# Patient Record
Sex: MALE | Race: WHITE | Employment: UNEMPLOYED | URBAN - METROPOLITAN AREA
[De-identification: names, ages, dates, MRNs, and addresses within clinical notes are randomized per-mention and may not be internally consistent; named-entity substitution may affect disease eponyms.]

---

## 2018-02-15 ENCOUNTER — HOSPITAL ENCOUNTER (INPATIENT)
Age: 48
LOS: 1 days | Discharge: ANOTHER HOSPITAL | DRG: 950 | End: 2018-02-16
Attending: EMERGENCY MEDICINE | Admitting: INTERNAL MEDICINE
Payer: COMMERCIAL

## 2018-02-15 ENCOUNTER — APPOINTMENT (OUTPATIENT)
Dept: CT IMAGING | Age: 48
DRG: 950 | End: 2018-02-15
Attending: EMERGENCY MEDICINE
Payer: COMMERCIAL

## 2018-02-15 DIAGNOSIS — E78.1 HYPERTRIGLYCERIDEMIA: ICD-10-CM

## 2018-02-15 DIAGNOSIS — K85.80 OTHER ACUTE PANCREATITIS WITHOUT INFECTION OR NECROSIS: Primary | ICD-10-CM

## 2018-02-15 DIAGNOSIS — R73.9 HYPERGLYCEMIA: ICD-10-CM

## 2018-02-15 PROBLEM — I25.10 CAD (CORONARY ARTERY DISEASE): Status: ACTIVE | Noted: 2018-02-15

## 2018-02-15 PROBLEM — E87.29 HIGH ANION GAP METABOLIC ACIDOSIS: Status: ACTIVE | Noted: 2018-02-15

## 2018-02-15 PROBLEM — D72.829 LEUKOCYTOSIS: Status: ACTIVE | Noted: 2018-02-15

## 2018-02-15 PROBLEM — K85.90 ACUTE PANCREATITIS: Status: ACTIVE | Noted: 2018-02-15

## 2018-02-15 PROBLEM — E11.10 DKA (DIABETIC KETOACIDOSES): Status: ACTIVE | Noted: 2018-02-15

## 2018-02-15 LAB
ALBUMIN SERPL-MCNC: 3.9 G/DL (ref 3.5–5)
ALBUMIN/GLOB SERPL: 0.8 {RATIO} (ref 1.2–3.5)
ALP SERPL-CCNC: 99 U/L (ref 50–136)
ALT SERPL-CCNC: 30 U/L (ref 12–65)
ANION GAP SERPL CALC-SCNC: 18 MMOL/L (ref 7–16)
AST SERPL-CCNC: 21 U/L (ref 15–37)
ATRIAL RATE: 107 BPM
BILIRUB SERPL-MCNC: 0.9 MG/DL (ref 0.2–1.1)
BUN SERPL-MCNC: 12 MG/DL (ref 6–23)
CALCIUM SERPL-MCNC: 9.1 MG/DL (ref 8.3–10.4)
CALCULATED P AXIS, ECG09: 22 DEGREES
CALCULATED R AXIS, ECG10: 58 DEGREES
CALCULATED T AXIS, ECG11: 23 DEGREES
CHLORIDE SERPL-SCNC: 101 MMOL/L (ref 98–107)
CHOLEST SERPL-MCNC: 612 MG/DL
CO2 SERPL-SCNC: 16 MMOL/L (ref 21–32)
CREAT SERPL-MCNC: 0.77 MG/DL (ref 0.8–1.5)
DIAGNOSIS, 93000: NORMAL
DIFFERENTIAL METHOD BLD: ABNORMAL
ERYTHROCYTE [DISTWIDTH] IN BLOOD BY AUTOMATED COUNT: 12.5 % (ref 11.9–14.6)
GLOBULIN SER CALC-MCNC: 4.9 G/DL
GLUCOSE BLD STRIP.AUTO-MCNC: 218 MG/DL (ref 65–100)
GLUCOSE SERPL-MCNC: 249 MG/DL (ref 65–100)
HCT VFR BLD AUTO: 46.2 % (ref 41.1–50.3)
HDLC SERPL-MCNC: 42 MG/DL (ref 40–60)
HDLC SERPL: 14.6 {RATIO}
HGB BLD-MCNC: 17.3 G/DL (ref 13.6–17.2)
LACTATE SERPL-SCNC: 0.7 MMOL/L (ref 0.4–2)
LDLC SERPL CALC-MCNC: ABNORMAL MG/DL
LDLC SERPL DIRECT ASSAY-MCNC: 67 MG/DL
LIPASE SERPL-CCNC: 1568 U/L (ref 73–393)
LIPID PROFILE,FLP: ABNORMAL
LYMPHOCYTES # BLD: 1.4 K/UL (ref 0.5–4.6)
LYMPHOCYTES NFR BLD MANUAL: 9 % (ref 16–44)
MCH RBC QN AUTO: 32.3 PG (ref 26.1–32.9)
MCHC RBC AUTO-ENTMCNC: 37.4 G/DL (ref 31.4–35)
MCV RBC AUTO: 86.4 FL (ref 79.6–97.8)
MONOCYTES # BLD: 2.7 K/UL (ref 0.1–1.3)
MONOCYTES NFR BLD MANUAL: 17 % (ref 3–9)
NEUTS SEG # BLD: 11.7 K/UL (ref 1.7–8.2)
NEUTS SEG NFR BLD MANUAL: 74 % (ref 47–75)
P-R INTERVAL, ECG05: 142 MS
PLATELET # BLD AUTO: 224 K/UL (ref 150–450)
PLATELET COMMENTS,PCOM: ADEQUATE
PMV BLD AUTO: 11.5 FL (ref 10.8–14.1)
POTASSIUM SERPL-SCNC: 3.9 MMOL/L (ref 3.5–5.1)
PROT SERPL-MCNC: 8.8 G/DL (ref 6.3–8.2)
Q-T INTERVAL, ECG07: 330 MS
QRS DURATION, ECG06: 96 MS
QTC CALCULATION (BEZET), ECG08: 440 MS
RBC # BLD AUTO: 5.35 M/UL (ref 4.23–5.67)
RBC MORPH BLD: ABNORMAL
SODIUM SERPL-SCNC: 135 MMOL/L (ref 136–145)
TRIGL SERPL-MCNC: 6306 MG/DL (ref 35–150)
TROPONIN I BLD-MCNC: 0 NG/ML (ref 0.02–0.05)
VENTRICULAR RATE, ECG03: 107 BPM
VLDLC SERPL CALC-MCNC: 1261.2 MG/DL (ref 7–27)
WBC # BLD AUTO: 15.8 K/UL (ref 4.3–11.1)

## 2018-02-15 PROCEDURE — 82962 GLUCOSE BLOOD TEST: CPT

## 2018-02-15 PROCEDURE — 65270000029 HC RM PRIVATE

## 2018-02-15 PROCEDURE — 96374 THER/PROPH/DIAG INJ IV PUSH: CPT | Performed by: EMERGENCY MEDICINE

## 2018-02-15 PROCEDURE — 93005 ELECTROCARDIOGRAM TRACING: CPT | Performed by: EMERGENCY MEDICINE

## 2018-02-15 PROCEDURE — 81003 URINALYSIS AUTO W/O SCOPE: CPT | Performed by: EMERGENCY MEDICINE

## 2018-02-15 PROCEDURE — 83605 ASSAY OF LACTIC ACID: CPT | Performed by: INTERNAL MEDICINE

## 2018-02-15 PROCEDURE — 74011250636 HC RX REV CODE- 250/636: Performed by: EMERGENCY MEDICINE

## 2018-02-15 PROCEDURE — 96375 TX/PRO/DX INJ NEW DRUG ADDON: CPT | Performed by: EMERGENCY MEDICINE

## 2018-02-15 PROCEDURE — 85025 COMPLETE CBC W/AUTO DIFF WBC: CPT | Performed by: EMERGENCY MEDICINE

## 2018-02-15 PROCEDURE — 83036 HEMOGLOBIN GLYCOSYLATED A1C: CPT | Performed by: INTERNAL MEDICINE

## 2018-02-15 PROCEDURE — 80061 LIPID PANEL: CPT | Performed by: INTERNAL MEDICINE

## 2018-02-15 PROCEDURE — 99285 EMERGENCY DEPT VISIT HI MDM: CPT | Performed by: EMERGENCY MEDICINE

## 2018-02-15 PROCEDURE — 84484 ASSAY OF TROPONIN QUANT: CPT

## 2018-02-15 PROCEDURE — 83690 ASSAY OF LIPASE: CPT | Performed by: EMERGENCY MEDICINE

## 2018-02-15 PROCEDURE — 80053 COMPREHEN METABOLIC PANEL: CPT | Performed by: EMERGENCY MEDICINE

## 2018-02-15 PROCEDURE — 83721 ASSAY OF BLOOD LIPOPROTEIN: CPT | Performed by: INTERNAL MEDICINE

## 2018-02-15 PROCEDURE — 74176 CT ABD & PELVIS W/O CONTRAST: CPT

## 2018-02-15 PROCEDURE — 74011250636 HC RX REV CODE- 250/636: Performed by: INTERNAL MEDICINE

## 2018-02-15 RX ORDER — GUAIFENESIN 100 MG/5ML
81 LIQUID (ML) ORAL DAILY
Status: DISCONTINUED | OUTPATIENT
Start: 2018-02-16 | End: 2018-02-16 | Stop reason: HOSPADM

## 2018-02-15 RX ORDER — SODIUM CHLORIDE 0.9 % (FLUSH) 0.9 %
5-10 SYRINGE (ML) INJECTION AS NEEDED
Status: DISCONTINUED | OUTPATIENT
Start: 2018-02-15 | End: 2018-02-16 | Stop reason: HOSPADM

## 2018-02-15 RX ORDER — KETOROLAC TROMETHAMINE 30 MG/ML
30 INJECTION, SOLUTION INTRAMUSCULAR; INTRAVENOUS
Status: COMPLETED | OUTPATIENT
Start: 2018-02-15 | End: 2018-02-15

## 2018-02-15 RX ORDER — HYDRALAZINE HYDROCHLORIDE 20 MG/ML
10 INJECTION INTRAMUSCULAR; INTRAVENOUS
Status: DISCONTINUED | OUTPATIENT
Start: 2018-02-15 | End: 2018-02-16 | Stop reason: HOSPADM

## 2018-02-15 RX ORDER — INSULIN LISPRO 100 [IU]/ML
INJECTION, SOLUTION INTRAVENOUS; SUBCUTANEOUS
Status: DISCONTINUED | OUTPATIENT
Start: 2018-02-15 | End: 2018-02-15

## 2018-02-15 RX ORDER — ONDANSETRON 2 MG/ML
4 INJECTION INTRAMUSCULAR; INTRAVENOUS
Status: DISCONTINUED | OUTPATIENT
Start: 2018-02-15 | End: 2018-02-16 | Stop reason: HOSPADM

## 2018-02-15 RX ORDER — ACETAMINOPHEN 325 MG/1
650 TABLET ORAL
Status: DISCONTINUED | OUTPATIENT
Start: 2018-02-15 | End: 2018-02-16 | Stop reason: HOSPADM

## 2018-02-15 RX ORDER — SODIUM CHLORIDE, SODIUM LACTATE, POTASSIUM CHLORIDE, CALCIUM CHLORIDE 600; 310; 30; 20 MG/100ML; MG/100ML; MG/100ML; MG/100ML
1000 INJECTION, SOLUTION INTRAVENOUS ONCE
Status: ACTIVE | OUTPATIENT
Start: 2018-02-15 | End: 2018-02-16

## 2018-02-15 RX ORDER — AMLODIPINE BESYLATE 10 MG/1
10 TABLET ORAL DAILY
Status: DISCONTINUED | OUTPATIENT
Start: 2018-02-16 | End: 2018-02-16 | Stop reason: HOSPADM

## 2018-02-15 RX ORDER — HEPARIN SODIUM 5000 [USP'U]/ML
5000 INJECTION, SOLUTION INTRAVENOUS; SUBCUTANEOUS EVERY 12 HOURS
Status: DISCONTINUED | OUTPATIENT
Start: 2018-02-15 | End: 2018-02-16 | Stop reason: HOSPADM

## 2018-02-15 RX ORDER — ATORVASTATIN CALCIUM 40 MG/1
80 TABLET, FILM COATED ORAL
Status: DISCONTINUED | OUTPATIENT
Start: 2018-02-15 | End: 2018-02-16 | Stop reason: HOSPADM

## 2018-02-15 RX ORDER — INSULIN LISPRO 100 [IU]/ML
INJECTION, SOLUTION INTRAVENOUS; SUBCUTANEOUS
Status: DISCONTINUED | OUTPATIENT
Start: 2018-02-16 | End: 2018-02-15

## 2018-02-15 RX ORDER — ONDANSETRON 2 MG/ML
4 INJECTION INTRAMUSCULAR; INTRAVENOUS
Status: COMPLETED | OUTPATIENT
Start: 2018-02-15 | End: 2018-02-15

## 2018-02-15 RX ORDER — SODIUM CHLORIDE 0.9 % (FLUSH) 0.9 %
5-10 SYRINGE (ML) INJECTION EVERY 8 HOURS
Status: DISCONTINUED | OUTPATIENT
Start: 2018-02-15 | End: 2018-02-16 | Stop reason: HOSPADM

## 2018-02-15 RX ORDER — HYDROMORPHONE HYDROCHLORIDE 2 MG/ML
0.5 INJECTION, SOLUTION INTRAMUSCULAR; INTRAVENOUS; SUBCUTANEOUS
Status: COMPLETED | OUTPATIENT
Start: 2018-02-15 | End: 2018-02-15

## 2018-02-15 RX ORDER — DEXTROSE MONOHYDRATE, SODIUM CHLORIDE, SODIUM LACTATE, POTASSIUM CHLORIDE, CALCIUM CHLORIDE 5; 600; 310; 179; 20 G/100ML; MG/100ML; MG/100ML; MG/100ML; MG/100ML
INJECTION, SOLUTION INTRAVENOUS CONTINUOUS
Status: DISCONTINUED | OUTPATIENT
Start: 2018-02-15 | End: 2018-02-16 | Stop reason: HOSPADM

## 2018-02-15 RX ORDER — NALOXONE HYDROCHLORIDE 0.4 MG/ML
0.4 INJECTION, SOLUTION INTRAMUSCULAR; INTRAVENOUS; SUBCUTANEOUS
Status: DISCONTINUED | OUTPATIENT
Start: 2018-02-15 | End: 2018-02-16 | Stop reason: HOSPADM

## 2018-02-15 RX ORDER — KETOROLAC TROMETHAMINE 30 MG/ML
15 INJECTION, SOLUTION INTRAMUSCULAR; INTRAVENOUS
Status: DISCONTINUED | OUTPATIENT
Start: 2018-02-15 | End: 2018-02-16 | Stop reason: HOSPADM

## 2018-02-15 RX ORDER — NITROGLYCERIN 0.4 MG/1
0.4 TABLET SUBLINGUAL
Status: DISCONTINUED | OUTPATIENT
Start: 2018-02-15 | End: 2018-02-16 | Stop reason: HOSPADM

## 2018-02-15 RX ORDER — SODIUM CHLORIDE 9 MG/ML
150 INJECTION, SOLUTION INTRAVENOUS CONTINUOUS
Status: DISCONTINUED | OUTPATIENT
Start: 2018-02-15 | End: 2018-02-16

## 2018-02-15 RX ORDER — HYDROMORPHONE HCL IN 0.9% NACL 50 MG/50ML
0.5 PLASTIC BAG, INJECTION (ML) INJECTION
Status: DISCONTINUED | OUTPATIENT
Start: 2018-02-15 | End: 2018-02-16 | Stop reason: HOSPADM

## 2018-02-15 RX ADMIN — Medication 0.5 MG: at 21:25

## 2018-02-15 RX ADMIN — ONDANSETRON 4 MG: 2 INJECTION INTRAMUSCULAR; INTRAVENOUS at 21:09

## 2018-02-15 RX ADMIN — ONDANSETRON 4 MG: 2 INJECTION INTRAMUSCULAR; INTRAVENOUS at 16:39

## 2018-02-15 RX ADMIN — KETOROLAC TROMETHAMINE 30 MG: 30 INJECTION, SOLUTION INTRAMUSCULAR at 16:39

## 2018-02-15 RX ADMIN — SODIUM CHLORIDE 150 ML/HR: 900 INJECTION, SOLUTION INTRAVENOUS at 21:25

## 2018-02-15 RX ADMIN — HYDROMORPHONE HYDROCHLORIDE 0.5 MG: 2 INJECTION, SOLUTION INTRAMUSCULAR; INTRAVENOUS; SUBCUTANEOUS at 16:39

## 2018-02-15 NOTE — ED PROVIDER NOTES
HPI Comments: 28-year-old gentleman with 24-48 hours history of back pain that he seems to feel somewhat worse on the right. Worse with motion. Not pleuritic and there is no cough fever or shortness of breath. The pain does not radiate into his legs. Pain does seem to radiate around the abdomen. He does not have any urinary symptoms. He went to urgent care. They stated he had an abnormal urine needed to come here. No history kidney stones. Has a history of hypertension, borderline diabetes and coronary artery disease with stenting in the past.    Patient is a 41 y.o. male presenting with abnormal lab results and back pain. The history is provided by the patient. Abnormal Lab Results   Associated symptoms include abdominal pain. Pertinent negatives include no chest pain, no headaches and no shortness of breath. Back Pain    This is a new problem. The current episode started 2 days ago. The problem has not changed since onset. The problem occurs constantly. The pain is associated with no known injury. The pain is present in the middle back and right side. The pain is moderate. Associated symptoms include abdominal pain. Pertinent negatives include no chest pain, no fever, no headaches, no dysuria, no leg pain, no paresthesias and no weakness. He has tried nothing for the symptoms.         Past Medical History:   Diagnosis Date    Acute coronary syndrome ([address] 12/15/2016    Arthritis     psoriatic    Endocrine disease     Hypertension     Psoriatic arthropathy (HCC)        Past Surgical History:   Procedure Laterality Date    HX APPENDECTOMY      HX CHOLECYSTECTOMY      HX ORTHOPAEDIC      left heel secondary to trauma         Family History:   Problem Relation Age of Onset    Cancer Mother     Thyroid Disease Mother     Diabetes Father     Heart Disease Father        Social History     Social History    Marital status:      Spouse name: N/A    Number of children: N/A    Years of education: N/A     Occupational History    Not on file. Social History Main Topics    Smoking status: Current Every Day Smoker     Packs/day: 0.25     Years: 30.00    Smokeless tobacco: Never Used    Alcohol use No    Drug use: No    Sexual activity: Not Currently     Partners: Female     Other Topics Concern     Service No    Blood Transfusions No    Caffeine Concern No    Occupational Exposure No    Hobby Hazards No    Sleep Concern No    Stress Concern Yes    Weight Concern Yes    Special Diet No    Back Care No    Exercise No    Bike Helmet No    Seat Belt Yes    Self-Exams No     Social History Narrative         ALLERGIES: Morphine    Review of Systems   Constitutional: Negative for chills and fever. Respiratory: Negative for cough and shortness of breath. Cardiovascular: Negative for chest pain and palpitations. Gastrointestinal: Positive for abdominal pain. Negative for diarrhea and vomiting. Genitourinary: Negative for dysuria and flank pain. Musculoskeletal: Positive for back pain. Negative for neck pain. Skin: Negative for color change and rash. Neurological: Negative for syncope, weakness, headaches and paresthesias. All other systems reviewed and are negative. Vitals:    02/15/18 1447   BP: (!) 166/103   Pulse: (!) 112   Resp: 18   Temp: 98.1 °F (36.7 °C)   SpO2: 96%   Weight: 121.1 kg (267 lb)   Height: 5' 11\" (1.803 m)            Physical Exam   Constitutional: He is oriented to person, place, and time. He appears well-developed and well-nourished. No distress. HENT:   Head: Normocephalic and atraumatic. Mouth/Throat: Oropharynx is clear and moist. No oropharyngeal exudate. Eyes: Conjunctivae and EOM are normal. Pupils are equal, round, and reactive to light. Neck: Normal range of motion. Neck supple. Cardiovascular: Normal rate, regular rhythm and intact distal pulses. No murmur heard.   Pulmonary/Chest: Breath sounds normal. No respiratory distress. Abdominal: Soft. Bowel sounds are normal. He exhibits no mass. There is no tenderness. There is CVA tenderness. There is no rebound and no guarding. No hernia. Musculoskeletal:        Arms:  Neurological: He is alert and oriented to person, place, and time. Gait normal.   Nl speech   Skin: Skin is warm and dry. Psychiatric: He has a normal mood and affect. His speech is normal.   Nursing note and vitals reviewed. MDM  Number of Diagnoses or Management Options  Diagnosis management comments: Possibility of mechanical back pain. Possibility of kidney stone. Check urinalysis and screening blood work. Amount and/or Complexity of Data Reviewed  Clinical lab tests: ordered and reviewed  Tests in the radiology section of CPT®: ordered and reviewed  Independent visualization of images, tracings, or specimens: yes    Risk of Complications, Morbidity, and/or Mortality  Presenting problems: moderate  Diagnostic procedures: minimal  Management options: low    Patient Progress  Patient progress: stable        ED Course       Procedures    Ct Urogram Wo Cont    Result Date: 2/15/2018  NONCONTRAST CT ABDOMEN AND PELVIS: CLINICAL HISTORY:  Bilateral flank pain for 4 days in a 44-year-old with a history of appendectomy and cholecystectomy. Now with leukocytosis. TECHNIQUE:  Without contrast administration, the abdomen and pelvis were scanned with spiral technique. COMPARISON:  None. FINDINGS:  The lung bases are clear. No calcified stone is seen in either kidney or ureter, and there is no significant hydronephrosis. No biliary duct dilatation status post cholecystectomy. Diffusely decreased attenuation of the hepatic parenchyma suggests fatty infiltration. No focal liver lesion is seen. There is soft tissue stranding of the peripancreatic fat at the head of the pancreas suggesting possible pancreatitis. No pseudocyst is identified.   The spleen, and adrenals appear unremarkable without contrast.  No pathologically enlarged lymph nodes or free fluid is evident. Bone windows demonstrate no aggressive process accounting for scattered degenerative changes. IMPRESSION:  1. Findings suspicious for mild pancreatitis without pseudocyst or other complication identified. 2.  No calcified urinary stone, obstruction, or other acute abdominopelvic abnormality identified. Ct Urogram Wo Cont    Result Date: 2/15/2018  NONCONTRAST CT ABDOMEN AND PELVIS: CLINICAL HISTORY:  Bilateral flank pain for 4 days in a 53-year-old with a history of appendectomy and cholecystectomy. Now with leukocytosis. TECHNIQUE:  Without contrast administration, the abdomen and pelvis were scanned with spiral technique. COMPARISON:  None. FINDINGS:  The lung bases are clear. No calcified stone is seen in either kidney or ureter, and there is no significant hydronephrosis. No biliary duct dilatation status post cholecystectomy. Diffusely decreased attenuation of the hepatic parenchyma suggests fatty infiltration. No focal liver lesion is seen. There is soft tissue stranding of the peripancreatic fat at the head of the pancreas suggesting possible pancreatitis. No pseudocyst is identified. The spleen, and adrenals appear unremarkable without contrast.  No pathologically enlarged lymph nodes or free fluid is evident. Bone windows demonstrate no aggressive process accounting for scattered degenerative changes. IMPRESSION:  1. Findings suspicious for mild pancreatitis without pseudocyst or other complication identified. 2.  No calcified urinary stone, obstruction, or other acute abdominopelvic abnormality identified.       Results Include:    Recent Results (from the past 24 hour(s))   EKG, 12 LEAD, INITIAL    Collection Time: 02/15/18  2:56 PM   Result Value Ref Range    Ventricular Rate 107 BPM    Atrial Rate 107 BPM    P-R Interval 142 ms    QRS Duration 96 ms    Q-T Interval 330 ms    QTC Calculation (Bezet) 440 ms Calculated P Axis 22 degrees    Calculated R Axis 58 degrees    Calculated T Axis 23 degrees    Diagnosis       Sinus tachycardia  Otherwise normal ECG  When compared with ECG of 16-DEC-2016 06:15,  Nonspecific T wave abnormality now evident in Inferior leads  Confirmed by Chandni Delatorre [id number]) on 2/15/2018 3:26:41 PM     POC TROPONIN-I    Collection Time: 02/15/18  3:01 PM   Result Value Ref Range    Troponin-I (POC) 0 (L) 0.02 - 0.05 ng/ml   CBC WITH AUTOMATED DIFF    Collection Time: 02/15/18  3:12 PM   Result Value Ref Range    WBC 15.8 (H) 4.3 - 11.1 K/uL    RBC 5.35 4.23 - 5.67 M/uL    HGB 17.3 (H) 13.6 - 17.2 g/dL    HCT 46.2 41.1 - 50.3 %    MCV 86.4 79.6 - 97.8 FL    MCH 32.3 26.1 - 32.9 PG    MCHC 37.4 (H) 31.4 - 35.0 g/dL    RDW 12.5 11.9 - 14.6 %    PLATELET 891 262 - 539 K/uL    MPV 11.5 10.8 - 14.1 FL    NEUTROPHILS 74 47 - 75 %    LYMPHOCYTES 9 (L) 16 - 44 %    MONOCYTES 17 (H) 3 - 9 %    ABS. NEUTROPHILS 11.7 (H) 1.7 - 8.2 K/UL    ABS. LYMPHOCYTES 1.4 0.5 - 4.6 K/UL    ABS. MONOCYTES 2.7 (H) 0.1 - 1.3 K/UL    RBC COMMENTS SLIGHT  ANISOCYTOSIS + POIKILOCYTOSIS        PLATELET COMMENTS ADEQUATE      DF MANUAL     METABOLIC PANEL, COMPREHENSIVE    Collection Time: 02/15/18  3:12 PM   Result Value Ref Range    Sodium 135 (L) 136 - 145 mmol/L    Potassium 3.9 3.5 - 5.1 mmol/L    Chloride 101 98 - 107 mmol/L    CO2 16 (L) 21 - 32 mmol/L    Anion gap 18 (H) 7 - 16 mmol/L    Glucose 249 (H) 65 - 100 mg/dL    BUN 12 6 - 23 MG/DL    Creatinine 0.77 (L) 0.8 - 1.5 MG/DL    GFR est AA >60 >60 ml/min/1.73m2    GFR est non-AA >60 >60 ml/min/1.73m2    Calcium 9.1 8.3 - 10.4 MG/DL    Bilirubin, total 0.9 0.2 - 1.1 MG/DL    ALT (SGPT) 30 12 - 65 U/L    AST (SGOT) 21 15 - 37 U/L    Alk. phosphatase 99 50 - 136 U/L    Protein, total 8.8 (H) 6.3 - 8.2 g/dL    Albumin 3.9 3.5 - 5.0 g/dL    Globulin 4.9 g/dL    A-G Ratio 0.8 (L) 1.2 - 3.5         .     urine has ketones, glucose.   Concern for early DKA with low CO2 and high anion gap. Suspect pancreatitis is from the lipids. Spoke with the hospice regarding an observation admission.

## 2018-02-16 ENCOUNTER — HOSPITAL ENCOUNTER (INPATIENT)
Dept: INTERVENTIONAL RADIOLOGY/VASCULAR | Age: 48
LOS: 2 days | Discharge: HOME | DRG: 950 | End: 2018-02-18
Attending: INTERNAL MEDICINE | Admitting: INTERNAL MEDICINE
Payer: COMMERCIAL

## 2018-02-16 VITALS
OXYGEN SATURATION: 96 % | WEIGHT: 267 LBS | HEART RATE: 111 BPM | TEMPERATURE: 98.1 F | RESPIRATION RATE: 20 BRPM | BODY MASS INDEX: 37.38 KG/M2 | DIASTOLIC BLOOD PRESSURE: 97 MMHG | HEIGHT: 71 IN | SYSTOLIC BLOOD PRESSURE: 159 MMHG

## 2018-02-16 DIAGNOSIS — I10 ACCELERATED HYPERTENSION: ICD-10-CM

## 2018-02-16 DIAGNOSIS — E78.1 HYPERTRIGLYCERIDEMIA: ICD-10-CM

## 2018-02-16 LAB
ALBUMIN SERPL-MCNC: 2.8 G/DL (ref 3.5–5)
ALBUMIN SERPL-MCNC: 2.9 G/DL (ref 3.5–5)
ALBUMIN/GLOB SERPL: 0.5 {RATIO} (ref 1.2–3.5)
ALBUMIN/GLOB SERPL: 0.6 {RATIO} (ref 1.2–3.5)
ALP SERPL-CCNC: 89 U/L (ref 50–136)
ALP SERPL-CCNC: 90 U/L (ref 50–136)
ALT SERPL-CCNC: 35 U/L (ref 12–65)
ALT SERPL-CCNC: 47 U/L (ref 12–65)
ANION GAP SERPL CALC-SCNC: 10 MMOL/L (ref 7–16)
ANION GAP SERPL CALC-SCNC: 11 MMOL/L (ref 7–16)
ANION GAP SERPL CALC-SCNC: 13 MMOL/L (ref 7–16)
ANION GAP SERPL CALC-SCNC: 17 MMOL/L (ref 7–16)
ANION GAP SERPL CALC-SCNC: 18 MMOL/L (ref 7–16)
APTT PPP: 37.5 SEC (ref 23.2–35.3)
AST SERPL-CCNC: 55 U/L (ref 15–37)
AST SERPL-CCNC: 82 U/L (ref 15–37)
BILIRUB DIRECT SERPL-MCNC: <0.1 MG/DL
BILIRUB DIRECT SERPL-MCNC: <0.1 MG/DL (ref 0–0.3)
BILIRUB SERPL-MCNC: 0.6 MG/DL (ref 0.2–1.1)
BILIRUB SERPL-MCNC: 1.1 MG/DL (ref 0.2–1.1)
BUN SERPL-MCNC: 13 MG/DL (ref 6–23)
BUN SERPL-MCNC: 14 MG/DL (ref 6–23)
BUN SERPL-MCNC: 15 MG/DL (ref 6–23)
CALCIUM SERPL-MCNC: 8.8 MG/DL (ref 8.3–10.4)
CALCIUM SERPL-MCNC: 8.9 MG/DL (ref 8.3–10.4)
CHLORIDE SERPL-SCNC: 100 MMOL/L (ref 98–107)
CHLORIDE SERPL-SCNC: 103 MMOL/L (ref 98–107)
CHLORIDE SERPL-SCNC: 107 MMOL/L (ref 98–107)
CHLORIDE SERPL-SCNC: 108 MMOL/L (ref 98–107)
CHLORIDE SERPL-SCNC: 110 MMOL/L (ref 98–107)
CO2 SERPL-SCNC: 14 MMOL/L (ref 21–32)
CO2 SERPL-SCNC: 15 MMOL/L (ref 21–32)
CO2 SERPL-SCNC: 15 MMOL/L (ref 21–32)
CO2 SERPL-SCNC: 19 MMOL/L (ref 21–32)
CO2 SERPL-SCNC: 19 MMOL/L (ref 21–32)
CREAT SERPL-MCNC: 0.68 MG/DL (ref 0.8–1.5)
CREAT SERPL-MCNC: 0.7 MG/DL (ref 0.8–1.5)
CREAT SERPL-MCNC: 0.74 MG/DL (ref 0.8–1.5)
CREAT SERPL-MCNC: 0.83 MG/DL (ref 0.8–1.5)
CREAT SERPL-MCNC: 0.89 MG/DL (ref 0.8–1.5)
DIFFERENTIAL METHOD BLD: ABNORMAL
ERYTHROCYTE [DISTWIDTH] IN BLOOD BY AUTOMATED COUNT: 13.1 % (ref 11.9–14.6)
EST. AVERAGE GLUCOSE BLD GHB EST-MCNC: 318 MG/DL
FIBRINOGEN PPP-MCNC: 858 MG/DL (ref 190–501)
GLOBULIN SER CALC-MCNC: 4.8 G/DL (ref 2.3–3.5)
GLOBULIN SER CALC-MCNC: 5.7 G/DL (ref 2.3–3.5)
GLUCOSE BLD STRIP.AUTO-MCNC: 142 MG/DL (ref 65–100)
GLUCOSE BLD STRIP.AUTO-MCNC: 160 MG/DL (ref 65–100)
GLUCOSE BLD STRIP.AUTO-MCNC: 168 MG/DL (ref 65–100)
GLUCOSE BLD STRIP.AUTO-MCNC: 184 MG/DL (ref 65–100)
GLUCOSE BLD STRIP.AUTO-MCNC: 195 MG/DL (ref 65–100)
GLUCOSE BLD STRIP.AUTO-MCNC: 196 MG/DL (ref 65–100)
GLUCOSE BLD STRIP.AUTO-MCNC: 198 MG/DL (ref 65–100)
GLUCOSE BLD STRIP.AUTO-MCNC: 216 MG/DL (ref 65–100)
GLUCOSE BLD STRIP.AUTO-MCNC: 222 MG/DL (ref 65–100)
GLUCOSE BLD STRIP.AUTO-MCNC: 230 MG/DL (ref 65–100)
GLUCOSE BLD STRIP.AUTO-MCNC: 245 MG/DL (ref 65–100)
GLUCOSE BLD STRIP.AUTO-MCNC: 279 MG/DL (ref 65–100)
GLUCOSE SERPL-MCNC: 182 MG/DL (ref 65–100)
GLUCOSE SERPL-MCNC: 201 MG/DL (ref 65–100)
GLUCOSE SERPL-MCNC: 226 MG/DL (ref 65–100)
GLUCOSE SERPL-MCNC: 244 MG/DL (ref 65–100)
GLUCOSE SERPL-MCNC: 297 MG/DL (ref 65–100)
HBA1C MFR BLD: 12.7 % (ref 4.8–6)
HCT VFR BLD AUTO: 44.7 % (ref 41.1–50.3)
HGB BLD-MCNC: 15.7 G/DL (ref 13.6–17.2)
INR BLD: 1.4 (ref 0.9–1.2)
LDLC SERPL DIRECT ASSAY-MCNC: 487 MG/DL
LYMPHOCYTES # BLD: 2.3 K/UL (ref 0.5–4.6)
LYMPHOCYTES NFR BLD MANUAL: 13 % (ref 16–44)
MAGNESIUM SERPL-MCNC: 2.4 MG/DL (ref 1.8–2.4)
MCH RBC QN AUTO: 30.9 PG (ref 26.1–32.9)
MCHC RBC AUTO-ENTMCNC: 35.1 G/DL (ref 31.4–35)
MCV RBC AUTO: 88 FL (ref 79.6–97.8)
MONOCYTES # BLD: 1.6 K/UL (ref 0.1–1.3)
MONOCYTES NFR BLD MANUAL: 9 % (ref 3–9)
NEUTS BAND NFR BLD MANUAL: 15 % (ref 0–6)
NEUTS SEG # BLD: 13.8 K/UL (ref 1.7–8.2)
NEUTS SEG NFR BLD MANUAL: 63 % (ref 47–75)
PLATELET # BLD AUTO: 222 K/UL (ref 150–450)
PLATELET COMMENTS,PCOM: ADEQUATE
PMV BLD AUTO: 11.8 FL (ref 10.8–14.1)
POTASSIUM SERPL-SCNC: 4.7 MMOL/L (ref 3.5–5.1)
POTASSIUM SERPL-SCNC: 4.7 MMOL/L (ref 3.5–5.1)
POTASSIUM SERPL-SCNC: 4.9 MMOL/L (ref 3.5–5.1)
POTASSIUM SERPL-SCNC: 5 MMOL/L (ref 3.5–5.1)
POTASSIUM SERPL-SCNC: 5.6 MMOL/L (ref 3.5–5.1)
PROT SERPL-MCNC: 7.6 G/DL (ref 6.3–8.2)
PROT SERPL-MCNC: 8.6 G/DL (ref 6.3–8.2)
PT BLD: 16.9 SECS (ref 9.6–11.6)
RBC # BLD AUTO: 5.08 M/UL (ref 4.23–5.67)
SODIUM SERPL-SCNC: 131 MMOL/L (ref 136–145)
SODIUM SERPL-SCNC: 135 MMOL/L (ref 136–145)
SODIUM SERPL-SCNC: 136 MMOL/L (ref 136–145)
SODIUM SERPL-SCNC: 137 MMOL/L (ref 136–145)
SODIUM SERPL-SCNC: 140 MMOL/L (ref 136–145)
TRIGL SERPL-MCNC: >1000 MG/DL (ref 35–150)
TRIGL SERPL-MCNC: >400 MG/DL (ref 35–150)
WBC # BLD AUTO: 17.7 K/UL (ref 4.3–11.1)
WBC MORPH BLD: SLIGHT

## 2018-02-16 PROCEDURE — 02H633Z INSERTION OF INFUSION DEVICE INTO RIGHT ATRIUM, PERCUTANEOUS APPROACH: ICD-10-PCS | Performed by: RADIOLOGY

## 2018-02-16 PROCEDURE — 74011636637 HC RX REV CODE- 636/637: Performed by: INTERNAL MEDICINE

## 2018-02-16 PROCEDURE — 6A550Z3 PHERESIS OF PLASMA, SINGLE: ICD-10-PCS | Performed by: NURSE PRACTITIONER

## 2018-02-16 PROCEDURE — 74011000250 HC RX REV CODE- 250: Performed by: RADIOLOGY

## 2018-02-16 PROCEDURE — 77030002916 HC SUT ETHLN J&J -A

## 2018-02-16 PROCEDURE — 36415 COLL VENOUS BLD VENIPUNCTURE: CPT | Performed by: INTERNAL MEDICINE

## 2018-02-16 PROCEDURE — 74011250637 HC RX REV CODE- 250/637: Performed by: INTERNAL MEDICINE

## 2018-02-16 PROCEDURE — 74011250636 HC RX REV CODE- 250/636: Performed by: INTERNAL MEDICINE

## 2018-02-16 PROCEDURE — 84478 ASSAY OF TRIGLYCERIDES: CPT | Performed by: INTERNAL MEDICINE

## 2018-02-16 PROCEDURE — 82962 GLUCOSE BLOOD TEST: CPT

## 2018-02-16 PROCEDURE — 74011000258 HC RX REV CODE- 258: Performed by: INTERNAL MEDICINE

## 2018-02-16 PROCEDURE — 85384 FIBRINOGEN ACTIVITY: CPT | Performed by: INTERNAL MEDICINE

## 2018-02-16 PROCEDURE — 74011000250 HC RX REV CODE- 250: Performed by: INTERNAL MEDICINE

## 2018-02-16 PROCEDURE — 77030018719 HC DRSG PTCH ANTIMIC J&J -A

## 2018-02-16 PROCEDURE — 85610 PROTHROMBIN TIME: CPT

## 2018-02-16 PROCEDURE — 74011250636 HC RX REV CODE- 250/636: Performed by: NURSE PRACTITIONER

## 2018-02-16 PROCEDURE — 80076 HEPATIC FUNCTION PANEL: CPT | Performed by: INTERNAL MEDICINE

## 2018-02-16 PROCEDURE — C1752 CATH,HEMODIALYSIS,SHORT-TERM: HCPCS

## 2018-02-16 PROCEDURE — P9045 ALBUMIN (HUMAN), 5%, 250 ML: HCPCS | Performed by: INTERNAL MEDICINE

## 2018-02-16 PROCEDURE — 36514 APHERESIS PLASMA: CPT

## 2018-02-16 PROCEDURE — 80048 BASIC METABOLIC PNL TOTAL CA: CPT | Performed by: INTERNAL MEDICINE

## 2018-02-16 PROCEDURE — 74011250636 HC RX REV CODE- 250/636: Performed by: RADIOLOGY

## 2018-02-16 PROCEDURE — 85025 COMPLETE CBC W/AUTO DIFF WBC: CPT | Performed by: INTERNAL MEDICINE

## 2018-02-16 PROCEDURE — 74011636637 HC RX REV CODE- 636/637: Performed by: FAMILY MEDICINE

## 2018-02-16 PROCEDURE — 85730 THROMBOPLASTIN TIME PARTIAL: CPT | Performed by: INTERNAL MEDICINE

## 2018-02-16 PROCEDURE — 99223 1ST HOSP IP/OBS HIGH 75: CPT | Performed by: INTERNAL MEDICINE

## 2018-02-16 PROCEDURE — B244ZZZ ULTRASONOGRAPHY OF RIGHT HEART: ICD-10-PCS | Performed by: RADIOLOGY

## 2018-02-16 PROCEDURE — 65660000000 HC RM CCU STEPDOWN

## 2018-02-16 PROCEDURE — 74011250636 HC RX REV CODE- 250/636

## 2018-02-16 PROCEDURE — C9113 INJ PANTOPRAZOLE SODIUM, VIA: HCPCS | Performed by: INTERNAL MEDICINE

## 2018-02-16 PROCEDURE — 83721 ASSAY OF BLOOD LIPOPROTEIN: CPT | Performed by: INTERNAL MEDICINE

## 2018-02-16 PROCEDURE — 83735 ASSAY OF MAGNESIUM: CPT | Performed by: INTERNAL MEDICINE

## 2018-02-16 PROCEDURE — 77001 FLUOROGUIDE FOR VEIN DEVICE: CPT

## 2018-02-16 PROCEDURE — C1894 INTRO/SHEATH, NON-LASER: HCPCS

## 2018-02-16 PROCEDURE — 80076 HEPATIC FUNCTION PANEL: CPT | Performed by: NURSE PRACTITIONER

## 2018-02-16 RX ORDER — NALOXONE HYDROCHLORIDE 0.4 MG/ML
0.4 INJECTION, SOLUTION INTRAMUSCULAR; INTRAVENOUS; SUBCUTANEOUS
Status: DISCONTINUED | OUTPATIENT
Start: 2018-02-16 | End: 2018-02-18 | Stop reason: HOSPADM

## 2018-02-16 RX ORDER — KETOROLAC TROMETHAMINE 15 MG/ML
15 INJECTION, SOLUTION INTRAMUSCULAR; INTRAVENOUS
Status: DISCONTINUED | OUTPATIENT
Start: 2018-02-16 | End: 2018-02-18 | Stop reason: HOSPADM

## 2018-02-16 RX ORDER — SODIUM CHLORIDE 0.9 % (FLUSH) 0.9 %
5-10 SYRINGE (ML) INJECTION AS NEEDED
Status: CANCELLED | OUTPATIENT
Start: 2018-02-16

## 2018-02-16 RX ORDER — GUAIFENESIN 100 MG/5ML
81 LIQUID (ML) ORAL DAILY
Status: CANCELLED | OUTPATIENT
Start: 2018-02-17

## 2018-02-16 RX ORDER — HYDROMORPHONE HYDROCHLORIDE 2 MG/ML
0.5 INJECTION, SOLUTION INTRAMUSCULAR; INTRAVENOUS; SUBCUTANEOUS
Status: DISCONTINUED | OUTPATIENT
Start: 2018-02-16 | End: 2018-02-18 | Stop reason: HOSPADM

## 2018-02-16 RX ORDER — GUAIFENESIN 100 MG/5ML
81 LIQUID (ML) ORAL DAILY
Status: DISCONTINUED | OUTPATIENT
Start: 2018-02-17 | End: 2018-02-18 | Stop reason: HOSPADM

## 2018-02-16 RX ORDER — DIPHENHYDRAMINE HYDROCHLORIDE 50 MG/ML
25 INJECTION, SOLUTION INTRAMUSCULAR; INTRAVENOUS ONCE
Status: COMPLETED | OUTPATIENT
Start: 2018-02-16 | End: 2018-02-16

## 2018-02-16 RX ORDER — LIDOCAINE HYDROCHLORIDE 20 MG/ML
20-200 INJECTION, SOLUTION INFILTRATION; PERINEURAL
Status: DISCONTINUED | OUTPATIENT
Start: 2018-02-16 | End: 2018-02-16 | Stop reason: ALTCHOICE

## 2018-02-16 RX ORDER — INSULIN GLARGINE 100 [IU]/ML
20 INJECTION, SOLUTION SUBCUTANEOUS ONCE
Status: COMPLETED | OUTPATIENT
Start: 2018-02-16 | End: 2018-02-16

## 2018-02-16 RX ORDER — HEPARIN SODIUM 5000 [USP'U]/ML
5000 INJECTION, SOLUTION INTRAVENOUS; SUBCUTANEOUS EVERY 12 HOURS
Status: DISCONTINUED | OUTPATIENT
Start: 2018-02-16 | End: 2018-02-18 | Stop reason: HOSPADM

## 2018-02-16 RX ORDER — DEXTROSE MONOHYDRATE, SODIUM CHLORIDE, SODIUM LACTATE, POTASSIUM CHLORIDE, CALCIUM CHLORIDE 5; 600; 310; 179; 20 G/100ML; MG/100ML; MG/100ML; MG/100ML; MG/100ML
INJECTION, SOLUTION INTRAVENOUS CONTINUOUS
Status: DISCONTINUED | OUTPATIENT
Start: 2018-02-16 | End: 2018-02-17

## 2018-02-16 RX ORDER — HYDRALAZINE HYDROCHLORIDE 20 MG/ML
10 INJECTION INTRAMUSCULAR; INTRAVENOUS
Status: DISCONTINUED | OUTPATIENT
Start: 2018-02-16 | End: 2018-02-18 | Stop reason: HOSPADM

## 2018-02-16 RX ORDER — ACETAMINOPHEN 325 MG/1
650 TABLET ORAL
Status: DISCONTINUED | OUTPATIENT
Start: 2018-02-16 | End: 2018-02-18 | Stop reason: HOSPADM

## 2018-02-16 RX ORDER — ATORVASTATIN CALCIUM 40 MG/1
80 TABLET, FILM COATED ORAL
Status: CANCELLED | OUTPATIENT
Start: 2018-02-16

## 2018-02-16 RX ORDER — DEXTROSE MONOHYDRATE, SODIUM CHLORIDE, SODIUM LACTATE, POTASSIUM CHLORIDE, CALCIUM CHLORIDE 5; 600; 310; 179; 20 G/100ML; MG/100ML; MG/100ML; MG/100ML; MG/100ML
INJECTION, SOLUTION INTRAVENOUS CONTINUOUS
Status: CANCELLED | OUTPATIENT
Start: 2018-02-16

## 2018-02-16 RX ORDER — ONDANSETRON 2 MG/ML
4 INJECTION INTRAMUSCULAR; INTRAVENOUS
Status: DISCONTINUED | OUTPATIENT
Start: 2018-02-16 | End: 2018-02-18 | Stop reason: HOSPADM

## 2018-02-16 RX ORDER — SODIUM CHLORIDE 0.9 % (FLUSH) 0.9 %
5-10 SYRINGE (ML) INJECTION AS NEEDED
Status: DISCONTINUED | OUTPATIENT
Start: 2018-02-16 | End: 2018-02-18 | Stop reason: HOSPADM

## 2018-02-16 RX ORDER — ONDANSETRON 2 MG/ML
4 INJECTION INTRAMUSCULAR; INTRAVENOUS
Status: CANCELLED | OUTPATIENT
Start: 2018-02-16

## 2018-02-16 RX ORDER — NITROGLYCERIN 0.4 MG/1
0.4 TABLET SUBLINGUAL
Status: DISCONTINUED | OUTPATIENT
Start: 2018-02-16 | End: 2018-02-18 | Stop reason: HOSPADM

## 2018-02-16 RX ORDER — AMLODIPINE BESYLATE 10 MG/1
10 TABLET ORAL DAILY
Status: DISCONTINUED | OUTPATIENT
Start: 2018-02-17 | End: 2018-02-18 | Stop reason: HOSPADM

## 2018-02-16 RX ORDER — HEPARIN SODIUM 5000 [USP'U]/ML
5000 INJECTION, SOLUTION INTRAVENOUS; SUBCUTANEOUS EVERY 12 HOURS
Status: CANCELLED | OUTPATIENT
Start: 2018-02-16

## 2018-02-16 RX ORDER — SODIUM CHLORIDE 0.9 % (FLUSH) 0.9 %
5-10 SYRINGE (ML) INJECTION EVERY 8 HOURS
Status: DISCONTINUED | OUTPATIENT
Start: 2018-02-16 | End: 2018-02-18 | Stop reason: HOSPADM

## 2018-02-16 RX ORDER — NITROGLYCERIN 0.4 MG/1
0.4 TABLET SUBLINGUAL
Status: CANCELLED | OUTPATIENT
Start: 2018-02-16

## 2018-02-16 RX ORDER — KETOROLAC TROMETHAMINE 30 MG/ML
15 INJECTION, SOLUTION INTRAMUSCULAR; INTRAVENOUS
Status: CANCELLED | OUTPATIENT
Start: 2018-02-16 | End: 2018-02-20

## 2018-02-16 RX ORDER — HYDRALAZINE HYDROCHLORIDE 20 MG/ML
10 INJECTION INTRAMUSCULAR; INTRAVENOUS
Status: CANCELLED | OUTPATIENT
Start: 2018-02-16

## 2018-02-16 RX ORDER — ACETAMINOPHEN 325 MG/1
650 TABLET ORAL
Status: CANCELLED | OUTPATIENT
Start: 2018-02-16

## 2018-02-16 RX ORDER — HYDROMORPHONE HCL IN 0.9% NACL 50 MG/50ML
0.5 PLASTIC BAG, INJECTION (ML) INJECTION
Status: CANCELLED | OUTPATIENT
Start: 2018-02-16

## 2018-02-16 RX ORDER — ATORVASTATIN CALCIUM 40 MG/1
80 TABLET, FILM COATED ORAL
Status: DISCONTINUED | OUTPATIENT
Start: 2018-02-16 | End: 2018-02-18 | Stop reason: HOSPADM

## 2018-02-16 RX ORDER — HEPARIN SODIUM 200 [USP'U]/100ML
1000 INJECTION, SOLUTION INTRAVENOUS
Status: DISCONTINUED | OUTPATIENT
Start: 2018-02-16 | End: 2018-02-16 | Stop reason: ALTCHOICE

## 2018-02-16 RX ORDER — INSULIN LISPRO 100 [IU]/ML
INJECTION, SOLUTION INTRAVENOUS; SUBCUTANEOUS
Status: DISCONTINUED | OUTPATIENT
Start: 2018-02-16 | End: 2018-02-18 | Stop reason: HOSPADM

## 2018-02-16 RX ORDER — HEPARIN SODIUM 1000 [USP'U]/ML
1000-8000 INJECTION, SOLUTION INTRAVENOUS; SUBCUTANEOUS
Status: COMPLETED | OUTPATIENT
Start: 2018-02-16 | End: 2018-02-16

## 2018-02-16 RX ORDER — NALOXONE HYDROCHLORIDE 0.4 MG/ML
0.4 INJECTION, SOLUTION INTRAMUSCULAR; INTRAVENOUS; SUBCUTANEOUS
Status: CANCELLED | OUTPATIENT
Start: 2018-02-16

## 2018-02-16 RX ORDER — ALBUMIN HUMAN 50 G/1000ML
4157 SOLUTION INTRAVENOUS ONCE
Status: COMPLETED | OUTPATIENT
Start: 2018-02-16 | End: 2018-02-17

## 2018-02-16 RX ORDER — SODIUM CHLORIDE 0.9 % (FLUSH) 0.9 %
5-10 SYRINGE (ML) INJECTION EVERY 8 HOURS
Status: CANCELLED | OUTPATIENT
Start: 2018-02-16

## 2018-02-16 RX ORDER — AMLODIPINE BESYLATE 10 MG/1
10 TABLET ORAL DAILY
Status: CANCELLED | OUTPATIENT
Start: 2018-02-17

## 2018-02-16 RX ADMIN — SODIUM CHLORIDE 9 UNITS/HR: 900 INJECTION, SOLUTION INTRAVENOUS at 18:56

## 2018-02-16 RX ADMIN — INSULIN GLARGINE 20 UNITS: 100 INJECTION, SOLUTION SUBCUTANEOUS at 21:41

## 2018-02-16 RX ADMIN — ALBUMIN (HUMAN) 207.85 G: 12.5 INJECTION, SOLUTION INTRAVENOUS at 21:11

## 2018-02-16 RX ADMIN — SODIUM CHLORIDE 40 MG: 9 INJECTION INTRAMUSCULAR; INTRAVENOUS; SUBCUTANEOUS at 00:41

## 2018-02-16 RX ADMIN — DIPHENHYDRAMINE HYDROCHLORIDE 25 MG: 50 INJECTION, SOLUTION INTRAMUSCULAR; INTRAVENOUS at 21:52

## 2018-02-16 RX ADMIN — KETOROLAC TROMETHAMINE 15 MG: 30 INJECTION, SOLUTION INTRAMUSCULAR at 09:15

## 2018-02-16 RX ADMIN — HEPARIN SODIUM 1600 UNITS: 1000 INJECTION, SOLUTION INTRAVENOUS; SUBCUTANEOUS at 15:34

## 2018-02-16 RX ADMIN — HEPARIN SODIUM 2000 UNITS: 200 INJECTION, SOLUTION INTRAVENOUS at 15:30

## 2018-02-16 RX ADMIN — HEPARIN SODIUM 5000 UNITS: 5000 INJECTION, SOLUTION INTRAVENOUS; SUBCUTANEOUS at 10:41

## 2018-02-16 RX ADMIN — HEPARIN SODIUM 1700 UNITS: 1000 INJECTION, SOLUTION INTRAVENOUS; SUBCUTANEOUS at 15:33

## 2018-02-16 RX ADMIN — POTASSIUM CHLORIDE, SODIUM CHLORIDE, CALCIUM CHLORIDE, SODIUM LACTATE, AND DEXTROSE MONOHYDRATE: 1.79; 6; .2; 3.1; 5 INJECTION, SOLUTION INTRAVENOUS at 04:47

## 2018-02-16 RX ADMIN — INSULIN HUMAN 2 UNITS: 100 INJECTION, SOLUTION PARENTERAL at 10:45

## 2018-02-16 RX ADMIN — ATORVASTATIN CALCIUM 80 MG: 40 TABLET, FILM COATED ORAL at 22:43

## 2018-02-16 RX ADMIN — SODIUM CHLORIDE 5 UNITS/HR: 900 INJECTION, SOLUTION INTRAVENOUS at 04:49

## 2018-02-16 RX ADMIN — LIDOCAINE HYDROCHLORIDE 200 MG: 20 INJECTION, SOLUTION INFILTRATION; PERINEURAL at 15:27

## 2018-02-16 RX ADMIN — ATORVASTATIN CALCIUM 80 MG: 40 TABLET, FILM COATED ORAL at 00:42

## 2018-02-16 RX ADMIN — DIPHENHYDRAMINE HYDROCHLORIDE 25 MG: 50 INJECTION, SOLUTION INTRAMUSCULAR; INTRAVENOUS at 22:26

## 2018-02-16 RX ADMIN — HYDROMORPHONE HYDROCHLORIDE 0.5 MG: 2 INJECTION, SOLUTION INTRAMUSCULAR; INTRAVENOUS; SUBCUTANEOUS at 16:08

## 2018-02-16 RX ADMIN — CALCIUM GLUCONATE 4 G: 94 INJECTION, SOLUTION INTRAVENOUS at 21:11

## 2018-02-16 RX ADMIN — ONDANSETRON 4 MG: 2 INJECTION INTRAMUSCULAR; INTRAVENOUS at 09:00

## 2018-02-16 RX ADMIN — POTASSIUM CHLORIDE, SODIUM CHLORIDE, CALCIUM CHLORIDE, SODIUM LACTATE, AND DEXTROSE MONOHYDRATE: 1.79; 6; .2; 3.1; 5 INJECTION, SOLUTION INTRAVENOUS at 19:43

## 2018-02-16 RX ADMIN — Medication 10 ML: at 22:43

## 2018-02-16 RX ADMIN — SODIUM BICARBONATE 1 ML: 0.2 INJECTION, SOLUTION INTRAVENOUS at 15:24

## 2018-02-16 RX ADMIN — HEPARIN SODIUM 5000 UNITS: 5000 INJECTION, SOLUTION INTRAVENOUS; SUBCUTANEOUS at 00:41

## 2018-02-16 RX ADMIN — LIDOCAINE HYDROCHLORIDE 200 MG: 20 INJECTION, SOLUTION INFILTRATION; PERINEURAL at 15:24

## 2018-02-16 RX ADMIN — Medication 10 ML: at 16:08

## 2018-02-16 NOTE — IP AVS SNAPSHOT
[address] 
[phone] Patient: Ivelisse Zamudio MRN: [id number] NWY:[id number] About your hospitalization You were admitted on:  February 16, 2018 You last received care in the:  [address] You were discharged on:  February 18, 2018 Why you were hospitalized Your primary diagnosis was:  Pancreatitis Your diagnoses also included:  Diabetes Mellitus Type Ii, Uncontrolled (Hcc), Cad (Coronary Artery Disease), Dka (Diabetic Ketoacidoses) ([facility]), Accelerated Hypertension Follow-up Information Follow up With Details Comments Contact Info Anthony Green MD In 3 days  [address] [address] 
[phone] [facility] CARDIOLOGY [place] OFFICE In 1 week  [address] [id number] [place] [phone] Discharge Orders None A check magnolia indicates which time of day the medication should be taken. My Medications START taking these medications Instructions Each Dose to Equal  
 Morning Noon Evening Bedtime Blood-Glucose Meter monitoring kit  
   
 by SubCUTAneous route Before breakfast, lunch, dinner and at bedtime. carvedilol 3.125 mg tablet Commonly known as:  Drena Gavel Take 1 Tab by mouth two (2) times daily (with meals). 3.125 mg  
    
  
   
   
   
  
 fenofibrate 160 mg tablet Commonly known as:  LOFIBRA Start taking on:  2/19/2018 Take 1 Tab by mouth daily. 160 mg  
    
  
   
   
   
  
 insulin glargine 100 unit/mL (3 mL) Inpn Commonly known as:  LANTUS SOLOSTAR U-100 INSULIN  
   
 25 Units by SubCUTAneous route nightly. 25 Units  
    
   
   
   
  
  
 insulin lispro 100 unit/mL kwikpen Commonly known as:  HUMALOG  
   
 3 Units by SubCUTAneous route Before breakfast, lunch, and dinner. 3 Units Insulin Needles (Disposable) 30 gauge x 1/3\" by SubCUTAneous route Before breakfast, lunch, dinner and at bedtime. CONTINUE taking these medications Instructions Each Dose to Equal  
 Morning Noon Evening Bedtime  
 amLODIPine 10 mg tablet Commonly known as:  Rulon Ирина Take 1 Tab by mouth daily for 360 days. 10 mg  
    
  
   
   
   
  
 aspirin 81 mg chewable tablet Take 1 Tab by mouth daily. 81 mg  
    
  
   
   
   
  
 atorvastatin 80 mg tablet Commonly known as:  LIPITOR Take 1 Tab by mouth nightly for 360 days. 80 mg  
    
   
   
   
  
  
 lisinopril 40 mg tablet Commonly known as:  Felix Shamrock Take 1 Tab by mouth daily. 40 mg  
    
  
   
   
   
  
 nitroglycerin 0.4 mg SL tablet Commonly known as:  NITROSTAT  
   
 1 Tab by SubLINGual route every five (5) minutes as needed for Chest Pain. 0.4 mg  
    
   
   
   
  
  
STOP taking these medications   
 chlorthalidone 50 mg tablet Commonly known as:  HYGROTEN  
   
  
 metFORMIN 1,000 mg tablet Commonly known as:  GLUCOPHAGE Where to Get Your Medications These medications were sent to [address] [place] [address] [address], [address] [facility] Phone:  [phone]  
  amLODIPine 10 mg tablet  
 aspirin 81 mg chewable tablet  
 atorvastatin 80 mg tablet Blood-Glucose Meter monitoring kit  
 carvedilol 3.125 mg tablet  
 fenofibrate 160 mg tablet  
 insulin glargine 100 unit/mL (3 mL) Inpn  
 insulin lispro 100 unit/mL kwikpen Insulin Needles (Disposable) 30 gauge x 1/3\" lisinopril 40 mg tablet Discharge Instructions DISCHARGE SUMMARY from Nurse PATIENT INSTRUCTIONS: 
 
 
F-face looks uneven A-arms unable to move or move unevenly S-speech slurred or non-existent T-time-call 911 as soon as signs and symptoms begin-DO NOT go Back to bed or wait to see if you get better-TIME IS BRAIN. Warning Signs of HEART ATTACK Call 911 if you have these symptoms: 
? Chest discomfort. Most heart attacks involve discomfort in the center of the chest that lasts more than a few minutes, or that goes away and comes back. It can feel like uncomfortable pressure, squeezing, fullness, or pain. ? Discomfort in other areas of the upper body. Symptoms can include pain or discomfort in one or both arms, the back, neck, jaw, or stomach. ? Shortness of breath with or without chest discomfort. ? Other signs may include breaking out in a cold sweat, nausea, or lightheadedness. Don't wait more than five minutes to call [address]! Fast action can save your life. Calling 911 is almost always the fastest way to get lifesaving treatment. Emergency Medical Services staff can begin treatment when they arrive  up to an hour sooner than if someone gets to the hospital by car. The discharge information has been reviewed with the patient. The patient verbalized understanding. Discharge medications reviewed with the patient and appropriate educational materials and side effects teaching were provided. ___________________________________________________________________________________________________________________________________ [facility] Announcement We are excited to announce that we are making your provider's discharge notes available to you in TATE'S LIST. You will see these notes when they are completed and signed by the physician that discharged you from your recent hospital stay.   If you have any questions or concerns about any information you see in [facility], please call the [facility] where you were seen or reach out to your Primary Care Provider for more information about your plan of care. Introducing [facility]! Dear Govind Saunders: Thank you for requesting a [employer] account. Our records indicate that you already have an active [employer] account. You can access your account anytime at [email or web address] Did you know that you can access your hospital and ER discharge instructions at any time in [employer]? You can also review all of your test results from your hospital stay or ER visit. Additional Information If you have questions, please visit the Frequently Asked Questions section of the [employer] website at [email or web address] Remember, [employer] is NOT to be used for urgent needs. For medical emergencies, dial 911. Now available from your iPhone and Android! Providers Seen During Your Hospitalization Provider Specialty Primary office phone Arya Warner MD Internal Medicine [phone] Your Primary Care Physician (PCP) Primary Care Physician Office Phone Office Fax [facility] [employer] [phone] You are allergic to the following Allergen Reactions Morphine Other (comments) Anxiety, claustrophobia Recent Documentation Height Weight BMI Smoking Status 1.803 m 121.9 kg 37.49 kg/m2 Current Every Day Smoker Emergency Contacts Name Discharge Info Relation Home Work Mobile Jeny Moffett [[id number]] [phone] Patient Belongings The following personal items are in your possession at time of discharge: 
  Dental Appliances: None  Visual Aid: Glasses, At bedside      Home Medications: None   Jewelry: None  Clothing: None Please provide this summary of care documentation to your next provider.  
  
  
 
  
Signatures-by signing, you are acknowledging that this After Visit Summary has been reviewed with you and you have received a copy. Patient Signature:  ____________________________________________________________ Date:  ____________________________________________________________  
  
Maria Fernanda Keas Provider Signature:  ____________________________________________________________ Date:  ____________________________________________________________

## 2018-02-16 NOTE — CONSULTS
[facility]        Inpatient Hematology / Oncology Consult Note    Reason for Consult:  Hypertriglyceridemia [E78.1]  Referring Physician:  Rosey Rowley MD    History of Present Illness:  Mr. Jelly Michaels is a 41 y.o. male admitted on 2/16/2018 . He presented to the ER with 2 days of back and abdominal pain, dull, 8/10 in intensity with nausea. No vomiting, fever, chest pain, diarrhea, dysuria. Found to have pancreatitis with hypertriglyceridemia (6306) and DKA. We were consulted for apheresis his elevated triglycerides. Review of Systems:  Constitutional Denies fever, chills, weight loss, appetite changes, fatigue, night sweats. HEENT Denies trauma, blurry vision, hearing loss, ear pain, nosebleeds, sore throat, neck pain    Skin Denies lesions or rashes. Lungs Denies dyspnea, cough, sputum production or hemoptysis. Cardiovascular Denies chest pain, palpitations, or lower extremity edema. Gastrointestinal +abdominal pain - worsening, nausea    Denies dysuria, frequency or hesitancy of urination. Neuro Denies headaches, visual changes or ataxia. Denies dizziness, tingling, tremors, sensory change, speech change, focal weakness      Hematology Denies easy bruising or bleeding, denies gingival bleeding or epistaxis. Endo Denies heat/cold intolerance, denies diabetes or thyroid abnormalities. MSK + back pain      Psychiatric/Behavioral Denies depression and substance abuse. The patient is not nervous/anxious.          Allergies   Allergen Reactions    Morphine Other (comments)     Anxiety, claustrophobia     Past Medical History:   Diagnosis Date    Acute coronary syndrome ([address] 12/15/2016    Acute pancreatitis 2/15/2018    Arthritis     psoriatic    Endocrine disease     Hypertension     Psoriatic arthropathy ([facility] [address] [facility]      Past Surgical History:   Procedure Laterality Date    HX APPENDECTOMY      HX CHOLECYSTECTOMY      HX ORTHOPAEDIC      left heel secondary to trauma Family History   Problem Relation Age of Onset    Cancer Mother     Thyroid Disease Mother     Diabetes Father     Heart Disease Father      Social History     Social History    Marital status:      Spouse name: N/A    Number of children: N/A    Years of education: N/A     Occupational History    Not on file. Social History Main Topics    Smoking status: Current Every Day Smoker     Packs/day: 0.25     Years: 30.00    Smokeless tobacco: Never Used    Alcohol use No    Drug use: No    Sexual activity: Not Currently     Partners: Female     Other Topics Concern     Service No    Blood Transfusions No    Caffeine Concern No    Occupational Exposure No    Hobby Hazards No    Sleep Concern No    Stress Concern Yes    Weight Concern Yes    Special Diet No    Back Care No    Exercise No    Bike Helmet No    Seat Belt Yes    Self-Exams No     Social History Narrative     Current Facility-Administered Medications   Medication Dose Route Frequency Provider Last Rate Last Dose    lidocaine (XYLOCAINE) 20 mg/mL (2 %) injection  mg   mg SubCUTAneous Rad Clare Gauthier MD        sodium bicarbonate (4%) (NEUT) injection 1-2 mL  1-2 mL SubCUTAneous ONCE Tiago Gauthier MD        heparin (PF) 2 units/ml in NS infusion 2,000 Units  1,000 mL Irrigation Multiple Tiago Gauthier MD        heparin (porcine) 1,000 unit/mL injection 1,000-8,000 Units  1,000-8,000 Units IntraVENous Rad Clare Gauthier MD           OBJECTIVE:  Patient Vitals for the past 8 hrs:   BP Temp Pulse Resp SpO2 Height Weight   18 1349 151/89 99.2 °F (37.3 °C) 95 18 95 % 5' 11\" (1.803 m) 267 lb (121.1 kg)     Temp (24hrs), Av.2 °F (37.3 °C), Min:99.2 °F (37.3 °C), Max:99.2 °F (37.3 °C)         Physical Exam:  Constitutional: Well developed, well nourished male in no acute distress, sitting comfortably in the hospital bed. HEENT: Normocephalic and atraumatic.  Oropharynx is clear, mucous membranes are moist. Neck supple     Lymph node   Deferred   Skin Warm and dry. No bruising and no rash noted. No erythema. No pallor. Respiratory Lungs are clear to auscultation bilaterally without wheezes, rales or rhonchi, normal air exchange without accessory muscle use. CVS Normal rate, regular rhythm and normal S1 and S2. No murmurs, gallops, or rubs. Abdomen Obese. Semi-firm, tender. No palpable mass. No hepatosplenomegaly. Neuro Grossly nonfocal with no obvious sensory or motor deficits. MSK Normal range of motion in general.  No edema and no tenderness. Psych Appropriate mood and affect. Labs:    Recent Results (from the past 24 hour(s))   POC TROPONIN-I    Collection Time: 02/15/18  3:01 PM   Result Value Ref Range    Troponin-I (POC) 0 (L) 0.02 - 0.05 ng/ml   CBC WITH AUTOMATED DIFF    Collection Time: 02/15/18  3:12 PM   Result Value Ref Range    WBC 15.8 (H) 4.3 - 11.1 K/uL    RBC 5.35 4.23 - 5.67 M/uL    HGB 17.3 (H) 13.6 - 17.2 g/dL    HCT 46.2 41.1 - 50.3 %    MCV 86.4 79.6 - 97.8 FL    MCH 32.3 26.1 - 32.9 PG    MCHC 37.4 (H) 31.4 - 35.0 g/dL    RDW 12.5 11.9 - 14.6 %    PLATELET 534 863 - 874 K/uL    MPV 11.5 10.8 - 14.1 FL    NEUTROPHILS 74 47 - 75 %    LYMPHOCYTES 9 (L) 16 - 44 %    MONOCYTES 17 (H) 3 - 9 %    ABS. NEUTROPHILS 11.7 (H) 1.7 - 8.2 K/UL    ABS. LYMPHOCYTES 1.4 0.5 - 4.6 K/UL    ABS.  MONOCYTES 2.7 (H) 0.1 - 1.3 K/UL    RBC COMMENTS SLIGHT  ANISOCYTOSIS + POIKILOCYTOSIS        PLATELET COMMENTS ADEQUATE      DF MANUAL     METABOLIC PANEL, COMPREHENSIVE    Collection Time: 02/15/18  3:12 PM   Result Value Ref Range    Sodium 135 (L) 136 - 145 mmol/L    Potassium 3.9 3.5 - 5.1 mmol/L    Chloride 101 98 - 107 mmol/L    CO2 16 (L) 21 - 32 mmol/L    Anion gap 18 (H) 7 - 16 mmol/L    Glucose 249 (H) 65 - 100 mg/dL    BUN 12 6 - 23 MG/DL    Creatinine 0.77 (L) 0.8 - 1.5 MG/DL    GFR est AA >60 >60 ml/min/1.73m2    GFR est non-AA >60 >60 ml/min/1.73m2 Calcium 9.1 8.3 - 10.4 MG/DL    Bilirubin, total 0.9 0.2 - 1.1 MG/DL    ALT (SGPT) 30 12 - 65 U/L    AST (SGOT) 21 15 - 37 U/L    Alk.  phosphatase 99 50 - 136 U/L    Protein, total 8.8 (H) 6.3 - 8.2 g/dL    Albumin 3.9 3.5 - 5.0 g/dL    Globulin 4.9 g/dL    A-G Ratio 0.8 (L) 1.2 - 3.5     HEMOGLOBIN A1C WITH EAG    Collection Time: 02/15/18  3:12 PM   Result Value Ref Range    Hemoglobin A1c 12.7 (H) 4.8 - 6.0 %    Est. average glucose 318 mg/dL   LIPASE    Collection Time: 02/15/18  3:13 PM   Result Value Ref Range    Lipase 1568 (H) 73 - 393 U/L   LIPID PANEL    Collection Time: 02/15/18  7:11 PM   Result Value Ref Range    LIPID PROFILE          Cholesterol, total 612 (H) <200 MG/DL    Triglyceride 6306 (H) 35 - 150 MG/DL    HDL Cholesterol 42 40 - 60 MG/DL    LDL, calculated  <100 MG/DL     LDL AND VLDL CHOLESTEROL NOT CALCULATED WHEN TRIGLYCERIDES >400 MG/DL OR HDL CHOLESTEROL <20 MG/DL    VLDL, calculated 1261.2 (H) 7.0 - 27.0 MG/DL    CHOL/HDL Ratio 14.6     LACTIC ACID    Collection Time: 02/15/18  7:11 PM   Result Value Ref Range    Lactic acid 0.7 0.4 - 2.0 MMOL/L   LDL, DIRECT    Collection Time: 02/15/18  7:11 PM   Result Value Ref Range    LDL,Direct 67 <100 mg/dl   GLUCOSE, POC    Collection Time: 02/15/18 10:43 PM   Result Value Ref Range    Glucose (POC) 218 (H) 65 - 359 mg/dL   METABOLIC PANEL, BASIC    Collection Time: 02/16/18 12:30 AM   Result Value Ref Range    Sodium 136 136 - 145 mmol/L    Potassium 4.9 3.5 - 5.1 mmol/L    Chloride 103 98 - 107 mmol/L    CO2 15 (L) 21 - 32 mmol/L    Anion gap 18 (H) 7 - 16 mmol/L    Glucose 244 (H) 65 - 100 mg/dL    BUN 14 6 - 23 MG/DL    Creatinine 0.89 0.8 - 1.5 MG/DL    GFR est AA >60 >60 ml/min/1.73m2    GFR est non-AA >60 >60 ml/min/1.73m2    Calcium 8.9 8.3 - 26.5 MG/DL   METABOLIC PANEL, BASIC    Collection Time: 02/16/18  4:05 AM   Result Value Ref Range    Sodium 131 (L) 136 - 145 mmol/L    Potassium 4.7 3.5 - 5.1 mmol/L    Chloride 100 98 - 107 mmol/L    CO2 14 (L) 21 - 32 mmol/L    Anion gap 17 (H) 7 - 16 mmol/L    Glucose 297 (H) 65 - 100 mg/dL    BUN 13 6 - 23 MG/DL    Creatinine 0.68 (L) 0.8 - 1.5 MG/DL    GFR est AA >60 >60 ml/min/1.73m2    GFR est non-AA >60 >60 ml/min/1.73m2    Calcium 8.8 8.3 - 10.4 MG/DL   CBC WITH AUTOMATED DIFF    Collection Time: 02/16/18  4:05 AM   Result Value Ref Range    WBC 17.7 (H) 4.3 - 11.1 K/uL    RBC 5.08 4.23 - 5.67 M/uL    HGB 15.7 13.6 - 17.2 g/dL    HCT 44.7 41.1 - 50.3 %    MCV 88.0 79.6 - 97.8 FL    MCH 30.9 26.1 - 32.9 PG    MCHC 35.1 (H) 31.4 - 35.0 g/dL    RDW 13.1 11.9 - 14.6 %    PLATELET 341 666 - 619 K/uL    MPV 11.8 10.8 - 14.1 FL    NEUTROPHILS 63 47 - 75 %    BAND NEUTROPHILS 15 (H) 0 - 6 %    LYMPHOCYTES 13 (L) 16 - 44 %    MONOCYTES 9 3 - 9 %    ABS. NEUTROPHILS 13.8 (H) 1.7 - 8.2 K/UL    ABS. LYMPHOCYTES 2.3 0.5 - 4.6 K/UL    ABS. MONOCYTES 1.6 (H) 0.1 - 1.3 K/UL    WBC COMMENTS SLIGHT      PLATELET COMMENTS ADEQUATE      DF MANUAL     HEPATIC FUNCTION PANEL    Collection Time: 02/16/18  4:05 AM   Result Value Ref Range    Protein, total 8.6 (H) 6.3 - 8.2 g/dL    Albumin 2.9 (L) 3.5 - 5.0 g/dL    Globulin 5.7 (H) 2.3 - 3.5 g/dL    A-G Ratio 0.5 (L) 1.2 - 3.5      Bilirubin, total 1.1 0.2 - 1.1 MG/DL    Bilirubin, direct <0.1 0.0 - 0.3 MG/DL    Alk.  phosphatase 89 50 - 136 U/L    AST (SGOT) 55 (H) 15 - 37 U/L    ALT (SGPT) 35 12 - 65 U/L   TRIGLYCERIDE    Collection Time: 02/16/18  4:05 AM   Result Value Ref Range    Triglyceride >400 (H) 35 - 150 MG/DL   LDL, DIRECT    Collection Time: 02/16/18  4:05 AM   Result Value Ref Range    LDL,Direct 487 (H) <100 mg/dl   GLUCOSE, POC    Collection Time: 02/16/18  5:48 AM   Result Value Ref Range    Glucose (POC) 230 (H) 65 - 100 mg/dL   GLUCOSE, POC    Collection Time: 02/16/18  7:01 AM   Result Value Ref Range    Glucose (POC) 279 (H) 65 - 100 mg/dL   GLUCOSE, POC    Collection Time: 02/16/18  9:42 AM   Result Value Ref Range    Glucose (POC) 245 (H) 65 - 100 mg/dL   GLUCOSE, POC    Collection Time: 02/16/18 10:59 AM   Result Value Ref Range    Glucose (POC) 222 (H) 65 - 352 mg/dL   METABOLIC PANEL, BASIC    Collection Time: 02/16/18 11:46 AM   Result Value Ref Range    Sodium 135 (L) 136 - 145 mmol/L    Potassium 5.6 (H) 3.5 - 5.1 mmol/L    Chloride 107 98 - 107 mmol/L    CO2 15 (L) 21 - 32 mmol/L    Anion gap 13 7 - 16 mmol/L    Glucose 226 (H) 65 - 100 mg/dL    BUN 15 6 - 23 MG/DL    Creatinine 0.83 0.8 - 1.5 MG/DL    GFR est AA >60 >60 ml/min/1.73m2    GFR est non-AA >60 >60 ml/min/1.73m2    Calcium 8.9 8.3 - 10.4 MG/DL   GLUCOSE, POC    Collection Time: 02/16/18 12:48 PM   Result Value Ref Range    Glucose (POC) 198 (H) 65 - 100 mg/dL       Imaging:  CT urogram 2/16  FINDINGS:  The lung bases are clear. No calcified stone is seen in either  kidney or ureter, and there is no significant hydronephrosis. No biliary duct  dilatation status post cholecystectomy. Diffusely decreased attenuation of the  hepatic parenchyma suggests fatty infiltration. No focal liver lesion is seen. There is soft tissue stranding of the peripancreatic fat at the head of the  pancreas suggesting possible pancreatitis. No pseudocyst is identified. The  spleen, and adrenals appear unremarkable without contrast.  No pathologically  enlarged lymph nodes or free fluid is evident. Bone windows demonstrate no  aggressive process accounting for scattered degenerative changes. IMPRESSION:       1. Findings suspicious for mild pancreatitis without pseudocyst or other  complication identified.     2. No calcified urinary stone, obstruction, or other acute abdominopelvic  abnormality identified.         ASSESSMENT:  Problem List  Date Reviewed: 2/15/2018          Codes Class Noted    Acute pancreatitis ICD-10-CM: K85.90  ICD-9-CM: 651.4  2/15/2018        High anion gap metabolic acidosis TMT-84-AH: E87.2  ICD-9-CM: 276.2  2/15/2018        CAD (coronary artery disease) ICD-10-CM: I25.10  ICD-9-CM: 414.00  2/15/2018        Leukocytosis ICD-10-CM: D72.829  ICD-9-CM: 288.60  2/15/2018        DKA (diabetic ketoacidoses) ([facility] ICD-10-CM: E13.10  ICD-9-CM: 250.10  2/15/2018        Diabetes mellitus type II, uncontrolled ([facility] [address] (Chronic) ICD-10-CM: E11.65  ICD-9-CM: 250.02  12/15/2016        Accelerated hypertension ICD-10-CM: I10  ICD-9-CM: 401.0  12/15/2016        Tobacco abuse (Chronic) ICD-10-CM: Z72.0  ICD-9-CM: 305.1  12/15/2016                RECOMMENDATIONS:  Hypertriglyceridemia with pancreatitis  - Initial triglyceride level >6000. Will have IR place apheresis line and plan for therapeutic apheresis later today. Primary team managing pancreatitis. Repeat level in AM. May need additional treatment tomorrow    DKA  - Off insulin drip and transferring to floor. Primary team managing insulin    Lab studies and imaging studies (CT urogram) were personally reviewed. Thank you for allowing us to participate in the care of Mr. Randolph Carlson. We will follow along           Faith Cedeno NP   [facility]  [address] [place] [address]  Office : [phone]  Fax : [phone]         Attending Addendum:  I agree with the assessment, findings and plan as documented. He needs urgent apheresis.               Annalise Costa MD  [address] [place] [address]  Office : [phone]  Fax : [phone]

## 2018-02-16 NOTE — PROGRESS NOTES
TRANSFER - OUT REPORT:    Verbal report given to Sheeba Nagy RN (name) on Zoya Cano  being transferred to 5th floor (unit) for routine progression of care       Report consisted of patients Situation, Background, Assessment and   Recommendations(SBAR). Information from the following report(s) Procedure Summary, Intake/Output, MAR and Recent Results was reviewed with the receiving nurse. Lines:   Peripheral IV 02/15/18 Right Hand (Active)   Site Assessment Clean, dry, & intact 2/15/2018  3:07 PM   Phlebitis Assessment 0 2/15/2018  3:07 PM   Infiltration Assessment 0 2/15/2018  3:07 PM   Dressing Status Clean, dry, & intact 2/15/2018  3:07 PM   Hub Color/Line Status Pink 2/15/2018  3:07 PM       Peripheral IV 02/16/18 Right Forearm (Active)   Site Assessment Clean, dry, & intact 2/16/2018 11:14 AM   Phlebitis Assessment 0 2/16/2018 11:14 AM   Infiltration Assessment 0 2/16/2018 11:14 AM   Dressing Status Clean, dry, & intact 2/16/2018 11:14 AM        Opportunity for questions and clarification was provided.       Patient transported with:   Registered Nurse

## 2018-02-16 NOTE — ED NOTES
Spoke with [facility] and told them to send the D5-LR with KCl and Insulin drip here instead of [facility].

## 2018-02-16 NOTE — H&P
History and Physical    Patient: Cathi Em MRN: [id number]  SSN: [id number]    YOB: 1970  Age: 41 y.o. Sex: male      Subjective:   CC:\" my back hurts\"     Cathi Em is a 41 y.o. male who has a PMH of type 2 DM, HTN, CAD s/p stent in 2016, currently only on asa, GERD, psoriasis who came referred from an urgent care clinic after he was found with kidney problems. He stated having 2 days of back and abdominal pain, 8/10 in intensity, dull like, associated with nausea. Denies vomiting, fever, chest pain, diarrhea, dysuria. He never had pancreatitis in the past. He run out of some HTN meds, but stressed he is taking metformin still. Upon arrival vs bp 166/103    T 98F  O2: 98%  RR 18. Labs: wbc 15 k  HB 17   Na 135  CO2 16  GAP 18  Glucose 249, cr 0.7. CT urogram showed mild pancreatitis, no kidney stones. Ekg: sinus tachycardia. UA negative for infection. Lipase in progress. Patient was given dilaudid, toradol, zofran, LR x 1 liter. Hospitalist was contacted for admission of acute pancreatitis and DKA. ROS: all pertinent findings described in my note. PMH: as above  Social hx: past heavy smoker.  Quit on Jan/12/18- no alcohol intake  Family hx: his mother had cancer and thyroid disease; his father had DM and heart disease     Past Medical History:   Diagnosis Date    Acute coronary syndrome ([address] 12/15/2016    Acute pancreatitis 2/15/2018    Arthritis     psoriatic    Endocrine disease     Hypertension     Psoriatic arthropathy ([address]      Past Surgical History:   Procedure Laterality Date    HX APPENDECTOMY      HX CHOLECYSTECTOMY      HX ORTHOPAEDIC      left heel secondary to trauma      Family History   Problem Relation Age of Onset    Cancer Mother     Thyroid Disease Mother     Diabetes Father     Heart Disease Father      Social History   Substance Use Topics    Smoking status: Current Every Day Smoker     Packs/day: 0.25     Years: 30.00    Smokeless tobacco: Never Used    Alcohol use No      Prior to Admission medications    Medication Sig Start Date End Date Taking? Authorizing Provider   chlorthalidone (HYGROTEN) 50 mg tablet Take 1 Tab by mouth daily for 180 days. 11/7/17 5/6/18  Natanael Powers MD   metFORMIN (GLUCOPHAGE) 1,000 mg tablet Take 1 Tab by mouth two (2) times daily (with meals). 9/8/17   Natanael Powers MD   lisinopril (PRINIVIL, ZESTRIL) 40 mg tablet Take 1 Tab by mouth daily. 8/9/17   Natanael Powers MD   atorvastatin (LIPITOR) 80 mg tablet Take 1 Tab by mouth nightly for 360 days. 8/9/17 8/4/18  Natanael Powers MD   amLODIPine (NORVASC) 10 mg tablet Take 1 Tab by mouth daily for 360 days. 8/9/17 8/4/18  Natanael Powers MD   aspirin 81 mg chewable tablet Take 1 Tab by mouth daily. 12/16/16   Elina Carlin NP   nitroglycerin (NITROSTAT) 0.4 mg SL tablet 1 Tab by SubLINGual route every five (5) minutes as needed for Chest Pain. 12/16/16   Elina Carlin NP        Allergies   Allergen Reactions    Morphine Other (comments)     Anxiety, claustrophobia       Review of Systems:  A comprehensive review of systems was negative except for that written in the History of Present Illness. Objective:     Vitals:    02/15/18 1447   BP: (!) 166/103   Pulse: (!) 112   Resp: 18   Temp: 98.1 °F (36.7 °C)   SpO2: 96%   Weight: 121.1 kg (267 lb)   Height: 5' 11\" (1.803 m)        Physical Exam:  GENERAL: alert, cooperative, no distress, appears stated age  EYE: negative  LYMPHATIC: Cervical, supraclavicular, and axillary nodes normal.   THROAT & NECK: normal and no erythema or exudates noted.    LUNG: clear to auscultation bilaterally  HEART: regular rate and rhythm, S1, S2 normal, no murmur, click, rub or gallop  ABDOMEN: diffuse tenderness, predominantly over upper and left abdomen, no rebound, Bowel sounds normal. No masses,  no organomegaly  EXTREMITIES:  extremities normal, atraumatic, no cyanosis or edema  SKIN: Normal.  NEUROLOGIC: negative  PSYCHIATRIC: non focal    Assessment:     Hospital Problems  Date Reviewed: 2/15/2018          Codes Class Noted POA    Acute pancreatitis ICD-10-CM: K85.90  ICD-9-CM: 393.1  2/15/2018 Yes        High anion gap metabolic acidosis SONAL-47-OB: E87.2  ICD-9-CM: 276.2  2/15/2018 Yes        CAD (coronary artery disease) ICD-10-CM: I25.10  ICD-9-CM: 414.00  2/15/2018 No        Leukocytosis ICD-10-CM: V12.433  ICD-9-CM: 288.60  2/15/2018 Yes        Diabetes mellitus type II, uncontrolled (HCC) (Chronic) ICD-10-CM: E11.65  ICD-9-CM: 250.02  12/15/2016 Yes        Accelerated hypertension ICD-10-CM: I10  ICD-9-CM: 401.0  12/15/2016 Yes          DKA     Plan: 1. Acute pancreatitis: possible hypertriglyceridemia induced since blood sample was noted lipemic  - no signs of gallstones on ct scan  2. DKA, with glucose of 249  3. HTN  4. CAD on aspirin     Plan:  Admit under medicine  Keep NPO  Continue IVF at 150 ml/hr, for now will treat his DKA with IVF   Check lactic acid, if normal may change NS to LR  Continue humalog sliding scale  Pain control: dilaudid, toradol, tylenol  zofran if nausea  Hydralazine ivp prn for HTN  Resume norvasc, aspirin  Hold metformin  Check chemistry q 6hrs, next at 2100hr  Monitor lipid panel, if triglycerides > 1000 may need insulin gtt under ICU care   lactic acid, lipase  hba1c in am  DVT ppx: heparin sq, GCS     Full code    Estimated LOS > 2MN  Risk: high  Estimated DC planning: home  Goals of care discussed with patient   Signed By: Key Underwood MD     February 15, 2018

## 2018-02-16 NOTE — IP AVS SNAPSHOT
[address] 
[phone] Patient: Eduardo Hill MRN: [id number] IEE:1[birth year]0774 A check magnolia indicates which time of day the medication should be taken. My Medications START taking these medications Instructions Each Dose to Equal  
 Morning Noon Evening Bedtime Blood-Glucose Meter monitoring kit  
   
 by SubCUTAneous route Before breakfast, lunch, dinner and at bedtime. carvedilol 3.125 mg tablet Commonly known as:  Manus Kylie Take 1 Tab by mouth two (2) times daily (with meals). 3.125 mg  
    
  
   
   
   
  
 fenofibrate 160 mg tablet Commonly known as:  LOFIBRA Start taking on:  2/19/2018 Take 1 Tab by mouth daily. 160 mg  
    
  
   
   
   
  
 insulin glargine 100 unit/mL (3 mL) Inpn Commonly known as:  LANTUS SOLOSTAR U-100 INSULIN  
   
 25 Units by SubCUTAneous route nightly. 25 Units  
    
   
   
   
  
  
 insulin lispro 100 unit/mL kwikpen Commonly known as:  HUMALOG  
   
 3 Units by SubCUTAneous route Before breakfast, lunch, and dinner. 3 Units Insulin Needles (Disposable) 30 gauge x 1/3\" by SubCUTAneous route Before breakfast, lunch, dinner and at bedtime. CONTINUE taking these medications Instructions Each Dose to Equal  
 Morning Noon Evening Bedtime  
 amLODIPine 10 mg tablet Commonly known as:  Stephen Nearing Take 1 Tab by mouth daily for 360 days. 10 mg  
    
  
   
   
   
  
 aspirin 81 mg chewable tablet Take 1 Tab by mouth daily. 81 mg  
    
  
   
   
   
  
 atorvastatin 80 mg tablet Commonly known as:  LIPITOR Take 1 Tab by mouth nightly for 360 days. 80 mg  
    
   
   
   
  
  
 lisinopril 40 mg tablet Commonly known as:  Kaylin Corona [place] Take 1 Tab by mouth daily.   
 40 mg  
    
  
   
   
   
  
 nitroglycerin 0.4 mg SL tablet Commonly known as:  NITROSTAT  
   
 1 Tab by SubLINGual route every five (5) minutes as needed for Chest Pain. 0.4 mg  
    
   
   
   
  
  
STOP taking these medications   
 chlorthalidone 50 mg tablet Commonly known as:  HYGROTEN  
   
  
 metFORMIN 1,000 mg tablet Commonly known as:  GLUCOPHAGE Where to Get Your Medications These medications were sent to [address] [place] [address], [facility], [address] [place] [address] [address], [facility] Phone:  [phone]  
  amLODIPine 10 mg tablet  
 aspirin 81 mg chewable tablet  
 atorvastatin 80 mg tablet Blood-Glucose Meter monitoring kit  
 carvedilol 3.125 mg tablet  
 fenofibrate 160 mg tablet  
 insulin glargine 100 unit/mL (3 mL) Inpn  
 insulin lispro 100 unit/mL kwikpen Insulin Needles (Disposable) 30 gauge x 1/3\" lisinopril 40 mg tablet

## 2018-02-16 NOTE — PROGRESS NOTES
1536-TRANSFER - IN REPORT:  Verbal report received from [facility] RN on Nuria Dooley  being received from IR for routine post - op    Report consisted of patients Situation, Background, Assessment and   Recommendations(SBAR). Information from the following report(s) SBAR, Kardex and MAR was reviewed with the receiving nurse. Opportunity for questions and clarification was provided. Assessment will be completed upon patients arrival to unit and care assumed. 1551-Pt arrived to room from IR. Primary Nurse Mayi Gomez and Gaby Neil RN performed a dual skin assessment on this patient No impairment noted. 1810-END OF SHIFT NOTE:  Pts VSS and is in no acute distress. Pt has insulin drip and D5LR with 20 KCL @ 150 mL/hr. Intake/Output  02/16 0701 - 02/16 1900  In: 0   Out: 225 [Urine:225]   Voiding: YES  Catheter: NO  Drain:      Stool:  0 occurrences. Emesis:  0 occurrences. VITAL SIGNS  Patient Vitals for the past 12 hrs:   Temp Pulse Resp BP SpO2   02/16/18 1604 98.3 °F (36.8 °C) (!) 109 18 (!) 164/101 98 %   02/16/18 1517 - (!) 103 15 - -   02/16/18 1349 99.2 °F (37.3 °C) 95 18 151/89 95 %       Pain Assessment  Pain 1  Pain Scale 1: Numeric (0 - 10) (02/16/18 1647)  Pain Intensity 1: 2 (02/16/18 1647)  Patient Stated Pain Goal: 5 (02/16/18 1349)  Pain Reassessment 1: Yes (02/16/18 1647)  Pain Location 1: Back (02/16/18 1608)  Pain Orientation 1: Lower (02/16/18 1349)  Pain Description 1: Aching (02/16/18 1608)  Pain Intervention(s) 1: Medication (see MAR) (02/16/18 1608)    Ambulating  Yes    Shift report given to oncoming nurse at the bedside.     Ermias Sorensen

## 2018-02-16 NOTE — PROGRESS NOTES
Follow up labs reviewed. LA 0.7    TG 6303    This is an indication of plasma pharesis, as TG >1000. This will rapidly lower TG levels to goal <500. Spoke with on Call Hematologist Dr Marjorie Andre who agrees. Not emergent. Pt will need to be [facility] to [facility] ICU/medical bed in am when bed is available. Will need IR consult for plasma pharesis catheter placement. For now Will switch IVF to [facility] with KCL and place on Insulin gtt. Check FS q1h. STAT BMP now and recheck every 4h. Daily Triglyceride levels ordered.

## 2018-02-16 NOTE — IP AVS SNAPSHOT
Summary of Care Report The Summary of Care report has been created to help improve care coordination. Users with access to [employer] or [facility] (Web-based application) may access additional patient information including the Discharge Summary. If you are not currently a [address] user and need more information, please call the number listed below in the [place] [address] section and ask to be connected with Medical Records. Facility Information Name Address Phone [phone] [address] [address] [phone] Patient Information Patient Name Sex KIMBER Ramirez ([id number]) Male 1970 Discharge Information Admitting Provider Service Area Unit Sumeet Pizano MD / [id number] [address] / [phone] Discharge Provider Discharge Date/Time Discharge Disposition Destination (none) 2018 (Pending) AHR (none) Patient Language Language ENGLISH [13] Hospital Problems as of 2018  Reviewed: 2/15/2018  7:06 PM by Yamile Urena MD  
  
  
  
 Class Noted - Resolved Last Modified POA Active Problems Diabetes mellitus type II, uncontrolled ([facility] (Chronic)  12/15/2016 - Present 2018 by Sumeet Pizano MD Unknown Entered by LIBERTY Sewell Accelerated hypertension  12/15/2016 - Present 2018 by Kalina Mann MD Yes Entered by LIBERTY Sewell  
  CAD (coronary artery disease)  2/15/2018 - Present 2018 by Kalina Mann MD Yes Entered by Yamile Urena MD  
  DKA (diabetic ketoacidoses) [facility])  2/15/2018 - Present 2018 by Kalina Mann MD Yes Entered by Yamile Urena MD  
  * (Principal)Pancreatitis  2018 - Present 2018 by Kalina Mann MD Unknown   Entered by Sumeet Pizano MD  
  
 Non-Hospital Problems as of 2/18/2018  Reviewed: 2/15/2018  7:06 PM by Tammy Kothari MD  
  
  
  
 Class Noted - Resolved Last Modified Active Problems Tobacco abuse (Chronic)  12/15/2016 - Present 12/15/2016 by LIBERTY Mcdonough Entered by LIBERTY Mcdonough Acute pancreatitis  2/15/2018 - Present 2/15/2018 by Tammy Kothari MD  
  Entered by Tammy Kothari MD  
  High anion gap metabolic acidosis  7/86/4085 - Present 2/15/2018 by Tammy Kothari MD  
  Entered by Tammy Kothari MD  
  Leukocytosis  2/15/2018 - Present 2/15/2018 by Tammy Kothari MD  
  Entered by Tammy Kothari MD  
  
You are allergic to the following Allergen Reactions Morphine Other (comments) Anxiety, claustrophobia Current Discharge Medication List  
  
START taking these medications Dose & Instructions Dispensing Information Comments Blood-Glucose Meter monitoring kit  
 by SubCUTAneous route Before breakfast, lunch, dinner and at bedtime. Quantity:  1 Kit Refills:  0  
   
 carvedilol 3.125 mg tablet Commonly known as:  Kip Evener Dose:  3.125 mg Take 1 Tab by mouth two (2) times daily (with meals). Quantity:  60 Tab Refills:  1  
   
 fenofibrate 160 mg tablet Commonly known as:  LOFIBRA Start taking on:  2/19/2018 Dose:  160 mg Take 1 Tab by mouth daily. Quantity:  30 Tab Refills:  1  
   
 insulin glargine 100 unit/mL (3 mL) Inpn Commonly known as:  LANTUS SOLOSTAR U-100 INSULIN Dose:  25 Units 25 Units by SubCUTAneous route nightly. Quantity:  1 Pen Refills:  1  
   
 insulin lispro 100 unit/mL kwikpen Commonly known as:  HUMALOG Dose:  3 Units 3 Units by SubCUTAneous route Before breakfast, lunch, and dinner. Quantity:  1 Package Refills:  1 Insulin Needles (Disposable) 30 gauge x 1/3\" by SubCUTAneous route Before breakfast, lunch, dinner and at bedtime. Quantity:  1 Package Refills:  11  
   
  
 CONTINUE these medications which have NOT CHANGED Dose & Instructions Dispensing Information Comments  
 amLODIPine 10 mg tablet Commonly known as:  Saint[place] Ricknatali Dose:  10 mg Take 1 Tab by mouth daily for 360 days. Quantity:  90 Tab Refills:  3  
   
 aspirin 81 mg chewable tablet Dose:  81 mg Take 1 Tab by mouth daily. Quantity:  30 Tab Refills:  1  
   
 atorvastatin 80 mg tablet Commonly known as:  LIPITOR Dose:  80 mg Take 1 Tab by mouth nightly for 360 days. Quantity:  90 Tab Refills:  3  
   
 lisinopril 40 mg tablet Commonly known as:  Bigg Dubonnet Dose:  40 mg Take 1 Tab by mouth daily. Quantity:  90 Tab Refills:  1  
   
 nitroglycerin 0.4 mg SL tablet Commonly known as:  NITROSTAT Dose:  0.4 mg  
1 Tab by SubLINGual route every five (5) minutes as needed for Chest Pain. Quantity:  1 Bottle Refills:  5 STOP taking these medications Comments  
 chlorthalidone 50 mg tablet Commonly known as:  HYGROTEN  
   
   
 metFORMIN 1,000 mg tablet Commonly known as:  GLUCOPHAGE Current Immunizations Name Date Influenza Vaccine (Quad) Mdck Pf 9/8/2017 Surgery Information ID Date/Time Status Primary Surgeon All Procedures Location [id number] 2/16/2018 Unposted   [facility] RAD ANGIO IR OR-DO NOT SCHEDULE Follow-up Information Follow up With Details Comments Contact Info Simin Good MD In 3 days  [address] [id number] [phone] [facility] CARDIOLOGY [place] OFFICE In 1 week  [address] 
[address] [address] [facility] 
[phone] Discharge Instructions DISCHARGE SUMMARY from Nurse PATIENT INSTRUCTIONS: 
 
 
F-face looks uneven A-arms unable to move or move unevenly S-speech slurred or non-existent T-time-call 911 as soon as signs and symptoms begin-DO NOT go Back to bed or wait to see if you get better-TIME IS BRAIN. Warning Signs of HEART ATTACK Call 911 if you have these symptoms: 
? Chest discomfort. Most heart attacks involve discomfort in the center of the chest that lasts more than a few minutes, or that goes away and comes back. It can feel like uncomfortable pressure, squeezing, fullness, or pain. ? Discomfort in other areas of the upper body. Symptoms can include pain or discomfort in one or both arms, the back, neck, jaw, or stomach. ? Shortness of breath with or without chest discomfort. ? Other signs may include breaking out in a cold sweat, nausea, or lightheadedness. Don't wait more than five minutes to call [address]! Fast action can save your life. Calling 911 is almost always the fastest way to get lifesaving treatment. Emergency Medical Services staff can begin treatment when they arrive  up to an hour sooner than if someone gets to the hospital by car. The discharge information has been reviewed with the patient. The patient verbalized understanding. Discharge medications reviewed with the patient and appropriate educational materials and side effects teaching were provided. ___________________________________________________________________________________________________________________________________ Chart Review Routing History No Routing History on File

## 2018-02-16 NOTE — PROCEDURES
Department of Interventional Radiology  [phone]        Interventional Radiology Brief Procedure Note    Patient: Crow Quintanilla MRN: [id number]  SSN: [id number]    YOB: 1970  Age: 41 y.o.   Sex: male      Date of Procedure: 2/16/2018    Pre-Procedure Diagnosis: Hyperlipidema    Post-Procedure Diagnosis: SAME    Procedure(s): Temporary Central Venous Catheter    Description of Procedure: As above    Performed By: Willian Small MD     Assistants: None    Anesthesia:Lidocaine    Estimated Blood Loss: None    Specimens: None    Implants: Temporary Venous Catheter    Findings: Tip in RA, functions well    Complications: None    Recommendations: Ok to use the catheter    Follow Up: PRN    Signed By: Willian Small MD     February 16, 2018

## 2018-02-16 NOTE — DISCHARGE SUMMARY
Discharge Summary     Patient: Cherry Ott MRN: [id number]  SSN: [id number]    YOB: 1970  Age: 41 y.o. Sex: male       Admit Date: 2/15/2018    Discharge Date: 2/16/2018      Admission Diagnoses: Acute pancreatitis  High anion gap metabolic acidosis    Discharge Diagnoses:   Problem List as of 2/16/2018  Date Reviewed: 2/15/2018          Codes Class Noted - Resolved    Acute pancreatitis ICD-10-CM: K85.90  ICD-9-CM: 526.2  2/15/2018 - Present        High anion gap metabolic acidosis EUO-87-YP: E87.2  ICD-9-CM: 276.2  2/15/2018 - Present        CAD (coronary artery disease) ICD-10-CM: I25.10  ICD-9-CM: 414.00  2/15/2018 - Present        Leukocytosis ICD-10-CM: D72.829  ICD-9-CM: 288.60  2/15/2018 - Present        DKA (diabetic ketoacidoses) ([facility] ICD-10-CM: E13.10  ICD-9-CM: 250.10  2/15/2018 - Present        Diabetes mellitus type II, uncontrolled ([facility] (Chronic) ICD-10-CM: E11.65  ICD-9-CM: 250.02  12/15/2016 - Present        Accelerated hypertension ICD-10-CM: I10  ICD-9-CM: 401.0  12/15/2016 - Present        Tobacco abuse (Chronic) ICD-10-CM: Z72.0  ICD-9-CM: 305.1  12/15/2016 - Present        RESOLVED: NSTEMI (non-ST elevated myocardial infarction) [facility]) ICD-10-CM: I21.4  ICD-9-CM: 410.70  12/15/2016 - 11/7/2017               Discharge Condition: [facility] Course:   Cherry Ott is a 41 y.o. male who has a PMH of type 2 DM, HTN, CAD s/p stent in 2016, currently only on asa, GERD, psoriasis who came referred from an urgent care clinic after he was found with kidney problems. He stated having 2 days of back and abdominal pain, 8/10 in intensity, dull like, associated with nausea. Denies vomiting, fever, chest pain, diarrhea, dysuria. He never had pancreatitis in the past. He run out of some HTN meds, but stressed he is taking metformin still. Upon arrival vs bp 166/103    T 98F  O2: 98%  RR 18. Labs: wbc 15 k  HB 17   Na 135  CO2 16  GAP 18  Glucose 249, cr 0.7.   CT urogram showed mild pancreatitis, no kidney stones. Ekg: sinus tachycardia. UA negative for infection. Lipase 1600, triglycerides > 6000. He was started on IVF, insulin gtt. Dr. Pancho Mtz from hematology contacted, and patient is been sent to [facility] for Tunneled cath placement and apheresis. Physical Exam:  GENERAL: alert, cooperative, no distress, appears stated age  EYE: negative  LYMPHATIC: Cervical, supraclavicular, and axillary nodes normal.   THROAT & NECK: normal and no erythema or exudates noted. LUNG: clear to auscultation bilaterally  HEART: regular rate and rhythm, S1, S2 normal, no murmur, click, rub or gallop  ABDOMEN: diffuse tenderness, predominantly over upper and left abdomen, no rebound, Bowel sounds normal. No masses,  no organomegaly  EXTREMITIES:  extremities normal, atraumatic, no cyanosis or edema  SKIN: Normal.  NEUROLOGIC: negative  PSYCHIATRIC: non focal    Consults: Hematology, IR     Significant Diagnostic Studies: SEE DC SUMMARY     Disposition: SFD    Discharge Medications:   Current Discharge Medication List      CONTINUE these medications which have NOT CHANGED    Details   chlorthalidone (HYGROTEN) 50 mg tablet Take 1 Tab by mouth daily for 180 days. Qty: 90 Tab, Refills: 1    Associated Diagnoses: Essential hypertension      metFORMIN (GLUCOPHAGE) 1,000 mg tablet Take 1 Tab by mouth two (2) times daily (with meals). Qty: 180 Tab, Refills: 2    Associated Diagnoses: Uncontrolled type 2 diabetes mellitus without complication, without long-term current use of insulin (HCC)      lisinopril (PRINIVIL, ZESTRIL) 40 mg tablet Take 1 Tab by mouth daily. Qty: 90 Tab, Refills: 3    Associated Diagnoses: Accelerated hypertension      atorvastatin (LIPITOR) 80 mg tablet Take 1 Tab by mouth nightly for 360 days.   Qty: 90 Tab, Refills: 3    Associated Diagnoses: Uncontrolled type 2 diabetes mellitus without complication, without long-term current use of insulin (HCC)      amLODIPine (NORVASC) 10 mg tablet Take 1 Tab by mouth daily for 360 days. Qty: 90 Tab, Refills: 3    Associated Diagnoses: Accelerated hypertension      aspirin 81 mg chewable tablet Take 1 Tab by mouth daily. Qty: 30 Tab, Refills: 0      nitroglycerin (NITROSTAT) 0.4 mg SL tablet 1 Tab by SubLINGual route every five (5) minutes as needed for Chest Pain. Qty: 1 Bottle, Refills: 5             Activity: Activity as tolerated  Diet: Diabetic Diet  Wound Care: None needed    No orders of the defined types were placed in this encounter.       Signed By: Nel Carlin MD     February 16, 2018

## 2018-02-16 NOTE — H&P
History and Physical    Patient: Scar Chau MRN: [id number]  SSN: [id number]    YOB: 1970  Age: 41 y.o. Sex: male      Subjective:    Scar Chau is a 41 y.o. male who has a PMH of type 2 DM, HTN, CAD s/p stent in 2016, currently only on asa, GERD, psoriasis who came referred from an urgent care clinic after he was found with kidney problems. He stated having 2 days of back and abdominal pain, 8/10 in intensity, dull like, associated with nausea. Denies vomiting, fever, chest pain, diarrhea, dysuria. He never had pancreatitis in the past. He run out of some HTN meds, but stressed he is taking metformin still. Upon arrival vs bp 166/103    T 98F  O2: 98%  RR 18. Labs: wbc 15 k  HB 17   Na 135  CO2 16  GAP 18  Glucose 249, cr 0.7. CT urogram showed mild pancreatitis, no kidney stones. Ekg: sinus tachycardia. UA negative for infection. Lipase 1600, triglycerides > 6000. Patient was given dilaudid, toradol, zofran, LR x 1 liter. Started on insulin gtt. Dr. Tamika Nagy from Hematology contacted and patient will have apheresis done today. Hospitalist was contacted for admission of acute pancreatitis and DKA.     ROS: all pertinent findings described in my note.     PMH: as above  Social hx: past heavy smoker.  Quit on Jan/12/18- no alcohol intake  Family hx: his mother had cancer and thyroid disease; his father had DM and heart disease     Past Medical History:   Diagnosis Date    Acute coronary syndrome ([address] 12/15/2016    Acute pancreatitis 2/15/2018    Arthritis     psoriatic    Endocrine disease     Hypertension     Psoriatic arthropathy ([facility] [address]      Past Surgical History:   Procedure Laterality Date    HX APPENDECTOMY      HX CHOLECYSTECTOMY      HX ORTHOPAEDIC      left heel secondary to trauma      Family History   Problem Relation Age of Onset    Cancer Mother     Thyroid Disease Mother     Diabetes Father     Heart Disease Father      Social History   Substance Use Topics    Smoking status: Current Every Day Smoker     Packs/day: 0.25     Years: 30.00    Smokeless tobacco: Never Used    Alcohol use No      Prior to Admission medications    Medication Sig Start Date End Date Taking? Authorizing Provider   chlorthalidone (HYGROTEN) 50 mg tablet Take 1 Tab by mouth daily for 180 days. 11/7/17 5/6/18  Simin Good MD   metFORMIN (GLUCOPHAGE) 1,000 mg tablet Take 1 Tab by mouth two (2) times daily (with meals). 9/8/17   Simin Good MD   lisinopril (PRINIVIL, ZESTRIL) 40 mg tablet Take 1 Tab by mouth daily. 8/9/17   Simin Good MD   atorvastatin (LIPITOR) 80 mg tablet Take 1 Tab by mouth nightly for 360 days. 8/9/17 8/4/18  Simin Good MD   amLODIPine (NORVASC) 10 mg tablet Take 1 Tab by mouth daily for 360 days. 8/9/17 8/4/18  Simin Good MD   aspirin 81 mg chewable tablet Take 1 Tab by mouth daily. 12/16/16   Lamar Seats, NP   nitroglycerin (NITROSTAT) 0.4 mg SL tablet 1 Tab by SubLINGual route every five (5) minutes as needed for Chest Pain. 12/16/16   Lamar Seats, NP        Allergies   Allergen Reactions    Morphine Other (comments)     Anxiety, claustrophobia       Review of Systems:  A comprehensive review of systems was negative except for that written in the History of Present Illness. Objective:     Vitals:    02/16/18 1349   BP: 151/89   Pulse: 95   Resp: 18   Temp: 99.2 °F (37.3 °C)   SpO2: 95%   Weight: 121.1 kg (267 lb)   Height: 5' 11\" (1.803 m)      Physical Exam:  GENERAL: alert, cooperative, no distress, appears stated age  EYE: negative  LYMPHATIC: Cervical, supraclavicular, and axillary nodes normal.   THROAT & NECK: normal and no erythema or exudates noted.    LUNG: clear to auscultation bilaterally  HEART: regular rate and rhythm, S1, S2 normal, no murmur, click, rub or gallop  ABDOMEN: diffuse tenderness, predominantly over upper and left abdomen, no rebound, Bowel sounds normal. No masses,  no organomegaly  EXTREMITIES:  extremities normal, atraumatic, no cyanosis or edema  SKIN: Normal.  NEUROLOGIC: negative  PSYCHIATRIC: non focal    Assessment:     Hospital Problems  Date Reviewed: 2/15/2018    None        Acute pancreatitis ICD-10-CM: K85.90  ICD-9-CM: 577.0   2/15/2018 Yes            High anion gap metabolic acidosis ODZ-91-BG: E87.2  ICD-9-CM: 276.2   2/15/2018 Yes           CAD (coronary artery disease) ICD-10-CM: I25.10  ICD-9-CM: 414.00   2/15/2018 No           Leukocytosis ICD-10-CM: D72.829  ICD-9-CM: 288.60   2/15/2018 Yes           Diabetes mellitus type II, uncontrolled (HCC) (Chronic) ICD-10-CM: E11.65  ICD-9-CM: 250.02   12/15/2016 Yes           Accelerated hypertension ICD-10-CM: I10  ICD-9-CM: 401.0   12/15/2016 Yes         Plan: 1. Acute pancreatitis induced by hypertriglyceridemia  2. DKA  3. HTN  4. CAD on aspirin   5. Type 2 DM, Hba1c 11%     Plan:     On telemetry unit   Transfer to [facility] for apheresis - he will go to IR for cath placement   Keep NPO  Continue IVF at 150 ml/hr  If gap is closed, may stop insulin gtt   Pain control: dilaudid, toradol, tylenol  zofran if nausea  Hydralazine ivp prn for HTN  Resume norvasc, aspirin  Hold metformin  Check chemistry q 6hrs  Monitor triglycerides daily   DVT ppx: heparin sq, GCS      Full code     Estimated LOS > 2MN  Risk: high  Estimated DC planning: home  Goals of care discussed with patient     Signed By: Garo Bond MD     February 16, 2018

## 2018-02-16 NOTE — ED NOTES
Spoke with hospitalist who will be putting in an order for an Insulin drip. Will need this to come from [facility] pharmacy.

## 2018-02-16 NOTE — ED NOTES
I spoke with hospitalist who has spoken with [facility] hospitalist and [facility] nurse supervisor. Per [facility] hospitalist, pt will be transported to [facility] ICU. I called [facility] pharmacy and told them to hold the [facility] and Insulin drip per MD order.

## 2018-02-17 PROBLEM — K85.90 PANCREATITIS: Status: ACTIVE | Noted: 2018-02-17

## 2018-02-17 LAB
ANION GAP SERPL CALC-SCNC: 10 MMOL/L (ref 7–16)
ANION GAP SERPL CALC-SCNC: 11 MMOL/L (ref 7–16)
ANION GAP SERPL CALC-SCNC: 11 MMOL/L (ref 7–16)
ANION GAP SERPL CALC-SCNC: 13 MMOL/L (ref 7–16)
BUN SERPL-MCNC: 10 MG/DL (ref 6–23)
BUN SERPL-MCNC: 11 MG/DL (ref 6–23)
BUN SERPL-MCNC: 11 MG/DL (ref 6–23)
BUN SERPL-MCNC: 12 MG/DL (ref 6–23)
CALCIUM SERPL-MCNC: 7.6 MG/DL (ref 8.3–10.4)
CALCIUM SERPL-MCNC: 7.8 MG/DL (ref 8.3–10.4)
CALCIUM SERPL-MCNC: 8 MG/DL (ref 8.3–10.4)
CALCIUM SERPL-MCNC: 8.1 MG/DL (ref 8.3–10.4)
CHLORIDE SERPL-SCNC: 109 MMOL/L (ref 98–107)
CHLORIDE SERPL-SCNC: 109 MMOL/L (ref 98–107)
CHLORIDE SERPL-SCNC: 110 MMOL/L (ref 98–107)
CHLORIDE SERPL-SCNC: 112 MMOL/L (ref 98–107)
CO2 SERPL-SCNC: 16 MMOL/L (ref 21–32)
CO2 SERPL-SCNC: 17 MMOL/L (ref 21–32)
CO2 SERPL-SCNC: 17 MMOL/L (ref 21–32)
CO2 SERPL-SCNC: 19 MMOL/L (ref 21–32)
CREAT SERPL-MCNC: 0.57 MG/DL (ref 0.8–1.5)
CREAT SERPL-MCNC: 0.66 MG/DL (ref 0.8–1.5)
CREAT SERPL-MCNC: 0.66 MG/DL (ref 0.8–1.5)
CREAT SERPL-MCNC: 0.8 MG/DL (ref 0.8–1.5)
GLUCOSE BLD STRIP.AUTO-MCNC: 216 MG/DL (ref 65–100)
GLUCOSE BLD STRIP.AUTO-MCNC: 241 MG/DL (ref 65–100)
GLUCOSE BLD STRIP.AUTO-MCNC: 260 MG/DL (ref 65–100)
GLUCOSE BLD STRIP.AUTO-MCNC: 270 MG/DL (ref 65–100)
GLUCOSE SERPL-MCNC: 244 MG/DL (ref 65–100)
GLUCOSE SERPL-MCNC: 253 MG/DL (ref 65–100)
GLUCOSE SERPL-MCNC: 254 MG/DL (ref 65–100)
GLUCOSE SERPL-MCNC: 310 MG/DL (ref 65–100)
LDLC SERPL DIRECT ASSAY-MCNC: 43 MG/DL
POTASSIUM SERPL-SCNC: 4.6 MMOL/L (ref 3.5–5.1)
POTASSIUM SERPL-SCNC: 4.7 MMOL/L (ref 3.5–5.1)
POTASSIUM SERPL-SCNC: 4.8 MMOL/L (ref 3.5–5.1)
POTASSIUM SERPL-SCNC: 4.9 MMOL/L (ref 3.5–5.1)
SODIUM SERPL-SCNC: 138 MMOL/L (ref 136–145)
SODIUM SERPL-SCNC: 140 MMOL/L (ref 136–145)
TRIGL SERPL-MCNC: >400 MG/DL (ref 35–150)

## 2018-02-17 PROCEDURE — 99233 SBSQ HOSP IP/OBS HIGH 50: CPT | Performed by: INTERNAL MEDICINE

## 2018-02-17 PROCEDURE — 80048 BASIC METABOLIC PNL TOTAL CA: CPT | Performed by: INTERNAL MEDICINE

## 2018-02-17 PROCEDURE — 83721 ASSAY OF BLOOD LIPOPROTEIN: CPT | Performed by: INTERNAL MEDICINE

## 2018-02-17 PROCEDURE — 74011250637 HC RX REV CODE- 250/637: Performed by: INTERNAL MEDICINE

## 2018-02-17 PROCEDURE — 74011250636 HC RX REV CODE- 250/636: Performed by: FAMILY MEDICINE

## 2018-02-17 PROCEDURE — 65660000000 HC RM CCU STEPDOWN

## 2018-02-17 PROCEDURE — 84478 ASSAY OF TRIGLYCERIDES: CPT | Performed by: INTERNAL MEDICINE

## 2018-02-17 PROCEDURE — 74011636637 HC RX REV CODE- 636/637: Performed by: FAMILY MEDICINE

## 2018-02-17 PROCEDURE — 74011000250 HC RX REV CODE- 250: Performed by: INTERNAL MEDICINE

## 2018-02-17 PROCEDURE — 74011250636 HC RX REV CODE- 250/636: Performed by: INTERNAL MEDICINE

## 2018-02-17 PROCEDURE — C9113 INJ PANTOPRAZOLE SODIUM, VIA: HCPCS | Performed by: INTERNAL MEDICINE

## 2018-02-17 PROCEDURE — 77030030054 HC DEV COM SAFEGRD MIND -B

## 2018-02-17 PROCEDURE — 74011636637 HC RX REV CODE- 636/637: Performed by: INTERNAL MEDICINE

## 2018-02-17 PROCEDURE — 82962 GLUCOSE BLOOD TEST: CPT

## 2018-02-17 PROCEDURE — 36591 DRAW BLOOD OFF VENOUS DEVICE: CPT

## 2018-02-17 RX ORDER — INSULIN LISPRO 100 [IU]/ML
3 INJECTION, SOLUTION INTRAVENOUS; SUBCUTANEOUS
Status: DISCONTINUED | OUTPATIENT
Start: 2018-02-17 | End: 2018-02-18 | Stop reason: HOSPADM

## 2018-02-17 RX ORDER — INSULIN LISPRO 100 [IU]/ML
3 INJECTION, SOLUTION INTRAVENOUS; SUBCUTANEOUS
Status: DISCONTINUED | OUTPATIENT
Start: 2018-02-17 | End: 2018-02-17

## 2018-02-17 RX ORDER — INSULIN LISPRO 100 [IU]/ML
2 INJECTION, SOLUTION INTRAVENOUS; SUBCUTANEOUS
Status: DISCONTINUED | OUTPATIENT
Start: 2018-02-17 | End: 2018-02-17

## 2018-02-17 RX ORDER — CARVEDILOL 3.12 MG/1
3.12 TABLET ORAL 2 TIMES DAILY WITH MEALS
Status: DISCONTINUED | OUTPATIENT
Start: 2018-02-17 | End: 2018-02-18 | Stop reason: HOSPADM

## 2018-02-17 RX ORDER — FENOFIBRATE 160 MG/1
160 TABLET ORAL DAILY
Status: DISCONTINUED | OUTPATIENT
Start: 2018-02-17 | End: 2018-02-18 | Stop reason: HOSPADM

## 2018-02-17 RX ORDER — CARVEDILOL 3.12 MG/1
3.12 TABLET ORAL 2 TIMES DAILY WITH MEALS
Status: DISCONTINUED | OUTPATIENT
Start: 2018-02-17 | End: 2018-02-17

## 2018-02-17 RX ORDER — INSULIN GLARGINE 100 [IU]/ML
25 INJECTION, SOLUTION SUBCUTANEOUS
Status: DISCONTINUED | OUTPATIENT
Start: 2018-02-17 | End: 2018-02-18 | Stop reason: HOSPADM

## 2018-02-17 RX ORDER — SODIUM CHLORIDE 9 MG/ML
75 INJECTION, SOLUTION INTRAVENOUS CONTINUOUS
Status: DISCONTINUED | OUTPATIENT
Start: 2018-02-17 | End: 2018-02-17

## 2018-02-17 RX ADMIN — Medication 10 ML: at 14:54

## 2018-02-17 RX ADMIN — HEPARIN SODIUM 5000 UNITS: 5000 INJECTION, SOLUTION INTRAVENOUS; SUBCUTANEOUS at 12:08

## 2018-02-17 RX ADMIN — CARVEDILOL 3.12 MG: 3.12 TABLET, FILM COATED ORAL at 17:10

## 2018-02-17 RX ADMIN — ASPIRIN 81 MG 81 MG: 81 TABLET ORAL at 07:40

## 2018-02-17 RX ADMIN — CARVEDILOL 3.12 MG: 3.12 TABLET, FILM COATED ORAL at 14:04

## 2018-02-17 RX ADMIN — INSULIN LISPRO 6 UNITS: 100 INJECTION, SOLUTION INTRAVENOUS; SUBCUTANEOUS at 21:45

## 2018-02-17 RX ADMIN — INSULIN LISPRO 9 UNITS: 100 INJECTION, SOLUTION INTRAVENOUS; SUBCUTANEOUS at 07:39

## 2018-02-17 RX ADMIN — INSULIN LISPRO 2 UNITS: 100 INJECTION, SOLUTION INTRAVENOUS; SUBCUTANEOUS at 08:22

## 2018-02-17 RX ADMIN — INSULIN LISPRO 6 UNITS: 100 INJECTION, SOLUTION INTRAVENOUS; SUBCUTANEOUS at 12:07

## 2018-02-17 RX ADMIN — INSULIN LISPRO 2 UNITS: 100 INJECTION, SOLUTION INTRAVENOUS; SUBCUTANEOUS at 12:08

## 2018-02-17 RX ADMIN — Medication 10 ML: at 06:17

## 2018-02-17 RX ADMIN — KETOROLAC TROMETHAMINE 15 MG: 15 INJECTION, SOLUTION INTRAMUSCULAR; INTRAVENOUS at 22:53

## 2018-02-17 RX ADMIN — SODIUM CHLORIDE 75 ML/HR: 900 INJECTION, SOLUTION INTRAVENOUS at 02:28

## 2018-02-17 RX ADMIN — INSULIN GLARGINE 25 UNITS: 100 INJECTION, SOLUTION SUBCUTANEOUS at 21:44

## 2018-02-17 RX ADMIN — INSULIN LISPRO 9 UNITS: 100 INJECTION, SOLUTION INTRAVENOUS; SUBCUTANEOUS at 17:10

## 2018-02-17 RX ADMIN — AMLODIPINE BESYLATE 10 MG: 10 TABLET ORAL at 07:40

## 2018-02-17 RX ADMIN — HEPARIN SODIUM 5000 UNITS: 5000 INJECTION, SOLUTION INTRAVENOUS; SUBCUTANEOUS at 22:54

## 2018-02-17 RX ADMIN — FENOFIBRATE 160 MG: 160 TABLET ORAL at 08:21

## 2018-02-17 RX ADMIN — Medication 10 ML: at 21:45

## 2018-02-17 RX ADMIN — ATORVASTATIN CALCIUM 80 MG: 40 TABLET, FILM COATED ORAL at 21:44

## 2018-02-17 RX ADMIN — INSULIN LISPRO 3 UNITS: 100 INJECTION, SOLUTION INTRAVENOUS; SUBCUTANEOUS at 17:11

## 2018-02-17 RX ADMIN — SODIUM CHLORIDE 40 MG: 9 INJECTION INTRAMUSCULAR; INTRAVENOUS; SUBCUTANEOUS at 07:40

## 2018-02-17 NOTE — PROGRESS NOTES
END OF SHIFT NOTE:    Intake/Output  02/16 1901 - 02/17 0700  In: 3299 [I.V.:3299]  Out: 1700 [Urine:1700]   Voiding: YES  Catheter: NO  Drain:              Stool:  0 occurrences. Emesis:  0 occurrences. VITAL SIGNS  Patient Vitals for the past 12 hrs:   Temp Pulse Resp BP SpO2   02/17/18 0437 99.1 °F (37.3 °C) 86 18 107/64 96 %   02/17/18 0018 98.7 °F (37.1 °C) (!) 115 18 119/74 96 %   02/16/18 2230 98.7 °F (37.1 °C) (!) 116 16 (!) 160/99 94 %   02/16/18 2102 98.7 °F (37.1 °C) (!) 108 18 (!) 162/102 97 %   02/16/18 2035 99.1 °F (37.3 °C) (!) 106 18 137/86 97 %       Pain Assessment  Pain 1  Pain Scale 1: Numeric (0 - 10) (02/16/18 1935)  Pain Intensity 1: 2 (02/16/18 1935)  Patient Stated Pain Goal: 0 (02/16/18 1935)  Pain Reassessment 1: Yes (02/16/18 1647)  Pain Location 1: Back (02/16/18 1608)  Pain Orientation 1: Lower (02/16/18 1349)  Pain Description 1: Aching (02/16/18 1608)  Pain Intervention(s) 1: Medication (see MAR) (02/16/18 1608)    Ambulating  Yes    Additional Information: Doctor called about insulin drip and blood sugars. Taken off of insulin drip and taken off of NPO. Rested fairly well    Shift report given to oncoming nurse at the bedside.     Carmel Speak

## 2018-02-17 NOTE — ED NOTES
Dr. Suresh Calles made aware that still no bmp result as specimens are very lipemic and hemolyzed. Specimen recollected 4 times. Per lab, specimen sent to [address] [place] [address].

## 2018-02-17 NOTE — PROGRESS NOTES
[facility]ology [facility] On[facility]        Inpatient Hematology / Oncology Progress Note      Admission Date: 2018  2:50 PM  Reason for Admission/Hospital Course: Hypertriglyceridemia [E78.1]      24 Hour Events:  Apheresis yesterday  Trigs improved >400  Feeling better      ROS:  Constitutional: negative for fever, chills, weakness, malaise, fatigue. CV:  negative for chest pain, palpitations, edema. Respiratory: negative for dyspnea, cough, wheezing. GI: negative for nausea, + abdominal pain    10 point review of systems is otherwise negative with the exception of the elements mentioned above in the HPI. Allergies   Allergen Reactions    Morphine Other (comments)     Anxiety, claustrophobia       OBJECTIVE:  Patient Vitals for the past 8 hrs:   BP Temp Pulse Resp SpO2   18 0650 135/87 98 °F (36.7 °C) (!) 117 18 97 %   18 0437 107/64 99.1 °F (37.3 °C) 86 18 96 %     Temp (24hrs), Av.7 °F (37.1 °C), Min:98 °F (36.7 °C), Max:99.2 °F (37.3 °C)     0701 -  1900  In: -   Out: 400 [Urine:400]    Physical Exam:  Constitutional: Well developed, well nourished male in no acute distress, sitting comfortably in the hospital bed. HEENT: Normocephalic and atraumatic. Oropharynx is clear, mucous membranes are moist. Neck supple     Lymph node Deferred   Skin Warm and dry. No bruising and no rash noted. No erythema. No pallor. Respiratory Lungs are clear to auscultation bilaterally without wheezes, rales or rhonchi, normal air exchange without accessory muscle use. CVS Normal rate, regular rhythm and normal S1 and S2. No murmurs, gallops, or rubs. Abdomen Obese. Semi-firm, tender. No palpable mass. No hepatosplenomegaly. Neuro Grossly nonfocal with no obvious sensory or motor deficits. MSK Normal range of motion in general.  No edema and no tenderness.    Psych Appropriate mood and affect.           Labs:    Recent Labs      18   0405  02/15/18   1512   WBC  17.7* 15.8*   RBC  5.08  5.35   HGB  15.7  17.3*   HCT  44.7  46.2   MCV  88.0  86.4   MCH  30.9  32.3   MCHC  35.1*  37.4*   RDW  13.1  12.5   PLT  222  224   GRANS  63  74   LYMPH  13*  9*   MONOS  9  17*   DF  MANUAL  MANUAL   ANEU  13.8*  11.7*   ABL  2.3  1.4   ABM  1.6*  2.7*      Recent Labs      02/17/18   0753  02/17/18   0336  02/17/18   0105  02/16/18   1941   02/16/18   1739   02/16/18   0405   02/15/18   1512   NA  138  138  140  140   < >   --    < >  131*   < >  135*   K  4.7  4.9  4.6  4.7   < >   --    < >  4.7   < >  3.9   CL  110*  109*  112*  110*   < >   --    < >  100   < >  101   CO2  17*  16*  17*  19*   < >   --    < >  14*   < >  16*   AGAP  11  13  11  11   < >   --    < >  17*   < >  18*   GLU  254*  310*  253*  182*   < >   --    < >  297*   < >  249*   BUN  12  11  11  14   < >   --    < >  13   < >  12   CREA  0.66*  0.80  0.66*  0.70*   < >   --    < >  0.68*   < >  0.77*   GFRAA  >60  >60  >60  >60   < >   --    < >  >60   < >  >60   GFRNA  >60  >60  >60  >60   < >   --    < >  >60   < >  >60   CA  7.8*  8.1*  7.6*  8.9   < >   --    < >  8.8   < >  9.1   SGOT   --    --    --   82*   --    --    --   55*   --   21   AP   --    --    --   90   --    --    --   89   --   99   TP   --    --    --   7.6   --    --    --   8.6*   --   8.8*   ALB   --    --    --   2.8*   --    --    --   2.9*   --   3.9   GLOB   --    --    --   4.8*   --    --    --   5.7*   --   4.9   AGRAT   --    --    --   0.6*   --    --    --   0.5*   --   0.8*   MG   --    --    --    --    --   2.4   --    --    --    --     < > = values in this interval not displayed.          Imaging:      ASSESSMENT:    Problem List  Date Reviewed: 2/15/2018          Codes Class Noted    Pancreatitis ICD-10-CM: K85.90  ICD-9-CM: 809.0  2/17/2018        Acute pancreatitis ICD-10-CM: K85.90  ICD-9-CM: 034.3  2/15/2018        High anion gap metabolic acidosis TSK-12-UC: E87.2  ICD-9-CM: 276.2  2/15/2018        CAD (coronary artery disease) ICD-10-CM: I25.10  ICD-9-CM: 414.00  2/15/2018        Leukocytosis ICD-10-CM: N67.664  ICD-9-CM: 288.60  2/15/2018        DKA (diabetic ketoacidoses) ([facility] [address] ICD-10-CM: E13.10  ICD-9-CM: 250.10  2/15/2018        Diabetes mellitus type II, uncontrolled ([facility] [address] (Chronic) ICD-10-CM: E11.65  ICD-9-CM: 250.02  12/15/2016        Accelerated hypertension ICD-10-CM: I10  ICD-9-CM: 401.0  12/15/2016        Tobacco abuse (Chronic) ICD-10-CM: Z72.0  ICD-9-CM: 305.1  12/15/2016                PLAN:    Hypertriglyceridemia with pancreatitis  - Initial triglyceride level >6000. Will have IR place apheresis line and plan for therapeutic apheresis later today. Primary team managing pancreatitis. Repeat level in AM. May need additional treatment tomorrow  2/17 Trigs >400 improved from >1000     DKA  - Off insulin drip and transferring to floor. Primary team managing insulin     Lab studies and imaging studies (CT urogram) were personally reviewed. Thank you for allowing us to participate in the care of Mr. Quinten Bañuelos. We will sign off. [facility]  [address] [place] [address]  Office : [phone]  Fax : [phone]         Attending Addendum:  I personally evaluated the patient with Odell Virgen, N.P.,  and agree with the assessment, findings and plan as documented. Appears stable, hypertriglyceridemia improved after apheresis, we will sign off.               Shaun Alaniz MD  [address]  Office : [phone]  Fax : [phone]

## 2018-02-17 NOTE — ROUTINE PROCESS
TRANSFER - OUT REPORT:    Verbal report given to Sammy Mock RN(name) on Mark Hahnpe  being transferred to Interventional radiology(unit) for ordered procedure       Report consisted of patients Situation, Background, Assessment and   Recommendations(SBAR). Information from the following report(s) ED Summary was reviewed with the receiving nurse. Lines:   Peripheral IV 02/16/18 Right Forearm (Active)   Site Assessment Clean, dry, & intact 2/16/2018  3:57 PM   Phlebitis Assessment 0 2/16/2018  3:57 PM   Infiltration Assessment 0 2/16/2018  3:57 PM   Dressing Status Clean, dry, & intact 2/16/2018  3:57 PM   Dressing Type Transparent;Tape 2/16/2018  3:57 PM   Hub Color/Line Status Infusing;Green;Patent 2/16/2018  3:57 PM       Peripheral IV Right Hand (Active)   Site Assessment Clean, dry, & intact 2/16/2018  3:57 PM   Phlebitis Assessment 0 2/16/2018  3:57 PM   Infiltration Assessment 0 2/16/2018  3:57 PM   Dressing Status Clean, dry, & intact 2/16/2018  3:57 PM   Dressing Type Transparent;Tape 2/16/2018  3:57 PM   Hub Color/Line Status Pink;Patent; Infusing 2/16/2018  3:57 PM        Opportunity for questions and clarification was provided.       Patient transported with:   Monitor

## 2018-02-17 NOTE — PROGRESS NOTES
Called Dr. Dorie Ulloa back to give an update on Pal Stephanie Morset labs per his request. Dr. Dorie Ulloa ordered normal saline at 75ml/hr and took him off NPO and put him on a diabetic diet.

## 2018-02-17 NOTE — PROGRESS NOTES
FSBS at 2000 was 168 and at 2100 at 142. Called dr. Kyler Padilla because he was still on insulin drip going at 9units/hr. Dr. Kyler Padilla said to stop insulin drip and give 20 units of lantus and to call him back when they do another BMP at midnight to report his BS and anion gap.

## 2018-02-17 NOTE — PROGRESS NOTES
END OF SHIFT NOTE:    Intake/Output  02/17 0701 - 02/17 1900  In: 360 [P.O.:360]  Out: 825 [Urine:825]   Voiding: YES  Catheter: NO  Drain:              Stool:  0 occurrences. Emesis:  0 occurrences. VITAL SIGNS  Patient Vitals for the past 12 hrs:   Temp Pulse Resp BP SpO2   02/17/18 1426 98.4 °F (36.9 °C) (!) 105 18 108/81 97 %   02/17/18 1038 97.8 °F (36.6 °C) (!) 109 18 115/66 96 %   02/17/18 0650 98 °F (36.7 °C) (!) 117 18 135/87 97 %       Pain Assessment  Pain 1  Pain Scale 1: Numeric (0 - 10) (02/16/18 1935)  Pain Intensity 1: 2 (02/16/18 1935)  Patient Stated Pain Goal: 0 (02/16/18 1935)  Pain Reassessment 1: Yes (02/16/18 1647)  Pain Location 1: Back (02/16/18 1608)  Pain Orientation 1: Lower (02/16/18 1349)  Pain Description 1: Aching (02/16/18 1608)  Pain Intervention(s) 1: Medication (see MAR) (02/16/18 1608)    Ambulating  Yes    Additional Information: Apheresis Catheter removed today. BS still elevated    Shift report will be given to oncoming nurse at the bedside.     John Garvin RN

## 2018-02-17 NOTE — PROGRESS NOTES
Procedure: Therapeutic Plasma Apheresis  Dx: Hypertriglyceridemia  Day: 1  Labs drawn: CMP, triglyceride, magnesium, fibrinogen, PTT  Fibrinogen: 858  Calcium used:  4 grams  Replacement product: Albumin  Replace volume: 4157  /  Remove volume: 5644  TBV processed: 100%  Other parameters: N/A   [facility]A  Issues: Consent obtained from patient. Procedure explained to patient, patient verbalized understanding. Questions answered appropriately. Patient began itching during procedure, no hives or rash visualized. Call placed to Prince Osei NP. Telephone order received for 25 IV Benadryl initially and if itching did not resolve, to give 25 mg IV Benadryl again. 25 mg IV given with little relief, additional 25 mg IV given to patient. Itching then resolved. Patient tolerated the remainder of the procedure without any difficulties. Patient resting quietly in bed at completion of procedure. Next procedure date: TBD.

## 2018-02-17 NOTE — PROGRESS NOTES
Problem: Falls - Risk of  Goal: *Absence of Falls  Document Marychuy Fall Risk and appropriate interventions in the flowsheet.    Outcome: Progressing Towards Goal  Fall Risk Interventions:            Medication Interventions: Patient to call before getting OOB, Teach patient to arise slowly

## 2018-02-17 NOTE — PROGRESS NOTES
Hospitalist Progress Note    2018  Admit Date: 2018  2:50 PM   NAME: Karina Headley   :  1970   DOS:              18  MRN:  [id number]   Attending: Octaviano Hewitt MD  PCP:  Kandi Rubio MD  Treatment Team: Attending Provider: Russ Wells MD; Consulting Provider: Enzo Brown MD    Prior     SUBJECTIVE:   As previously documented: 41 y. o. male who has a PMH of type 2 DM, HTN, CAD s/p stent in 2016, currently only on asa, GERD, psoriasis presented to [facility] ER complaining of abrominal pain. Patient was admitted for pancreatitis and DKA. In the floor patient was found to have Hypertriglyceridemia >6000. Hematology recommended to be transfer to [facility] for apheresis. Repeated  triglycerides values 400. AG close but blood glucose still uncontrolled. 18    Karina Headley stated having minimal abdominal pain. Patient is tolerating PO without nasuea or vomiting. 10+ ROS reviewed and negative except for positive in HPI.    Allergies   Allergen Reactions    Morphine Other (comments)     Anxiety, claustrophobia     Current Facility-Administered Medications   Medication Dose Route Frequency    0.9% sodium chloride infusion  75 mL/hr IntraVENous CONTINUOUS    fenofibrate (LOFIBRA) tablet 160 mg  160 mg Oral DAILY    insulin glargine (LANTUS) injection 25 Units  25 Units SubCUTAneous QHS    insulin lispro (HUMALOG) injection 2 Units  2 Units SubCUTAneous TIDAC    acetaminophen (TYLENOL) tablet 650 mg  650 mg Oral Q6H PRN    amLODIPine (NORVASC) tablet 10 mg  10 mg Oral DAILY    aspirin chewable tablet 81 mg  81 mg Oral DAILY    atorvastatin (LIPITOR) tablet 80 mg  80 mg Oral QHS    heparin (porcine) injection 5,000 Units  5,000 Units SubCUTAneous Q12H    hydrALAZINE (APRESOLINE) 20 mg/mL injection 10 mg  10 mg IntraVENous Q6H PRN    HYDROmorphone (PF) (DILAUDID) injection 0.5 mg  0.5 mg IntraVENous Q4H PRN    ketorolac (TORADOL) injection 15 mg  15 mg IntraVENous Q8H PRN  naloxone (NARCAN) injection 0.4 mg  0.4 mg IntraVENous EVERY 2 MINUTES AS NEEDED    nitroglycerin (NITROSTAT) tablet 0.4 mg  0.4 mg SubLINGual Q5MIN PRN    ondansetron (ZOFRAN) injection 4 mg  4 mg IntraVENous Q8H PRN    pantoprazole (PROTONIX) 40 mg in sodium chloride 10 mL injection  40 mg IntraVENous DAILY    sodium chloride (NS) flush 5-10 mL  5-10 mL IntraVENous Q8H    sodium chloride (NS) flush 5-10 mL  5-10 mL IntraVENous PRN    insulin lispro (HUMALOG) injection   SubCUTAneous AC&HS         Immunization History   Administered Date(s) Administered    Influenza Vaccine (Quad) Mdck Pf 2017     Objective:     Patient Vitals for the past 24 hrs:   Temp Pulse Resp BP SpO2   18 1038 97.8 °F (36.6 °C) (!) 109 18 115/66 96 %   18 0650 98 °F (36.7 °C) (!) 117 18 135/87 97 %   18 0437 99.1 °F (37.3 °C) 86 18 107/64 96 %   18 0018 98.7 °F (37.1 °C) (!) 115 18 119/74 96 %   18 2230 98.7 °F (37.1 °C) (!) 116 16 (!) 160/99 94 %   18 2102 98.7 °F (37.1 °C) (!) 108 18 (!) 162/102 97 %   18 2035 99.1 °F (37.3 °C) (!) 106 18 137/86 97 %   18 1604 98.3 °F (36.8 °C) (!) 109 18 (!) 164/101 98 %   18 1517 - (!) 103 15 - -   18 1349 99.2 °F (37.3 °C) 95 18 151/89 95 %     Temp (24hrs), Av.6 °F (37 °C), Min:97.8 °F (36.6 °C), Max:99.2 °F (37.3 °C)    Oxygen Therapy  O2 Sat (%): 96 % (18 1038)  O2 Device: Room air (18 0018)  Oxygen Therapy  O2 Sat (%): 96 % (18 1038)  O2 Device: Room air (18 0018)    Physical Exam:  General:         Alert, cooperative, no distress   HEENT:               NCAT. No obvious deformity. Lungs: No wheezing/rhonchi/rales  Cardiovascular:   RRR. No m/r/g. No pedal edema b/l. Abdomen:       S/nt/nd. Bowel sounds normal. .   Skin:         No rashes or lesions. Neurologic:    AAOx3. No gross focal deficit. Psychiatric:         Good mood. Normal affect.                DIAGNOSTIC STUDIES      Data Review:   Recent Results (from the past 24 hour(s))   GLUCOSE, POC    Collection Time: 02/16/18 12:48 PM   Result Value Ref Range    Glucose (POC) 198 (H) 65 - 100 mg/dL   GLUCOSE, POC    Collection Time: 02/16/18  2:48 PM   Result Value Ref Range    Glucose (POC) 216 (H) 65 - 100 mg/dL   POC PT/INR    Collection Time: 02/16/18  2:50 PM   Result Value Ref Range    Prothrombin time (POC) 16.9 (H) 9.6 - 11.6 SECS    INR (POC) 1.4 (H) 0.9 - 1.2     GLUCOSE, POC    Collection Time: 02/16/18  3:55 PM   Result Value Ref Range    Glucose (POC) 196 (H) 65 - 100 mg/dL   PTT    Collection Time: 02/16/18  4:25 PM   Result Value Ref Range    aPTT 37.5 (H) 23.2 - 35.3 SEC   FIBRINOGEN    Collection Time: 02/16/18  4:25 PM   Result Value Ref Range    Fibrinogen 858 (H) 190 - 501 mg/dL   GLUCOSE, POC    Collection Time: 02/16/18  5:01 PM   Result Value Ref Range    Glucose (POC) 195 (H) 65 - 100 mg/dL   MAGNESIUM    Collection Time: 02/16/18  5:39 PM   Result Value Ref Range    Magnesium 2.4 1.8 - 2.4 mg/dL   METABOLIC PANEL, BASIC    Collection Time: 02/16/18  5:49 PM   Result Value Ref Range    Sodium 137 136 - 145 mmol/L    Potassium 5.0 3.5 - 5.1 mmol/L    Chloride 108 (H) 98 - 107 mmol/L    CO2 19 (L) 21 - 32 mmol/L    Anion gap 10 7 - 16 mmol/L    Glucose 201 (H) 65 - 100 mg/dL    BUN 14 6 - 23 MG/DL    Creatinine 0.74 (L) 0.8 - 1.5 MG/DL    GFR est AA >60 >60 ml/min/1.73m2    GFR est non-AA >60 >60 ml/min/1.73m2    Calcium 8.9 8.3 - 10.4 MG/DL   GLUCOSE, POC    Collection Time: 02/16/18  5:57 PM   Result Value Ref Range    Glucose (POC) 184 (H) 65 - 100 mg/dL   GLUCOSE, POC    Collection Time: 02/16/18  6:58 PM   Result Value Ref Range    Glucose (POC) 160 (H) 65 - 430 mg/dL   METABOLIC PANEL, BASIC    Collection Time: 02/16/18  7:41 PM   Result Value Ref Range    Sodium 140 136 - 145 mmol/L    Potassium 4.7 3.5 - 5.1 mmol/L    Chloride 110 (H) 98 - 107 mmol/L    CO2 19 (L) 21 - 32 mmol/L    Anion gap 11 7 - 16 mmol/L    Glucose 182 (H) 65 - 100 mg/dL    BUN 14 6 - 23 MG/DL    Creatinine 0.70 (L) 0.8 - 1.5 MG/DL    GFR est AA >60 >60 ml/min/1.73m2    GFR est non-AA >60 >60 ml/min/1.73m2    Calcium 8.9 8.3 - 10.4 MG/DL   TRIGLYCERIDE    Collection Time: 02/16/18  7:41 PM   Result Value Ref Range    Triglyceride >1000 (H) 35 - 150 MG/DL   HEPATIC FUNCTION PANEL    Collection Time: 02/16/18  7:41 PM   Result Value Ref Range    Protein, total 7.6 6.3 - 8.2 g/dL    Albumin 2.8 (L) 3.5 - 5.0 g/dL    Globulin 4.8 (H) 2.3 - 3.5 g/dL    A-G Ratio 0.6 (L) 1.2 - 3.5      Bilirubin, total 0.6 0.2 - 1.1 MG/DL    Bilirubin, direct <0.1 <0.4 MG/DL    Alk.  phosphatase 90 50 - 136 U/L    AST (SGOT) 82 (H) 15 - 37 U/L    ALT (SGPT) 47 12 - 65 U/L   GLUCOSE, POC    Collection Time: 02/16/18  8:06 PM   Result Value Ref Range    Glucose (POC) 168 (H) 65 - 100 mg/dL   GLUCOSE, POC    Collection Time: 02/16/18  9:19 PM   Result Value Ref Range    Glucose (POC) 142 (H) 65 - 147 mg/dL   METABOLIC PANEL, BASIC    Collection Time: 02/17/18  1:05 AM   Result Value Ref Range    Sodium 140 136 - 145 mmol/L    Potassium 4.6 3.5 - 5.1 mmol/L    Chloride 112 (H) 98 - 107 mmol/L    CO2 17 (L) 21 - 32 mmol/L    Anion gap 11 7 - 16 mmol/L    Glucose 253 (H) 65 - 100 mg/dL    BUN 11 6 - 23 MG/DL    Creatinine 0.66 (L) 0.8 - 1.5 MG/DL    GFR est AA >60 >60 ml/min/1.73m2    GFR est non-AA >60 >60 ml/min/1.73m2    Calcium 7.6 (L) 8.3 - 70.5 MG/DL   METABOLIC PANEL, BASIC    Collection Time: 02/17/18  3:36 AM   Result Value Ref Range    Sodium 138 136 - 145 mmol/L    Potassium 4.9 3.5 - 5.1 mmol/L    Chloride 109 (H) 98 - 107 mmol/L    CO2 16 (L) 21 - 32 mmol/L    Anion gap 13 7 - 16 mmol/L    Glucose 310 (H) 65 - 100 mg/dL    BUN 11 6 - 23 MG/DL    Creatinine 0.80 0.8 - 1.5 MG/DL    GFR est AA >60 >60 ml/min/1.73m2    GFR est non-AA >60 >60 ml/min/1.73m2    Calcium 8.1 (L) 8.3 - 10.4 MG/DL   TRIGLYCERIDE    Collection Time: 02/17/18  3:36 AM   Result Value Ref Range    Triglyceride >400 (H) 35 - 150 MG/DL   LDL, DIRECT    Collection Time: 02/17/18  3:36 AM   Result Value Ref Range    LDL,Direct 43 <100 mg/dl   GLUCOSE, POC    Collection Time: 02/17/18  6:58 AM   Result Value Ref Range    Glucose (POC) 270 (H) 65 - 155 mg/dL   METABOLIC PANEL, BASIC    Collection Time: 02/17/18  7:53 AM   Result Value Ref Range    Sodium 138 136 - 145 mmol/L    Potassium 4.7 3.5 - 5.1 mmol/L    Chloride 110 (H) 98 - 107 mmol/L    CO2 17 (L) 21 - 32 mmol/L    Anion gap 11 7 - 16 mmol/L    Glucose 254 (H) 65 - 100 mg/dL    BUN 12 6 - 23 MG/DL    Creatinine 0.66 (L) 0.8 - 1.5 MG/DL    GFR est AA >60 >60 ml/min/1.73m2    GFR est non-AA >60 >60 ml/min/1.73m2    Calcium 7.8 (L) 8.3 - 10.4 MG/DL   GLUCOSE, POC    Collection Time: 02/17/18 11:21 AM   Result Value Ref Range    Glucose (POC) 241 (H) 65 - 100 mg/dL       All Micro Results     None          Imaging /Procedures /Studies:    CXR Results  (Last 48 hours)    None        CT Results  (Last 48 hours)               02/15/18 1716  CT UROGRAM WO CONT Final result    Impression:  IMPRESSION:         1. Findings suspicious for mild pancreatitis without pseudocyst or other   complication identified. 2.  No calcified urinary stone, obstruction, or other acute abdominopelvic   abnormality identified. Narrative:  NONCONTRAST CT ABDOMEN AND PELVIS:        CLINICAL HISTORY:  Bilateral flank pain for 4 days in a 28-year-old with a   history of appendectomy and cholecystectomy. Now with leukocytosis. TECHNIQUE:  Without contrast administration, the abdomen and pelvis were scanned   with spiral technique. COMPARISON:  None. FINDINGS:  The lung bases are clear. No calcified stone is seen in either   kidney or ureter, and there is no significant hydronephrosis. No biliary duct   dilatation status post cholecystectomy. Diffusely decreased attenuation of the   hepatic parenchyma suggests fatty infiltration.   No focal liver lesion is seen. There is soft tissue stranding of the peripancreatic fat at the head of the   pancreas suggesting possible pancreatitis. No pseudocyst is identified. The   spleen, and adrenals appear unremarkable without contrast.  No pathologically   enlarged lymph nodes or free fluid is evident. Bone windows demonstrate no   aggressive process accounting for scattered degenerative changes. Ir Insert Non Tunl Cvc Over 5 Yrs    Result Date: 2/16/2018  Impression: Placement of a right internal jugular vein temporary apheresis catheter. No results found for this visit on 02/16/18. Labs and Studies from previous 24 hours have been personally reviewed by myself    ASSESSMENT      Active Hospital Problems    Diagnosis Date Noted    Pancreatitis 02/17/2018    CAD (coronary artery disease) 02/15/2018    DKA (diabetic ketoacidoses) ([facility] [address] 02/15/2018    Diabetes mellitus type II, uncontrolled ([facility] [address] 12/15/2016    Accelerated hypertension 12/15/2016     Hospital Problems as of 2/17/2018  Date Reviewed: 2/15/2018          Codes Class Noted - Resolved POA    * (Principal)Pancreatitis ICD-10-CM: K85.90  ICD-9-CM: 577.0  2/17/2018 - Present Unknown        CAD (coronary artery disease) ICD-10-CM: I25.10  ICD-9-CM: 414.00  2/15/2018 - Present Yes        DKA (diabetic ketoacidoses) ([facility] [address] ICD-10-CM: E13.10  ICD-9-CM: 250.10  2/15/2018 - Present Yes        Diabetes mellitus type II, uncontrolled ([facility] [address] (Chronic) ICD-10-CM: E11.65  ICD-9-CM: 250.02  12/15/2016 - Present Unknown        Accelerated hypertension ICD-10-CM: I10  ICD-9-CM: 401.0  12/15/2016 - Present Yes              A/P:    -hypertriglyceridemia  Better controlled after apheresis, now on 400s  Hematology evaluation appreciated  Cont statins and start fenofibrate as triglycerides values were above 800  Patient to be follow closely for Myopathy    -Acute pancreatitis  Likely resolving  Patient tolerating PO diet    -DM  Uncontrolled.  S/P insulin drip. Insulin requirements reviewed. Patient received Lantus 20 last night  On PO medications at home  Increase Lantus to 25 and add lispro 3 units TID  Will adjust insulin as needed    -History of CAD  Stable  Cont ASA and lipitor. Add low dose BB  To follow up with primary care. Cardiology follow up upon discharge    -HTN  Controlled  Cont amlodipine and BB      DVT Prophylaxis: heparin   Plan of Care Discussed with: patient.  Care team.  Medical Risk:  Disposition:    Bang Almaguer MD  02/17/18

## 2018-02-18 VITALS
RESPIRATION RATE: 18 BRPM | DIASTOLIC BLOOD PRESSURE: 90 MMHG | HEIGHT: 71 IN | WEIGHT: 268.8 LBS | TEMPERATURE: 98 F | BODY MASS INDEX: 37.63 KG/M2 | OXYGEN SATURATION: 96 % | SYSTOLIC BLOOD PRESSURE: 140 MMHG | HEART RATE: 100 BPM

## 2018-02-18 LAB
GLUCOSE BLD STRIP.AUTO-MCNC: 203 MG/DL (ref 65–100)
GLUCOSE BLD STRIP.AUTO-MCNC: 249 MG/DL (ref 65–100)

## 2018-02-18 PROCEDURE — 74011636637 HC RX REV CODE- 636/637: Performed by: INTERNAL MEDICINE

## 2018-02-18 PROCEDURE — 74011250637 HC RX REV CODE- 250/637: Performed by: INTERNAL MEDICINE

## 2018-02-18 PROCEDURE — 74011250636 HC RX REV CODE- 250/636: Performed by: INTERNAL MEDICINE

## 2018-02-18 PROCEDURE — 82962 GLUCOSE BLOOD TEST: CPT

## 2018-02-18 PROCEDURE — C9113 INJ PANTOPRAZOLE SODIUM, VIA: HCPCS | Performed by: INTERNAL MEDICINE

## 2018-02-18 PROCEDURE — 74011000250 HC RX REV CODE- 250: Performed by: INTERNAL MEDICINE

## 2018-02-18 PROCEDURE — 74011636637 HC RX REV CODE- 636/637: Performed by: FAMILY MEDICINE

## 2018-02-18 RX ORDER — LISINOPRIL 40 MG/1
40 TABLET ORAL DAILY
Qty: 90 TAB | Refills: 1 | Status: SHIPPED | OUTPATIENT
Start: 2018-02-18 | End: 2018-12-17

## 2018-02-18 RX ORDER — ATORVASTATIN CALCIUM 80 MG/1
80 TABLET, FILM COATED ORAL
Qty: 90 TAB | Refills: 3 | Status: ON HOLD | OUTPATIENT
Start: 2018-02-18 | End: 2018-11-30 | Stop reason: SDUPTHER

## 2018-02-18 RX ORDER — INSULIN LISPRO 100 [IU]/ML
3 INJECTION, SOLUTION INTRAVENOUS; SUBCUTANEOUS
Qty: 1 PACKAGE | Refills: 1 | Status: SHIPPED | OUTPATIENT
Start: 2018-02-18 | End: 2018-06-19 | Stop reason: ALTCHOICE

## 2018-02-18 RX ORDER — GUAIFENESIN 100 MG/5ML
81 LIQUID (ML) ORAL DAILY
Qty: 30 TAB | Refills: 1 | Status: SHIPPED | OUTPATIENT
Start: 2018-02-18 | End: 2018-12-17

## 2018-02-18 RX ORDER — AMLODIPINE BESYLATE 10 MG/1
10 TABLET ORAL DAILY
Qty: 90 TAB | Refills: 3 | Status: SHIPPED | OUTPATIENT
Start: 2018-02-18 | End: 2019-02-13

## 2018-02-18 RX ORDER — CARVEDILOL 3.12 MG/1
3.12 TABLET ORAL 2 TIMES DAILY WITH MEALS
Qty: 60 TAB | Refills: 1 | Status: SHIPPED | OUTPATIENT
Start: 2018-02-18 | End: 2018-04-19 | Stop reason: SDUPTHER

## 2018-02-18 RX ORDER — INSULIN GLARGINE 100 [IU]/ML
25 INJECTION, SOLUTION SUBCUTANEOUS
Qty: 1 PEN | Refills: 1 | Status: SHIPPED | OUTPATIENT
Start: 2018-02-18 | End: 2018-06-19 | Stop reason: CLARIF

## 2018-02-18 RX ORDER — INSULIN PUMP SYRINGE, 3 ML
EACH MISCELLANEOUS
Qty: 1 KIT | Refills: 0 | Status: SHIPPED | OUTPATIENT
Start: 2018-02-18 | End: 2019-05-08 | Stop reason: ALTCHOICE

## 2018-02-18 RX ORDER — FENOFIBRATE 160 MG/1
160 TABLET ORAL DAILY
Qty: 30 TAB | Refills: 1 | Status: SHIPPED | OUTPATIENT
Start: 2018-02-19 | End: 2018-04-19 | Stop reason: SDUPTHER

## 2018-02-18 RX ADMIN — ASPIRIN 81 MG 81 MG: 81 TABLET ORAL at 08:46

## 2018-02-18 RX ADMIN — SODIUM CHLORIDE 40 MG: 9 INJECTION INTRAMUSCULAR; INTRAVENOUS; SUBCUTANEOUS at 08:46

## 2018-02-18 RX ADMIN — INSULIN LISPRO 6 UNITS: 100 INJECTION, SOLUTION INTRAVENOUS; SUBCUTANEOUS at 12:14

## 2018-02-18 RX ADMIN — INSULIN LISPRO 3 UNITS: 100 INJECTION, SOLUTION INTRAVENOUS; SUBCUTANEOUS at 08:45

## 2018-02-18 RX ADMIN — FENOFIBRATE 160 MG: 160 TABLET ORAL at 08:46

## 2018-02-18 RX ADMIN — INSULIN LISPRO 3 UNITS: 100 INJECTION, SOLUTION INTRAVENOUS; SUBCUTANEOUS at 12:14

## 2018-02-18 RX ADMIN — HEPARIN SODIUM 5000 UNITS: 5000 INJECTION, SOLUTION INTRAVENOUS; SUBCUTANEOUS at 11:10

## 2018-02-18 RX ADMIN — CARVEDILOL 3.12 MG: 3.12 TABLET, FILM COATED ORAL at 08:46

## 2018-02-18 RX ADMIN — Medication 10 ML: at 05:27

## 2018-02-18 RX ADMIN — AMLODIPINE BESYLATE 10 MG: 10 TABLET ORAL at 08:46

## 2018-02-18 RX ADMIN — INSULIN LISPRO 6 UNITS: 100 INJECTION, SOLUTION INTRAVENOUS; SUBCUTANEOUS at 08:45

## 2018-02-18 NOTE — PROGRESS NOTES
Pt states understanding of written and verbal dc instructions, pt states no questions or concerns at this time, pt instructed in insulin administration, pt expresses need for new PCP, pt given resources

## 2018-02-18 NOTE — PROGRESS NOTES
END OF SHIFT NOTE:    Intake/Output      Voiding: YES  Catheter: NO  Drain:              Stool:  0 occurrences. Emesis:  0 occurrences. VITAL SIGNS  Patient Vitals for the past 12 hrs:   Temp Pulse Resp BP SpO2   02/18/18 0420 97.8 °F (36.6 °C) 90 18 98/64 96 %   02/17/18 2353 98.2 °F (36.8 °C) 86 19 93/58 97 %   02/17/18 2000 98.3 °F (36.8 °C) 95 18 110/76 97 %       Pain Assessment  Pain 1  Pain Scale 1: Visual (02/18/18 0218)  Pain Intensity 1: 0 (02/18/18 0218)  Patient Stated Pain Goal: 0 (02/18/18 0218)  Pain Reassessment 1: Yes (02/18/18 0039)  Pain Location 1: Generalized (02/17/18 2253)  Pain Orientation 1: Lower (02/16/18 1349)  Pain Description 1: Aching (02/17/18 2253)  Pain Intervention(s) 1: Medication (see MAR) (02/17/18 2253)    Ambulating  Yes    Shift report given to oncoming nurse at the bedside.     Chava Stone

## 2018-02-18 NOTE — DISCHARGE INSTRUCTIONS
DISCHARGE SUMMARY from Nurse    PATIENT INSTRUCTIONS:    After general anesthesia or intravenous sedation, for 24 hours or while taking prescription Narcotics:  · Limit your activities  · Do not drive and operate hazardous machinery  · Do not make important personal or business decisions  · Do  not drink alcoholic beverages  · If you have not urinated within 8 hours after discharge, please contact your surgeon on call. Report the following to your surgeon:  · Excessive pain, swelling, redness or odor of or around the surgical area  · Temperature over 100.5  · Nausea and vomiting lasting longer than 4 hours or if unable to take medications  · Any signs of decreased circulation or nerve impairment to extremity: change in color, persistent  numbness, tingling, coldness or increase pain  · Any questions    What to do at Home:  Recommended activity: Activity as tolerated    If you experience any of the following symptoms fever, unrelieved pain, nausea or vomiting, please follow up with PCP. *  Please give a list of your current medications to your Primary Care Provider. *  Please update this list whenever your medications are discontinued, doses are      changed, or new medications (including over-the-counter products) are added. *  Please carry medication information at all times in case of emergency situations. These are general instructions for a healthy lifestyle:    No smoking/ No tobacco products/ Avoid exposure to second hand smoke  Surgeon General's Warning:  Quitting smoking now greatly reduces serious risk to your health.     Obesity, smoking, and sedentary lifestyle greatly increases your risk for illness    A healthy diet, regular physical exercise & weight monitoring are important for maintaining a healthy lifestyle    You may be retaining fluid if you have a history of heart failure or if you experience any of the following symptoms:  Weight gain of 3 pounds or more overnight or 5 pounds in a week, increased swelling in our hands or feet or shortness of breath while lying flat in bed. Please call your doctor as soon as you notice any of these symptoms; do not wait until your next office visit. Recognize signs and symptoms of STROKE:    F-face looks uneven    A-arms unable to move or move unevenly    S-speech slurred or non-existent    T-time-call 911 as soon as signs and symptoms begin-DO NOT go       Back to bed or wait to see if you get better-TIME IS BRAIN. Warning Signs of HEART ATTACK     Call 911 if you have these symptoms:   Chest discomfort. Most heart attacks involve discomfort in the center of the chest that lasts more than a few minutes, or that goes away and comes back. It can feel like uncomfortable pressure, squeezing, fullness, or pain.  Discomfort in other areas of the upper body. Symptoms can include pain or discomfort in one or both arms, the back, neck, jaw, or stomach.  Shortness of breath with or without chest discomfort.  Other signs may include breaking out in a cold sweat, nausea, or lightheadedness. Don't wait more than five minutes to call 911 - MINUTES MATTER! Fast action can save your life. Calling 911 is almost always the fastest way to get lifesaving treatment. Emergency Medical Services staff can begin treatment when they arrive -- up to an hour sooner than if someone gets to the hospital by car. The discharge information has been reviewed with the patient. The patient verbalized understanding. Discharge medications reviewed with the patient and appropriate educational materials and side effects teaching were provided.   ___________________________________________________________________________________________________________________________________

## 2018-02-18 NOTE — DISCHARGE SUMMARY
Hospitalist Discharge Summary     Admit Date:  2018  2:50 PM   Name:  Galen Mccarty   Age:  41 y.o.  :  1970   MRN:  [id number]   PCP:  Simin Good MD  Treatment Team: Attending Provider: Jeevan Weinstein MD    Problem List for this Hospitalization:  Hospital Problems as of 2018  Date Reviewed: 2/15/2018          Codes Class Noted - Resolved POA    * (Principal)Pancreatitis ICD-10-CM: K85.90  ICD-9-CM: 144.3  2018 - Present Unknown        CAD (coronary artery disease) ICD-10-CM: I25.10  ICD-9-CM: 414.00  2/15/2018 - Present Yes        DKA (diabetic ketoacidoses) ([facility] [address] ICD-10-CM: E13.10  ICD-9-CM: 250.10  2/15/2018 - Present Yes        Diabetes mellitus type II, uncontrolled ([facility] [address] (Chronic) ICD-10-CM: E11.65  ICD-9-CM: 250.02  12/15/2016 - Present Unknown        Accelerated hypertension ICD-10-CM: I10  ICD-9-CM: 401.0  12/15/2016 - Present Yes                Admission HPI from 2018:    Jose Maria Chaidez is a 41 y.o. male who has a PMH of type 2 DM, HTN, CAD s/p stent in , currently only on asa, GERD, psoriasis who came referred from an urgent care clinic after he was found with kidney problems. He stated having 2 days of back and abdominal pain, 8/10 in intensity, dull like, associated with nausea. Denies vomiting, fever, chest pain, diarrhea, dysuria. He never had pancreatitis in the past. He run out of some HTN meds, but stressed he is taking metformin still. Hospital Course: In the ED patient had elevated blood glucose with elevated AG. On CT patient was showing mild acute pancreatitis. Patient was started on treatment for DKA and pancreatitis and was transferred to the floor for further work up. In the floor AG closed. Lab work up revealed hypertriglyceridemia likely cause of pancreatitis. Hem-Onc was contacted for apheresis and recommended to transfer patient to [facility]. Patient went for apheresis and triglycerides dropped from 6000 to 400.  Patient was started on  insulin for uncontrolled DM and fenofibrate for risk of recurrent pancreatitis due to  Hypertriglyceridemia. Patient was strongly advised to follow up his appointments upon discharge. Patient was educated about insulin use and patient stated her sister had used insulin for a ling time, she will help him with management. Patient to be follow closely for myopathies as patient is on statins for CAD and fibrates for hypertriglyceridemia. Follow up instructions below. Plan was discussed with patient/ careteam.  All questions answered. Patient was stable at time of discharge and was instructed to call or return if there are any concerns or recurrence of symptoms. Diagnostic Imaging/Tests: All Micro Results     None          Labs: Results:       BMP, Mg, Phos Recent Labs      02/17/18   1213  02/17/18   0753  02/17/18   0336   02/16/18   1739   NA  138  138  138   < >   --    K  4.8  4.7  4.9   < >   --    CL  109*  110*  109*   < >   --    CO2  19*  17*  16*   < >   --    AGAP  10  11  13   < >   --    BUN  10  12  11   < >   --    CREA  0.57*  0.66*  0.80   < >   --    CA  8.0*  7.8*  8.1*   < >   --    GLU  244*  254*  310*   < >   --    MG   --    --    --    --   2.4    < > = values in this interval not displayed.       CBC Recent Labs      02/16/18   0405  02/15/18   1512   WBC  17.7*  15.8*   RBC  5.08  5.35   HGB  15.7  17.3*   HCT  44.7  46.2   PLT  222  224   GRANS  63  74   LYMPH  13*  9*   MONOS  9  17*   ANEU  13.8*  11.7*   ABL  2.3  1.4   ABM  1.6*  2.7*      LFT Recent Labs      02/16/18   1941  02/16/18   0405  02/15/18   1512   SGOT  82*  55*  21   ALT  47  35  30   AP  90  89  99   TP  7.6  8.6*  8.8*   ALB  2.8*  2.9*  3.9   GLOB  4.8*  5.7*  4.9   AGRAT  0.6*  0.5*  0.8*      Cardiac Testing Lab Results   Component Value Date/Time    Troponin-I, Qt. 9.36 () 12/16/2016 08:06 AM    Troponin-I, Qt. 18.90 () 12/16/2016 02:25 AM    Troponin-I, Qt. 30.90 () 12/15/2016 09:45 PM      Coagulation Tests Lab Results   Component Value Date/Time    INR (POC) 1.4 (H) 02/16/2018 02:50 PM    aPTT 37.5 (H) 02/16/2018 04:25 PM      A1c Lab Results   Component Value Date/Time    Hemoglobin A1c 12.7 (H) 02/15/2018 03:12 PM    Hemoglobin A1c 9.3 (H) 12/16/2016 02:25 AM      Lipid Panel Lab Results   Component Value Date/Time    Cholesterol, total 612 (H) 02/15/2018 07:11 PM    HDL Cholesterol 42 02/15/2018 07:11 PM    LDL,Direct 43 02/17/2018 03:36 AM    LDL, calculated  02/15/2018 07:11 PM     LDL AND VLDL CHOLESTEROL NOT CALCULATED WHEN TRIGLYCERIDES >400 MG/DL OR HDL CHOLESTEROL <20 MG/DL    VLDL, calculated 1261.2 (H) 02/15/2018 07:11 PM    Triglyceride >400 (H) 02/17/2018 03:36 AM    CHOL/HDL Ratio 14.6 02/15/2018 07:11 PM      Thyroid Panel No results found for: T4, T3U, TSH, TSHEXT, TSHEXT     Most Recent UA No results found for: COLOR, APPRN, REFSG, MARGARITO, PROTU, GLUCU, KETU, BILU, BLDU, UROU, BERNICE, LEUKU     Allergies   Allergen Reactions    Morphine Other (comments)     Anxiety, claustrophobia     Immunization History   Administered Date(s) Administered    Influenza Vaccine (Quad) Mdck Pf 09/08/2017       All Labs from Last 24 Hrs:  Recent Results (from the past 24 hour(s))   GLUCOSE, POC    Collection Time: 02/17/18 11:21 AM   Result Value Ref Range    Glucose (POC) 241 (H) 65 - 961 mg/dL   METABOLIC PANEL, BASIC    Collection Time: 02/17/18 12:13 PM   Result Value Ref Range    Sodium 138 136 - 145 mmol/L    Potassium 4.8 3.5 - 5.1 mmol/L    Chloride 109 (H) 98 - 107 mmol/L    CO2 19 (L) 21 - 32 mmol/L    Anion gap 10 7 - 16 mmol/L    Glucose 244 (H) 65 - 100 mg/dL    BUN 10 6 - 23 MG/DL    Creatinine 0.57 (L) 0.8 - 1.5 MG/DL    GFR est AA >60 >60 ml/min/1.73m2    GFR est non-AA >60 >60 ml/min/1.73m2    Calcium 8.0 (L) 8.3 - 10.4 MG/DL   GLUCOSE, POC    Collection Time: 02/17/18  3:50 PM   Result Value Ref Range    Glucose (POC) 260 (H) 65 - 100 mg/dL   GLUCOSE, POC    Collection Time: 02/17/18  9:38 PM Result Value Ref Range    Glucose (POC) 216 (H) 65 - 100 mg/dL   GLUCOSE, POC    Collection Time: 02/18/18  7:35 AM   Result Value Ref Range    Glucose (POC) 203 (H) 65 - 100 mg/dL       Discharge Exam:  Patient Vitals for the past 24 hrs:   Temp Pulse Resp BP SpO2   02/18/18 0705 98.6 °F (37 °C) (!) 101 18 131/77 98 %   02/18/18 0420 97.8 °F (36.6 °C) 90 18 98/64 96 %   02/17/18 2353 98.2 °F (36.8 °C) 86 19 93/58 97 %   02/17/18 2000 98.3 °F (36.8 °C) 95 18 110/76 97 %   02/17/18 1426 98.4 °F (36.9 °C) (!) 105 18 108/81 97 %     Oxygen Therapy  O2 Sat (%): 98 % (02/18/18 0705)  O2 Device: Room air (02/18/18 0815)    Intake/Output Summary (Last 24 hours) at 02/18/18 1112  Last data filed at 02/18/18 0800   Gross per 24 hour   Intake             1000 ml   Output              425 ml   Net              575 ml       General:    Well nourished. Alert. No distress. Eyes:   Normal sclera. ENT:  Normocephalic, atraumatic. Moist mucous membranes  CV:   Regular rate and rhythm. No murmur, rub, or gallop. Lungs:  Clear to auscultation bilaterally. No wheezing, rhonchi, or rales. Abdomen: Soft, nontender, nondistended. Bowel sounds normal.   Extremities: Warm and dry. No cyanosis or edema. Neurologic: CN II-XII grossly intact. Sensation intact. Skin:     No rashes or jaundice. No wounds. Psych:  Normal mood and affect. Discharge Info:   Current Discharge Medication List      START taking these medications    Details   carvedilol (COREG) 3.125 mg tablet Take 1 Tab by mouth two (2) times daily (with meals). Qty: 60 Tab, Refills: 1      fenofibrate (LOFIBRA) 160 mg tablet Take 1 Tab by mouth daily. Qty: 30 Tab, Refills: 1      Blood-Glucose Meter monitoring kit by SubCUTAneous route Before breakfast, lunch, dinner and at bedtime. Qty: 1 Kit, Refills: 0      insulin lispro (HUMALOG) 100 unit/mL kwikpen 3 Units by SubCUTAneous route Before breakfast, lunch, and dinner.   Qty: 1 Package, Refills: 1 insulin glargine (LANTUS SOLOSTAR U-100 INSULIN) 100 unit/mL (3 mL) inpn 25 Units by SubCUTAneous route nightly. Qty: 1 Pen, Refills: 1      Insulin Needles, Disposable, 30 gauge x 1/3\" by SubCUTAneous route Before breakfast, lunch, dinner and at bedtime. Qty: 1 Package, Refills: 11         CONTINUE these medications which have CHANGED    Details   amLODIPine (NORVASC) 10 mg tablet Take 1 Tab by mouth daily for 360 days. Qty: 90 Tab, Refills: 3    Associated Diagnoses: Accelerated hypertension      aspirin 81 mg chewable tablet Take 1 Tab by mouth daily. Qty: 30 Tab, Refills: 1      atorvastatin (LIPITOR) 80 mg tablet Take 1 Tab by mouth nightly for 360 days. Qty: 90 Tab, Refills: 3    Associated Diagnoses: Uncontrolled type 2 diabetes mellitus without complication, without long-term current use of insulin (HCC)      lisinopril (PRINIVIL, ZESTRIL) 40 mg tablet Take 1 Tab by mouth daily. Qty: 90 Tab, Refills: 1    Associated Diagnoses: Accelerated hypertension         CONTINUE these medications which have NOT CHANGED    Details   nitroglycerin (NITROSTAT) 0.4 mg SL tablet 1 Tab by SubLINGual route every five (5) minutes as needed for Chest Pain. Qty: 1 Bottle, Refills: 5         STOP taking these medications       chlorthalidone (HYGROTEN) 50 mg tablet Comments:   Reason for Stopping:         metFORMIN (GLUCOPHAGE) 1,000 mg tablet Comments:   Reason for Stopping:                 Disposition: home  Activity: Activity as tolerated  Diet: Diabetic Diet    Follow-up Information     Follow up With Details Comments Arlen Carlin MD In 3 days  [address] In 1 week  2 Roderick Forrest [address] [place] [address]  [phone]            Signed:   Sandra Mann MD

## 2018-03-01 PROBLEM — E11.21 TYPE 2 DIABETES WITH NEPHROPATHY (HCC): Status: ACTIVE | Noted: 2018-03-01

## 2018-03-02 PROBLEM — E66.9 OBESITY, UNSPECIFIED OBESITY SEVERITY, UNSPECIFIED OBESITY TYPE: Status: ACTIVE | Noted: 2018-03-02

## 2018-03-02 PROBLEM — E78.5 HYPERLIPIDEMIA: Status: ACTIVE | Noted: 2018-03-02

## 2018-03-02 PROBLEM — I10 ESSENTIAL HYPERTENSION: Status: ACTIVE | Noted: 2018-03-02

## 2018-03-02 PROBLEM — D63.8 ANEMIA OF CHRONIC DISEASE: Status: ACTIVE | Noted: 2018-03-02

## 2018-03-19 PROBLEM — Z71.89 ENCOUNTER FOR MEDICATION REVIEW AND COUNSELING: Status: ACTIVE | Noted: 2018-03-19

## 2018-03-19 PROBLEM — E78.1 HYPERTRIGLYCERIDEMIA: Status: ACTIVE | Noted: 2018-03-19

## 2018-03-19 PROBLEM — E66.01 SEVERE OBESITY (BMI 35.0-39.9) WITH COMORBIDITY (HCC): Status: ACTIVE | Noted: 2018-03-19

## 2018-03-19 PROBLEM — Z71.2 ENCOUNTER TO DISCUSS TEST RESULTS: Status: ACTIVE | Noted: 2018-03-19

## 2018-06-19 PROBLEM — Z87.891 HISTORY OF TOBACCO USE: Status: ACTIVE | Noted: 2018-06-19

## 2018-06-19 PROBLEM — M19.032 ARTHRITIS OF LEFT WRIST: Status: ACTIVE | Noted: 2018-06-19

## 2018-11-27 ENCOUNTER — HOSPITAL ENCOUNTER (INPATIENT)
Age: 48
LOS: 2 days | Discharge: HOME | DRG: 282 | End: 2018-11-30
Attending: EMERGENCY MEDICINE | Admitting: FAMILY MEDICINE
Payer: COMMERCIAL

## 2018-11-27 ENCOUNTER — APPOINTMENT (OUTPATIENT)
Dept: CT IMAGING | Age: 48
DRG: 282 | End: 2018-11-27
Attending: EMERGENCY MEDICINE
Payer: COMMERCIAL

## 2018-11-27 DIAGNOSIS — G47.19 EXCESSIVE DAYTIME SLEEPINESS: ICD-10-CM

## 2018-11-27 DIAGNOSIS — E78.1 HYPERTRIGLYCERIDEMIA: ICD-10-CM

## 2018-11-27 DIAGNOSIS — R29.818 SUSPECTED SLEEP APNEA: ICD-10-CM

## 2018-11-27 DIAGNOSIS — K85.90 ACUTE PANCREATITIS, UNSPECIFIED COMPLICATION STATUS, UNSPECIFIED PANCREATITIS TYPE: Primary | ICD-10-CM

## 2018-11-27 DIAGNOSIS — I10 ESSENTIAL HYPERTENSION: ICD-10-CM

## 2018-11-27 DIAGNOSIS — E66.9 OBESITY, UNSPECIFIED OBESITY SEVERITY, UNSPECIFIED OBESITY TYPE: ICD-10-CM

## 2018-11-27 LAB
ALBUMIN SERPL-MCNC: 2.5 G/DL (ref 3.5–5)
ALBUMIN/GLOB SERPL: 0.4 {RATIO} (ref 1.2–3.5)
ALP SERPL-CCNC: 74 U/L (ref 50–136)
ALT SERPL-CCNC: ABNORMAL U/L (ref 12–65)
ANION GAP SERPL CALC-SCNC: 13 MMOL/L (ref 7–16)
AST SERPL-CCNC: ABNORMAL U/L (ref 15–37)
BASOPHILS # BLD: 0.1 K/UL (ref 0–0.2)
BASOPHILS NFR BLD: 1 % (ref 0–2)
BILIRUB SERPL-MCNC: ABNORMAL MG/DL (ref 0.2–1.1)
BUN SERPL-MCNC: 12 MG/DL (ref 6–23)
CALCIUM SERPL-MCNC: 7.4 MG/DL (ref 8.3–10.4)
CHLORIDE SERPL-SCNC: 97 MMOL/L (ref 98–107)
CO2 SERPL-SCNC: 17 MMOL/L (ref 21–32)
CREAT SERPL-MCNC: 0.72 MG/DL (ref 0.8–1.5)
DIFFERENTIAL METHOD BLD: ABNORMAL
EOSINOPHIL # BLD: 0.5 K/UL (ref 0–0.8)
EOSINOPHIL NFR BLD: 4 % (ref 0.5–7.8)
ERYTHROCYTE [DISTWIDTH] IN BLOOD BY AUTOMATED COUNT: 13.6 %
GLOBULIN SER CALC-MCNC: 5.8 G/DL (ref 2.3–3.5)
GLUCOSE SERPL-MCNC: 276 MG/DL (ref 65–100)
HCT VFR BLD AUTO: 47.4 % (ref 41.1–50.3)
HGB BLD-MCNC: 17 G/DL (ref 13.6–17.2)
IMM GRANULOCYTES # BLD: 0.1 K/UL (ref 0–0.5)
IMM GRANULOCYTES NFR BLD AUTO: 0 % (ref 0–5)
LIPASE SERPL-CCNC: 548 U/L (ref 73–393)
LYMPHOCYTES # BLD: 1.6 K/UL (ref 0.5–4.6)
LYMPHOCYTES NFR BLD: 11 % (ref 13–44)
MCH RBC QN AUTO: 31.7 PG (ref 26.1–32.9)
MCHC RBC AUTO-ENTMCNC: 35.9 G/DL (ref 31.4–35)
MCV RBC AUTO: 88.3 FL (ref 79.6–97.8)
MONOCYTES # BLD: 1.1 K/UL (ref 0.1–1.3)
MONOCYTES NFR BLD: 8 % (ref 4–12)
NEUTS SEG # BLD: 10.9 K/UL (ref 1.7–8.2)
NEUTS SEG NFR BLD: 77 % (ref 43–78)
NRBC # BLD: 0 K/UL (ref 0–0.2)
PLATELET # BLD AUTO: 284 K/UL (ref 150–450)
PMV BLD AUTO: 12.6 FL (ref 9.4–12.3)
POTASSIUM SERPL-SCNC: 7.6 MMOL/L (ref 3.5–5.1)
PROT SERPL-MCNC: 8.3 G/DL (ref 6.3–8.2)
RBC # BLD AUTO: 5.37 M/UL (ref 4.23–5.6)
SODIUM SERPL-SCNC: 127 MMOL/L (ref 136–145)
WBC # BLD AUTO: 14.2 K/UL (ref 4.3–11.1)

## 2018-11-27 PROCEDURE — 74011250636 HC RX REV CODE- 250/636: Performed by: EMERGENCY MEDICINE

## 2018-11-27 PROCEDURE — 83690 ASSAY OF LIPASE: CPT

## 2018-11-27 PROCEDURE — 81003 URINALYSIS AUTO W/O SCOPE: CPT | Performed by: EMERGENCY MEDICINE

## 2018-11-27 PROCEDURE — 96374 THER/PROPH/DIAG INJ IV PUSH: CPT | Performed by: EMERGENCY MEDICINE

## 2018-11-27 PROCEDURE — 74011636320 HC RX REV CODE- 636/320: Performed by: EMERGENCY MEDICINE

## 2018-11-27 PROCEDURE — 74011000258 HC RX REV CODE- 258: Performed by: EMERGENCY MEDICINE

## 2018-11-27 PROCEDURE — 74177 CT ABD & PELVIS W/CONTRAST: CPT

## 2018-11-27 PROCEDURE — 96375 TX/PRO/DX INJ NEW DRUG ADDON: CPT | Performed by: EMERGENCY MEDICINE

## 2018-11-27 PROCEDURE — 99284 EMERGENCY DEPT VISIT MOD MDM: CPT | Performed by: EMERGENCY MEDICINE

## 2018-11-27 PROCEDURE — 85025 COMPLETE CBC W/AUTO DIFF WBC: CPT

## 2018-11-27 PROCEDURE — 80053 COMPREHEN METABOLIC PANEL: CPT

## 2018-11-27 RX ORDER — SODIUM CHLORIDE 0.9 % (FLUSH) 0.9 %
10 SYRINGE (ML) INJECTION
Status: COMPLETED | OUTPATIENT
Start: 2018-11-27 | End: 2018-11-27

## 2018-11-27 RX ORDER — HYDROMORPHONE HYDROCHLORIDE 1 MG/ML
1 INJECTION, SOLUTION INTRAMUSCULAR; INTRAVENOUS; SUBCUTANEOUS ONCE
Status: COMPLETED | OUTPATIENT
Start: 2018-11-27 | End: 2018-11-27

## 2018-11-27 RX ORDER — ONDANSETRON 2 MG/ML
4 INJECTION INTRAMUSCULAR; INTRAVENOUS
Status: COMPLETED | OUTPATIENT
Start: 2018-11-27 | End: 2018-11-27

## 2018-11-27 RX ORDER — HYDROMORPHONE HYDROCHLORIDE 1 MG/ML
0.5 INJECTION, SOLUTION INTRAMUSCULAR; INTRAVENOUS; SUBCUTANEOUS
Status: COMPLETED | OUTPATIENT
Start: 2018-11-27 | End: 2018-11-27

## 2018-11-27 RX ADMIN — SODIUM CHLORIDE 1000 ML: 900 INJECTION, SOLUTION INTRAVENOUS at 21:15

## 2018-11-27 RX ADMIN — IOPAMIDOL 100 ML: 755 INJECTION, SOLUTION INTRAVENOUS at 22:19

## 2018-11-27 RX ADMIN — ONDANSETRON 4 MG: 2 INJECTION INTRAMUSCULAR; INTRAVENOUS at 21:15

## 2018-11-27 RX ADMIN — HYDROMORPHONE HYDROCHLORIDE 0.5 MG: 1 INJECTION, SOLUTION INTRAMUSCULAR; INTRAVENOUS; SUBCUTANEOUS at 21:15

## 2018-11-27 RX ADMIN — SODIUM CHLORIDE 100 ML: 900 INJECTION, SOLUTION INTRAVENOUS at 22:19

## 2018-11-27 RX ADMIN — HYDROMORPHONE HYDROCHLORIDE 1 MG: 1 INJECTION, SOLUTION INTRAMUSCULAR; INTRAVENOUS; SUBCUTANEOUS at 23:54

## 2018-11-27 RX ADMIN — Medication 10 ML: at 22:19

## 2018-11-28 PROBLEM — R29.818 SUSPECTED SLEEP APNEA: Status: ACTIVE | Noted: 2018-11-28

## 2018-11-28 PROBLEM — R06.83 SNORING: Status: ACTIVE | Noted: 2018-11-28

## 2018-11-28 LAB
ALBUMIN SERPL-MCNC: 3.3 G/DL (ref 3.5–5)
ALBUMIN/GLOB SERPL: 0.6 {RATIO} (ref 1.2–3.5)
ALP SERPL-CCNC: 91 U/L (ref 50–136)
ALT SERPL-CCNC: 38 U/L (ref 12–65)
ANION GAP SERPL CALC-SCNC: 15 MMOL/L (ref 7–16)
AST SERPL-CCNC: 57 U/L (ref 15–37)
ATRIAL RATE: 102 BPM
ATRIAL RATE: 104 BPM
BILIRUB SERPL-MCNC: 1 MG/DL (ref 0.2–1.1)
BUN SERPL-MCNC: 10 MG/DL (ref 6–23)
CALCIUM SERPL-MCNC: 8.8 MG/DL (ref 8.3–10.4)
CALCULATED P AXIS, ECG09: 36 DEGREES
CALCULATED P AXIS, ECG09: 53 DEGREES
CALCULATED R AXIS, ECG10: 58 DEGREES
CALCULATED R AXIS, ECG10: 66 DEGREES
CALCULATED T AXIS, ECG11: 0 DEGREES
CALCULATED T AXIS, ECG11: 14 DEGREES
CHLORIDE SERPL-SCNC: 102 MMOL/L (ref 98–107)
CHOLEST SERPL-MCNC: 278 MG/DL
CO2 SERPL-SCNC: 15 MMOL/L (ref 21–32)
CREAT SERPL-MCNC: 0.68 MG/DL (ref 0.8–1.5)
DIAGNOSIS, 93000: NORMAL
DIAGNOSIS, 93000: NORMAL
ERYTHROCYTE [DISTWIDTH] IN BLOOD BY AUTOMATED COUNT: 13.1 %
GLOBULIN SER CALC-MCNC: 5.2 G/DL (ref 2.3–3.5)
GLUCOSE BLD STRIP.AUTO-MCNC: 179 MG/DL (ref 65–100)
GLUCOSE BLD STRIP.AUTO-MCNC: 197 MG/DL (ref 65–100)
GLUCOSE BLD STRIP.AUTO-MCNC: 263 MG/DL (ref 65–100)
GLUCOSE SERPL-MCNC: 262 MG/DL (ref 65–100)
HCT VFR BLD AUTO: 46.9 % (ref 41.1–50.3)
HDLC SERPL-MCNC: 25 MG/DL (ref 40–60)
HDLC SERPL: 11.1 {RATIO}
HGB BLD-MCNC: 16.3 G/DL (ref 13.6–17.2)
LACTATE SERPL-SCNC: <0.1 MMOL/L (ref 0.4–2)
LDLC SERPL CALC-MCNC: ABNORMAL MG/DL
LDLC SERPL DIRECT ASSAY-MCNC: NORMAL MG/DL
LIPASE SERPL-CCNC: 523 U/L (ref 73–393)
LIPID PROFILE,FLP: ABNORMAL
MCH RBC QN AUTO: 31 PG (ref 26.1–32.9)
MCHC RBC AUTO-ENTMCNC: 34.8 G/DL (ref 31.4–35)
MCV RBC AUTO: 89.3 FL (ref 79.6–97.8)
NRBC # BLD: 0 K/UL (ref 0–0.2)
P-R INTERVAL, ECG05: 138 MS
P-R INTERVAL, ECG05: 148 MS
PLATELET # BLD AUTO: 209 K/UL (ref 150–450)
PMV BLD AUTO: 12.6 FL (ref 9.4–12.3)
POTASSIUM SERPL-SCNC: 4.2 MMOL/L (ref 3.5–5.1)
POTASSIUM SERPL-SCNC: 5 MMOL/L (ref 3.5–5.1)
PROT SERPL-MCNC: 8.5 G/DL (ref 6.3–8.2)
Q-T INTERVAL, ECG07: 348 MS
Q-T INTERVAL, ECG07: 352 MS
QRS DURATION, ECG06: 96 MS
QRS DURATION, ECG06: 96 MS
QTC CALCULATION (BEZET), ECG08: 457 MS
QTC CALCULATION (BEZET), ECG08: 458 MS
RBC # BLD AUTO: 5.25 M/UL (ref 4.23–5.6)
SODIUM SERPL-SCNC: 132 MMOL/L (ref 136–145)
TRIGL SERPL-MCNC: 1096 MG/DL (ref 35–150)
VENTRICULAR RATE, ECG03: 102 BPM
VENTRICULAR RATE, ECG03: 104 BPM
VLDLC SERPL CALC-MCNC: ABNORMAL MG/DL (ref 6–23)
WBC # BLD AUTO: 16.5 K/UL (ref 4.3–11.1)

## 2018-11-28 PROCEDURE — 85027 COMPLETE CBC AUTOMATED: CPT

## 2018-11-28 PROCEDURE — 82962 GLUCOSE BLOOD TEST: CPT

## 2018-11-28 PROCEDURE — 83721 ASSAY OF BLOOD LIPOPROTEIN: CPT

## 2018-11-28 PROCEDURE — 74011636637 HC RX REV CODE- 636/637: Performed by: EMERGENCY MEDICINE

## 2018-11-28 PROCEDURE — 65270000029 HC RM PRIVATE

## 2018-11-28 PROCEDURE — 74011250636 HC RX REV CODE- 250/636: Performed by: EMERGENCY MEDICINE

## 2018-11-28 PROCEDURE — 83690 ASSAY OF LIPASE: CPT

## 2018-11-28 PROCEDURE — 77030020263 HC SOL INJ SOD CL0.9% LFCR 1000ML

## 2018-11-28 PROCEDURE — 74011250637 HC RX REV CODE- 250/637: Performed by: FAMILY MEDICINE

## 2018-11-28 PROCEDURE — 84132 ASSAY OF SERUM POTASSIUM: CPT

## 2018-11-28 PROCEDURE — 36415 COLL VENOUS BLD VENIPUNCTURE: CPT

## 2018-11-28 PROCEDURE — 83605 ASSAY OF LACTIC ACID: CPT

## 2018-11-28 PROCEDURE — 80061 LIPID PANEL: CPT

## 2018-11-28 PROCEDURE — 80053 COMPREHEN METABOLIC PANEL: CPT

## 2018-11-28 PROCEDURE — 99252 IP/OBS CONSLTJ NEW/EST SF 35: CPT | Performed by: INTERNAL MEDICINE

## 2018-11-28 PROCEDURE — 74011250636 HC RX REV CODE- 250/636: Performed by: FAMILY MEDICINE

## 2018-11-28 PROCEDURE — 93005 ELECTROCARDIOGRAM TRACING: CPT | Performed by: FAMILY MEDICINE

## 2018-11-28 PROCEDURE — 74011636637 HC RX REV CODE- 636/637: Performed by: FAMILY MEDICINE

## 2018-11-28 RX ORDER — INSULIN GLARGINE 100 [IU]/ML
15 INJECTION, SOLUTION SUBCUTANEOUS 2 TIMES DAILY
Status: DISCONTINUED | OUTPATIENT
Start: 2018-11-28 | End: 2018-11-30 | Stop reason: HOSPADM

## 2018-11-28 RX ORDER — CARVEDILOL 3.12 MG/1
3.12 TABLET ORAL 2 TIMES DAILY WITH MEALS
Status: DISCONTINUED | OUTPATIENT
Start: 2018-11-28 | End: 2018-11-30 | Stop reason: HOSPADM

## 2018-11-28 RX ORDER — NALOXONE HYDROCHLORIDE 0.4 MG/ML
0.4 INJECTION, SOLUTION INTRAMUSCULAR; INTRAVENOUS; SUBCUTANEOUS AS NEEDED
Status: DISCONTINUED | OUTPATIENT
Start: 2018-11-28 | End: 2018-11-30 | Stop reason: HOSPADM

## 2018-11-28 RX ORDER — MAG HYDROX/ALUMINUM HYD/SIMETH 200-200-20
30 SUSPENSION, ORAL (FINAL DOSE FORM) ORAL
Status: DISCONTINUED | OUTPATIENT
Start: 2018-11-28 | End: 2018-11-30 | Stop reason: HOSPADM

## 2018-11-28 RX ORDER — ONDANSETRON 2 MG/ML
4 INJECTION INTRAMUSCULAR; INTRAVENOUS
Status: DISCONTINUED | OUTPATIENT
Start: 2018-11-28 | End: 2018-11-30 | Stop reason: HOSPADM

## 2018-11-28 RX ORDER — LISINOPRIL 20 MG/1
40 TABLET ORAL DAILY
Status: DISCONTINUED | OUTPATIENT
Start: 2018-11-28 | End: 2018-11-30 | Stop reason: HOSPADM

## 2018-11-28 RX ORDER — ATORVASTATIN CALCIUM 40 MG/1
80 TABLET, FILM COATED ORAL
Status: DISCONTINUED | OUTPATIENT
Start: 2018-11-28 | End: 2018-11-30 | Stop reason: HOSPADM

## 2018-11-28 RX ORDER — SODIUM CHLORIDE 0.9 % (FLUSH) 0.9 %
5-10 SYRINGE (ML) INJECTION EVERY 8 HOURS
Status: DISCONTINUED | OUTPATIENT
Start: 2018-11-28 | End: 2018-11-30 | Stop reason: HOSPADM

## 2018-11-28 RX ORDER — GUAIFENESIN 100 MG/5ML
81 LIQUID (ML) ORAL DAILY
Status: DISCONTINUED | OUTPATIENT
Start: 2018-11-28 | End: 2018-11-30 | Stop reason: HOSPADM

## 2018-11-28 RX ORDER — FENOFIBRATE 160 MG/1
160 TABLET ORAL DAILY
Status: DISCONTINUED | OUTPATIENT
Start: 2018-11-28 | End: 2018-11-30 | Stop reason: HOSPADM

## 2018-11-28 RX ORDER — SODIUM CHLORIDE 9 MG/ML
150 INJECTION, SOLUTION INTRAVENOUS CONTINUOUS
Status: DISCONTINUED | OUTPATIENT
Start: 2018-11-28 | End: 2018-11-29

## 2018-11-28 RX ORDER — SODIUM CHLORIDE 0.9 % (FLUSH) 0.9 %
5-10 SYRINGE (ML) INJECTION AS NEEDED
Status: DISCONTINUED | OUTPATIENT
Start: 2018-11-28 | End: 2018-11-30 | Stop reason: HOSPADM

## 2018-11-28 RX ORDER — INSULIN LISPRO 100 [IU]/ML
INJECTION, SOLUTION INTRAVENOUS; SUBCUTANEOUS
Status: DISCONTINUED | OUTPATIENT
Start: 2018-11-28 | End: 2018-11-30 | Stop reason: HOSPADM

## 2018-11-28 RX ORDER — HYDROMORPHONE HYDROCHLORIDE 1 MG/ML
1 INJECTION, SOLUTION INTRAMUSCULAR; INTRAVENOUS; SUBCUTANEOUS
Status: DISCONTINUED | OUTPATIENT
Start: 2018-11-28 | End: 2018-11-30 | Stop reason: HOSPADM

## 2018-11-28 RX ORDER — PANTOPRAZOLE SODIUM 40 MG/1
40 TABLET, DELAYED RELEASE ORAL
Status: DISCONTINUED | OUTPATIENT
Start: 2018-11-28 | End: 2018-11-30 | Stop reason: HOSPADM

## 2018-11-28 RX ORDER — HYDROCODONE BITARTRATE AND ACETAMINOPHEN 5; 325 MG/1; MG/1
1 TABLET ORAL
Status: DISCONTINUED | OUTPATIENT
Start: 2018-11-28 | End: 2018-11-30 | Stop reason: HOSPADM

## 2018-11-28 RX ORDER — HYDROMORPHONE HYDROCHLORIDE 1 MG/ML
0.5 INJECTION, SOLUTION INTRAMUSCULAR; INTRAVENOUS; SUBCUTANEOUS
Status: DISCONTINUED | OUTPATIENT
Start: 2018-11-28 | End: 2018-11-28

## 2018-11-28 RX ORDER — HEPARIN SODIUM 5000 [USP'U]/ML
5000 INJECTION, SOLUTION INTRAVENOUS; SUBCUTANEOUS EVERY 8 HOURS
Status: DISCONTINUED | OUTPATIENT
Start: 2018-11-28 | End: 2018-11-30 | Stop reason: HOSPADM

## 2018-11-28 RX ORDER — AMLODIPINE BESYLATE 10 MG/1
10 TABLET ORAL DAILY
Status: DISCONTINUED | OUTPATIENT
Start: 2018-11-28 | End: 2018-11-30 | Stop reason: HOSPADM

## 2018-11-28 RX ORDER — HYDRALAZINE HYDROCHLORIDE 20 MG/ML
20 INJECTION INTRAMUSCULAR; INTRAVENOUS
Status: DISCONTINUED | OUTPATIENT
Start: 2018-11-28 | End: 2018-11-30 | Stop reason: HOSPADM

## 2018-11-28 RX ORDER — HYDRALAZINE HYDROCHLORIDE 20 MG/ML
20 INJECTION INTRAMUSCULAR; INTRAVENOUS ONCE
Status: COMPLETED | OUTPATIENT
Start: 2018-11-28 | End: 2018-11-28

## 2018-11-28 RX ADMIN — ASPIRIN 81 MG 81 MG: 81 TABLET ORAL at 09:11

## 2018-11-28 RX ADMIN — AMLODIPINE BESYLATE 10 MG: 10 TABLET ORAL at 17:16

## 2018-11-28 RX ADMIN — HYDROMORPHONE HYDROCHLORIDE 1 MG: 1 INJECTION, SOLUTION INTRAMUSCULAR; INTRAVENOUS; SUBCUTANEOUS at 17:16

## 2018-11-28 RX ADMIN — LISINOPRIL 40 MG: 20 TABLET ORAL at 09:11

## 2018-11-28 RX ADMIN — SODIUM CHLORIDE 150 ML/HR: 900 INJECTION, SOLUTION INTRAVENOUS at 23:43

## 2018-11-28 RX ADMIN — HYDROCODONE BITARTRATE AND ACETAMINOPHEN 1 TABLET: 5; 325 TABLET ORAL at 15:10

## 2018-11-28 RX ADMIN — SODIUM CHLORIDE 150 ML/HR: 900 INJECTION, SOLUTION INTRAVENOUS at 17:21

## 2018-11-28 RX ADMIN — ONDANSETRON 4 MG: 2 INJECTION INTRAMUSCULAR; INTRAVENOUS at 03:16

## 2018-11-28 RX ADMIN — HEPARIN SODIUM 5000 UNITS: 5000 INJECTION INTRAVENOUS; SUBCUTANEOUS at 20:17

## 2018-11-28 RX ADMIN — Medication 10 ML: at 15:10

## 2018-11-28 RX ADMIN — Medication 10 ML: at 21:42

## 2018-11-28 RX ADMIN — HYDRALAZINE HYDROCHLORIDE 20 MG: 20 INJECTION INTRAMUSCULAR; INTRAVENOUS at 02:13

## 2018-11-28 RX ADMIN — HYDROCODONE BITARTRATE AND ACETAMINOPHEN 1 TABLET: 5; 325 TABLET ORAL at 07:51

## 2018-11-28 RX ADMIN — HYDROMORPHONE HYDROCHLORIDE 1 MG: 1 INJECTION, SOLUTION INTRAMUSCULAR; INTRAVENOUS; SUBCUTANEOUS at 13:12

## 2018-11-28 RX ADMIN — PANTOPRAZOLE SODIUM 40 MG: 40 TABLET, DELAYED RELEASE ORAL at 20:18

## 2018-11-28 RX ADMIN — INSULIN GLARGINE 15 UNITS: 100 INJECTION, SOLUTION SUBCUTANEOUS at 21:41

## 2018-11-28 RX ADMIN — INSULIN HUMAN 5 UNITS: 100 INJECTION, SOLUTION PARENTERAL at 00:13

## 2018-11-28 RX ADMIN — ONDANSETRON 4 MG: 2 INJECTION INTRAMUSCULAR; INTRAVENOUS at 13:15

## 2018-11-28 RX ADMIN — SODIUM CHLORIDE 150 ML/HR: 900 INJECTION, SOLUTION INTRAVENOUS at 05:33

## 2018-11-28 RX ADMIN — SODIUM CHLORIDE 1000 ML: 900 INJECTION, SOLUTION INTRAVENOUS at 00:15

## 2018-11-28 RX ADMIN — CARVEDILOL 3.12 MG: 3.12 TABLET, FILM COATED ORAL at 07:50

## 2018-11-28 RX ADMIN — INSULIN LISPRO 2 UNITS: 100 INJECTION, SOLUTION INTRAVENOUS; SUBCUTANEOUS at 17:17

## 2018-11-28 RX ADMIN — CARVEDILOL 3.12 MG: 3.12 TABLET, FILM COATED ORAL at 15:13

## 2018-11-28 RX ADMIN — ATORVASTATIN CALCIUM 80 MG: 40 TABLET, FILM COATED ORAL at 21:42

## 2018-11-28 RX ADMIN — HEPARIN SODIUM 5000 UNITS: 5000 INJECTION INTRAVENOUS; SUBCUTANEOUS at 13:12

## 2018-11-28 RX ADMIN — INSULIN GLARGINE 15 UNITS: 100 INJECTION, SOLUTION SUBCUTANEOUS at 09:12

## 2018-11-28 RX ADMIN — HYDROCODONE BITARTRATE AND ACETAMINOPHEN 1 TABLET: 5; 325 TABLET ORAL at 20:18

## 2018-11-28 RX ADMIN — FENOFIBRATE 160 MG: 160 TABLET ORAL at 10:01

## 2018-11-28 RX ADMIN — Medication 5 ML: at 05:33

## 2018-11-28 RX ADMIN — HYDROMORPHONE HYDROCHLORIDE 1 MG: 1 INJECTION, SOLUTION INTRAMUSCULAR; INTRAVENOUS; SUBCUTANEOUS at 21:43

## 2018-11-28 RX ADMIN — HEPARIN SODIUM 5000 UNITS: 5000 INJECTION INTRAVENOUS; SUBCUTANEOUS at 03:16

## 2018-11-28 RX ADMIN — INSULIN LISPRO 2 UNITS: 100 INJECTION, SOLUTION INTRAVENOUS; SUBCUTANEOUS at 21:42

## 2018-11-28 RX ADMIN — HYDROMORPHONE HYDROCHLORIDE 1 MG: 1 INJECTION, SOLUTION INTRAMUSCULAR; INTRAVENOUS; SUBCUTANEOUS at 04:40

## 2018-11-28 RX ADMIN — HYDROMORPHONE HYDROCHLORIDE 1 MG: 1 INJECTION, SOLUTION INTRAMUSCULAR; INTRAVENOUS; SUBCUTANEOUS at 09:20

## 2018-11-28 RX ADMIN — SODIUM CHLORIDE 150 ML/HR: 900 INJECTION, SOLUTION INTRAVENOUS at 13:18

## 2018-11-28 RX ADMIN — HYDROCODONE BITARTRATE AND ACETAMINOPHEN 1 TABLET: 5; 325 TABLET ORAL at 03:16

## 2018-11-28 NOTE — CDMP QUERY
Please clarify if this patient is being treated/managed for: Hyponatremia in patient with pancreatitis, sodium 127, treated with ivf.  
 
=>Other Explanation of clinical findings =>Unable to Determine (no explanation of clinical findings) The medical record reflects the following: 
 
Risk Factors: Acute pancreatitis, hypertriglyceridemia Clinical Indicators: Na 127 -132 Treatment: IVF /hr and lab monitoring. Please clarify and document your clinical opinion in the progress notes and discharge summary including the definitive and/or presumptive diagnosis, (suspected or probable), related to the above clinical findings. Please include clinical findings supporting your diagnosis. Thanks, Loretta Fernandez RN, [facility] Compliant Documentation Management Program 
[phone]

## 2018-11-28 NOTE — PROGRESS NOTES
made an initial visit. Pt was alert and verbal with his daughter, Mitra Fernandez, present. Pt appeared comfortable with no pain level expressed or observed.  welcomed them to DT and shared information about  services.  provided spiritual care through presence, pastoral conversation, and assurance of prayer.

## 2018-11-28 NOTE — H&P
HOSPITALIST H&P/CONSULTNAME:  Felicitas Landau Age:  57 y.o. 
:   1970 MRN:   [id number] PCP: Asia Pham MD 
Consulting MD: Treatment Team: Primary Nurse: Willa Russell RN; Primary Nurse: Angelica Wong RN 
HPI:  
Patient is 53yo M with Hx DM, hypertriglyceridemia, pancreatitis, who presents with 2w worsening abdominal and back pain. Feels similar to last episode of pancreatitis. States has been doing well with his medications, keeping follow-up with PCP. BS well controlled, no alcohol use. Complete ROS done and is as stated in HPI or otherwise negative Past Medical History:  
Diagnosis Date  Acute coronary syndrome ([facility] [address] 12/15/2016  Acute pancreatitis 2/15/2018  Arthritis   
 psoriatic  Endocrine disease  Hypertension  Psoriatic arthropathy ([facility] Past Surgical History:  
Procedure Laterality Date  HX APPENDECTOMY  HX CHOLECYSTECTOMY  HX ORTHOPAEDIC    
 left heel secondary to trauma Prior to Admission Medications Prescriptions Last Dose Informant Patient Reported? Taking? Blood-Glucose Meter (ACCU-CHEK SHIRA) misc   No No  
Sig: Check blood sugar three to four  times daily 4 accucheck shira test strip daily Blood-Glucose Meter monitoring kit   No No  
Sig: by SubCUTAneous route Before breakfast, lunch, dinner and at bedtime. Insulin Needles, Disposable, 30 gauge x 1/3\"   No No  
Sig: by SubCUTAneous route Before breakfast, lunch, dinner and at bedtime. amLODIPine (NORVASC) 10 mg tablet   No No  
Sig: Take 1 Tab by mouth daily for 360 days. aspirin 81 mg chewable tablet   No No  
Sig: Take 1 Tab by mouth daily. atorvastatin (LIPITOR) 80 mg tablet   No No  
Sig: Take 1 Tab by mouth nightly for 360 days. carvedilol (COREG) 3.125 mg tablet   No No  
Sig: Take 1 Tab by mouth two (2) times daily (with meals).   
diclofenac potassium (CATAFLAM) 50 mg tablet   No No  
 Sig: Take 1 Tab by mouth two (2) times daily as needed. Indications: OSTEOARTHRITIS, with food  
fenofibrate (LOFIBRA) 160 mg tablet   No No  
Sig: Take 1 Tab by mouth daily. lisinopril (PRINIVIL, ZESTRIL) 40 mg tablet   No No  
Sig: Take 1 Tab by mouth daily. metFORMIN (GLUCOPHAGE) 500 mg tablet   No No  
Sig: Take 1 Tab by mouth two (2) times daily (with meals). nitroglycerin (NITROSTAT) 0.4 mg SL tablet   No No  
Si Tab by SubLINGual route every five (5) minutes as needed for Chest Pain. Facility-Administered Medications: None Allergies Allergen Reactions  Morphine Other (comments) Anxiety, claustrophobia Social History Tobacco Use  Smoking status: Current Every Day Smoker Packs/day: 0.25 Years: 30.00 Pack years: 7.50  Smokeless tobacco: Never Used Substance Use Topics  Alcohol use: No  
  
Family History Problem Relation Age of Onset  Cancer Mother  Thyroid Disease Mother  Diabetes Father  Heart Disease Father Objective:  
 
Visit Vitals BP (!) 194/107 (BP 1 Location: Left arm, BP Patient Position: Supine) Pulse (!) 108 Temp 98.9 °F (37.2 °C) Resp 16 Ht 5' 11\" (1.803 m) Wt 113.4 kg (250 lb) SpO2 94% BMI 34.87 kg/m² Temp (24hrs), Av.9 °F (37.2 °C), Min:98.9 °F (37.2 °C), Max:98.9 °F (37.2 °C) Oxygen Therapy O2 Sat (%): 94 % (18) O2 Device: Room air (18) Physical Exam: 
General:    Alert, cooperative, mild distress from pain Head:   Normocephalic, without obvious abnormality, atraumatic. Nose:  Nares normal. No drainage or sinus tenderness. Lungs:   Clear to auscultation bilaterally. No Wheezing or Rhonchi. No rales. Heart:   Regular rate and rhythm,  no murmur, rub or gallop. Abdomen:   Soft, tender epigastrium, no rebound or tenderness, +BS Extremities: No cyanosis. No edema. No clubbing Skin:     Texture, turgor normal. No rashes or lesions. Not Jaundiced Neurologic: Alert and oriented x 3, no focal deficits Data Review:  
Recent Results (from the past 24 hour(s)) CBC WITH AUTOMATED DIFF Collection Time: 11/27/18  8:56 PM  
Result Value Ref Range WBC 14.2 (H) 4.3 - 11.1 K/uL  
 RBC 5.37 4.23 - 5.6 M/uL  
 HGB 17.0 13.6 - 17.2 g/dL HCT 47.4 41.1 - 50.3 % MCV 88.3 79.6 - 97.8 FL  
 MCH 31.7 26.1 - 32.9 PG  
 MCHC 35.9 (H) 31.4 - 35.0 g/dL  
 RDW 13.6 % PLATELET 636 584 - 799 K/uL MPV 12.6 (H) 9.4 - 12.3 FL ABSOLUTE NRBC 0.00 0.0 - 0.2 K/uL  
 DF AUTOMATED NEUTROPHILS 77 43 - 78 % LYMPHOCYTES 11 (L) 13 - 44 % MONOCYTES 8 4.0 - 12.0 % EOSINOPHILS 4 0.5 - 7.8 % BASOPHILS 1 0.0 - 2.0 % IMMATURE GRANULOCYTES 0 0.0 - 5.0 %  
 ABS. NEUTROPHILS 10.9 (H) 1.7 - 8.2 K/UL  
 ABS. LYMPHOCYTES 1.6 0.5 - 4.6 K/UL  
 ABS. MONOCYTES 1.1 0.1 - 1.3 K/UL  
 ABS. EOSINOPHILS 0.5 0.0 - 0.8 K/UL  
 ABS. BASOPHILS 0.1 0.0 - 0.2 K/UL  
 ABS. IMM. GRANS. 0.1 0.0 - 0.5 K/UL  
LIPASE Collection Time: 11/27/18 10:27 PM  
Result Value Ref Range Lipase 548 (H) 73 - 275 U/L  
METABOLIC PANEL, COMPREHENSIVE Collection Time: 11/27/18 10:27 PM  
Result Value Ref Range Sodium 127 (L) 136 - 145 mmol/L Potassium 7.6 (HH) 3.5 - 5.1 mmol/L Chloride 97 (L) 98 - 107 mmol/L  
 CO2 17 (L) 21 - 32 mmol/L Anion gap 13 7 - 16 mmol/L Glucose 276 (H) 65 - 100 mg/dL BUN 12 6 - 23 MG/DL Creatinine 0.72 (L) 0.8 - 1.5 MG/DL  
 GFR est AA >60 >60 ml/min/1.73m2 GFR est non-AA >60 >60 ml/min/1.73m2 Calcium 7.4 (L) 8.3 - 10.4 MG/DL Bilirubin, total  0.2 - 1.1 MG/DL UNABLE TO PROCESS. SPECIMEN GROSSLY LIPEMIC AND HEMOLYTIC  
 ALT (SGPT)  12 - 65 U/L  
  UNALBE TO PROCESS. SPECIMEN GROSSLY LIPEMIC AND HEMOLYTIC  
 AST (SGOT)  15 - 37 U/L  
  UNABLE TO PROCESS. SPECIMEN GROSSLY LIPEMIC AND HEMOLYTIC Alk. phosphatase 74 50 - 136 U/L Protein, total 8.3 (H) 6.3 - 8.2 g/dL Albumin 2.5 (L) 3.5 - 5.0 g/dL Globulin 5.8 (H) 2.3 - 3.5 g/dL A-G Ratio 0.4 (L) 1.2 - 3.5 Imaging [facility] [facility] CT ABD PELV W CONT Final Result IMPRESSION:  
  
Findings consistent with acute pancreatitis of the uncinate process of the  
pancreas without evidence of pancreatic necrosis. Stable left adrenal myelolipoma. Status post pinning of the left hip and cholecystectomy. Diffuse fatty change liver. Small hiatal hernia. Date of Dictation: 11/27/2018 10:24 PM  
  
  
  
 
 
Assessment and Plan: Active Hospital Problems Diagnosis Date Noted  Hypertriglyceridemia 03/19/2018  Essential hypertension 03/02/2018  Hyperlipidemia 03/02/2018  Obesity, unspecified obesity severity, unspecified obesity type 03/02/2018  Acute pancreatitis 02/15/2018  Diabetes mellitus type II, uncontrolled ([facility] [address] 12/15/2016 PLAN: 
Acute pancreatitis: likely 2/2 triglycerides. Pain control, npo. Hypertriglyceridemia: recheck, continue lofibra. Has needed apheresis in past. 
Hyperkalemia: repeat pending - hydrolyzed sample. Received insulin in ER. DM: decreased home lantus while npo. Hold metformin, add ssi. HTN: elevated pressures on presentation likely due to pain; restart home meds. Prn hydral. 
 
DVT PPx: HSQ Code Status: full Anticipated discharge: 3-4d Signed By: Gary Ding MD   
 November 28, 2018 Addendum: Hyponatremia being treated with IV NS

## 2018-11-28 NOTE — ED PROVIDER NOTES
Patient is a 43-year-old male with a past history significant for pancreatitis. He presents with back pain and abdominal pain. States pain to the past couple of weeks, states similar to when he had pancreatitis in the past however, states this time a little bit more pain in the back. No fevers, chills, no nausea or vomiting, chest pain or shortness of breath, different complaints. No pain with urination. Past Medical History:  
Diagnosis Date  Acute coronary syndrome ([address] 12/15/2016  Acute pancreatitis 2/15/2018  Arthritis   
 psoriatic  Endocrine disease  Hypertension  Psoriatic arthropathy ([address] Past Surgical History:  
Procedure Laterality Date  HX APPENDECTOMY  HX CHOLECYSTECTOMY  HX ORTHOPAEDIC    
 left heel secondary to trauma Family History:  
Problem Relation Age of Onset  Cancer Mother  Thyroid Disease Mother  Diabetes Father  Heart Disease Father Social History Socioeconomic History  Marital status:  Spouse name: Not on file  Number of children: Not on file  Years of education: Not on file  Highest education level: Not on file Social Needs  Financial resource strain: Not on file  Food insecurity - worry: Not on file  Food insecurity - inability: Not on file  Transportation needs - medical: Not on file  Transportation needs - non-medical: Not on file Occupational History  Not on file Tobacco Use  Smoking status: Current Every Day Smoker Packs/day: 0.25 Years: 30.00 Pack years: 7.50  Smokeless tobacco: Never Used Substance and Sexual Activity  Alcohol use: No  
 Drug use: No  
 Sexual activity: Not Currently Partners: Female Other Topics Concern   Service No  
 Blood Transfusions No  
 Caffeine Concern No  
 Occupational Exposure No  
 Hobby Hazards No  
 Sleep Concern No  
 Stress Concern Yes  Weight Concern Yes  Special Diet No  
 Back Care No  
 Exercise No  
 Bike Helmet No  
 Seat Belt Yes  Self-Exams No  
Social History Narrative  Not on file ALLERGIES: Morphine Review of Systems Constitutional: Negative for chills and fever. HENT: Negative for rhinorrhea and sore throat. Eyes: Negative for visual disturbance. Respiratory: Negative for cough and shortness of breath. Cardiovascular: Negative for chest pain and leg swelling. Gastrointestinal: Positive for abdominal pain. Negative for diarrhea, nausea and vomiting. Genitourinary: Negative for dysuria. Musculoskeletal: Positive for back pain. Negative for neck pain. Skin: Negative for rash. Neurological: Negative for weakness and headaches. Psychiatric/Behavioral: The patient is not nervous/anxious. Vitals:  
 11/27/18 1910 11/27/18 2104 11/27/18 2320 BP: (!) 175/133 (!) 183/113 (!) 195/118 Pulse: (!) 121 (!) 110 (!) 110 Resp: 18 16 18 Temp: 98.9 °F (37.2 °C) SpO2: 95% 98% 95% Weight: 113.4 kg (250 lb) Height: 5' 11\" (1.803 m) Physical Exam  
Constitutional: He is oriented to person, place, and time. He appears well-developed and well-nourished. HENT:  
Head: Normocephalic. Right Ear: External ear normal.  
Left Ear: External ear normal.  
Eyes: Conjunctivae and EOM are normal. Pupils are equal, round, and reactive to light. Neck: Normal range of motion. Neck supple. No tracheal deviation present. Cardiovascular: Normal rate, regular rhythm, normal heart sounds and intact distal pulses. No murmur heard. Pulmonary/Chest: Effort normal and breath sounds normal. No respiratory distress. Abdominal: Soft. He exhibits no distension. There is tenderness (to palpation in epigastric region). There is no guarding. Musculoskeletal: Normal range of motion. Neurological: He is alert and oriented to person, place, and time. No cranial nerve deficit. Skin: No rash noted. Nursing note and vitals reviewed. MDM Number of Diagnoses or Management Options Acute pancreatitis, unspecified complication status, unspecified pancreatitis type: new and requires workup Amount and/or Complexity of Data Reviewed Clinical lab tests: ordered and reviewed Tests in the radiology section of CPT®: ordered and reviewed Review and summarize past medical records: yes Risk of Complications, Morbidity, and/or Mortality Presenting problems: high Diagnostic procedures: high Management options: high Patient Progress Patient progress: stable Procedures Recent Results (from the past 12 hour(s)) CBC WITH AUTOMATED DIFF Collection Time: 11/27/18  8:56 PM  
Result Value Ref Range WBC 14.2 (H) 4.3 - 11.1 K/uL  
 RBC 5.37 4.23 - 5.6 M/uL  
 HGB 17.0 13.6 - 17.2 g/dL HCT 47.4 41.1 - 50.3 % MCV 88.3 79.6 - 97.8 FL  
 MCH 31.7 26.1 - 32.9 PG  
 MCHC 35.9 (H) 31.4 - 35.0 g/dL  
 RDW 13.6 % PLATELET 993 061 - 786 K/uL MPV 12.6 (H) 9.4 - 12.3 FL ABSOLUTE NRBC 0.00 0.0 - 0.2 K/uL  
 DF AUTOMATED NEUTROPHILS 77 43 - 78 % LYMPHOCYTES 11 (L) 13 - 44 % MONOCYTES 8 4.0 - 12.0 % EOSINOPHILS 4 0.5 - 7.8 % BASOPHILS 1 0.0 - 2.0 % IMMATURE GRANULOCYTES 0 0.0 - 5.0 %  
 ABS. NEUTROPHILS 10.9 (H) 1.7 - 8.2 K/UL  
 ABS. LYMPHOCYTES 1.6 0.5 - 4.6 K/UL  
 ABS. MONOCYTES 1.1 0.1 - 1.3 K/UL  
 ABS. EOSINOPHILS 0.5 0.0 - 0.8 K/UL  
 ABS. BASOPHILS 0.1 0.0 - 0.2 K/UL  
 ABS. IMM. GRANS. 0.1 0.0 - 0.5 K/UL  
LIPASE Collection Time: 11/27/18 10:27 PM  
Result Value Ref Range Lipase 548 (H) 73 - 457 U/L  
METABOLIC PANEL, COMPREHENSIVE Collection Time: 11/27/18 10:27 PM  
Result Value Ref Range Sodium 127 (L) 136 - 145 mmol/L Potassium 7.6 (HH) 3.5 - 5.1 mmol/L Chloride 97 (L) 98 - 107 mmol/L  
 CO2 17 (L) 21 - 32 mmol/L Anion gap 13 7 - 16 mmol/L Glucose 276 (H) 65 - 100 mg/dL BUN 12 6 - 23 MG/DL  Creatinine 0.72 (L) 0.8 - 1.5 MG/DL  
 GFR est AA >60 >60 ml/min/1.73m2 GFR est non-AA >60 >60 ml/min/1.73m2 Calcium 7.4 (L) 8.3 - 10.4 MG/DL Bilirubin, total  0.2 - 1.1 MG/DL UNABLE TO PROCESS. SPECIMEN GROSSLY LIPEMIC AND HEMOLYTIC  
 ALT (SGPT)  12 - 65 U/L  
  UNALBE TO PROCESS. SPECIMEN GROSSLY LIPEMIC AND HEMOLYTIC  
 AST (SGOT)  15 - 37 U/L  
  UNABLE TO PROCESS. SPECIMEN GROSSLY LIPEMIC AND HEMOLYTIC Alk. phosphatase 74 50 - 136 U/L Protein, total 8.3 (H) 6.3 - 8.2 g/dL Albumin 2.5 (L) 3.5 - 5.0 g/dL Globulin 5.8 (H) 2.3 - 3.5 g/dL A-G Ratio 0.4 (L) 1.2 - 3.5 Ct Abd Pelv W Cont Result Date: 11/27/2018 CT ABDOMEN AND PELVIS WITH CONTRAST HISTORY: Lower abdominal pain COMPARISON: 5/28/0972 TECHNIQUE: Helical imaging was performed from the lung bases through the ischial tuberosities during the intravenous infusion of 100 cc of Isovue-370. Oral contrast was not administered. Coronal and sagittal reformats were performed. Dose reduction techniques used: Automated exposure control, adjustment of the mAs and/or kVp according to patient's size, and iterative reconstruction techniques. FINDINGS: *  LUNG BASES: Small hiatal hernia. *  LIVER: Diffuse fatty change. *  GALLBLADDER AND BILE DUCTS: A cystectomy. *  SPLEEN: Within normal limits. *  URINARY TRACT: Within normal limits. *  ADRENALS: 2.7 cm left adrenal myelolipoma unchanged. *  PANCREAS: Laboratory changes in the region of the uncinate process of the pancreas without evidence of pancreatic necrosis. *  GASTROINTESTINAL TRACT: Within normal limits. *  RETROPERITONEUM: Within normal limits. *  PERITONEAL CAVITY AND ABDOMINAL WALL: Within normal limits. *  PELVIS: Status post pinning of the left hip. *  SPINE / BONES: Within normal limits. *  OTHER COMMENTS: None. IMPRESSION: Findings consistent with acute pancreatitis of the uncinate process of the pancreas without evidence of pancreatic necrosis.  Stable left adrenal myelolipoma. Status post pinning of the left hip and cholecystectomy. Diffuse fatty change liver. Small hiatal hernia. Date of Dictation: 11/27/2018 10:24 PM  
 
53-year-old male with pancreatitis: 
 
 plan admit for pain control, fluids, further workup of pancreatitis. Of note, his potassium is elevated , however , with hemolysis. Will give insulin, fluids, recollect

## 2018-11-28 NOTE — PROGRESS NOTES
Hospitalist Progress Note   
2018 Admit Date: 2018  7:27 PM  
NAME: Rico Martinez :  1970 MRN:  [id number] Attending: Moni Trejo MD 
PCP:  Trisha Hill MD 
 
SUBJECTIVE:  
Patient is 55yo M with Hx DM, hypertriglyceridemia, pancreatitis, who presents with 2w worsening abdominal and back pain. Feels similar to last episode of pancreatitis. States has been doing well with his medications, keeping follow-up with PCP. BS well controlled, no alcohol use. Interval History ): patient examined at bedside. No events since admission. Pain medication has been helping but he still \"does not feel well. \" He says he has needed apheresis in the past due to similar symptoms. Unable to lay on his back due to back pain. No fevers/chills. Some nausea without vomiting. Review of Systems negative with exception of pertinent positives noted above PHYSICAL EXAM  
 
Visit Vitals BP (!) 169/108 (BP 1 Location: Left arm) Pulse 100 Temp 98.5 °F (36.9 °C) Resp 18 Ht 5' 11\" (1.803 m) Wt 113.4 kg (250 lb) SpO2 95% BMI 34.87 kg/m² Temp (24hrs), Av.5 °F (36.9 °C), Min:98.2 °F (36.8 °C), Max:98.9 °F (37.2 °C) Oxygen Therapy O2 Sat (%): 95 % (18 1600) Pulse via Oximetry: 103 beats per minute (18 0903) O2 Device: Room air (18 1311) Intake/Output Summary (Last 24 hours) at 2018 2976 Last data filed at 2018 0230 Gross per 24 hour Intake 2100 ml Output  Net 2100 ml General: Moderate distress, pleasant, sitting on end of bed   
Lungs:  CTA Bilaterally. Heart:  Regular rate and rhythm,  No murmur, rub, or gallop Abdomen: TTP in epigastrium, bowel sounds present Extremities: No cyanosis, clubbing or edema Neurologic:  No focal deficits ASSESSMENT Active Hospital Problems Diagnosis Date Noted  Hypertriglyceridemia 2018 Much better now on lofibra--doing well Advised more stricter low carb diet and aggressive control of DM--pt willing F/u in few months for recheck  Essential hypertension 03/02/2018 Stable with med-amlodip[ine/lisinopril/coreg Refilled Labs Annual eye exam recommended F/u in 3 months  Hyperlipidemia 03/02/2018 On statin and fenofibrate Recheck labs-f/u Diet discussed  Obesity, unspecified obesity severity, unspecified obesity type 03/02/2018 Weight loss encouraged  Acute pancreatitis 02/15/2018 Sec to hypertriglyceridemia Resolved now 3/2018  Diabetes mellitus type II, uncontrolled ([facility] 12/15/2016 Now on lantus and humalog prn 
goor control BS under 150 Sliding scale given-multiples of 4 Pt on metformit 500 mg bid 3/2019 and  lantus  25 units Diet discussed Labs today F/u every 3 months Plan: # Acute pancreatitis due to known hypertriglyceridemia - patient without some of his medications for a few days, will continue home atorvastatin 80 mg and fenofibrate 160 mg 
- pulmonary and heme/onc consult for indication for apheresis vs insulin gtt 
- keep NPO 
- continue with maintenance fluids - pain medications as needed # DM 
- hold home metformin 
- continue with SSI and serial CBGs 
- added Lantus 15 units SQ #HTN 
- continue with home meds F/E/N: maintenance fluids, replete electrolytes as needed, NPO for now Ppx: heparin SQ for VTE Code Status: FULL CODE Disposition: pending clinical improvement as above, likely discharge home following hospitalization Signed By: Casey Lees DO November 28, 2018

## 2018-11-28 NOTE — ED TRIAGE NOTES
\"Im in so much pain I feel like Im going to pass out. Its been hurting for a couple weeks. I didn't hurt it at all\"

## 2018-11-28 NOTE — ED NOTES
TRANSFER - OUT REPORT: 
 
Verbal report given to Via Onur Knutson, KIERSTEN(name) on Anna Madsen  being transferred to 6th floor(unit) for routine progression of care Report consisted of patients Situation, Background, Assessment and  
Recommendations(SBAR). Information from the following report(s) ED Summary was reviewed with the receiving nurse. Lines:  
Peripheral IV 11/27/18 Right Antecubital (Active) Site Assessment Clean, dry, & intact 11/27/2018  9:00 PM  
Phlebitis Assessment 0 11/27/2018  9:00 PM  
Infiltration Assessment 0 11/27/2018  9:00 PM  
Dressing Status Clean, dry, & intact 11/27/2018  9:00 PM  
Dressing Type Transparent 11/27/2018  9:00 PM  
  
 
Opportunity for questions and clarification was provided. Patient transported with: 
Pt chart, pt belongings, family member, IV fluids

## 2018-11-28 NOTE — ED NOTES
Verbal report from Jeremiah Duverney, RN for continuation of care. Care assumed at this time. Pt moved to [facility] room 1.

## 2018-11-28 NOTE — CONSULTS
CONSULT NOTE    Rico Martinez    11/28/2018    Date of Admission:  11/27/2018    The patient's chart is reviewed and the patient is discussed with the staff. Subjective:     Patient is a 57 y.o.  male seen and evaluated at the request of Dr. James Williamson. Presented to the ER with back pain, abdominal pan and feeling like might pass out. Has history of pancreatitis 9 months ago and required apheresis at that time. Has DM, HTN and hypertriglyceriemia. CT abdomen/pelvis with findings consistent with acute pancreatitis. He states he has been compliant with home meds, recently quit smoking, denies ETOH use. Lives with his mother, is disabled due to arthritis and is followed by PCP at [facility]. Review of Systems  A comprehensive review of systems was negative except for: Constitutional: positive for fatigue, malaise and anorexia  Respiratory: positive for dyspnea on exertion  Gastrointestinal: positive for nausea, abdominal pain and hx pancreatitis reuiring aphersis  Musculoskeletal: positive for stiff joints, muscle weakness and chronic arthritis  Behvioral/Psych: positive for recently quit smoking, obesity and tobacco use  Endocrine: positive for DM    Patient Active Problem List   Diagnosis Code    Diabetes mellitus type II, uncontrolled ([facility] E11.65    Tobacco abuse Z72.0    Acute pancreatitis K85.90    High anion gap metabolic acidosis M40.4    CAD (coronary artery disease) I25.10    Leukocytosis D72.829    DKA (diabetic ketoacidoses) (HCC) E13.10    Pancreatitis K85.90    Type 2 diabetes with nephropathy ([facility] E11.21    Essential hypertension I10    Hyperlipidemia E78.5    Obesity, unspecified obesity severity, unspecified obesity type E66.9    Anemia of chronic disease D63.8    Severe obesity (BMI 35.0-39. 9) with comorbidity ([facility] E66.01    Encounter to discuss test results Z71.2    Encounter for medication review and counseling Z71.89    Hypertriglyceridemia E78.1    History of tobacco use Z87.891    Arthritis of left wrist M19.032       Home [employer] by [employer] company none. Prior to Admission Medications   Prescriptions Last Dose Informant Patient Reported? Taking? Blood-Glucose Meter (ACCU-CHEK NICK) misc   No No   Sig: Check blood sugar three to four  times daily 4 accucheck nick test strip daily   Blood-Glucose Meter monitoring kit   No No   Sig: by SubCUTAneous route Before breakfast, lunch, dinner and at bedtime. Insulin Needles, Disposable, 30 gauge x 1/3\"   No No   Sig: by SubCUTAneous route Before breakfast, lunch, dinner and at bedtime. amLODIPine (NORVASC) 10 mg tablet   No No   Sig: Take 1 Tab by mouth daily for 360 days. aspirin 81 mg chewable tablet   No No   Sig: Take 1 Tab by mouth daily. atorvastatin (LIPITOR) 80 mg tablet   No No   Sig: Take 1 Tab by mouth nightly for 360 days. carvedilol (COREG) 3.125 mg tablet   No No   Sig: Take 1 Tab by mouth two (2) times daily (with meals). diclofenac potassium (CATAFLAM) 50 mg tablet   No No   Sig: Take 1 Tab by mouth two (2) times daily as needed. Indications: OSTEOARTHRITIS, with food   fenofibrate (LOFIBRA) 160 mg tablet   No No   Sig: Take 1 Tab by mouth daily. lisinopril (PRINIVIL, ZESTRIL) 40 mg tablet   No No   Sig: Take 1 Tab by mouth daily. metFORMIN (GLUCOPHAGE) 500 mg tablet   No No   Sig: Take 1 Tab by mouth two (2) times daily (with meals). nitroglycerin (NITROSTAT) 0.4 mg SL tablet   No No   Si Tab by SubLINGual route every five (5) minutes as needed for Chest Pain.       Facility-Administered Medications: None       Past Medical History:   Diagnosis Date    Acute coronary syndrome ([address] 12/15/2016    Acute pancreatitis 2/15/2018    Arthritis     psoriatic    Endocrine disease     Hypertension     Psoriatic arthropathy ([facility]      Past Surgical History:   Procedure Laterality Date    HX APPENDECTOMY      HX CHOLECYSTECTOMY      HX ORTHOPAEDIC      left heel secondary to trauma Social History     Socioeconomic History    Marital status:      Spouse name: Not on file    Number of children: Not on file    Years of education: Not on file    Highest education level: Not on file   Social Needs    Financial resource strain: Not on file    Food insecurity - worry: Not on file    Food insecurity - inability: Not on file    Transportation needs - medical: Not on file   [employer] needs - non-medical: Not on file   Occupational History    Not on file   Tobacco Use    Smoking status: Current Every Day Smoker     Packs/day: 0.25     Years: 30.00     Pack years: 7.50    Smokeless tobacco: Never Used   Substance and Sexual Activity    Alcohol use: No    Drug use: No    Sexual activity: Not Currently     Partners: Female   Other Topics Concern     Service No    Blood Transfusions No    Caffeine Concern No    Occupational Exposure No    Hobby Hazards No    Sleep Concern No    Stress Concern Yes    Weight Concern Yes    Special Diet No    Back Care No    Exercise No    Bike Helmet No    Seat Belt Yes    Self-Exams No   Social History Narrative    Not on file     Family History   Problem Relation Age of Onset    Cancer Mother     Thyroid Disease Mother     Diabetes Father     Heart Disease Father      Allergies   Allergen Reactions    Morphine Other (comments)     Anxiety, claustrophobia       Current Facility-Administered Medications   Medication Dose Route Frequency    amLODIPine (NORVASC) tablet 10 mg  10 mg Oral DAILY    aspirin chewable tablet 81 mg  81 mg Oral DAILY    atorvastatin (LIPITOR) tablet 80 mg  80 mg Oral QHS    carvedilol (COREG) tablet 3.125 mg  3.125 mg Oral BID WITH MEALS    fenofibrate (LOFIBRA) tablet 160 mg  160 mg Oral DAILY    lisinopril (PRINIVIL, ZESTRIL) tablet 40 mg  40 mg Oral DAILY    sodium chloride (NS) flush 5-10 mL  5-10 mL IntraVENous Q8H    sodium chloride (NS) flush 5-10 mL  5-10 mL IntraVENous PRN    HYDROcodone-acetaminophen (NORCO) 5-325 mg per tablet 1 Tab  1 Tab Oral Q4H PRN    naloxone (NARCAN) injection 0.4 mg  0.4 mg IntraVENous PRN    heparin (porcine) injection 5,000 Units  5,000 Units SubCUTAneous Q8H    ondansetron (ZOFRAN) injection 4 mg  4 mg IntraVENous Q4H PRN    insulin lispro (HUMALOG) injection   SubCUTAneous AC&HS    insulin glargine (LANTUS) injection 15 Units  15 Units SubCUTAneous BID    hydrALAZINE (APRESOLINE) 20 mg/mL injection 20 mg  20 mg IntraVENous Q6H PRN    HYDROmorphone (PF) (DILAUDID) injection 1 mg  1 mg IntraVENous Q4H PRN    0.9% sodium chloride infusion  150 mL/hr IntraVENous CONTINUOUS         Objective:     Vitals:    11/28/18 0903 11/28/18 0911 11/28/18 1311 11/28/18 1456   BP: (!) 168/94 (!) 168/94 134/76 (!) 155/105   Pulse: (!) 102 (!) 105 99 97   Resp: 14   20   Temp:    98.2 °F (36.8 °C)   SpO2: 94%  95% 96%   Weight:       Height:           PHYSICAL EXAM     Constitutional:  the patient is obese and in no acute distress, room air sat 96%  EENMT:  Sclera clear, pupils equal, oral mucosa moist  Respiratory: slightly diminished in bases, rare cough, no sputum production  Cardiovascular:  RRR without M,G,R  Gastrointestinal: soft and non-tender; with positive bowel sounds. Musculoskeletal: warm without cyanosis. There is no lower leg edema. Skin:  no jaundice or rashes, no wounds   Neurologic: no gross neuro deficits     Psychiatric:  alert and oriented x 3    CXR:  None    CT abdomen/pelvis 11/27/18:   --Findings consistent with acute pancreatitis of the uncinate process of the pancreas without evidence of pancreatic necrosis. --Stable left adrenal myelolipoma. --Status post pinning of the left hip and cholecystectomy. --Diffuse fatty change liver.   --Small hiatal hernia    Recent Labs     11/28/18  0440 11/27/18  2056   WBC 16.5* 14.2*   HGB 16.3 17.0   HCT 46.9 47.4    284     Recent Labs     11/28/18  0440 11/28/18  0224 11/27/18  2227   * --  127*   K 5.0 4.2 7.6*     --  97*   *  --  276*   CO2 15*  --  17*   BUN 10  --  12   CREA 0.68*  --  0.72*   CA 8.8  --  7.4*   ALB 3.3*  --  2.5*   TBILI 1.0  --  UNABLE TO PROCESS. SPECIMEN GROSSLY LIPEMIC AND HEMOLYTIC   ALT 38  --  UNALBE TO PROCESS. SPECIMEN GROSSLY LIPEMIC AND HEMOLYTIC   SGOT 57*  --  UNABLE TO PROCESS. SPECIMEN GROSSLY LIPEMIC AND HEMOLYTIC     No results for input(s): PH, PCO2, PO2, HCO3 in the last 72 hours.   Recent Labs     11/28/18  0817   LAC <0.1*       Assessment:  (Medical Decision Making)     Hospital Problems  Date Reviewed: 6/19/2018          Codes Class Noted POA    Hypertriglyceridemia ICD-10-CM: E78.1  ICD-9-CM: 272.1  3/19/2018 Yes    Overview Signed 3/19/2018  7:31 PM by Danielle Boles MD     Much better now on lofibra--doing well  Advised more stricter low carb diet and aggressive control of DM--pt willing  F/u in few months for recheck             Essential hypertension ICD-10-CM: I10  ICD-9-CM: 401.9  3/2/2018 Yes    Overview Addendum 6/19/2018  8:16 AM by Danielle Boles MD     Stable with med-amlodip[ine/lisinopril/coreg  Refilled  Labs   Annual eye exam recommended  F/u in 3 months               Hyperlipidemia ICD-10-CM: E78.5  ICD-9-CM: 272.4  3/2/2018 Yes    Overview Addendum 6/19/2018 11:33 AM by Danielle Boles MD     On statin and fenofibrate   Recheck labs-f/u  Diet discussed             Obesity, unspecified obesity severity, unspecified obesity type ICD-10-CM: E66.9  ICD-9-CM: 278.00  3/2/2018 Yes    Overview Signed 3/2/2018  1:34 PM by Danielle Boles MD     Weight loss encouraged             Acute pancreatitis ICD-10-CM: K85.90  ICD-9-CM: 549.6  2/15/2018 Yes    Overview Signed 3/2/2018  1:35 PM by Danielle Boles MD     Sec to hypertriglyceridemia  Resolved now 3/2018             Diabetes mellitus type II, uncontrolled ([facility] (Chronic) ICD-10-CM: E11.65  ICD-9-CM: 250.02  12/15/2016 Yes    Overview Addendum 6/19/2018 11:35 AM by Gwendolyn Zavala MD     Now on lantus and humalog prn  goor control BS under 150   Sliding scale given-multiples of 4  Pt on metformit 500 mg bid 3/2019 and  lantus  25 units  Diet discussed  Labs today  F/u every 3 months                   Plan:  (Medical Decision Making)     --NS 150ml/hr and NPO  --Hematology consulted  --Currently on room air, sat 96%  --Would benefit from aggressive lipid management at lipid clinic for familial hypertriglyceridemia. More than 50% of the time documented was spent in face-to-face contact with the patient and in the care of the patient on the floor/unit where the patient is located. Thank you very much for this referral.  We appreciate the opportunity to participate in this patient's care. Will follow along with above stated plan. Sarah Mays NP     Lungs: decreased BS in the bases  Heart:  RRR with no Murmur/Rubs/Gallops  Abdomen :soft with+ BS    Additional Comments:  Consider pheresis to lower Triglyceride rapidly and need genetic testing for enzyme defect causing his disease. Also need OP PSG since he has all cardinal signs of ALISSA     I have spoken with and examined the patient. I agree with the above assessment and plan as documented.     Rosita Gannon MD

## 2018-11-29 PROBLEM — G47.19 EXCESSIVE DAYTIME SLEEPINESS: Status: ACTIVE | Noted: 2018-11-29

## 2018-11-29 LAB
ALBUMIN SERPL-MCNC: 2.6 G/DL (ref 3.5–5)
ALBUMIN/GLOB SERPL: 0.7 {RATIO} (ref 1.2–3.5)
ALP SERPL-CCNC: 123 U/L (ref 50–136)
ALT SERPL-CCNC: 92 U/L (ref 12–65)
ANION GAP SERPL CALC-SCNC: 10 MMOL/L (ref 7–16)
AST SERPL-CCNC: 120 U/L (ref 15–37)
BILIRUB SERPL-MCNC: 0.6 MG/DL (ref 0.2–1.1)
BUN SERPL-MCNC: 12 MG/DL (ref 6–23)
CALCIUM SERPL-MCNC: 8.3 MG/DL (ref 8.3–10.4)
CHLORIDE SERPL-SCNC: 107 MMOL/L (ref 98–107)
CO2 SERPL-SCNC: 21 MMOL/L (ref 21–32)
CREAT SERPL-MCNC: 0.64 MG/DL (ref 0.8–1.5)
ERYTHROCYTE [DISTWIDTH] IN BLOOD BY AUTOMATED COUNT: 13.6 % (ref 11.9–14.6)
GLOBULIN SER CALC-MCNC: 3.9 G/DL (ref 2.3–3.5)
GLUCOSE BLD STRIP.AUTO-MCNC: 149 MG/DL (ref 65–100)
GLUCOSE BLD STRIP.AUTO-MCNC: 156 MG/DL (ref 65–100)
GLUCOSE BLD STRIP.AUTO-MCNC: 180 MG/DL (ref 65–100)
GLUCOSE BLD STRIP.AUTO-MCNC: 196 MG/DL (ref 65–100)
GLUCOSE SERPL-MCNC: 191 MG/DL (ref 65–100)
HCT VFR BLD AUTO: 41.8 % (ref 41.1–50.3)
HGB BLD-MCNC: 13.7 G/DL (ref 13.6–17.2)
LIPASE SERPL-CCNC: 320 U/L (ref 73–393)
MCH RBC QN AUTO: 30.6 PG (ref 26.1–32.9)
MCHC RBC AUTO-ENTMCNC: 32.8 G/DL (ref 31.4–35)
MCV RBC AUTO: 93.3 FL (ref 79.6–97.8)
NRBC # BLD: 0 K/UL (ref 0–0.2)
PLATELET # BLD AUTO: 181 K/UL (ref 150–450)
PMV BLD AUTO: 11.4 FL (ref 9.4–12.3)
POTASSIUM SERPL-SCNC: 3.8 MMOL/L (ref 3.5–5.1)
PROT SERPL-MCNC: 6.5 G/DL (ref 6.3–8.2)
RBC # BLD AUTO: 4.48 M/UL (ref 4.23–5.6)
SODIUM SERPL-SCNC: 138 MMOL/L (ref 136–145)
WBC # BLD AUTO: 9.7 K/UL (ref 4.3–11.1)

## 2018-11-29 PROCEDURE — 74011250637 HC RX REV CODE- 250/637: Performed by: FAMILY MEDICINE

## 2018-11-29 PROCEDURE — 83690 ASSAY OF LIPASE: CPT

## 2018-11-29 PROCEDURE — 85027 COMPLETE CBC AUTOMATED: CPT

## 2018-11-29 PROCEDURE — 74011636637 HC RX REV CODE- 636/637: Performed by: FAMILY MEDICINE

## 2018-11-29 PROCEDURE — 36415 COLL VENOUS BLD VENIPUNCTURE: CPT

## 2018-11-29 PROCEDURE — 65270000029 HC RM PRIVATE

## 2018-11-29 PROCEDURE — 74011250636 HC RX REV CODE- 250/636: Performed by: FAMILY MEDICINE

## 2018-11-29 PROCEDURE — 99232 SBSQ HOSP IP/OBS MODERATE 35: CPT | Performed by: INTERNAL MEDICINE

## 2018-11-29 PROCEDURE — 77030020263 HC SOL INJ SOD CL0.9% LFCR 1000ML

## 2018-11-29 PROCEDURE — 80053 COMPREHEN METABOLIC PANEL: CPT

## 2018-11-29 PROCEDURE — 82962 GLUCOSE BLOOD TEST: CPT

## 2018-11-29 RX ADMIN — HEPARIN SODIUM 5000 UNITS: 5000 INJECTION INTRAVENOUS; SUBCUTANEOUS at 11:00

## 2018-11-29 RX ADMIN — INSULIN LISPRO 2 UNITS: 100 INJECTION, SOLUTION INTRAVENOUS; SUBCUTANEOUS at 07:30

## 2018-11-29 RX ADMIN — PANTOPRAZOLE SODIUM 40 MG: 40 TABLET, DELAYED RELEASE ORAL at 05:49

## 2018-11-29 RX ADMIN — Medication 10 ML: at 06:00

## 2018-11-29 RX ADMIN — INSULIN LISPRO 2 UNITS: 100 INJECTION, SOLUTION INTRAVENOUS; SUBCUTANEOUS at 21:42

## 2018-11-29 RX ADMIN — ATORVASTATIN CALCIUM 80 MG: 40 TABLET, FILM COATED ORAL at 21:43

## 2018-11-29 RX ADMIN — SODIUM CHLORIDE 150 ML/HR: 900 INJECTION, SOLUTION INTRAVENOUS at 05:53

## 2018-11-29 RX ADMIN — INSULIN GLARGINE 15 UNITS: 100 INJECTION, SOLUTION SUBCUTANEOUS at 21:41

## 2018-11-29 RX ADMIN — HYDROCODONE BITARTRATE AND ACETAMINOPHEN 1 TABLET: 5; 325 TABLET ORAL at 00:54

## 2018-11-29 RX ADMIN — HYDROMORPHONE HYDROCHLORIDE 1 MG: 1 INJECTION, SOLUTION INTRAMUSCULAR; INTRAVENOUS; SUBCUTANEOUS at 02:10

## 2018-11-29 RX ADMIN — HYDROCODONE BITARTRATE AND ACETAMINOPHEN 1 TABLET: 5; 325 TABLET ORAL at 12:18

## 2018-11-29 RX ADMIN — FENOFIBRATE 160 MG: 160 TABLET ORAL at 12:14

## 2018-11-29 RX ADMIN — HEPARIN SODIUM 5000 UNITS: 5000 INJECTION INTRAVENOUS; SUBCUTANEOUS at 19:26

## 2018-11-29 RX ADMIN — CARVEDILOL 3.12 MG: 3.12 TABLET, FILM COATED ORAL at 16:51

## 2018-11-29 RX ADMIN — HYDROMORPHONE HYDROCHLORIDE 1 MG: 1 INJECTION, SOLUTION INTRAMUSCULAR; INTRAVENOUS; SUBCUTANEOUS at 21:51

## 2018-11-29 RX ADMIN — AMLODIPINE BESYLATE 10 MG: 10 TABLET ORAL at 16:51

## 2018-11-29 RX ADMIN — ONDANSETRON 4 MG: 2 INJECTION INTRAMUSCULAR; INTRAVENOUS at 08:36

## 2018-11-29 RX ADMIN — Medication 10 ML: at 21:43

## 2018-11-29 RX ADMIN — HYDROMORPHONE HYDROCHLORIDE 1 MG: 1 INJECTION, SOLUTION INTRAMUSCULAR; INTRAVENOUS; SUBCUTANEOUS at 08:39

## 2018-11-29 RX ADMIN — Medication 10 ML: at 14:00

## 2018-11-29 RX ADMIN — ASPIRIN 81 MG 81 MG: 81 TABLET ORAL at 08:43

## 2018-11-29 RX ADMIN — SODIUM CHLORIDE 150 ML/HR: 900 INJECTION, SOLUTION INTRAVENOUS at 12:22

## 2018-11-29 RX ADMIN — CARVEDILOL 3.12 MG: 3.12 TABLET, FILM COATED ORAL at 08:43

## 2018-11-29 RX ADMIN — HYDROCODONE BITARTRATE AND ACETAMINOPHEN 1 TABLET: 5; 325 TABLET ORAL at 19:25

## 2018-11-29 RX ADMIN — LISINOPRIL 40 MG: 20 TABLET ORAL at 08:42

## 2018-11-29 RX ADMIN — INSULIN LISPRO 2 UNITS: 100 INJECTION, SOLUTION INTRAVENOUS; SUBCUTANEOUS at 16:30

## 2018-11-29 RX ADMIN — HEPARIN SODIUM 5000 UNITS: 5000 INJECTION INTRAVENOUS; SUBCUTANEOUS at 02:10

## 2018-11-29 RX ADMIN — INSULIN GLARGINE 15 UNITS: 100 INJECTION, SOLUTION SUBCUTANEOUS at 09:00

## 2018-11-29 RX ADMIN — HYDROCODONE BITARTRATE AND ACETAMINOPHEN 1 TABLET: 5; 325 TABLET ORAL at 05:52

## 2018-11-29 NOTE — PROGRESS NOTES
Chart screened by  for discharge planning. No needs identified at this time. Please consult  if any new issues arise. Care Management Interventions PCP Verified by CM: Yes Mode of Transport at Discharge: Other (see comment) Transition of Care Consult (CM Consult): Discharge Planning Discharge Durable Medical Equipment: No 
Physical Therapy Consult: No 
Occupational Therapy Consult: No 
Current Support Network: Own Home Confirm Follow Up Transport: Family Plan discussed with Pt/Family/Caregiver: Yes Freedom of Choice Offered: Yes Discharge Location Discharge Placement: Home

## 2018-11-29 NOTE — PROGRESS NOTES
Interdisciplinary Rounds completed 11/29/18. Nursing, Case Management, Physician and PT present. Plan of care reviewed and updated. Pt tolerating diet.   Hoping to go home in am.

## 2018-11-29 NOTE — PROGRESS NOTES
Hospitalist Progress Note   
2018 Admit Date: 2018  7:27 PM  
NAME: Patricia Dill :  1970 MRN:  [id number] Attending: Lovely Gaytan MD 
PCP:  Reuben Huang MD 
 
SUBJECTIVE:  
Patient is 55yo M with Hx DM, hypertriglyceridemia, pancreatitis, who presents with 2w worsening abdominal and back pain. Feels similar to last episode of pancreatitis. States has been doing well with his medications, keeping follow-up with PCP. BS well controlled, no alcohol use. Interval History (): patient examined at bedside. Feeling better today. Pain still present but improved overall is improved compared to admission. He wants to try to eat a meal now. Denies chest pain, shortness of breath, nausea/vomiting, diarrhea, or constipation. Patient concerned about a mass behind his neck that has been steadily growing the past few months, not painful \"but I know it's there when I turn my head. \" Review of Systems negative with exception of pertinent positives noted above PHYSICAL EXAM  
 
Visit Vitals BP (!) 149/98 (BP 1 Location: Left arm, BP Patient Position: At rest) Pulse 90 Temp 96.8 °F (36 °C) Resp 18 Ht 5' 11\" (1.803 m) Wt 113.4 kg (250 lb) SpO2 95% BMI 34.87 kg/m² Temp (24hrs), Av.1 °F (36.7 °C), Min:96.8 °F (36 °C), Max:98.5 °F (36.9 °C) Oxygen Therapy O2 Sat (%): 95 % (18 1606) Pulse via Oximetry: 103 beats per minute (18 0903) O2 Device: Room air (18 1311) Intake/Output Summary (Last 24 hours) at 2018 1643 Last data filed at 2018 9808 Gross per 24 hour Intake 2120 ml Output  Net 2120 ml General: No distress, pleasant, sitting on end of bed   
Lungs:  CTA Bilaterally. Heart:  Regular rate and rhythm,  No murmur, rub, or gallop Abdomen: TTP in epigastrium with interval improvement, bowel sounds present Extremities: No cyanosis, clubbing or edema Skin:  Palpable mass along cervical column with no tenderness to palpation, no erythema or drainage Neurologic:  No focal deficits ASSESSMENT Active Hospital Problems Diagnosis Date Noted  Excessive daytime sleepiness 11/29/2018  Snoring 11/28/2018  Suspected sleep apnea 11/28/2018  Hypertriglyceridemia 03/19/2018 Much better now on lofibra--doing well Advised more stricter low carb diet and aggressive control of DM--pt willing F/u in few months for recheck  Essential hypertension 03/02/2018 Stable with med-amlodip[ine/lisinopril/coreg Refilled Labs Annual eye exam recommended F/u in 3 months  Hyperlipidemia 03/02/2018 On statin and fenofibrate Recheck labs-f/u Diet discussed  Obesity, unspecified obesity severity, unspecified obesity type 03/02/2018 Weight loss encouraged  Acute pancreatitis 02/15/2018 Sec to hypertriglyceridemia Resolved now 3/2018  Diabetes mellitus type II, uncontrolled ([facility] [address] 12/15/2016 Now on lantus and humalog prn 
goor control BS under 150 Sliding scale given-multiples of 4 Pt on metformit 500 mg bid 3/2019 and  lantus  25 units Diet discussed Labs today F/u every 3 months Plan: # Acute pancreatitis due to known hypertriglyceridemia with interval improvement - patient without some of his medications for a few days, will continue home atorvastatin 80 mg and fenofibrate 160 mg 
- no current ang for apheresis  
- advance diet as tolerated - pain medications as needed # DM 
- hold home metformin 
- continue with SSI and serial CBGs 
- added Lantus 15 units SQ #HTN 
- continue with home meds # Cervical mass 
- outpatient follow-up, no immediate concerning neurologic disturbances F/E/N: discontinue fluids, replete electrolytes as needed, clear diet advance as tolerated Ppx: heparin SQ for VTE Code Status: FULL CODE 
 
 Disposition: pending clinical improvement as above, likely discharge home following hospitalization in 1-2 days Signed By: Lou Dooley DO November 29, 2018

## 2018-11-29 NOTE — PROGRESS NOTES
Hourly rounds done. Patient's fluid were discontinued today. He was started on a clear liquid diet and will advance to GI soft in morning, patient tolerating it fine. Patient receiving Norco and IV dilaudid for pain control. Patient has no needs at this time, call light within reach.

## 2018-11-29 NOTE — PROGRESS NOTES
TRANSFER - IN REPORT: 
 
Verbal report received from Maryam RN(name) on Adolfo Arango  being received from ED(unit) for routine progression of care Report consisted of patients Situation, Background, Assessment and  
Recommendations(SBAR). Information from the following report(s) SBAR was reviewed with the receiving nurse. Opportunity for questions and clarification was provided. Assessment completed upon patients arrival to unit and care assumed.

## 2018-11-29 NOTE — PROGRESS NOTES
[facility] Admission Date: 11/27/2018 Daily Progress Note: 11/29/2018 The patient's chart is reviewed and the patient is discussed with the staff. 57 y.o. CM evaluated at the request of Dr. Rukhsana Snachez. He presented to the ER with back pain, abdominal pain and feeling like might pass out. Has history of pancreatitis 9 months ago and required apheresis at that time. Has DM, HTN and hypertriglyceriemia. CT abdomen/pelvis with findings consistent with acute pancreatitis. He states he has been compliant with home meds, recently quit smoking, denies ETOH use. Lives with his mother, is disabled due to arthritis and is followed by PCP at [facility]. Has CAD with NSTEMI 12/2016 and stents placed. Subjective:  
 
Sitting up in bed, states is feeling better with less abdominal pain and is having a full liquid diet. Denies shortness of breath, has no cough and only mild nausea earlier this morning. Remains on room air. Current Facility-Administered Medications Medication Dose Route Frequency  amLODIPine (NORVASC) tablet 10 mg  10 mg Oral DAILY  aspirin chewable tablet 81 mg  81 mg Oral DAILY  atorvastatin (LIPITOR) tablet 80 mg  80 mg Oral QHS  carvedilol (COREG) tablet 3.125 mg  3.125 mg Oral BID WITH MEALS  fenofibrate (LOFIBRA) tablet 160 mg  160 mg Oral DAILY  lisinopril (PRINIVIL, ZESTRIL) tablet 40 mg  40 mg Oral DAILY  sodium chloride (NS) flush 5-10 mL  5-10 mL IntraVENous Q8H  
 sodium chloride (NS) flush 5-10 mL  5-10 mL IntraVENous PRN  
 HYDROcodone-acetaminophen (NORCO) 5-325 mg per tablet 1 Tab  1 Tab Oral Q4H PRN  
 naloxone (NARCAN) injection 0.4 mg  0.4 mg IntraVENous PRN  
 heparin (porcine) injection 5,000 Units  5,000 Units SubCUTAneous Q8H  
 ondansetron (ZOFRAN) injection 4 mg  4 mg IntraVENous Q4H PRN  
 insulin lispro (HUMALOG) injection   SubCUTAneous AC&HS  
  insulin glargine (LANTUS) injection 15 Units  15 Units SubCUTAneous BID  hydrALAZINE (APRESOLINE) 20 mg/mL injection 20 mg  20 mg IntraVENous Q6H PRN  
 HYDROmorphone (PF) (DILAUDID) injection 1 mg  1 mg IntraVENous Q4H PRN  
 0.9% sodium chloride infusion  150 mL/hr IntraVENous CONTINUOUS  
 pantoprazole (PROTONIX) tablet 40 mg  40 mg Oral ACB  alum-mag hydroxide-simeth (MYLANTA) oral suspension 30 mL  30 mL Oral Q4H PRN Review of Systems Constitutional: negative for fever, chills, sweats Cardiovascular: negative for chest pain, palpitations, syncope, edema Gastrointestinal:  negative for dysphagia, reflux, vomiting, diarrhea, abdominal pain, or melena Neurologic:  negative for focal weakness, numbness, headache Objective:  
 
Vitals:  
 11/28/18 2309 11/29/18 0431 11/29/18 0654 11/29/18 1116 BP: 138/85 100/64 111/70 123/85 Pulse: 100 94 96 87 Resp: 18 18 18 18 Temp: 98.5 °F (36.9 °C) 98.4 °F (36.9 °C) 98.4 °F (36.9 °C) 98.3 °F (36.8 °C) SpO2: 93% 95% 95% 95% Weight:      
Height:      
 
Intake and Output:  
11/27 1901 - 11/29 0700 In: 9152 [I.V.:4220] Out: - No intake/output data recorded. Physical Exam:  
Constitution:  the patient is obese and in no acute distress, room air sat 95% EENMT:  Sclera clear, pupils equal, oral mucosa moist 
Respiratory: clear anterior Cardiovascular:  RRR without M,G,R 
Gastrointestinal: soft and non-tender; with positive bowel sounds. Musculoskeletal: warm without cyanosis. There is no lower leg edema. Skin:  no jaundice or rashes, no wounds Neurologic: no gross neuro deficits Psychiatric:  alert and oriented x 3 CXR: None today LAB Recent Labs  
  11/29/18 200 11/29/18 
0063 11/28/18 
2002 11/28/18 
1627 11/28/18 
4226 GLUCPOC 149* 180* 179* 197* 263* Recent Labs  
  11/29/18 
0517 11/28/18 
0440 11/27/18 2056 WBC 9.7 16.5* 14.2* HGB 13.7 16.3 17.0 HCT 41.8 46.9 47.4  209 284 Recent Labs 11/29/18 
2850 11/28/18 
0440 11/28/18 
0224 11/27/18 
2227  132*  --  127* K 3.8 5.0 4.2 7.6*  
 102  --  97* CO2 21 15*  --  17* * 262*  --  276* BUN 12 10  --  12  
CREA 0.64* 0.68*  --  0.72* CA 8.3 8.8  --  7.4* ALB 2.6* 3.3*  --  2.5* TBILI 0.6 1.0  --  UNABLE TO PROCESS. SPECIMEN GROSSLY LIPEMIC AND HEMOLYTIC  
ALT 92* 38  --  UNALBE TO PROCESS. SPECIMEN GROSSLY LIPEMIC AND HEMOLYTIC  
SGOT 120* 57*  --  UNABLE TO PROCESS. SPECIMEN GROSSLY LIPEMIC AND HEMOLYTIC No results for input(s): PH, PCO2, PO2, HCO3, PHI, PCO2I, PO2I, HCO3I in the last 72 hours. Recent Labs  
  11/28/18 
5633 LAC <0.1* Assessment:  (Medical Decision Making) Hospital Problems  Date Reviewed: 11/29/2018 Codes Class Noted POA Excessive daytime sleepiness ICD-10-CM: G47.19 ICD-9-CM: 780.54  11/29/2018 Yes Sleep study ordered Snoring ICD-10-CM: R06.83 
ICD-9-CM: 786.09  11/28/2018 Yes Sleep study ordered Suspected sleep apnea ICD-10-CM: R29.818 ICD-9-CM: 781.99  11/28/2018 Yes PSG as outpatient Hypertriglyceridemia ICD-10-CM: E78.1 ICD-9-CM: 272.1  3/19/2018 Yes Much better now on lofibra--doing well Advised more stricter low carb diet and aggressive control of DM--pt willing F/u in few months for recheck 
  
  
 chronic Essential hypertension ICD-10-CM: I10 
ICD-9-CM: 401.9  3/2/2018 Yes Stable with med-amlodip[ine/lisinopril/coreg Refilled Labs Annual eye exam recommended F/u in 3 months 
 
  
  
 chronic Hyperlipidemia ICD-10-CM: E78.5 ICD-9-CM: 272.4  3/2/2018 Yes On statin and fenofibrate Recheck labs-f/u Diet discussed 
  
  
 chronic Obesity, unspecified obesity severity, unspecified obesity type ICD-10-CM: E66.9 ICD-9-CM: 278.00  3/2/2018 Yes Weight loss encouraged 
  
  
 chronic Acute pancreatitis ICD-10-CM: K85.90 ICD-9-CM: 105.0  2/15/2018 Yes Sec to hypertriglyceridemia Resolved now 3/2018 Improved symptoms--advancing diet, lipase trending down Diabetes mellitus type II, uncontrolled ([facility] [address] (Chronic) ICD-10-CM: E11.65 ICD-9-CM: 250.02  12/15/2016 Yes Now on lantus and humalog prn 
goor control BS under 150 Sliding scale given-multiples of 4 Pt on metformit 500 mg bid 3/2019 and  lantus  25 units Diet discussed Labs today F/u every 3 months Chronic--per primary Plan:  (Medical Decision Making)  
 
--NS 150ml/hr and NPO 
--Hematology consulted 
--Lipase down to 320 >> 548 
--Triglycerides yesterday 1096 
--Currently on room air, sat 95% --Would benefit from aggressive lipid management at lipid clinic for familial hypertriglyceridemia. --Consider pheresis to lower Triglyceride rapidly and need genetic testing for enzyme defect causing his disease. --Would benefit from outpatient PSG since he has all cardinal signs of ALISSA. Ordered and our office will contact with appointment. --Respiratory status is stable on room air. No further pulmonary needs noted at this time and we have nothing further to add. We will sign off but please call if we can be of further assistance. More than 50% of the time documented was spent in face-to-face contact with the patient and in the care of the patient on the floor/unit where the patient is located. Dashawn Bobo NP I have spoken with and examined the patient. I agree with the above assessment and plan as documented. Patient stabilizing without need for pheresis catheter. Agreeable to PSG. Reports excessive daytime sleepiness and has snoring and obesity. Tolerating clears Gen: pleasant, no distress Lungs:  CTA Heart:  RRR with no Murmur/Rubs/Gallops Abd: +BS Ext:  No edema 
 
--PSG ordered to be done as outpatient with sleep center follow up 
--[facility] will sign off.  
 
Natalia Sheth MD

## 2018-11-29 NOTE — PROGRESS NOTES
BP elevated, Dr. Ibeth Figueroa in room to see pt, observed elevated BP, informed Dr. Ibeth Figueroa will give prn Hydralazine, MAR and PTA med for BP checked with Dr. Ibeth Figueroa, he prefers pt take his regular po meds for BP, informed Dr. Kathleen Gatica shows Norvasc was canceled on [facility] in ER and was not administered. Dr. Ibeth Figueroa spoke with clinical pharmacist [facility] and scheduled Norvasc for this evening, will adm Norvasc po and re-check BP.

## 2018-11-29 NOTE — PROGRESS NOTES
Hourly rounds performed;all pt needs met. Bed in low position and call light/ personal items within reach. Patient's -140s/80-90s, given prn meds per order. Total IVF intake 2120ml, and patient has been NPO except meds. No complaints of nausea or vomiting. Resting quietly in bed. Will continue to monitor and give bedside shift report to oncoming day shift nurse.

## 2018-11-30 VITALS
WEIGHT: 250 LBS | RESPIRATION RATE: 18 BRPM | HEIGHT: 71 IN | SYSTOLIC BLOOD PRESSURE: 127 MMHG | OXYGEN SATURATION: 95 % | TEMPERATURE: 98.4 F | BODY MASS INDEX: 35 KG/M2 | HEART RATE: 89 BPM | DIASTOLIC BLOOD PRESSURE: 80 MMHG

## 2018-11-30 PROBLEM — R22.1 MASS IN NECK: Status: ACTIVE | Noted: 2018-11-30

## 2018-11-30 LAB
ALBUMIN SERPL-MCNC: 2.6 G/DL (ref 3.5–5)
ALBUMIN/GLOB SERPL: 0.7 {RATIO} (ref 1.2–3.5)
ALP SERPL-CCNC: 107 U/L (ref 50–136)
ALT SERPL-CCNC: 64 U/L (ref 12–65)
ANION GAP SERPL CALC-SCNC: 8 MMOL/L (ref 7–16)
AST SERPL-CCNC: 43 U/L (ref 15–37)
BILIRUB SERPL-MCNC: 0.5 MG/DL (ref 0.2–1.1)
BUN SERPL-MCNC: 7 MG/DL (ref 6–23)
CALCIUM SERPL-MCNC: 8.4 MG/DL (ref 8.3–10.4)
CHLORIDE SERPL-SCNC: 104 MMOL/L (ref 98–107)
CO2 SERPL-SCNC: 25 MMOL/L (ref 21–32)
CREAT SERPL-MCNC: 0.49 MG/DL (ref 0.8–1.5)
ERYTHROCYTE [DISTWIDTH] IN BLOOD BY AUTOMATED COUNT: 13.4 % (ref 11.9–14.6)
GLOBULIN SER CALC-MCNC: 3.9 G/DL (ref 2.3–3.5)
GLUCOSE BLD STRIP.AUTO-MCNC: 173 MG/DL (ref 65–100)
GLUCOSE SERPL-MCNC: 167 MG/DL (ref 65–100)
HCT VFR BLD AUTO: 39.8 % (ref 41.1–50.3)
HGB BLD-MCNC: 13 G/DL (ref 13.6–17.2)
LIPASE SERPL-CCNC: 256 U/L (ref 73–393)
MCH RBC QN AUTO: 30 PG (ref 26.1–32.9)
MCHC RBC AUTO-ENTMCNC: 32.7 G/DL (ref 31.4–35)
MCV RBC AUTO: 91.7 FL (ref 79.6–97.8)
NRBC # BLD: 0 K/UL (ref 0–0.2)
PLATELET # BLD AUTO: 183 K/UL (ref 150–450)
PMV BLD AUTO: 11.8 FL (ref 9.4–12.3)
POTASSIUM SERPL-SCNC: 3.4 MMOL/L (ref 3.5–5.1)
PROT SERPL-MCNC: 6.5 G/DL (ref 6.3–8.2)
RBC # BLD AUTO: 4.34 M/UL (ref 4.23–5.6)
SODIUM SERPL-SCNC: 137 MMOL/L (ref 136–145)
WBC # BLD AUTO: 7.5 K/UL (ref 4.3–11.1)

## 2018-11-30 PROCEDURE — 74011250637 HC RX REV CODE- 250/637: Performed by: FAMILY MEDICINE

## 2018-11-30 PROCEDURE — 83690 ASSAY OF LIPASE: CPT

## 2018-11-30 PROCEDURE — 82962 GLUCOSE BLOOD TEST: CPT

## 2018-11-30 PROCEDURE — 85027 COMPLETE CBC AUTOMATED: CPT

## 2018-11-30 PROCEDURE — 74011250636 HC RX REV CODE- 250/636: Performed by: FAMILY MEDICINE

## 2018-11-30 PROCEDURE — 80053 COMPREHEN METABOLIC PANEL: CPT

## 2018-11-30 PROCEDURE — 74011636637 HC RX REV CODE- 636/637: Performed by: FAMILY MEDICINE

## 2018-11-30 PROCEDURE — 36415 COLL VENOUS BLD VENIPUNCTURE: CPT

## 2018-11-30 RX ORDER — FENOFIBRATE 160 MG/1
160 TABLET ORAL DAILY
Qty: 90 TAB | Refills: 3 | Status: SHIPPED | OUTPATIENT
Start: 2018-11-30 | End: 2019-01-08 | Stop reason: SDUPTHER

## 2018-11-30 RX ORDER — INSULIN PUMP SYRINGE, 3 ML
EACH MISCELLANEOUS
Qty: 1 KIT | Refills: 0 | Status: SHIPPED | OUTPATIENT
Start: 2018-11-30 | End: 2019-01-08 | Stop reason: SDUPTHER

## 2018-11-30 RX ORDER — INSULIN GLARGINE 100 [IU]/ML
15 INJECTION, SOLUTION SUBCUTANEOUS
Qty: 4.5 ML | Refills: 2 | Status: SHIPPED | OUTPATIENT
Start: 2018-11-30 | End: 2018-12-17

## 2018-11-30 RX ORDER — ATORVASTATIN CALCIUM 80 MG/1
80 TABLET, FILM COATED ORAL
Qty: 90 TAB | Refills: 3 | Status: SHIPPED | OUTPATIENT
Start: 2018-11-30 | End: 2020-02-12 | Stop reason: SDUPTHER

## 2018-11-30 RX ORDER — LANCETS
EACH MISCELLANEOUS
Qty: 1 EACH | Refills: 11 | Status: SHIPPED | OUTPATIENT
Start: 2018-11-30 | End: 2019-01-08 | Stop reason: SDUPTHER

## 2018-11-30 RX ADMIN — LISINOPRIL 40 MG: 20 TABLET ORAL at 08:56

## 2018-11-30 RX ADMIN — FENOFIBRATE 160 MG: 160 TABLET ORAL at 08:56

## 2018-11-30 RX ADMIN — PANTOPRAZOLE SODIUM 40 MG: 40 TABLET, DELAYED RELEASE ORAL at 05:20

## 2018-11-30 RX ADMIN — INSULIN GLARGINE 15 UNITS: 100 INJECTION, SOLUTION SUBCUTANEOUS at 08:56

## 2018-11-30 RX ADMIN — CARVEDILOL 3.12 MG: 3.12 TABLET, FILM COATED ORAL at 08:56

## 2018-11-30 RX ADMIN — ASPIRIN 81 MG 81 MG: 81 TABLET ORAL at 08:56

## 2018-11-30 RX ADMIN — INSULIN LISPRO 2 UNITS: 100 INJECTION, SOLUTION INTRAVENOUS; SUBCUTANEOUS at 08:57

## 2018-11-30 RX ADMIN — HYDROMORPHONE HYDROCHLORIDE 1 MG: 1 INJECTION, SOLUTION INTRAMUSCULAR; INTRAVENOUS; SUBCUTANEOUS at 03:58

## 2018-11-30 RX ADMIN — Medication 10 ML: at 05:23

## 2018-11-30 RX ADMIN — HEPARIN SODIUM 5000 UNITS: 5000 INJECTION INTRAVENOUS; SUBCUTANEOUS at 02:34

## 2018-11-30 NOTE — DISCHARGE INSTRUCTIONS
DISCHARGE SUMMARY from Nurse    PATIENT INSTRUCTIONS:    After general anesthesia or intravenous sedation, for 24 hours or while taking prescription Narcotics:  · Limit your activities  · Do not drive and operate hazardous machinery  · Do not make important personal or business decisions  · Do  not drink alcoholic beverages  · If you have not urinated within 8 hours after discharge, please contact your surgeon on call. Report the following to your surgeon:  · Excessive pain, swelling, redness or odor of or around the surgical area  · Temperature over 100.5  · Nausea and vomiting lasting longer than 4 hours or if unable to take medications  · Any signs of decreased circulation or nerve impairment to extremity: change in color, persistent  numbness, tingling, coldness or increase pain  · Any questions    What to do at Home:  Recommended activity: Activity as tolerated, low fat diet. If you experience any of the following symptoms increased pain, fever greater than 101, or nausea/vomiting, please follow up with your primary care physician. *  Please give a list of your current medications to your Primary Care Provider. *  Please update this list whenever your medications are discontinued, doses are      changed, or new medications (including over-the-counter products) are added. *  Please carry medication information at all times in case of emergency situations. These are general instructions for a healthy lifestyle:    No smoking/ No tobacco products/ Avoid exposure to second hand smoke  Surgeon General's Warning:  Quitting smoking now greatly reduces serious risk to your health.     Obesity, smoking, and sedentary lifestyle greatly increases your risk for illness    A healthy diet, regular physical exercise & weight monitoring are important for maintaining a healthy lifestyle    You may be retaining fluid if you have a history of heart failure or if you experience any of the following symptoms: Weight gain of 3 pounds or more overnight or 5 pounds in a week, increased swelling in our hands or feet or shortness of breath while lying flat in bed. Please call your doctor as soon as you notice any of these symptoms; do not wait until your next office visit. Recognize signs and symptoms of STROKE:    F-face looks uneven    A-arms unable to move or move unevenly    S-speech slurred or non-existent    T-time-call 911 as soon as signs and symptoms begin-DO NOT go       Back to bed or wait to see if you get better-TIME IS BRAIN. Warning Signs of HEART ATTACK     Call 911 if you have these symptoms:   Chest discomfort. Most heart attacks involve discomfort in the center of the chest that lasts more than a few minutes, or that goes away and comes back. It can feel like uncomfortable pressure, squeezing, fullness, or pain.  Discomfort in other areas of the upper body. Symptoms can include pain or discomfort in one or both arms, the back, neck, jaw, or stomach.  Shortness of breath with or without chest discomfort.  Other signs may include breaking out in a cold sweat, nausea, or lightheadedness. Don't wait more than five minutes to call 911 - MINUTES MATTER! Fast action can save your life. Calling 911 is almost always the fastest way to get lifesaving treatment. Emergency Medical Services staff can begin treatment when they arrive -- up to an hour sooner than if someone gets to the hospital by car. The discharge information has been reviewed with the patient. The patient verbalized understanding. Discharge medications reviewed with the patient and appropriate educational materials and side effects teaching were provided.   ___________________________________________________________________________________________________________________________________

## 2018-11-30 NOTE — PROGRESS NOTES
Patient escorted via wheelchair out with family by Madeleine Cardenas for discharge after receiving RX for glucometer. Patient has no further questions or complaints.

## 2018-11-30 NOTE — PROGRESS NOTES
Patient's /99, given pain med prn per order, rechecked /97. MD made aware, no orders received at this time, instructed to continue monitoring patient's BP. Will continue to monitor.

## 2018-11-30 NOTE — DISCHARGE SUMMARY
Hospitalist Discharge Summary     Patient ID:  Patricia Dill  [id number]  55 y.o.  1970  Admit date: 11/27/2018  7:27 PM  Discharge date and time: 11/30/2018  Attending: Lovely Gaytan MD  PCP:  Reuben Huang MD  Treatment Team: Attending Provider: Lovely Gaytan MD; Utilization Review: John Oliveros, RN; Care Manager: Sarah Reyna RN    Principal Diagnosis <principal problem not specified>   Active Problems:    Diabetes mellitus type II, uncontrolled ([address] (12/15/2016)      Overview: Now on lantus and humalog prn      goor control BS under 150       Sliding scale given-multiples of 4      Pt on metformit 500 mg bid 3/2019 and  lantus  25 units      Diet discussed      Labs today      F/u every 3 months      Acute pancreatitis (2/15/2018)      Overview: Sec to hypertriglyceridemia      Resolved now 3/2018      Essential hypertension (3/2/2018)      Overview: Stable with med-amlodip[ine/lisinopril/coreg      Refilled      Labs       Annual eye exam recommended      F/u in 3 months            Hyperlipidemia (3/2/2018)      Overview: On statin and fenofibrate       Recheck labs-f/u      Diet discussed      Obesity, unspecified obesity severity, unspecified obesity type (3/2/2018)      Overview: Weight loss encouraged      Hypertriglyceridemia (3/19/2018)      Overview: Much better now on lofibra--doing well      Advised more stricter low carb diet and aggressive control of DM--pt       willing      F/u in few months for recheck      Snoring (11/28/2018)      Suspected sleep apnea (11/28/2018)      Excessive daytime sleepiness (11/29/2018)      Mass in neck (11/30/2018)       Patient is 55yo M with Hx DM, hypertriglyceridemia, pancreatitis, who presents with 2w worsening abdominal and back pain. Feels similar to last episode of pancreatitis. States has been doing well with his medications, keeping follow-up with PCP. BS well controlled, no alcohol use.     Interval History (11/30): patient examined at bedside, he is upright in chair getting ready to eat breakfast. Symptoms improved overall, he has been able to tolerate advancement of his diet. No current fevers/chills, chest pain, abdominal pain, shortness of breath, nausea/vomiting, diarrhea, or constipation. He wants to go home today. Hospital Course:  Please refer to the admission H&P for details of presentation. In summary, the patient is admitted with abdominal pain most likely to represent recurrent acute pancreatitis in the setting of known hypertriglyceridemia. Patient had knowly not taken his medications for several days as he has had family problems \". . And I just didn't feel like taking them anymore. \" However, he wants to take them now again and is feeling better overall. Initially upon admission, there was consideration for starting apheresis as he has had to have this done in the past when he was initially diagnosed with pancreatitis due to undiagnosed hypertriglycerdemia; however, after conservative management with IVFs and pain management his symptoms improved overall. Refills for his Lantus 15 units, lancets/test strips, atorvastatin, and fenofibrate were provided at discharge. Of note, patient has a mass on the back of his neck that has been growing for several months. It is nonerythematous with no observed purulence or drainage, and the patient has no obvious neurologic deficits. Would recommend outpatient follow-up for this, will defer to his PCP. Significant Diagnostic Studies:     CT ABDOMEN AND PELVIS WITH CONTRAST     HISTORY: Lower abdominal pain     COMPARISON: 2/15/2018     TECHNIQUE: Helical imaging was performed from the lung bases through the ischial  tuberosities during the intravenous infusion of 100 cc of Isovue-370. Oral  contrast was not administered. Coronal and sagittal reformats were performed.     Dose reduction techniques used:  Automated exposure control, adjustment of the  mAs and/or kVp according to patient's size, and iterative reconstruction  techniques.     FINDINGS:  *  LUNG BASES: Small hiatal hernia. *  LIVER: Diffuse fatty change. *  GALLBLADDER AND BILE DUCTS: A cystectomy. *  SPLEEN: Within normal limits. *  URINARY TRACT: Within normal limits. *  ADRENALS: 2.7 cm left adrenal myelolipoma unchanged. *  PANCREAS: Laboratory changes in the region of the uncinate process of the  pancreas without evidence of pancreatic necrosis. *  GASTROINTESTINAL TRACT: Within normal limits. *  RETROPERITONEUM: Within normal limits. *  PERITONEAL CAVITY AND ABDOMINAL WALL: Within normal limits. *  PELVIS: Status post pinning of the left hip. *  SPINE / BONES: Within normal limits. *  OTHER COMMENTS: None.     IMPRESSION  IMPRESSION:     Findings consistent with acute pancreatitis of the uncinate process of the  pancreas without evidence of pancreatic necrosis.     Stable left adrenal myelolipoma.     Status post pinning of the left hip and cholecystectomy.     Diffuse fatty change liver.     Small hiatal hernia.     Date of Dictation: 11/27/2018 10:24 PM       Labs: Results:       Chemistry Recent Labs     11/30/18 0457 11/29/18 0517 11/28/18 0440   * 191* 262*    138 132*   K 3.4* 3.8 5.0    107 102   CO2 25 21 15*   BUN 7 12 10   CREA 0.49* 0.64* 0.68*   CA 8.4 8.3 8.8   AGAP 8 10 15    123 91   TP 6.5 6.5 8.5*   ALB 2.6* 2.6* 3.3*   GLOB 3.9* 3.9* 5.2*   AGRAT 0.7* 0.7* 0.6*      CBC w/Diff Recent Labs     11/30/18 0457 11/29/18 0517 11/28/18 0440 11/27/18 2056   WBC 7.5 9.7 16.5* 14.2*   RBC 4.34 4.48 5.25 5.37   HGB 13.0* 13.7 16.3 17.0   HCT 39.8* 41.8 46.9 47.4    181 209 284   GRANS  --   --   --  77   LYMPH  --   --   --  11*   EOS  --   --   --  4      Cardiac Enzymes No results for input(s): CPK, CKND1, HAMILTON in the last 72 hours. No lab exists for component: CKRMB, TROIP   Coagulation No results for input(s): PTP, INR, APTT in the last 72 hours.     No lab exists for component: INREXT, INREXT    Lipid Panel Lab Results   Component Value Date/Time    Cholesterol, total 278 (H) 11/28/2018 04:40 AM    HDL Cholesterol 25 (L) 11/28/2018 04:40 AM    LDL,Direct  11/28/2018 04:40 AM     UNABLE TO OBTAIN ACCURATE RESULTS DUE TO GROSS LIPEMIA    LDL, calculated  11/28/2018 04:40 AM     LDL not calculated due to elevated triglyceride level. VLDL, calculated Not calculated due to elevated triglyceride level 11/28/2018 04:40 AM    Triglyceride 1,096 (H) 11/28/2018 04:40 AM    CHOL/HDL Ratio 11.1 11/28/2018 04:40 AM      BNP No results for input(s): BNPP in the last 72 hours. Liver Enzymes Recent Labs     11/30/18  0457   TP 6.5   ALB 2.6*      SGOT 43*      Thyroid Studies No results found for: T4, T3U, TSH, TSHEXT, TSHEXT         Discharge Exam:  Visit Vitals  /80 (BP 1 Location: Left arm, BP Patient Position: At rest)   Pulse 89   Temp 98.4 °F (36.9 °C)   Resp 18   Ht 5' 11\" (1.803 m)   Wt 113.4 kg (250 lb)   SpO2 95%   BMI 34.87 kg/m²     General appearance: alert, cooperative, no distress, appears stated age, very pleasant  HEENT: mobile, fleshy mass along cervical vertebral column that is nontender to palpation with no visible drainage and is nonerythematous  Lungs: clear to auscultation bilaterally  Heart: regular rate and rhythm, S1, S2 normal, no murmur, click, rub or gallop  Abdomen: soft, non-tender. Bowel sounds normal. No masses,  no organomegaly  Extremities: no cyanosis or edema  Neurologic: Grossly normal    Disposition: home  Discharge Condition: stable  Patient Instructions:   Current Discharge Medication List      START taking these medications    Details   insulin glargine (LANTUS) 100 unit/mL injection 15 Units by SubCUTAneous route nightly for 30 days.   Qty: 4.5 mL, Refills: 2      Lancets misc Check CBG once daily, patient uses [facility], one month supply, E11.9, pharmacy to substitute as appropriate  Qty: 1 Each, Refills: 11      glucose blood VI test strips (ASCENSIA AUTODISC VI, ONE TOUCH ULTRA TEST VI) strip Check CBG once daily, patient uses Natana[facility] Tolbert, one month supply, E11.9, pharmacy to substitute as appropriate  Qty: 30 Strip, Refills: 2         CONTINUE these medications which have CHANGED    Details   atorvastatin (LIPITOR) 80 mg tablet Take 1 Tab by mouth nightly for 360 days. Qty: 90 Tab, Refills: 3    Associated Diagnoses: Uncontrolled type 2 diabetes mellitus without complication, without long-term current use of insulin (HCC)      fenofibrate (LOFIBRA) 160 mg tablet Take 1 Tab by mouth daily. Qty: 90 Tab, Refills: 3      Insulin Needles, Disposable, 30 gauge x 1/3\" by SubCUTAneous route Before breakfast, lunch, dinner and at bedtime. For Natanael Tolbert, check CBG once daily, E11.9, one month supply  Qty: 1 Package, Refills: 11         CONTINUE these medications which have NOT CHANGED    Details   metFORMIN (GLUCOPHAGE) 500 mg tablet Take 1 Tab by mouth two (2) times daily (with meals). Qty: 180 Tab, Refills: 0      diclofenac potassium (CATAFLAM) 50 mg tablet Take 1 Tab by mouth two (2) times daily as needed. Indications: OSTEOARTHRITIS, with food  Qty: 20 Tab, Refills: 0      carvedilol (COREG) 3.125 mg tablet Take 1 Tab by mouth two (2) times daily (with meals). Qty: 180 Tab, Refills: 1      Blood-Glucose Meter (ACCU-CHEK NICK) misc Check blood sugar three to four  times daily 4 accucheck nick test strip daily  Qty: 120 Each, Refills: 11      amLODIPine (NORVASC) 10 mg tablet Take 1 Tab by mouth daily for 360 days. Qty: 90 Tab, Refills: 3    Associated Diagnoses: Accelerated hypertension      aspirin 81 mg chewable tablet Take 1 Tab by mouth daily. Qty: 30 Tab, Refills: 1      lisinopril (PRINIVIL, ZESTRIL) 40 mg tablet Take 1 Tab by mouth daily. Qty: 90 Tab, Refills: 1    Associated Diagnoses: Accelerated hypertension      Blood-Glucose Meter monitoring kit by SubCUTAneous route Before breakfast, lunch, dinner and at bedtime.   Qty: 1 Kit, Refills: 0      nitroglycerin (NITROSTAT) 0.4 mg SL tablet 1 Tab by SubLINGual route every five (5) minutes as needed for Chest Pain. Qty: 1 Bottle, Refills: 5             Activity: Activity as tolerated  Diet: Low fat, Low cholesterol  Wound Care: None needed    Follow-up  ·   Follow-up with PCP within 1 week  Time spent to discharge patient 35 minutes  Signed:   Lou Dooley DO  11/30/2018  7:47 AM

## 2018-11-30 NOTE — PROGRESS NOTES
Hourly rounds performed;all pt needs met. Bed in low position and call light/ personal items within reach. Patient's -150s/70-90s for this shift, last /80. Patient stated that he tolerated well  his diet. C/o severe sharp back pain, worse on the right lower back, given prn pain meds per order. Will continue to monitor and give bedside shift report to oncoming day shift nurse.

## 2018-12-10 PROBLEM — M19.90 OSTEOARTHRITIS: Status: ACTIVE | Noted: 2018-12-10

## 2018-12-10 PROBLEM — K44.9 HIATAL HERNIA: Status: ACTIVE | Noted: 2018-12-10

## 2018-12-10 PROBLEM — Z09 ENCOUNTER FOR EXAMINATION FOLLOWING TREATMENT AT HOSPITAL: Status: ACTIVE | Noted: 2018-12-10

## 2018-12-10 PROBLEM — L72.3 SEBACEOUS CYST: Status: ACTIVE | Noted: 2018-12-10

## 2019-01-09 PROBLEM — L72.3 INFECTED SEBACEOUS CYST OF SKIN: Status: ACTIVE | Noted: 2019-01-09

## 2019-01-09 PROBLEM — Z76.0 MEDICATION REFILL: Status: ACTIVE | Noted: 2019-01-09

## 2019-01-09 PROBLEM — M75.02 ADHESIVE CAPSULITIS OF LEFT SHOULDER: Status: ACTIVE | Noted: 2019-01-09

## 2019-01-09 PROBLEM — L08.9 INFECTED SEBACEOUS CYST OF SKIN: Status: ACTIVE | Noted: 2019-01-09

## 2019-01-09 PROBLEM — M25.512 ACUTE PAIN OF LEFT SHOULDER: Status: ACTIVE | Noted: 2019-01-09

## 2019-05-08 PROBLEM — F32.A DEPRESSION: Status: ACTIVE | Noted: 2019-05-08

## 2019-05-08 PROBLEM — Z71.3 DIETARY COUNSELING: Status: ACTIVE | Noted: 2019-05-08

## 2019-05-08 PROBLEM — Z87.891 FORMER SMOKER: Status: ACTIVE | Noted: 2019-05-08

## 2019-05-08 PROBLEM — Z95.5 HX OF HEART ARTERY STENT: Status: ACTIVE | Noted: 2019-05-08

## 2019-05-20 PROBLEM — I21.3 STEMI (ST ELEVATION MYOCARDIAL INFARCTION) (HCC): Status: ACTIVE | Noted: 2019-02-26

## 2019-05-20 PROBLEM — K85.90 ACUTE PANCREATITIS: Status: ACTIVE | Noted: 2018-02-15

## 2019-05-20 PROBLEM — E78.1 HYPERTRIGLYCERIDEMIA: Status: ACTIVE | Noted: 2018-03-19

## 2019-05-20 PROBLEM — I25.10 ATHEROSCLEROSIS OF CORONARY ARTERY: Status: ACTIVE | Noted: 2018-02-15

## 2019-05-20 PROBLEM — E11.10 DIABETIC KETOACIDOSIS WITHOUT COMA ASSOCIATED WITH TYPE 2 DIABETES MELLITUS (HCC): Status: ACTIVE | Noted: 2018-02-15

## 2019-05-20 PROBLEM — I20.0 UNSTABLE ANGINA (HCC): Status: ACTIVE | Noted: 2019-05-02

## 2019-05-20 PROBLEM — K85.90 ACUTE PANCREATITIS: Status: RESOLVED | Noted: 2018-02-15 | Resolved: 2019-05-20

## 2019-05-20 PROBLEM — E78.1 HYPERTRIGLYCERIDEMIA: Status: RESOLVED | Noted: 2018-03-19 | Resolved: 2019-05-20

## 2020-01-24 PROBLEM — Z91.14 NONCOMPLIANCE WITH MEDICATIONS: Status: ACTIVE | Noted: 2020-01-24

## 2020-01-24 PROBLEM — L40.50 PSORIATIC ARTHROPATHY (HCC): Status: ACTIVE | Noted: 2020-01-24

## 2020-01-24 PROBLEM — E11.9 COMPREHENSIVE DIABETIC FOOT EXAMINATION, TYPE 2 DM, ENCOUNTER FOR (HCC): Status: ACTIVE | Noted: 2020-01-24

## 2020-01-24 PROBLEM — M20.60 ACQUIRED DEFORMITY OF TOE: Status: ACTIVE | Noted: 2020-01-24

## 2020-01-24 PROBLEM — F41.9 ANXIETY: Status: ACTIVE | Noted: 2020-01-24

## 2020-01-31 ENCOUNTER — HOSPITAL ENCOUNTER (OUTPATIENT)
Dept: LAB | Age: 50
Discharge: HOME | End: 2020-01-31
Payer: COMMERCIAL

## 2020-01-31 DIAGNOSIS — K44.9 HIATAL HERNIA: ICD-10-CM

## 2020-01-31 DIAGNOSIS — I25.83 CORONARY ARTERY DISEASE DUE TO LIPID RICH PLAQUE: ICD-10-CM

## 2020-01-31 DIAGNOSIS — Z91.14 NONCOMPLIANCE WITH MEDICATIONS: ICD-10-CM

## 2020-01-31 DIAGNOSIS — Z71.3 DIETARY COUNSELING: ICD-10-CM

## 2020-01-31 DIAGNOSIS — E78.5 HYPERLIPIDEMIA, UNSPECIFIED HYPERLIPIDEMIA TYPE: ICD-10-CM

## 2020-01-31 DIAGNOSIS — E66.9 OBESITY, UNSPECIFIED OBESITY SEVERITY, UNSPECIFIED OBESITY TYPE: ICD-10-CM

## 2020-01-31 DIAGNOSIS — Z95.5 HX OF HEART ARTERY STENT: ICD-10-CM

## 2020-01-31 DIAGNOSIS — E78.1 HYPERTRIGLYCERIDEMIA: ICD-10-CM

## 2020-01-31 DIAGNOSIS — I10 ESSENTIAL HYPERTENSION: ICD-10-CM

## 2020-01-31 DIAGNOSIS — Z87.891 FORMER SMOKER: ICD-10-CM

## 2020-01-31 DIAGNOSIS — I25.10 CORONARY ARTERY DISEASE DUE TO LIPID RICH PLAQUE: ICD-10-CM

## 2020-01-31 LAB
ALBUMIN SERPL-MCNC: 3.5 G/DL (ref 3.5–5)
ALBUMIN/GLOB SERPL: 0.8 {RATIO} (ref 1.2–3.5)
ALP SERPL-CCNC: 75 U/L (ref 50–136)
ALT SERPL-CCNC: 30 U/L (ref 12–65)
ANION GAP SERPL CALC-SCNC: 10 MMOL/L (ref 7–16)
AST SERPL-CCNC: 20 U/L (ref 15–37)
BILIRUB SERPL-MCNC: 0.7 MG/DL (ref 0.2–1.1)
BUN SERPL-MCNC: 16 MG/DL (ref 6–23)
CALCIUM SERPL-MCNC: 9.2 MG/DL (ref 8.3–10.4)
CHLORIDE SERPL-SCNC: 100 MMOL/L (ref 98–107)
CHOLEST SERPL-MCNC: 198 MG/DL
CO2 SERPL-SCNC: 24 MMOL/L (ref 21–32)
CREAT SERPL-MCNC: 0.9 MG/DL (ref 0.8–1.5)
CREAT UR-MCNC: 89 MG/DL
EST. AVERAGE GLUCOSE BLD GHB EST-MCNC: 298 MG/DL
GLOBULIN SER CALC-MCNC: 4.6 G/DL (ref 2.3–3.5)
GLUCOSE SERPL-MCNC: 293 MG/DL (ref 65–100)
HBA1C MFR BLD: 12 % (ref 4.8–6)
HDLC SERPL-MCNC: 30 MG/DL (ref 40–60)
HDLC SERPL: 6.6 {RATIO}
LDLC SERPL CALC-MCNC: ABNORMAL MG/DL
LDLC SERPL DIRECT ASSAY-MCNC: 91 MG/DL
LIPID PROFILE,FLP: ABNORMAL
MICROALBUMIN UR-MCNC: 14.2 MG/DL
MICROALBUMIN/CREAT UR-RTO: 160 MG/G
POTASSIUM SERPL-SCNC: 4.3 MMOL/L (ref 3.5–5.1)
PROT SERPL-MCNC: 8.1 G/DL (ref 6.3–8.2)
SODIUM SERPL-SCNC: 134 MMOL/L (ref 136–145)
TRIGL SERPL-MCNC: 535 MG/DL (ref 35–150)
VLDLC SERPL CALC-MCNC: 107 MG/DL (ref 6–23)

## 2020-01-31 PROCEDURE — 82043 UR ALBUMIN QUANTITATIVE: CPT

## 2020-01-31 PROCEDURE — 83721 ASSAY OF BLOOD LIPOPROTEIN: CPT

## 2020-01-31 PROCEDURE — 80053 COMPREHEN METABOLIC PANEL: CPT

## 2020-01-31 PROCEDURE — 83036 HEMOGLOBIN GLYCOSYLATED A1C: CPT

## 2020-01-31 PROCEDURE — 80061 LIPID PANEL: CPT

## 2020-01-31 PROCEDURE — 36415 COLL VENOUS BLD VENIPUNCTURE: CPT

## 2020-02-12 PROBLEM — S06.9XAA TBI (TRAUMATIC BRAIN INJURY): Status: ACTIVE | Noted: 2020-02-12

## 2020-03-09 PROBLEM — J02.9 PHARYNGITIS: Status: ACTIVE | Noted: 2020-03-09

## 2020-03-09 PROBLEM — H92.09 EARACHE: Status: ACTIVE | Noted: 2020-03-09

## 2020-03-09 PROBLEM — G40.909 SEIZURE DISORDER (HCC): Status: ACTIVE | Noted: 2020-03-09

## 2020-03-09 PROBLEM — H65.493 CHRONIC MIDDLE EAR EFFUSION, BILATERAL: Status: ACTIVE | Noted: 2020-03-09

## 2020-03-09 PROBLEM — J32.9 SINUSITIS: Status: ACTIVE | Noted: 2020-03-09

## 2020-03-25 ENCOUNTER — HOSPITAL ENCOUNTER (OUTPATIENT)
Age: 50
Setting detail: OBSERVATION
Discharge: HOME | End: 2020-03-26
Attending: INTERNAL MEDICINE | Admitting: INTERNAL MEDICINE
Payer: COMMERCIAL

## 2020-03-25 ENCOUNTER — APPOINTMENT (OUTPATIENT)
Dept: GENERAL RADIOLOGY | Age: 50
End: 2020-03-25
Attending: EMERGENCY MEDICINE
Payer: COMMERCIAL

## 2020-03-25 ENCOUNTER — HOSPITAL ENCOUNTER (EMERGENCY)
Age: 50
Discharge: OTHER INSTITUTION | End: 2020-03-25
Attending: EMERGENCY MEDICINE
Payer: COMMERCIAL

## 2020-03-25 VITALS
RESPIRATION RATE: 27 BRPM | HEART RATE: 83 BPM | BODY MASS INDEX: 40.88 KG/M2 | OXYGEN SATURATION: 95 % | DIASTOLIC BLOOD PRESSURE: 90 MMHG | WEIGHT: 276 LBS | HEIGHT: 69 IN | SYSTOLIC BLOOD PRESSURE: 140 MMHG | TEMPERATURE: 98.1 F

## 2020-03-25 DIAGNOSIS — Z79.4 TYPE 2 DIABETES MELLITUS WITH OTHER SPECIFIED COMPLICATION, WITH LONG-TERM CURRENT USE OF INSULIN (HCC): ICD-10-CM

## 2020-03-25 DIAGNOSIS — R07.9 CHEST PAIN, UNSPECIFIED TYPE: Primary | ICD-10-CM

## 2020-03-25 DIAGNOSIS — E11.69 TYPE 2 DIABETES MELLITUS WITH OTHER SPECIFIED COMPLICATION, WITH LONG-TERM CURRENT USE OF INSULIN (HCC): ICD-10-CM

## 2020-03-25 PROBLEM — R68.83 CHILLS: Status: ACTIVE | Noted: 2020-03-25

## 2020-03-25 PROBLEM — I25.119 CORONARY ARTERY DISEASE INVOLVING NATIVE HEART WITH ANGINA PECTORIS (HCC): Status: ACTIVE | Noted: 2020-03-25

## 2020-03-25 PROBLEM — J40 BRONCHITIS: Status: ACTIVE | Noted: 2020-03-25

## 2020-03-25 PROBLEM — R11.0 NAUSEA: Status: ACTIVE | Noted: 2020-03-25

## 2020-03-25 LAB
ANION GAP SERPL CALC-SCNC: 7 MMOL/L (ref 7–16)
ATRIAL RATE: 78 BPM
BASOPHILS # BLD: 0.1 K/UL (ref 0–0.2)
BASOPHILS NFR BLD: 1 % (ref 0–2)
BUN SERPL-MCNC: 14 MG/DL (ref 6–23)
CALCIUM SERPL-MCNC: 8.8 MG/DL (ref 8.3–10.4)
CALCULATED P AXIS, ECG09: 59 DEGREES
CALCULATED R AXIS, ECG10: 65 DEGREES
CALCULATED T AXIS, ECG11: 53 DEGREES
CHLORIDE SERPL-SCNC: 100 MMOL/L (ref 98–107)
CO2 SERPL-SCNC: 25 MMOL/L (ref 21–32)
CREAT SERPL-MCNC: 1.22 MG/DL (ref 0.8–1.5)
DIAGNOSIS, 93000: NORMAL
DIFFERENTIAL METHOD BLD: NORMAL
EOSINOPHIL # BLD: 0.4 K/UL (ref 0–0.8)
EOSINOPHIL NFR BLD: 5 % (ref 0.5–7.8)
ERYTHROCYTE [DISTWIDTH] IN BLOOD BY AUTOMATED COUNT: 13.1 % (ref 11.9–14.6)
ERYTHROCYTE [DISTWIDTH] IN BLOOD BY AUTOMATED COUNT: 13.2 % (ref 11.9–14.6)
GLUCOSE BLD STRIP.AUTO-MCNC: 230 MG/DL (ref 65–100)
GLUCOSE BLD STRIP.AUTO-MCNC: 279 MG/DL (ref 65–100)
GLUCOSE SERPL-MCNC: 268 MG/DL (ref 65–100)
HCT VFR BLD AUTO: 42.7 % (ref 41.1–50.3)
HCT VFR BLD AUTO: 43.1 % (ref 41.1–50.3)
HGB BLD-MCNC: 14.4 G/DL (ref 13.6–17.2)
HGB BLD-MCNC: 14.4 G/DL (ref 13.6–17.2)
IMM GRANULOCYTES # BLD AUTO: 0 K/UL (ref 0–0.5)
IMM GRANULOCYTES NFR BLD AUTO: 0 % (ref 0–5)
LYMPHOCYTES # BLD: 2.6 K/UL (ref 0.5–4.6)
LYMPHOCYTES NFR BLD: 28 % (ref 13–44)
MCH RBC QN AUTO: 30.2 PG (ref 26.1–32.9)
MCH RBC QN AUTO: 30.6 PG (ref 26.1–32.9)
MCHC RBC AUTO-ENTMCNC: 33.4 G/DL (ref 31.4–35)
MCHC RBC AUTO-ENTMCNC: 33.7 G/DL (ref 31.4–35)
MCV RBC AUTO: 90.4 FL (ref 79.6–97.8)
MCV RBC AUTO: 90.7 FL (ref 79.6–97.8)
MONOCYTES # BLD: 0.8 K/UL (ref 0.1–1.3)
MONOCYTES NFR BLD: 9 % (ref 4–12)
NEUTS SEG # BLD: 5.5 K/UL (ref 1.7–8.2)
NEUTS SEG NFR BLD: 58 % (ref 43–78)
NRBC # BLD: 0 K/UL (ref 0–0.2)
NRBC # BLD: 0 K/UL (ref 0–0.2)
P-R INTERVAL, ECG05: 154 MS
PLATELET # BLD AUTO: 313 K/UL (ref 150–450)
PLATELET # BLD AUTO: 317 K/UL (ref 150–450)
PMV BLD AUTO: 10.7 FL (ref 9.4–12.3)
PMV BLD AUTO: 10.9 FL (ref 9.4–12.3)
POTASSIUM SERPL-SCNC: 4.9 MMOL/L (ref 3.5–5.1)
Q-T INTERVAL, ECG07: 366 MS
QRS DURATION, ECG06: 100 MS
QTC CALCULATION (BEZET), ECG08: 417 MS
RBC # BLD AUTO: 4.71 M/UL (ref 4.23–5.6)
RBC # BLD AUTO: 4.77 M/UL (ref 4.23–5.6)
SODIUM SERPL-SCNC: 132 MMOL/L (ref 136–145)
TROPONIN I SERPL-MCNC: <0.02 NG/ML (ref 0.02–0.05)
TROPONIN I SERPL-MCNC: <0.02 NG/ML (ref 0.02–0.05)
VENTRICULAR RATE, ECG03: 78 BPM
WBC # BLD AUTO: 10.4 K/UL (ref 4.3–11.1)
WBC # BLD AUTO: 9.5 K/UL (ref 4.3–11.1)

## 2020-03-25 PROCEDURE — 71046 X-RAY EXAM CHEST 2 VIEWS: CPT

## 2020-03-25 PROCEDURE — 74011636637 HC RX REV CODE- 636/637: Performed by: NURSE PRACTITIONER

## 2020-03-25 PROCEDURE — 74011250637 HC RX REV CODE- 250/637: Performed by: NURSE PRACTITIONER

## 2020-03-25 PROCEDURE — 93005 ELECTROCARDIOGRAM TRACING: CPT | Performed by: EMERGENCY MEDICINE

## 2020-03-25 PROCEDURE — 80048 BASIC METABOLIC PNL TOTAL CA: CPT

## 2020-03-25 PROCEDURE — 74011250637 HC RX REV CODE- 250/637: Performed by: INTERNAL MEDICINE

## 2020-03-25 PROCEDURE — 99218 HC RM OBSERVATION: CPT

## 2020-03-25 PROCEDURE — 84484 ASSAY OF TROPONIN QUANT: CPT

## 2020-03-25 PROCEDURE — 99285 EMERGENCY DEPT VISIT HI MDM: CPT

## 2020-03-25 PROCEDURE — 74011250637 HC RX REV CODE- 250/637: Performed by: EMERGENCY MEDICINE

## 2020-03-25 PROCEDURE — 85027 COMPLETE CBC AUTOMATED: CPT

## 2020-03-25 PROCEDURE — 65660000000 HC RM CCU STEPDOWN

## 2020-03-25 PROCEDURE — 82962 GLUCOSE BLOOD TEST: CPT

## 2020-03-25 PROCEDURE — 36415 COLL VENOUS BLD VENIPUNCTURE: CPT

## 2020-03-25 PROCEDURE — 74011636637 HC RX REV CODE- 636/637: Performed by: INTERNAL MEDICINE

## 2020-03-25 PROCEDURE — 85025 COMPLETE CBC W/AUTO DIFF WBC: CPT

## 2020-03-25 RX ORDER — ASPIRIN 81 MG/1
81 TABLET ORAL DAILY
Status: DISCONTINUED | OUTPATIENT
Start: 2020-03-26 | End: 2020-03-26 | Stop reason: HOSPADM

## 2020-03-25 RX ORDER — PRASUGREL 10 MG/1
10 TABLET, FILM COATED ORAL DAILY
Status: DISCONTINUED | OUTPATIENT
Start: 2020-03-26 | End: 2020-03-26 | Stop reason: HOSPADM

## 2020-03-25 RX ORDER — INSULIN LISPRO 100 [IU]/ML
INJECTION, SOLUTION INTRAVENOUS; SUBCUTANEOUS
Status: DISCONTINUED | OUTPATIENT
Start: 2020-03-25 | End: 2020-03-25

## 2020-03-25 RX ORDER — ISOSORBIDE MONONITRATE 30 MG/1
30 TABLET, EXTENDED RELEASE ORAL DAILY
Status: DISCONTINUED | OUTPATIENT
Start: 2020-03-26 | End: 2020-03-25 | Stop reason: SDUPTHER

## 2020-03-25 RX ORDER — ACETAMINOPHEN 325 MG/1
650 TABLET ORAL
Status: DISCONTINUED | OUTPATIENT
Start: 2020-03-25 | End: 2020-03-26 | Stop reason: HOSPADM

## 2020-03-25 RX ORDER — INSULIN GLARGINE 100 [IU]/ML
50 INJECTION, SOLUTION SUBCUTANEOUS
Status: DISCONTINUED | OUTPATIENT
Start: 2020-03-25 | End: 2020-03-26 | Stop reason: HOSPADM

## 2020-03-25 RX ORDER — SODIUM CHLORIDE 0.9 % (FLUSH) 0.9 %
5-40 SYRINGE (ML) INJECTION AS NEEDED
Status: DISCONTINUED | OUTPATIENT
Start: 2020-03-25 | End: 2020-03-26 | Stop reason: HOSPADM

## 2020-03-25 RX ORDER — CIDER VINEGAR 300 MG
250 TABLET ORAL
Status: DISCONTINUED | OUTPATIENT
Start: 2020-03-25 | End: 2020-03-25

## 2020-03-25 RX ORDER — BUPROPION HYDROCHLORIDE 100 MG/1
100 TABLET ORAL 2 TIMES DAILY
Status: DISCONTINUED | OUTPATIENT
Start: 2020-03-25 | End: 2020-03-26 | Stop reason: HOSPADM

## 2020-03-25 RX ORDER — SODIUM CHLORIDE 0.9 % (FLUSH) 0.9 %
5-40 SYRINGE (ML) INJECTION EVERY 8 HOURS
Status: DISCONTINUED | OUTPATIENT
Start: 2020-03-25 | End: 2020-03-26 | Stop reason: HOSPADM

## 2020-03-25 RX ORDER — FENOFIBRATE 160 MG/1
160 TABLET ORAL DAILY
Status: DISCONTINUED | OUTPATIENT
Start: 2020-03-26 | End: 2020-03-26 | Stop reason: HOSPADM

## 2020-03-25 RX ORDER — SODIUM CHLORIDE 9 MG/ML
50 INJECTION, SOLUTION INTRAVENOUS CONTINUOUS
Status: DISCONTINUED | OUTPATIENT
Start: 2020-03-26 | End: 2020-03-26 | Stop reason: HOSPADM

## 2020-03-25 RX ORDER — GLIPIZIDE 5 MG/1
5 TABLET, FILM COATED, EXTENDED RELEASE ORAL DAILY
Status: DISCONTINUED | OUTPATIENT
Start: 2020-03-26 | End: 2020-03-25

## 2020-03-25 RX ORDER — INSULIN GLARGINE 100 [IU]/ML
50 INJECTION, SOLUTION SUBCUTANEOUS DAILY
Status: DISCONTINUED | OUTPATIENT
Start: 2020-03-26 | End: 2020-03-25

## 2020-03-25 RX ORDER — LISINOPRIL 20 MG/1
20 TABLET ORAL DAILY
Status: DISCONTINUED | OUTPATIENT
Start: 2020-03-26 | End: 2020-03-26 | Stop reason: HOSPADM

## 2020-03-25 RX ORDER — GUAIFENESIN 100 MG/5ML
324 LIQUID (ML) ORAL
Status: COMPLETED | OUTPATIENT
Start: 2020-03-25 | End: 2020-03-25

## 2020-03-25 RX ORDER — INSULIN LISPRO 100 [IU]/ML
INJECTION, SOLUTION INTRAVENOUS; SUBCUTANEOUS
Status: DISCONTINUED | OUTPATIENT
Start: 2020-03-25 | End: 2020-03-26 | Stop reason: HOSPADM

## 2020-03-25 RX ORDER — GLIPIZIDE 5 MG/1
5 TABLET, FILM COATED, EXTENDED RELEASE ORAL
Status: DISCONTINUED | OUTPATIENT
Start: 2020-03-25 | End: 2020-03-26 | Stop reason: HOSPADM

## 2020-03-25 RX ORDER — CARVEDILOL 3.12 MG/1
3.12 TABLET ORAL 2 TIMES DAILY WITH MEALS
Status: DISCONTINUED | OUTPATIENT
Start: 2020-03-25 | End: 2020-03-26

## 2020-03-25 RX ORDER — ATORVASTATIN CALCIUM 80 MG/1
80 TABLET, FILM COATED ORAL
Status: DISCONTINUED | OUTPATIENT
Start: 2020-03-25 | End: 2020-03-26 | Stop reason: HOSPADM

## 2020-03-25 RX ORDER — NITROGLYCERIN 0.4 MG/1
0.4 TABLET SUBLINGUAL AS NEEDED
Status: DISCONTINUED | OUTPATIENT
Start: 2020-03-25 | End: 2020-03-26 | Stop reason: HOSPADM

## 2020-03-25 RX ORDER — PANTOPRAZOLE SODIUM 40 MG/1
40 TABLET, DELAYED RELEASE ORAL
Status: DISCONTINUED | OUTPATIENT
Start: 2020-03-26 | End: 2020-03-26 | Stop reason: HOSPADM

## 2020-03-25 RX ADMIN — NITROGLYCERIN 0.4 MG: 0.4 TABLET SUBLINGUAL at 20:00

## 2020-03-25 RX ADMIN — ATORVASTATIN CALCIUM 80 MG: 80 TABLET, FILM COATED ORAL at 22:27

## 2020-03-25 RX ADMIN — Medication 5 ML: at 22:28

## 2020-03-25 RX ADMIN — CARVEDILOL 3.12 MG: 3.12 TABLET, FILM COATED ORAL at 17:55

## 2020-03-25 RX ADMIN — Medication 10 ML: at 18:08

## 2020-03-25 RX ADMIN — GLIPIZIDE 5 MG: 5 TABLET, FILM COATED, EXTENDED RELEASE ORAL at 22:27

## 2020-03-25 RX ADMIN — INSULIN LISPRO 6 UNITS: 100 INJECTION, SOLUTION INTRAVENOUS; SUBCUTANEOUS at 22:28

## 2020-03-25 RX ADMIN — BUPROPION HYDROCHLORIDE 100 MG: 100 TABLET, FILM COATED ORAL at 22:27

## 2020-03-25 RX ADMIN — ASPIRIN 81 MG 324 MG: 81 TABLET ORAL at 13:09

## 2020-03-25 RX ADMIN — NITROGLYCERIN 0.4 MG: 0.4 TABLET SUBLINGUAL at 20:05

## 2020-03-25 RX ADMIN — ACETAMINOPHEN 650 MG: 325 TABLET, FILM COATED ORAL at 22:27

## 2020-03-25 RX ADMIN — Medication 1 AMPULE: at 22:28

## 2020-03-25 RX ADMIN — INSULIN LISPRO 4 UNITS: 100 INJECTION, SOLUTION INTRAVENOUS; SUBCUTANEOUS at 17:55

## 2020-03-25 RX ADMIN — INSULIN GLARGINE 50 UNITS: 100 INJECTION, SOLUTION SUBCUTANEOUS at 22:28

## 2020-03-25 RX ADMIN — NITROGLYCERIN 1 INCH: 20 OINTMENT TOPICAL at 13:09

## 2020-03-25 RX ADMIN — NITROGLYCERIN 0.5 INCH: 20 OINTMENT TOPICAL at 22:27

## 2020-03-25 NOTE — ED TRIAGE NOTES
Pt presents with chest tightness and SOB for several day. Pt has been on 2 different antibiotics. Pt states he has had 2 heart attacks within the last couple of years. Pt masked at registration.

## 2020-03-25 NOTE — ROUTINE PROCESS
Verbal bedside report given to Master Sahu oncoming RN. Patient's situation, background, assessment and recommendations provided. Opportunity for questions provided. Oncoming RN assumed care of patient.

## 2020-03-25 NOTE — PROGRESS NOTES
Patient in quarantine area so SW did not meet with him face-to-face. Chart reviewed, patient from home where he is independent at his baseline. Patient is established with primary care and is current. No needs at this time from case management, but SW/[facility] dept to continue to follow for potential new needs.      Segura Lacer, [place]    [address] [facility]  [email or web address]

## 2020-03-25 NOTE — PROGRESS NOTES
SW reviewed patient's chart and conducted a baseline assessment. Discharge plan at this time is as follows:     Care Management Interventions  PCP Verified by CM:  Yes  Mode of Transport at Discharge: Self  Transition of Care Consult (CM Consult): Discharge Planning  Current Support Network: Own Home  Confirm Follow Up Transport: Family  Discharge Location  Discharge Placement: Home with family assistance    Rod Dunlap, [address] Work   [address] [facility]  [email or web address]

## 2020-03-25 NOTE — ROUTINE PROCESS
Bedside and Verbal shift change report given to  (oncoming nurse) by Rosalba Carpenter (offgoing nurse). Report included the following information SBAR, Kardex, Intake/Output, MAR and Recent Results.

## 2020-03-25 NOTE — H&P
[address] [place] [address]  HISTORY AND PHYSICAL    Name:  Vinny Souza  MR#:  [id number]  :  1970  ACCOUNT #:  [de-identified]  ADMIT DATE:  2020      CHIEF COMPLAINT:  Chest pain. HISTORY OF PRESENT ILLNESS:  The patient is a 82-year-old gentleman with well-established history of coronary artery disease, multiple stent procedures who presents with a 1-week history of progressively worsening chest pain. Chest pain has been fairly constant although it has increasing episodes that are worse with exertion, better with rest, lasting 10-15 minutes at a time. Chest pain is retrosternal, nonradiating, associated with nausea and some shortness of breath. PAST MEDICAL HISTORY:  Significant for coronary artery disease, diabetes mellitus, hypertension, and dyslipidemia. SOCIAL HISTORY:  Quit smoking 6 months ago. FAMILY HISTORY:  Strongly positive for premature coronary disease with family members having MIs and his father  at age 48 from myocardial infarction. REVIEW OF SYSTEMS:  GENERAL:  He denies any fevers or chills. HEAD:  No headaches. NOSE:  No rhinorrhea. RESPIRATORY:  He has had a dry cough for the last several weeks. GI:  No nausea, vomiting or diarrhea. :  No dysuria. ENDOCRINE:  No temperature intolerance. MUSCULOSKELETAL:  No myalgias or arthralgias. SKIN:  No rashes. EYES:  No visual changes. NEUROLOGIC:  No CVA symptoms. MEDICATIONS:  Include aspirin 81 mg a day, Lipitor 80 mg a day, Coreg 3.125 mg b.i.d., fenofibrate 106 mg a day, Glucotrol XL 5 mg a day, insulin as directed, Imdur 30 mg a day, lisinopril 20 mg a day, Protonix 40 mg a day and Effient 10 mg a day. PHYSICAL EXAMINATION:  VITAL SIGNS:  Blood pressure 163/114, heart rate 83. GENERAL:  Well-developed, well-nourished male in no acute distress. Alert and oriented x3 with appropriate mood and affect. HEENT:  Sclerae clear. CARDIOVASCULAR:  S1 and S2 regular. LUNGS:  Clear.   ABDOMEN: Soft.  EXTREMITIES:  No edema. SKIN:  Warm and dry. NECK:  Supple. Trachea midline. LABORATORY DATA:  EKG is pending at the time of dictation. Pertinent labs, troponin is negative. White count is 9.5, hemoglobin is 14.4, platelets are 946. Potassium 4.9, creatinine 1.22. Troponin 0.02.    ASSESSMENT:  A 54year-old gentleman with unstable angina. We will admit to treat with nitroglycerin paste, hydrate overnight and plan for cardiac catheterization tomorrow. Risks and benefits of the procedure were explained to the patient, agrees to proceed.         MD YARELY White/[id number]  D:  03/25/2020 17:11  T:  03/25/2020 18:59  JOB #:  [id number]

## 2020-03-25 NOTE — ED PROVIDER NOTES
Patient is a 61-year-old male presenting to the emerge department a complaining of chest pressure and shortness of breath which started about 5 days ago and has been persistent since onset. Activity makes his symptoms worse. Patient states \"it is like 1 of my grandkids is sitting on my chest.\"  He has history of 3 previous heart attacks with 7 stents. Most recent cardiac catheterization was in 2019 with stent placement at [facility]. Patient denies any productive cough or fevers. The patient has not had any recent sick contacts. He is not working and on disability secondary to his multiple medical comorbidities. The patient states his diabetes has been poorly controlled recently although he is on glipizide and a long and short acting insulin.            Past Medical History:   Diagnosis Date    Acute coronary syndrome ([facility] 12/15/2016    Acute pancreatitis 2/15/2018    Arthritis     psoriatic    Diabetes ([facility]     Endocrine disease     Hypertension     Psoriatic arthropathy (HCC)     Seizures (HCC)        Past Surgical History:   Procedure Laterality Date    CARDIAC SURG PROCEDURE UNLIST      HX APPENDECTOMY      HX CHOLECYSTECTOMY      HX HERNIA REPAIR      HX ORTHOPAEDIC      left heel secondary to trauma         Family History:   Problem Relation Age of Onset    Cancer Mother     Thyroid Disease Mother     Heart Disease Mother     Diabetes Father     Heart Disease Father        Social History     Socioeconomic History    Marital status:      Spouse name: Not on file    Number of children: Not on file    Years of education: Not on file    Highest education level: Not on file   Occupational History    Not on file   Social Needs    Financial resource strain: Not on file    Food insecurity     Worry: Not on file     Inability: Not on file    Transportation needs     Medical: Not on file     Non-medical: Not on file   Tobacco Use    Smoking status: Former Smoker Packs/day: 0.25     Years: 30.00     Pack years: 7.50     Last attempt to quit: 2019     Years since quittin.1    Smokeless tobacco: Former User   Substance and Sexual Activity    Alcohol use: No    Drug use: No    Sexual activity: Not Currently     Partners: Female   Lifestyle    Physical activity     Days per week: Not on file     Minutes per session: Not on file    Stress: Not on file   Relationships    Social connections     Talks on phone: Not on file     Gets together: Not on file     Attends Jew service: Not on file     Active member of club or organization: Not on file     Attends meetings of clubs or organizations: Not on file     Relationship status: Not on file    Intimate partner violence     Fear of current or ex partner: Not on file     Emotionally abused: Not on file     Physically abused: Not on file     Forced sexual activity: Not on file   Other Topics Concern     Service No    Blood Transfusions No    Caffeine Concern No    Occupational Exposure No    Hobby Hazards No    Sleep Concern No    Stress Concern Yes    Weight Concern Yes    Special Diet No    Back Care No    Exercise No    Bike Helmet No    Seat Belt Yes    Self-Exams No   Social History Narrative    Not on file         ALLERGIES: Morphine    Review of Systems   Respiratory: Positive for chest tightness and shortness of breath. Cardiovascular: Positive for chest pain. All other systems reviewed and are negative. Vitals:    20 1245   BP: (!) 163/114   Pulse: 83   Resp: 20   Temp: 98.1 °F (36.7 °C)   SpO2: 97%   Weight: 125.2 kg (276 lb)   Height: 5' 9\" (1.753 m)            Physical Exam     GENERAL:The patient is obese, and well-hydrated. No acute distress  VITAL SIGNS: Heart rate, blood pressure, respiratory rate reviewed as recorded in  nurse's notes  EYES: Pupils reactive. Extraocular motion intact. No conjunctival redness or drainage. NECK: Supple, no meningeal signs. Trachea midline. No masses or thyromegaly. LUNGS: Breath sounds clear and equal bilaterally no accessory muscle use  CHEST: No deformity or crepitus. CARDIOVASCULAR: Regular rate and rhythm. No gallops or rubs appreciated. ABDOMEN: Soft without tenderness. No palpable masses or organomegaly. No  peritoneal signs. No rigidity. EXTREMITIES: No clubbing or cyanosis. No joint swelling. Normal muscle tone. No  restricted range of motion appreciated. NEUROLOGIC: Sensation is grossly intact. Cranial nerve exam reveals face is  symmetrical, tongue is midline speech is clear. SKIN: No rash or petechiae. Good skin turgor palpated. PSYCHIATRIC: Alert and oriented. Appropriate behavior and judgment. MDM  Number of Diagnoses or Management Options  Diagnosis management comments: CHF, COPD, pneumonia, PE,    MI, coronary artery disease, unstable angina, coronary artery disease,    Atrial fibrillation, cardiac arrhythmia, PVC, medication induced palpitations, heart block,  electrolyte induced palpitations,    Aortic dissection, aortic aneurysm,    GERD, musculoskeletal pain, costochondritis, rib fracture, pleurisy,         Amount and/or Complexity of Data Reviewed  Clinical lab tests: ordered and reviewed  Tests in the radiology section of CPT®: reviewed and ordered  Tests in the medicine section of CPT®: reviewed and ordered  Review and summarize past medical records: yes  Independent visualization of images, tracings, or specimens: yes      ED Course as of Mar 25 1409   Wed Mar 25, 2020   1332 IMPRESSION: No acute findings in the chest   XR CHEST PA LAT [KH]   1357 Case was discussed with Dr. Rober Perry who requested to be transferred to [facility] and they will see him there and take a look at his coronaries    [KH]   26 I talked to the patient about the findings in the emergency department he is agreeable to being transferred to the other hospital for evaluation by cardiology and probable cardiac catheterization. [KH]      ED Course User Index  [KH] Syliva Kocher, DO       Procedures

## 2020-03-25 NOTE — ROUTINE PROCESS
TRANSFER - IN REPORT: 
 
Verbal report received from Mika on Hernan Fish being received from [facility] for routine progression of care. Report consisted of patients Situation, Background, Assessment and Recommendations(SBAR). Information from the following report(s) SBAR, Kardex, [facility] and Cardiac Rhythm NSR was reviewed. Opportunity for questions and clarification was provided. Assessment completed upon patients arrival to unit and care assumed. Patient received to room 320. Patient connected to monitor and assessment completed. Plan of care reviewed. Patient oriented to room and call light. Patient aware to use call light to communicate any chest pain or needs. Admission skin assessment completed with second RN and reveals the following: Patient's sacrum and heels intact. Healed scar on L. Heel. L. Elbow with scabs, patient states he has psoriasis.

## 2020-03-26 VITALS
DIASTOLIC BLOOD PRESSURE: 93 MMHG | BODY MASS INDEX: 41.23 KG/M2 | SYSTOLIC BLOOD PRESSURE: 138 MMHG | WEIGHT: 278.4 LBS | HEART RATE: 89 BPM | HEIGHT: 69 IN | OXYGEN SATURATION: 95 % | TEMPERATURE: 97.9 F | RESPIRATION RATE: 16 BRPM

## 2020-03-26 LAB
ANION GAP SERPL CALC-SCNC: 5 MMOL/L (ref 7–16)
BUN SERPL-MCNC: 18 MG/DL (ref 6–23)
CALCIUM SERPL-MCNC: 8.3 MG/DL (ref 8.3–10.4)
CHLORIDE SERPL-SCNC: 104 MMOL/L (ref 98–107)
CHOLEST SERPL-MCNC: 148 MG/DL
CO2 SERPL-SCNC: 26 MMOL/L (ref 21–32)
CREAT SERPL-MCNC: 1 MG/DL (ref 0.8–1.5)
ERYTHROCYTE [DISTWIDTH] IN BLOOD BY AUTOMATED COUNT: 13.1 % (ref 11.9–14.6)
GLUCOSE BLD STRIP.AUTO-MCNC: 242 MG/DL (ref 65–100)
GLUCOSE BLD STRIP.AUTO-MCNC: 257 MG/DL (ref 65–100)
GLUCOSE SERPL-MCNC: 275 MG/DL (ref 65–100)
HCT VFR BLD AUTO: 39.7 % (ref 41.1–50.3)
HDLC SERPL-MCNC: 26 MG/DL (ref 40–60)
HDLC SERPL: 5.7 {RATIO}
HGB BLD-MCNC: 13.5 G/DL (ref 13.6–17.2)
LDLC SERPL CALC-MCNC: ABNORMAL MG/DL
LDLC SERPL DIRECT ASSAY-MCNC: 73 MG/DL
LIPID PROFILE,FLP: ABNORMAL
MCH RBC QN AUTO: 30.8 PG (ref 26.1–32.9)
MCHC RBC AUTO-ENTMCNC: 34 G/DL (ref 31.4–35)
MCV RBC AUTO: 90.6 FL (ref 79.6–97.8)
NRBC # BLD: 0 K/UL (ref 0–0.2)
PLATELET # BLD AUTO: 259 K/UL (ref 150–450)
PMV BLD AUTO: 10.9 FL (ref 9.4–12.3)
POTASSIUM SERPL-SCNC: 3.9 MMOL/L (ref 3.5–5.1)
RBC # BLD AUTO: 4.38 M/UL (ref 4.23–5.6)
SODIUM SERPL-SCNC: 135 MMOL/L (ref 136–145)
T4 FREE SERPL-MCNC: 1.1 NG/DL (ref 0.9–1.8)
TRIGL SERPL-MCNC: 494 MG/DL (ref 35–150)
TSH SERPL DL<=0.005 MIU/L-ACNC: 2.92 UIU/ML (ref 0.36–3.74)
VLDLC SERPL CALC-MCNC: 98.8 MG/DL (ref 6–23)
WBC # BLD AUTO: 9.1 K/UL (ref 4.3–11.1)

## 2020-03-26 PROCEDURE — 74011250637 HC RX REV CODE- 250/637: Performed by: INTERNAL MEDICINE

## 2020-03-26 PROCEDURE — 74011250637 HC RX REV CODE- 250/637: Performed by: NURSE PRACTITIONER

## 2020-03-26 PROCEDURE — 99218 HC RM OBSERVATION: CPT

## 2020-03-26 PROCEDURE — 77030016699 HC CATH ANGI DX INFN1 CARD -A

## 2020-03-26 PROCEDURE — 99153 MOD SED SAME PHYS/QHP EA: CPT

## 2020-03-26 PROCEDURE — 84443 ASSAY THYROID STIM HORMONE: CPT

## 2020-03-26 PROCEDURE — C1894 INTRO/SHEATH, NON-LASER: HCPCS

## 2020-03-26 PROCEDURE — 83721 ASSAY OF BLOOD LIPOPROTEIN: CPT

## 2020-03-26 PROCEDURE — 36415 COLL VENOUS BLD VENIPUNCTURE: CPT

## 2020-03-26 PROCEDURE — 74011636637 HC RX REV CODE- 636/637: Performed by: NURSE PRACTITIONER

## 2020-03-26 PROCEDURE — 77030015766

## 2020-03-26 PROCEDURE — 84439 ASSAY OF FREE THYROXINE: CPT

## 2020-03-26 PROCEDURE — C1769 GUIDE WIRE: HCPCS

## 2020-03-26 PROCEDURE — 74011636320 HC RX REV CODE- 636/320: Performed by: INTERNAL MEDICINE

## 2020-03-26 PROCEDURE — C1887 CATHETER, GUIDING: HCPCS

## 2020-03-26 PROCEDURE — 93458 L HRT ARTERY/VENTRICLE ANGIO: CPT

## 2020-03-26 PROCEDURE — 82962 GLUCOSE BLOOD TEST: CPT

## 2020-03-26 PROCEDURE — 82306 VITAMIN D 25 HYDROXY: CPT

## 2020-03-26 PROCEDURE — 99152 MOD SED SAME PHYS/QHP 5/>YRS: CPT

## 2020-03-26 PROCEDURE — 80048 BASIC METABOLIC PNL TOTAL CA: CPT

## 2020-03-26 PROCEDURE — 74011250636 HC RX REV CODE- 250/636: Performed by: NURSE PRACTITIONER

## 2020-03-26 PROCEDURE — 74011000250 HC RX REV CODE- 250: Performed by: INTERNAL MEDICINE

## 2020-03-26 PROCEDURE — 80061 LIPID PANEL: CPT

## 2020-03-26 PROCEDURE — 74011000258 HC RX REV CODE- 258: Performed by: INTERNAL MEDICINE

## 2020-03-26 PROCEDURE — 93571 IV DOP VEL&/PRESS C FLO 1ST: CPT

## 2020-03-26 PROCEDURE — 85027 COMPLETE CBC AUTOMATED: CPT

## 2020-03-26 PROCEDURE — 77030029997 HC DEV COM RDL R BND TELE -B

## 2020-03-26 PROCEDURE — 74011250636 HC RX REV CODE- 250/636: Performed by: INTERNAL MEDICINE

## 2020-03-26 RX ORDER — CARVEDILOL 6.25 MG/1
6.25 TABLET ORAL 2 TIMES DAILY WITH MEALS
Status: DISCONTINUED | OUTPATIENT
Start: 2020-03-26 | End: 2020-03-26 | Stop reason: HOSPADM

## 2020-03-26 RX ORDER — CARVEDILOL 6.25 MG/1
6.25 TABLET ORAL 2 TIMES DAILY WITH MEALS
Qty: 60 TAB | Refills: 5 | Status: SHIPPED | OUTPATIENT
Start: 2020-03-26 | End: 2020-12-17 | Stop reason: SDUPTHER

## 2020-03-26 RX ORDER — HEPARIN SODIUM 200 [USP'U]/100ML
3 INJECTION, SOLUTION INTRAVENOUS CONTINUOUS
Status: DISCONTINUED | OUTPATIENT
Start: 2020-03-26 | End: 2020-03-26 | Stop reason: HOSPADM

## 2020-03-26 RX ORDER — ISOSORBIDE MONONITRATE 30 MG/1
30 TABLET, EXTENDED RELEASE ORAL DAILY
Status: DISCONTINUED | OUTPATIENT
Start: 2020-03-26 | End: 2020-03-26 | Stop reason: HOSPADM

## 2020-03-26 RX ORDER — MIDAZOLAM HYDROCHLORIDE 1 MG/ML
.5-2 INJECTION, SOLUTION INTRAMUSCULAR; INTRAVENOUS
Status: DISCONTINUED | OUTPATIENT
Start: 2020-03-26 | End: 2020-03-26 | Stop reason: HOSPADM

## 2020-03-26 RX ORDER — LIDOCAINE HYDROCHLORIDE 10 MG/ML
1-30 INJECTION, SOLUTION EPIDURAL; INFILTRATION; INTRACAUDAL; PERINEURAL ONCE
Status: COMPLETED | OUTPATIENT
Start: 2020-03-26 | End: 2020-03-26

## 2020-03-26 RX ADMIN — NITROGLYCERIN 0.4 MG: 0.4 TABLET SUBLINGUAL at 04:04

## 2020-03-26 RX ADMIN — ACETAMINOPHEN 650 MG: 325 TABLET, FILM COATED ORAL at 14:53

## 2020-03-26 RX ADMIN — PRASUGREL 10 MG: 10 TABLET, FILM COATED ORAL at 09:00

## 2020-03-26 RX ADMIN — BUPROPION HYDROCHLORIDE 100 MG: 100 TABLET, FILM COATED ORAL at 08:54

## 2020-03-26 RX ADMIN — FENOFIBRATE 160 MG: 160 TABLET ORAL at 08:54

## 2020-03-26 RX ADMIN — LIDOCAINE HYDROCHLORIDE 3 ML: 10 INJECTION, SOLUTION EPIDURAL; INFILTRATION; INTRACAUDAL; PERINEURAL at 09:26

## 2020-03-26 RX ADMIN — BIVALIRUDIN 1.75 MG/KG/HR: 250 INJECTION INTRACAVERNOUS at 09:43

## 2020-03-26 RX ADMIN — HEPARIN SODIUM 3 UNITS/HR: 200 INJECTION, SOLUTION INTRAVENOUS at 09:17

## 2020-03-26 RX ADMIN — Medication 1 AMPULE: at 08:54

## 2020-03-26 RX ADMIN — NITROGLYCERIN 0.5 INCH: 20 OINTMENT TOPICAL at 04:15

## 2020-03-26 RX ADMIN — CARVEDILOL 6.25 MG: 6.25 TABLET, FILM COATED ORAL at 08:54

## 2020-03-26 RX ADMIN — LISINOPRIL 20 MG: 20 TABLET ORAL at 08:54

## 2020-03-26 RX ADMIN — IOPAMIDOL 130 ML: 755 INJECTION, SOLUTION INTRAVENOUS at 09:55

## 2020-03-26 RX ADMIN — NITROGLYCERIN 0.4 MG: 0.4 TABLET SUBLINGUAL at 04:09

## 2020-03-26 RX ADMIN — INSULIN LISPRO 6 UNITS: 100 INJECTION, SOLUTION INTRAVENOUS; SUBCUTANEOUS at 11:52

## 2020-03-26 RX ADMIN — SODIUM CHLORIDE 50 ML/HR: 900 INJECTION, SOLUTION INTRAVENOUS at 00:28

## 2020-03-26 RX ADMIN — ISOSORBIDE MONONITRATE 30 MG: 30 TABLET, EXTENDED RELEASE ORAL at 14:52

## 2020-03-26 RX ADMIN — Medication 10 ML: at 13:34

## 2020-03-26 RX ADMIN — NITROGLYCERIN 0.2 MG: 200 INJECTION, SOLUTION INTRAVENOUS at 09:48

## 2020-03-26 RX ADMIN — VERAPAMIL HYDROCHLORIDE 2 ML: 5 INJECTION INTRAVENOUS at 09:32

## 2020-03-26 RX ADMIN — ASPIRIN 81 MG: 81 TABLET ORAL at 08:50

## 2020-03-26 RX ADMIN — MIDAZOLAM 2 MG: 1 INJECTION INTRAMUSCULAR; INTRAVENOUS at 09:21

## 2020-03-26 RX ADMIN — PANTOPRAZOLE SODIUM 40 MG: 40 TABLET, DELAYED RELEASE ORAL at 08:50

## 2020-03-26 RX ADMIN — ACETAMINOPHEN 650 MG: 325 TABLET, FILM COATED ORAL at 04:15

## 2020-03-26 NOTE — PROCEDURES
Cardiac Catheterization Procedure Note    Patient ID:     Name: Yudi Harding   Medical Record Number: [id number]   YOB: 1970    Date of Procedure: 3/26/2020     Pre-procedure Diagnosis:  Atypical Angina    Post-procedure Diagnosis: Non-cardiac Chest Pain    Reason for Procedure: Suspected CAD    Blood loss less than 5 ml    Sedation. Pt received 2 mg versed and 0 mcg fentanyl for monitored conscious sedation from 930to 1000. Nurse henry    Specimen: None    No complications    No assistants    Time out, Mallampati, and ASA performed    Procedure:  After informed consent, patient was prepped and draped in the usual sterile fashion. radial approach was used. 130cc Visipaque contrast were utilized for the entire procedure. no closure device used        FINDINGS    Left Ventricle: 65  LVEDP: 15    Left Main:ok    Left Anterior descending coronary artery: mid lad 30-70. iFR 0.98       Left Circumflex coronary artery: ok        Right coronary artery: ok            Graft anatomy: na    Intervention if done: iFR of lad 0.98    Conclusions: stable cad    Recommentations: med tx    No complications    Family and or significant other were sought out and discussion of the procedure and findings took place. Procedure and findings including pertinent sequele were discussed with the patient immediately post procedure. Opportunity to ask questions was offered. If no one was available in the post procedure waiting area, I can be reached thru paging system or at [phone].           Signed By: Madeleine Fuentes MD

## 2020-03-26 NOTE — DISCHARGE SUMMARY
[facility] Cardiology Discharge Summary     Patient ID:  Solomon Rocha  [id number]  34 y.o.  1970    Admit date: 3/25/2020    Discharge date:  03/26/20     Admitting Physician: Scar Calhoun MD     Discharge Physician: Rik Lin NP/Dr. Dena Bueno    Admission Diagnoses: Chest pain [R07.9]    Discharge Diagnoses:   Patient Active Problem List    Diagnosis Date Noted    Chills 03/25/2020    Bronchitis 03/25/2020    Chest pain 03/25/2020    Coronary artery disease involving native heart with angina pectoris ([facility] 03/25/2020    Nausea 03/25/2020    Seizure disorder ([facility] 03/09/2020    Sinusitis 03/09/2020    Chronic middle ear effusion, bilateral 03/09/2020    Earache 03/09/2020    Pharyngitis 03/09/2020    TBI (traumatic brain injury) ([facility] 02/12/2020    Psoriatic arthropathy ([facility] 01/24/2020    Acquired deformity of toe 01/24/2020    Anxiety 01/24/2020    Comprehensive diabetic foot examination, type 2 DM, encounter for ([facility] 01/24/2020    Noncompliance with medications 01/24/2020    BMI 35.0-35.9,adult 01/24/2020    Former smoker 05/08/2019    Depression 05/08/2019    Hx of heart artery stent 05/08/2019    Dietary counseling 05/08/2019    Unstable angina ([facility] 05/02/2019    STEMI (ST elevation myocardial infarction) ([facility] 02/26/2019    Infected sebaceous cyst of skin 01/09/2019    Medication refill 01/09/2019    Adhesive capsulitis of left shoulder 01/09/2019    Acute pain of left shoulder 01/09/2019    Osteoarthritis 12/10/2018    Hiatal hernia 12/10/2018    Sebaceous cyst 12/10/2018    Uncontrolled type 2 diabetes mellitus without complication, without long-term current use of insulin ([facility] 12/10/2018    Encounter for examination following treatment at hospital 12/10/2018    Mass in neck 11/30/2018    Excessive daytime sleepiness 11/29/2018    Snoring 11/28/2018    Suspected sleep apnea 11/28/2018    History of tobacco use 06/19/2018    Arthritis of left wrist 06/19/2018    Severe obesity (BMI 35.0-39. 9) with comorbidity ([facility] 03/19/2018    Encounter to discuss test results 03/19/2018    Encounter for medication review and counseling 03/19/2018    Hypertriglyceridemia 03/19/2018    Essential hypertension 03/02/2018    Hyperlipidemia 03/02/2018    Obesity, unspecified obesity severity, unspecified obesity type 03/02/2018    Anemia of chronic disease 03/02/2018    Type 2 diabetes with nephropathy ([facility] 03/01/2018    Pancreatitis 02/17/2018    High anion gap metabolic acidosis 85/47/0172    CAD (coronary artery disease) 02/15/2018    Leukocytosis 02/15/2018    DKA (diabetic ketoacidoses) ([facility] 02/15/2018    Acute pancreatitis 02/15/2018    Atherosclerosis of coronary artery 02/15/2018    Diabetic ketoacidosis without coma associated with type 2 diabetes mellitus ([facility] 02/15/2018    Diabetes mellitus type II, uncontrolled ([facility] 12/15/2016    Tobacco abuse 12/15/2016       Cardiology Procedures this admission:  Diagnostic left heart catheterization  Consults: None    Hospital Course: Patient with prior h/o coronary artery disease, multiple stent procedures who presents with a 1-week history of progressively worsening chest pain. Chest pain has been fairly constant although it has increasing episodes that are worse with exertion, better with rest, lasting 10-15 minutes at a time. Chest pain is retrosternal, nonradiating, associated with nausea and some shortness of breath. He was admitted to tele floor for planned LHC. Patient underwent cardiac catheterization by Dr. Gabbi Knight on 3/26/2020. Patient was found to have stable CAD (iFR of lad 0.98). It was felt his chest pain was non cardiac related. Patient tolerated the procedure well and was taken to the telemetry floor for recovery. The afternoon post procedure, the patient was  feeling well without any complaints of chest pain or shortness of breath.  Patient's right radial cath site was clean, dry and intact without hematoma or bruit. Patient's labs were WNL. Patient was seen and examined by Dr. Aaron Pastor and determined stable and ready for discharge. For maximized medical therapy for CAD, patient will continue ASA, effient, BB, ACE-I, and statin as well. The patient's BB was increased during hospital stay. The patient will follow up with [facility] [address] Dr. Jean-Pierre Burt on 4/28/2020 at 80 am in [place] office. DISPOSITION: The patient is being discharged home in stable condition on a low saturated fat, low cholesterol and low salt diet. The patient is instructed to advance activities as tolerated to the limit of fatigue or shortness of breath. The patient is instructed to avoid all heavy lifting for 5 days. The patient is instructed to watch the cath site for bleeding/oozing; if seen, the patient is instructed to apply firm pressure with a clean cloth and call [address] [place] [address] 121 Cardiology at [phone]. The patient is instructed to watch for signs of infection which include: increasing area of redness, fever/hot to touch or purulent drainage at the catheterization site. The patient is instructed not to soak in a bathtub for 7-10 days, but is cleared to shower. The patient is instructed to call the office or return to the ER for immediate evaluation for any shortness of breath or chest pain not relieved by NTG. Discharge Exam:   Visit Vitals  /77 (BP 1 Location: Left arm, BP Patient Position: At rest)   Pulse 87   Temp 97.9 °F (36.6 °C)   Resp 16   Ht 5' 9\" (1.753 m)   Wt 126.3 kg (278 lb 6.4 oz)   SpO2 95%   BMI 41.11 kg/m²     Patient has been seen by Dr. Aaron Pastor: see his progress note for exam details.     Recent Results (from the past 24 hour(s))   EKG, 12 LEAD, INITIAL    Collection Time: 03/25/20 12:47 PM   Result Value Ref Range    Ventricular Rate 78 BPM    Atrial Rate 78 BPM    P-R Interval 154 ms    QRS Duration 100 ms    Q-T Interval 366 ms    QTC Calculation (Bezet) 417 ms Calculated P Axis 59 degrees    Calculated R Axis 65 degrees    Calculated T Axis 53 degrees    Diagnosis       !! AGE AND GENDER SPECIFIC ECG ANALYSIS !! Normal sinus rhythm  Normal ECG  When compared with ECG of 28-NOV-2018 07:54,  Nonspecific T wave abnormality has replaced inverted T waves in Inferior   leads  Confirmed by Roya Varner MD (), MORIS DANIEL ([id number]) on 3/25/2020 1:27:46 PM     TROPONIN I    Collection Time: 03/25/20 12:56 PM   Result Value Ref Range    Troponin-I, Qt. <0.02 (L) 0.02 - 0.05 NG/ML   CBC WITH AUTOMATED DIFF    Collection Time: 03/25/20 12:56 PM   Result Value Ref Range    WBC 9.5 4.3 - 11.1 K/uL    RBC 4.77 4.23 - 5.6 M/uL    HGB 14.4 13.6 - 17.2 g/dL    HCT 43.1 41.1 - 50.3 %    MCV 90.4 79.6 - 97.8 FL    MCH 30.2 26.1 - 32.9 PG    MCHC 33.4 31.4 - 35.0 g/dL    RDW 13.2 11.9 - 14.6 %    PLATELET 852 934 - 718 K/uL    MPV 10.9 9.4 - 12.3 FL    ABSOLUTE NRBC 0.00 0.0 - 0.2 K/uL    DF AUTOMATED      NEUTROPHILS 58 43 - 78 %    LYMPHOCYTES 28 13 - 44 %    MONOCYTES 9 4.0 - 12.0 %    EOSINOPHILS 5 0.5 - 7.8 %    BASOPHILS 1 0.0 - 2.0 %    IMMATURE GRANULOCYTES 0 0.0 - 5.0 %    ABS. NEUTROPHILS 5.5 1.7 - 8.2 K/UL    ABS. LYMPHOCYTES 2.6 0.5 - 4.6 K/UL    ABS. MONOCYTES 0.8 0.1 - 1.3 K/UL    ABS. EOSINOPHILS 0.4 0.0 - 0.8 K/UL    ABS. BASOPHILS 0.1 0.0 - 0.2 K/UL    ABS. IMM.  GRANS. 0.0 0.0 - 0.5 K/UL   METABOLIC PANEL, BASIC    Collection Time: 03/25/20 12:56 PM   Result Value Ref Range    Sodium 132 (L) 136 - 145 mmol/L    Potassium 4.9 3.5 - 5.1 mmol/L    Chloride 100 98 - 107 mmol/L    CO2 25 21 - 32 mmol/L    Anion gap 7 7 - 16 mmol/L    Glucose 268 (H) 65 - 100 mg/dL    BUN 14 6 - 23 MG/DL    Creatinine 1.22 0.8 - 1.5 MG/DL    GFR est AA >60 >60 ml/min/1.73m2    GFR est non-AA >60 >60 ml/min/1.73m2    Calcium 8.8 8.3 - 10.4 MG/DL   GLUCOSE, POC    Collection Time: 03/25/20  4:38 PM   Result Value Ref Range    Glucose (POC) 230 (H) 65 - 100 mg/dL   CBC W/O DIFF    Collection Time: 03/25/20 5:04 PM   Result Value Ref Range    WBC 10.4 4.3 - 11.1 K/uL    RBC 4.71 4.23 - 5.6 M/uL    HGB 14.4 13.6 - 17.2 g/dL    HCT 42.7 41.1 - 50.3 %    MCV 90.7 79.6 - 97.8 FL    MCH 30.6 26.1 - 32.9 PG    MCHC 33.7 31.4 - 35.0 g/dL    RDW 13.1 11.9 - 14.6 %    PLATELET 524 905 - 616 K/uL    MPV 10.7 9.4 - 12.3 FL    ABSOLUTE NRBC 0.00 0.0 - 0.2 K/uL   TROPONIN I    Collection Time: 03/25/20  5:04 PM   Result Value Ref Range    Troponin-I, Qt. <0.02 (L) 0.02 - 0.05 NG/ML   GLUCOSE, POC    Collection Time: 03/25/20  9:19 PM   Result Value Ref Range    Glucose (POC) 279 (H) 65 - 100 mg/dL   TSH 3RD GENERATION    Collection Time: 03/26/20  3:35 AM   Result Value Ref Range    TSH 2.920 0.358 - 3.740 uIU/mL   T4, FREE    Collection Time: 03/26/20  3:35 AM   Result Value Ref Range    T4, Free 1.1 0.9 - 1.8 NG/DL   METABOLIC PANEL, BASIC    Collection Time: 03/26/20  3:40 AM   Result Value Ref Range    Sodium 135 (L) 136 - 145 mmol/L    Potassium 3.9 3.5 - 5.1 mmol/L    Chloride 104 98 - 107 mmol/L    CO2 26 21 - 32 mmol/L    Anion gap 5 (L) 7 - 16 mmol/L    Glucose 275 (H) 65 - 100 mg/dL    BUN 18 6 - 23 MG/DL    Creatinine 1.00 0.8 - 1.5 MG/DL    GFR est AA >60 >60 ml/min/1.73m2    GFR est non-AA >60 >60 ml/min/1.73m2    Calcium 8.3 8.3 - 10.4 MG/DL   CBC W/O DIFF    Collection Time: 03/26/20  3:40 AM   Result Value Ref Range    WBC 9.1 4.3 - 11.1 K/uL    RBC 4.38 4.23 - 5.6 M/uL    HGB 13.5 (L) 13.6 - 17.2 g/dL    HCT 39.7 (L) 41.1 - 50.3 %    MCV 90.6 79.6 - 97.8 FL    MCH 30.8 26.1 - 32.9 PG    MCHC 34.0 31.4 - 35.0 g/dL    RDW 13.1 11.9 - 14.6 %    PLATELET 283 000 - 495 K/uL    MPV 10.9 9.4 - 12.3 FL    ABSOLUTE NRBC 0.00 0.0 - 0.2 K/uL   LIPID PANEL    Collection Time: 03/26/20  3:40 AM   Result Value Ref Range    LIPID PROFILE          Cholesterol, total 148 <200 MG/DL    Triglyceride 494 (H) 35 - 150 MG/DL    HDL Cholesterol 26 (L) 40 - 60 MG/DL    LDL, calculated Not calculated due to elevated triglyceride level <100 MG/DL    VLDL, calculated 98.8 (H) 6.0 - 23.0 MG/DL    CHOL/HDL Ratio 5.7     LDL, DIRECT    Collection Time: 03/26/20  3:40 AM   Result Value Ref Range    LDL,Direct 73 <100 mg/dl   GLUCOSE, POC    Collection Time: 03/26/20  6:05 AM   Result Value Ref Range    Glucose (POC) 242 (H) 65 - 100 mg/dL         Patient Instructions:   Current Discharge Medication List      CONTINUE these medications which have CHANGED    Details   carvediloL (COREG) 6.25 mg tablet Take 1 Tab by mouth two (2) times daily (with meals). Qty: 60 Tab, Refills: 5         CONTINUE these medications which have NOT CHANGED    Details   glipiZIDE SR (GLUCOTROL XL) 5 mg CR tablet Take 1 Tab by mouth daily. Qty: 90 Tab, Refills: 0    Associated Diagnoses: Uncontrolled type 2 diabetes mellitus without complication, without long-term current use of insulin ([facility])      pantoprazole (PROTONIX) 40 mg tablet Take 1 Tab by mouth daily. Qty: 90 Tab, Refills: 0      lisinopril (PRINIVIL, ZESTRIL) 20 mg tablet Take 1 Tab by mouth daily. Qty: 90 Tab, Refills: 0      fenofibrate (LOFIBRA) 160 mg tablet Take 1 Tab by mouth daily. Qty: 90 Tab, Refills: 0    Associated Diagnoses: BMI 35.0-35.9,adult; Uncontrolled type 2 diabetes mellitus without complication, without long-term current use of insulin ([address] Medication refill      insulin lispro (ADMELOG SOLOSTAR U-100 INSULIN) 100 unit/mL kwikpen Based on sliding scale --15 units tid As needed max---45 units daily approximately  Qty: 10 Pen, Refills: 1    Associated Diagnoses: Uncontrolled type 2 diabetes mellitus without complication, without long-term current use of insulin ([facility])      atorvastatin (LIPITOR) 80 mg tablet Take 1 Tab by mouth nightly for 360 days. Qty: 90 Tab, Refills: 0    Associated Diagnoses: Uncontrolled type 2 diabetes mellitus without complication, without long-term current use of insulin ([facility])      buPROPion (WELLBUTRIN) 100 mg tablet Take 1 Tab by mouth two (2) times a day.   Qty: 60 Tab, Refills: 1    Associated Diagnoses: Anxiety; Depression, unspecified depression type      Insulin Needles, Disposable, 31 gauge x 5/16\" ndle by SubCUTAneous route four (4) times daily. Qty: 1 Package, Refills: 11    Associated Diagnoses: Uncontrolled type 2 diabetes mellitus without complication, without long-term current use of insulin ([facility])      omega-3 fatty acids (FISH OIL CONCENTRATE) 1,000 mg cap Take 1,000 mg by mouth two (2) times a day. Qty: 180 Cap, Refills: 0    Associated Diagnoses: Hypertriglyceridemia      insulin glargine (BASAGLAR KWIKPEN U-100 INSULIN) 100 unit/mL (3 mL) inpn 50 Units by SubCUTAneous route daily for 90 days. Dose increased to 50 units on 1/24/20  Qty: 15 Pen, Refills: 1    Associated Diagnoses: Uncontrolled type 2 diabetes mellitus without complication, without long-term current use of insulin ([facility])      Blood-Glucose Meter monitoring kit Check blood sugar tid DM -2  Qty: 1 Kit, Refills: 0    Associated Diagnoses: Uncontrolled type 2 diabetes mellitus without complication, without long-term current use of insulin ([address]      ! ! glucose blood VI test strips (BLOOD GLUCOSE TEST) strip Check blood sugar tid  Qty: 100 Strip, Refills: 11    Associated Diagnoses: Uncontrolled type 2 diabetes mellitus without complication, without long-term current use of insulin ([address]      ! ! lancets misc Check blood sugar tid  Qty: 100 Each, Refills: 11    Associated Diagnoses: Uncontrolled type 2 diabetes mellitus without complication, without long-term current use of insulin ([facility])      nitroglycerin (NITROSTAT) 0.4 mg SL tablet 1 Tab by SubLINGual route as needed for Chest Pain. And call EMS ,May repeat every five min for up to 3 doses AS NEEDED  Qty: 1 Bottle, Refills: 0    Associated Diagnoses: Coronary artery disease due to lipid rich plaque; Hx of heart artery stent      isosorbide mononitrate ER (IMDUR) 30 mg tablet Take 30 mg by mouth.       aspirin delayed-release 81 mg tablet Take 81 mg by mouth.      prasugrel (EFFIENT) 10 mg tablet Take 10 mg by mouth. !! glucose blood VI test strips (BLOOD GLUCOSE TEST) strip Check blood sugar tid , accucheck shira  Qty: 100 Strip, Refills: 11    Associated Diagnoses: BMI 35.0-35.9,adult; Acute pain of left shoulder; Adhesive capsulitis of left shoulder; Uncontrolled type 2 diabetes mellitus without complication, without long-term current use of insulin ([address] Medication refill      !! lancets misc Check blood sugar tid  Qty: 1 Each, Refills: 11    Associated Diagnoses: BMI 35.0-35.9,adult; Acute pain of left shoulder; Adhesive capsulitis of left shoulder; Uncontrolled type 2 diabetes mellitus without complication, without long-term current use of insulin ([address] Medication refill      Insulin Needles, Disposable, 30 gauge x 1/3\" by SubCUTAneous route Before breakfast, lunch, dinner and at bedtime. For Sonia Naveed, check CBG once daily, E11.9, one month supply  Qty: 1 Package, Refills: 11      !! glucose blood VI test strips (ASCENSIA AUTODISC VI, ONE TOUCH ULTRA TEST VI) strip Check CBG once daily, patient uses Sonia Naveed, one month supply, E11.9, pharmacy to substitute as appropriate  Qty: 30 Strip, Refills: 2       !! - Potential duplicate medications found. Please discuss with provider.             Signed:  JAYASHREE Gonzalez  3/26/2020  10:27 AM

## 2020-03-26 NOTE — PROGRESS NOTES
Problem: Falls - Risk of  Goal: *Absence of Falls  Description: Document Krista Valdivia Fall Risk and appropriate interventions in the flowsheet.   Outcome: Progressing Towards Goal  Note: Fall Risk Interventions:            Medication Interventions: Evaluate medications/consider consulting pharmacy, Patient to call before getting OOB, Teach patient to arise slowly

## 2020-03-26 NOTE — DIABETES MGMT
Patient admitted with CAD. Admitting blood glucose 242. HbA1c in  (eAG 298). Blood glucose ranged 230-279 yesterday with patient receiving Lantus 50 units, Humalog 10 units, and Glipizide 5mg. Blood glucose this morning was 242. Creatinine 1.00. GFR >60. Noted patient was NPO today for cath. Most recent FSBS post cath 257. Patient to discharge today. Reviewed these numbers and their significance with patient. Patient seen for assessment regarding diabetes management. Patient has a past medical history of CAD, stents, HTN, HLD, DM, hypertriglyceridemia, former smoker, DKA, pancreatitis. Patient states they were diagnosed 10 years ago with diabetes and voices positive family history of diabetes, stating \"my father  from heart disease caused by diabetes and my sister has had multiple toes amputated. \" Patient states they do have a working glucometer with supplies at home. Per patient they typically check blood glucose levels 2 times a day. Per patient their blood glucose levels have been running \"200-230s\" at home. Patient states they are currently taking Glipizide, Admelog 20-40 units with meals, Basaglar 55 units nightly at home for management of diabetes. Patient has attended formal diabetes education in the past. Patient reports no difficulty with affording their diabetic supplies, but states \"a lot of times by pharmacist has to call to get my insulin approved through my insurance. \" Patient uses insulin pens. Patient given educational material, \"Diabetes Self-Management: A Patient Teaching Guide\", which was reviewed with patient. Explained basic physiology of diabetes, as well as causes, signs and symptoms, and treatments for hypoglycemia and hyperglycemia. Described the effects of poor glycemic control and the development of long-term complications such as renal, eye, nerve, and cardiovascular disease. Described proper diabetic foot care and the importance of checking feet daily.  Patient has numbness and tingling in feet. Per patient they typically drink water and occasionally a diet soda patient denies drinking sweetened or alcoholic beverages. Reviewed effects of sweetened beverages on glycemic control. Per patient they typically eat 3 meals a day, for breakfast whole wheat toast and sausage and for lunch and dinner typically chicken and vegetables sometimes pasta. Educated re: effects of carbohydrates on blood glucose, the \"plate method\" of healthy meal planning, basics of healthy meal plan, Consistent Carbohydrate Diet, discussed the basics of carb counting and how to read a nutrition label. Educated patient regarding the benefits of physical activity (as cleared by provider) on glycemic control. Patient states he used to walk and ride a bike but is unable to do that now due to health problems. Patient states he is seeing his rheumatologist and the end of this month. Encouraged patient to discuss physical activity options with his providers. Also explained the relationship between hyperglycemia and infection and delayed healing. Also discussed the benefits of weight loss on glycemic control. Discussed target goals for blood glucose and A1C. Educated patient regarding diabetic medications including mechanism of action, timing, and possible side effects. Patient verbalizes understanding of teaching. Explained the importance of blood glucose monitoring to patient and how this will provide his primary care provider with more information to help them safely titrate their regimen. Recommended frequency of ACHS and to record in log book to take to primary care provider appointment. Also encouraged patient to call PCP for further titration of regimen when blood glucose levels remain out of target goals rather than waiting months or weeks for an appointment. Pt verbalized understanding. Patient correctly verbalizes proper insulin storage and site rotation. Patient admits to re-using needles at times. Reviewed the importance of changing needle with each insulin administration. Patient verbalized understanding. Patient would likely benefit from continued diabetes outpatient education when available. Patient provided with contact information to [employer] program to pursue at their convenience after discharge with their primary care provider when classes are reopened. Also provided patient with ADA patient web site. Educated patient regarding current home basal/bolus regimen of Basaglar 55 units nightly and Admelog 20-40 units with meals and SSI including type of insulin, timing with meals, onset, duration of effect, and peak of insulin dose. Educated patient on the differences between prandial insulin and sliding scale insulin. Instructed patient to always seek guidance from their primary care provider about adjusting their insulin doses and not to adjust them on their own as this could negatively impact their glycemic control or result in hypoglycemia. Patient verbalizes understanding. Encouraged compliance with discharge regimen. Encouraged patient to continue to work on lifestyle modifications and to follow up with primary care provider for further titration of regimen. Patient verbalized understanding and voices no further questions regarding diabetes management.

## 2020-03-26 NOTE — PROGRESS NOTES
Spoke with Blanca Mccauley NP and orders received to order patient's home dose of 30 mg imdur daily starting now.

## 2020-03-26 NOTE — DISCHARGE INSTRUCTIONS
Patient Education        Angiogram: What to Expect at Home  Your Recovery  An angiogram is an X-ray test that uses dye and a camera to take pictures of the blood flow in an artery or a vein. The doctor inserted a thin, flexible tube (catheter) into a blood vessel in your groin. In some cases, the catheter is placed in a blood vessel in the arm. An angiogram is done for many reasons. For example, you may have an angiogram to find the source of bleeding, such as an ulcer. Or it may be done to look for blocked blood vessels in your lungs. After an angiogram, your groin or arm may have a bruise and feel sore for a day or two. You can do light activities around the house but nothing strenuous for several days. Your doctor may give you specific instructions on when you can do your normal activities again, such as driving and going back to work. This care sheet gives you a general idea about how long it will take for you to recover. But each person recovers at a different pace. Follow the steps below to feel better as quickly as possible. How can you care for yourself at home? Activity    · Do not do strenuous exercise and do not lift, pull, or push anything heavy until your doctor says it is okay. This may be for a day or two. You can walk around the house and do light activity, such as cooking.     · If the catheter was placed in your groin, try not to walk up stairs for the first couple of days.     · If the catheter was placed in your arm near your wrist, do not bend your wrist deeply for the first couple of days. Be careful using your hand to get into and out of a chair or bed.     · If your doctor recommends it, get more exercise. Walking is a good choice. Bit by bit, increase the amount you walk every day. Try for at least 30 minutes on most days of the week. Diet    · Drink plenty of fluids to help your body flush out the dye.  If you have kidney, heart, or liver disease and have to limit fluids, talk with your doctor before you increase the amount of fluids you drink.     · You can eat your normal diet. If your stomach is upset, try bland, low-fat foods like plain rice, broiled chicken, toast, and yogurt. Medicines    · Be safe with medicines. Read and follow all instructions on the label. ? If the doctor gave you a prescription medicine for pain, take it as prescribed. ? If you are not taking a prescription pain medicine, ask your doctor if you can take an over-the-counter medicine.     · If you take aspirin or some other blood thinner, ask your doctor if and when to start taking it again. Make sure that you understand exactly what your doctor wants you to do.     · Your doctor will tell you if and when you can restart your medicines. He or she will also give you instructions about taking any new medicines.    Care of the catheter site    · You will have a dressing over the cut (incision). A dressing helps the incision heal and protects it. Your doctor will tell you how to take care of this.     · Put ice or a cold pack on the area for 10 to 20 minutes at a time to help with soreness or swelling. Put a thin cloth between the ice and your skin.     · You may shower 24 to 48 hours after the procedure, if your doctor okays it. Pat the incision dry.     · Do not soak the catheter site until it is healed. Don't take a bath for 1 week, or until your doctor tells you it is okay.     · Watch for bleeding from the site. A small amount of blood (up to the size of a quarter) on the bandage can be normal.     · If you are bleeding, lie down and press on the area for 15 minutes to try to make it stop. If the bleeding does not stop, call your doctor or seek immediate medical care. Follow-up care is a key part of your treatment and safety. Be sure to make and go to all appointments, and call your doctor if you are having problems.  It's also a good idea to know your test results and keep a list of the medicines you take.  When should you call for help? Call 911 anytime you think you may need emergency care. For example, call if:    · You passed out (lost consciousness).     · You have severe trouble breathing.     · You have sudden chest pain and shortness of breath, or you cough up blood.    Call your doctor now or seek immediate medical care if:    · You are bleeding from the area where the catheter was put in your artery.     · You have a fast-growing, painful lump at the catheter site.     · You have signs of infection, such as:  ? Increased pain, swelling, warmth, or redness. ? Red streaks leading from the incision. ? Pus draining from the incision. ? A fever.    Watch closely for any changes in your health, and be sure to contact your doctor if:    · You don't get better as expected. Where can you learn more? Go to http://samra-gini.info/  Enter [id number] in the search box to learn more about \"Angiogram: What to Expect at Home. \"  Current as of: December 8, 2019Content Version: 12.4  © 3167-7965 [employer]. Care instructions adapted under license by  (which disclaims liability or warranty for this information). If you have questions about a medical condition or this instruction, always ask your healthcare professional. [place] [address] any warranty or liability for your use of this information.

## 2020-03-26 NOTE — PROGRESS NOTES
Care Management Interventions  Transition of Care Consult (CM Consult): Discharge Planning  Discharge Durable Medical Equipment: No  Physical Therapy Consult: No  Speech Therapy Consult: No  Discharge Location  Discharge Placement: Home        Chart screened by  for discharge planning. No needs identified at this time. Please consult  if any new issues arise.

## 2020-03-26 NOTE — PROGRESS NOTES
Discharge instructions were reviewed with patient. An opportunity was given for questions. All medications were reviewed, and information was given on the new medications. Patient verbalized understanding, and has no questions at this time. Patient to have iv's and heart monitor removed at 3:30 when ride gets here.

## 2020-03-26 NOTE — PROGRESS NOTES
am  3/26/2020 7:49 AM    Admit Date: 3/25/2020    Admit Diagnosis: Chest pain [R07.9]      Subjective:    Patient admitted with CCC4 angina. Known cad risk factors.     Objective:      Visit Vitals  /89   Pulse 88   Temp 97.5 °F (36.4 °C)   Resp 18   Ht 5' 9\" (1.753 m)   Wt 126.3 kg (278 lb 6.4 oz)   SpO2 97%   BMI 41.11 kg/m²       ROS:  General ROS: negative for - chills  Hematological and Lymphatic ROS: negative for - blood clots or jaundice  Respiratory ROS: positive for - shortness of breath  Cardiovascular ROS: positive for - chest pain and dyspnea on exertion  Gastrointestinal ROS: no abdominal pain, change in bowel habits, or black or bloody stools  Neurological ROS: no TIA or stroke symptoms    Physical Exam:    Physical Examination: General appearance - alert, well appearing, and in no distress  Mental status - alert, oriented to person, place, and time  Eyes - pupils equal and reactive, extraocular eye movements intact  Neck/lymph - supple, no significant adenopathy  Chest/CV - clear to auscultation, no wheezes, rales or rhonchi, symmetric air entry  Heart - normal rate, regular rhythm, normal S1, S2, no murmurs, rubs, clicks or gallops  Abdomen/GI - soft, nontender, nondistended, no masses or organomegaly  Musculoskeletal - no joint tenderness, deformity or swelling  Extremities - peripheral pulses normal, no pedal edema, no clubbing or cyanosis  Skin - normal coloration and turgor, no rashes, no suspicious skin lesions noted    Current Facility-Administered Medications   Medication Dose Route Frequency    carvediloL (COREG) tablet 3.125 mg  3.125 mg Oral BID WITH MEALS    atorvastatin (LIPITOR) tablet 80 mg  80 mg Oral QHS    lisinopriL (PRINIVIL, ZESTRIL) tablet 20 mg  20 mg Oral DAILY    fenofibrate (LOFIBRA) tablet 160 mg  160 mg Oral DAILY    buPROPion (WELLBUTRIN) tablet 100 mg  100 mg Oral BID    aspirin delayed-release tablet 81 mg  81 mg Oral DAILY    prasugreL (EFFIENT) tablet 10 mg 10 mg Oral DAILY    pantoprazole (PROTONIX) tablet 40 mg  40 mg Oral ACB    nitroglycerin (NITROSTAT) tablet 0.4 mg  0.4 mg SubLINGual PRN    sodium chloride (NS) flush 5-40 mL  5-40 mL IntraVENous Q8H    sodium chloride (NS) flush 5-40 mL  5-40 mL IntraVENous PRN    0.9% sodium chloride infusion  50 mL/hr IntraVENous CONTINUOUS    insulin lispro (HUMALOG) injection   SubCUTAneous AC&HS    alcohol 62% (NOZIN) nasal  1 Ampule  1 Ampule Topical Q12H    insulin glargine (LANTUS) injection 50 Units  50 Units SubCUTAneous QHS    glipiZIDE SR (GLUCOTROL XL) tablet 5 mg  5 mg Oral QHS    nitroglycerin (NITROBID) 2 % ointment 0.5 Inch  0.5 Inch Topical Q6H    acetaminophen (TYLENOL) tablet 650 mg  650 mg Oral Q4H PRN       Data Review:   @LABRCNT(Na,K,BUN,CREA,WBC,HGB,HCT,PLT,INR,TRP,TCHOL*,Triglyceride*,LDL*,LDLCPOC HDL*,HDL])@    TELEMETRY: nsr    Assessment/Plan:     Principal Problem:    CAD (coronary artery disease) (2/15/2018)      Overview: Stents      Sees cardiologist      aspirin    Active Problems:    Diabetes mellitus type II, uncontrolled ([facility] [address] (12/15/2016)      Overview: Now on lantus and humalog prn      goor control BS under 150       Sliding scale given-multiples of 4      Pt on metformit 500 mg bid 3/2019 and  lantus  25 units      Diet discussed      Labs today      F/u every 3 months      Essential hypertension (3/2/2018)      Overview: Stable with med-amlodip[ine/lisinopril/coreg      Refilled      Labs       Annual eye exam recommended      F/u in 3 months                  Overview:       Overview:       Stable with med-amlodip[ine/lisinopril/coreg      Refilled      Labs       Annual eye exam recommended      F/u in 3 months      Hyperlipidemia (3/2/2018)      Overview: On statin and fenofibrate       Recheck labs-f/u      Diet discussed            Overview:       Overview:        On statin and fenofibrate       Recheck labs-f/u      Diet discussed            Last Assessment & Plan:        - Reports statin Intolerance. however tolerating Lipitor here        - Dietary modifications. Former smoker (5/8/2019)      Overview: quit in 3/2019       Hypertriglyceridemia (3/19/2018)      Overview: Overview:       Overview:       Much better now on lofibra--doing well      Advised more stricter low carb diet and aggressive control of DM--pt       willing      F/u in few months for recheck      5/2019      Statin/lofibra not helping--will add fish oil and niacin      F/u in few weeks for recheck      Chest pain (3/25/2020)      Overview: LIKELY ABGINA R/O ACUTE mi      ASPIRIN 81 MG       REFER TO er NOW--PT TO GO VIA PRIVATE VEHICLE      ADVISED TO CALL 911 IF WORSE      Plan cath Pt set up for procedure. Risks benefits and alternatives discussed. Pt agrees to proceed. Risks of bleeding infection emergent surgical procedure loss of life or limb renal failure and other known risks discussed. Pt agrees to proceed and agrees to sign consent form.   HTN needs coreg titrated up to 6.25 bid  Lipids on meds but needs to consider Joaquin Steele MD

## 2020-03-26 NOTE — PROGRESS NOTES
Patient calling complaining of chest pain 5/10 to midsteral and left chest.  Similar to the chest pain that brought him into the hospital.  SBP elevated. Placed on 2L NC for comfort. NTG 0.4mg SL given. NTG paste ordered to be continued. After 5 mins, pain better but remains present 3/10. NTG 0.4mg SL given again. BP now 137/87. HR 86. Pain resolved. 03/25/20 1952   Vital Signs   Temp 98.2 °F (36.8 °C)   Pulse (Heart Rate) 85   Resp Rate 20   O2 Sat (%) 96 %   Level of Consciousness Alert   BP (!) 149/103   MAP (Calculated) 118   MEWS Score 1   Pain 1   Pain Scale 1 Numeric (0 - 10)   Pain Intensity 1 5   Patient Stated Pain Goal 0   Pain Onset 1 PTA but now worse   Pain Location 1 Chest   Pain Orientation 1 Mid   Pain Description 1 Constant   Pain Intervention(s) 1 Medication (see MAR)   Oxygen Therapy   O2 Device Nasal cannula   O2 Flow Rate (L/min) 2 l/min   Patient Observation   Observations CP. Placed on 2L NC for comfort.   NTG SL for pain

## 2020-03-26 NOTE — PROGRESS NOTES
Spiritual Care Visit, initial visit; Advance Directive Consult. Visited with patient at bedside. Patient came to hospital because of a heart attack; he had a cardiac Cath today.  visited with him and listened as he spoke of his cardiac history. This was his third heart attack, per him.  told him that due to Anesthesia he would not be able to execute the [facility] today, but I gave him a copy of the document to review. If he needs help, he can call us. Prayed for patient's healing and health. Visit by Marylen Cruz Marny [place]oka, Staff .  M.Adam., Th.B., B.A.

## 2020-03-26 NOTE — PROGRESS NOTES
Bedside and Verbal shift change report given to be given to self (oncoming nurse) by Johann Bailey RN (offgoing nurse). Report included the following information SBAR, Kardex, Intake/Output and MAR.

## 2020-03-26 NOTE — ROUTINE PROCESS
Bedside and Verbal shift change report to be given to [facility] Yanet (oncoming nurse) by  Darion tom. Report included the following information SBAR, Kardex, MAR and Recent Results. RN to assume care of patient.

## 2020-03-26 NOTE — ROUTINE PROCESS
TRANSFER - OUT REPORT: 
 
[facility] with iFR Dr. Javier Gracia RRFREDY access Versed 2mg 
angiomax for anticoagulation, stopped @ 1464 iFR of LAD, negative. Medical management R band placed right wrist @ 0955 with 12ml air Verbal report given to Paulo Quintana RN(name) on Oswald Michaels  being transferred to cpru(unit) for routine progression of care Report consisted of patients Situation, Background, Assessment and  
Recommendations(SBAR). Information from the following report(s) Procedure Summary was reviewed with the receiving nurse. Lines:  
Peripheral IV 03/25/20 Right Forearm (Active) Site Assessment Clean, dry, & intact 3/26/2020  7:53 AM  
Phlebitis Assessment 0 3/26/2020  7:53 AM  
Infiltration Assessment 0 3/26/2020  7:53 AM  
Dressing Status Clean, dry, & intact 3/26/2020  7:53 AM  
Dressing Type Transparent;Tape 3/26/2020  7:53 AM  
Hub Color/Line Status Patent 3/26/2020  7:53 AM  
Action Taken Blood drawn 3/25/2020 12:54 PM  
Alcohol Cap Used No 3/26/2020 12:18 AM  
   
Peripheral IV 03/25/20 Left Forearm (Active) Site Assessment Clean, dry, & intact 3/26/2020  7:53 AM  
Phlebitis Assessment 0 3/26/2020  7:53 AM  
Infiltration Assessment 0 3/26/2020  7:53 AM  
Dressing Status Clean, dry, & intact 3/26/2020  7:53 AM  
Dressing Type Transparent;Tape 3/26/2020  7:53 AM  
Hub Color/Line Status Patent 3/26/2020  7:53 AM  
Alcohol Cap Used No 3/26/2020  4:08 AM  
  
 
Opportunity for questions and clarification was provided. Patient transported with: 
 [facility]

## 2020-03-27 ENCOUNTER — PATIENT OUTREACH (OUTPATIENT)
Dept: CASE MANAGEMENT | Age: 50
End: 2020-03-27

## 2020-03-27 LAB — 25(OH)D3+25(OH)D2 SERPL-MCNC: 5.8 NG/ML (ref 30–100)

## 2020-03-27 NOTE — PROCEDURES
[facility]  CARDIAC CATH    Name:  Jesenia Acosta  MR#:  [id number]  :  1970  ACCOUNT #:  [de-identified]  DATE OF SERVICE:  2020    PROCEDURES PERFORMED:  Left heart cath, selective coronary angiography, left ventriculogram with iFR evaluation of the LAD. PREOPERATIVE DIAGNOSIS:  Angina. POSTOPERATIVE DIAGNOSIS:  Noncardiac chest pain with stable coronary artery disease. SURGEON:  Clark Scott. Sheri Cameron MD    ASSISTANT:  None. ESTIMATED BLOOD LOSS:  2 mL. SPECIMENS REMOVED:  None. COMPLICATIONS:  None. IMPLANTS:  None. ANESTHESIA:  Sedation, 2 mg of Versed given between 9:30 and 10:00 a.m. by Briscoe Opitz, RN. INDICATION:  Angina. ACCESS:  Right radial access with a TIG-4 and 3DRC and pigtail. IFR evaluation was done with a JL-3.5 guide and an iFR wire. FINDINGS:  Left ventriculogram done in JON projection shows EF of 60-65%. No wall motion abnormalities. No gradient on pullback. LVEDP is 15. Aortic pressure 147/80. Left main arises normally, bifurcates into LAD and circumflex. Left main is somewhat short and has no disease. LAD courses the apex, supplies the large proximal diagonal.  Just after this diagonal, there is an area of 30-70% plaque but this cannot be fully evaluated via angiography. Therefore, iFR evaluation is performed in the usual fashion showing an iFR of 0.98 indicating no hemodynamic significance. Circumflex artery in the AV groove has been stented. Stents are patent. There is mild nonobstructive 20% irregularity. The OMs are patent with no significant disease. Right coronary artery is tortuous, arises in a normal fashion, courses in the AV groove, supplies the posterior descending and posterior lateral.  The right has been extensively stented including the ostium. The right is patent with minimal luminal irregularity. IFR evaluation of the LAD done with Angiomax shows an iFR of 0.98.     There were no complications. CONCLUSIONS:  Mild hemodynamically insignificant coronary artery disease with preserved left ventricular systolic function. Continue medical therapy.       Ab Wheeler MD      [id number]  D:  03/26/2020 10:15  T:  03/27/2020 7:18  JOB #:  [id number]    <>

## 2020-03-27 NOTE — PROGRESS NOTES
CTN attempted to outreach to patient for COVID-19 response call due to hospital discharge 3/26/2020. Unable to reach. Message left with contact information requesting a return call. Will continue to outreach per protocol.

## 2020-03-30 ENCOUNTER — PATIENT OUTREACH (OUTPATIENT)
Dept: CASE MANAGEMENT | Age: 50
End: 2020-03-30

## 2020-03-30 NOTE — PROGRESS NOTES
CTN attempted another COVID-19 call. Unable to reach patient. Another message left with contact information requesting a return call. Will continue to outreach if no return call received.

## 2020-03-31 ENCOUNTER — PATIENT OUTREACH (OUTPATIENT)
Dept: CASE MANAGEMENT | Age: 50
End: 2020-03-31

## 2020-04-02 PROBLEM — G40.209 NONINTRACTABLE EPILEPSY WITH COMPLEX PARTIAL SEIZURES (HCC): Status: ACTIVE | Noted: 2020-04-02

## 2020-11-24 ENCOUNTER — APPOINTMENT (OUTPATIENT)
Dept: GENERAL RADIOLOGY | Age: 50
DRG: 282 | End: 2020-11-24
Attending: EMERGENCY MEDICINE
Payer: COMMERCIAL

## 2020-11-24 ENCOUNTER — APPOINTMENT (OUTPATIENT)
Dept: CT IMAGING | Age: 50
DRG: 282 | End: 2020-11-24
Attending: INTERNAL MEDICINE
Payer: COMMERCIAL

## 2020-11-24 ENCOUNTER — HOSPITAL ENCOUNTER (INPATIENT)
Age: 50
LOS: 1 days | Discharge: ANOTHER HOSPITAL | DRG: 282 | End: 2020-11-25
Attending: EMERGENCY MEDICINE | Admitting: INTERNAL MEDICINE
Payer: COMMERCIAL

## 2020-11-24 ENCOUNTER — APPOINTMENT (OUTPATIENT)
Dept: ULTRASOUND IMAGING | Age: 50
DRG: 282 | End: 2020-11-24
Attending: INTERNAL MEDICINE
Payer: COMMERCIAL

## 2020-11-24 DIAGNOSIS — E11.65 UNCONTROLLED TYPE 2 DIABETES MELLITUS WITH HYPERGLYCEMIA (HCC): Chronic | ICD-10-CM

## 2020-11-24 DIAGNOSIS — J18.9 PNEUMONIA OF LEFT LOWER LOBE DUE TO INFECTIOUS ORGANISM: ICD-10-CM

## 2020-11-24 DIAGNOSIS — Z87.891 HISTORY OF SMOKING 25-50 PACK YEARS: ICD-10-CM

## 2020-11-24 DIAGNOSIS — L40.50 PSORIATIC ARTHRITIS (HCC): ICD-10-CM

## 2020-11-24 DIAGNOSIS — R91.8 LUNG INFILTRATE: ICD-10-CM

## 2020-11-24 DIAGNOSIS — K85.90 PANCREATITIS: ICD-10-CM

## 2020-11-24 DIAGNOSIS — R63.4 WEIGHT LOSS, UNINTENTIONAL: ICD-10-CM

## 2020-11-24 DIAGNOSIS — A41.9 SEPSIS WITHOUT ACUTE ORGAN DYSFUNCTION, DUE TO UNSPECIFIED ORGANISM (HCC): Primary | ICD-10-CM

## 2020-11-24 DIAGNOSIS — K85.90 ACUTE PANCREATITIS, UNSPECIFIED COMPLICATION STATUS, UNSPECIFIED PANCREATITIS TYPE: ICD-10-CM

## 2020-11-24 DIAGNOSIS — E11.10 DIABETIC KETOACIDOSIS WITHOUT COMA ASSOCIATED WITH TYPE 2 DIABETES MELLITUS (HCC): ICD-10-CM

## 2020-11-24 DIAGNOSIS — E78.1 HYPERTRIGLYCERIDEMIA: ICD-10-CM

## 2020-11-24 LAB
ADMINISTERED INITIALS, ADMINIT: NORMAL
ALBUMIN SERPL-MCNC: 4.2 G/DL (ref 3.5–5)
ALBUMIN/GLOB SERPL: 1.3 {RATIO} (ref 1.2–3.5)
ALP SERPL-CCNC: 123 U/L (ref 50–136)
ALT SERPL-CCNC: 26 U/L (ref 12–65)
ANION GAP SERPL CALC-SCNC: 16 MMOL/L (ref 7–16)
ANION GAP SERPL CALC-SCNC: 18 MMOL/L (ref 7–16)
ANION GAP SERPL CALC-SCNC: 21 MMOL/L (ref 7–16)
ANION GAP SERPL CALC-SCNC: 22 MMOL/L (ref 7–16)
APPEARANCE UR: CLEAR
ARTERIAL PATENCY WRIST A: ABNORMAL
AST SERPL-CCNC: 23 U/L (ref 15–37)
ATRIAL RATE: 118 BPM
BACTERIA URNS QL MICRO: 0 /HPF
BASE DEFICIT BLDV-SCNC: 8 MMOL/L
BASOPHILS # BLD: 0.1 K/UL (ref 0–0.2)
BASOPHILS # BLD: 0.1 K/UL (ref 0–0.2)
BASOPHILS NFR BLD: 1 % (ref 0–2)
BASOPHILS NFR BLD: 1 % (ref 0–2)
BDY SITE: ABNORMAL
BILIRUB SERPL-MCNC: 1 MG/DL (ref 0.2–1.1)
BILIRUB UR QL: ABNORMAL
BUN SERPL-MCNC: 11 MG/DL (ref 6–23)
BUN SERPL-MCNC: 7 MG/DL (ref 6–23)
BUN SERPL-MCNC: 8 MG/DL (ref 6–23)
BUN SERPL-MCNC: 8 MG/DL (ref 6–23)
CALCIUM SERPL-MCNC: 7.1 MG/DL (ref 8.3–10.4)
CALCIUM SERPL-MCNC: 7.3 MG/DL (ref 8.3–10.4)
CALCIUM SERPL-MCNC: 7.8 MG/DL (ref 8.3–10.4)
CALCIUM SERPL-MCNC: 8.3 MG/DL (ref 8.3–10.4)
CALCULATED P AXIS, ECG09: 71 DEGREES
CALCULATED R AXIS, ECG10: 81 DEGREES
CALCULATED T AXIS, ECG11: 38 DEGREES
CASTS URNS QL MICRO: ABNORMAL /LPF
CHLORIDE SERPL-SCNC: 104 MMOL/L (ref 98–107)
CHLORIDE SERPL-SCNC: 104 MMOL/L (ref 98–107)
CHLORIDE SERPL-SCNC: 105 MMOL/L (ref 98–107)
CHLORIDE SERPL-SCNC: 96 MMOL/L (ref 98–107)
CO2 BLD-SCNC: 18 MMOL/L
CO2 SERPL-SCNC: 14 MMOL/L (ref 21–32)
CO2 SERPL-SCNC: 16 MMOL/L (ref 21–32)
CO2 SERPL-SCNC: 19 MMOL/L (ref 21–32)
CO2 SERPL-SCNC: 23 MMOL/L (ref 21–32)
COLLECT TIME,HTIME: 1255
COLOR UR: YELLOW
CREAT SERPL-MCNC: 0.44 MG/DL (ref 0.8–1.5)
CREAT SERPL-MCNC: 0.5 MG/DL (ref 0.8–1.5)
CREAT SERPL-MCNC: 0.62 MG/DL (ref 0.8–1.5)
CREAT SERPL-MCNC: 0.73 MG/DL (ref 0.8–1.5)
D50 ADMINISTERED, D50ADM: 0 ML
D50 ORDER, D50ORD: 0 ML
DIAGNOSIS, 93000: NORMAL
DIFFERENTIAL METHOD BLD: ABNORMAL
DIFFERENTIAL METHOD BLD: ABNORMAL
EOSINOPHIL # BLD: 0.3 K/UL (ref 0–0.8)
EOSINOPHIL # BLD: 0.4 K/UL (ref 0–0.8)
EOSINOPHIL NFR BLD: 2 % (ref 0.5–7.8)
EOSINOPHIL NFR BLD: 3 % (ref 0.5–7.8)
EPI CELLS #/AREA URNS HPF: ABNORMAL /HPF
ERYTHROCYTE [DISTWIDTH] IN BLOOD BY AUTOMATED COUNT: 13 % (ref 11.9–14.6)
ERYTHROCYTE [DISTWIDTH] IN BLOOD BY AUTOMATED COUNT: 13.2 % (ref 11.9–14.6)
GAS FLOW.O2 O2 DELIVERY SYS: ABNORMAL L/MIN
GLOBULIN SER CALC-MCNC: 3.2 G/DL (ref 2.3–3.5)
GLSCOM COMMENTS: NORMAL
GLUCOSE BLD STRIP.AUTO-MCNC: 168 MG/DL (ref 65–100)
GLUCOSE BLD STRIP.AUTO-MCNC: 170 MG/DL (ref 65–100)
GLUCOSE BLD STRIP.AUTO-MCNC: 177 MG/DL (ref 65–100)
GLUCOSE BLD STRIP.AUTO-MCNC: 190 MG/DL (ref 65–100)
GLUCOSE BLD STRIP.AUTO-MCNC: 196 MG/DL (ref 65–100)
GLUCOSE BLD STRIP.AUTO-MCNC: 217 MG/DL (ref 65–100)
GLUCOSE BLD STRIP.AUTO-MCNC: 218 MG/DL (ref 65–100)
GLUCOSE BLD STRIP.AUTO-MCNC: 242 MG/DL (ref 65–100)
GLUCOSE BLD STRIP.AUTO-MCNC: 257 MG/DL (ref 65–100)
GLUCOSE BLD STRIP.AUTO-MCNC: 284 MG/DL (ref 65–100)
GLUCOSE BLD STRIP.AUTO-MCNC: 307 MG/DL (ref 65–100)
GLUCOSE SERPL-MCNC: 161 MG/DL (ref 65–100)
GLUCOSE SERPL-MCNC: 189 MG/DL (ref 65–100)
GLUCOSE SERPL-MCNC: 246 MG/DL (ref 65–100)
GLUCOSE SERPL-MCNC: 336 MG/DL (ref 65–100)
GLUCOSE UR STRIP.AUTO-MCNC: >1000 MG/DL
GLUCOSE, GLC: 168 MG/DL
GLUCOSE, GLC: 170 MG/DL
GLUCOSE, GLC: 177 MG/DL
GLUCOSE, GLC: 190 MG/DL
GLUCOSE, GLC: 196 MG/DL
GLUCOSE, GLC: 217 MG/DL
GLUCOSE, GLC: 218 MG/DL
GLUCOSE, GLC: 242 MG/DL
GLUCOSE, GLC: 257 MG/DL
HCO3 BLDV-SCNC: 17.1 MMOL/L (ref 23–28)
HCT VFR BLD AUTO: 40 % (ref 41.1–50.3)
HCT VFR BLD AUTO: 48.2 % (ref 41.1–50.3)
HGB BLD-MCNC: 14.9 G/DL (ref 13.6–17.2)
HGB BLD-MCNC: 18 G/DL (ref 13.6–17.2)
HGB UR QL STRIP: NEGATIVE
HIGH TARGET, HITG: 180 MG/DL
HIGH TARGET, HITG: 250 MG/DL
IMM GRANULOCYTES # BLD AUTO: 0 K/UL (ref 0–0.5)
IMM GRANULOCYTES # BLD AUTO: 0.1 K/UL (ref 0–0.5)
IMM GRANULOCYTES NFR BLD AUTO: 0 % (ref 0–5)
IMM GRANULOCYTES NFR BLD AUTO: 0 % (ref 0–5)
INSULIN ADMINSTERED, INSADM: 2.2 UNITS/HOUR
INSULIN ADMINSTERED, INSADM: 2.2 UNITS/HOUR
INSULIN ADMINSTERED, INSADM: 2.3 UNITS/HOUR
INSULIN ADMINSTERED, INSADM: 2.6 UNITS/HOUR
INSULIN ADMINSTERED, INSADM: 2.7 UNITS/HOUR
INSULIN ADMINSTERED, INSADM: 3.1 UNITS/HOUR
INSULIN ADMINSTERED, INSADM: 3.2 UNITS/HOUR
INSULIN ADMINSTERED, INSADM: 3.6 UNITS/HOUR
INSULIN ADMINSTERED, INSADM: 3.9 UNITS/HOUR
INSULIN ORDER, INSORD: 2.2 UNITS/HOUR
INSULIN ORDER, INSORD: 2.2 UNITS/HOUR
INSULIN ORDER, INSORD: 2.3 UNITS/HOUR
INSULIN ORDER, INSORD: 2.6 UNITS/HOUR
INSULIN ORDER, INSORD: 2.7 UNITS/HOUR
INSULIN ORDER, INSORD: 3.1 UNITS/HOUR
INSULIN ORDER, INSORD: 3.2 UNITS/HOUR
INSULIN ORDER, INSORD: 3.6 UNITS/HOUR
INSULIN ORDER, INSORD: 3.9 UNITS/HOUR
KETONES UR QL STRIP.AUTO: >80 MG/DL
LACTATE SERPL-SCNC: 0.9 MMOL/L (ref 0.4–2)
LDLC SERPL DIRECT ASSAY-MCNC: 103 MG/DL
LDLC SERPL DIRECT ASSAY-MCNC: 75 MG/DL
LEUKOCYTE ESTERASE UR QL STRIP.AUTO: NEGATIVE
LIPASE SERPL-CCNC: 99 U/L (ref 73–393)
LOW TARGET, LOT: 140 MG/DL
LOW TARGET, LOT: 150 MG/DL
LYMPHOCYTES # BLD: 1.8 K/UL (ref 0.5–4.6)
LYMPHOCYTES # BLD: 2.6 K/UL (ref 0.5–4.6)
LYMPHOCYTES NFR BLD: 12 % (ref 13–44)
LYMPHOCYTES NFR BLD: 20 % (ref 13–44)
MCH RBC QN AUTO: 34 PG (ref 26.1–32.9)
MCH RBC QN AUTO: 34.5 PG (ref 26.1–32.9)
MCHC RBC AUTO-ENTMCNC: 37.3 G/DL (ref 31.4–35)
MCHC RBC AUTO-ENTMCNC: 37.3 G/DL (ref 31.4–35)
MCV RBC AUTO: 90.9 FL (ref 79.6–97.8)
MCV RBC AUTO: 92.6 FL (ref 79.6–97.8)
MINUTES UNTIL NEXT BG, NBG: 60 MIN
MONOCYTES # BLD: 1.2 K/UL (ref 0.1–1.3)
MONOCYTES # BLD: 1.5 K/UL (ref 0.1–1.3)
MONOCYTES NFR BLD: 10 % (ref 4–12)
MONOCYTES NFR BLD: 9 % (ref 4–12)
MULTIPLIER, MUL: 0.02
NEUTS SEG # BLD: 11.8 K/UL (ref 1.7–8.2)
NEUTS SEG # BLD: 8.9 K/UL (ref 1.7–8.2)
NEUTS SEG NFR BLD: 67 % (ref 43–78)
NEUTS SEG NFR BLD: 76 % (ref 43–78)
NITRITE UR QL STRIP.AUTO: NEGATIVE
NRBC # BLD: 0 K/UL (ref 0–0.2)
NRBC # BLD: 0 K/UL (ref 0–0.2)
ORDER INITIALS, ORDINIT: NORMAL
P-R INTERVAL, ECG05: 176 MS
PCO2 BLDV: 33.9 MMHG (ref 41–51)
PH BLDV: 7.31 [PH] (ref 7.32–7.42)
PH UR STRIP: 5 [PH] (ref 5–9)
PLATELET # BLD AUTO: 210 K/UL (ref 150–450)
PLATELET # BLD AUTO: 245 K/UL (ref 150–450)
PMV BLD AUTO: 11.3 FL (ref 9.4–12.3)
PMV BLD AUTO: 11.7 FL (ref 9.4–12.3)
PO2 BLDV: 53 MMHG
POTASSIUM SERPL-SCNC: 3.8 MMOL/L (ref 3.5–5.1)
POTASSIUM SERPL-SCNC: 3.9 MMOL/L (ref 3.5–5.1)
POTASSIUM SERPL-SCNC: 4 MMOL/L (ref 3.5–5.1)
POTASSIUM SERPL-SCNC: 4.6 MMOL/L (ref 3.5–5.1)
PROT SERPL-MCNC: 7.4 G/DL (ref 6.3–8.2)
PROT UR STRIP-MCNC: 30 MG/DL
Q-T INTERVAL, ECG07: 304 MS
QRS DURATION, ECG06: 96 MS
QTC CALCULATION (BEZET), ECG08: 426 MS
RBC # BLD AUTO: 4.32 M/UL (ref 4.23–5.6)
RBC # BLD AUTO: 5.3 M/UL (ref 4.23–5.6)
RBC #/AREA URNS HPF: ABNORMAL /HPF
SAO2 % BLDV: 84 % (ref 65–88)
SERVICE CMNT-IMP: ABNORMAL
SODIUM SERPL-SCNC: 132 MMOL/L (ref 136–145)
SODIUM SERPL-SCNC: 141 MMOL/L (ref 136–145)
SODIUM SERPL-SCNC: 142 MMOL/L (ref 136–145)
SODIUM SERPL-SCNC: 143 MMOL/L (ref 136–145)
SP GR UR REFRACTOMETRY: 1.04 (ref 1–1.02)
SPECIMEN TYPE: ABNORMAL
TRIGL SERPL-MCNC: 2395 MG/DL (ref 35–150)
TRIGL SERPL-MCNC: 2712 MG/DL (ref 35–150)
TROPONIN-HIGH SENSITIVITY: 12.8 PG/ML (ref 0–14)
TROPONIN-HIGH SENSITIVITY: 14.2 PG/ML (ref 0–14)
TROPONIN-HIGH SENSITIVITY: 14.2 PG/ML (ref 0–14)
TSH SERPL DL<=0.005 MIU/L-ACNC: 2.13 UIU/ML
UROBILINOGEN UR QL STRIP.AUTO: 0.2 EU/DL (ref 0.2–1)
VENTRICULAR RATE, ECG03: 118 BPM
WBC # BLD AUTO: 13.2 K/UL (ref 4.3–11.1)
WBC # BLD AUTO: 15.6 K/UL (ref 4.3–11.1)
WBC URNS QL MICRO: 0 /HPF

## 2020-11-24 PROCEDURE — 74011000258 HC RX REV CODE- 258: Performed by: INTERNAL MEDICINE

## 2020-11-24 PROCEDURE — 71045 X-RAY EXAM CHEST 1 VIEW: CPT

## 2020-11-24 PROCEDURE — C9113 INJ PANTOPRAZOLE SODIUM, VIA: HCPCS | Performed by: INTERNAL MEDICINE

## 2020-11-24 PROCEDURE — 83605 ASSAY OF LACTIC ACID: CPT

## 2020-11-24 PROCEDURE — 74011250636 HC RX REV CODE- 250/636: Performed by: EMERGENCY MEDICINE

## 2020-11-24 PROCEDURE — 85025 COMPLETE CBC W/AUTO DIFF WBC: CPT

## 2020-11-24 PROCEDURE — 74011636637 HC RX REV CODE- 636/637: Performed by: EMERGENCY MEDICINE

## 2020-11-24 PROCEDURE — 87040 BLOOD CULTURE FOR BACTERIA: CPT

## 2020-11-24 PROCEDURE — 80048 BASIC METABOLIC PNL TOTAL CA: CPT

## 2020-11-24 PROCEDURE — 65610000001 HC ROOM ICU GENERAL

## 2020-11-24 PROCEDURE — 76705 ECHO EXAM OF ABDOMEN: CPT

## 2020-11-24 PROCEDURE — 87449 NOS EACH ORGANISM AG IA: CPT

## 2020-11-24 PROCEDURE — 93005 ELECTROCARDIOGRAM TRACING: CPT | Performed by: EMERGENCY MEDICINE

## 2020-11-24 PROCEDURE — 74011000250 HC RX REV CODE- 250: Performed by: INTERNAL MEDICINE

## 2020-11-24 PROCEDURE — 96374 THER/PROPH/DIAG INJ IV PUSH: CPT

## 2020-11-24 PROCEDURE — 96375 TX/PRO/DX INJ NEW DRUG ADDON: CPT

## 2020-11-24 PROCEDURE — 74011000258 HC RX REV CODE- 258: Performed by: EMERGENCY MEDICINE

## 2020-11-24 PROCEDURE — 96376 TX/PRO/DX INJ SAME DRUG ADON: CPT

## 2020-11-24 PROCEDURE — 87086 URINE CULTURE/COLONY COUNT: CPT

## 2020-11-24 PROCEDURE — 74011250637 HC RX REV CODE- 250/637: Performed by: EMERGENCY MEDICINE

## 2020-11-24 PROCEDURE — 99285 EMERGENCY DEPT VISIT HI MDM: CPT

## 2020-11-24 PROCEDURE — 87635 SARS-COV-2 COVID-19 AMP PRB: CPT

## 2020-11-24 PROCEDURE — 74011636637 HC RX REV CODE- 636/637: Performed by: INTERNAL MEDICINE

## 2020-11-24 PROCEDURE — 74011000636 HC RX REV CODE- 636: Performed by: EMERGENCY MEDICINE

## 2020-11-24 PROCEDURE — 74011250636 HC RX REV CODE- 250/636: Performed by: INTERNAL MEDICINE

## 2020-11-24 PROCEDURE — 84478 ASSAY OF TRIGLYCERIDES: CPT

## 2020-11-24 PROCEDURE — 83721 ASSAY OF BLOOD LIPOPROTEIN: CPT

## 2020-11-24 PROCEDURE — 71260 CT THORAX DX C+: CPT

## 2020-11-24 PROCEDURE — 96365 THER/PROPH/DIAG IV INF INIT: CPT

## 2020-11-24 PROCEDURE — 81001 URINALYSIS AUTO W/SCOPE: CPT

## 2020-11-24 PROCEDURE — 80053 COMPREHEN METABOLIC PANEL: CPT

## 2020-11-24 PROCEDURE — 74011250637 HC RX REV CODE- 250/637: Performed by: INTERNAL MEDICINE

## 2020-11-24 PROCEDURE — 84484 ASSAY OF TROPONIN QUANT: CPT

## 2020-11-24 PROCEDURE — 82962 GLUCOSE BLOOD TEST: CPT

## 2020-11-24 PROCEDURE — 83690 ASSAY OF LIPASE: CPT

## 2020-11-24 PROCEDURE — 84443 ASSAY THYROID STIM HORMONE: CPT

## 2020-11-24 PROCEDURE — 86738 MYCOPLASMA ANTIBODY: CPT

## 2020-11-24 PROCEDURE — 82803 BLOOD GASES ANY COMBINATION: CPT

## 2020-11-24 PROCEDURE — 87899 AGENT NOS ASSAY W/OPTIC: CPT

## 2020-11-24 RX ORDER — ONDANSETRON 2 MG/ML
4 INJECTION INTRAMUSCULAR; INTRAVENOUS
Status: COMPLETED | OUTPATIENT
Start: 2020-11-24 | End: 2020-11-24

## 2020-11-24 RX ORDER — SODIUM CHLORIDE 9 MG/ML
1000 INJECTION, SOLUTION INTRAVENOUS ONCE
Status: COMPLETED | OUTPATIENT
Start: 2020-11-24 | End: 2020-11-24

## 2020-11-24 RX ORDER — DEXTROSE MONOHYDRATE AND SODIUM CHLORIDE 5; .45 G/100ML; G/100ML
150 INJECTION, SOLUTION INTRAVENOUS CONTINUOUS
Status: DISCONTINUED | OUTPATIENT
Start: 2020-11-25 | End: 2020-11-25

## 2020-11-24 RX ORDER — ALPRAZOLAM 0.5 MG/1
0.5 TABLET ORAL AS NEEDED
Status: DISCONTINUED | OUTPATIENT
Start: 2020-11-24 | End: 2020-11-24

## 2020-11-24 RX ORDER — SODIUM CHLORIDE, SODIUM LACTATE, POTASSIUM CHLORIDE, CALCIUM CHLORIDE 600; 310; 30; 20 MG/100ML; MG/100ML; MG/100ML; MG/100ML
250 INJECTION, SOLUTION INTRAVENOUS CONTINUOUS
Status: DISCONTINUED | OUTPATIENT
Start: 2020-11-24 | End: 2020-11-24

## 2020-11-24 RX ORDER — DEXTROSE 40 %
15 GEL (GRAM) ORAL AS NEEDED
Status: DISCONTINUED | OUTPATIENT
Start: 2020-11-24 | End: 2020-11-25

## 2020-11-24 RX ORDER — SODIUM CHLORIDE 0.9 % (FLUSH) 0.9 %
10 SYRINGE (ML) INJECTION
Status: COMPLETED | OUTPATIENT
Start: 2020-11-24 | End: 2020-11-24

## 2020-11-24 RX ORDER — HYDROMORPHONE HYDROCHLORIDE 1 MG/ML
1 INJECTION, SOLUTION INTRAMUSCULAR; INTRAVENOUS; SUBCUTANEOUS
Status: COMPLETED | OUTPATIENT
Start: 2020-11-24 | End: 2020-11-24

## 2020-11-24 RX ORDER — GUAIFENESIN 100 MG/5ML
81 LIQUID (ML) ORAL DAILY
Status: DISCONTINUED | OUTPATIENT
Start: 2020-11-25 | End: 2020-11-25 | Stop reason: HOSPADM

## 2020-11-24 RX ORDER — LABETALOL HYDROCHLORIDE 5 MG/ML
10 INJECTION, SOLUTION INTRAVENOUS
Status: DISCONTINUED | OUTPATIENT
Start: 2020-11-24 | End: 2020-11-25

## 2020-11-24 RX ORDER — PRASUGREL 10 MG/1
10 TABLET, FILM COATED ORAL DAILY
Status: DISCONTINUED | OUTPATIENT
Start: 2020-11-25 | End: 2020-11-25

## 2020-11-24 RX ORDER — ENOXAPARIN SODIUM 100 MG/ML
40 INJECTION SUBCUTANEOUS EVERY 24 HOURS
Status: DISCONTINUED | OUTPATIENT
Start: 2020-11-24 | End: 2020-11-25 | Stop reason: HOSPADM

## 2020-11-24 RX ORDER — SODIUM CHLORIDE 0.9 % (FLUSH) 0.9 %
5-40 SYRINGE (ML) INJECTION AS NEEDED
Status: DISCONTINUED | OUTPATIENT
Start: 2020-11-24 | End: 2020-11-25 | Stop reason: HOSPADM

## 2020-11-24 RX ORDER — GUAIFENESIN 100 MG/5ML
324 LIQUID (ML) ORAL
Status: COMPLETED | OUTPATIENT
Start: 2020-11-24 | End: 2020-11-24

## 2020-11-24 RX ORDER — HYDROMORPHONE HYDROCHLORIDE 1 MG/ML
0.5 INJECTION, SOLUTION INTRAMUSCULAR; INTRAVENOUS; SUBCUTANEOUS
Status: DISCONTINUED | OUTPATIENT
Start: 2020-11-24 | End: 2020-11-24

## 2020-11-24 RX ORDER — FENOFIBRATE 160 MG/1
160 TABLET ORAL DAILY
Status: DISCONTINUED | OUTPATIENT
Start: 2020-11-25 | End: 2020-11-25 | Stop reason: HOSPADM

## 2020-11-24 RX ORDER — HYDROMORPHONE HYDROCHLORIDE 1 MG/ML
0.25 INJECTION, SOLUTION INTRAMUSCULAR; INTRAVENOUS; SUBCUTANEOUS
Status: DISCONTINUED | OUTPATIENT
Start: 2020-11-24 | End: 2020-11-25 | Stop reason: HOSPADM

## 2020-11-24 RX ORDER — LABETALOL HYDROCHLORIDE 5 MG/ML
20 INJECTION, SOLUTION INTRAVENOUS
Status: DISCONTINUED | OUTPATIENT
Start: 2020-11-24 | End: 2020-11-25 | Stop reason: HOSPADM

## 2020-11-24 RX ORDER — DEXTROSE 50 % IN WATER (D50W) INTRAVENOUS SYRINGE
25-50 AS NEEDED
Status: DISCONTINUED | OUTPATIENT
Start: 2020-11-24 | End: 2020-11-25

## 2020-11-24 RX ORDER — HYDROMORPHONE HYDROCHLORIDE 1 MG/ML
1 INJECTION, SOLUTION INTRAMUSCULAR; INTRAVENOUS; SUBCUTANEOUS
Status: DISCONTINUED | OUTPATIENT
Start: 2020-11-24 | End: 2020-11-25 | Stop reason: HOSPADM

## 2020-11-24 RX ORDER — SODIUM CHLORIDE 0.9 % (FLUSH) 0.9 %
5-40 SYRINGE (ML) INJECTION EVERY 8 HOURS
Status: DISCONTINUED | OUTPATIENT
Start: 2020-11-24 | End: 2020-11-25 | Stop reason: HOSPADM

## 2020-11-24 RX ORDER — LABETALOL HYDROCHLORIDE 5 MG/ML
10 INJECTION, SOLUTION INTRAVENOUS
Status: DISCONTINUED | OUTPATIENT
Start: 2020-11-24 | End: 2020-11-25 | Stop reason: HOSPADM

## 2020-11-24 RX ADMIN — CEFTRIAXONE SODIUM 2 G: 2 INJECTION, POWDER, FOR SOLUTION INTRAMUSCULAR; INTRAVENOUS at 12:05

## 2020-11-24 RX ADMIN — ALPRAZOLAM 0.5 MG: 0.5 TABLET ORAL at 14:25

## 2020-11-24 RX ADMIN — SODIUM CHLORIDE 100 ML: 900 INJECTION, SOLUTION INTRAVENOUS at 15:35

## 2020-11-24 RX ADMIN — NITROGLYCERIN 0.5 INCH: 20 OINTMENT TOPICAL at 21:41

## 2020-11-24 RX ADMIN — ASPIRIN 324 MG: 81 TABLET, CHEWABLE ORAL at 14:25

## 2020-11-24 RX ADMIN — ONDANSETRON 4 MG: 2 INJECTION INTRAMUSCULAR; INTRAVENOUS at 11:28

## 2020-11-24 RX ADMIN — SODIUM CHLORIDE, SODIUM LACTATE, POTASSIUM CHLORIDE, AND CALCIUM CHLORIDE 125 ML/HR: 600; 310; 30; 20 INJECTION, SOLUTION INTRAVENOUS at 14:40

## 2020-11-24 RX ADMIN — SODIUM CHLORIDE 1000 ML: 900 INJECTION, SOLUTION INTRAVENOUS at 11:28

## 2020-11-24 RX ADMIN — IOPAMIDOL 100 ML: 755 INJECTION, SOLUTION INTRAVENOUS at 15:35

## 2020-11-24 RX ADMIN — Medication 10 ML: at 15:35

## 2020-11-24 RX ADMIN — HYDROMORPHONE HYDROCHLORIDE 0.5 MG: 1 INJECTION, SOLUTION INTRAMUSCULAR; INTRAVENOUS; SUBCUTANEOUS at 21:47

## 2020-11-24 RX ADMIN — SODIUM CHLORIDE 3.9 UNITS/HR: 9 INJECTION, SOLUTION INTRAVENOUS at 15:04

## 2020-11-24 RX ADMIN — AZITHROMYCIN MONOHYDRATE 500 MG: 500 INJECTION, POWDER, LYOPHILIZED, FOR SOLUTION INTRAVENOUS at 12:08

## 2020-11-24 RX ADMIN — Medication 10 ML: at 23:12

## 2020-11-24 RX ADMIN — INSULIN HUMAN 4 UNITS: 100 INJECTION, SOLUTION PARENTERAL at 13:24

## 2020-11-24 RX ADMIN — SODIUM CHLORIDE 40 MG: 9 INJECTION, SOLUTION INTRAMUSCULAR; INTRAVENOUS; SUBCUTANEOUS at 21:41

## 2020-11-24 RX ADMIN — ENOXAPARIN SODIUM 40 MG: 40 INJECTION SUBCUTANEOUS at 19:31

## 2020-11-24 RX ADMIN — HYDROMORPHONE HYDROCHLORIDE 1 MG: 1 INJECTION, SOLUTION INTRAMUSCULAR; INTRAVENOUS; SUBCUTANEOUS at 11:28

## 2020-11-24 RX ADMIN — SODIUM CHLORIDE, SODIUM LACTATE, POTASSIUM CHLORIDE, AND CALCIUM CHLORIDE 250 ML/HR: 600; 310; 30; 20 INJECTION, SOLUTION INTRAVENOUS at 20:36

## 2020-11-24 RX ADMIN — NITROGLYCERIN 0.5 INCH: 20 OINTMENT TOPICAL at 15:08

## 2020-11-24 RX ADMIN — SODIUM CHLORIDE 1000 ML: 900 INJECTION, SOLUTION INTRAVENOUS at 12:34

## 2020-11-24 RX ADMIN — HYDROMORPHONE HYDROCHLORIDE 0.5 MG: 1 INJECTION, SOLUTION INTRAMUSCULAR; INTRAVENOUS; SUBCUTANEOUS at 18:08

## 2020-11-24 RX ADMIN — SODIUM CHLORIDE 40 MG: 9 INJECTION, SOLUTION INTRAMUSCULAR; INTRAVENOUS; SUBCUTANEOUS at 14:26

## 2020-11-24 NOTE — ED PROVIDER NOTES
The history is provided by the patient. Back Pain    This is a chronic problem. Episode onset: months ago. The problem has been gradually worsening. The problem occurs constantly. Patient reports not work related injury. The pain is associated with no known injury. The pain is present in the thoracic spine and lumbar spine. Radiates to: today left chest. The pain is severe. The symptoms are aggravated by certain positions, twisting and bending. Associated symptoms include chest pain and tingling ( chronic right thigh s/p hip replacement). Pertinent negatives include no fever, no numbness, no headaches, no abdominal pain, no dysuria, no paresthesias, no paresis and no weakness. He has tried nothing for the symptoms. Risk factors include obesity (CAD, type 2 DM).         Past Medical History:   Diagnosis Date    Acute coronary syndrome ([address] 12/15/2016    Acute pancreatitis 2/15/2018    Arthritis     psoriatic    Diabetes ([facility]     Endocrine disease     Hypertension     Nonintractable epilepsy with complex partial seizures ([facility] [address] 4/2/2020    Psoriatic arthropathy (HCC)     Seizures (HCC)        Past Surgical History:   Procedure Laterality Date    CARDIAC SURG PROCEDURE UNLIST      HX APPENDECTOMY      HX CHOLECYSTECTOMY      HX HERNIA REPAIR      HX ORTHOPAEDIC      left heel secondary to trauma         Family History:   Problem Relation Age of Onset    Thyroid Disease Mother         goiters    Breast Cancer Mother         42's    Atrial Fibrillation Mother     Diabetes Father     Heart Disease Father 48    Diabetes Sister     Heart Disease Paternal Grandfather        Social History     Socioeconomic History    Marital status:      Spouse name: Not on file    Number of children: Not on file    Years of education: Not on file    Highest education level: Not on file   Occupational History    Not on file   Social Needs    Financial resource strain: Not on file    Food insecurity Worry: Not on file     Inability: Not on file    Transportation needs     Medical: Not on file     Non-medical: Not on file   Tobacco Use    Smoking status: Former Smoker     Packs/day: 0.25     Years: 30.00     Pack years: 7.50     Last attempt to quit: 2019     Years since quittin.8    Smokeless tobacco: Former User   Substance and Sexual Activity    Alcohol use: No    Drug use: No    Sexual activity: Not Currently     Partners: Female   Lifestyle    Physical activity     Days per week: Not on file     Minutes per session: Not on file    Stress: Not on file   Relationships    Social connections     Talks on phone: Not on file     Gets together: Not on file     Attends Yarsani service: Not on file     Active member of club or organization: Not on file     Attends meetings of clubs or organizations: Not on file     Relationship status: Not on file    Intimate partner violence     Fear of current or ex partner: Not on file     Emotionally abused: Not on file     Physically abused: Not on file     Forced sexual activity: Not on file   Other Topics Concern     Service No    Blood Transfusions No    Caffeine Concern No    Occupational Exposure No    Hobby Hazards No    Sleep Concern No    Stress Concern Yes    Weight Concern Yes    Special Diet No    Back Care No    Exercise No    Bike Helmet No    Seat Belt Yes    Self-Exams No   Social History Narrative    Not on file         ALLERGIES: Peanut and Morphine    Review of Systems   Constitutional: Positive for fatigue and unexpected weight change. Negative for chills and fever. HENT: Negative for congestion, rhinorrhea and sore throat. Eyes: Negative for photophobia and redness. Respiratory: Positive for cough. Negative for shortness of breath. Cardiovascular: Positive for chest pain. Negative for leg swelling. Gastrointestinal: Negative for abdominal pain, diarrhea, nausea and vomiting.    Endocrine: Positive for polydipsia and polyuria. Genitourinary: Negative for dysuria and hematuria. Musculoskeletal: Positive for back pain and myalgias. Skin: Negative for color change and rash. Neurological: Positive for tingling ( chronic right thigh s/p hip replacement). Negative for weakness, numbness, headaches and paresthesias. Vitals:    11/24/20 1103   BP: (!) 176/104   Pulse: (!) 125   Resp: 18   Temp: 98.4 °F (36.9 °C)   SpO2: 98%   Weight: 111.6 kg (246 lb)   Height: 5' 10\" (1.778 m)            Physical Exam  Vitals signs and nursing note reviewed. Constitutional:       General: He is not in acute distress. Appearance: He is well-developed. He is obese. He is ill-appearing. He is not toxic-appearing. HENT:      Mouth/Throat:      Mouth: Mucous membranes are dry. Pharynx: No oropharyngeal exudate. Eyes:      Conjunctiva/sclera: Conjunctivae normal.      Pupils: Pupils are equal, round, and reactive to light. Neck:      Musculoskeletal: Neck supple. Cardiovascular:      Rate and Rhythm: Regular rhythm. Tachycardia present. Heart sounds: Normal heart sounds. Pulmonary:      Effort: Pulmonary effort is normal.      Breath sounds: Normal breath sounds. Abdominal:      General: Bowel sounds are normal. There is no distension. Palpations: Abdomen is soft. Tenderness: There is no abdominal tenderness. There is no guarding or rebound. Musculoskeletal: Normal range of motion. General: No tenderness. Lymphadenopathy:      Cervical: No cervical adenopathy. Skin:     General: Skin is warm and dry. Neurological:      Mental Status: He is alert and oriented to person, place, and time. Cranial Nerves: No cranial nerve deficit. Sensory: No sensory deficit. Motor: No weakness.       Coordination: Coordination normal.      Comments: Left great toe weak, 2 plus patellar, 1 plus ankle reflexes present, sensation intact          MDM  Number of Diagnoses or Management Options  Diagnosis management comments: Patient describes chronic worsening back pain today was it radiated into his left chest.  Patient is tachycardic and has not been compliant with diabetes medications. He said polyuria. He has had DKA in the past and we will evaluate for this today. Will out MI. No unilateral leg swelling or pleuritic pain to suggest PE, however it is in the differential.  Abdomen is soft and nontender, but he has had pancreatitis before and with back pain I will add on a lipase. . IV hydration ordered. Insulin pending potassium results. Malignancy is in the differential.  Weight loss could be from uncontrolled diabetes.        Amount and/or Complexity of Data Reviewed  Clinical lab tests: ordered and reviewed  Tests in the radiology section of CPT®: ordered and reviewed    Risk of Complications, Morbidity, and/or Mortality  Presenting problems: high  Diagnostic procedures: low  Management options: moderate           Procedures

## 2020-11-24 NOTE — ED TRIAGE NOTES
Patient arrives with complaint of Back pain that encompasses his entire back, and intermittently wraps around to his chest.   The back pain started one month ago, increasing in intensity. The chest pain started one week ago. He also reports losing 40 lbs since august, unintentionally.

## 2020-11-24 NOTE — H&P
Hospitalist Note     Admit Date:  2020 11:06 AM   Name:  Scott Serrato   Age:  52 y.o.  :  1970   MRN:  [id number]   PCP:  LIBERTY El  Treatment Team: Primary Nurse: Deyanira Velazquez; Care Manager: Fabrizio Walker RN    HPI/Subjective:       53-year-old male history of CAD, multiple stent procedures ([address] 3/26/2020 with stable CAD), GERD, hypertriglyceridemia (TG>6000), history of psoriatic arthritis, insulin-dependent diabetes with history of DKA, non-intractable epilepsy with complex partial seizures, hypertension. Patient presents today with back pain that he states involves his entire back, intermittently wraps around to his chest.  Patient states the back pain (mostly mid to lower parts of his back) started a month ago but has been progressively getting worse to the point where he cannot move without pain. He is also states he lost 40 pounds since August which is unintentional.  He also states is having chest pain which feels like something is sitting on his chest.  There is no injury the patient is aware of. By working with an endocrinologist, he has had difficulty controlling his blood sugar with his home regimen and stopped taking his insulin a week and a half ago. He also endorses polydipsia and polyuria. Over the last month he has had chills and has had elevated temperatures to 99.9. He states he also had increasing cough today. In the ER blood pressure was elevated 176/104, heart rate elevated 125. WBC 15.6, hemoglobin  18.0. Glucose 336. UA with glucosuria and ketonuria. His anion gap was 22. High-sensitivity troponin 14.2 EKG was without ischemic changes, nonspecific T wave flattening in the inferior leads. CT chest \"Multiple ill-defined nodules in the left lung, most likely atypical infection such as fungal or tuberculosis. \" No PE/Dissection.      CT abd/p: \"Findings consistent with acute pancreatitis of the uncinate process of the pancreas without evidence of pancreatic necrosis. Stable left adrenal myelolipoma. Status post pinning of the left hip and cholecystectomy. Diffuse fatty change liver.   Small hiatal hernia. 10 systems reviewed and negative except as noted in HPI. Past Medical History:   Diagnosis Date    Acute coronary syndrome ([facility] 12/15/2016    Acute pancreatitis 2/15/2018    Arthritis     psoriatic    Diabetes ([facility]     Endocrine disease     Hypertension     Nonintractable epilepsy with complex partial seizures ([facility] 2020    Psoriatic arthropathy (HCC)     Seizures (HCC)       Past Surgical History:   Procedure Laterality Date    CARDIAC SURG PROCEDURE UNLIST      HX APPENDECTOMY      HX CHOLECYSTECTOMY      HX HERNIA REPAIR      HX ORTHOPAEDIC      left heel secondary to trauma      Allergies   Allergen Reactions    Peanut Anaphylaxis    Morphine Other (comments)     Anxiety, claustrophobia      Social History     Tobacco Use    Smoking status: Former Smoker     Packs/day: 0.25     Years: 30.00     Pack years: 7.50     Last attempt to quit: 2019     Years since quittin.8    Smokeless tobacco: Former User   Substance Use Topics    Alcohol use: No      Family History   Problem Relation Age of Onset    Thyroid Disease Mother         goiters    Breast Cancer Mother         42's    Atrial Fibrillation Mother     Diabetes Father     Heart Disease Father 48    Diabetes Sister     Heart Disease Paternal Grandfather       Family history reviewed and noncontributory. Immunization History   Administered Date(s) Administered    Influenza Vaccine 10/01/2018    Influenza Vaccine (Quad) Mdck Pf (>4 Yrs Flucelvax QUAD [id number]) 2017    Pneumococcal Polysaccharide (PPSV-23) 2019    TB Skin Test (PPD) Intradermal 2008     PTA Medications:  Prior to Admission Medications   Prescriptions Last Dose Informant Patient Reported? Taking?    Accu-Chek Guide test strips strip   No No   Sig: Check BGL 4 times daily, E11.65   Admelog SoloStar U-100 Insulin 100 unit/mL kwikpen   No No   Si units TIDAC plus correction 4/50>150, max daily dose 100 units   Basaglar KwikPen U-100 Insulin 100 unit/mL (3 mL) inpn   No No   Sig: Start 50 units BID and increase by 2 units per dose every 3 days until FBG , max daily dose 160 units   Blood-Glucose Meter monitoring kit   No No   Sig: Check blood sugar tid DM -2   Insulin Needles, Disposable, 31 gauge x 5/16\" ndle   No No   Sig: by SubCUTAneous route four (4) times daily. aspirin delayed-release 81 mg tablet   Yes No   Sig: Take 81 mg by mouth. atorvastatin (LIPITOR) 80 mg tablet   No No   Sig: Take 1 Tab by mouth nightly for 360 days. buPROPion (WELLBUTRIN) 100 mg tablet   No No   Sig: Take 1 Tab by mouth two (2) times a day. carvediloL (COREG) 6.25 mg tablet   No No   Sig: Take 1 Tab by mouth two (2) times daily (with meals). ergocalciferol (ERGOCALCIFEROL) 1,250 mcg (50,000 unit) capsule   No No   Sig: Take 1 Cap by mouth every seven (7) days. fenofibrate (LOFIBRA) 160 mg tablet   No No   Sig: Take 1 Tab by mouth daily. isosorbide mononitrate ER (IMDUR) 30 mg tablet   Yes No   Sig: TAKE 1 TABLET BY MOUTH ONCE DAILY   lancets misc   No No   Sig: Check blood sugar tid   lancets misc   No No   Sig: Check blood sugar tid   nitroglycerin (NITROSTAT) 0.4 mg SL tablet   No No   Si Tab by SubLINGual route as needed for Chest Pain. And call EMS ,May repeat every five min for up to 3 doses AS NEEDED   omega-3 fatty acids (FISH OIL CONCENTRATE) 1,000 mg cap   No No   Sig: Take 1,000 mg by mouth two (2) times a day. pantoprazole (PROTONIX) 40 mg tablet   No No   Sig: Take 1 Tab by mouth daily. prasugrel (EFFIENT) 10 mg tablet   Yes No   Sig: Take 10 mg by mouth.   predniSONE (DELTASONE) 10 mg tablet   No No   Sig: Take 10 mg by mouth daily (with breakfast).    secukinumab (Cosentyx Pen) 150 mg/mL pnij   No No   Si mg by SubCUTAneous route every twenty-eight (28) days. 300 mg SC at week every 4 weeks   zonisamide (Zonegran) 100 mg capsule   No No   Sig: Take 3 Caps by mouth nightly. Facility-Administered Medications: None       Objective:     Patient Vitals for the past 24 hrs:   Temp Pulse Resp BP SpO2   11/24/20 1103 98.4 °F (36.9 °C) (!) 125 18 (!) 176/104 98 %     Oxygen Therapy  O2 Sat (%): 98 % (11/24/20 1103)  O2 Device: Room air (11/24/20 1212)    Estimated body mass index is 35.3 kg/m² as calculated from the following:    Height as of this encounter: 5' 10\" (1.778 m). Weight as of this encounter: 111.6 kg (246 lb). Intake/Output Summary (Last 24 hours) at 11/24/2020 1613  Last data filed at 11/24/2020 1233  Gross per 24 hour   Intake 50 ml   Output    Net 50 ml       *Note that automatically entered I/Os may not be accurate; dependent on patient compliance with collection and accurate  by assistants. Physical Exam:  General:    Well nourished. Alert. Appearing. Obese. Eyes:   Normal sclerae. Extraocular movements intact. HENT:  Normocephalic, atraumatic. Dry mucous membranes  CV:   Tachycardia. No m/r/g. Lungs:  CTAB. No wheezing, rhonchi, or rales. Abdomen: Soft, minimally tender to palpation. , there is some CVA tenderness. Extremities: Warm and dry. No cyanosis or edema. Neurologic: No focal motor deficits. No focal midline back tenderness  Skin:     No rashes or jaundice. Normal coloration  Psych:  Normal mood and affect. I reviewed the labs, imaging, EKGs, telemetry, and other studies done this admission.   Data Review:   Recent Results (from the past 24 hour(s))   EKG, 12 LEAD, INITIAL    Collection Time: 11/24/20 11:12 AM   Result Value Ref Range    Ventricular Rate 118 BPM    Atrial Rate 118 BPM    P-R Interval 176 ms    QRS Duration 96 ms    Q-T Interval 304 ms    QTC Calculation (Bezet) 426 ms    Calculated P Axis 71 degrees    Calculated R Axis 81 degrees    Calculated T Axis 38 degrees Diagnosis       Sinus tachycardia  Otherwise normal ECG  When compared with ECG of 25-MAR-2020 12:47,  Vent. rate has increased BY  40 BPM  Nonspecific T wave abnormality, worse in Inferior leads     GLUCOSE, POC    Collection Time: 11/24/20 11:19 AM   Result Value Ref Range    Glucose (POC) 307 (H) 65 - 100 mg/dL   CBC WITH AUTOMATED DIFF    Collection Time: 11/24/20 11:20 AM   Result Value Ref Range    WBC 15.6 (H) 4.3 - 11.1 K/uL    RBC 5.30 4.23 - 5.6 M/uL    HGB 18.0 (H) 13.6 - 17.2 g/dL    HCT 48.2 41.1 - 50.3 %    MCV 90.9 79.6 - 97.8 FL    MCH 34.0 (H) 26.1 - 32.9 PG    MCHC 37.3 (H) 31.4 - 35.0 g/dL    RDW 13.0 11.9 - 14.6 %    PLATELET 310 305 - 762 K/uL    MPV 11.7 9.4 - 12.3 FL    ABSOLUTE NRBC 0.00 0.0 - 0.2 K/uL    DF AUTOMATED      NEUTROPHILS 76 43 - 78 %    LYMPHOCYTES 12 (L) 13 - 44 %    MONOCYTES 10 4.0 - 12.0 %    EOSINOPHILS 2 0.5 - 7.8 %    BASOPHILS 1 0.0 - 2.0 %    IMMATURE GRANULOCYTES 0 0.0 - 5.0 %    ABS. NEUTROPHILS 11.8 (H) 1.7 - 8.2 K/UL    ABS. LYMPHOCYTES 1.8 0.5 - 4.6 K/UL    ABS. MONOCYTES 1.5 (H) 0.1 - 1.3 K/UL    ABS. EOSINOPHILS 0.3 0.0 - 0.8 K/UL    ABS. BASOPHILS 0.1 0.0 - 0.2 K/UL    ABS. IMM. GRANS. 0.1 0.0 - 0.5 K/UL   METABOLIC PANEL, COMPREHENSIVE    Collection Time: 11/24/20 11:20 AM   Result Value Ref Range    Sodium 132 (L) 136 - 145 mmol/L    Potassium 3.8 3.5 - 5.1 mmol/L    Chloride 96 (L) 98 - 107 mmol/L    CO2 14 (L) 21 - 32 mmol/L    Anion gap 22 (H) 7 - 16 mmol/L    Glucose 336 (H) 65 - 100 mg/dL    BUN 11 6 - 23 MG/DL    Creatinine 0.73 (L) 0.8 - 1.5 MG/DL    GFR est AA >60 >60 ml/min/1.73m2    GFR est non-AA >60 >60 ml/min/1.73m2    Calcium 8.3 8.3 - 10.4 MG/DL    Bilirubin, total 1.0 0.2 - 1.1 MG/DL    ALT (SGPT) 26 12 - 65 U/L    AST (SGOT) 23 15 - 37 U/L    Alk.  phosphatase 123 50 - 136 U/L    Protein, total 7.4 6.3 - 8.2 g/dL    Albumin 4.2 3.5 - 5.0 g/dL    Globulin 3.2 2.3 - 3.5 g/dL    A-G Ratio 1.3 1.2 - 3.5     TROPONIN-HIGH SENSITIVITY    Collection Time: 11/24/20 11:20 AM   Result Value Ref Range    Troponin-High Sensitivity 14.2 (H) 0 - 14 pg/mL   TSH 3RD GENERATION    Collection Time: 11/24/20 11:20 AM   Result Value Ref Range    TSH 2.130 uIU/mL   LIPASE    Collection Time: 11/24/20 11:20 AM   Result Value Ref Range    Lipase 99 73 - 393 U/L   LACTIC ACID    Collection Time: 11/24/20 12:02 PM   Result Value Ref Range    Lactic acid 0.9 0.4 - 2.0 MMOL/L   GLUCOSE, POC    Collection Time: 11/24/20 12:53 PM   Result Value Ref Range    Glucose (POC) 284 (H) 65 - 100 mg/dL   POC VENOUS BLOOD GAS    Collection Time: 11/24/20 12:54 PM   Result Value Ref Range    Device: ROOM AIR      pH, venous (POC) 7.31 (L) 7.32 - 7.42      pCO2, venous (POC) 33.9 (L) 41 - 51 MMHG    pO2, venous (POC) 53 mmHg    HCO3, venous (POC) 17.1 (L) 23 - 28 MMOL/L    sO2, venous (POC) 84 65 - 88 %    Base deficit, venous (POC) 8 mmol/L    Allens test (POC) NOT APPLICABLE      Site OTHER      Specimen type (POC) VENOUS BLOOD      Performed by Porter     CO2, POC 18 MMOL/L    COLLECT TIME 1,255     SARS-COV-2    Collection Time: 11/24/20  2:33 PM   Result Value Ref Range    Specimen source Nasopharyngeal      SARS CoV-2 PENDING    URINALYSIS W/ RFLX MICROSCOPIC    Collection Time: 11/24/20  2:33 PM   Result Value Ref Range    Color YELLOW      Appearance CLEAR      Specific gravity 1.037 (H) 1.001 - 1.023      pH (UA) 5.0 5.0 - 9.0      Protein 30 (A) NEG mg/dL    Glucose >1,000 mg/dL    Ketone >80 (A) NEG mg/dL    Bilirubin SMALL (A) NEG      Blood Negative NEG      Urobilinogen 0.2 0.2 - 1.0 EU/dL    Nitrites Negative NEG      Leukocyte Esterase Negative NEG      WBC 0 0 /hpf    RBC 0-3 0 /hpf    Epithelial cells 0-3 0 /hpf    Bacteria 0 0 /hpf    Casts 0-3 0 /lpf   GLUCOSE, POC    Collection Time: 11/24/20  3:01 PM   Result Value Ref Range    Glucose (POC) 257 (H) 65 - 100 mg/dL   GLUCOSTABILIZER    Collection Time: 11/24/20  3:03 PM   Result Value Ref Range    Glucose 257 mg/dL    Insulin order 3.9 units/hour    Insulin adminstered 3.9 units/hour    Multiplier 0.020     Low target 150 mg/dL    High target 250 mg/dL    D50 order 0.0 ml    D50 administered 0.00 ml    Minutes until next BG 60 min    Order initials cm     Administered initials cm     GLSCOM Comments     TROPONIN-HIGH SENSITIVITY    Collection Time: 11/24/20  3:12 PM   Result Value Ref Range    Troponin-High Sensitivity 12.8 0 - 14 pg/mL   METABOLIC PANEL, BASIC    Collection Time: 11/24/20  3:12 PM   Result Value Ref Range    Sodium 141 136 - 145 mmol/L    Potassium 3.9 3.5 - 5.1 mmol/L    Chloride 104 98 - 107 mmol/L    CO2 16 (L) 21 - 32 mmol/L    Anion gap 21 (H) 7 - 16 mmol/L    Glucose 246 (H) 65 - 100 mg/dL    BUN 8 6 - 23 MG/DL    Creatinine 0.44 (L) 0.8 - 1.5 MG/DL    GFR est AA >60 >60 ml/min/1.73m2    GFR est non-AA >60 >60 ml/min/1.73m2    Calcium 7.1 (L) 8.3 - 10.4 MG/DL   GLUCOSTABILIZER    Collection Time: 11/24/20  4:09 PM   Result Value Ref Range    Glucose 242 mg/dL    Insulin order 3.6 units/hour    Insulin adminstered 3.6 units/hour    Multiplier 0.020     Low target 150 mg/dL    High target 250 mg/dL    D50 order 0.0 ml    D50 administered 0.00 ml    Minutes until next BG 60 min    Order initials cm     Administered initials cm     GLSCOM Comments         All Micro Results     Procedure Component Value Units Date/Time    CLAUDIA Morrison UR/CSF [[id number]] Collected:  11/24/20 1433    Order Status:  Completed Updated:  11/24/20 1538    CULTURE, URINE [[id number]] Collected:  11/24/20 1433    Order Status:  Completed Specimen:  Urine from Clean catch Updated:  11/24/20 1442    CLAUDIA Morrison UR/CSF [[id number]] Collected:  11/24/20 1415    Order Status:  Canceled     CULTURE, RESPIRATORY/SPUTUM/BRONCH Veralan Tyler [[id number]]     Order Status:  Sent Specimen:  Sputum     BLOOD CULTURE [[id number]] Collected:  11/24/20 1204    Order Status:  Completed Specimen:  Blood Updated:  11/24/20 1235    BLOOD CULTURE [[id number]] Collected:  11/24/20 1203    Order Status:  Completed Specimen:  Blood Updated:  11/24/20 1235          Current Facility-Administered Medications   Medication Dose Route Frequency    pantoprazole (PROTONIX) 40 mg in 0.9% sodium chloride 10 mL injection  40 mg IntraVENous Q12H    insulin regular (NOVOLIN R, HUMULIN R) 100 Units in 0.9% sodium chloride 100 mL infusion  1-10 Units/hr IntraVENous TITRATE    dextrose 40% (GLUTOSE) oral gel 1 Tube  15 g Oral PRN    glucagon (GLUCAGEN) injection 1 mg  1 mg IntraMUSCular PRN    dextrose (D50W) injection syrg 12.5-25 g  25-50 mL IntraVENous PRN    [START ON 11/25/2020] cefTRIAXone (ROCEPHIN) 1 g in 0.9% sodium chloride (MBP/ADV) 50 mL MBP  1 g IntraVENous Q24H    [START ON 11/25/2020] azithromycin (ZITHROMAX) 500 mg in 0.9% sodium chloride 250 mL (VIAL-MATE)  500 mg IntraVENous Q24H    lactated Ringers infusion  125 mL/hr IntraVENous CONTINUOUS    labetaloL (NORMODYNE;TRANDATE) injection 10 mg  10 mg IntraVENous Q2H PRN    nitroglycerin (NITROBID) 2 % ointment 0.5 Inch  0.5 Inch Topical BID     Current Outpatient Medications   Medication Sig    isosorbide mononitrate ER (IMDUR) 30 mg tablet TAKE 1 TABLET BY MOUTH ONCE DAILY    Admelog SoloStar U-100 Insulin 100 unit/mL kwikpen 25 units TIDAC plus correction 4/50>150, max daily dose 100 units    Basaglar KwikPen U-100 Insulin 100 unit/mL (3 mL) inpn Start 50 units BID and increase by 2 units per dose every 3 days until FBG , max daily dose 160 units    Accu-Chek Guide test strips strip Check BGL 4 times daily, E11.65    secukinumab (Cosentyx Pen) 150 mg/mL pnij 300 mg by SubCUTAneous route every twenty-eight (28) days. 300 mg SC at week every 4 weeks    ergocalciferol (ERGOCALCIFEROL) 1,250 mcg (50,000 unit) capsule Take 1 Cap by mouth every seven (7) days.  predniSONE (DELTASONE) 10 mg tablet Take 10 mg by mouth daily (with breakfast).     zonisamide (Zonegran) 100 mg capsule Take 3 Caps by mouth nightly.  carvediloL (COREG) 6.25 mg tablet Take 1 Tab by mouth two (2) times daily (with meals).  pantoprazole (PROTONIX) 40 mg tablet Take 1 Tab by mouth daily.  fenofibrate (LOFIBRA) 160 mg tablet Take 1 Tab by mouth daily.  atorvastatin (LIPITOR) 80 mg tablet Take 1 Tab by mouth nightly for 360 days.  buPROPion (WELLBUTRIN) 100 mg tablet Take 1 Tab by mouth two (2) times a day.  Insulin Needles, Disposable, 31 gauge x 5/16\" ndle by SubCUTAneous route four (4) times daily.  omega-3 fatty acids (FISH OIL CONCENTRATE) 1,000 mg cap Take 1,000 mg by mouth two (2) times a day.  Blood-Glucose Meter monitoring kit Check blood sugar tid DM -2    lancets misc Check blood sugar tid    nitroglycerin (NITROSTAT) 0.4 mg SL tablet 1 Tab by SubLINGual route as needed for Chest Pain. And call EMS ,May repeat every five min for up to 3 doses AS NEEDED    aspirin delayed-release 81 mg tablet Take 81 mg by mouth.  prasugrel (EFFIENT) 10 mg tablet Take 10 mg by mouth.  lancets misc Check blood sugar tid       Other Studies:  No results found for this visit on 11/24/20. Xr Chest Port    Result Date: 11/24/2020  History: Chest pain, shortness of breath Exam: portable chest Comparison: 3/25/2020 Findings: There is apparent new asymmetric opacity seen at the left lung base adjacent heart shadow. Otherwise, the lungs are clear. Mediastinal contour and osseous structures are normal. Impressions: Apparent new asymmetric lung opacity at the left lung base. Correlation with PA and lateral chest x-ray recommended for further evaluation, as the finding could be artifactual.         Assessment and Plan:     Hospital Problems as of 11/24/2020 Date Reviewed: 9/17/2020    None            Back pain radiating to the chest/abdomen   Note: yesterday I originally thought CT abd/p was with pancreatitis but this was in error as that finding was from 2018. GI/heme were consulted at that time.  It was later discovered that CT from 11/24 did not in fact show any pancreatitis. His triglyceride levels are above 2000 but no ICU bed available at [facility] so transfer was deferred until clinical status and/or bed availability     DKA  Likely secondary to pancreatitis and not taking any insulin for the last week and a half  C/w insulin gtt, IVF, BMP Q4hr  Check A1c    Cough, with fever   CT chest w/ Multiple ill-defined nodules in the left lung, most likely atypical  R/o COVID status  Radiology read as infection such as fungal or tuberculosis. Has associated weight loss  AFB x 3 and pulm consult  Check atypical panel (Legionella, mycoplasma, chlamydia)   Continue Rocephin and azithromycin  airborn isolation until TB r/o, then droplet isolation until COVID r/o; avoid fluoroquinolones until rule out TB    Unintentional weight loss  Possibly secondary to uncontrolled diabetes versus TB versus occult malignancy  Needs outpatient follow-up with oncology for further evaluation    Uncontrolled hypertension  Likely secondary to pain  As needed labetalol; pain control    Chest Pain  CAD, multiple stent procedures (last [facility] 3/26/2020 with stable CAD)  ASA 325mg x 1 and NTG paste, trend troponin and EKG; check echo  Cardiology consult  CTA w/o PE  VTE ppx with lovenox  PPI in case related to hiatal hernia       Critical care time spent 40 minutes  High risk for clinical decline given DKA with pancreatitis  Rest of plan per hospital course    Discharge planning:    DVT ppx ordered  Code status:  Full  Estimated LOS:  Greater than 2 midnights  Risk:  high    Signed:  Jeremy Casillas MD

## 2020-11-24 NOTE — DIABETES MGMT
Admitting blood glucose 336. Diabetes management consult acknowledged. Patient currently on droplet plus precautions for COVID-19 rule out. Creatinine 0.73. GFR >60. Anion gap 22. Noted patient to be started on insulin gtt via glucostabilizer. Patient well known to diabetes management team as he was recently seen by educator in March 2020 at which time patient received diabetes education. Per previous conversations patient was diagnosed with diabetes 10 years ago and has a positive family history of diabetes. While hospitalized at that time patient was receiving Lantus 50 units daily, Humalog SSI, and Glipizide 5mg daily.

## 2020-11-24 NOTE — ACP (ADVANCE CARE PLANNING)
Advance Care Planning     Advance Care Planning Activator (Inpatient)  Conversation Note      Date of ACP Conversation: 11/24/20     Conversation Conducted with:   Patient with Decision Making Capacity    ACP Activator: Tamika Glass RN    *When Decision Maker makes decisions on behalf of the incapacitated patient: Decision Maker is asked to consider and make decisions based on patient values, known preferences, or best interests. Health Care Decision Maker:    Current Designated Health Care Decision Maker:   (If there is a valid [address] named in the [address] [place] [address] Makers\" box in the ACP activity, but it is not visible above, be sure to open that field and then select the health care decision maker relationship (ie \"primary\") in the blank space to the right of the name.) Validate  this information as still accurate & up-to-date; edit [address] field as needed.)    Note: Assess and validate information in current ACP documents, as indicated. If no Decision Maker listed above or available through scanned documents, then:    If no Authorized Decision Maker has previously been identified, then patient chooses [address]:  \"Who would you like to name as your primary health care decision-maker? \"    Name: Raphael Wiseman  Relationship: Mother Phone number: [phone]  [place] this person be reached easily? \" YES      Note: If the relationship of these Decision-Makers to the patient does NOT follow your state's Next of Kin hierarchy, recommend that patient complete ACP document that meets state-specific requirements to allow them to act on the patient's behalf when appropriate. Care Preferences    Ventilation: \"If you were in your present state of health and suddenly became very ill and were unable to breathe on your own, what would your preference be about the use of a ventilator (breathing machine) if it were available to you? \"      If patient would desire the use of a ventilator (breathing machine), answer \"yes\", if not \"no\":yes    \"If your health worsens and it becomes clear that your chance of recovery is unlikely, what would your preference be about the use of a ventilator (breathing machine) if it were available to you? \"     Would the patient desire the use of a ventilator (breathing machine)? YES      Resuscitation  \"CPR works best to restart the heart when there is a sudden event, like a heart attack, in someone who is otherwise healthy. Unfortunately, CPR does not typically restart the heart for people who have serious health conditions or who are very sick. \"    \"In the event your heart stopped as a result of an underlying serious health condition, would you want attempts to be made to restart your heart (answer \"yes\" for attempt to resuscitate) or would you prefer a natural death (answer \"no\" for do not attempt to resuscitate)? \" yes      NOTE: If the patient has a valid advance directive AND now provides care preference(s) that are inconsistent with that prior directive, advise the patient to consider either: creating a new advance directive that complies with state-specific requirements; or, if that is not possible, orally revoking that prior directive in accordance with state-specific requirements, which must be documented in the EHR. [] Yes  [] No   Educated Trish / Priscilla Palomino regarding differences between Advance Directives and portable DNR orders.     Length of ACP Conversation in minutes:      Conversation Outcomes:  [] ACP discussion completed  [] Existing advance directive reviewed with patient; no changes to patient's previously recorded wishes     [] New Advance Directive completed   [] Portable Do Not Resuscitate prepared for Provider review and signature  [] POLST/POST/MOLST/MOST prepared for Provider review and signature      Follow-up plan:    [] Schedule follow-up conversation to continue planning  [] Referred individual to Provider for additional questions/concerns   [] Advised patient/agent/surrogate to review completed ACP document and update if needed with changes in condition, patient preferences or care setting     [] This note routed to one or more involved healthcare providers       These are patient's current wishes as discussed at bedside today and are not intended to take place of [address]

## 2020-11-25 ENCOUNTER — HOSPITAL ENCOUNTER (INPATIENT)
Age: 50
LOS: 6 days | Discharge: HOME | DRG: 282 | End: 2020-12-01
Attending: INTERNAL MEDICINE | Admitting: INTERNAL MEDICINE
Payer: COMMERCIAL

## 2020-11-25 ENCOUNTER — APPOINTMENT (OUTPATIENT)
Dept: INTERVENTIONAL RADIOLOGY/VASCULAR | Age: 50
DRG: 282 | End: 2020-11-25
Attending: NURSE PRACTITIONER
Payer: COMMERCIAL

## 2020-11-25 VITALS
TEMPERATURE: 98.1 F | RESPIRATION RATE: 13 BRPM | OXYGEN SATURATION: 95 % | HEART RATE: 87 BPM | BODY MASS INDEX: 36.25 KG/M2 | DIASTOLIC BLOOD PRESSURE: 92 MMHG | WEIGHT: 253.2 LBS | SYSTOLIC BLOOD PRESSURE: 156 MMHG | HEIGHT: 70 IN

## 2020-11-25 DIAGNOSIS — R91.8 PULMONARY INFILTRATES: ICD-10-CM

## 2020-11-25 DIAGNOSIS — D64.9 ANEMIA, UNSPECIFIED TYPE: ICD-10-CM

## 2020-11-25 DIAGNOSIS — E78.1 HYPERTRIGLYCERIDEMIA: Primary | ICD-10-CM

## 2020-11-25 DIAGNOSIS — R63.4 WEIGHT LOSS, UNINTENTIONAL: ICD-10-CM

## 2020-11-25 DIAGNOSIS — E11.10 DIABETIC KETOACIDOSIS WITHOUT COMA ASSOCIATED WITH TYPE 2 DIABETES MELLITUS (HCC): ICD-10-CM

## 2020-11-25 DIAGNOSIS — G40.909 SEIZURE DISORDER (HCC): ICD-10-CM

## 2020-11-25 DIAGNOSIS — R07.9 CHEST PAIN, UNSPECIFIED TYPE: ICD-10-CM

## 2020-11-25 DIAGNOSIS — K85.90 ACUTE PANCREATITIS, UNSPECIFIED COMPLICATION STATUS, UNSPECIFIED PANCREATITIS TYPE: ICD-10-CM

## 2020-11-25 DIAGNOSIS — I25.10 CORONARY ARTERY DISEASE INVOLVING NATIVE CORONARY ARTERY OF NATIVE HEART WITHOUT ANGINA PECTORIS: ICD-10-CM

## 2020-11-25 DIAGNOSIS — M25.512 ACUTE PAIN OF LEFT SHOULDER: ICD-10-CM

## 2020-11-25 DIAGNOSIS — I10 HYPERTENSION, UNSPECIFIED TYPE: ICD-10-CM

## 2020-11-25 DIAGNOSIS — M54.9 INTRACTABLE BACK PAIN: ICD-10-CM

## 2020-11-25 DIAGNOSIS — R91.8 LUNG NODULES: ICD-10-CM

## 2020-11-25 DIAGNOSIS — L40.50 PSORIATIC ARTHRITIS (HCC): ICD-10-CM

## 2020-11-25 DIAGNOSIS — R91.8 LUNG INFILTRATE: ICD-10-CM

## 2020-11-25 PROBLEM — R52 INTRACTABLE PAIN: Status: ACTIVE | Noted: 2020-11-25

## 2020-11-25 PROBLEM — Z87.891 HISTORY OF SMOKING 25-50 PACK YEARS: Status: ACTIVE | Noted: 2020-11-25

## 2020-11-25 LAB
ADMINISTERED INITIALS, ADMINIT: NORMAL
ALBUMIN SERPL-MCNC: 2.9 G/DL (ref 3.5–5)
ALBUMIN/GLOB SERPL: 0.9 {RATIO} (ref 1.2–3.5)
ALP SERPL-CCNC: 98 U/L (ref 50–136)
ALT SERPL-CCNC: 24 U/L (ref 12–65)
ANION GAP SERPL CALC-SCNC: 10 MMOL/L (ref 7–16)
ANION GAP SERPL CALC-SCNC: 14 MMOL/L (ref 7–16)
APTT PPP: 31.6 SEC (ref 24.1–35.1)
AST SERPL-CCNC: 65 U/L (ref 15–37)
BASOPHILS # BLD: 0.1 K/UL (ref 0–0.2)
BASOPHILS NFR BLD: 1 % (ref 0–2)
BILIRUB SERPL-MCNC: 1 MG/DL (ref 0.2–1.1)
BUN SERPL-MCNC: 6 MG/DL (ref 6–23)
BUN SERPL-MCNC: 7 MG/DL (ref 6–23)
CALCIUM SERPL-MCNC: 7.6 MG/DL (ref 8.3–10.4)
CALCIUM SERPL-MCNC: 7.9 MG/DL (ref 8.3–10.4)
CHLORIDE SERPL-SCNC: 106 MMOL/L (ref 98–107)
CHLORIDE SERPL-SCNC: 95 MMOL/L (ref 98–107)
CO2 SERPL-SCNC: 23 MMOL/L (ref 21–32)
CO2 SERPL-SCNC: 24 MMOL/L (ref 21–32)
COVID-19 RAPID TEST, COVR: NOT DETECTED
CREAT SERPL-MCNC: 0.61 MG/DL (ref 0.8–1.5)
CREAT SERPL-MCNC: 0.63 MG/DL (ref 0.8–1.5)
D50 ADMINISTERED, D50ADM: 0 ML
D50 ORDER, D50ORD: 0 ML
DIFFERENTIAL METHOD BLD: ABNORMAL
EOSINOPHIL # BLD: 0.5 K/UL (ref 0–0.8)
EOSINOPHIL NFR BLD: 5 % (ref 0.5–7.8)
ERYTHROCYTE [DISTWIDTH] IN BLOOD BY AUTOMATED COUNT: 13.6 % (ref 11.9–14.6)
EST. AVERAGE GLUCOSE BLD GHB EST-MCNC: 263 MG/DL
FIBRINOGEN PPP-MCNC: 594 MG/DL (ref 190–501)
FLUID CULTURE, SPNG2: NORMAL
GLOBULIN SER CALC-MCNC: 3.3 G/DL (ref 2.3–3.5)
GLSCOM COMMENTS: NORMAL
GLUCOSE BLD STRIP.AUTO-MCNC: 158 MG/DL (ref 65–100)
GLUCOSE BLD STRIP.AUTO-MCNC: 171 MG/DL (ref 65–100)
GLUCOSE BLD STRIP.AUTO-MCNC: 177 MG/DL (ref 65–100)
GLUCOSE BLD STRIP.AUTO-MCNC: 179 MG/DL (ref 65–100)
GLUCOSE BLD STRIP.AUTO-MCNC: 197 MG/DL (ref 65–100)
GLUCOSE BLD STRIP.AUTO-MCNC: 201 MG/DL (ref 65–100)
GLUCOSE BLD STRIP.AUTO-MCNC: 211 MG/DL (ref 65–100)
GLUCOSE BLD STRIP.AUTO-MCNC: 247 MG/DL (ref 65–100)
GLUCOSE BLD STRIP.AUTO-MCNC: 248 MG/DL (ref 65–100)
GLUCOSE SERPL-MCNC: 192 MG/DL (ref 65–100)
GLUCOSE SERPL-MCNC: 284 MG/DL (ref 65–100)
GLUCOSE, GLC: 158 MG/DL
GLUCOSE, GLC: 177 MG/DL
GLUCOSE, GLC: 179 MG/DL
GLUCOSE, GLC: 197 MG/DL
GLUCOSE, GLC: 211 MG/DL
HBA1C MFR BLD: 10.8 %
HCT VFR BLD AUTO: 39.9 % (ref 41.1–50.3)
HGB BLD-MCNC: 14.7 G/DL (ref 13.6–17.2)
HIGH TARGET, HITG: 180 MG/DL
IMM GRANULOCYTES # BLD AUTO: 0 K/UL (ref 0–0.5)
IMM GRANULOCYTES NFR BLD AUTO: 0 % (ref 0–5)
INR PPP: 1
INSULIN ADMINSTERED, INSADM: 2 UNITS/HOUR
INSULIN ADMINSTERED, INSADM: 2.3 UNITS/HOUR
INSULIN ADMINSTERED, INSADM: 2.4 UNITS/HOUR
INSULIN ADMINSTERED, INSADM: 4.1 UNITS/HOUR
INSULIN ADMINSTERED, INSADM: 6 UNITS/HOUR
INSULIN ORDER, INSORD: 2 UNITS/HOUR
INSULIN ORDER, INSORD: 2.3 UNITS/HOUR
INSULIN ORDER, INSORD: 2.4 UNITS/HOUR
INSULIN ORDER, INSORD: 4.1 UNITS/HOUR
INSULIN ORDER, INSORD: 6 UNITS/HOUR
L PNEUMO1 AG UR QL IA: NEGATIVE
LDLC SERPL DIRECT ASSAY-MCNC: 80 MG/DL
LDLC SERPL DIRECT ASSAY-MCNC: 81 MG/DL
LDLC SERPL DIRECT ASSAY-MCNC: 93 MG/DL
LIPASE SERPL-CCNC: 41 U/L (ref 73–393)
LOW TARGET, LOT: 140 MG/DL
LYMPHOCYTES # BLD: 2.4 K/UL (ref 0.5–4.6)
LYMPHOCYTES NFR BLD: 24 % (ref 13–44)
MAGNESIUM SERPL-MCNC: 2 MG/DL (ref 1.8–2.4)
MCH RBC QN AUTO: 34 PG (ref 26.1–32.9)
MCHC RBC AUTO-ENTMCNC: 36.8 G/DL (ref 31.4–35)
MCV RBC AUTO: 92.4 FL (ref 79.6–97.8)
MINUTES UNTIL NEXT BG, NBG: 60 MIN
MONOCYTES # BLD: 1 K/UL (ref 0.1–1.3)
MONOCYTES NFR BLD: 9 % (ref 4–12)
MULTIPLIER, MUL: 0.02
MULTIPLIER, MUL: 0.03
MULTIPLIER, MUL: 0.04
NEUTS SEG # BLD: 6.2 K/UL (ref 1.7–8.2)
NEUTS SEG NFR BLD: 61 % (ref 43–78)
NRBC # BLD: 0 K/UL (ref 0–0.2)
ORDER INITIALS, ORDINIT: NORMAL
ORGANISM ID, SPNG3: NORMAL
PHOSPHATE SERPL-MCNC: 4.4 MG/DL (ref 2.5–4.5)
PLATELET # BLD AUTO: 222 K/UL (ref 150–450)
PLEASE NOTE, SPNG4: NORMAL
PMV BLD AUTO: 11.9 FL (ref 9.4–12.3)
POTASSIUM SERPL-SCNC: 4 MMOL/L (ref 3.5–5.1)
POTASSIUM SERPL-SCNC: 5 MMOL/L (ref 3.5–5.1)
POTASSIUM SERPL-SCNC: 6.7 MMOL/L (ref 3.5–5.1)
PROCALCITONIN SERPL-MCNC: <0.05 NG/ML
PROT SERPL-MCNC: 6.2 G/DL (ref 6.3–8.2)
PROTHROMBIN TIME: 13.7 SEC (ref 12.5–14.7)
RBC # BLD AUTO: 4.32 M/UL (ref 4.23–5.6)
S PNEUM AG SPEC QL LA: NEGATIVE
SODIUM SERPL-SCNC: 128 MMOL/L (ref 136–145)
SODIUM SERPL-SCNC: 144 MMOL/L (ref 136–145)
SOURCE, COVRS: NORMAL
SPECIMEN SOURCE: NORMAL
SPECIMEN SOURCE: NORMAL
SPECIMEN, SPNG1: NORMAL
TRIGL SERPL-MCNC: 2184 MG/DL (ref 35–150)
TRIGL SERPL-MCNC: 2245 MG/DL (ref 35–150)
TRIGL SERPL-MCNC: 2253 MG/DL (ref 35–150)
TRIGL SERPL-MCNC: 2304 MG/DL (ref 35–150)
TRIGL SERPL-MCNC: NORMAL MG/DL (ref 30–200)
WBC # BLD AUTO: 10.1 K/UL (ref 4.3–11.1)

## 2020-11-25 PROCEDURE — 36514 APHERESIS PLASMA: CPT

## 2020-11-25 PROCEDURE — 74011250636 HC RX REV CODE- 250/636: Performed by: INTERNAL MEDICINE

## 2020-11-25 PROCEDURE — 82962 GLUCOSE BLOOD TEST: CPT

## 2020-11-25 PROCEDURE — 84100 ASSAY OF PHOSPHORUS: CPT

## 2020-11-25 PROCEDURE — 85384 FIBRINOGEN ACTIVITY: CPT

## 2020-11-25 PROCEDURE — C1752 CATH,HEMODIALYSIS,SHORT-TERM: HCPCS

## 2020-11-25 PROCEDURE — 99223 1ST HOSP IP/OBS HIGH 75: CPT | Performed by: INTERNAL MEDICINE

## 2020-11-25 PROCEDURE — 74011250637 HC RX REV CODE- 250/637: Performed by: NURSE PRACTITIONER

## 2020-11-25 PROCEDURE — 83721 ASSAY OF BLOOD LIPOPROTEIN: CPT

## 2020-11-25 PROCEDURE — 74011250636 HC RX REV CODE- 250/636: Performed by: RADIOLOGY

## 2020-11-25 PROCEDURE — 83036 HEMOGLOBIN GLYCOSYLATED A1C: CPT

## 2020-11-25 PROCEDURE — C1894 INTRO/SHEATH, NON-LASER: HCPCS

## 2020-11-25 PROCEDURE — 85025 COMPLETE CBC W/AUTO DIFF WBC: CPT

## 2020-11-25 PROCEDURE — 74011250637 HC RX REV CODE- 250/637: Performed by: INTERNAL MEDICINE

## 2020-11-25 PROCEDURE — 83690 ASSAY OF LIPASE: CPT

## 2020-11-25 PROCEDURE — 74011000258 HC RX REV CODE- 258: Performed by: FAMILY MEDICINE

## 2020-11-25 PROCEDURE — 74011636637 HC RX REV CODE- 636/637: Performed by: INTERNAL MEDICINE

## 2020-11-25 PROCEDURE — 02HV33Z INSERTION OF INFUSION DEVICE INTO SUPERIOR VENA CAVA, PERCUTANEOUS APPROACH: ICD-10-PCS | Performed by: RADIOLOGY

## 2020-11-25 PROCEDURE — 74011250636 HC RX REV CODE- 250/636: Performed by: FAMILY MEDICINE

## 2020-11-25 PROCEDURE — 74011250637 HC RX REV CODE- 250/637: Performed by: EMERGENCY MEDICINE

## 2020-11-25 PROCEDURE — 74011000258 HC RX REV CODE- 258: Performed by: INTERNAL MEDICINE

## 2020-11-25 PROCEDURE — 77030002916 HC SUT ETHLN J&J -A

## 2020-11-25 PROCEDURE — 77001 FLUOROGUIDE FOR VEIN DEVICE: CPT

## 2020-11-25 PROCEDURE — 84478 ASSAY OF TRIGLYCERIDES: CPT

## 2020-11-25 PROCEDURE — 77030018719 HC DRSG PTCH ANTIMIC J&J -A

## 2020-11-25 PROCEDURE — P9045 ALBUMIN (HUMAN), 5%, 250 ML: HCPCS | Performed by: NURSE PRACTITIONER

## 2020-11-25 PROCEDURE — 83735 ASSAY OF MAGNESIUM: CPT

## 2020-11-25 PROCEDURE — 85610 PROTHROMBIN TIME: CPT

## 2020-11-25 PROCEDURE — C9113 INJ PANTOPRAZOLE SODIUM, VIA: HCPCS | Performed by: INTERNAL MEDICINE

## 2020-11-25 PROCEDURE — 74011000250 HC RX REV CODE- 250: Performed by: INTERNAL MEDICINE

## 2020-11-25 PROCEDURE — 65270000029 HC RM PRIVATE

## 2020-11-25 PROCEDURE — 80053 COMPREHEN METABOLIC PANEL: CPT

## 2020-11-25 PROCEDURE — 74011250636 HC RX REV CODE- 250/636: Performed by: NURSE PRACTITIONER

## 2020-11-25 PROCEDURE — 2709999900 HC NON-CHARGEABLE SUPPLY

## 2020-11-25 PROCEDURE — 99254 IP/OBS CNSLTJ NEW/EST MOD 60: CPT | Performed by: INTERNAL MEDICINE

## 2020-11-25 PROCEDURE — 84145 PROCALCITONIN (PCT): CPT

## 2020-11-25 PROCEDURE — 85730 THROMBOPLASTIN TIME PARTIAL: CPT

## 2020-11-25 PROCEDURE — 74011000250 HC RX REV CODE- 250: Performed by: RADIOLOGY

## 2020-11-25 PROCEDURE — 84132 ASSAY OF SERUM POTASSIUM: CPT

## 2020-11-25 PROCEDURE — 99222 1ST HOSP IP/OBS MODERATE 55: CPT | Performed by: INTERNAL MEDICINE

## 2020-11-25 PROCEDURE — 74011636637 HC RX REV CODE- 636/637: Performed by: FAMILY MEDICINE

## 2020-11-25 RX ORDER — INSULIN GLARGINE 100 [IU]/ML
22 INJECTION, SOLUTION SUBCUTANEOUS 2 TIMES DAILY
Status: DISCONTINUED | OUTPATIENT
Start: 2020-11-25 | End: 2020-11-25 | Stop reason: HOSPADM

## 2020-11-25 RX ORDER — ENOXAPARIN SODIUM 100 MG/ML
40 INJECTION SUBCUTANEOUS DAILY
Status: CANCELLED | OUTPATIENT
Start: 2020-11-26

## 2020-11-25 RX ORDER — ACETAMINOPHEN 650 MG/1
650 SUPPOSITORY RECTAL
Status: CANCELLED | OUTPATIENT
Start: 2020-11-25

## 2020-11-25 RX ORDER — ACETAMINOPHEN 325 MG/1
650 TABLET ORAL
Status: CANCELLED | OUTPATIENT
Start: 2020-11-25

## 2020-11-25 RX ORDER — ASPIRIN 81 MG/1
81 TABLET ORAL DAILY
Status: DISCONTINUED | OUTPATIENT
Start: 2020-11-26 | End: 2020-12-01 | Stop reason: HOSPADM

## 2020-11-25 RX ORDER — HYDROMORPHONE HYDROCHLORIDE 1 MG/ML
0.5 INJECTION, SOLUTION INTRAMUSCULAR; INTRAVENOUS; SUBCUTANEOUS
Status: DISCONTINUED | OUTPATIENT
Start: 2020-11-25 | End: 2020-12-01 | Stop reason: HOSPADM

## 2020-11-25 RX ORDER — SODIUM CHLORIDE 9 MG/ML
25 INJECTION, SOLUTION INTRAVENOUS CONTINUOUS
Status: CANCELLED | OUTPATIENT
Start: 2020-11-25

## 2020-11-25 RX ORDER — INSULIN LISPRO 100 [IU]/ML
INJECTION, SOLUTION INTRAVENOUS; SUBCUTANEOUS
Status: DISCONTINUED | OUTPATIENT
Start: 2020-11-25 | End: 2020-12-01 | Stop reason: HOSPADM

## 2020-11-25 RX ORDER — CARVEDILOL 6.25 MG/1
6.25 TABLET ORAL 2 TIMES DAILY WITH MEALS
Status: DISCONTINUED | OUTPATIENT
Start: 2020-11-25 | End: 2020-11-25 | Stop reason: HOSPADM

## 2020-11-25 RX ORDER — ENOXAPARIN SODIUM 100 MG/ML
40 INJECTION SUBCUTANEOUS DAILY
Status: DISCONTINUED | OUTPATIENT
Start: 2020-11-26 | End: 2020-12-01 | Stop reason: HOSPADM

## 2020-11-25 RX ORDER — PANTOPRAZOLE SODIUM 40 MG/1
40 TABLET, DELAYED RELEASE ORAL DAILY
Status: DISCONTINUED | OUTPATIENT
Start: 2020-11-25 | End: 2020-11-25 | Stop reason: SDUPTHER

## 2020-11-25 RX ORDER — CARVEDILOL 6.25 MG/1
6.25 TABLET ORAL 2 TIMES DAILY WITH MEALS
Status: DISCONTINUED | OUTPATIENT
Start: 2020-11-26 | End: 2020-12-01 | Stop reason: HOSPADM

## 2020-11-25 RX ORDER — LABETALOL HYDROCHLORIDE 5 MG/ML
20 INJECTION, SOLUTION INTRAVENOUS
Status: DISCONTINUED | OUTPATIENT
Start: 2020-11-25 | End: 2020-12-01 | Stop reason: HOSPADM

## 2020-11-25 RX ORDER — SODIUM CHLORIDE 0.9 % (FLUSH) 0.9 %
5-40 SYRINGE (ML) INJECTION EVERY 8 HOURS
Status: CANCELLED | OUTPATIENT
Start: 2020-11-25

## 2020-11-25 RX ORDER — ISOSORBIDE MONONITRATE 30 MG/1
30 TABLET, EXTENDED RELEASE ORAL DAILY
Status: DISCONTINUED | OUTPATIENT
Start: 2020-11-26 | End: 2020-11-25 | Stop reason: HOSPADM

## 2020-11-25 RX ORDER — BUPROPION HYDROCHLORIDE 100 MG/1
100 TABLET ORAL 2 TIMES DAILY
Status: DISCONTINUED | OUTPATIENT
Start: 2020-11-25 | End: 2020-12-01 | Stop reason: HOSPADM

## 2020-11-25 RX ORDER — ACETAMINOPHEN 325 MG/1
650 TABLET ORAL AS NEEDED
Status: CANCELLED
Start: 2020-11-25

## 2020-11-25 RX ORDER — POLYETHYLENE GLYCOL 3350 17 G/17G
17 POWDER, FOR SOLUTION ORAL DAILY PRN
Status: CANCELLED | OUTPATIENT
Start: 2020-11-25

## 2020-11-25 RX ORDER — PRASUGREL 10 MG/1
10 TABLET, FILM COATED ORAL DAILY
Status: DISCONTINUED | OUTPATIENT
Start: 2020-11-26 | End: 2020-12-01 | Stop reason: HOSPADM

## 2020-11-25 RX ORDER — EPINEPHRINE 1 MG/ML
0.3 INJECTION, SOLUTION, CONCENTRATE INTRAVENOUS AS NEEDED
Status: CANCELLED | OUTPATIENT
Start: 2020-11-25

## 2020-11-25 RX ORDER — ISOSORBIDE MONONITRATE 30 MG/1
30 TABLET, EXTENDED RELEASE ORAL DAILY
Status: DISCONTINUED | OUTPATIENT
Start: 2020-11-26 | End: 2020-12-01 | Stop reason: HOSPADM

## 2020-11-25 RX ORDER — DEXTROSE 40 %
15 GEL (GRAM) ORAL AS NEEDED
Status: DISCONTINUED | OUTPATIENT
Start: 2020-11-25 | End: 2020-11-25 | Stop reason: HOSPADM

## 2020-11-25 RX ORDER — PROMETHAZINE HYDROCHLORIDE 25 MG/1
12.5 TABLET ORAL
Status: CANCELLED | OUTPATIENT
Start: 2020-11-25

## 2020-11-25 RX ORDER — HEPARIN SODIUM 1000 [USP'U]/ML
1000-10000 INJECTION, SOLUTION INTRAVENOUS; SUBCUTANEOUS ONCE
Status: COMPLETED | OUTPATIENT
Start: 2020-11-25 | End: 2020-11-25

## 2020-11-25 RX ORDER — SODIUM CHLORIDE 9 MG/ML
25 INJECTION, SOLUTION INTRAVENOUS CONTINUOUS
Status: DISPENSED | OUTPATIENT
Start: 2020-11-25 | End: 2020-11-26

## 2020-11-25 RX ORDER — DIPHENHYDRAMINE HYDROCHLORIDE 50 MG/ML
25 INJECTION, SOLUTION INTRAMUSCULAR; INTRAVENOUS AS NEEDED
Status: CANCELLED
Start: 2020-11-25

## 2020-11-25 RX ORDER — ONDANSETRON 2 MG/ML
4 INJECTION INTRAMUSCULAR; INTRAVENOUS
Status: DISCONTINUED | OUTPATIENT
Start: 2020-11-25 | End: 2020-12-01 | Stop reason: HOSPADM

## 2020-11-25 RX ORDER — SODIUM CHLORIDE 0.9 % (FLUSH) 0.9 %
10 SYRINGE (ML) INJECTION AS NEEDED
Status: ACTIVE | OUTPATIENT
Start: 2020-11-25 | End: 2020-11-26

## 2020-11-25 RX ORDER — ISOSORBIDE MONONITRATE 30 MG/1
30 TABLET, EXTENDED RELEASE ORAL DAILY
Status: DISCONTINUED | OUTPATIENT
Start: 2020-11-25 | End: 2020-11-25 | Stop reason: SDUPTHER

## 2020-11-25 RX ORDER — SODIUM CHLORIDE 0.9 % (FLUSH) 0.9 %
5-40 SYRINGE (ML) INJECTION AS NEEDED
Status: CANCELLED | OUTPATIENT
Start: 2020-11-25

## 2020-11-25 RX ORDER — CALCIUM CARBONATE 500(1250)
500 TABLET ORAL
Status: DISCONTINUED | OUTPATIENT
Start: 2020-11-26 | End: 2020-12-01 | Stop reason: HOSPADM

## 2020-11-25 RX ORDER — ACETAMINOPHEN 325 MG/1
650 TABLET ORAL
Status: DISCONTINUED | OUTPATIENT
Start: 2020-11-25 | End: 2020-12-01 | Stop reason: HOSPADM

## 2020-11-25 RX ORDER — DEXTROSE 50 % IN WATER (D50W) INTRAVENOUS SYRINGE
25-50 AS NEEDED
Status: DISCONTINUED | OUTPATIENT
Start: 2020-11-25 | End: 2020-11-25 | Stop reason: HOSPADM

## 2020-11-25 RX ORDER — DIPHENHYDRAMINE HCL 25 MG
25 CAPSULE ORAL
Status: CANCELLED
Start: 2020-11-25

## 2020-11-25 RX ORDER — FAMOTIDINE 20 MG/1
20 TABLET, FILM COATED ORAL 2 TIMES DAILY
Status: CANCELLED | OUTPATIENT
Start: 2020-11-25

## 2020-11-25 RX ORDER — PRASUGREL 10 MG/1
10 TABLET, FILM COATED ORAL DAILY
Status: DISCONTINUED | OUTPATIENT
Start: 2020-11-25 | End: 2020-11-25 | Stop reason: HOSPADM

## 2020-11-25 RX ORDER — DIPHENHYDRAMINE HCL 25 MG
25 CAPSULE ORAL
Status: COMPLETED | OUTPATIENT
Start: 2020-11-25 | End: 2020-11-26

## 2020-11-25 RX ORDER — POLYETHYLENE GLYCOL 3350 17 G/17G
17 POWDER, FOR SOLUTION ORAL DAILY PRN
Status: DISCONTINUED | OUTPATIENT
Start: 2020-11-25 | End: 2020-12-01 | Stop reason: HOSPADM

## 2020-11-25 RX ORDER — INSULIN GLARGINE 100 [IU]/ML
22 INJECTION, SOLUTION SUBCUTANEOUS EVERY 12 HOURS
Status: DISCONTINUED | OUTPATIENT
Start: 2020-11-25 | End: 2020-11-28

## 2020-11-25 RX ORDER — SODIUM CHLORIDE 0.9 % (FLUSH) 0.9 %
5-40 SYRINGE (ML) INJECTION AS NEEDED
Status: DISCONTINUED | OUTPATIENT
Start: 2020-11-25 | End: 2020-12-01 | Stop reason: HOSPADM

## 2020-11-25 RX ORDER — NITROGLYCERIN 0.4 MG/1
0.4 TABLET SUBLINGUAL AS NEEDED
Status: DISCONTINUED | OUTPATIENT
Start: 2020-11-25 | End: 2020-11-25 | Stop reason: HOSPADM

## 2020-11-25 RX ORDER — ATORVASTATIN CALCIUM 80 MG/1
80 TABLET, FILM COATED ORAL DAILY
Status: DISCONTINUED | OUTPATIENT
Start: 2020-11-26 | End: 2020-12-01 | Stop reason: HOSPADM

## 2020-11-25 RX ORDER — ONDANSETRON 2 MG/ML
4 INJECTION INTRAMUSCULAR; INTRAVENOUS
Status: DISCONTINUED | OUTPATIENT
Start: 2020-11-25 | End: 2020-11-25 | Stop reason: HOSPADM

## 2020-11-25 RX ORDER — INSULIN LISPRO 100 [IU]/ML
INJECTION, SOLUTION INTRAVENOUS; SUBCUTANEOUS EVERY 24 HOURS
Status: DISCONTINUED | OUTPATIENT
Start: 2020-11-26 | End: 2020-11-25 | Stop reason: HOSPADM

## 2020-11-25 RX ORDER — ACETAMINOPHEN 650 MG/1
650 SUPPOSITORY RECTAL
Status: DISCONTINUED | OUTPATIENT
Start: 2020-11-25 | End: 2020-12-01 | Stop reason: HOSPADM

## 2020-11-25 RX ORDER — LIDOCAINE HYDROCHLORIDE 20 MG/ML
40-120 INJECTION, SOLUTION INFILTRATION; PERINEURAL ONCE
Status: COMPLETED | OUTPATIENT
Start: 2020-11-25 | End: 2020-11-25

## 2020-11-25 RX ORDER — INSULIN GLARGINE 100 [IU]/ML
22 INJECTION, SOLUTION SUBCUTANEOUS DAILY
Status: DISCONTINUED | OUTPATIENT
Start: 2020-11-25 | End: 2020-11-25

## 2020-11-25 RX ORDER — SODIUM CHLORIDE 9 MG/ML
10 INJECTION INTRAMUSCULAR; INTRAVENOUS; SUBCUTANEOUS AS NEEDED
Status: CANCELLED | OUTPATIENT
Start: 2020-11-25

## 2020-11-25 RX ORDER — SODIUM CHLORIDE 0.9 % (FLUSH) 0.9 %
5-40 SYRINGE (ML) INJECTION EVERY 8 HOURS
Status: DISCONTINUED | OUTPATIENT
Start: 2020-11-25 | End: 2020-12-01 | Stop reason: HOSPADM

## 2020-11-25 RX ORDER — ACETAMINOPHEN 325 MG/1
650 TABLET ORAL
Status: COMPLETED | OUTPATIENT
Start: 2020-11-25 | End: 2020-11-26

## 2020-11-25 RX ORDER — CALCIUM CARBONATE 500(1250)
500 TABLET ORAL
Status: DISCONTINUED | OUTPATIENT
Start: 2020-11-25 | End: 2020-11-25 | Stop reason: HOSPADM

## 2020-11-25 RX ORDER — ACETAMINOPHEN 325 MG/1
650 TABLET ORAL
Status: CANCELLED
Start: 2020-11-25

## 2020-11-25 RX ORDER — ONDANSETRON 2 MG/ML
4 INJECTION INTRAMUSCULAR; INTRAVENOUS
Status: CANCELLED | OUTPATIENT
Start: 2020-11-25

## 2020-11-25 RX ORDER — BUPROPION HYDROCHLORIDE 100 MG/1
100 TABLET ORAL 2 TIMES DAILY
Status: DISCONTINUED | OUTPATIENT
Start: 2020-11-25 | End: 2020-11-25 | Stop reason: HOSPADM

## 2020-11-25 RX ORDER — FENOFIBRATE 160 MG/1
160 TABLET ORAL DAILY
Status: DISCONTINUED | OUTPATIENT
Start: 2020-11-26 | End: 2020-12-01 | Stop reason: HOSPADM

## 2020-11-25 RX ORDER — DIPHENHYDRAMINE HYDROCHLORIDE 50 MG/ML
50 INJECTION, SOLUTION INTRAMUSCULAR; INTRAVENOUS AS NEEDED
Status: CANCELLED
Start: 2020-11-25

## 2020-11-25 RX ORDER — ALBUMIN HUMAN 50 G/1000ML
3500 SOLUTION INTRAVENOUS ONCE
Status: COMPLETED | OUTPATIENT
Start: 2020-11-25 | End: 2020-11-26

## 2020-11-25 RX ORDER — ATORVASTATIN CALCIUM 40 MG/1
80 TABLET, FILM COATED ORAL
Status: DISCONTINUED | OUTPATIENT
Start: 2020-11-25 | End: 2020-11-25 | Stop reason: HOSPADM

## 2020-11-25 RX ORDER — PROMETHAZINE HYDROCHLORIDE 25 MG/1
12.5 TABLET ORAL
Status: DISCONTINUED | OUTPATIENT
Start: 2020-11-25 | End: 2020-12-01 | Stop reason: HOSPADM

## 2020-11-25 RX ORDER — ALBUTEROL SULFATE 0.83 MG/ML
2.5 SOLUTION RESPIRATORY (INHALATION) AS NEEDED
Status: CANCELLED
Start: 2020-11-25

## 2020-11-25 RX ORDER — SODIUM CHLORIDE 0.9 % (FLUSH) 0.9 %
10 SYRINGE (ML) INJECTION AS NEEDED
Status: CANCELLED | OUTPATIENT
Start: 2020-11-25

## 2020-11-25 RX ORDER — CALCIUM GLUCONATE 94 MG/ML
1 INJECTION, SOLUTION INTRAVENOUS ONCE
Status: DISCONTINUED | OUTPATIENT
Start: 2020-11-25 | End: 2020-11-25

## 2020-11-25 RX ORDER — ONDANSETRON 2 MG/ML
8 INJECTION INTRAMUSCULAR; INTRAVENOUS AS NEEDED
Status: CANCELLED | OUTPATIENT
Start: 2020-11-25

## 2020-11-25 RX ORDER — INSULIN LISPRO 100 [IU]/ML
INJECTION, SOLUTION INTRAVENOUS; SUBCUTANEOUS EVERY 24 HOURS
Status: DISCONTINUED | OUTPATIENT
Start: 2020-11-26 | End: 2020-11-28

## 2020-11-25 RX ORDER — HYDROMORPHONE HYDROCHLORIDE 1 MG/ML
0.2 INJECTION, SOLUTION INTRAMUSCULAR; INTRAVENOUS; SUBCUTANEOUS
Status: DISCONTINUED | OUTPATIENT
Start: 2020-11-25 | End: 2020-11-29

## 2020-11-25 RX ORDER — HYDROCORTISONE SODIUM SUCCINATE 100 MG/2ML
100 INJECTION, POWDER, FOR SOLUTION INTRAMUSCULAR; INTRAVENOUS AS NEEDED
Status: CANCELLED | OUTPATIENT
Start: 2020-11-25

## 2020-11-25 RX ADMIN — Medication 500 MG: at 16:16

## 2020-11-25 RX ADMIN — Medication 10 ML: at 14:03

## 2020-11-25 RX ADMIN — CARVEDILOL 6.25 MG: 6.25 TABLET, FILM COATED ORAL at 16:16

## 2020-11-25 RX ADMIN — LABETALOL HYDROCHLORIDE 20 MG: 5 INJECTION INTRAVENOUS at 10:49

## 2020-11-25 RX ADMIN — CARVEDILOL 6.25 MG: 6.25 TABLET, FILM COATED ORAL at 10:44

## 2020-11-25 RX ADMIN — SODIUM CHLORIDE 25 ML/HR: 900 INJECTION, SOLUTION INTRAVENOUS at 21:55

## 2020-11-25 RX ADMIN — HEPARIN SODIUM 2600 UNITS: 1000 INJECTION INTRAVENOUS; SUBCUTANEOUS at 17:39

## 2020-11-25 RX ADMIN — PROMETHAZINE HYDROCHLORIDE 12.5 MG: 25 INJECTION INTRAMUSCULAR; INTRAVENOUS at 10:45

## 2020-11-25 RX ADMIN — AZITHROMYCIN 500 MG: 500 INJECTION, POWDER, LYOPHILIZED, FOR SOLUTION INTRAVENOUS at 13:41

## 2020-11-25 RX ADMIN — HYDROMORPHONE HYDROCHLORIDE 1 MG: 1 INJECTION, SOLUTION INTRAMUSCULAR; INTRAVENOUS; SUBCUTANEOUS at 13:36

## 2020-11-25 RX ADMIN — Medication 40 ML: at 22:15

## 2020-11-25 RX ADMIN — SODIUM CHLORIDE 40 MG: 9 INJECTION, SOLUTION INTRAMUSCULAR; INTRAVENOUS; SUBCUTANEOUS at 08:21

## 2020-11-25 RX ADMIN — CALCIUM GLUCONATE 1 G: 98 INJECTION, SOLUTION INTRAVENOUS at 21:55

## 2020-11-25 RX ADMIN — ONDANSETRON 4 MG: 2 INJECTION INTRAMUSCULAR; INTRAVENOUS at 22:19

## 2020-11-25 RX ADMIN — INSULIN HUMAN 4 UNITS: 100 INJECTION, SOLUTION PARENTERAL at 16:15

## 2020-11-25 RX ADMIN — DEXTROSE MONOHYDRATE AND SODIUM CHLORIDE 150 ML/HR: 5; .45 INJECTION, SOLUTION INTRAVENOUS at 00:06

## 2020-11-25 RX ADMIN — CEFTRIAXONE 1 G: 1 INJECTION, POWDER, FOR SOLUTION INTRAMUSCULAR; INTRAVENOUS at 13:44

## 2020-11-25 RX ADMIN — Medication 40 ML: at 21:55

## 2020-11-25 RX ADMIN — INSULIN LISPRO 2 UNITS: 100 INJECTION, SOLUTION INTRAVENOUS; SUBCUTANEOUS at 22:00

## 2020-11-25 RX ADMIN — NITROGLYCERIN 0.5 INCH: 20 OINTMENT TOPICAL at 08:20

## 2020-11-25 RX ADMIN — Medication 500 MG: at 13:40

## 2020-11-25 RX ADMIN — Medication 10 ML: at 22:00

## 2020-11-25 RX ADMIN — INSULIN HUMAN 4 UNITS: 100 INJECTION, SOLUTION PARENTERAL at 08:18

## 2020-11-25 RX ADMIN — Medication 10 ML: at 05:11

## 2020-11-25 RX ADMIN — ALBUMIN (HUMAN) 175 G: 12.5 INJECTION, SOLUTION INTRAVENOUS at 21:56

## 2020-11-25 RX ADMIN — PROMETHAZINE HYDROCHLORIDE 12.5 MG: 25 INJECTION INTRAMUSCULAR; INTRAVENOUS at 05:15

## 2020-11-25 RX ADMIN — INSULIN GLARGINE 22 UNITS: 100 INJECTION, SOLUTION SUBCUTANEOUS at 21:00

## 2020-11-25 RX ADMIN — HYDROMORPHONE HYDROCHLORIDE 1 MG: 1 INJECTION, SOLUTION INTRAMUSCULAR; INTRAVENOUS; SUBCUTANEOUS at 08:24

## 2020-11-25 RX ADMIN — Medication 40 ML: at 22:00

## 2020-11-25 RX ADMIN — INSULIN HUMAN 4 UNITS: 100 INJECTION, SOLUTION PARENTERAL at 10:56

## 2020-11-25 RX ADMIN — FENOFIBRATE 160 MG: 160 TABLET ORAL at 08:20

## 2020-11-25 RX ADMIN — BUPROPION HYDROCHLORIDE 100 MG: 100 TABLET, FILM COATED ORAL at 10:44

## 2020-11-25 RX ADMIN — ASPIRIN 81 MG: 81 TABLET, CHEWABLE ORAL at 08:21

## 2020-11-25 RX ADMIN — HYDROMORPHONE HYDROCHLORIDE 1 MG: 1 INJECTION, SOLUTION INTRAMUSCULAR; INTRAVENOUS; SUBCUTANEOUS at 00:11

## 2020-11-25 RX ADMIN — HYDROMORPHONE HYDROCHLORIDE 0.5 MG: 1 INJECTION, SOLUTION INTRAMUSCULAR; INTRAVENOUS; SUBCUTANEOUS at 22:20

## 2020-11-25 RX ADMIN — INSULIN GLARGINE 22 UNITS: 100 INJECTION, SOLUTION SUBCUTANEOUS at 10:57

## 2020-11-25 RX ADMIN — LIDOCAINE HYDROCHLORIDE 90 MG: 20 INJECTION, SOLUTION INFILTRATION; PERINEURAL at 17:38

## 2020-11-25 RX ADMIN — ONDANSETRON 4 MG: 2 INJECTION INTRAMUSCULAR; INTRAVENOUS at 13:36

## 2020-11-25 RX ADMIN — PROMETHAZINE HYDROCHLORIDE 12.5 MG: 25 INJECTION INTRAMUSCULAR; INTRAVENOUS at 00:13

## 2020-11-25 RX ADMIN — BUPROPION HYDROCHLORIDE 100 MG: 100 TABLET, FILM COATED ORAL at 22:19

## 2020-11-25 NOTE — PROGRESS NOTES
TRANSFER - OUT REPORT: 
 
Verbal report given to [facility] RN(name) on Merary Arce  being transferred to [facility](unit) for routine progression of care Report consisted of patients Situation, Background, Assessment and  
Recommendations(SBAR). Information from the following report(s) SBAR, Kardex, Procedure Summary, Intake/Output, MAR and Accordion was reviewed with the receiving nurse. Lines:  
Peripheral IV 11/24/20 Right Forearm (Active) Site Assessment Clean, dry, & intact 11/25/20 0600 Phlebitis Assessment 0 11/25/20 0600 Infiltration Assessment 0 11/25/20 0600 Dressing Status Clean, dry, & intact 11/25/20 0600 Dressing Type Transparent;Tape 11/25/20 0600 Hub Color/Line Status Capped 11/25/20 0600 Peripheral IV 11/24/20 Right Antecubital (Active) Site Assessment Clean, dry, & intact 11/25/20 0600 Phlebitis Assessment 0 11/25/20 0600 Infiltration Assessment 0 11/25/20 0600 Dressing Status Clean, dry, & intact 11/25/20 0600 Dressing Type Transparent;Tape 11/25/20 0600 Hub Color/Line Status Capped 11/25/20 0600 Opportunity for questions and clarification was provided. Patient transported with: 
 O2 @ 2 liters Tech

## 2020-11-25 NOTE — ED NOTES
TRANSFER - OUT REPORT:    Verbal report given to Bailee on Jonnathan Obrien  being transferred to [facility] ICU for routine progression of care       Report consisted of patients Situation, Background, Assessment and   Recommendations(SBAR). Information from the following report(s) SBAR, Kardex, ED Summary, Intake/Output, MAR, Accordion, Recent Results and Med Rec Status was reviewed with the receiving nurse. Lines:   Peripheral IV 11/24/20 Right Forearm (Active)   Site Assessment Clean, dry, & intact 11/24/20 1138   Phlebitis Assessment 0 11/24/20 1138   Infiltration Assessment 0 11/24/20 1138   Dressing Status Clean, dry, & intact 11/24/20 1138       Peripheral IV 11/24/20 Right Antecubital (Active)   Site Assessment Clean, dry, & intact 11/24/20 1450   Phlebitis Assessment 0 11/24/20 1450   Infiltration Assessment 0 11/24/20 1450   Dressing Status Clean, dry, & intact 11/24/20 1450   Hub Color/Line Status Green 11/24/20 1450        Opportunity for questions and clarification was provided.       Patient transported with:   Registered Nurse

## 2020-11-25 NOTE — H&P
Hospitalist Note     Admit Date:  2020 11:06 AM   Name:  Lory Gant   Age:  52 y.o.  :  1970   MRN:  [id number]   PCP:  LIBERTY Kaiser  Treatment Team: Attending Provider: Pallavi Odonnell MD; Care Manager: Rocky Marques, KIERSTEN; Consulting Provider: Gi Oates MD; Consulting Provider: Yamil Teresa DO; Consulting Provider: Kadie Abdi MD; Primary Nurse: Marianna Garza RN; Utilization Review: Panda Pearson RN    HPI/Subjective:   57-year-old male history of CAD, multiple stent procedures ([address] 3/26/2020 with stable CAD), GERD, hypertriglyceridemia (TG>6000), history of psoriatic arthritis, insulin-dependent diabetes with history of DKA, non-intractable epilepsy with complex partial seizures, hypertension.     Patient presents today with back pain that he states involves his entire back, intermittently wraps around to his chest.  Patient states the back pain (mostly mid to lower parts of his back) started a month ago but has been progressively getting worse to the point where he cannot move without pain. He is also states he lost 40 pounds since August which is unintentional.  He also states is having chest pain which feels like something is sitting on his chest.  There is no injury the patient is aware of. By working with an endocrinologist, he has had difficulty controlling his blood sugar with his home regimen and stopped taking his insulin a week and a half ago. He also endorses polydipsia and polyuria.       Over the last month he has had chills and has had elevated temperatures to 99.9. He states he also had increasing cough today.     In the ER blood pressure was elevated 176/104, heart rate elevated 125. WBC 15.6, hemoglobin  18.0. Glucose 336. UA with glucosuria and ketonuria. His anion gap was 22.   High-sensitivity troponin 14.2 EKG was without ischemic changes, nonspecific T wave flattening in the inferior leads.     CT chest \"Multiple ill-defined nodules in the left lung, most likely atypical infection such as fungal or tuberculosis. \" No PE/Dissection.       10 systems reviewed and negative except as noted in HPI. Past Medical History:   Diagnosis Date    Acute coronary syndrome ([facility] 12/15/2016    Acute pancreatitis 2/15/2018    Arthritis     psoriatic    Diabetes ([facility]     Endocrine disease     Hypertension     Nonintractable epilepsy with complex partial seizures ([facility] 2020    Psoriatic arthropathy (HCC)     Seizures (HCC)       Past Surgical History:   Procedure Laterality Date    CARDIAC SURG PROCEDURE UNLIST      HX APPENDECTOMY      HX CHOLECYSTECTOMY      HX HERNIA REPAIR      HX ORTHOPAEDIC      left heel secondary to trauma      Allergies   Allergen Reactions    Peanut Anaphylaxis    Morphine Other (comments)     Anxiety, claustrophobia      Social History     Tobacco Use    Smoking status: Former Smoker     Packs/day: 0.25     Years: 30.00     Pack years: 7.50     Last attempt to quit: 2019     Years since quittin.8    Smokeless tobacco: Former User   Substance Use Topics    Alcohol use: No      Family History   Problem Relation Age of Onset    Thyroid Disease Mother         goiters    Breast Cancer Mother         42's    Atrial Fibrillation Mother     Diabetes Father     Heart Disease Father 48    Diabetes Sister     Heart Disease Paternal Grandfather       Family history reviewed and noncontributory. Immunization History   Administered Date(s) Administered    Influenza Vaccine 10/01/2018    Influenza Vaccine (Quad) Mdck Pf (>4 Yrs Flucelvax QUAD [id number]) 2017    Pneumococcal Polysaccharide (PPSV-23) 2019    TB Skin Test (PPD) Intradermal 2008     PTA Medications:  Prior to Admission Medications   Prescriptions Last Dose Informant Patient Reported? Taking?    Accu-Chek Guide test strips strip   No No   Sig: Check BGL 4 times daily, E11.65   Admelog SoloStar U-100 Insulin 100 unit/mL kwikpen   No No   Si units TIDAC plus correction 4/50>150, max daily dose 100 units   Basaglar KwikPen U-100 Insulin 100 unit/mL (3 mL) inpn   No No   Sig: Start 50 units BID and increase by 2 units per dose every 3 days until FBG , max daily dose 160 units   Blood-Glucose Meter monitoring kit   No No   Sig: Check blood sugar tid DM -2   Insulin Needles, Disposable, 31 gauge x 5/16\" ndle   No No   Sig: by SubCUTAneous route four (4) times daily. aspirin delayed-release 81 mg tablet   Yes No   Sig: Take 81 mg by mouth. atorvastatin (LIPITOR) 80 mg tablet   No No   Sig: Take 1 Tab by mouth nightly for 360 days. buPROPion (WELLBUTRIN) 100 mg tablet   No No   Sig: Take 1 Tab by mouth two (2) times a day. carvediloL (COREG) 6.25 mg tablet   No No   Sig: Take 1 Tab by mouth two (2) times daily (with meals). ergocalciferol (ERGOCALCIFEROL) 1,250 mcg (50,000 unit) capsule   No No   Sig: Take 1 Cap by mouth every seven (7) days. fenofibrate (LOFIBRA) 160 mg tablet   No No   Sig: Take 1 Tab by mouth daily. isosorbide mononitrate ER (IMDUR) 30 mg tablet   Yes No   Sig: TAKE 1 TABLET BY MOUTH ONCE DAILY   lancets misc   No No   Sig: Check blood sugar tid   lancets misc   No No   Sig: Check blood sugar tid   nitroglycerin (NITROSTAT) 0.4 mg SL tablet   No No   Si Tab by SubLINGual route as needed for Chest Pain. And call EMS ,May repeat every five min for up to 3 doses AS NEEDED   omega-3 fatty acids (FISH OIL CONCENTRATE) 1,000 mg cap   No No   Sig: Take 1,000 mg by mouth two (2) times a day. pantoprazole (PROTONIX) 40 mg tablet   No No   Sig: Take 1 Tab by mouth daily. prasugrel (EFFIENT) 10 mg tablet   Yes No   Sig: Take 10 mg by mouth.   predniSONE (DELTASONE) 10 mg tablet   No No   Sig: Take 10 mg by mouth daily (with breakfast). secukinumab (Cosentyx Pen) 150 mg/mL pnij   No No   Si mg by SubCUTAneous route every twenty-eight (28) days.  300 mg SC at week every 4 weeks   zonisamide (Zonegran) 100 mg capsule   No No   Sig: Take 3 Caps by mouth nightly. Facility-Administered Medications: None       Objective:     Patient Vitals for the past 24 hrs:   Temp Pulse Resp BP SpO2   11/25/20 1200  89  135/84 97 %   11/25/20 1100 98 °F (36.7 °C) 87 14 (!) 142/92 96 %   11/25/20 1049  97  (!) 183/114    11/25/20 1044  97  (!) 183/114    11/25/20 1036  (!) 102 15 (!) 183/114    11/25/20 1000  (!) 101 12 (!) 169/100 98 %   11/25/20 0900  98 17 138/78 99 %   11/25/20 0846     97 %   11/25/20 0820 97.9 °F (36.6 °C) 98 18 (!) 173/105 97 %   11/25/20 0819  (!) 104 23 (!) 173/105 100 %   11/25/20 0800  96 15 (!) 153/111 96 %   11/25/20 0720 97.9 °F (36.6 °C) 94 12 (!) 165/103 97 %   11/25/20 0700  (!) 106 14 (!) 165/103 97 %   11/25/20 0600  93 15 (!) 158/83 (!) 89 %   11/25/20 0500  92  (!) 157/89 95 %   11/25/20 0359 97.9 °F (36.6 °C) 94 12 111/82 98 %   11/25/20 0259  93 13 (!) 140/90 98 %   11/25/20 0221     96 %   11/25/20 0159  (!) 103 11 93/66 (!) 88 %   11/25/20 0059  (!) 115 13 (!) 144/96 91 %   11/24/20 2359 98.1 °F (36.7 °C) 98 15 (!) 153/97 97 %   11/24/20 2311 98.1 °F (36.7 °C)       11/24/20 2233  94 11  94 %   11/24/20 2141    (!) 164/96    11/24/20 2041  96 12  94 %   11/24/20 1917  99 12  93 %   11/24/20 1800  (!) 102 12  97 %   11/24/20 1700  (!) 103 15  91 %   11/24/20 1630  (!) 103 15  95 %   11/24/20 1605  (!) 102 15 135/88 94 %   11/24/20 1537    (!) 142/90    11/24/20 1506  (!) 105  (!) 142/90 95 %     Oxygen Therapy  O2 Sat (%): 97 % (11/25/20 1200)  Pulse via Oximetry: 90 beats per minute (11/25/20 1200)  O2 Device: Nasal cannula (11/25/20 1100)  O2 Flow Rate (L/min): 2 l/min (11/25/20 1100)    Estimated body mass index is 36.33 kg/m² as calculated from the following:    Height as of this encounter: 5' 10\" (1.778 m). Weight as of this encounter: 114.9 kg (253 lb 3.2 oz).       Intake/Output Summary (Last 24 hours) at 11/25/2020 1453  Last data filed at 11/25/2020 1348  Gross per 24 hour   Intake 2844.39 ml   Output 2325 ml   Net 519.39 ml       *Note that automatically entered I/Os may not be accurate; dependent on patient compliance with collection and accurate  by assistants. Physical Exam:     Physical Exam:  General:          Well nourished. Alert. Appearing. Obese. Eyes:               Normal sclerae. Extraocular movements intact. HENT:             Normocephalic, atraumatic. Dry mucous membranes  CV:                  Tachycardia. No m/r/g. Lungs:             CTAB. No wheezing, rhonchi, or rales. Abdomen:        Soft, moderately tender, there is some CVA tenderness. Extremities:     Warm and dry. No cyanosis or edema. Neurologic:      No focal motor deficits. No focal midline back tenderness  Skin:                No rashes or jaundice. Normal coloration  Psych:             Normal mood and affect.     I reviewed the labs, imaging, EKGs, telemetry, and other studies done this admission.   Data Review:   Recent Results (from the past 24 hour(s))   GLUCOSE, POC    Collection Time: 11/24/20  3:01 PM   Result Value Ref Range    Glucose (POC) 257 (H) 65 - 100 mg/dL   GLUCOSTABILIZER    Collection Time: 11/24/20  3:03 PM   Result Value Ref Range    Glucose 257 mg/dL    Insulin order 3.9 units/hour    Insulin adminstered 3.9 units/hour    Multiplier 0.020     Low target 150 mg/dL    High target 250 mg/dL    D50 order 0.0 ml    D50 administered 0.00 ml    Minutes until next BG 60 min    Order initials cm     Administered initials cm     GLSCOM Comments     TROPONIN-HIGH SENSITIVITY    Collection Time: 11/24/20  3:12 PM   Result Value Ref Range    Troponin-High Sensitivity 12.8 0 - 14 pg/mL   METABOLIC PANEL, BASIC    Collection Time: 11/24/20  3:12 PM   Result Value Ref Range    Sodium 141 136 - 145 mmol/L    Potassium 3.9 3.5 - 5.1 mmol/L    Chloride 104 98 - 107 mmol/L    CO2 16 (L) 21 - 32 mmol/L Anion gap 21 (H) 7 - 16 mmol/L    Glucose 246 (H) 65 - 100 mg/dL    BUN 8 6 - 23 MG/DL    Creatinine 0.44 (L) 0.8 - 1.5 MG/DL    GFR est AA >60 >60 ml/min/1.73m2    GFR est non-AA >60 >60 ml/min/1.73m2    Calcium 7.1 (L) 8.3 - 10.4 MG/DL   GLUCOSE, POC    Collection Time: 11/24/20  4:07 PM   Result Value Ref Range    Glucose (POC) 242 (H) 65 - 100 mg/dL   GLUCOSTABILIZER    Collection Time: 11/24/20  4:09 PM   Result Value Ref Range    Glucose 242 mg/dL    Insulin order 3.6 units/hour    Insulin adminstered 3.6 units/hour    Multiplier 0.020     Low target 150 mg/dL    High target 250 mg/dL    D50 order 0.0 ml    D50 administered 0.00 ml    Minutes until next BG 60 min    Order initials cm     Administered initials cm     GLSCOM Comments     TRIGLYCERIDE    Collection Time: 11/24/20  4:51 PM   Result Value Ref Range    Triglyceride 2,395 (H) 35 - 150 MG/DL   LDL, DIRECT    Collection Time: 11/24/20  4:51 PM   Result Value Ref Range    LDL,Direct 75 <100 mg/dl   GLUCOSE, POC    Collection Time: 11/24/20  5:13 PM   Result Value Ref Range    Glucose (POC) 218 (H) 65 - 100 mg/dL   GLUCOSTABILIZER    Collection Time: 11/24/20  5:14 PM   Result Value Ref Range    Glucose 218 mg/dL    Insulin order 3.2 units/hour    Insulin adminstered 3.2 units/hour    Multiplier 0.020     Low target 150 mg/dL    High target 250 mg/dL    D50 order 0.0 ml    D50 administered 0.00 ml    Minutes until next BG 60 min    Order initials cm     Administered initials cm     GLSCOM Comments     SARS-COV-2    Collection Time: 11/24/20  6:09 PM   Result Value Ref Range    Specimen source Nasopharyngeal      COVID-19 rapid test Not detected NOTD     METABOLIC PANEL, BASIC    Collection Time: 11/24/20  6:12 PM   Result Value Ref Range    Sodium 142 136 - 145 mmol/L    Potassium 4.0 3.5 - 5.1 mmol/L    Chloride 105 98 - 107 mmol/L    CO2 19 (L) 21 - 32 mmol/L    Anion gap 18 (H) 7 - 16 mmol/L    Glucose 189 (H) 65 - 100 mg/dL    BUN 8 6 - 23 MG/DL Creatinine 0.50 (L) 0.8 - 1.5 MG/DL    GFR est AA >60 >60 ml/min/1.73m2    GFR est non-AA >60 >60 ml/min/1.73m2    Calcium 7.3 (L) 8.3 - 10.4 MG/DL   CBC WITH AUTOMATED DIFF    Collection Time: 11/24/20  6:12 PM   Result Value Ref Range    WBC 13.2 (H) 4.3 - 11.1 K/uL    RBC 4.32 4.23 - 5.6 M/uL    HGB 14.9 13.6 - 17.2 g/dL    HCT 40.0 (L) 41.1 - 50.3 %    MCV 92.6 79.6 - 97.8 FL    MCH 34.5 (H) 26.1 - 32.9 PG    MCHC 37.3 (H) 31.4 - 35.0 g/dL    RDW 13.2 11.9 - 14.6 %    PLATELET 048 798 - 163 K/uL    MPV 11.3 9.4 - 12.3 FL    ABSOLUTE NRBC 0.00 0.0 - 0.2 K/uL    DF AUTOMATED      NEUTROPHILS 67 43 - 78 %    LYMPHOCYTES 20 13 - 44 %    MONOCYTES 9 4.0 - 12.0 %    EOSINOPHILS 3 0.5 - 7.8 %    BASOPHILS 1 0.0 - 2.0 %    IMMATURE GRANULOCYTES 0 0.0 - 5.0 %    ABS. NEUTROPHILS 8.9 (H) 1.7 - 8.2 K/UL    ABS. LYMPHOCYTES 2.6 0.5 - 4.6 K/UL    ABS. MONOCYTES 1.2 0.1 - 1.3 K/UL    ABS. EOSINOPHILS 0.4 0.0 - 0.8 K/UL    ABS. BASOPHILS 0.1 0.0 - 0.2 K/UL    ABS. IMM.  GRANS. 0.0 0.0 - 0.5 K/UL   TRIGLYCERIDE    Collection Time: 11/24/20  6:12 PM   Result Value Ref Range    Triglyceride 2,712 (H) 35 - 150 MG/DL   TROPONIN-HIGH SENSITIVITY    Collection Time: 11/24/20  6:12 PM   Result Value Ref Range    Troponin-High Sensitivity 14.2 (H) 0 - 14 pg/mL   LDL, DIRECT    Collection Time: 11/24/20  6:12 PM   Result Value Ref Range    LDL,Direct 103 (H) <100 mg/dl   GLUCOSE, POC    Collection Time: 11/24/20  6:17 PM   Result Value Ref Range    Glucose (POC) 217 (H) 65 - 100 mg/dL   GLUCOSTABILIZER    Collection Time: 11/24/20  6:18 PM   Result Value Ref Range    Glucose 217 mg/dL    Insulin order 3.1 units/hour    Insulin adminstered 3.1 units/hour    Multiplier 0.020     Low target 150 mg/dL    High target 250 mg/dL    D50 order 0.0 ml    D50 administered 0.00 ml    Minutes until next BG 60 min    Order initials cm     Administered initials cm     GLSCOM Comments     GLUCOSE, POC    Collection Time: 11/24/20  7:27 PM   Result Value Ref Range    Glucose (POC) 196 (H) 65 - 100 mg/dL   GLUCOSTABILIZER    Collection Time: 11/24/20  7:28 PM   Result Value Ref Range    Glucose 196 mg/dL    Insulin order 2.7 units/hour    Insulin adminstered 2.7 units/hour    Multiplier 0.020     Low target 150 mg/dL    High target 250 mg/dL    D50 order 0.0 ml    D50 administered 0.00 ml    Minutes until next BG 60 min    Order initials SON     Administered initials SON     GLSCOM Comments     GLUCOSE, POC    Collection Time: 11/24/20  8:35 PM   Result Value Ref Range    Glucose (POC) 190 (H) 65 - 100 mg/dL   GLUCOSTABILIZER    Collection Time: 11/24/20  8:38 PM   Result Value Ref Range    Glucose 190 mg/dL    Insulin order 2.6 units/hour    Insulin adminstered 2.6 units/hour    Multiplier 0.020     Low target 150 mg/dL    High target 250 mg/dL    D50 order 0.0 ml    D50 administered 0.00 ml    Minutes until next BG 60 min    Order initials      Administered initials      GLSCOM Comments     GLUCOSE, POC    Collection Time: 11/24/20  9:39 PM   Result Value Ref Range    Glucose (POC) 177 (H) 65 - 100 mg/dL   GLUCOSTABILIZER    Collection Time: 11/24/20  9:40 PM   Result Value Ref Range    Glucose 177 mg/dL    Insulin order 2.3 units/hour    Insulin adminstered 2.3 units/hour    Multiplier 0.020     Low target 150 mg/dL    High target 250 mg/dL    D50 order 0.0 ml    D50 administered 0.00 ml    Minutes until next BG 60 min    Order initials SON     Administered initials [facility]     GLSCOM Comments     METABOLIC PANEL, BASIC    Collection Time: 11/24/20 10:42 PM   Result Value Ref Range    Sodium 143 136 - 145 mmol/L    Potassium 4.6 3.5 - 5.1 mmol/L    Chloride 104 98 - 107 mmol/L    CO2 23 21 - 32 mmol/L    Anion gap 16 7 - 16 mmol/L    Glucose 161 (H) 65 - 100 mg/dL    BUN 7 6 - 23 MG/DL    Creatinine 0.62 (L) 0.8 - 1.5 MG/DL    GFR est AA >60 >60 ml/min/1.73m2    GFR est non-AA >60 >60 ml/min/1.73m2    Calcium 7.8 (L) 8.3 - 10.4 MG/DL   GLUCOSE, POC    Collection Time: 11/24/20 10:43 PM   Result Value Ref Range    Glucose (POC) 170 (H) 65 - 100 mg/dL   GLUCOSTABILIZER    Collection Time: 11/24/20 10:44 PM   Result Value Ref Range    Glucose 170 mg/dL    Insulin order 2.2 units/hour    Insulin adminstered 2.2 units/hour    Multiplier 0.020     Low target 150 mg/dL    High target 250 mg/dL    D50 order 0.0 ml    D50 administered 0.00 ml    Minutes until next BG 60 min    Order initials SON     Administered initials SON     GLSCOM Comments     GLUCOSE, POC    Collection Time: 11/24/20 11:21 PM   Result Value Ref Range    Glucose (POC) 168 (H) 65 - 100 mg/dL   GLUCOSTABILIZER    Collection Time: 11/24/20 11:21 PM   Result Value Ref Range    Glucose 168 mg/dL    Insulin order 2.2 units/hour    Insulin adminstered 2.2 units/hour    Multiplier 0.020     Low target 140 mg/dL    High target 180 mg/dL    D50 order 0.0 ml    D50 administered 0.00 ml    Minutes until next BG 60 min    Order initials sb     Administered initials sb     GLSCOM Comments     GLUCOSE, POC    Collection Time: 11/25/20 12:19 AM   Result Value Ref Range    Glucose (POC) 158 (H) 65 - 100 mg/dL   GLUCOSTABILIZER    Collection Time: 11/25/20 12:20 AM   Result Value Ref Range    Glucose 158 mg/dL    Insulin order 2.0 units/hour    Insulin adminstered 2.0 units/hour    Multiplier 0.020     Low target 140 mg/dL    High target 180 mg/dL    D50 order 0.0 ml    D50 administered 0.00 ml    Minutes until next BG 60 min    Order initials ALB     Administered initials ALB     GLSCOM Comments     GLUCOSE, POC    Collection Time: 11/25/20  1:01 AM   Result Value Ref Range    Glucose (POC) 179 (H) 65 - 100 mg/dL   GLUCOSTABILIZER    Collection Time: 11/25/20  1:17 AM   Result Value Ref Range    Glucose 179 mg/dL    Insulin order 2.4 units/hour    Insulin adminstered 2.4 units/hour    Multiplier 0.020     Low target 140 mg/dL    High target 180 mg/dL    D50 order 0.0 ml    D50 administered 0.00 ml    Minutes until next BG 60 min    Order initials ALB     Administered initials ALB     GLSCOM Comments     GLUCOSE, POC    Collection Time: 11/25/20  2:16 AM   Result Value Ref Range    Glucose (POC) 177 (H) 65 - 100 mg/dL   GLUCOSTABILIZER    Collection Time: 11/25/20  2:17 AM   Result Value Ref Range    Glucose 177 mg/dL    Insulin order 2.3 units/hour    Insulin adminstered 2.3 units/hour    Multiplier 0.020     Low target 140 mg/dL    High target 180 mg/dL    D50 order 0.0 ml    D50 administered 0.00 ml    Minutes until next BG 60 min    Order initials ALB     Administered initials ALB     GLSCOM Comments     METABOLIC PANEL, BASIC    Collection Time: 11/25/20  2:31 AM   Result Value Ref Range    Sodium 144 136 - 145 mmol/L    Potassium 6.7 (HH) 3.5 - 5.1 mmol/L    Chloride 106 98 - 107 mmol/L    CO2 24 21 - 32 mmol/L    Anion gap 14 7 - 16 mmol/L    Glucose 192 (H) 65 - 100 mg/dL    BUN 7 6 - 23 MG/DL    Creatinine 0.61 (L) 0.8 - 1.5 MG/DL    GFR est AA >60 >60 ml/min/1.73m2    GFR est non-AA >60 >60 ml/min/1.73m2    Calcium 7.9 (L) 8.3 - 10.4 MG/DL   HEMOGLOBIN A1C WITH EAG    Collection Time: 11/25/20  2:31 AM   Result Value Ref Range    Hemoglobin A1c 10.8 %    Est. average glucose 263 mg/dL   TRIGLYCERIDE    Collection Time: 11/25/20  2:31 AM   Result Value Ref Range    Triglyceride 2,245 (H) 35 - 150 MG/DL   CBC WITH AUTOMATED DIFF    Collection Time: 11/25/20  2:31 AM   Result Value Ref Range    WBC 10.1 4.3 - 11.1 K/uL    RBC 4.32 4.23 - 5.6 M/uL    HGB 14.7 13.6 - 17.2 g/dL    HCT 39.9 (L) 41.1 - 50.3 %    MCV 92.4 79.6 - 97.8 FL    MCH 34.0 (H) 26.1 - 32.9 PG    MCHC 36.8 (H) 31.4 - 35.0 g/dL    RDW 13.6 11.9 - 14.6 %    PLATELET 082 733 - 670 K/uL    MPV 11.9 9.4 - 12.3 FL    ABSOLUTE NRBC 0.00 0.0 - 0.2 K/uL    DF AUTOMATED      NEUTROPHILS 61 43 - 78 %    LYMPHOCYTES 24 13 - 44 %    MONOCYTES 9 4.0 - 12.0 %    EOSINOPHILS 5 0.5 - 7.8 %    BASOPHILS 1 0.0 - 2.0 %    IMMATURE GRANULOCYTES 0 0.0 - 5.0 %    ABS.  NEUTROPHILS 6.2 1.7 - 8.2 K/UL    ABS. LYMPHOCYTES 2.4 0.5 - 4.6 K/UL    ABS. MONOCYTES 1.0 0.1 - 1.3 K/UL    ABS. EOSINOPHILS 0.5 0.0 - 0.8 K/UL    ABS. BASOPHILS 0.1 0.0 - 0.2 K/UL    ABS. IMM.  GRANS. 0.0 0.0 - 0.5 K/UL   LDL, DIRECT    Collection Time: 11/25/20  2:31 AM   Result Value Ref Range    LDL,Direct 80 <100 mg/dl   MAGNESIUM    Collection Time: 11/25/20  2:31 AM   Result Value Ref Range    Magnesium 2.0 1.8 - 2.4 mg/dL   PHOSPHORUS    Collection Time: 11/25/20  2:31 AM   Result Value Ref Range    Phosphorus 4.4 2.5 - 4.5 MG/DL   GLUCOSE, POC    Collection Time: 11/25/20  3:20 AM   Result Value Ref Range    Glucose (POC) 197 (H) 65 - 100 mg/dL   GLUCOSTABILIZER    Collection Time: 11/25/20  3:21 AM   Result Value Ref Range    Glucose 197 mg/dL    Insulin order 4.1 units/hour    Insulin adminstered 4.1 units/hour    Multiplier 0.030     Low target 140 mg/dL    High target 180 mg/dL    D50 order 0.0 ml    D50 administered 0.00 ml    Minutes until next BG 60 min    Order initials ALB     Administered initials ALB     GLSCOM Comments     GLUCOSE, POC    Collection Time: 11/25/20  4:26 AM   Result Value Ref Range    Glucose (POC) 211 (H) 65 - 100 mg/dL   GLUCOSTABILIZER    Collection Time: 11/25/20  4:27 AM   Result Value Ref Range    Glucose 211 mg/dL    Insulin order 6.0 units/hour    Insulin adminstered 6.0 units/hour    Multiplier 0.040     Low target 140 mg/dL    High target 180 mg/dL    D50 order 0.0 ml    D50 administered 0.00 ml    Minutes until next BG 60 min    Order initials ALB     Administered initials ALB     GLSCOM Comments     TRIGLYCERIDE    Collection Time: 11/25/20  7:05 AM   Result Value Ref Range    Triglyceride  30 - 200 MG/DL     CORRECTION TO MEDICAL RECORD-DISREGARD THESE TEST RESULTS   LIPASE    Collection Time: 11/25/20  7:05 AM   Result Value Ref Range    Lipase 41 (L) 73 - 393 U/L   POTASSIUM    Collection Time: 11/25/20  7:05 AM   Result Value Ref Range    Potassium 4.0 3.5 - 5.1 mmol/L TRIGLYCERIDE    Collection Time: 11/25/20  7:06 AM   Result Value Ref Range    Triglyceride 2,304 (H) 35 - 150 MG/DL   LDL, DIRECT    Collection Time: 11/25/20  7:06 AM   Result Value Ref Range    LDL,Direct 81 <100 mg/dl   GLUCOSE, POC    Collection Time: 11/25/20  8:18 AM   Result Value Ref Range    Glucose (POC) 247 (H) 65 - 100 mg/dL   TRIGLYCERIDE    Collection Time: 11/25/20  8:34 AM   Result Value Ref Range    Triglyceride 2,253 (H) 35 - 150 MG/DL   LDL, DIRECT    Collection Time: 11/25/20  8:34 AM   Result Value Ref Range    LDL,Direct 93 <571 mg/dl   METABOLIC PANEL, COMPREHENSIVE    Collection Time: 11/25/20  8:41 AM   Result Value Ref Range    Sodium 128 (L) 136 - 145 mmol/L    Potassium 5.0 3.5 - 5.1 mmol/L    Chloride 95 (L) 98 - 107 mmol/L    CO2 23 21 - 32 mmol/L    Anion gap 10 7 - 16 mmol/L    Glucose 284 (H) 65 - 100 mg/dL    BUN 6 6 - 23 MG/DL    Creatinine 0.63 (L) 0.8 - 1.5 MG/DL    GFR est AA >60 >60 ml/min/1.73m2    GFR est non-AA >60 >60 ml/min/1.73m2    Calcium 7.6 (L) 8.3 - 10.4 MG/DL    Bilirubin, total 1.0 0.2 - 1.1 MG/DL    ALT (SGPT) 24 12 - 65 U/L    AST (SGOT) 65 (H) 15 - 37 U/L    Alk.  phosphatase 98 50 - 136 U/L    Protein, total 6.2 (L) 6.3 - 8.2 g/dL    Albumin 2.9 (L) 3.5 - 5.0 g/dL    Globulin 3.3 2.3 - 3.5 g/dL    A-G Ratio 0.9 (L) 1.2 - 3.5     PROTHROMBIN TIME + INR    Collection Time: 11/25/20  8:41 AM   Result Value Ref Range    Prothrombin time 13.7 12.5 - 14.7 sec    INR 1.0     PTT    Collection Time: 11/25/20  8:41 AM   Result Value Ref Range    aPTT 31.6 24.1 - 35.1 SEC   FIBRINOGEN    Collection Time: 11/25/20  8:41 AM   Result Value Ref Range    Fibrinogen 594 (H) 190 - 501 mg/dL   GLUCOSE, POC    Collection Time: 11/25/20 10:55 AM   Result Value Ref Range    Glucose (POC) 248 (H) 65 - 100 mg/dL   TRIGLYCERIDE    Collection Time: 11/25/20 12:43 PM   Result Value Ref Range    Triglyceride 2,184 (H) 35 - 150 MG/DL       All Micro Results     Procedure Component Value Units Date/Time    Micheline Height PLUS [[id number]] Collected:  11/25/20 1243    Order Status:  Completed Specimen:  Blood Updated:  11/25/20 1249    AFB CULTURE + SMEAR W/RFLX ID FROM CULTURE [[id number]]     Order Status:  Sent     CULTURE, URINE [[id number]] Collected:  11/24/20 1433    Order Status:  Completed Specimen:  Urine from Clean catch Updated:  11/25/20 0845     Special Requests: NO SPECIAL REQUESTS        Culture result:       NO GROWTH AFTER SHORT PERIOD OF INCUBATION. FURTHER RESULTS TO FOLLOW AFTER OVERNIGHT INCUBATION. AFB CULTURE + SMEAR W/RFLX ID FROM CULTURE [[id number]]     Order Status:  Sent     BLOOD CULTURE [[id number]] Collected:  11/24/20 1203    Order Status:  Completed Specimen:  Blood Updated:  11/25/20 0725     Special Requests: --        NO SPECIAL REQUESTS  LEFT  FOREARM       Culture result: NO GROWTH AFTER 18 HOURS       BLOOD CULTURE [[id number]] Collected:  11/24/20 1204    Order Status:  Completed Specimen:  Blood Updated:  11/25/20 0725     Special Requests: --        RIGHT  HAND       Culture result: NO GROWTH AFTER 18 HOURS       AFB CULTURE + SMEAR W/RFLX ID FROM CULTURE [[id number]]     Order StatusJacklyn Throckmorton, IGM [[id number]] Collected:  11/24/20 1809    Order Status:  Completed Specimen:  Serum Updated:  11/24/20 1820    LEGIONELLA PNEUMOPHILA AG, URINE [[id number]] Collected:  11/24/20 1433    Order Status:  Completed Specimen:  Urine Updated:  11/24/20 1712    AFB CULTURE + SMEAR W/RFLX ID FROM CULTURE [[id number]]     Order Status:  Sent     S. Fabiana Catherine, UR/CSF [[id number]] Collected:  11/24/20 1433    Order Status:  Completed Updated:  11/24/20 1538    S. Fabiana Catherine, UR/CSF [[id number]] Collected:  11/24/20 1415    Order Status:  Canceled     CULTURE, RESPIRATORY/SPUTUM/BRONCH Durwood Mirta [[id number]]     Order Status:  Sent Specimen:  Sputum           Current Facility-Administered Medications   Medication Dose Route Frequency    influenza vaccine 2020-21 (6 mos+)(PF) (FLUARIX/FLULAVAL/FLUZONE QUAD) injection 0.5 mL  0.5 mL IntraMUSCular PRIOR TO DISCHARGE    insulin regular (NOVOLIN R, HUMULIN R) injection   SubCUTAneous AC&HS    dextrose 40% (GLUTOSE) oral gel 1 Tube  15 g Oral PRN    glucagon (GLUCAGEN) injection 1 mg  1 mg IntraMUSCular PRN    dextrose (D50W) injection syrg 12.5-25 g  25-50 mL IntraVENous PRN    insulin glargine (LANTUS) injection 22 Units  22 Units SubCUTAneous DAILY    atorvastatin (LIPITOR) tablet 80 mg  80 mg Oral QHS    buPROPion (WELLBUTRIN) tablet 100 mg  100 mg Oral BID    carvediloL (COREG) tablet 6.25 mg  6.25 mg Oral BID WITH MEALS    nitroglycerin (NITROSTAT) tablet 0.4 mg  0.4 mg SubLINGual PRN    prasugreL (EFFIENT) tablet 10 mg  10 mg Oral DAILY    calcium carbonate (OS-SARAI) tablet 500 mg [elemental]  500 mg Oral TID WITH MEALS    ondansetron (ZOFRAN) injection 4 mg  4 mg IntraVENous Q4H PRN    pantoprazole (PROTONIX) 40 mg in 0.9% sodium chloride 10 mL injection  40 mg IntraVENous Q12H    cefTRIAXone (ROCEPHIN) 1 g in 0.9% sodium chloride (MBP/ADV) 50 mL MBP  1 g IntraVENous Q24H    azithromycin (ZITHROMAX) 500 mg in 0.9% sodium chloride 250 mL (VIAL-MATE)  500 mg IntraVENous Q24H    nitroglycerin (NITROBID) 2 % ointment 0.5 Inch  0.5 Inch Topical BID    HYDROmorphone (PF) (DILAUDID) injection 0.25 mg  0.25 mg IntraVENous Q4H PRN    labetaloL (NORMODYNE;TRANDATE) injection 20 mg  20 mg IntraVENous Q2H PRN    labetaloL (NORMODYNE;TRANDATE) injection 10 mg  10 mg IntraVENous Q4H PRN    aspirin chewable tablet 81 mg  81 mg Oral DAILY    fenofibrate (LOFIBRA) tablet 160 mg  160 mg Oral DAILY    sodium chloride (NS) flush 5-40 mL  5-40 mL IntraVENous Q8H    sodium chloride (NS) flush 5-40 mL  5-40 mL IntraVENous PRN    enoxaparin (LOVENOX) injection 40 mg  40 mg SubCUTAneous Q24H    promethazine (PHENERGAN) with saline injection 12.5 mg  12.5 mg IntraVENous Q6H PRN    HYDROmorphone (PF) (DILAUDID) injection 1 mg  1 mg IntraVENous Q4H PRN       Other Studies:  No results found for this visit on 11/24/20. Ct Chest Abd Pelv W Cont    Result Date: 11/24/2020  CT of the Chest, Abdomen, and Pelvis INDICATION: Increasing back and chest pain, unexpected weight loss Multiple axial images were obtained through the chest, abdomen, and pelvis. Oral contrast was used for bowel opacification. 100mL of Isovue 370 intravenous contrast was used for better evaluation of solid organs and vascular structures. Radiation dose reduction techniques were used for this study. All CT scans performed at this facility use one or all of the following: Automated exposure control, adjustment of the mA and/or kVp according to patient's size, iterative reconstruction. COMPARISON: Abdomen CT dated 11/27/2018 FINDINGS: -LUNGS: Multiple small ill-defined nodular areas in the left lung, upper lobe predominant. Largest lesion is 8 mm. The right lung is clear. No associated effusion. -MEDIASTINUM/AXILLA: No significant adenopathy. -HEART/VESSELS: There is moderate vascular calcification. Heart size is normal.  There is normal enhancement of the thoracic aorta and pulmonary arteries. -CHEST WALL: Normal. -LIVER: There is hepatomegaly and diffuse fatty infiltration of liver. No discrete liver mass. -GALLBLADDER/BILE DUCTS: Postcholecystectomy. -PANCREAS: Normal. -SPLEEN: Normal. -ADRENALS:  There is a stable 3.4 cm fat attenuation left adrenal mass, likely a myelolipoma. Right adrenal gland is unremarkable. -KIDNEYS/URETERS: No hydronephrosis or significant mass. -BLADDER: Normal. -REPRODUCTIVE ORGANS: No pelvic masses. -BOWEL: Normal caliber. No inflammatory changes. -LYMPH NODES: No significant retroperitoneal, mesenteric, or pelvic adenopathy. -BONES: Postoperative changes in the left hip. No acute fracture. -VASCULATURE: Normal -OTHER: No ascites.      IMPRESSION: 1.  Multiple ill-defined nodules in the left lung, most likely atypical infection such as fungal or tuberculosis. 2.  Hepatomegaly and steatosis. 3.  No evidence of pulmonary embolus or aortic dissection. 4.  Extensive coronary artery calcification. If there are any questions about this report, I can be reached on [employer] or at [address]    Result Date: 11/24/2020  RIGHT UPPER QUADRANT ULTRASOUND 11/24/2020 HISTORY: Moderate right upper quadrant pain for one month. TECHNIQUE: Sonographic imaging of the right upper quadrant was performed. COMPARISON: CT abdomen and pelvis 11/27/2018 FINDINGS: Changes of a cholecystectomy are present. The common bile duct is 5 mm in diameter. There is no intrahepatic biliary ductal dilatation. The liver parenchyma is diffusely echogenic relative to the renal cortex. This finding may be present with steatosis. The right kidney is 13.1 cm in length. There is no hydronephrosis. The distal abdominal aorta is 1.9 cm in diameter. The IVC is patent. IMPRESSION: Hepatic steatosis status post cholecystectomy.         Assessment and Plan:     Hospital Problems as of 11/25/2020 Date Reviewed: 9/17/2020          Codes Class Noted - Resolved POA    Lung infiltrate ICD-10-CM: R91.8  ICD-9-CM: 793.19  11/25/2020 - Present Unknown        Weight loss, unintentional  - over 40 lbs in 3 months ICD-10-CM: R63.4  ICD-9-CM: 783.21  11/25/2020 - Present Unknown        History of smoking 25-50 pack years -- quit in 2016 about 27 pack years ICD-10-CM: Z87.891  ICD-9-CM: V15.82  11/25/2020 - Present Unknown        Psoriatic arthritis ([facility] -- was on Cosyntrex ICD-10-CM: L40.50  ICD-9-CM: 696.0  11/25/2020 - Present Unknown        * (Principal) Pancreatitis ICD-10-CM: K85.90  ICD-9-CM: 734.0  2/17/2018 - Present Yes        DKA (diabetic ketoacidoses) ([facility] ICD-10-CM: E11.10  ICD-9-CM: 250.12  2/15/2018 - Present Unknown        Diabetes mellitus type II, uncontrolled ([facility] (Chronic) ICD-10-CM: E11.65  ICD-9-CM: 250.02  12/15/2016 - Present Yes Overview Addendum 6/19/2018 11:35 AM by Erin Walters., MD     Now on lantus and humalog prn  goor control BS under 150   Sliding scale given-multiples of 4  Pt on metformit 500 mg bid 3/2019 and  lantus  25 units  Diet discussed  Labs today  F/u every 3 months                    Back pain radiating to the chest/abdomen, associated with nauseas  -Unclear etiology-chronic pancreatitis vs other (eg lumbar pathology)  -His triglyceride levels are above 2000 so will be transferred for apheresis-heme aware and will arrange IR placed apheresis line and plan for therapeutic apheresis later today; for transfer to DT today  -c/w pain control, antiemetics as ordered  -get MRI Lumbar Spine given L foot drop and back pain      Hypertriglyceridemia, most recent TG is 2253  Takes fenobrate and fish oil (1000mg bid) at home, continue fenofibrate 160mg as we dont carry fish oil here  Plan for apheresis  Hematology consult     DKA/uncontrolled DM (A1c 10.8)  Likely secondary to not taking any insulin for the last week and a half, +/-infection  AG closed, off insulin gtt  Start lantus 22u bid and SSI QID ACHS +2AM sliding scale. Pt still nauseous and unable to eat much because of abd pain and nausea. Diabetic educator. At home, he takes Basaglar 50 units BID and Admelog 25 units before meals with a sliding scale         Cough, with subjective fever   CT chest w/ Multiple ill-defined nodules in the left lung, most likely atypical  Rapid COVID negative,  Radiology read as infection such as fungal or tuberculosis. Has associated weight loss  AFB x 3 and pulm consult  Check atypical panel (Legionella, mycoplasma, chlamydia)   Continue Rocephin and azithromycin  airborn isolation until TB r/o, then droplet isolation until COVID r/o; avoid fluoroquinolones until rule out TB  Pulm consulted, will consider bronch while at [facility]     Unintentional weight loss of 40lbs since August  Possibly secondary to uncontrolled diabetes versus TB versus occult malignancy  Needs outpatient follow-up with oncology for further evaluation     Uncontrolled hypertension  Likely secondary to pain  Continue home meds  As needed labetalol; pain control     Chest Pain  CAD, multiple stent procedures (last [facility] 3/26/2020 with stable CAD)  Troponin/EKGs not c/w ACS check echo  Continue PTA ASA 81mg every day , effient 10mg every day , coreg 3.25mg bid, atorvastatin 80mg qd  Cardiology consult  CTA w/o PE  VTE ppx with lovenox  PPI in case related to hiatal hernia       High risk for clinical decline   Rest of plan per hospital course     Discharge planning:    DVT ppx ordered  Code status:  Full  Estimated LOS:  Greater than 2 midnights  Risk:  high     Signed:  Pauly Wick MD  Signed:  Pauly Wick MD

## 2020-11-25 NOTE — DIABETES MGMT
Patient admitted 11/24/2020 with DKA and started on Glucostabilizer. Pt currently on droplet plus precautions. Blood glucose 247. Per discussion with primary care provider, plan is to start Lantus 22 units now and titrate up if blood glucose is elevated tonight. Current insulin regimen: Lantus 22 units daily and Regular insulin sliding scale coverage 4x/day ac and hs. Plan per chart review is for pt to transfer downtown for possible [facility] today. Pt is known to the diabetes education team having been seen for diabetes education during previous hospitalizations, most recently in March 2020. Pt has been seen by Roslyn Allen PA-C [facility] on September 17,2020. Per review of endocrinology notes pt was instructed to take Basaglar 50 units BID and Admelog 25 units before meals with a scale to increase that dose or hold based on blood glucose results.

## 2020-11-25 NOTE — PROGRESS NOTES
Silvia Holmes in to transport patient to [email or web address] IR. Report given to medics. N95 mask placed on patient. IR notified that patient has left department.

## 2020-11-25 NOTE — PROGRESS NOTES
TRANSFER - OUT REPORT:    Verbal report given to Oscar Ty on Doy Mary Ellen  being transferred to [facility] IR for ordered procedure       Report consisted of patients Situation, Background, Assessment and   Recommendations(SBAR). Information from the following report(s) SBAR, Kardex and MAR was reviewed with the receiving nurse. Lines:   Peripheral IV 11/24/20 Right Forearm (Active)   Site Assessment Clean, dry, & intact 11/25/20 0600   Phlebitis Assessment 0 11/25/20 0600   Infiltration Assessment 0 11/25/20 0600   Dressing Status Clean, dry, & intact 11/25/20 0600   Dressing Type Transparent;Tape 11/25/20 0600   Hub Color/Line Status Capped 11/25/20 0600       Peripheral IV 11/24/20 Right Antecubital (Active)   Site Assessment Clean, dry, & intact 11/25/20 0600   Phlebitis Assessment 0 11/25/20 0600   Infiltration Assessment 0 11/25/20 0600   Dressing Status Clean, dry, & intact 11/25/20 0600   Dressing Type Transparent;Tape 11/25/20 0600   Hub Color/Line Status Capped 11/25/20 0600        Opportunity for questions and clarification was provided.       Patient transported with:   O2 @ 2 liters   [facility]

## 2020-11-25 NOTE — PROGRESS NOTES
Patient discussed with Dr. Leann Bolton who is recommending an APAP at discharge. Patient advised Dr. Leann Bolton that he had a sleep study in the past and had a CPAP machine which he returned (reason unknown). SW will investigate where the patient had his initial sleep study completed and request records to assist in obtaining an APAP machine at discharge. Per CM note to [facility], patient would qualify for O2 per [facility] with an overnight oximetry study and ALISSA dx. /CM dept continuing to follow. Update: Patient now states that he never had a sleep study. Per Bethany Dixon at [facility] the patient can rent an APAP for a month in order to give him enough time to have a sleep study completed. Patient anticipated to transfer LACY Castrejon    [address] [place] [address]    * [email or web address]

## 2020-11-25 NOTE — PROGRESS NOTES
TRANSFER - IN REPORT:    Verbal report received from KIERSTEN Link  on James Martins  being received from ER for routine progression of care      Report consisted of patients Situation, Background, Assessment and   Recommendations(SBAR). Information from the following report(s) SBAR, Kardex, ED Summary, Procedure Summary, Intake/Output, MAR and Recent Results was reviewed with the receiving nurse. Opportunity for questions and clarification was provided. Assessment completed upon patients arrival to unit and care assumed.

## 2020-11-25 NOTE — ED NOTES
Patient to be admitted to ICU at [facility] due to negative rapid swab. Waiting for negative pressure room to be cleaned upstairs.

## 2020-11-25 NOTE — CONSULTS
CONSULT NOTE    Paulo Espinal    11/25/2020    Date of Admission:  11/24/2020    The patient's chart is reviewed and the patient is discussed with the staff. Subjective:     Patient is a 52 y.o.  male seen and evaluated at the request of Dr. Birdie Garza for lung nodules    46-year-old male history of CAD, multiple stent procedures ([address] 3/26/2020 with stable CAD), GERD, hypertriglyceridemia (TG>6000), history of psoriatic arthritis on Cosyntrex, insulin-dependent diabetes with history of DKA, non-intractable epilepsy with complex partial seizures, hypertension came ins yesterday due to pain in his back toward his chest. Reported felt like marked pressure in this chest. +mild dyspnea +cough +chills. No fevers +night sweats. Has been losing about 40 lbs unintentionally since August of this year. Reported pain was also in abdomen and has been having increased foot drop in left leg with increased neuropathy. Reports on disability due to crippling psoriatic arthritis. TNI marginally up at 14.2 but comming down. EKG non-specific LFTS including trig at 2,245 were way up but down today. Lipase was only 41 today. Was in DKA and started insulin drip. CT noted with increased congestion with nodule in CATALINA and noted to have possible pancreatitis on CT of abd/pelvis along with adrenal cyst. Pain less since just given dilaudid    Off insulin drip now. Patient also smoker about 27 pack y ears quit 4 years ago. Does not use oxygen nebs/inhalers at home.        Review of Systems:  -Fever +chills +night sweats +unintentionsl weight loss  -Headaches  + Chest pain/pressure (less now)  -Dyspnea, -wheezing, +cough with scant white/yellow sputum  +Abdominal pain, -constipation  -Leg swelling  +left foot drop  +chronic pain pain with neuromyopathy in legs  All other organ systems grossly normal.      Patient Active Problem List   Diagnosis Code    Diabetes mellitus type II, uncontrolled ([address] E11.65    Tobacco abuse Z72.0    High anion gap metabolic acidosis Q90.2    CAD (coronary artery disease) I25.10    Leukocytosis D72.829    DKA (diabetic ketoacidoses) ([facility]) E11.10    Pancreatitis K85.90    Type 2 diabetes with nephropathy ([facility]) E11.21    Essential hypertension I10    Hyperlipidemia E78.5    Obesity, unspecified obesity severity, unspecified obesity type E66.9    Anemia of chronic disease D63.8    Severe obesity (BMI 35.0-39. 9) with comorbidity ([facility] E66.01    Encounter to discuss test results Z71.2    Encounter for medication review and counseling Z71.89    History of tobacco use Z87.891    Arthritis of left wrist M19.032    Loud snoring R06.83    Suspected sleep apnea R29.818    Excessive daytime sleepiness G47.19    Mass in neck R22.1    Osteoarthritis M19.90    Hiatal hernia K44.9    Sebaceous cyst L72.3    Uncontrolled type 2 diabetes mellitus without complication, without long-term current use of insulin [id number]    Encounter for examination following treatment at hospital Z09    Infected sebaceous cyst of skin L72.3, L08.9    Medication refill Z76.0    Adhesive capsulitis of left shoulder M75.02    Acute pain of left shoulder M25.512    Former smoker Z87.891    Depression F32.9    Hx of heart artery stent Z95.5    Dietary counseling Z71.3    STEMI (ST elevation myocardial infarction) ([facility]) I21.3    Unstable angina ([facility]) I20.0    Acute pancreatitis K85.90    Atherosclerosis of coronary artery I25.10    Diabetic ketoacidosis without coma associated with type 2 diabetes mellitus ([facility]) E11.10    Hypertriglyceridemia E78.1    Psoriatic arthropathy ([facility]) L40.50    Acquired deformity of toe M20.60    Anxiety F41.9    Comprehensive diabetic foot examination, type 2 DM, encounter for ([facility] E11.9    Noncompliance with medications Z91.14    BMI 35.0-35.9,adult Z68.35    TBI (traumatic brain injury) ([facility] S06. 9X9A    Seizure disorder ([facility] G40.909    Sinusitis J32.9    Chronic middle ear effusion, bilateral H65.493    Earache H92.09    Pharyngitis J02.9    Chills R68.83    Bronchitis J40    Chest pain R07.9    Coronary artery disease involving native heart with angina pectoris (HCC) I25.119    Nausea R11.0    Nonintractable epilepsy with complex partial seizures (HCC) G40.209    Lung infiltrate R91.8    Weight loss, unintentional  - over 40 lbs in 3 months R63.4    History of smoking 25-50 pack years -- quit in 2016 about 27 pack years Z87.891    Psoriatic arthritis ([facility] -- was on Cosyntrex L40.50         Prior to Admission Medications   Prescriptions Last Dose Informant Patient Reported? Taking? Accu-Chek Guide test strips strip   No No   Sig: Check BGL 4 times daily, E11.65   Admelog SoloStar U-100 Insulin 100 unit/mL kwikpen   No No   Si units TIDAC plus correction 4/50>150, max daily dose 100 units   Basaglar KwikPen U-100 Insulin 100 unit/mL (3 mL) inpn   No No   Sig: Start 50 units BID and increase by 2 units per dose every 3 days until FBG , max daily dose 160 units   Blood-Glucose Meter monitoring kit   No No   Sig: Check blood sugar tid DM -2   Insulin Needles, Disposable, 31 gauge x 5/16\" ndle   No No   Sig: by SubCUTAneous route four (4) times daily. aspirin delayed-release 81 mg tablet   Yes No   Sig: Take 81 mg by mouth. atorvastatin (LIPITOR) 80 mg tablet   No No   Sig: Take 1 Tab by mouth nightly for 360 days. buPROPion (WELLBUTRIN) 100 mg tablet   No No   Sig: Take 1 Tab by mouth two (2) times a day. carvediloL (COREG) 6.25 mg tablet   No No   Sig: Take 1 Tab by mouth two (2) times daily (with meals). ergocalciferol (ERGOCALCIFEROL) 1,250 mcg (50,000 unit) capsule   No No   Sig: Take 1 Cap by mouth every seven (7) days. fenofibrate (LOFIBRA) 160 mg tablet   No No   Sig: Take 1 Tab by mouth daily.    isosorbide mononitrate ER (IMDUR) 30 mg tablet   Yes No   Sig: TAKE 1 TABLET BY MOUTH ONCE DAILY   lancets misc No No   Sig: Check blood sugar tid   lancets misc   No No   Sig: Check blood sugar tid   nitroglycerin (NITROSTAT) 0.4 mg SL tablet   No No   Si Tab by SubLINGual route as needed for Chest Pain. And call EMS ,May repeat every five min for up to 3 doses AS NEEDED   omega-3 fatty acids (FISH OIL CONCENTRATE) 1,000 mg cap   No No   Sig: Take 1,000 mg by mouth two (2) times a day. pantoprazole (PROTONIX) 40 mg tablet   No No   Sig: Take 1 Tab by mouth daily. prasugrel (EFFIENT) 10 mg tablet   Yes No   Sig: Take 10 mg by mouth.   predniSONE (DELTASONE) 10 mg tablet   No No   Sig: Take 10 mg by mouth daily (with breakfast). secukinumab (Cosentyx Pen) 150 mg/mL pnij   No No   Si mg by SubCUTAneous route every twenty-eight (28) days. 300 mg SC at week every 4 weeks   zonisamide (Zonegran) 100 mg capsule   No No   Sig: Take 3 Caps by mouth nightly.       Facility-Administered Medications: None       Past Medical History:   Diagnosis Date    Acute coronary syndrome ([facility] 12/15/2016    Acute pancreatitis 2/15/2018    Arthritis     psoriatic    Diabetes ([facility]     Endocrine disease     Hypertension     Nonintractable epilepsy with complex partial seizures ([facility] 2020    Psoriatic arthropathy (HCC)     Seizures (HCC)      Past Surgical History:   Procedure Laterality Date    CARDIAC SURG PROCEDURE UNLIST      HX APPENDECTOMY      HX CHOLECYSTECTOMY      HX HERNIA REPAIR      HX ORTHOPAEDIC      left heel secondary to trauma     Social History     Socioeconomic History    Marital status:      Spouse name: Not on file    Number of children: Not on file    Years of education: Not on file    Highest education level: Not on file   Occupational History    Not on file   Social Needs    Financial resource strain: Not on file    Food insecurity     Worry: Not on file     Inability: Not on file    Transportation needs     Medical: Not on file     Non-medical: Not on file   Tobacco Use    Smoking status: Former Smoker     Packs/day: 0.25     Years: 30.00     Pack years: 7.50     Last attempt to quit: 2019     Years since quittin.8    Smokeless tobacco: Former User   Substance and Sexual Activity    Alcohol use: No    Drug use: No    Sexual activity: Not Currently     Partners: Female   Lifestyle    Physical activity     Days per week: Not on file     Minutes per session: Not on file    Stress: Not on file   Relationships    Social connections     Talks on phone: Not on file     Gets together: Not on file     Attends Buddhism service: Not on file     Active member of club or organization: Not on file     Attends meetings of clubs or organizations: Not on file     Relationship status: Not on file    Intimate partner violence     Fear of current or ex partner: Not on file     Emotionally abused: Not on file     Physically abused: Not on file     Forced sexual activity: Not on file   Other Topics Concern     Service No    Blood Transfusions No    Caffeine Concern No    Occupational Exposure No    Hobby Hazards No    Sleep Concern No    Stress Concern Yes    Weight Concern Yes    Special Diet No    Back Care No    Exercise No    Bike Helmet No    Seat Belt Yes    Self-Exams No   Social History Narrative    Not on file     Family History   Problem Relation Age of Onset    Thyroid Disease Mother         goiters    Breast Cancer Mother         42's    Atrial Fibrillation Mother     Diabetes Father     Heart Disease Father 48    Diabetes Sister     Heart Disease Paternal Grandfather      Allergies   Allergen Reactions    Peanut Anaphylaxis    Morphine Other (comments)     Anxiety, claustrophobia       Current Facility-Administered Medications   Medication Dose Route Frequency    influenza vaccine - (6 mos+)(PF) (FLUARIX/FLULAVAL/FLUZONE QUAD) injection 0.5 mL  0.5 mL IntraMUSCular PRIOR TO DISCHARGE    insulin regular (NOVOLIN R, HUMULIN R) injection SubCUTAneous AC&HS    dextrose 40% (GLUTOSE) oral gel 1 Tube  15 g Oral PRN    glucagon (GLUCAGEN) injection 1 mg  1 mg IntraMUSCular PRN    dextrose (D50W) injection syrg 12.5-25 g  25-50 mL IntraVENous PRN    . PHARMACY TO SUBSTITUTE PER PROTOCOL (Reordered from: Basaglar KwikPen U-100 Insulin 100 unit/mL (3 mL) inpn)    Per Protocol    pantoprazole (PROTONIX) 40 mg in 0.9% sodium chloride 10 mL injection  40 mg IntraVENous Q12H    cefTRIAXone (ROCEPHIN) 1 g in 0.9% sodium chloride (MBP/ADV) 50 mL MBP  1 g IntraVENous Q24H    azithromycin (ZITHROMAX) 500 mg in 0.9% sodium chloride 250 mL (VIAL-MATE)  500 mg IntraVENous Q24H    labetaloL (NORMODYNE;TRANDATE) injection 10 mg  10 mg IntraVENous Q2H PRN    nitroglycerin (NITROBID) 2 % ointment 0.5 Inch  0.5 Inch Topical BID    HYDROmorphone (PF) (DILAUDID) injection 0.25 mg  0.25 mg IntraVENous Q4H PRN    labetaloL (NORMODYNE;TRANDATE) injection 20 mg  20 mg IntraVENous Q2H PRN    labetaloL (NORMODYNE;TRANDATE) injection 10 mg  10 mg IntraVENous Q4H PRN    aspirin chewable tablet 81 mg  81 mg Oral DAILY    prasugreL (EFFIENT) tablet 10 mg  10 mg Oral DAILY    fenofibrate (LOFIBRA) tablet 160 mg  160 mg Oral DAILY    sodium chloride (NS) flush 5-40 mL  5-40 mL IntraVENous Q8H    sodium chloride (NS) flush 5-40 mL  5-40 mL IntraVENous PRN    enoxaparin (LOVENOX) injection 40 mg  40 mg SubCUTAneous Q24H    promethazine (PHENERGAN) with saline injection 12.5 mg  12.5 mg IntraVENous Q6H PRN    HYDROmorphone (PF) (DILAUDID) injection 1 mg  1 mg IntraVENous Q4H PRN         Objective:     Vitals:    11/25/20 0720 11/25/20 0800 11/25/20 0820 11/25/20 0846   BP: (!) 165/103  (!) 173/105    Pulse: 94 97 98    Resp: 12      Temp: 97.9 °F (36.6 °C)      SpO2: 97%   (P) 97%   Weight:       Height:           PHYSICAL EXAM     Constitutional:  the patient is well developed and in no acute distress  EENMT:  Sclera clear, pupils equal, oral mucosa moist  Respiratory: CTA b/l. No wheezing. Moist cough on 2 lPM  Cardiovascular:  RRR without M,G,R  Gastrointestinal: abdomen with minimal guarding but no rebound. Full and large +BS due less  Musculoskeletal: warm without cyanosis. There is no lower extremity edema. Skin:  no jaundice or rashes, no visible wounds   Neurologic: moving limbs but less on lft leg and toes. Decreased sensation on legs b/l  Psychiatric:  alert and oriented x 3    CXR:  Left upper lung with few nodules and infiltrates. Findings consistent with acute pancreatitis of the uncinate process of the  pancreas without evidence of pancreatic necrosis. Stable left adrenal myelolipoma.                       Recent Labs     11/25/20  0231 11/24/20  1812 11/24/20  1120   WBC 10.1 13.2* 15.6*   HGB 14.7 14.9 18.0*   HCT 39.9* 40.0* 48.2    210 245     Recent Labs     11/25/20  0705 11/25/20  0231 11/24/20  2242 11/24/20  1812 11/24/20  1120   NA  --  144 143 142   < > 132*   K 4.0 6.7* 4.6 4.0   < > 3.8   CL  --  106 104 105   < > 96*   GLU  --  192* 161* 189*   < > 336*   CO2  --  24 23 19*   < > 14*   BUN  --  7 7 8   < > 11   CREA  --  0.61* 0.62* 0.50*   < > 0.73*   MG  --  2.0  --   --   --   --    PHOS  --  4.4  --   --   --   --    CA  --  7.9* 7.8* 7.3*   < > 8.3   ALB  --   --   --   --   --  4.2   TBILI  --   --   --   --   --  1.0   ALT  --   --   --   --   --  26    < > = values in this interval not displayed. No results for input(s): PH, PCO2, PO2, HCO3, PHI, PCO2I, PO2I, HCO3I in the last 72 hours. Recent Labs     11/24/20  1202   LAC 0.9     Blood cultures x2 11/24 -- negative so far   Urine cltures 11/24 - negative so far  Urine S.  Pneumonia -- pending  Urine legionella -- pending  myoplasma ab, igm -- pending  Covid-19 rapid -- negative and send out pending  Fungitell -- pending      Assessment:  (Medical Decision Making)     Hospital Problems  Date Reviewed: 9/17/2020          Codes Class Noted POA    Lung infiltrate ICD-10-CM: R91.8  ICD-9-CM: 793.19  11/25/2020 Unknown        Weight loss, unintentional  - over 40 lbs in 3 months ICD-10-CM: R63.4  ICD-9-CM: 783.21  11/25/2020 Unknown        History of smoking 25-50 pack years -- quit in 2016 about 27 pack years ICD-10-CM: Z87.891  ICD-9-CM: V15.82  11/25/2020 Unknown        Psoriatic arthritis ([facility] [facility] -- was on Cosyntrex ICD-10-CM: L40.50  ICD-9-CM: 696.0  11/25/2020 Unknown        Pancreatitis ICD-10-CM: K85.90  ICD-9-CM: 277.4  2/17/2018 Yes        DKA (diabetic ketoacidoses) ([facility] [facility] ICD-10-CM: E11.10  ICD-9-CM: 250.12  2/15/2018 Unknown        Diabetes mellitus type II, uncontrolled ([facility] [facility] (Chronic) ICD-10-CM: E11.65  ICD-9-CM: 250.02  12/15/2016 Yes    Overview Addendum 6/19/2018 11:35 AM by Ermelinda Beyer MD     Now on lantus and humalog prn  goor control BS under 150   Sliding scale given-multiples of 4  Pt on metformit 500 mg bid 3/2019 and  lantus  25 units  Diet discussed  Labs today  F/u every 3 months                 Patient with CAD prior stent/psyoritic arthritis on biologic agent/DM2 uncontrolled/prior pancreatis admitted with chest pain/unintentional weight loss/dka and pancreatis. infiltrates in chest as well and at risk for TB  Plan:  (Medical Decision Making)     --continue negative pressure room. --spoke with hospitalist and planning to likely send downtown since off insulin drip and plan for possible BRONCH with BAL of CATALINA. Given on biologic for arthritis at risk for TB given night sweats/unintentions weight loss/infiltrates in chest.   --AFB x 3 for now  --PPD  --prior quaniferon gold from 3/20 was negative and can re-check for now.   --continue abx  --f/u abdomen labs since unclear how TGI dropped significantly. Still being evaluate for likely pancreatitis.  NPO for now.   --also given has increased foot drop, may need imagining study of spine -- hospitalist aware  --f/u with remaining teams    More than 50% of the time documented was spent in face-to-face contact with the patient and in the care of the patient on the floor/unit where the patient is located. Thank you very much for this referral.  We appreciate the opportunity to participate in this patient's care. Will follow along with above stated plan.     Time spent with care 50 minutes    Sigifredo Davis MD

## 2020-11-25 NOTE — PROGRESS NOTES
Skin assessment done by this RN and Aldo Fontanez RN. Patient with red, scaly rash above/around penis and on scrotum. He states this is from psoriasis and has been ongoing for a while. Same rash is also on his left elbow. He has a rash on his left shin that looks like petechiae. No other skin issues noted. Patient oriented to room and call light. All questions answered.

## 2020-11-25 NOTE — DISCHARGE SUMMARY
Hospitalist Discharge Summary     Admit Date:  2020 11:06 AM   Name:  Brittaney Carey   Age:  52 y.o.  :  1970   MRN:  [id number]   PCP:  LIBERTY Will  Treatment Team: Care Manager: Gamal Martin RN; Consulting Provider: Karina Mondragon MD; Consulting Provider: Georgette Arzate DO; Consulting Provider: Kenyatta Marques MD; Primary Nurse: Tad Borjas RN; Utilization Review: Jerri Salomon RN    Problem List for this Hospitalization:  Hospital Problems as of 2020 Date Reviewed: 2020          Codes Class Noted - Resolved POA    Lung infiltrate ICD-10-CM: R91.8  ICD-9-CM: 793.19  2020 - Present Unknown        Weight loss, unintentional  - over 40 lbs in 3 months ICD-10-CM: R63.4  ICD-9-CM: 783.21  2020 - Present Unknown        History of smoking 25-50 pack years -- quit in  about 27 pack years ICD-10-CM: Z87.891  ICD-9-CM: V15.82  2020 - Present Unknown        Psoriatic arthritis ([facility] -- was on Cosyntrex ICD-10-CM: L40.50  ICD-9-CM: 696.0  2020 - Present Unknown        * (Principal) Pancreatitis ICD-10-CM: K85.90  ICD-9-CM: 905.1  2018 - Present Yes        DKA (diabetic ketoacidoses) ([facility] ICD-10-CM: E11.10  ICD-9-CM: 250.12  2/15/2018 - Present Unknown        Diabetes mellitus type II, uncontrolled ([facility] (Chronic) ICD-10-CM: E11.65  ICD-9-CM: 250.02  12/15/2016 - Present Yes    Overview Addendum 2018 11:35 AM by Shantel Avalos MD     Now on lantus and humalog prn  goor control BS under 150   Sliding scale given-multiples of 4  Pt on metformit 500 mg bid 3/2019 and  lantus  25 units  Diet discussed  Labs today  F/u every 3 months                         Hospital Course:    57-year-old female history of CAD, multiple stent procedures ([address] 3/26/2020 with stable CAD), GERD, hypertriglyceridemia (TG>6000), history of psoriatic arthritis, insulin-dependent diabetes with history of DKA, non-intractable epilepsy with complex partial seizures, hypertension. Patient presents today with back pain that he states involves his entire back, intermittently wraps around to his chest.  Patient states the back pain (mostly mid to lower parts of his back) started a month ago but has been progressively getting worse to the point where he cannot move without pain. He is also states he lost 40 pounds since August which is unintentional.  He also states is having chest pain which feels like something is sitting on his chest.  There is no injury the patient is aware of. By working with an endocrinologist, he has had difficulty controlling his blood sugar with his home regimen and stopped taking his insulin a week and a half ago. He also endorses polydipsia and polyuria.       Over the last month he has had chills and has had elevated temperatures to 99.9. He states he also had increasing cough today.     In the ER blood pressure was elevated 176/104, heart rate elevated 125. WBC 15.6, hemoglobin  18.0. Glucose 336. UA with glucosuria and ketonuria. His anion gap was 22. High-sensitivity troponin 14.2 EKG was without ischemic changes, nonspecific T wave flattening in the inferior leads.     CT chest \"Multiple ill-defined nodules in the left lung, most likely atypical infection such as fungal or tuberculosis. \" No PE/Dissection. Patient was admitted DKA and intractable back pain radiating to abdomen and chest of unclear etiology, possible chronic pancreatitis. Initially was thought that patient had acute pancreatitis however this was only identified on the CAT scan of 2018 and not demonstrated on the current CAT scan of abdomen pelvis. Patient's anion gap closed and he was started on long-acting insulin. Patient's triglycerides were found to be above 2000 and plan was to apherese the patient. Patient also had CAT scan with possible atypical infection and was placed on airborne isolation and placed on antibiotic.   Home felt a bronchoscopy might be indicated which could be accomplished at the [facility]. He had additional problems such as possible atypical pneumonia, unintentional weight loss, uncontrolled hypertension, and chest pain. ACS was ruled out and cardiology felt this was noncardiac chest pain. Patient was transferred to [facility] after IR placement of apheresis catheter. Rapid Covid test was negative although the PCR test was pending. An MRI of the lumbar spine was ordered for his intractable back pain. Case and specifics were relayed to receiving hospitalist.  Plan of care discussed with patient at length. Disposition: Other Healthcare  Activity: Activity as tolerated  Diet: DIET CLEAR LIQUID  Code Status: Full Code    Follow up instructions, discharge meds at bottom of this note. Plan was discussed with patient. All questions answered. Patient was stable at time of discharge. Patient will call a physician or return if any concerns. Discharge summary was sent to PCP electronically via \"Comm Mgt\" link in [facility], if possible. Diagnostic Imaging/Tests:   Ct Chest Abd Pelv W Cont    Result Date: 11/24/2020  CT of the Chest, Abdomen, and Pelvis INDICATION: Increasing back and chest pain, unexpected weight loss Multiple axial images were obtained through the chest, abdomen, and pelvis. Oral contrast was used for bowel opacification. 100mL of Isovue 370 intravenous contrast was used for better evaluation of solid organs and vascular structures. Radiation dose reduction techniques were used for this study. All CT scans performed at this facility use one or all of the following: Automated exposure control, adjustment of the mA and/or kVp according to patient's size, iterative reconstruction. COMPARISON: Abdomen CT dated 11/27/2018 FINDINGS: -LUNGS: Multiple small ill-defined nodular areas in the left lung, upper lobe predominant. Largest lesion is 8 mm. The right lung is clear.   No associated effusion. -MEDIASTINUM/AXILLA: No significant adenopathy. -HEART/VESSELS: There is moderate vascular calcification. Heart size is normal.  There is normal enhancement of the thoracic aorta and pulmonary arteries. -CHEST WALL: Normal. -LIVER: There is hepatomegaly and diffuse fatty infiltration of liver. No discrete liver mass. -GALLBLADDER/BILE DUCTS: Postcholecystectomy. -PANCREAS: Normal. -SPLEEN: Normal. -ADRENALS:  There is a stable 3.4 cm fat attenuation left adrenal mass, likely a myelolipoma. Right adrenal gland is unremarkable. -KIDNEYS/URETERS: No hydronephrosis or significant mass. -BLADDER: Normal. -REPRODUCTIVE ORGANS: No pelvic masses. -BOWEL: Normal caliber. No inflammatory changes. -LYMPH NODES: No significant retroperitoneal, mesenteric, or pelvic adenopathy. -BONES: Postoperative changes in the left hip. No acute fracture. -VASCULATURE: Normal -OTHER: No ascites. IMPRESSION: 1.  Multiple ill-defined nodules in the left lung, most likely atypical infection such as fungal or tuberculosis. 2.  Hepatomegaly and steatosis. 3.  No evidence of pulmonary embolus or aortic dissection. 4.  Extensive coronary artery calcification. If there are any questions about this report, I can be reached on [employer] or at [address]    Result Date: 11/24/2020  RIGHT UPPER QUADRANT ULTRASOUND 11/24/2020 HISTORY: Moderate right upper quadrant pain for one month. TECHNIQUE: Sonographic imaging of the right upper quadrant was performed. COMPARISON: CT abdomen and pelvis 11/27/2018 FINDINGS: Changes of a cholecystectomy are present. The common bile duct is 5 mm in diameter. There is no intrahepatic biliary ductal dilatation. The liver parenchyma is diffusely echogenic relative to the renal cortex. This finding may be present with steatosis. The right kidney is 13.1 cm in length. There is no hydronephrosis. The distal abdominal aorta is 1.9 cm in diameter. The IVC is patent.      IMPRESSION: Hepatic steatosis status post cholecystectomy. Xr Chest Port    Result Date: 11/24/2020  History: Chest pain, shortness of breath Exam: portable chest Comparison: 3/25/2020 Findings: There is apparent new asymmetric opacity seen at the left lung base adjacent heart shadow. Otherwise, the lungs are clear. Mediastinal contour and osseous structures are normal. Impressions: Apparent new asymmetric lung opacity at the left lung base. Correlation with PA and lateral chest x-ray recommended for further evaluation, as the finding could be artifactual.        Echocardiogram results:  No results found for this visit on 11/24/20. Procedures done this admission:  * No surgery found *    All Micro Results     Procedure Component Value Units Date/Time    AFB CULTURE + SMEAR W/RFLX ID FROM CULTURE [[id number]] Collected:  11/25/20 1715    Order Status:  Canceled     S. Shanta Pott, UR/CSF [[id number]] Collected:  11/24/20 1433    Order Status:  Completed Specimen:  Miscellaneous sample Updated:  11/25/20 1636     Source URINE        Specimen Urine     Streptococcus pneumoniae Ag Negative        Fluid culture Not indicated. Organism ID Not indicated. Please note Comment        Comment: (NOTE)  College [employer] standards require a culture to be  performed on CSF specimens submitted for bacterial antigen testing. (CAP [id number]) Urine specimens will not be cultured.   Performed At: [address] [id number]  Mary Mcnair MD YY:[id number]         Chele Douglas [[id number]] Collected:  11/25/20 1243    Order Status:  Canceled Specimen:  Blood     AFB CULTURE + SMEAR W/RFLX ID FROM CULTURE [[id number]] Collected:  11/25/20 0915    Order Status:  Canceled     CULTURE, URINE [[id number]] Collected:  11/24/20 1433    Order Status:  Completed Specimen:  Urine from Clean catch Updated:  11/25/20 0820     Special Requests: NO SPECIAL REQUESTS        Culture result:       NO GROWTH AFTER SHORT PERIOD OF INCUBATION. FURTHER RESULTS TO FOLLOW AFTER OVERNIGHT INCUBATION. BLOOD CULTURE [[id number]] Collected:  11/24/20 1203    Order Status:  Completed Specimen:  Blood Updated:  11/25/20 0725     Special Requests: --        NO SPECIAL REQUESTS  LEFT  FOREARM       Culture result: NO GROWTH AFTER 18 HOURS       BLOOD CULTURE [[id number]] Collected:  11/24/20 1204    Order Status:  Completed Specimen:  Blood Updated:  11/25/20 0725     Special Requests: --        RIGHT  HAND       Culture result: NO GROWTH AFTER 18 HOURS       AFB CULTURE + SMEAR W/RFLX ID FROM CULTURE [[id number]] Collected:  11/25/20 0115    Order Status:  Cherise Yue AB, IGM [[id number]] Collected:  11/24/20 1809    Order Status:  Completed Specimen:  Serum Updated:  11/24/20 1820    AFB CULTURE + SMEAR W/RFLX ID FROM CULTURE [[id number]] Collected:  11/24/20 1715    Order Status:  Canceled     LEGIONELLA PNEUMOPHILA AG, URINE [[id number]] Collected:  11/24/20 1433    Order Status:  Completed Specimen:  Urine Updated:  11/24/20 1712    CULTURE, RESPIRATORY/SPUTUM/BRONCH Trinidad Klaus STAIN [[id number]] Collected:  11/24/20 1415    Order Status:  Canceled Specimen:  Sputum     S. Si Pretzel, UR/CSF [[id number]] Collected:  11/24/20 1415    Order Status:  Canceled           Labs: Results:       BMP, Mg, Phos Recent Labs     11/25/20  0841 11/25/20  0705 11/25/20  0231 11/24/20  2242   *  --  144 143   K 5.0 4.0 6.7* 4.6   CL 95*  --  106 104   CO2 23  --  24 23   AGAP 10  --  14 16   BUN 6  --  7 7   CREA 0.63*  --  0.61* 0.62*   CA 7.6*  --  7.9* 7.8*   *  --  192* 161*   MG  --   --  2.0  --    PHOS  --   --  4.4  --       CBC Recent Labs     11/25/20  0231 11/24/20  1812 11/24/20  1120   WBC 10.1 13.2* 15.6*   RBC 4.32 4.32 5.30   HGB 14.7 14.9 18.0*   HCT 39.9* 40.0* 48.2    210 245   GRANS 61 67 76   LYMPH 24 20 12*   EOS 5 3 2   MONOS 9 9 10   BASOS 1 1 1   IG 0 0 0   ANEU 6.2 8.9* 11.8*   ABL 2.4 2.6 1.8   EDWARD 0.5 0.4 0.3   ABM 1.0 1.2 1.5*   ABB 0.1 0.1 0.1   AIG 0.0 0.0 0.1      LFT Recent Labs     11/25/20  0841 11/24/20  1120   ALT 24 26   AP 98 123   TP 6.2* 7.4   ALB 2.9* 4.2   GLOB 3.3 3.2   AGRAT 0.9* 1.3      Cardiac Testing Lab Results   Component Value Date/Time    Troponin-I, Qt. <0.02 (L) 03/25/2020 05:04 PM    Troponin-I, Qt. <0.02 (L) 03/25/2020 12:56 PM    Troponin-I, Qt. 9.36 (HH) 12/16/2016 08:06 AM      Coagulation Tests Lab Results   Component Value Date/Time    Prothrombin time 13.7 11/25/2020 08:41 AM    INR 1.0 11/25/2020 08:41 AM    INR (POC) 1.4 (H) 02/16/2018 02:50 PM    aPTT 31.6 11/25/2020 08:41 AM    aPTT 37.5 (H) 02/16/2018 04:25 PM      A1c Lab Results   Component Value Date/Time    Hemoglobin A1c 10.8 11/25/2020 02:31 AM    Hemoglobin A1c 12.0 (H) 01/31/2020 08:42 AM    Hemoglobin A1c 12.2 (H) 05/08/2019 11:20 AM      Lipid Panel Lab Results   Component Value Date/Time    Cholesterol, total 148 03/26/2020 03:40 AM    HDL Cholesterol 26 (L) 03/26/2020 03:40 AM    LDL,Direct 93 11/25/2020 08:34 AM    LDL, calculated Not calculated due to elevated triglyceride level 03/26/2020 03:40 AM    VLDL, calculated 98.8 (H) 03/26/2020 03:40 AM    Triglyceride 2,184 (H) 11/25/2020 12:43 PM    CHOL/HDL Ratio 5.7 03/26/2020 03:40 AM      Thyroid Panel Lab Results   Component Value Date/Time    TSH 2.130 11/24/2020 11:20 AM    TSH 2.920 03/26/2020 03:35 AM    T4, Free 1.1 03/26/2020 03:35 AM    T4, Free 1.10 05/08/2019 11:20 AM        Most Recent UA Lab Results   Component Value Date/Time    Color YELLOW 11/24/2020 02:33 PM    Appearance CLEAR 11/24/2020 02:33 PM    Specific gravity 1.037 (H) 11/24/2020 02:33 PM    pH (UA) 5.0 11/24/2020 02:33 PM    Protein 30 (A) 11/24/2020 02:33 PM    Glucose >1,000 11/24/2020 02:33 PM    Ketone >80 (A) 11/24/2020 02:33 PM    Bilirubin SMALL (A) 11/24/2020 02:33 PM    Blood Negative 11/24/2020 02:33 PM    Urobilinogen 0.2 11/24/2020 02:33 PM    Nitrites Negative 11/24/2020 02:33 PM    Leukocyte Esterase Negative 11/24/2020 02:33 PM    WBC 0 11/24/2020 02:33 PM    RBC 0-3 11/24/2020 02:33 PM    Epithelial cells 0-3 11/24/2020 02:33 PM    Bacteria 0 11/24/2020 02:33 PM    Casts 0-3 11/24/2020 02:33 PM        Allergies   Allergen Reactions    Peanut Anaphylaxis    Morphine Other (comments)     Anxiety, claustrophobia     Immunization History   Administered Date(s) Administered    Influenza Vaccine 10/01/2018    Influenza Vaccine (Quad) Mdck Pf (>4 Yrs Flucelvax QUAD 92944) 09/08/2017    Pneumococcal Polysaccharide (PPSV-23) 02/27/2019    TB Skin Test (PPD) Intradermal 09/03/2008       All Labs from Last 24 Hrs:  Recent Results (from the past 24 hour(s))   SARS-COV-2    Collection Time: 11/24/20  6:09 PM   Result Value Ref Range    Specimen source Nasopharyngeal      COVID-19 rapid test Not detected NOTD     METABOLIC PANEL, BASIC    Collection Time: 11/24/20  6:12 PM   Result Value Ref Range    Sodium 142 136 - 145 mmol/L    Potassium 4.0 3.5 - 5.1 mmol/L    Chloride 105 98 - 107 mmol/L    CO2 19 (L) 21 - 32 mmol/L    Anion gap 18 (H) 7 - 16 mmol/L    Glucose 189 (H) 65 - 100 mg/dL    BUN 8 6 - 23 MG/DL    Creatinine 0.50 (L) 0.8 - 1.5 MG/DL    GFR est AA >60 >60 ml/min/1.73m2    GFR est non-AA >60 >60 ml/min/1.73m2    Calcium 7.3 (L) 8.3 - 10.4 MG/DL   CBC WITH AUTOMATED DIFF    Collection Time: 11/24/20  6:12 PM   Result Value Ref Range    WBC 13.2 (H) 4.3 - 11.1 K/uL    RBC 4.32 4.23 - 5.6 M/uL    HGB 14.9 13.6 - 17.2 g/dL    HCT 40.0 (L) 41.1 - 50.3 %    MCV 92.6 79.6 - 97.8 FL    MCH 34.5 (H) 26.1 - 32.9 PG    MCHC 37.3 (H) 31.4 - 35.0 g/dL    RDW 13.2 11.9 - 14.6 %    PLATELET 508 142 - 120 K/uL    MPV 11.3 9.4 - 12.3 FL    ABSOLUTE NRBC 0.00 0.0 - 0.2 K/uL    DF AUTOMATED      NEUTROPHILS 67 43 - 78 %    LYMPHOCYTES 20 13 - 44 %    MONOCYTES 9 4.0 - 12.0 %    EOSINOPHILS 3 0.5 - 7.8 %    BASOPHILS 1 0.0 - 2.0 %    IMMATURE GRANULOCYTES 0 0.0 - 5.0 % ABS. NEUTROPHILS 8.9 (H) 1.7 - 8.2 K/UL    ABS. LYMPHOCYTES 2.6 0.5 - 4.6 K/UL    ABS. MONOCYTES 1.2 0.1 - 1.3 K/UL    ABS. EOSINOPHILS 0.4 0.0 - 0.8 K/UL    ABS. BASOPHILS 0.1 0.0 - 0.2 K/UL    ABS. IMM.  GRANS. 0.0 0.0 - 0.5 K/UL   TRIGLYCERIDE    Collection Time: 11/24/20  6:12 PM   Result Value Ref Range    Triglyceride 2,712 (H) 35 - 150 MG/DL   TROPONIN-HIGH SENSITIVITY    Collection Time: 11/24/20  6:12 PM   Result Value Ref Range    Troponin-High Sensitivity 14.2 (H) 0 - 14 pg/mL   LDL, DIRECT    Collection Time: 11/24/20  6:12 PM   Result Value Ref Range    LDL,Direct 103 (H) <100 mg/dl   GLUCOSE, POC    Collection Time: 11/24/20  6:17 PM   Result Value Ref Range    Glucose (POC) 217 (H) 65 - 100 mg/dL   GLUCOSTABILIZER    Collection Time: 11/24/20  6:18 PM   Result Value Ref Range    Glucose 217 mg/dL    Insulin order 3.1 units/hour    Insulin adminstered 3.1 units/hour    Multiplier 0.020     Low target 150 mg/dL    High target 250 mg/dL    D50 order 0.0 ml    D50 administered 0.00 ml    Minutes until next BG 60 min    Order initials cm     Administered initials cm     GLSCOM Comments     GLUCOSE, POC    Collection Time: 11/24/20  7:27 PM   Result Value Ref Range    Glucose (POC) 196 (H) 65 - 100 mg/dL   GLUCOSTABILIZER    Collection Time: 11/24/20  7:28 PM   Result Value Ref Range    Glucose 196 mg/dL    Insulin order 2.7 units/hour    Insulin adminstered 2.7 units/hour    Multiplier 0.020     Low target 150 mg/dL    High target 250 mg/dL    D50 order 0.0 ml    D50 administered 0.00 ml    Minutes until next BG 60 min    Order initials SON     Administered initials SON     GLSCOM Comments     GLUCOSE, POC    Collection Time: 11/24/20  8:35 PM   Result Value Ref Range    Glucose (POC) 190 (H) 65 - 100 mg/dL   GLUCOSTABILIZER    Collection Time: 11/24/20  8:38 PM   Result Value Ref Range    Glucose 190 mg/dL    Insulin order 2.6 units/hour    Insulin adminstered 2.6 units/hour    Multiplier 0.020     Low target 150 mg/dL    High target 250 mg/dL    D50 order 0.0 ml    D50 administered 0.00 ml    Minutes until next BG 60 min    Order initials      Administered initials      GLSCOM Comments     GLUCOSE, POC    Collection Time: 11/24/20  9:39 PM   Result Value Ref Range    Glucose (POC) 177 (H) 65 - 100 mg/dL   GLUCOSTABILIZER    Collection Time: 11/24/20  9:40 PM   Result Value Ref Range    Glucose 177 mg/dL    Insulin order 2.3 units/hour    Insulin adminstered 2.3 units/hour    Multiplier 0.020     Low target 150 mg/dL    High target 250 mg/dL    D50 order 0.0 ml    D50 administered 0.00 ml    Minutes until next BG 60 min    Order initials SON     Administered initials [facility]     GLSCOM Comments     METABOLIC PANEL, BASIC    Collection Time: 11/24/20 10:42 PM   Result Value Ref Range    Sodium 143 136 - 145 mmol/L    Potassium 4.6 3.5 - 5.1 mmol/L    Chloride 104 98 - 107 mmol/L    CO2 23 21 - 32 mmol/L    Anion gap 16 7 - 16 mmol/L    Glucose 161 (H) 65 - 100 mg/dL    BUN 7 6 - 23 MG/DL    Creatinine 0.62 (L) 0.8 - 1.5 MG/DL    GFR est AA >60 >60 ml/min/1.73m2    GFR est non-AA >60 >60 ml/min/1.73m2    Calcium 7.8 (L) 8.3 - 10.4 MG/DL   GLUCOSE, POC    Collection Time: 11/24/20 10:43 PM   Result Value Ref Range    Glucose (POC) 170 (H) 65 - 100 mg/dL   GLUCOSTABILIZER    Collection Time: 11/24/20 10:44 PM   Result Value Ref Range    Glucose 170 mg/dL    Insulin order 2.2 units/hour    Insulin adminstered 2.2 units/hour    Multiplier 0.020     Low target 150 mg/dL    High target 250 mg/dL    D50 order 0.0 ml    D50 administered 0.00 ml    Minutes until next BG 60 min    Order initials [facility]     Administered initials [facility]     GLSCOM Comments     GLUCOSE, POC    Collection Time: 11/24/20 11:21 PM   Result Value Ref Range    Glucose (POC) 168 (H) 65 - 100 mg/dL   GLUCOSTABILIZER    Collection Time: 11/24/20 11:21 PM   Result Value Ref Range    Glucose 168 mg/dL    Insulin order 2.2 units/hour    Insulin adminstered 2.2 units/hour    Multiplier 0.020     Low target 140 mg/dL    High target 180 mg/dL    D50 order 0.0 ml    D50 administered 0.00 ml    Minutes until next BG 60 min    Order initials sb     Administered initials sb     GLSCOM Comments     GLUCOSE, POC    Collection Time: 11/25/20 12:19 AM   Result Value Ref Range    Glucose (POC) 158 (H) 65 - 100 mg/dL   GLUCOSTABILIZER    Collection Time: 11/25/20 12:20 AM   Result Value Ref Range    Glucose 158 mg/dL    Insulin order 2.0 units/hour    Insulin adminstered 2.0 units/hour    Multiplier 0.020     Low target 140 mg/dL    High target 180 mg/dL    D50 order 0.0 ml    D50 administered 0.00 ml    Minutes until next BG 60 min    Order initials ALB     Administered initials ALB     GLSCOM Comments     GLUCOSE, POC    Collection Time: 11/25/20  1:01 AM   Result Value Ref Range    Glucose (POC) 179 (H) 65 - 100 mg/dL   GLUCOSTABILIZER    Collection Time: 11/25/20  1:17 AM   Result Value Ref Range    Glucose 179 mg/dL    Insulin order 2.4 units/hour    Insulin adminstered 2.4 units/hour    Multiplier 0.020     Low target 140 mg/dL    High target 180 mg/dL    D50 order 0.0 ml    D50 administered 0.00 ml    Minutes until next BG 60 min    Order initials ALB     Administered initials ALB     GLSCOM Comments     GLUCOSE, POC    Collection Time: 11/25/20  2:16 AM   Result Value Ref Range    Glucose (POC) 177 (H) 65 - 100 mg/dL   GLUCOSTABILIZER    Collection Time: 11/25/20  2:17 AM   Result Value Ref Range    Glucose 177 mg/dL    Insulin order 2.3 units/hour    Insulin adminstered 2.3 units/hour    Multiplier 0.020     Low target 140 mg/dL    High target 180 mg/dL    D50 order 0.0 ml    D50 administered 0.00 ml    Minutes until next BG 60 min    Order initials ALB     Administered initials ALB     GLSCOM Comments     METABOLIC PANEL, BASIC    Collection Time: 11/25/20  2:31 AM   Result Value Ref Range    Sodium 144 136 - 145 mmol/L    Potassium 6.7 (HH) 3.5 - 5.1 mmol/L    Chloride 106 98 - 107 mmol/L    CO2 24 21 - 32 mmol/L    Anion gap 14 7 - 16 mmol/L    Glucose 192 (H) 65 - 100 mg/dL    BUN 7 6 - 23 MG/DL    Creatinine 0.61 (L) 0.8 - 1.5 MG/DL    GFR est AA >60 >60 ml/min/1.73m2    GFR est non-AA >60 >60 ml/min/1.73m2    Calcium 7.9 (L) 8.3 - 10.4 MG/DL   HEMOGLOBIN A1C WITH EAG    Collection Time: 11/25/20  2:31 AM   Result Value Ref Range    Hemoglobin A1c 10.8 %    Est. average glucose 263 mg/dL   TRIGLYCERIDE    Collection Time: 11/25/20  2:31 AM   Result Value Ref Range    Triglyceride 2,245 (H) 35 - 150 MG/DL   CBC WITH AUTOMATED DIFF    Collection Time: 11/25/20  2:31 AM   Result Value Ref Range    WBC 10.1 4.3 - 11.1 K/uL    RBC 4.32 4.23 - 5.6 M/uL    HGB 14.7 13.6 - 17.2 g/dL    HCT 39.9 (L) 41.1 - 50.3 %    MCV 92.4 79.6 - 97.8 FL    MCH 34.0 (H) 26.1 - 32.9 PG    MCHC 36.8 (H) 31.4 - 35.0 g/dL    RDW 13.6 11.9 - 14.6 %    PLATELET 361 231 - 528 K/uL    MPV 11.9 9.4 - 12.3 FL    ABSOLUTE NRBC 0.00 0.0 - 0.2 K/uL    DF AUTOMATED      NEUTROPHILS 61 43 - 78 %    LYMPHOCYTES 24 13 - 44 %    MONOCYTES 9 4.0 - 12.0 %    EOSINOPHILS 5 0.5 - 7.8 %    BASOPHILS 1 0.0 - 2.0 %    IMMATURE GRANULOCYTES 0 0.0 - 5.0 %    ABS. NEUTROPHILS 6.2 1.7 - 8.2 K/UL    ABS. LYMPHOCYTES 2.4 0.5 - 4.6 K/UL    ABS. MONOCYTES 1.0 0.1 - 1.3 K/UL    ABS. EOSINOPHILS 0.5 0.0 - 0.8 K/UL    ABS. BASOPHILS 0.1 0.0 - 0.2 K/UL    ABS. IMM.  GRANS. 0.0 0.0 - 0.5 K/UL   LDL, DIRECT    Collection Time: 11/25/20  2:31 AM   Result Value Ref Range    LDL,Direct 80 <100 mg/dl   MAGNESIUM    Collection Time: 11/25/20  2:31 AM   Result Value Ref Range    Magnesium 2.0 1.8 - 2.4 mg/dL   PHOSPHORUS    Collection Time: 11/25/20  2:31 AM   Result Value Ref Range    Phosphorus 4.4 2.5 - 4.5 MG/DL   GLUCOSE, POC    Collection Time: 11/25/20  3:20 AM   Result Value Ref Range    Glucose (POC) 197 (H) 65 - 100 mg/dL   GLUCOSTABILIZER    Collection Time: 11/25/20  3:21 AM   Result Value Ref Range    Glucose 197 mg/dL    Insulin order 4.1 units/hour    Insulin adminstered 4.1 units/hour    Multiplier 0.030     Low target 140 mg/dL    High target 180 mg/dL    D50 order 0.0 ml    D50 administered 0.00 ml    Minutes until next BG 60 min    Order initials ALB     Administered initials ALB     GLSCOM Comments     GLUCOSE, POC    Collection Time: 11/25/20  4:26 AM   Result Value Ref Range    Glucose (POC) 211 (H) 65 - 100 mg/dL   GLUCOSTABILIZER    Collection Time: 11/25/20  4:27 AM   Result Value Ref Range    Glucose 211 mg/dL    Insulin order 6.0 units/hour    Insulin adminstered 6.0 units/hour    Multiplier 0.040     Low target 140 mg/dL    High target 180 mg/dL    D50 order 0.0 ml    D50 administered 0.00 ml    Minutes until next BG 60 min    Order initials ALB     Administered initials ALB     GLSCOM Comments     TRIGLYCERIDE    Collection Time: 11/25/20  7:05 AM   Result Value Ref Range    Triglyceride  30 - 200 MG/DL     CORRECTION TO MEDICAL RECORD-DISREGARD THESE TEST RESULTS   LIPASE    Collection Time: 11/25/20  7:05 AM   Result Value Ref Range    Lipase 41 (L) 73 - 393 U/L   POTASSIUM    Collection Time: 11/25/20  7:05 AM   Result Value Ref Range    Potassium 4.0 3.5 - 5.1 mmol/L   TRIGLYCERIDE    Collection Time: 11/25/20  7:06 AM   Result Value Ref Range    Triglyceride 2,304 (H) 35 - 150 MG/DL   LDL, DIRECT    Collection Time: 11/25/20  7:06 AM   Result Value Ref Range    LDL,Direct 81 <100 mg/dl   GLUCOSE, POC    Collection Time: 11/25/20  8:18 AM   Result Value Ref Range    Glucose (POC) 247 (H) 65 - 100 mg/dL   TRIGLYCERIDE    Collection Time: 11/25/20  8:34 AM   Result Value Ref Range    Triglyceride 2,253 (H) 35 - 150 MG/DL   LDL, DIRECT    Collection Time: 11/25/20  8:34 AM   Result Value Ref Range    LDL,Direct 93 <027 mg/dl   METABOLIC PANEL, COMPREHENSIVE    Collection Time: 11/25/20  8:41 AM   Result Value Ref Range    Sodium 128 (L) 136 - 145 mmol/L    Potassium 5.0 3.5 - 5.1 mmol/L    Chloride 95 (L) 98 - 107 mmol/L    CO2 23 21 - 32 mmol/L    Anion gap 10 7 - 16 mmol/L    Glucose 284 (H) 65 - 100 mg/dL    BUN 6 6 - 23 MG/DL    Creatinine 0.63 (L) 0.8 - 1.5 MG/DL    GFR est AA >60 >60 ml/min/1.73m2    GFR est non-AA >60 >60 ml/min/1.73m2    Calcium 7.6 (L) 8.3 - 10.4 MG/DL    Bilirubin, total 1.0 0.2 - 1.1 MG/DL    ALT (SGPT) 24 12 - 65 U/L    AST (SGOT) 65 (H) 15 - 37 U/L    Alk.  phosphatase 98 50 - 136 U/L    Protein, total 6.2 (L) 6.3 - 8.2 g/dL    Albumin 2.9 (L) 3.5 - 5.0 g/dL    Globulin 3.3 2.3 - 3.5 g/dL    A-G Ratio 0.9 (L) 1.2 - 3.5     PROTHROMBIN TIME + INR    Collection Time: 11/25/20  8:41 AM   Result Value Ref Range    Prothrombin time 13.7 12.5 - 14.7 sec    INR 1.0     PTT    Collection Time: 11/25/20  8:41 AM   Result Value Ref Range    aPTT 31.6 24.1 - 35.1 SEC   FIBRINOGEN    Collection Time: 11/25/20  8:41 AM   Result Value Ref Range    Fibrinogen 594 (H) 190 - 501 mg/dL   GLUCOSE, POC    Collection Time: 11/25/20 10:55 AM   Result Value Ref Range    Glucose (POC) 248 (H) 65 - 100 mg/dL   TRIGLYCERIDE    Collection Time: 11/25/20 12:43 PM   Result Value Ref Range    Triglyceride 2,184 (H) 35 - 150 MG/DL   PROCALCITONIN    Collection Time: 11/25/20 12:43 PM   Result Value Ref Range    Procalcitonin <0.05 ng/mL   GLUCOSE, POC    Collection Time: 11/25/20  4:15 PM   Result Value Ref Range    Glucose (POC) 201 (H) 65 - 100 mg/dL       Current Med List in Hospital:   Current Facility-Administered Medications   Medication Dose Route Frequency    influenza vaccine 2020-21 (6 mos+)(PF) (FLUARIX/FLULAVAL/FLUZONE QUAD) injection 0.5 mL  0.5 mL IntraMUSCular PRIOR TO DISCHARGE    insulin regular (NOVOLIN R, HUMULIN R) injection   SubCUTAneous AC&HS    dextrose 40% (GLUTOSE) oral gel 1 Tube  15 g Oral PRN    glucagon (GLUCAGEN) injection 1 mg  1 mg IntraMUSCular PRN    dextrose (D50W) injection syrg 12.5-25 g  25-50 mL IntraVENous PRN    atorvastatin (LIPITOR) tablet 80 mg  80 mg Oral QHS    buPROPion (WELLBUTRIN) tablet 100 mg  100 mg Oral BID    carvediloL (COREG) tablet 6.25 mg  6.25 mg Oral BID WITH MEALS    nitroglycerin (NITROSTAT) tablet 0.4 mg  0.4 mg SubLINGual PRN    prasugreL (EFFIENT) tablet 10 mg  10 mg Oral DAILY    calcium carbonate (OS-SARAI) tablet 500 mg [elemental]  500 mg Oral TID WITH MEALS    ondansetron (ZOFRAN) injection 4 mg  4 mg IntraVENous Q4H PRN    insulin glargine (LANTUS) injection 22 Units  22 Units SubCUTAneous BID    [START ON 11/26/2020] insulin lispro (HUMALOG) injection   SubCUTAneous Q24H    [START ON 11/26/2020] isosorbide mononitrate ER (IMDUR) tablet 30 mg  30 mg Oral DAILY    pantoprazole (PROTONIX) 40 mg in 0.9% sodium chloride 10 mL injection  40 mg IntraVENous Q12H    cefTRIAXone (ROCEPHIN) 1 g in 0.9% sodium chloride (MBP/ADV) 50 mL MBP  1 g IntraVENous Q24H    azithromycin (ZITHROMAX) 500 mg in 0.9% sodium chloride 250 mL (VIAL-MATE)  500 mg IntraVENous Q24H    HYDROmorphone (PF) (DILAUDID) injection 0.25 mg  0.25 mg IntraVENous Q4H PRN    labetaloL (NORMODYNE;TRANDATE) injection 20 mg  20 mg IntraVENous Q2H PRN    labetaloL (NORMODYNE;TRANDATE) injection 10 mg  10 mg IntraVENous Q4H PRN    aspirin chewable tablet 81 mg  81 mg Oral DAILY    fenofibrate (LOFIBRA) tablet 160 mg  160 mg Oral DAILY    sodium chloride (NS) flush 5-40 mL  5-40 mL IntraVENous Q8H    sodium chloride (NS) flush 5-40 mL  5-40 mL IntraVENous PRN    enoxaparin (LOVENOX) injection 40 mg  40 mg SubCUTAneous Q24H    promethazine (PHENERGAN) with saline injection 12.5 mg  12.5 mg IntraVENous Q6H PRN    HYDROmorphone (PF) (DILAUDID) injection 1 mg  1 mg IntraVENous Q4H PRN     Current Outpatient Medications   Medication Sig    isosorbide mononitrate ER (IMDUR) 30 mg tablet TAKE 1 TABLET BY MOUTH ONCE DAILY    Admelog SoloStar U-100 Insulin 100 unit/mL kwikpen 25 units TIDAC plus correction 4/50>150, max daily dose 100 units    Basaglar KwikPen U-100 Insulin 100 unit/mL (3 mL) inpn Start 50 units BID and increase by 2 units per dose every 3 days until FBG , max daily dose 160 units    Accu-Chek Guide test strips strip Check BGL 4 times daily, E11.65    secukinumab (Cosentyx Pen) 150 mg/mL pnij 300 mg by SubCUTAneous route every twenty-eight (28) days. 300 mg SC at week every 4 weeks    ergocalciferol (ERGOCALCIFEROL) 1,250 mcg (50,000 unit) capsule Take 1 Cap by mouth every seven (7) days.  predniSONE (DELTASONE) 10 mg tablet Take 10 mg by mouth daily (with breakfast).  zonisamide (Zonegran) 100 mg capsule Take 3 Caps by mouth nightly.  carvediloL (COREG) 6.25 mg tablet Take 1 Tab by mouth two (2) times daily (with meals).  pantoprazole (PROTONIX) 40 mg tablet Take 1 Tab by mouth daily.  fenofibrate (LOFIBRA) 160 mg tablet Take 1 Tab by mouth daily.  atorvastatin (LIPITOR) 80 mg tablet Take 1 Tab by mouth nightly for 360 days.  buPROPion (WELLBUTRIN) 100 mg tablet Take 1 Tab by mouth two (2) times a day.  Insulin Needles, Disposable, 31 gauge x 5/16\" ndle by SubCUTAneous route four (4) times daily.  omega-3 fatty acids (FISH OIL CONCENTRATE) 1,000 mg cap Take 1,000 mg by mouth two (2) times a day.  Blood-Glucose Meter monitoring kit Check blood sugar tid DM -2    lancets misc Check blood sugar tid    nitroglycerin (NITROSTAT) 0.4 mg SL tablet 1 Tab by SubLINGual route as needed for Chest Pain. And call EMS ,May repeat every five min for up to 3 doses AS NEEDED    aspirin delayed-release 81 mg tablet Take 81 mg by mouth.  prasugrel (EFFIENT) 10 mg tablet Take 10 mg by mouth.     lancets misc Check blood sugar tid       Discharge Exam:  Patient Vitals for the past 24 hrs:   Temp Pulse Resp BP SpO2   11/25/20 1600  87 13 (!) 156/92 95 %   11/25/20 1500 98.1 °F (36.7 °C) 88 13 (!) 158/89 97 %   11/25/20 1453  89 14  98 %   11/25/20 1400  90 14 (!) 162/85 98 %   11/25/20 1300  86 15 (!) 137/94 100 %   11/25/20 1200  89  135/84 97 %   11/25/20 1100 98 °F (36.7 °C) 87 14 (!) 142/92 96 %   11/25/20 1049  97  (!) 183/114    11/25/20 1044  97  (!) 183/114    11/25/20 1036  (!) 102 15 (!) 183/114    11/25/20 1000  (!) 101 12 (!) 169/100 98 %   11/25/20 0900  98 17 138/78 99 %   11/25/20 0846     97 %   11/25/20 0820 97.9 °F (36.6 °C) 98 18 (!) 173/105 97 %   11/25/20 0819  (!) 104 23 (!) 173/105 100 %   11/25/20 0800  96 15 (!) 153/111 96 %   11/25/20 0720 97.9 °F (36.6 °C) 94 12 (!) 165/103 97 %   11/25/20 0700  (!) 106 14 (!) 165/103 97 %   11/25/20 0600  93 15 (!) 158/83 (!) 89 %   11/25/20 0500  92  (!) 157/89 95 %   11/25/20 0359 97.9 °F (36.6 °C) 94 12 111/82 98 %   11/25/20 0259  93 13 (!) 140/90 98 %   11/25/20 0221     96 %   11/25/20 0159  (!) 103 11 93/66 (!) 88 %   11/25/20 0059  (!) 115 13 (!) 144/96 91 %   11/24/20 2359 98.1 °F (36.7 °C) 98 15 (!) 153/97 97 %   11/24/20 2311 98.1 °F (36.7 °C)       11/24/20 2233  94 11  94 %   11/24/20 2141    (!) 164/96    11/24/20 2041  96 12  94 %   11/24/20 1917  99 12  93 %   11/24/20 1800  (!) 102 12  97 %     Oxygen Therapy  O2 Sat (%): 95 % (11/25/20 1600)  Pulse via Oximetry: 88 beats per minute (11/25/20 1600)  O2 Device: Nasal cannula (11/25/20 1500)  O2 Flow Rate (L/min): 2 l/min (11/25/20 1500)    Estimated body mass index is 36.33 kg/m² as calculated from the following:    Height as of this encounter: 5' 10\" (1.778 m). Weight as of this encounter: 114.9 kg (253 lb 3.2 oz). Intake/Output Summary (Last 24 hours) at 11/25/2020 1733  Last data filed at 11/25/2020 1348  Gross per 24 hour   Intake 2844.39 ml   Output 2325 ml   Net 519.39 ml       *Note that automatically entered I/Os may not be accurate; dependent on patient compliance with collection and accurate  by assistants. General:          Well nourished. Alert. Obese. Eyes:               Normal sclerae. Extraocular movements intact. HENT:             Normocephalic, atraumatic.   Dry mucous membranes  CV:                  Tachycardia. No m/r/g. Lungs:             CTAB. No wheezing, rhonchi, or rales. Abdomen:        Soft, moderately tender to palpation. , there is some CVA tenderness. Extremities:     Warm and dry. No cyanosis or edema. Neurologic:      L foot weakness to dorsiflexion. No midpoint back tenderness. Negative SLR  Skin:                No rashes or jaundice. Normal coloration  Psych:             Normal mood and affect. Discharge Info:   Discharge Medication List as of 11/25/2020  4:56 PM          Follow Up Orders:  No orders of the defined types were placed in this encounter. Follow-up Information    None         Time spent in patient discharge planning and coordination 35 minutes.     Signed:  Milady Lo MD

## 2020-11-25 NOTE — PROCEDURES
Department of Interventional Radiology  [phone]        Interventional Radiology Brief Procedure Note    Patient: Freda Pallas MRN: [id number]  SSN: [id number]    YOB: 1970  Age: 52 y.o.   Sex: male      Date of Procedure: 11/25/2020    Pre-Procedure Diagnosis: hypertriglyceridemia    Post-Procedure Diagnosis: SAME    Procedure(s): Temporary Central Venous Catheter    Brief Description of Procedure: as above    Performed By: Talya Baker MD     Assistants: None    Anesthesia:Lidocaine    Estimated Blood Loss: Less than 10ml    Specimens:  None    Implants:  Temporary Apheresis Catheter    Findings: patent right IJ    Complications: None    Recommendations: ok to use     Follow Up: as needed    Signed By: Talya Baker MD     November 25, 2020

## 2020-11-25 NOTE — ED NOTES
Bedside report taken from Jerica Chi 12. Currently waiting to find out about rapid flu test and if he can be admitted to ICU at [facility]. Waiting to hear from Dr. Scarlet Lanier. S.

## 2020-11-25 NOTE — CONSULTS
[facility]        Inpatient Hematology / Oncology Consult Note    Reason for Consult:  DKA (diabetic ketoacidoses) ([facility] [address] [E11.10]  DKA (diabetic ketoacidoses) ([facility] [E11.10]  Referring Physician:  Adrian Valenzuela MD    History of Present Illness:  Mr. Shaka Beasley is a 52 y.o. male admitted on 11/24/2020 with a primary diagnosis of chest pain, DKA, and pancreatitis. His PMH includes CAD s/p multiple stents, GERD, hypertriglyceridemia s/p pheresis (2/16/18), hx psoriatic arthritis, IDDM with hx DKA, non-intractable epilepsy with complex partial seizures, and HTN. Pt presented to ED with c/o progressively worsening back pain wrapping around to chest x 1 month. He also c/o cough, chest pain, polydipsia, polyuria, and unintentional wt loss of 40 # in past 3 months. He reports difficulty controlling his blood sugars at home and stopped his insulin about 1 and 1/2 weeks ago. On admission, /104, , WBC 15.6, Hgb 18, , Trop 14.2, EKG with no ischemic changes, non-specific T-wave flattening in inferior leads. CT CAP with multiple ill-defined nodules in L lung, most likely atypical infection such as fungal or TB; hepatomegaly and steatosis. Abd US with hepatic steatosis. On CTX/Azith. COVID rapid neg, PCR pending. TB pending. Cards/GI/Pulm consulted. Triglycerides 2253 today. We were consulted for pheresis. Review of Systems:  Constitutional +fatigue, wt loss   HEENT Denies trauma, blurry vision, hearing loss, ear pain, nosebleeds, sore throat, neck pain and ear discharge. Skin Denies lesions or rashes. Lungs +cough   Cardiovascular +chest pain   Gastrointestinal Denies nausea, vomiting, changes in bowel habits, bloody or black stools, abdominal pain.  Denies dysuria, frequency or hesitancy of urination. Neuro Denies headaches, visual changes or ataxia. Denies dizziness, tingling, tremors, sensory change, speech change, focal weakness or headaches. Hematology Denies easy bruising or bleeding, denies gingival bleeding or epistaxis. Endo +DM   MSK +back pain     Psychiatric/Behavioral Denies depression and substance abuse. The patient is not nervous/anxious.          Allergies   Allergen Reactions    Peanut Anaphylaxis    Morphine Other (comments)     Anxiety, claustrophobia     Past Medical History:   Diagnosis Date    Acute coronary syndrome ([facility] [address] 12/15/2016    Acute pancreatitis 2/15/2018    Arthritis     psoriatic    Diabetes ([facility] [address]     Endocrine disease     Hypertension     Nonintractable epilepsy with complex partial seizures ([facility] [address] 2020    Psoriatic arthropathy (HCC)     Seizures (HCC)      Past Surgical History:   Procedure Laterality Date    CARDIAC SURG PROCEDURE UNLIST      HX APPENDECTOMY      HX CHOLECYSTECTOMY      HX HERNIA REPAIR      HX ORTHOPAEDIC      left heel secondary to trauma     Family History   Problem Relation Age of Onset    Thyroid Disease Mother         goiters    Breast Cancer Mother         42's   Chaz Olp Atrial Fibrillation Mother     Diabetes Father     Heart Disease Father 48    Diabetes Sister     Heart Disease Paternal Grandfather      Social History     Socioeconomic History    Marital status:      Spouse name: Not on file    Number of children: Not on file    Years of education: Not on file    Highest education level: Not on file   Occupational History    Not on file   Social Needs    Financial resource strain: Not on file    Food insecurity     Worry: Not on file     Inability: Not on file    Transportation needs     Medical: Not on file     Non-medical: Not on file   Tobacco Use    Smoking status: Former Smoker     Packs/day: 0.25     Years: 30.00     Pack years: 7.50     Last attempt to quit: 2019     Years since quittin.8    Smokeless tobacco: Former User   Substance and Sexual Activity    Alcohol use: No    Drug use: No    Sexual activity: Not Currently     Partners: Female   Lifestyle    Physical activity     Days per week: Not on file     Minutes per session: Not on file    Stress: Not on file   Relationships    Social connections     Talks on phone: Not on file     Gets together: Not on file     Attends Hindu service: Not on file     Active member of club or organization: Not on file     Attends meetings of clubs or organizations: Not on file     Relationship status: Not on file    Intimate partner violence     Fear of current or ex partner: Not on file     Emotionally abused: Not on file     Physically abused: Not on file     Forced sexual activity: Not on file   Other Topics Concern     Service No    Blood Transfusions No    Caffeine Concern No    Occupational Exposure No    Hobby Hazards No    Sleep Concern No    Stress Concern Yes    Weight Concern Yes    Special Diet No    Back Care No    Exercise No    Bike Helmet No    Seat Belt Yes    Self-Exams No   Social History Narrative    Not on file     Current Facility-Administered Medications   Medication Dose Route Frequency Provider Last Rate Last Dose    influenza vaccine 2020-21 (6 mos+)(PF) (FLUARIX/FLULAVAL/FLUZONE QUAD) injection 0.5 mL  0.5 mL IntraMUSCular PRIOR TO DISCHARGE Marilou Interiano MD        insulin regular (Roddie Ani R, HUMULIN R) injection   SubCUTAneous AC&HS Lloyd Styles MD   4 Units at 11/25/20 0818    dextrose 40% (GLUTOSE) oral gel 1 Tube  15 g Oral PRN Lloyd Styles MD        glucagon (GLUCAGEN) injection 1 mg  1 mg IntraMUSCular PRN Lloyd Styles MD        dextrose (D50W) injection syrg 12.5-25 g  25-50 mL IntraVENous PRN Lloyd Styles MD        . PHARMACY TO SUBSTITUTE PER PROTOCOL (Reordered from: Basaglar KwikPen U-100 Insulin 100 unit/mL (3 mL) in)    Per Protocol Lloyd Styles MD        insulin glargine (LANTUS) injection 22 Units  22 Units SubCUTAneous DAILY Marilou Interiano MD        atorvastatin (LIPITOR) tablet 80 mg  80 mg Oral QHS Prashanth Valdez MD        buPROPion [facility]) tablet 100 mg  100 mg Oral BID Prashanth Valdez MD        carvediloL (COREG) tablet 6.25 mg  6.25 mg Oral BID WITH MEALS Prashanth Valdez MD        isosorbide mononitrate ER (IMDUR) tablet 30 mg  30 mg Oral DAILY Prashanth Valdez MD        nitroglycerin (NITROSTAT) tablet 0.4 mg  0.4 mg SubLINGual PRN Prashanth Valdez MD        pantoprazole (PROTONIX) tablet 40 mg  40 mg Oral DAILY Prashanth Valdez MD        . PHARMACY TO SUBSTITUTE PER PROTOCOL (Reordered from: omega-3 fatty acids (FISH OIL CONCENTRATE) 1,000 mg cap)    Per Protocol Prashanth Valdez MD        pantoprazole (PROTONIX) 40 mg in 0.9% sodium chloride 10 mL injection  40 mg IntraVENous Q12H Prashanth Valdez MD   40 mg at 11/25/20 2219    cefTRIAXone (ROCEPHIN) 1 g in 0.9% sodium chloride (MBP/ADV) 50 mL MBP  1 g IntraVENous Q24H Prashanth Valdez MD        azithromycin (ZITHROMAX) 500 mg in 0.9% sodium chloride 250 mL (VIAL-MATE)  500 mg IntraVENous Q24H Prashanth Valdez MD        nitroglycerin (NITROBID) 2 % ointment 0.5 Inch  0.5 Inch Topical BID Kassi Mehta MD   0.5 Inch at 11/25/20 0820    HYDROmorphone (PF) (DILAUDID) injection 0.25 mg  0.25 mg IntraVENous Q4H PRN Prashanth Valdez MD        labetaloL (NORMODYNE;TRANDATE) injection 20 mg  20 mg IntraVENous Q2H PRN Prashanth Valdez MD        labetaloL (NORMODYNE;TRANDATE) injection 10 mg  10 mg IntraVENous Q4H PRN Prashanth Valdez MD        aspirin chewable tablet 81 mg  81 mg Oral DAILY Prashanth Valdez MD   81 mg at 11/25/20 0343    prasugreL (EFFIENT) tablet 10 mg  10 mg Oral DAILY Prashanth Valdez MD        fenofibrate (LOFIBRA) tablet 160 mg  160 mg Oral DAILY Prashanth Valdez MD   160 mg at 11/25/20 0820    sodium chloride (NS) flush 5-40 mL  5-40 mL IntraVENous Q8H Prashanth Valdez MD   10 mL at 11/25/20 0511    sodium chloride (NS) flush 5-40 mL  5-40 mL IntraVENous PRN Sibyl Moritz, MD        enoxaparin (LOVENOX) injection 40 mg  40 mg SubCUTAneous Q24H Sibyl Moritz, MD   40 mg at 20 1931    promethazine (PHENERGAN) with saline injection 12.5 mg  12.5 mg IntraVENous Q6H PRN Sibyl Moritz, MD   12.5 mg at 20 0515    HYDROmorphone (PF) (DILAUDID) injection 1 mg  1 mg IntraVENous Q4H PRN Janneth Bryson MD   1 mg at 20 0824       OBJECTIVE:  Patient Vitals for the past 8 hrs:   BP Temp Pulse Resp SpO2 Weight   20 0900 138/78  98 17 99 %    20 0846     97 %    20 0820 (!) 173/105 97.9 °F (36.6 °C) 98 18 97 %    20 0819 (!) 173/105  (!) 104 23 100 %    20 0800 (!) 153/111  96 15 96 %    20 0720 (!) 165/103 97.9 °F (36.6 °C) 94 12 97 %    20 0700 (!) 165/103  (!) 106 14 97 %    20 0600 (!) 158/83  93 15 (!) 89 %    20 0519      253 lb 3.2 oz (114.9 kg)   20 0500 (!) 157/89  92  95 %    20 0359 111/82 97.9 °F (36.6 °C) 94 12 98 %    20 0259 (!) 140/90  93 13 98 %    20 0221     96 %    20 0159 93/66  (!) 103 11 (!) 88 %      Temp (24hrs), Av.1 °F (36.7 °C), Min:97.9 °F (36.6 °C), Max:98.4 °F (36.9 °C)     07 -  1900  In: -   Out: 400 [Urine:400]    Physical Exam:  Exam per Dr. Suman Rice:    Recent Results (from the past 24 hour(s))   EKG, 12 LEAD, INITIAL    Collection Time: 20 11:12 AM   Result Value Ref Range    Ventricular Rate 118 BPM    Atrial Rate 118 BPM    P-R Interval 176 ms    QRS Duration 96 ms    Q-T Interval 304 ms    QTC Calculation (Bezet) 426 ms    Calculated P Axis 71 degrees    Calculated R Axis 81 degrees    Calculated T Axis 38 degrees    Diagnosis       Sinus tachycardia  Otherwise normal ECG  When compared with ECG of 25-MAR-2020 12:47,  Vent.  rate has increased BY  40 BPM  Nonspecific T wave abnormality, worse in Inferior leads  Confirmed by CED SAENZ (), Luis Valdez ([id number]) on 2020 4:16:30 PM     GLUCOSE, POC    Collection Time: 11/24/20 11:19 AM   Result Value Ref Range    Glucose (POC) 307 (H) 65 - 100 mg/dL   CBC WITH AUTOMATED DIFF    Collection Time: 11/24/20 11:20 AM   Result Value Ref Range    WBC 15.6 (H) 4.3 - 11.1 K/uL    RBC 5.30 4.23 - 5.6 M/uL    HGB 18.0 (H) 13.6 - 17.2 g/dL    HCT 48.2 41.1 - 50.3 %    MCV 90.9 79.6 - 97.8 FL    MCH 34.0 (H) 26.1 - 32.9 PG    MCHC 37.3 (H) 31.4 - 35.0 g/dL    RDW 13.0 11.9 - 14.6 %    PLATELET 137 192 - 386 K/uL    MPV 11.7 9.4 - 12.3 FL    ABSOLUTE NRBC 0.00 0.0 - 0.2 K/uL    DF AUTOMATED      NEUTROPHILS 76 43 - 78 %    LYMPHOCYTES 12 (L) 13 - 44 %    MONOCYTES 10 4.0 - 12.0 %    EOSINOPHILS 2 0.5 - 7.8 %    BASOPHILS 1 0.0 - 2.0 %    IMMATURE GRANULOCYTES 0 0.0 - 5.0 %    ABS. NEUTROPHILS 11.8 (H) 1.7 - 8.2 K/UL    ABS. LYMPHOCYTES 1.8 0.5 - 4.6 K/UL    ABS. MONOCYTES 1.5 (H) 0.1 - 1.3 K/UL    ABS. EOSINOPHILS 0.3 0.0 - 0.8 K/UL    ABS. BASOPHILS 0.1 0.0 - 0.2 K/UL    ABS. IMM. GRANS. 0.1 0.0 - 0.5 K/UL   METABOLIC PANEL, COMPREHENSIVE    Collection Time: 11/24/20 11:20 AM   Result Value Ref Range    Sodium 132 (L) 136 - 145 mmol/L    Potassium 3.8 3.5 - 5.1 mmol/L    Chloride 96 (L) 98 - 107 mmol/L    CO2 14 (L) 21 - 32 mmol/L    Anion gap 22 (H) 7 - 16 mmol/L    Glucose 336 (H) 65 - 100 mg/dL    BUN 11 6 - 23 MG/DL    Creatinine 0.73 (L) 0.8 - 1.5 MG/DL    GFR est AA >60 >60 ml/min/1.73m2    GFR est non-AA >60 >60 ml/min/1.73m2    Calcium 8.3 8.3 - 10.4 MG/DL    Bilirubin, total 1.0 0.2 - 1.1 MG/DL    ALT (SGPT) 26 12 - 65 U/L    AST (SGOT) 23 15 - 37 U/L    Alk.  phosphatase 123 50 - 136 U/L    Protein, total 7.4 6.3 - 8.2 g/dL    Albumin 4.2 3.5 - 5.0 g/dL    Globulin 3.2 2.3 - 3.5 g/dL    A-G Ratio 1.3 1.2 - 3.5     TROPONIN-HIGH SENSITIVITY    Collection Time: 11/24/20 11:20 AM   Result Value Ref Range    Troponin-High Sensitivity 14.2 (H) 0 - 14 pg/mL   TSH 3RD GENERATION    Collection Time: 11/24/20 11:20 AM   Result Value Ref Range TSH 2.130 uIU/mL   LIPASE    Collection Time: 11/24/20 11:20 AM   Result Value Ref Range    Lipase 99 73 - 393 U/L   LACTIC ACID    Collection Time: 11/24/20 12:02 PM   Result Value Ref Range    Lactic acid 0.9 0.4 - 2.0 MMOL/L   CULTURE, BLOOD    Collection Time: 11/24/20 12:03 PM    Specimen: Blood   Result Value Ref Range    Special Requests: NO SPECIAL REQUESTS  LEFT  FOREARM        Culture result: NO GROWTH AFTER 18 HOURS     CULTURE, BLOOD    Collection Time: 11/24/20 12:04 PM    Specimen: Blood   Result Value Ref Range    Special Requests: RIGHT  HAND        Culture result: NO GROWTH AFTER 18 HOURS     GLUCOSE, POC    Collection Time: 11/24/20 12:53 PM   Result Value Ref Range    Glucose (POC) 284 (H) 65 - 100 mg/dL   POC VENOUS BLOOD GAS    Collection Time: 11/24/20 12:54 PM   Result Value Ref Range    Device: ROOM AIR      pH, venous (POC) 7.31 (L) 7.32 - 7.42      pCO2, venous (POC) 33.9 (L) 41 - 51 MMHG    pO2, venous (POC) 53 mmHg    HCO3, venous (POC) 17.1 (L) 23 - 28 MMOL/L    sO2, venous (POC) 84 65 - 88 %    Base deficit, venous (POC) 8 mmol/L    Allens test (POC) NOT APPLICABLE      Site OTHER      Specimen type (POC) VENOUS BLOOD      Performed by Porter     CO2, POC 18 MMOL/L    COLLECT TIME 1,255     SARS-COV-2    Collection Time: 11/24/20  2:33 PM   Result Value Ref Range    Specimen source Nasopharyngeal      SARS CoV-2 PENDING    URINALYSIS W/ RFLX MICROSCOPIC    Collection Time: 11/24/20  2:33 PM   Result Value Ref Range    Color YELLOW      Appearance CLEAR      Specific gravity 1.037 (H) 1.001 - 1.023      pH (UA) 5.0 5.0 - 9.0      Protein 30 (A) NEG mg/dL    Glucose >1,000 mg/dL    Ketone >80 (A) NEG mg/dL    Bilirubin SMALL (A) NEG      Blood Negative NEG      Urobilinogen 0.2 0.2 - 1.0 EU/dL    Nitrites Negative NEG      Leukocyte Esterase Negative NEG      WBC 0 0 /hpf    RBC 0-3 0 /hpf    Epithelial cells 0-3 0 /hpf    Bacteria 0 0 /hpf    Casts 0-3 0 /lpf   CULTURE, URINE Collection Time: 11/24/20  2:33 PM    Specimen: Clean catch; Urine    URINE   Result Value Ref Range    Special Requests: NO SPECIAL REQUESTS      Culture result:        NO GROWTH AFTER SHORT PERIOD OF INCUBATION. FURTHER RESULTS TO FOLLOW AFTER OVERNIGHT INCUBATION.    GLUCOSE, POC    Collection Time: 11/24/20  3:01 PM   Result Value Ref Range    Glucose (POC) 257 (H) 65 - 100 mg/dL   GLUCOSTABILIZER    Collection Time: 11/24/20  3:03 PM   Result Value Ref Range    Glucose 257 mg/dL    Insulin order 3.9 units/hour    Insulin adminstered 3.9 units/hour    Multiplier 0.020     Low target 150 mg/dL    High target 250 mg/dL    D50 order 0.0 ml    D50 administered 0.00 ml    Minutes until next BG 60 min    Order initials cm     Administered initials cm     GLSCOM Comments     TROPONIN-HIGH SENSITIVITY    Collection Time: 11/24/20  3:12 PM   Result Value Ref Range    Troponin-High Sensitivity 12.8 0 - 14 pg/mL   METABOLIC PANEL, BASIC    Collection Time: 11/24/20  3:12 PM   Result Value Ref Range    Sodium 141 136 - 145 mmol/L    Potassium 3.9 3.5 - 5.1 mmol/L    Chloride 104 98 - 107 mmol/L    CO2 16 (L) 21 - 32 mmol/L    Anion gap 21 (H) 7 - 16 mmol/L    Glucose 246 (H) 65 - 100 mg/dL    BUN 8 6 - 23 MG/DL    Creatinine 0.44 (L) 0.8 - 1.5 MG/DL    GFR est AA >60 >60 ml/min/1.73m2    GFR est non-AA >60 >60 ml/min/1.73m2    Calcium 7.1 (L) 8.3 - 10.4 MG/DL   GLUCOSE, POC    Collection Time: 11/24/20  4:07 PM   Result Value Ref Range    Glucose (POC) 242 (H) 65 - 100 mg/dL   GLUCOSTABILIZER    Collection Time: 11/24/20  4:09 PM   Result Value Ref Range    Glucose 242 mg/dL    Insulin order 3.6 units/hour    Insulin adminstered 3.6 units/hour    Multiplier 0.020     Low target 150 mg/dL    High target 250 mg/dL    D50 order 0.0 ml    D50 administered 0.00 ml    Minutes until next BG 60 min    Order initials cm     Administered initials cm     GLSCOM Comments     TRIGLYCERIDE    Collection Time: 11/24/20  4:51 PM   Result Value Ref Range    Triglyceride 2,395 (H) 35 - 150 MG/DL   LDL, DIRECT    Collection Time: 11/24/20  4:51 PM   Result Value Ref Range    LDL,Direct 75 <100 mg/dl   GLUCOSE, POC    Collection Time: 11/24/20  5:13 PM   Result Value Ref Range    Glucose (POC) 218 (H) 65 - 100 mg/dL   GLUCOSTABILIZER    Collection Time: 11/24/20  5:14 PM   Result Value Ref Range    Glucose 218 mg/dL    Insulin order 3.2 units/hour    Insulin adminstered 3.2 units/hour    Multiplier 0.020     Low target 150 mg/dL    High target 250 mg/dL    D50 order 0.0 ml    D50 administered 0.00 ml    Minutes until next BG 60 min    Order initials cm     Administered initials cm     GLSCOM Comments     SARS-COV-2    Collection Time: 11/24/20  6:09 PM   Result Value Ref Range    Specimen source Nasopharyngeal      COVID-19 rapid test Not detected NOTD      SARS CoV-2 PENDING    METABOLIC PANEL, BASIC    Collection Time: 11/24/20  6:12 PM   Result Value Ref Range    Sodium 142 136 - 145 mmol/L    Potassium 4.0 3.5 - 5.1 mmol/L    Chloride 105 98 - 107 mmol/L    CO2 19 (L) 21 - 32 mmol/L    Anion gap 18 (H) 7 - 16 mmol/L    Glucose 189 (H) 65 - 100 mg/dL    BUN 8 6 - 23 MG/DL    Creatinine 0.50 (L) 0.8 - 1.5 MG/DL    GFR est AA >60 >60 ml/min/1.73m2    GFR est non-AA >60 >60 ml/min/1.73m2    Calcium 7.3 (L) 8.3 - 10.4 MG/DL   CBC WITH AUTOMATED DIFF    Collection Time: 11/24/20  6:12 PM   Result Value Ref Range    WBC 13.2 (H) 4.3 - 11.1 K/uL    RBC 4.32 4.23 - 5.6 M/uL    HGB 14.9 13.6 - 17.2 g/dL    HCT 40.0 (L) 41.1 - 50.3 %    MCV 92.6 79.6 - 97.8 FL    MCH 34.5 (H) 26.1 - 32.9 PG    MCHC 37.3 (H) 31.4 - 35.0 g/dL    RDW 13.2 11.9 - 14.6 %    PLATELET 732 595 - 956 K/uL    MPV 11.3 9.4 - 12.3 FL    ABSOLUTE NRBC 0.00 0.0 - 0.2 K/uL    DF AUTOMATED      NEUTROPHILS 67 43 - 78 %    LYMPHOCYTES 20 13 - 44 %    MONOCYTES 9 4.0 - 12.0 %    EOSINOPHILS 3 0.5 - 7.8 %    BASOPHILS 1 0.0 - 2.0 %    IMMATURE GRANULOCYTES 0 0.0 - 5.0 %    ABS.  NEUTROPHILS 8.9 (H) 1.7 - 8.2 K/UL    ABS. LYMPHOCYTES 2.6 0.5 - 4.6 K/UL    ABS. MONOCYTES 1.2 0.1 - 1.3 K/UL    ABS. EOSINOPHILS 0.4 0.0 - 0.8 K/UL    ABS. BASOPHILS 0.1 0.0 - 0.2 K/UL    ABS. IMM.  GRANS. 0.0 0.0 - 0.5 K/UL   TRIGLYCERIDE    Collection Time: 11/24/20  6:12 PM   Result Value Ref Range    Triglyceride 2,712 (H) 35 - 150 MG/DL   TROPONIN-HIGH SENSITIVITY    Collection Time: 11/24/20  6:12 PM   Result Value Ref Range    Troponin-High Sensitivity 14.2 (H) 0 - 14 pg/mL   LDL, DIRECT    Collection Time: 11/24/20  6:12 PM   Result Value Ref Range    LDL,Direct 103 (H) <100 mg/dl   GLUCOSE, POC    Collection Time: 11/24/20  6:17 PM   Result Value Ref Range    Glucose (POC) 217 (H) 65 - 100 mg/dL   GLUCOSTABILIZER    Collection Time: 11/24/20  6:18 PM   Result Value Ref Range    Glucose 217 mg/dL    Insulin order 3.1 units/hour    Insulin adminstered 3.1 units/hour    Multiplier 0.020     Low target 150 mg/dL    High target 250 mg/dL    D50 order 0.0 ml    D50 administered 0.00 ml    Minutes until next BG 60 min    Order initials cm     Administered initials cm     GLSCOM Comments     GLUCOSE, POC    Collection Time: 11/24/20  7:27 PM   Result Value Ref Range    Glucose (POC) 196 (H) 65 - 100 mg/dL   GLUCOSTABILIZER    Collection Time: 11/24/20  7:28 PM   Result Value Ref Range    Glucose 196 mg/dL    Insulin order 2.7 units/hour    Insulin adminstered 2.7 units/hour    Multiplier 0.020     Low target 150 mg/dL    High target 250 mg/dL    D50 order 0.0 ml    D50 administered 0.00 ml    Minutes until next BG 60 min    Order initials SON     Administered initials SON     GLSCOM Comments     GLUCOSE, POC    Collection Time: 11/24/20  8:35 PM   Result Value Ref Range    Glucose (POC) 190 (H) 65 - 100 mg/dL   GLUCOSTABILIZER    Collection Time: 11/24/20  8:38 PM   Result Value Ref Range    Glucose 190 mg/dL    Insulin order 2.6 units/hour    Insulin adminstered 2.6 units/hour    Multiplier 0.020     Low target 150 mg/dL    High target 250 mg/dL    D50 order 0.0 ml    D50 administered 0.00 ml    Minutes until next BG 60 min    Order initials      Administered initials      GLSCOM Comments     GLUCOSE, POC    Collection Time: 11/24/20  9:39 PM   Result Value Ref Range    Glucose (POC) 177 (H) 65 - 100 mg/dL   GLUCOSTABILIZER    Collection Time: 11/24/20  9:40 PM   Result Value Ref Range    Glucose 177 mg/dL    Insulin order 2.3 units/hour    Insulin adminstered 2.3 units/hour    Multiplier 0.020     Low target 150 mg/dL    High target 250 mg/dL    D50 order 0.0 ml    D50 administered 0.00 ml    Minutes until next BG 60 min    Order initials SON     Administered initials [facility]     GLSCOM Comments     METABOLIC PANEL, BASIC    Collection Time: 11/24/20 10:42 PM   Result Value Ref Range    Sodium 143 136 - 145 mmol/L    Potassium 4.6 3.5 - 5.1 mmol/L    Chloride 104 98 - 107 mmol/L    CO2 23 21 - 32 mmol/L    Anion gap 16 7 - 16 mmol/L    Glucose 161 (H) 65 - 100 mg/dL    BUN 7 6 - 23 MG/DL    Creatinine 0.62 (L) 0.8 - 1.5 MG/DL    GFR est AA >60 >60 ml/min/1.73m2    GFR est non-AA >60 >60 ml/min/1.73m2    Calcium 7.8 (L) 8.3 - 10.4 MG/DL   GLUCOSE, POC    Collection Time: 11/24/20 10:43 PM   Result Value Ref Range    Glucose (POC) 170 (H) 65 - 100 mg/dL   GLUCOSTABILIZER    Collection Time: 11/24/20 10:44 PM   Result Value Ref Range    Glucose 170 mg/dL    Insulin order 2.2 units/hour    Insulin adminstered 2.2 units/hour    Multiplier 0.020     Low target 150 mg/dL    High target 250 mg/dL    D50 order 0.0 ml    D50 administered 0.00 ml    Minutes until next BG 60 min    Order initials SON     Administered initials [facility]     GLSCOM Comments     GLUCOSE, POC    Collection Time: 11/24/20 11:21 PM   Result Value Ref Range    Glucose (POC) 168 (H) 65 - 100 mg/dL   GLUCOSTABILIZER    Collection Time: 11/24/20 11:21 PM   Result Value Ref Range    Glucose 168 mg/dL    Insulin order 2.2 units/hour    Insulin adminstered 2.2 units/hour    Multiplier 0.020     Low target 140 mg/dL    High target 180 mg/dL    D50 order 0.0 ml    D50 administered 0.00 ml    Minutes until next BG 60 min    Order initials sb     Administered initials sb     GLSCOM Comments     GLUCOSE, POC    Collection Time: 11/25/20 12:19 AM   Result Value Ref Range    Glucose (POC) 158 (H) 65 - 100 mg/dL   GLUCOSTABILIZER    Collection Time: 11/25/20 12:20 AM   Result Value Ref Range    Glucose 158 mg/dL    Insulin order 2.0 units/hour    Insulin adminstered 2.0 units/hour    Multiplier 0.020     Low target 140 mg/dL    High target 180 mg/dL    D50 order 0.0 ml    D50 administered 0.00 ml    Minutes until next BG 60 min    Order initials ALB     Administered initials ALB     GLSCOM Comments     GLUCOSE, POC    Collection Time: 11/25/20  1:01 AM   Result Value Ref Range    Glucose (POC) 179 (H) 65 - 100 mg/dL   GLUCOSTABILIZER    Collection Time: 11/25/20  1:17 AM   Result Value Ref Range    Glucose 179 mg/dL    Insulin order 2.4 units/hour    Insulin adminstered 2.4 units/hour    Multiplier 0.020     Low target 140 mg/dL    High target 180 mg/dL    D50 order 0.0 ml    D50 administered 0.00 ml    Minutes until next BG 60 min    Order initials ALB     Administered initials ALB     GLSCOM Comments     GLUCOSE, POC    Collection Time: 11/25/20  2:16 AM   Result Value Ref Range    Glucose (POC) 177 (H) 65 - 100 mg/dL   GLUCOSTABILIZER    Collection Time: 11/25/20  2:17 AM   Result Value Ref Range    Glucose 177 mg/dL    Insulin order 2.3 units/hour    Insulin adminstered 2.3 units/hour    Multiplier 0.020     Low target 140 mg/dL    High target 180 mg/dL    D50 order 0.0 ml    D50 administered 0.00 ml    Minutes until next BG 60 min    Order initials ALB     Administered initials ALB     GLSCOM Comments     METABOLIC PANEL, BASIC    Collection Time: 11/25/20  2:31 AM   Result Value Ref Range    Sodium 144 136 - 145 mmol/L    Potassium 6.7 (HH) 3.5 - 5.1 mmol/L    Chloride 106 98 - 107 mmol/L    CO2 24 21 - 32 mmol/L    Anion gap 14 7 - 16 mmol/L    Glucose 192 (H) 65 - 100 mg/dL    BUN 7 6 - 23 MG/DL    Creatinine 0.61 (L) 0.8 - 1.5 MG/DL    GFR est AA >60 >60 ml/min/1.73m2    GFR est non-AA >60 >60 ml/min/1.73m2    Calcium 7.9 (L) 8.3 - 10.4 MG/DL   HEMOGLOBIN A1C WITH EAG    Collection Time: 11/25/20  2:31 AM   Result Value Ref Range    Hemoglobin A1c 10.8 %    Est. average glucose 263 mg/dL   TRIGLYCERIDE    Collection Time: 11/25/20  2:31 AM   Result Value Ref Range    Triglyceride 2,245 (H) 35 - 150 MG/DL   CBC WITH AUTOMATED DIFF    Collection Time: 11/25/20  2:31 AM   Result Value Ref Range    WBC 10.1 4.3 - 11.1 K/uL    RBC 4.32 4.23 - 5.6 M/uL    HGB 14.7 13.6 - 17.2 g/dL    HCT 39.9 (L) 41.1 - 50.3 %    MCV 92.4 79.6 - 97.8 FL    MCH 34.0 (H) 26.1 - 32.9 PG    MCHC 36.8 (H) 31.4 - 35.0 g/dL    RDW 13.6 11.9 - 14.6 %    PLATELET 596 980 - 468 K/uL    MPV 11.9 9.4 - 12.3 FL    ABSOLUTE NRBC 0.00 0.0 - 0.2 K/uL    DF AUTOMATED      NEUTROPHILS 61 43 - 78 %    LYMPHOCYTES 24 13 - 44 %    MONOCYTES 9 4.0 - 12.0 %    EOSINOPHILS 5 0.5 - 7.8 %    BASOPHILS 1 0.0 - 2.0 %    IMMATURE GRANULOCYTES 0 0.0 - 5.0 %    ABS. NEUTROPHILS 6.2 1.7 - 8.2 K/UL    ABS. LYMPHOCYTES 2.4 0.5 - 4.6 K/UL    ABS. MONOCYTES 1.0 0.1 - 1.3 K/UL    ABS. EOSINOPHILS 0.5 0.0 - 0.8 K/UL    ABS. BASOPHILS 0.1 0.0 - 0.2 K/UL    ABS. IMM.  GRANS. 0.0 0.0 - 0.5 K/UL   LDL, DIRECT    Collection Time: 11/25/20  2:31 AM   Result Value Ref Range    LDL,Direct 80 <100 mg/dl   MAGNESIUM    Collection Time: 11/25/20  2:31 AM   Result Value Ref Range    Magnesium 2.0 1.8 - 2.4 mg/dL   PHOSPHORUS    Collection Time: 11/25/20  2:31 AM   Result Value Ref Range    Phosphorus 4.4 2.5 - 4.5 MG/DL   GLUCOSE, POC    Collection Time: 11/25/20  3:20 AM   Result Value Ref Range    Glucose (POC) 197 (H) 65 - 100 mg/dL   GLUCOSTABILIZER    Collection Time: 11/25/20  3:21 AM   Result Value Ref Range    Glucose 197 mg/dL    Insulin order 4.1 units/hour    Insulin adminstered 4.1 units/hour    Multiplier 0.030     Low target 140 mg/dL    High target 180 mg/dL    D50 order 0.0 ml    D50 administered 0.00 ml    Minutes until next BG 60 min    Order initials ALB     Administered initials ALB     GLSCOM Comments     GLUCOSE, POC    Collection Time: 11/25/20  4:26 AM   Result Value Ref Range    Glucose (POC) 211 (H) 65 - 100 mg/dL   GLUCOSTABILIZER    Collection Time: 11/25/20  4:27 AM   Result Value Ref Range    Glucose 211 mg/dL    Insulin order 6.0 units/hour    Insulin adminstered 6.0 units/hour    Multiplier 0.040     Low target 140 mg/dL    High target 180 mg/dL    D50 order 0.0 ml    D50 administered 0.00 ml    Minutes until next BG 60 min    Order initials ALB     Administered initials ALB     GLSCOM Comments     TRIGLYCERIDE    Collection Time: 11/25/20  7:05 AM   Result Value Ref Range    Triglyceride  30 - 200 MG/DL     CORRECTION TO MEDICAL RECORD-DISREGARD THESE TEST RESULTS   LIPASE    Collection Time: 11/25/20  7:05 AM   Result Value Ref Range    Lipase 41 (L) 73 - 393 U/L   POTASSIUM    Collection Time: 11/25/20  7:05 AM   Result Value Ref Range    Potassium 4.0 3.5 - 5.1 mmol/L   GLUCOSE, POC    Collection Time: 11/25/20  8:18 AM   Result Value Ref Range    Glucose (POC) 247 (H) 65 - 100 mg/dL   TRIGLYCERIDE    Collection Time: 11/25/20  8:34 AM   Result Value Ref Range    Triglyceride 2,253 (H) 35 - 150 MG/DL   PROTHROMBIN TIME + INR    Collection Time: 11/25/20  8:41 AM   Result Value Ref Range    Prothrombin time 13.7 12.5 - 14.7 sec    INR 1.0     PTT    Collection Time: 11/25/20  8:41 AM   Result Value Ref Range    aPTT 31.6 24.1 - 35.1 SEC   FIBRINOGEN    Collection Time: 11/25/20  8:41 AM   Result Value Ref Range    Fibrinogen 594 (H) 190 - 501 mg/dL       Imaging:  US ABD LTD [[id number]]  Collected: 11/24/20 2046    Order Status: Completed  Updated: 11/24/20 2050    Narrative:      RIGHT UPPER QUADRANT ULTRASOUND 11/24/2020     HISTORY: Moderate right upper quadrant pain for one month. TECHNIQUE: Sonographic imaging of the right upper quadrant was performed. COMPARISON: CT abdomen and pelvis 11/27/2018     FINDINGS: Changes of a cholecystectomy are present. The common bile duct is 5 mm   in diameter. There is no intrahepatic biliary ductal dilatation. The liver parenchyma is diffusely echogenic relative to the renal cortex. This   finding may be present with steatosis. The right kidney is 13.1 cm in length. There is no hydronephrosis. The distal   abdominal aorta is 1.9 cm in diameter. The IVC is patent. Impression:      IMPRESSION: Hepatic steatosis status post cholecystectomy. CT CHEST ABD PELV W CONT [[id number]]  Collected: 11/24/20 1611    Order Status: Completed  Updated: 11/24/20 1618    Narrative:      CT of the Chest, Abdomen, and Pelvis     INDICATION: Increasing back and chest pain, unexpected weight loss     Multiple axial images were obtained through the chest, abdomen, and pelvis. Oral contrast was used for bowel opacification.  100mL of Isovue 370 intravenous   contrast was used for better evaluation of solid organs and vascular structures.    Radiation dose reduction techniques were used for this study.  All CT scans   performed at this facility use one or all of the following: Automated exposure   control, adjustment of the mA and/or kVp according to patient's size, iterative   reconstruction. COMPARISON: Abdomen CT dated 11/27/2018     FINDINGS:   -LUNGS: Multiple small ill-defined nodular areas in the left lung, upper lobe   predominant.  Largest lesion is 8 mm.  The right lung is clear.  No associated   effusion. -MEDIASTINUM/AXILLA: No significant adenopathy. -HEART/VESSELS: There is moderate vascular calcification.  Heart size is normal.    There is normal enhancement of the thoracic aorta and pulmonary arteries. -CHEST WALL: Normal.     -LIVER: There is hepatomegaly and diffuse fatty infiltration of liver.  No   discrete liver mass. -GALLBLADDER/BILE DUCTS: Postcholecystectomy. -PANCREAS: Normal.   -SPLEEN: Normal.     -ADRENALS:  There is a stable 3.4 cm fat attenuation left adrenal mass, likely a   myelolipoma.  Right adrenal gland is unremarkable. -KIDNEYS/URETERS: No hydronephrosis or significant mass. -BLADDER: Normal.   -REPRODUCTIVE ORGANS: No pelvic masses. -BOWEL: Normal caliber.  No inflammatory changes. -LYMPH NODES: No significant retroperitoneal, mesenteric, or pelvic adenopathy. -BONES: Postoperative changes in the left hip.  No acute fracture. -VASCULATURE: Normal   -OTHER: No ascites. Impression:      IMPRESSION:   1.  Multiple ill-defined nodules in the left lung, most likely atypical   infection such as fungal or tuberculosis. 2.  Hepatomegaly and steatosis. 3.  No evidence of pulmonary embolus or aortic dissection. 4.  Extensive coronary artery calcification. If there are any questions about this report, I can be reached on [employer]   or at [facility] WO CONT [[id number]]     Order Status: Canceled     CT ABD PELV W CONT [[id number]]     Order Status: Canceled     XR CHEST PORT [[id number]]  Collected: 11/24/20 1131    Order Status: Completed  Updated: 11/24/20 1134    Narrative:      History: Chest pain, shortness of breath     Exam: portable chest     Comparison: 3/25/2020     Findings: There is apparent new asymmetric opacity seen at the left lung base   adjacent heart shadow. Otherwise, the lungs are clear. Mediastinal contour and   osseous structures are normal.     Impressions: Apparent new asymmetric lung opacity at the left lung base.    Correlation with PA and lateral chest x-ray recommended for further evaluation,   as the finding could be artifactual.          ASSESSMENT:  Problem List  Date Reviewed: 9/17/2020          Codes Class Noted    Lung infiltrate ICD-10-CM: R91.8  ICD-9-CM: 793.19  11/25/2020        Weight loss, unintentional - over 40 lbs in 3 months ICD-10-CM: R63.4  ICD-9-CM: 783.21  11/25/2020        History of smoking 25-50 pack years -- quit in 2016 about 27 pack years ICD-10-CM: Z87.891  ICD-9-CM: V15.82  11/25/2020        Psoriatic arthritis ([facility] -- was on Cosyntrex ICD-10-CM: L40.50  ICD-9-CM: 696.0  11/25/2020        Nonintractable epilepsy with complex partial seizures ([facility] ICD-10-CM: G40.209  ICD-9-CM: 345.40  4/2/2020        Chills ICD-10-CM: X26.84  ICD-9-CM: 780.64  3/25/2020    Overview Signed 3/25/2020  7:43 PM by Ellis Monge MD     Refer to ER             Bronchitis ICD-10-CM: J40  ICD-9-CM: 089  3/25/2020    Overview Signed 3/25/2020  7:45 PM by Ellis Monge MD     S/p 2 rounds abx  Refer to ER for cxr               Chest pain ICD-10-CM: R07.9  ICD-9-CM: 786.50  3/25/2020    Overview Signed 3/25/2020  7:43 PM by Ellis Monge MD     LIKELY ABGINA R/O ACUTE mi  ASPIRIN 81 MG   REFER TO er NOW--PT TO GO VIA PRIVATE VEHICLE  ADVISED TO CALL 911 IF WORSE             Coronary artery disease involving native heart with angina pectoris ([facility] ICD-10-CM: I25.119  ICD-9-CM: 414.01, 413.9  3/25/2020    Overview Signed 3/25/2020  7:46 PM by Ellis Monge MD     Stents several  Sees cardiologist             Nausea ICD-10-CM: R11.0  ICD-9-CM: 787.02  3/25/2020    Overview Signed 3/25/2020  7:46 PM by Ellis Monge MD     Likely from chest pain             Seizure disorder [facility]) ICD-10-CM: G40.909  ICD-9-CM: 345.90  3/9/2020    Overview Signed 3/9/2020 11:24 AM by Ellis Monge MD     1990  eeg abnormal  CT brain neg  Was on phenobarbital  Off med since 2010  latelyt subtle symptoms of  Atypical seizure             Sinusitis ICD-10-CM: J32.9  ICD-9-CM: 473.9  3/9/2020    Overview Signed 3/9/2020  8:16 PM by Ellis Monge MD     Steam in halations  Saline nasal cleansing  mucinex as needed  abx  F/u in 2 weeks               Chronic middle ear effusion, bilateral ICD-10-CM: I90.075  ICD-9-CM: 381.3  3/9/2020    Overview Signed 3/9/2020  8:17 PM by Shantel Avalos MD     Sec to sinusitis  Monitor for few weeks             [place] ICD-10-CM: H92.09  ICD-9-CM: 388.70  3/9/2020    Overview Signed 3/9/2020  8:17 PM by Shantel Avalos MD     Referred pain vs from effusion  Supportive care             Pharyngitis ICD-10-CM: J02.9  ICD-9-CM: 579  3/9/2020    Overview Signed 3/9/2020  8:16 PM by Shantel Avalos MD     abx             TBI (traumatic brain injury) ([facility] ICD-10-CM: S06. 9X9A  ICD-9-CM: 854.00  2/12/2020    Overview Signed 2/12/2020 12:46 PM by Shantel Avalos MD     in 1990             Psoriatic arthropathy [facility]) ICD-10-CM: L40.50  ICD-9-CM: 696.0  1/24/2020    Overview Signed 1/24/2020  9:17 AM by Shantel Avalos MD     Multiple joints pain/swelling             Acquired deformity of toe ICD-10-CM: M20.60  ICD-9-CM: 735.9  1/24/2020        Anxiety ICD-10-CM: F41.9  ICD-9-CM: 300.00  1/24/2020    Overview Signed 2/12/2020 12:47 PM by Shantel Avalos MD     2/2020  Stop prozac and start wellbutrin             Comprehensive diabetic foot examination, type 2 DM, encounter for [facility]) ICD-10-CM: E11.9  ICD-9-CM: 250.00  1/24/2020    Overview Signed 1/24/2020 10:58 AM by Shantel Avalos MD     1/24/20             Noncompliance with medications ICD-10-CM: Z91.14  ICD-9-CM: V15.81  1/24/2020        BMI 35.0-35.9,adult ICD-10-CM: B16.29  ICD-9-CM: V85.35  1/24/2020        Former smoker ICD-10-CM: W59.584  ICD-9-CM: V15.82  5/8/2019    Overview Signed 5/8/2019 10:31 PM by Shantel Aavlos MD     quit in 3/2019              Depression ICD-10-CM: F32.9  ICD-9-CM: 497  5/8/2019    Overview Addendum 2/12/2020 12:47 PM by Shantel Avalos MD     Start zoloft-side effects discussed  F/u in 2 weeks--if all stable will fill more meds  5/2019  zolfgt helps -will refill med  No side effects  1/2020  Stop zoloft  Start prozac  Call in 2 weeks  2/2020  prozac not helping  Start welbutrin  Refer to psychiatrist             Hx of heart artery stent ICD-10-CM: Z95.5  ICD-9-CM: V45.82  5/8/2019    Overview Signed 5/8/2019 10:31 PM by Erin Davis MD     [phone]  [facility] cardiology             Dietary counseling ICD-10-CM: Z71.3  ICD-9-CM: V65.3  5/8/2019    Overview Addendum 5/22/2019  7:41 PM by Erin Davis MD     Low calorie/carb and fat diet discussed  diet discussed             Unstable angina ([facility] ICD-10-CM: I20.0  ICD-9-CM: 411.1  5/2/2019    Overview Signed 5/20/2019  9:29 AM by Sharan SIMMS     Added automatically from request for surgery [id number]             STEMI (ST elevation myocardial infarction) [facility]) ICD-10-CM: I21.3  ICD-9-CM: 410.90  2/26/2019    Overview Signed 5/20/2019  9:29 AM by Sharan SIMMS     Last Assessment & Plan:   1  Primarily single vessel obstructive coronary artery disease in a right dominant system. 2  The chronically placed MATIAS in OM 2 remains widely patent. 3  The subtotal occlusion of the RCA was successfully treated with implantation of 3 drug-eluting stents with an excellent angiographic result. Ischemically mediated ventricular fibrillation was successfully defibrillated back to normal sinus rhythm. 4  Normal left ventricular systolic function with estimated LVEF 55% and mild hypokinesis of the inferior wall.  5  Resting left heart hemodynamics parameters reveal mild systemic hypertension and mildly elevated resting LVEDP.  6  Normal systemic arterial oxygen saturation (99%). 7  Hemostasis of RCFA with Perclose device. 8  Conscious sedation was performed throughout the procedure with no untoward effects.     - ASA 81mg daily, Effient, statin, BB, and ACE.   - Normal LV Function   - Follow up in the office in 4-6 weeks, office will mail appt. Will sign off.  Please call with questions             Infected sebaceous cyst of skin ICD-10-CM: L72.3, L08.9  ICD-9-CM: 706.2  1/9/2019    Overview Signed 1/9/2019 1:01 AM by Sanjay Martinez MD     nape area  resolved with bactrim now             Medication refill ICD-10-CM: Z76.0  ICD-9-CM: V68.1  2019        Adhesive capsulitis of left shoulder ICD-10-CM: M75.02  ICD-9-CM: 726.0  2019    Overview Addendum 2019  9:29 AM by Vinod Beam as needed  will refer to ortho for possible steroid injection  pt wants to wait on PT  Overview:   Overview:   nsaids as needed  will refer to ortho for possible steroid injection  pt wants to wait on PT             Acute pain of left shoulder ICD-10-CM: M25.512  ICD-9-CM: 719.41  2019    Overview Addendum 2019  9:29 AM by Nelda SIMMS     x-ray normal  likely sprain vs capsulitis  rest  warmth  activity as tolerated   ortho  will benefit from PT  Overview:   Overview:   x-ray normal  likely sprain vs capsulitis  rest  warmth  activity as tolerated   ortho  will benefit from PT             Osteoarthritis ICD-10-CM: M19.90  ICD-9-CM: 715.90  12/10/2018    Overview Signed 12/10/2018 12:50 PM by Sanjay Martinez MD     multiple joints  supportive              Hiatal hernia ICD-10-CM: K44.9  ICD-9-CM: 553.3  12/10/2018    Overview Addendum 2019  9:29 AM by Nelda SIMMS     seen on CT abdomen  pantoprazole help GERD  symptoms-refll  Overview:   Overview:   seen on CT abdomen  pantoprazole help GERD  symptoms-refll             Sebaceous cyst ICD-10-CM: L72.3  ICD-9-CM: 706.2  12/10/2018    Overview Signed 12/10/2018 12:51 PM by Sanjay Martinez MD     NECK  PT DESIRES EXCISON  REFER TO GEN SURGEON               Uncontrolled type 2 diabetes mellitus without complication, without long-term current use of insulin ICD-10-CM: Cindy Charles  ICD-9-CM: Cindy Charles  12/10/2018    Overview Addendum 3/9/2020  8:18 PM by Sanjay Martinez MD     Sec to noncompliance  Stable while on med--ON INSULIN AND METFORMIN--tolerating well-no pancreatitis while on metformin  Refilled meds  F/u in 6 weeks for recheck  5/2019  Off metformin  Increase basaglar to 25 units  Add novolog  Add glipizide 5 mg daily  Keep BS log and call in 2 weeks  3/2020  Pt on 50 units basaglar and sliding scale   BS above 200  Increase basaglar to 55 units --5 units increase every week until FBS is under 150             Encounter for examination following treatment at hospital ICD-10-CM: [facility] [facility]  ICD-9-CM: V67.9  12/10/2018    Overview Addendum 5/20/2019  9:29 AM by Gagandeep Beckford records reviewed  Overview:   Overview:   hspital records reviewed             Mass in neck ICD-10-CM: R22.1  ICD-9-CM: 784.2  11/30/2018        Excessive daytime sleepiness ICD-10-CM: G47.19  ICD-9-CM: 780.54  11/29/2018    Overview Addendum 5/20/2019  9:29 AM by Nicole SIMMS     Referred for sleep study    Overview:   Overview:   Referred for sleep study             Loud snoring ICD-10-CM: R06.83  ICD-9-CM: 786.09  11/28/2018        Suspected sleep apnea ICD-10-CM: R29.818  ICD-9-CM: 781.99  11/28/2018    Overview Signed 12/10/2018 12:51 PM by Fabby Wen MD     Referred to pulmonologist             History of tobacco use ICD-10-CM: Z87.891  ICD-9-CM: V15.82  6/19/2018    Overview Signed 6/19/2018  8:31 AM by Fabby Wen MD     quit jan 2018             Arthritis of left wrist ICD-10-CM: R45.052  ICD-9-CM: 716.93  6/19/2018    Overview Addendum 5/20/2019  9:29 AM by Nicole SIMMS     psoriatic arthriris vs gout  rest  Warmth locally  Motrin or diclofenac  as needed for pain  Avoid heavy lifting/pushing/pulling  F/u as needed or in 2 weeks      Overview:   Overview:   psoriatic arthriris vs gout  rest  Warmth locally  Motrin or diclofenac  as needed for pain  Avoid heavy lifting/pushing/pulling  F/u as needed or in 2 weeks             Severe obesity (BMI 35.0-39. 9) with comorbidity [facility]) ICD-10-CM: E66.01  ICD-9-CM: 278.01  3/19/2018        Encounter to discuss test results ICD-10-CM: Z71.2  ICD-9-CM: V65.49 3/19/2018    Overview Addendum 5/20/2019  9:29 AM by Emma Dill     All results discussed in detail    Overview:   Overview: All results discussed in detail             Encounter for medication review and counseling ICD-10-CM: Z71.89  ICD-9-CM: V65.49  3/19/2018    Overview Signed 3/19/2018  7:30 PM by Cayetano Dancer., MD     Will have to consider metformin 3/2018             Hypertriglyceridemia ICD-10-CM: E78.1  ICD-9-CM: 272.1  3/19/2018    Overview Addendum 5/22/2019  7:41 PM by Cayetano Dancer., MD     Overview:   Overview:   Much better now on lofibra--doing well  Advised more stricter low carb diet and aggressive control of DM--pt willing  F/u in few months for recheck  5/2019  Statin/lofibra not helping--will add fish oil and niacin  F/u in few weeks for recheck             Essential hypertension ICD-10-CM: I10  ICD-9-CM: 401.9  3/2/2018    Overview Addendum 5/20/2019  9:29 AM by Emma Dill     Stable with med-amlodip[ine/lisinopril/coreg  Refilled  Labs   Annual eye exam recommended  F/u in 3 months      Overview:   Overview:   Stable with med-amlodip[ine/lisinopril/coreg  Refilled  Labs   Annual eye exam recommended  F/u in 3 months             Hyperlipidemia ICD-10-CM: E78.5  ICD-9-CM: 272.4  3/2/2018    Overview Addendum 5/20/2019  9:29 AM by Donald SIMMS     On statin and fenofibrate   Recheck labs-f/u  Diet discussed    Overview:   Overview: On statin and fenofibrate   Recheck labs-f/u  Diet discussed    Last Assessment & Plan:    - Reports statin Intolerance. however tolerating Lipitor here    - Dietary modifications.              Obesity, unspecified obesity severity, unspecified obesity type ICD-10-CM: E66.9  ICD-9-CM: 278.00  3/2/2018    Overview Signed 3/2/2018  1:34 PM by Cayetano Dancer., MD     Weight loss encouraged             Anemia of chronic disease ICD-10-CM: D63.8  ICD-9-CM: 285.29  3/2/2018    Overview Addendum 5/20/2019  9:29 AM by Royal Both, Leigh Caputo     DM  Will periodically monitor    Overview:   Overview:   DM  Will periodically monitor             Type 2 diabetes with nephropathy ([facility] ICD-10-CM: E11.21  ICD-9-CM: 250.40, 583.81  3/1/2018        * (Principal) Pancreatitis ICD-10-CM: K85.90  ICD-9-CM: 577.0  2/17/2018        High anion gap metabolic acidosis JFJ-02-RX: E87.2  ICD-9-CM: 276.2  2/15/2018        CAD (coronary artery disease) ICD-10-CM: I25.10  ICD-9-CM: 414.00  2/15/2018    Overview Signed 3/2/2018  1:36 PM by Kathalene Cogan., MD     Stents  Sees cardiologist  aspirin             Leukocytosis ICD-10-CM: N52.088  ICD-9-CM: 288.60  2/15/2018        DKA (diabetic ketoacidoses) ([facility] ICD-10-CM: E11.10  ICD-9-CM: 250.12  2/15/2018        Acute pancreatitis ICD-10-CM: K85.90  ICD-9-CM: 306.2  2/15/2018    Overview Signed 5/20/2019  9:29 AM by Aida SIMMS     Overview:   Overview:   Sec to hypertriglyceridemia  Resolved now 3/2018  Few tramadol dispensed for pain management             Atherosclerosis of coronary artery ICD-10-CM: I25.10  ICD-9-CM: 414.00  2/15/2018    Overview Signed 5/20/2019  9:29 AM by Aida SIMMS     Overview:   Overview:   Stents  Sees cardiologist  aspirin    Last Assessment & Plan:    - Cont medical management             Diabetic ketoacidosis without coma associated with type 2 diabetes mellitus ([facility] ICD-10-CM: E11.10  ICD-9-CM: 250.12  2/15/2018    Overview Signed 5/20/2019  9:29 AM by Thor Lange     Last Assessment & Plan:    - Per primary team             Diabetes mellitus type II, uncontrolled ([facility] (Chronic) ICD-10-CM: E11.65  ICD-9-CM: 250.02  12/15/2016    Overview Addendum 6/19/2018 11:35 AM by Kathalene Cogan., MD     Now on lantus and humalog prn  goor control BS under 150   Sliding scale given-multiples of 4  Pt on metformit 500 mg bid 3/2019 and  lantus  25 units  Diet discussed  Labs today  F/u every 3 months             Tobacco abuse (Chronic) ICD-10-CM: Z72.0  ICD-9-CM: 305.1  12/15/2016                RECOMMENDATIONS:  Hypertriglyceridemia Pancreatitis  - Triglycerides 2253  - Needs to be transferred to [facility] for apheresis. Discussed with Dr. Fausto Vera, hospitalist.  Will have IR place apheresis line and plan for therapeutic apheresis later today. Primary team managing pancreatitis. Repeat triglyceride level in AM. May need additional treatment tomorrow. DKA  - mgmt per primary team    Chest pain  - cards consulted    Cough  - CT CAP with multiple ill-defined nodules in L lung, most likely atypical infection such as fungal or TB; hepatomegaly and steatosis. - COVID rapid neg, PCR pending  - TB pending  - Pulm consulted  - On CTX/Azith    Hypocalcemia  - CCa++ 8.5. Calcium ordered, given pt will undergo pheresis later today    Lab studies and imaging studies were personally reviewed. Case discussed with Dr. Regis Fleischer. Thank you for allowing us to participate in the care of Mr. Pino Serra. Juni Gonsalez NP   [facility]ematology & On[facility]  [address] [place] [address]  Office : [phone]  Fax : [phone]         Attending Addendum:  I have personally performed a face to face diagnostic evaluation on this patient. I have reviewed and agree with the care plan as documented by Juni Gonsalez N.P. My findings are as follows:  He has pancreatitis and hypertryglyceridemia, appears anxious, heart rate regular without murmurs, abdomen is non-tender, bowel sounds are positive, we will arrange for him to undergo Plasmapheresis.               Serge Casey MD      [facility] Hematology/Oncology  [address] [place] [address]  Office : [phone]  Fax : [phone]

## 2020-11-26 ENCOUNTER — APPOINTMENT (OUTPATIENT)
Dept: MRI IMAGING | Age: 50
DRG: 282 | End: 2020-11-26
Attending: INTERNAL MEDICINE
Payer: COMMERCIAL

## 2020-11-26 PROBLEM — M54.9 INTRACTABLE BACK PAIN: Status: ACTIVE | Noted: 2020-11-25

## 2020-11-26 LAB
ANION GAP SERPL CALC-SCNC: 10 MMOL/L (ref 7–16)
APTT PPP: 30.7 SEC (ref 24.1–35.1)
BASOPHILS # BLD: 0 K/UL (ref 0–0.2)
BASOPHILS NFR BLD: 0 % (ref 0–2)
BUN SERPL-MCNC: 6 MG/DL (ref 6–23)
CALCIUM SERPL-MCNC: 8.3 MG/DL (ref 8.3–10.4)
CHLORIDE SERPL-SCNC: 105 MMOL/L (ref 98–107)
CO2 SERPL-SCNC: 22 MMOL/L (ref 21–32)
CREAT SERPL-MCNC: 0.6 MG/DL (ref 0.8–1.5)
DIFFERENTIAL METHOD BLD: NORMAL
EOSINOPHIL # BLD: 0.5 K/UL (ref 0–0.8)
EOSINOPHIL NFR BLD: 5 % (ref 0.5–7.8)
ERYTHROCYTE [DISTWIDTH] IN BLOOD BY AUTOMATED COUNT: 13.3 % (ref 11.9–14.6)
FIBRINOGEN PPP-MCNC: 310 MG/DL (ref 190–501)
GLUCOSE SERPL-MCNC: 183 MG/DL (ref 65–100)
HCT VFR BLD AUTO: 41.6 % (ref 41.1–50.3)
HGB BLD-MCNC: 14.5 G/DL (ref 13.6–17.2)
IMM GRANULOCYTES # BLD AUTO: 0 K/UL (ref 0–0.5)
IMM GRANULOCYTES NFR BLD AUTO: 1 % (ref 0–5)
LDLC SERPL DIRECT ASSAY-MCNC: 37 MG/DL
LYMPHOCYTES # BLD: 1.6 K/UL (ref 0.5–4.6)
LYMPHOCYTES NFR BLD: 18 % (ref 13–44)
MAGNESIUM SERPL-MCNC: 1.8 MG/DL (ref 1.8–2.4)
MCH RBC QN AUTO: 32.4 PG (ref 26.1–32.9)
MCHC RBC AUTO-ENTMCNC: 34.9 G/DL (ref 31.4–35)
MCV RBC AUTO: 92.9 FL (ref 79.6–97.8)
MONOCYTES # BLD: 1 K/UL (ref 0.1–1.3)
MONOCYTES NFR BLD: 12 % (ref 4–12)
NEUTS SEG # BLD: 5.7 K/UL (ref 1.7–8.2)
NEUTS SEG NFR BLD: 64 % (ref 43–78)
NRBC # BLD: 0 K/UL (ref 0–0.2)
PLATELET # BLD AUTO: 176 K/UL (ref 150–450)
PMV BLD AUTO: 11.6 FL (ref 9.4–12.3)
POTASSIUM SERPL-SCNC: 4.6 MMOL/L (ref 3.5–5.1)
RBC # BLD AUTO: 4.48 M/UL (ref 4.23–5.6)
SARS COV-2, XPGCVT: NEGATIVE
SODIUM SERPL-SCNC: 137 MMOL/L (ref 138–145)
SOURCE, COVRS: NORMAL
TRIGL SERPL-MCNC: 553 MG/DL (ref 35–150)
WBC # BLD AUTO: 8.8 K/UL (ref 4.3–11.1)

## 2020-11-26 PROCEDURE — 74011250637 HC RX REV CODE- 250/637: Performed by: NURSE PRACTITIONER

## 2020-11-26 PROCEDURE — 74011250637 HC RX REV CODE- 250/637: Performed by: INTERNAL MEDICINE

## 2020-11-26 PROCEDURE — 94640 AIRWAY INHALATION TREATMENT: CPT

## 2020-11-26 PROCEDURE — 65270000029 HC RM PRIVATE

## 2020-11-26 PROCEDURE — 74011000258 HC RX REV CODE- 258: Performed by: INTERNAL MEDICINE

## 2020-11-26 PROCEDURE — P9045 ALBUMIN (HUMAN), 5%, 250 ML: HCPCS | Performed by: NURSE PRACTITIONER

## 2020-11-26 PROCEDURE — 36514 APHERESIS PLASMA: CPT

## 2020-11-26 PROCEDURE — 87149 DNA/RNA DIRECT PROBE: CPT

## 2020-11-26 PROCEDURE — 87186 SC STD MICRODIL/AGAR DIL: CPT

## 2020-11-26 PROCEDURE — 74011250636 HC RX REV CODE- 250/636: Performed by: INTERNAL MEDICINE

## 2020-11-26 PROCEDURE — 84478 ASSAY OF TRIGLYCERIDES: CPT

## 2020-11-26 PROCEDURE — 85730 THROMBOPLASTIN TIME PARTIAL: CPT

## 2020-11-26 PROCEDURE — 72148 MRI LUMBAR SPINE W/O DYE: CPT

## 2020-11-26 PROCEDURE — 74011250636 HC RX REV CODE- 250/636: Performed by: NURSE PRACTITIONER

## 2020-11-26 PROCEDURE — 83735 ASSAY OF MAGNESIUM: CPT

## 2020-11-26 PROCEDURE — 74011636637 HC RX REV CODE- 636/637: Performed by: INTERNAL MEDICINE

## 2020-11-26 PROCEDURE — 83721 ASSAY OF BLOOD LIPOPROTEIN: CPT

## 2020-11-26 PROCEDURE — 99233 SBSQ HOSP IP/OBS HIGH 50: CPT | Performed by: INTERNAL MEDICINE

## 2020-11-26 PROCEDURE — 82962 GLUCOSE BLOOD TEST: CPT

## 2020-11-26 PROCEDURE — 2709999900 HC NON-CHARGEABLE SUPPLY

## 2020-11-26 PROCEDURE — 80048 BASIC METABOLIC PNL TOTAL CA: CPT

## 2020-11-26 PROCEDURE — 87116 MYCOBACTERIA CULTURE: CPT

## 2020-11-26 PROCEDURE — 85384 FIBRINOGEN ACTIVITY: CPT

## 2020-11-26 PROCEDURE — 74011000250 HC RX REV CODE- 250: Performed by: INTERNAL MEDICINE

## 2020-11-26 PROCEDURE — 85025 COMPLETE CBC W/AUTO DIFF WBC: CPT

## 2020-11-26 RX ORDER — HYDROCORTISONE SODIUM SUCCINATE 100 MG/2ML
100 INJECTION, POWDER, FOR SOLUTION INTRAMUSCULAR; INTRAVENOUS AS NEEDED
Status: CANCELLED | OUTPATIENT
Start: 2020-11-26

## 2020-11-26 RX ORDER — DIPHENHYDRAMINE HYDROCHLORIDE 50 MG/ML
50 INJECTION, SOLUTION INTRAMUSCULAR; INTRAVENOUS AS NEEDED
Status: CANCELLED
Start: 2020-11-26

## 2020-11-26 RX ORDER — DIPHENHYDRAMINE HYDROCHLORIDE 50 MG/ML
25 INJECTION, SOLUTION INTRAMUSCULAR; INTRAVENOUS AS NEEDED
Status: CANCELLED
Start: 2020-11-26

## 2020-11-26 RX ORDER — ACETAMINOPHEN 325 MG/1
650 TABLET ORAL AS NEEDED
Status: CANCELLED
Start: 2020-11-26

## 2020-11-26 RX ORDER — DIPHENHYDRAMINE HCL 25 MG
25 CAPSULE ORAL
Status: COMPLETED | OUTPATIENT
Start: 2020-11-26 | End: 2020-11-26

## 2020-11-26 RX ORDER — ACETAMINOPHEN 325 MG/1
650 TABLET ORAL
Status: COMPLETED | OUTPATIENT
Start: 2020-11-26 | End: 2020-11-26

## 2020-11-26 RX ORDER — SODIUM CHLORIDE 9 MG/ML
10 INJECTION INTRAMUSCULAR; INTRAVENOUS; SUBCUTANEOUS AS NEEDED
Status: CANCELLED | OUTPATIENT
Start: 2020-11-26

## 2020-11-26 RX ORDER — LORAZEPAM 2 MG/ML
1.5 INJECTION INTRAMUSCULAR
Status: COMPLETED | OUTPATIENT
Start: 2020-11-26 | End: 2020-11-26

## 2020-11-26 RX ORDER — EPINEPHRINE 1 MG/ML
0.3 INJECTION, SOLUTION, CONCENTRATE INTRAVENOUS AS NEEDED
Status: CANCELLED | OUTPATIENT
Start: 2020-11-26

## 2020-11-26 RX ORDER — ALBUTEROL SULFATE 0.83 MG/ML
2.5 SOLUTION RESPIRATORY (INHALATION) AS NEEDED
Status: CANCELLED
Start: 2020-11-26

## 2020-11-26 RX ORDER — LORAZEPAM 2 MG/ML
1.5 INJECTION INTRAMUSCULAR ONCE
Status: DISCONTINUED | OUTPATIENT
Start: 2020-11-26 | End: 2020-11-26

## 2020-11-26 RX ORDER — ALBUMIN HUMAN 50 G/1000ML
3500 SOLUTION INTRAVENOUS ONCE
Status: COMPLETED | OUTPATIENT
Start: 2020-11-26 | End: 2020-11-26

## 2020-11-26 RX ORDER — SODIUM CHLORIDE FOR INHALATION 3 %
4 VIAL, NEBULIZER (ML) INHALATION
Status: DISCONTINUED | OUTPATIENT
Start: 2020-11-26 | End: 2020-12-01 | Stop reason: HOSPADM

## 2020-11-26 RX ORDER — SODIUM CHLORIDE 0.9 % (FLUSH) 0.9 %
10 SYRINGE (ML) INJECTION AS NEEDED
Status: CANCELLED | OUTPATIENT
Start: 2020-11-26

## 2020-11-26 RX ORDER — SODIUM CHLORIDE 9 MG/ML
25 INJECTION, SOLUTION INTRAVENOUS CONTINUOUS
Status: DISPENSED | OUTPATIENT
Start: 2020-11-27 | End: 2020-11-27

## 2020-11-26 RX ORDER — ONDANSETRON 2 MG/ML
8 INJECTION INTRAMUSCULAR; INTRAVENOUS AS NEEDED
Status: CANCELLED | OUTPATIENT
Start: 2020-11-26

## 2020-11-26 RX ADMIN — INSULIN LISPRO 2 UNITS: 100 INJECTION, SOLUTION INTRAVENOUS; SUBCUTANEOUS at 02:00

## 2020-11-26 RX ADMIN — CALCIUM 500 MG: 500 TABLET ORAL at 17:32

## 2020-11-26 RX ADMIN — Medication 10 ML: at 06:00

## 2020-11-26 RX ADMIN — SODIUM CHLORIDE SOLN NEBU 3% 4 ML: 3 NEBU SOLN at 16:46

## 2020-11-26 RX ADMIN — HYDROMORPHONE HYDROCHLORIDE 0.5 MG: 1 INJECTION, SOLUTION INTRAMUSCULAR; INTRAVENOUS; SUBCUTANEOUS at 09:56

## 2020-11-26 RX ADMIN — Medication 10 ML: at 22:04

## 2020-11-26 RX ADMIN — HYDROMORPHONE HYDROCHLORIDE 0.5 MG: 1 INJECTION, SOLUTION INTRAMUSCULAR; INTRAVENOUS; SUBCUTANEOUS at 21:29

## 2020-11-26 RX ADMIN — INSULIN GLARGINE 22 UNITS: 100 INJECTION, SOLUTION SUBCUTANEOUS at 09:00

## 2020-11-26 RX ADMIN — CALCIUM 500 MG: 500 TABLET ORAL at 12:27

## 2020-11-26 RX ADMIN — CARVEDILOL 6.25 MG: 6.25 TABLET, FILM COATED ORAL at 08:47

## 2020-11-26 RX ADMIN — PRASUGREL 10 MG: 10 TABLET, FILM COATED ORAL at 09:00

## 2020-11-26 RX ADMIN — BUPROPION HYDROCHLORIDE 100 MG: 100 TABLET, FILM COATED ORAL at 08:46

## 2020-11-26 RX ADMIN — Medication 10 ML: at 14:00

## 2020-11-26 RX ADMIN — CALCIUM GLUCONATE 2 G: 98 INJECTION, SOLUTION INTRAVENOUS at 21:50

## 2020-11-26 RX ADMIN — DIPHENHYDRAMINE HYDROCHLORIDE 25 MG: 25 CAPSULE ORAL at 21:31

## 2020-11-26 RX ADMIN — ALBUMIN (HUMAN) 175 G: 12.5 INJECTION, SOLUTION INTRAVENOUS at 21:50

## 2020-11-26 RX ADMIN — ACETAMINOPHEN 650 MG: 325 TABLET, FILM COATED ORAL at 00:03

## 2020-11-26 RX ADMIN — AZITHROMYCIN 500 MG: 500 INJECTION, POWDER, LYOPHILIZED, FOR SOLUTION INTRAVENOUS at 14:26

## 2020-11-26 RX ADMIN — Medication 40 ML: at 21:50

## 2020-11-26 RX ADMIN — INSULIN LISPRO 6 UNITS: 100 INJECTION, SOLUTION INTRAVENOUS; SUBCUTANEOUS at 16:30

## 2020-11-26 RX ADMIN — ENOXAPARIN SODIUM 40 MG: 40 INJECTION SUBCUTANEOUS at 08:46

## 2020-11-26 RX ADMIN — CEFTRIAXONE SODIUM 1 G: 1 INJECTION, POWDER, FOR SOLUTION INTRAMUSCULAR; INTRAVENOUS at 14:27

## 2020-11-26 RX ADMIN — CARVEDILOL 6.25 MG: 6.25 TABLET, FILM COATED ORAL at 17:32

## 2020-11-26 RX ADMIN — Medication 40 ML: at 23:10

## 2020-11-26 RX ADMIN — ASPIRIN 81 MG: 81 TABLET ORAL at 08:47

## 2020-11-26 RX ADMIN — INSULIN GLARGINE 22 UNITS: 100 INJECTION, SOLUTION SUBCUTANEOUS at 21:00

## 2020-11-26 RX ADMIN — ISOSORBIDE MONONITRATE 30 MG: 30 TABLET, EXTENDED RELEASE ORAL at 08:47

## 2020-11-26 RX ADMIN — INSULIN LISPRO 4 UNITS: 100 INJECTION, SOLUTION INTRAVENOUS; SUBCUTANEOUS at 22:22

## 2020-11-26 RX ADMIN — HYDROMORPHONE HYDROCHLORIDE 0.5 MG: 1 INJECTION, SOLUTION INTRAMUSCULAR; INTRAVENOUS; SUBCUTANEOUS at 05:07

## 2020-11-26 RX ADMIN — FENOFIBRATE 160 MG: 160 TABLET ORAL at 08:47

## 2020-11-26 RX ADMIN — DIPHENHYDRAMINE HYDROCHLORIDE 25 MG: 25 CAPSULE ORAL at 00:03

## 2020-11-26 RX ADMIN — INSULIN LISPRO 2 UNITS: 100 INJECTION, SOLUTION INTRAVENOUS; SUBCUTANEOUS at 07:30

## 2020-11-26 RX ADMIN — ACETAMINOPHEN 650 MG: 325 TABLET, FILM COATED ORAL at 21:31

## 2020-11-26 RX ADMIN — BUPROPION HYDROCHLORIDE 100 MG: 100 TABLET, FILM COATED ORAL at 17:32

## 2020-11-26 RX ADMIN — ATORVASTATIN CALCIUM 80 MG: 80 TABLET, FILM COATED ORAL at 08:47

## 2020-11-26 RX ADMIN — CALCIUM 500 MG: 500 TABLET ORAL at 08:47

## 2020-11-26 RX ADMIN — INSULIN LISPRO 6 UNITS: 100 INJECTION, SOLUTION INTRAVENOUS; SUBCUTANEOUS at 11:30

## 2020-11-26 RX ADMIN — SODIUM CHLORIDE 25 ML/HR: 9 INJECTION, SOLUTION INTRAVENOUS at 21:50

## 2020-11-26 RX ADMIN — LORAZEPAM 1.5 MG: 2 INJECTION INTRAMUSCULAR; INTRAVENOUS at 11:49

## 2020-11-26 RX ADMIN — ONDANSETRON 4 MG: 2 INJECTION INTRAMUSCULAR; INTRAVENOUS at 15:20

## 2020-11-26 RX ADMIN — HYDROMORPHONE HYDROCHLORIDE 0.5 MG: 1 INJECTION, SOLUTION INTRAMUSCULAR; INTRAVENOUS; SUBCUTANEOUS at 15:21

## 2020-11-26 RX ADMIN — Medication 40 ML: at 00:30

## 2020-11-26 NOTE — PROGRESS NOTES
Name: Jia Thompson MRN: [id number]  : 1970  Age:50 y.o.  male  Admit Date:  2020 LOS: 1      Hospitalist Progress Note     Reason for Admission:  Intractable pain [R52]    Hospital Course:  Please refer to the admission H&P for details of presentation. In summary, Jia Thompson is a 52 y.o. male with medical history significant for CAD s/p multiple stents, GERD, hypertriglyceridemia, history of psoriatic arthritis, diabetes, seizure disorder, hypertension who presented to the ED on  with intractable back pain that radiates to the side of his chest w/ mild dyspnea, cough and chills, unintentional weight loss of 40 pounds since August.  Work-up in the ED revealed hypotension to 176/104, tachycardia with heart rate of 125, WBC 15.6 hemoglobin 18.0, anion gap of 22, glucose of 336, triglycerides of 2712. CT chest with multiple ill-defined nodules in the left lung. It was initially admitted to the ICU for DKA with insulin drip. DKA has resolved. Patient was then transferred to [facility] for apheresis due to hypertriglyceridemia. Patient underwent IR guided right IJ placement on  and had one session of apheresis. Subjective/24 hr Events (20) :  Patient is seen and examined at bedside. No acute events reported overnight by nursing staff. Patient reports that he still has intermittent lower back pain that occasionally wraps around his sides to the chest and upper abdomen. He states that the back pain is similar to his prior episodes of back pain due to his acute pancreatitis however at this time around he had no abdominal pain. Continues to have intermittent cough without production of sputum. No other acute complaints at this time. He is sitting on his chair and saturating on room air without any distress. Patient denies fever, chills, chest pains, shortness of breath, n/v, abdominal pain.   Tolerating diet and having BM.       ROS: 10 point review of systems is otherwise negative with the exception of the elements mentioned above. Objective:    Patient Vitals for the past 24 hrs:   Temp Pulse Resp BP SpO2   11/26/20 0755 98 °F (36.7 °C) 97 18 (!) 155/95 96 %   11/26/20 0413 98.1 °F (36.7 °C) 100 18 (!) 149/97 95 %   11/26/20 0021 97.8 °F (36.6 °C) 86 18 (!) 137/93 99 %   11/25/20 2359  87  (!) 153/96    11/25/20 2321  87  (!) 147/91    11/25/20 2246  88 16 (!) 154/98    11/25/20 1931 98.9 °F (37.2 °C) 87 15 (!) 148/95 99 %     Oxygen Therapy  O2 Sat (%): 96 % (11/26/20 0755)  O2 Device: Room air (11/26/20 0755)  O2 Flow Rate (L/min): 3 l/min (11/25/20 2321)    Estimated body mass index is 36.34 kg/m² as calculated from the following:    Height as of 11/24/20: 5' 10\" (1.778 m). Weight as of this encounter: 114.9 kg (253 lb 4.8 oz). No intake or output data in the 24 hours ending 11/26/20 1101    *Note that automatically entered I/Os may not be accurate; dependent on patient compliance with collection and accurate  by techs. Physical Exam:   General:     alert, awake, no acute distress. Well nourished. Obese  Head:   normocephalic, atraumatic  Eyes, Ears, nose: PERRL, EOMI. Normal conjunctiva  Neck:    supple, non-tender. Trachea midline. Lungs:   CTAB, no wheezing, rhonchi, rales  Cardiac:   RRR, Normal S1 and S2. Abdomen:   Soft, non distended, nontender, +BS  Extremities:   Warm, dry. No edema, Non TTP on the T/L/S spine. Skin:   No rashes, no jaundice  Neuro:  AAOx3.  No gross focal neurological deficit  Psychiatric:  No anxiety, calm, cooperative    Data Review:  I have reviewed all labs, meds, and studies from the last 24 hours:      Labs:    Recent Results (from the past 24 hour(s))   TRIGLYCERIDE    Collection Time: 11/25/20 12:43 PM   Result Value Ref Range    Triglyceride 2,184 (H) 35 - 150 MG/DL   PROCALCITONIN    Collection Time: 11/25/20 12:43 PM   Result Value Ref Range    Procalcitonin <0.05 ng/mL   GLUCOSE, POC    Collection Time: 11/25/20  4:15 PM   Result Value Ref Range    Glucose (POC) 201 (H) 65 - 100 mg/dL   GLUCOSE, POC    Collection Time: 11/25/20 10:29 PM   Result Value Ref Range    Glucose (POC) 171 (H) 65 - 100 mg/dL   CBC WITH AUTOMATED DIFF    Collection Time: 11/26/20  4:04 AM   Result Value Ref Range    WBC 8.8 4.3 - 11.1 K/uL    RBC 4.48 4.23 - 5.6 M/uL    HGB 14.5 13.6 - 17.2 g/dL    HCT 41.6 41.1 - 50.3 %    MCV 92.9 79.6 - 97.8 FL    MCH 32.4 26.1 - 32.9 PG    MCHC 34.9 31.4 - 35.0 g/dL    RDW 13.3 11.9 - 14.6 %    PLATELET 254 836 - 479 K/uL    MPV 11.6 9.4 - 12.3 FL    ABSOLUTE NRBC 0.00 0.0 - 0.2 K/uL    DF AUTOMATED      NEUTROPHILS 64 43 - 78 %    LYMPHOCYTES 18 13 - 44 %    MONOCYTES 12 4.0 - 12.0 %    EOSINOPHILS 5 0.5 - 7.8 %    BASOPHILS 0 0.0 - 2.0 %    IMMATURE GRANULOCYTES 1 0.0 - 5.0 %    ABS. NEUTROPHILS 5.7 1.7 - 8.2 K/UL    ABS. LYMPHOCYTES 1.6 0.5 - 4.6 K/UL    ABS. MONOCYTES 1.0 0.1 - 1.3 K/UL    ABS. EOSINOPHILS 0.5 0.0 - 0.8 K/UL    ABS. BASOPHILS 0.0 0.0 - 0.2 K/UL    ABS. IMM.  GRANS. 0.0 0.0 - 0.5 K/UL   METABOLIC PANEL, BASIC    Collection Time: 11/26/20  4:04 AM   Result Value Ref Range    Sodium 137 (L) 138 - 145 mmol/L    Potassium 4.6 3.5 - 5.1 mmol/L    Chloride 105 98 - 107 mmol/L    CO2 22 21 - 32 mmol/L    Anion gap 10 7 - 16 mmol/L    Glucose 183 (H) 65 - 100 mg/dL    BUN 6 6 - 23 MG/DL    Creatinine 0.60 (L) 0.8 - 1.5 MG/DL    GFR est AA >60 >60 ml/min/1.73m2    GFR est non-AA >60 >60 ml/min/1.73m2    Calcium 8.3 8.3 - 10.4 MG/DL   TRIGLYCERIDE    Collection Time: 11/26/20  4:04 AM   Result Value Ref Range    Triglyceride 553 (H) 35 - 150 MG/DL   LDL, DIRECT    Collection Time: 11/26/20  4:04 AM   Result Value Ref Range    LDL,Direct 37 <100 mg/dl         All Micro Results     Procedure Component Value Units Date/Time    AFB CULTURE + SMEAR W/RFLX ID FROM CULTURE [[id number]] Collected:  11/26/20 0458    Order Status:  No result     AFB CULTURE + SMEAR W/RFLX ID FROM CULTURE [[id number]] Collected:  11/26/20 0418    Order Status:  Canceled     AFB CULTURE + SMEAR W/RFLX ID FROM CULTURE [[id number]] Collected:  11/25/20 2100    Order Status:  Canceled           Current Meds:  Current Facility-Administered Medications   Medication Dose Route Frequency    albumin human 5% (BUMINATE) solution 175 g  3,500 mL IntraVENous ONCE    calcium gluconate 2 g in 0.9% sodium chloride 250 mL  2 g IntraVENous ONCE    sodium chloride (NS) flush 5-40 mL  5-40 mL IntraVENous Q8H    sodium chloride (NS) flush 5-40 mL  5-40 mL IntraVENous PRN    acetaminophen (TYLENOL) tablet 650 mg  650 mg Oral Q6H PRN    Or    acetaminophen (TYLENOL) suppository 650 mg  650 mg Rectal Q6H PRN    polyethylene glycol (MIRALAX) packet 17 g  17 g Oral DAILY PRN    promethazine (PHENERGAN) tablet 12.5 mg  12.5 mg Oral Q6H PRN    Or    ondansetron (ZOFRAN) injection 4 mg  4 mg IntraVENous Q6H PRN    enoxaparin (LOVENOX) injection 40 mg  40 mg SubCUTAneous DAILY    aspirin delayed-release tablet 81 mg  81 mg Oral DAILY    atorvastatin (LIPITOR) tablet 80 mg  80 mg Oral DAILY    azithromycin (ZITHROMAX) 500 mg in 0.9% sodium chloride 250 mL (VIAL-MATE)  500 mg IntraVENous Q24H    cefTRIAXone (ROCEPHIN) 1 g in 0.9% sodium chloride (MBP/ADV) 50 mL MBP  1 g IntraVENous Q24H    insulin lispro (HUMALOG) injection   SubCUTAneous AC&HS    insulin lispro (HUMALOG) injection   SubCUTAneous Q24H    calcium carbonate (OS-SARAI) tablet 500 mg [elemental]  500 mg Oral TID WITH MEALS    buPROPion (WELLBUTRIN) tablet 100 mg  100 mg Oral BID    carvediloL (COREG) tablet 6.25 mg  6.25 mg Oral BID WITH MEALS    fenofibrate (LOFIBRA) tablet 160 mg  160 mg Oral DAILY    insulin glargine (LANTUS) injection 22 Units  22 Units SubCUTAneous Q12H    isosorbide mononitrate ER (IMDUR) tablet 30 mg  30 mg Oral DAILY    prasugreL (EFFIENT) tablet 10 mg  10 mg Oral DAILY    HYDROmorphone (PF) (DILAUDID) injection 0.5 mg  0.5 mg IntraVENous Q4H PRN    HYDROmorphone (PF) (DILAUDID) injection 0.2 mg  0.2 mg IntraVENous Q4H PRN    labetaloL (NORMODYNE;TRANDATE) injection 20 mg  20 mg IntraVENous Q1H PRN    labetaloL (NORMODYNE;TRANDATE) injection 20 mg  20 mg IntraVENous Q1H PRN         Other Studies:  No results found. Assessment:    Active Hospital Problems    Diagnosis Date Noted    Intractable back pain 2020    Weight loss, unintentional  - over 40 lbs in 3 months 2020    [facility] 2020  eeg abnormal  CT brain neg  Was on phenobarbital  Off med since   latelyt subtle symptoms of  Atypical seizure      BMI 35.0-35.9,adult 2020    Hypertriglyceridemia 2018     Overview:   Overview:   Much better now on lofibra--doing well  Advised more stricter low carb diet and aggressive control of DM--pt willing  F/u in few months for recheck  2019  Statin/lofibra not helping--will add fish oil and niacin  F/u in few weeks for recheck      CAD (coronary artery disease) 02/15/2018     Stents  Sees cardiologist  aspirin      [facility] k[facility]acidoses[facility] 02/15/2018    Diabetes mellitus type II, uncontrolled ([facility] 12/15/2016     Now on lantus and humalog prn  goor control BS under 150   Sliding scale given-multiples of 4  Pt on metformit 500 mg bid 3/2019 and  lantus  25 units  Diet discussed  Labs today  F/u every 3 months         Plan:  Back pain radiating to the chest/abdomen, associated with nauseas  Unclear etiology. Has multiple episodes of acute pancreatitis due to triglyceridemia in the past with similar back pain and abdominal pain but this episode is without abdominal pain. - c/w pain control, antiemetics as ordered  - get MRI Lumbar Spine given L foot drop and back pain        Hypertriglyceridemia, most recent TG is 2253  Takes fenobrate and fish oil (1000mg bid) at home, continue fenofibrate 160mg as we dont carry fish oil here.     -Pathology followin session of apheresis on 11/25     DKA, resolved  Uncontrolled DM (A1c 10.8)  Likely secondary to not taking any insulin for the last week and a half, +/-infection. At home, he takes Basaglar 50 units BID and Admelog 25 units before meals with a sliding scale  but has not been able to take it due to insurance issues. - continue with lantus 22u bid and SSI QID ACHS +2AM sliding scale  - Diabetic educator. Cough, with subjective fever   CT chest w/ Multiple ill-defined nodules in the left lung, most likely atypical  Rapid COVID negative. Radiology read as infection such as fungal or tuberculosis. Has associated weight loss  - AFB x 3   - pulm following.  - Check atypical panel (Legionella, mycoplasma, chlamydia)   - Continue Rocephin and azithromycin  - airborn isolation until TB r/o, then droplet isolation until COVID r/o; avoid fluoroquinolones until rule out TB     Unintentional weight loss of 40lbs since August  Possibly secondary to uncontrolled diabetes versus TB versus occult malignancy  Needs outpatient follow-up with oncology for further evaluation       Chest Pain  CAD s/p multiple stent procedures // HTN  Troponin/EKGs not c/w ACS . CTA is neg for PE  - follow up Echo  - Continue PTA ASA 81mg every day , effient 10mg every day , coreg 3.25mg bid, atorvastatin 80mg qd  - Cardiology consult      Diet:  DIET CLEAR LIQUID  DVT PPx: Lovenox  Code: Full Code  Dispo: pending clinical course and PT/OT Eval  Estimated Discharge: TBD based on clinical course    Labs/Imaging Reviewed. Risk:  HIGH risk due to current condition and comorbid conditions as well as requiring frequent monitoring and high risk of decline. Plan discussed with staff, patient/family and are in agreement. Part of this note was written by using a voice dictation software and the note has been proof read but may still contain some grammatical/other typographical errors.     Signed By: Veronique Giles MD     November 26, 2020

## 2020-11-26 NOTE — PROGRESS NOTES
[facility] CARDIOLOGY PROGRESS NOTE           11/26/2020 10:52 AM    Admit Date: 11/25/2020      Subjective: The patient does not report SOB, CP or palpitations. ROS:  Constitutional:   Negative for fevers and unexplained weight loss. Eyes:   Negative for visual disturbance. ENT:   Negative for significant hearing loss and tinnitus. Respiratory:   Negative for hemoptysis. Cardiovascular:   Negative except as noted in HPI. Gastrointestinal:   Negative for melena and abdominal pain. Genitourinary:   Negative for hematuria, renal stones. Integumentary:   Negative for rash or non-healing wounds  Hematologic/Lymphatic:   Negative for excessive bleeding hx or clotting disorder. Musculoskeletal:  Negative for active, unexplained/severe joint pain. Neurological:   Negative for stroke. Behavioral/Psych:   Negative for suicidal ideations. Endocrine:   Negative for uncontrolled diabetic symptoms including polyuria, polydipsia and poor wound healing. Objective:      Vitals:    11/26/20 0021 11/26/20 0033 11/26/20 0413 11/26/20 0755   BP: (!) 137/93  (!) 149/97 (!) 155/95   Pulse: 86  100 97   Resp: 18  18 18   Temp: 97.8 °F (36.6 °C)  98.1 °F (36.7 °C) 98 °F (36.7 °C)   SpO2: 99%  95% 96%   Weight:  253 lb 4.8 oz (114.9 kg)         Physical Exam:  General-No Acute Distress  Neck- supple, no JVD  CV- regular rate and rhythm no RG  Lung- clear bilaterally  Abd- soft, nontender, nondistended  Ext- no edema bilaterally. Skin- warm and dry    Data Review:   Recent Labs     11/26/20  0404 11/25/20  0841  11/25/20  0231   * 128*  --  144   K 4.6 5.0   < > 6.7*   MG  --   --   --  2.0   BUN 6 6  --  7   CREA 0.60* 0.63*  --  0.61*   * 284*  --  192*   WBC 8.8  --   --  10.1   HGB 14.5  --   --  14.7   HCT 41.6  --   --  39.9*     --   --  222   INR  --  1.0  --   --     < > = values in this interval not displayed.        Assessment/Plan:     Hypertriglyceridemia  - severe high TG with acute pancreatitis  - recommend low fat diet, avoidance of refine carbohydrates and EtOH, omega-3 FA  - c/w statin and fibrate    CAD  - [facility] on 3/2020 with nonobstructive disease  - c/w ASA, statin, BB, Effient, ISMN  - f/u ECHO    HTN  - on BB  - consider ACEi    Sheron Byers MD

## 2020-11-26 NOTE — PROGRESS NOTES
Ruslan Siddiqi  Admission Date: 11/25/2020             Daily Progress Note: 11/26/2020    The patient's chart is reviewed and the patient is discussed with the staff. Ruslan Siddiqi is a 57yoM with h/o psoriatic arthritis on cosentyx, CAD, GERD, hypertriglyceridemia, IDDM, epilepsy, HTN who was admitted at [facility] on 11/23 with chest pressure, dyspnea, cough, chills and 3 months of unintentional weight loss (40lbs). He has a 27pkyr smoking history. COVID negative    Subjective:   Afebrile. Leukocytosis improved. O2 sat is 96% on room air. AFB sputa x 2 sent. Still has pain in left posterior chest wall.       Current Facility-Administered Medications   Medication Dose Route Frequency    albumin human 5% (BUMINATE) solution 175 g  3,500 mL IntraVENous ONCE    calcium gluconate 2 g in 0.9% sodium chloride 250 mL  2 g IntraVENous ONCE    sodium chloride (NS) flush 5-40 mL  5-40 mL IntraVENous Q8H    sodium chloride (NS) flush 5-40 mL  5-40 mL IntraVENous PRN    acetaminophen (TYLENOL) tablet 650 mg  650 mg Oral Q6H PRN    Or    acetaminophen (TYLENOL) suppository 650 mg  650 mg Rectal Q6H PRN    polyethylene glycol (MIRALAX) packet 17 g  17 g Oral DAILY PRN    promethazine (PHENERGAN) tablet 12.5 mg  12.5 mg Oral Q6H PRN    Or    ondansetron (ZOFRAN) injection 4 mg  4 mg IntraVENous Q6H PRN    enoxaparin (LOVENOX) injection 40 mg  40 mg SubCUTAneous DAILY    aspirin delayed-release tablet 81 mg  81 mg Oral DAILY    atorvastatin (LIPITOR) tablet 80 mg  80 mg Oral DAILY    azithromycin (ZITHROMAX) 500 mg in 0.9% sodium chloride 250 mL (VIAL-MATE)  500 mg IntraVENous Q24H    cefTRIAXone (ROCEPHIN) 1 g in 0.9% sodium chloride (MBP/ADV) 50 mL MBP  1 g IntraVENous Q24H    insulin lispro (HUMALOG) injection   SubCUTAneous AC&HS    insulin lispro (HUMALOG) injection   SubCUTAneous Q24H    calcium carbonate (OS-SARAI) tablet 500 mg [elemental]  500 mg Oral TID WITH MEALS    buPROPion [facility]) tablet 100 mg  100 mg Oral BID    carvediloL (COREG) tablet 6.25 mg  6.25 mg Oral BID WITH MEALS    fenofibrate (LOFIBRA) tablet 160 mg  160 mg Oral DAILY    insulin glargine (LANTUS) injection 22 Units  22 Units SubCUTAneous Q12H    isosorbide mononitrate ER (IMDUR) tablet 30 mg  30 mg Oral DAILY    prasugreL (EFFIENT) tablet 10 mg  10 mg Oral DAILY    HYDROmorphone (PF) (DILAUDID) injection 0.5 mg  0.5 mg IntraVENous Q4H PRN    HYDROmorphone (PF) (DILAUDID) injection 0.2 mg  0.2 mg IntraVENous Q4H PRN    labetaloL (NORMODYNE;TRANDATE) injection 20 mg  20 mg IntraVENous Q1H PRN    labetaloL (NORMODYNE;TRANDATE) injection 20 mg  20 mg IntraVENous Q1H PRN       Review of Systems    Constitutional: negative for fever, chills, sweats  Cardiovascular: negative for chest pain, palpitations, syncope, edema  Gastrointestinal:  negative for dysphagia, reflux, vomiting, diarrhea, abdominal pain, or melena  Neurologic:  negative for focal weakness, numbness, headache    Objective:     Vitals:    11/26/20 0033 11/26/20 0413 11/26/20 0755 11/26/20 1117   BP:  (!) 149/97 (!) 155/95 136/89   Pulse:  100 97 98   Resp:  18 18 18   Temp:  98.1 °F (36.7 °C) 98 °F (36.7 °C) 98 °F (36.7 °C)   SpO2:  95% 96% 96%   Weight: 253 lb 4.8 oz (114.9 kg)          No intake or output data in the 24 hours ending 11/26/20 1238    Physical Exam:   Constitution:  the patient is well developed and in no acute distress  EENMT:  Sclera clear, pupils equal, oral mucosa moist  Respiratory: CTAB  Cardiovascular:  RRR without M,G,R  Gastrointestinal: soft and non-tender; with positive bowel sounds. Musculoskeletal: warm without cyanosis. There is no lower extremity edema. Skin:  no jaundice or rashes, no wounds   Neurologic: no gross neuro deficits     Psychiatric:  alert and oriented x 3    CT 11/24  IMPRESSION:   1.  Multiple ill-defined nodules in the left lung, most likely atypical  infection such as fungal or tuberculosis.   2. Hepatomegaly and steatosis. 3.  No evidence of pulmonary embolus or aortic dissection. 4.  Extensive coronary artery calcification. LAB  Recent Labs     11/25/20  2229 11/25/20  1615 11/25/20  1055 11/25/20  0818 11/25/20  0426   GLUCPOC 171* 201* 248* 247* 211*      Recent Labs     11/26/20  0404 11/25/20  0841 11/25/20  0231 11/24/20  1812 11/24/20  1120   WBC 8.8  --  10.1 13.2* 15.6*   HGB 14.5  --  14.7 14.9 18.0*   HCT 41.6  --  39.9* 40.0* 48.2     --  222 210 245   INR  --  1.0  --   --   --      Recent Labs     11/26/20  1116 11/26/20  0404 11/25/20  0841 11/25/20  0705 11/25/20  0231  11/24/20  1120   NA  --  137* 128*  --  144   < > 132*   K  --  4.6 5.0 4.0 6.7*   < > 3.8   CL  --  105 95*  --  106   < > 96*   CO2  --  22 23  --  24   < > 14*   GLU  --  183* 284*  --  192*   < > 336*   BUN  --  6 6  --  7   < > 11   CREA  --  0.60* 0.63*  --  0.61*   < > 0.73*   MG 1.8  --   --   --  2.0  --   --    CA  --  8.3 7.6*  --  7.9*   < > 8.3   PHOS  --   --   --   --  4.4  --   --    ALB  --   --  2.9*  --   --   --  4.2   TBILI  --   --  1.0  --   --   --  1.0   ALT  --   --  24  --   --   --  26    < > = values in this interval not displayed. No results for input(s): PH, PCO2, PO2, HCO3, PHI, PCO2I, PO2I, HCO3I in the last 72 hours.   Recent Labs     11/24/20  1202   LAC 0.9         Assessment:  (Medical Decision Making)     Hospital Problems  Date Reviewed: 9/17/2020          Codes Class Noted POA    Weight loss, unintentional  - over 40 lbs in 3 months ICD-10-CM: R63.4  ICD-9-CM: 783.21  11/25/2020 Yes        * (Principal) Intractable back pain ICD-10-CM: M54.9  ICD-9-CM: 724.5  11/25/2020         [facility] ([facility] ICD-10-CM: G40.909  ICD-9-CM: 345.90  3/9/2020 Yes    Overview Signed 3/9/2020 11:24 AM by Flaquito Winter., MD     1990  eeg abnormal  CT brain neg  Was on phenobarbital  Off med since 2010  latelyt subtle symptoms of  Atypical seizure             BMI 35.0-35.9,adult ICD-10-CM: J89.27  ICD-9-CM: V85.35  1/24/2020 Yes        Hypertriglyceridemia ICD-10-CM: E78.1  ICD-9-CM: 272.1  3/19/2018 Yes    Overview Addendum 5/22/2019  7:41 PM by Grabiel Campuzano MD     Overview:   Overview:   Much better now on lofibra--doing well  Advised more stricter low carb diet and aggressive control of DM--pt willing  F/u in few months for recheck  5/2019  Statin/lofibra not helping--will add fish oil and niacin  F/u in few weeks for recheck             CAD (coronary artery disease) ICD-10-CM: I25.10  ICD-9-CM: 414.00  2/15/2018 Yes    Overview Signed 3/2/2018  1:36 PM by Grabiel Campuzano MD     Stents  Sees cardiologist  aspirin             [facility]acidoses) ([facility] ICD-10-CM: E11.10  ICD-9-CM: 250.12  2/15/2018 Yes        Diabetes mellitus type II, uncontrolled ([facility] (Chronic) ICD-10-CM: E11.65  ICD-9-CM: 250.02  12/15/2016 Yes    Overview Addendum 6/19/2018 11:35 AM by Grabiel Campuzano MD     Now on lantus and humalog prn  goor control BS under 150   Sliding scale given-multiples of 4  Pt on metformit 500 mg bid 3/2019 and  lantus  25 units  Diet discussed  Labs today  F/u every 3 months                   Plan:  (Medical Decision Making)     --obtain AFB stains testing via induced sputa x 3. This is needed prior to bronchoscopy. --plan bronchoscopy with BAL Sun or Monday, when AFB stains are negative. More than 50% of the time documented was spent in face-to-face contact with the patient and in the care of the patient on the floor/unit where the patient is located.     Lexie Toussaint MD

## 2020-11-26 NOTE — CONSULTS
[facility] CARDIOLOGY History &Physical                 *Please disregard note from earlier today as it is unable to be modified due to patient transfer*      Subjective:     Patient is a 52 y.o. male with a hx of CAD (PCI of OM x 2 in 2016, [facility] 3/2020 stable disease), GERD, hypertriglyceridemia, DM, epilepsy and HTN, who presented to the ED with c/o abdominal and back pain. Patient reports that the pain started in his abdomen, then migrated to his back and up to his upper back. No clear aggravating factors. Moderate to severe in nature. Aching type pain He was admitted to medicine and underwent CT chest which was negative for pulmonary embolus and no evidence of aortic dissection. Triglyceride found to be severely elevated and with findings of pancreatitis, patient was transferred to [facility] for IR placement of temporary line and apheresis under direction of hematology. Troponin tests obtained x 3. Denies ongoing chest pain, shortness of breath, cough, wheezes. Patient has been continued on home medication as a swell as pain control for pancreatitis. Given elevated troponin cardiology asked to see the patient.      Lab Results   Component Value Date     (L) 11/25/2020    K 5.0 11/25/2020    MG 2.0 11/25/2020    BUN 6 11/25/2020    CREA 0.63 (L) 11/25/2020    WBC 10.1 11/25/2020    HGB 14.7 11/25/2020     11/25/2020    INR 1.0 11/25/2020    TROIQ <0.02 (L) 03/25/2020    TROIQ <0.02 (L) 03/25/2020    TROIQ 9.36 (HH) 12/16/2016    TSH 2.130 11/24/2020        Past Medical History:   Diagnosis Date    Acute coronary syndrome ([address] 12/15/2016    Acute pancreatitis 2/15/2018    Arthritis     psoriatic    Diabetes ([address]     Endocrine disease     Hypertension     Nonintractable epilepsy with complex partial seizures ([address] 4/2/2020    Psoriatic arthropathy ([address]     Seizures ([address]       Past Surgical History:   Procedure Laterality Date    CARDIAC SURG PROCEDURE UNLIST      HX APPENDECTOMY      HX CHOLECYSTECTOMY      HX HERNIA REPAIR      HX ORTHOPAEDIC      left heel secondary to trauma      Family History   Problem Relation Age of Onset    Thyroid Disease Mother         goiters    Breast Cancer Mother         42's    Atrial Fibrillation Mother     Diabetes Father     Heart Disease Father 48    Diabetes Sister     Heart Disease Paternal Grandfather       Social History     Tobacco Use    Smoking status: Former Smoker     Packs/day: 0.25     Years: 30.00     Pack years: 7.50     Last attempt to quit: 2019     Years since quittin.8    Smokeless tobacco: Former User   Substance Use Topics    Alcohol use: No      Allergies   Allergen Reactions    Peanut Anaphylaxis    Morphine Other (comments)     Anxiety, claustrophobia         Review of Systems   Constitution: Negative. HENT: Negative. Eyes: Negative. Cardiovascular: Positive for chest pain (as per HPI , resolved ). Negative for dyspnea on exertion, leg swelling and paroxysmal nocturnal dyspnea. Respiratory: Negative. Endocrine: Negative. Hematologic/Lymphatic: Negative. Skin: Negative. Musculoskeletal: Positive for back pain. Gastrointestinal: Positive for bloating and abdominal pain. Genitourinary: Negative. Neurological: Negative. Psychiatric/Behavioral: Negative. Allergic/Immunologic: Negative. All other systems reviewed and are negative. Objective:     Visit Vitals  BP (!) 148/95 (BP 1 Location: Left arm, BP Patient Position: Head of bed elevated (Comment degrees); At rest)   Pulse 87   Temp 98.9 °F (37.2 °C)   Resp 15   SpO2 99%       No intake/output data recorded. No intake/output data recorded. Physical Exam   Constitutional: He is oriented to person, place, and time. He appears well-nourished. No distress. HENT:   Head: Normocephalic and atraumatic. Nose: Nose normal.   Mouth/Throat: Oropharyngeal exudate: mask in place.    Eyes: Conjunctivae are normal. Right eye exhibits no discharge. Left eye exhibits no discharge. No scleral icterus. Neck: Normal range of motion. No JVD present. No tracheal deviation present. Cardiovascular: Normal rate, regular rhythm, S1 normal and S2 normal. Exam reveals no gallop and no friction rub. Murmur (faint left sternal border systolic murmur) heard. Pulses:       Radial pulses are 2+ on the right side and 2+ on the left side. Pulmonary/Chest: Effort normal and breath sounds normal. No respiratory distress. He has no wheezes. He has no rales. Abdominal: Soft. He exhibits no distension. There is abdominal tenderness (mild diffuse). There is no rebound and no guarding. Musculoskeletal: Normal range of motion. General: No tenderness, deformity or edema (of legs). Neurological: He is alert and oriented to person, place, and time. Skin: Skin is warm and dry. He is not diaphoretic. Psychiatric: He has a normal mood and affect. His behavior is normal. Judgment and thought content normal.   Vitals reviewed. Data Review:   Recent Labs     11/25/20  0841 11/25/20  0705 11/25/20  0231  11/24/20  1812   *  --  144   < > 142   K 5.0 4.0 6.7*   < > 4.0   MG  --   --  2.0  --   --    BUN 6  --  7   < > 8   CREA 0.63*  --  0.61*   < > 0.50*   *  --  192*   < > 189*   WBC  --   --  10.1  --  13.2*   HGB  --   --  14.7  --  14.9   HCT  --   --  39.9*  --  40.0*   PLT  --   --  222  --  210   INR 1.0  --   --   --   --     < > = values in this interval not displayed.        Assessment/Plan:   Active Problems:    Pancreatitis    Hypertriglyceridemia    - apheresis per hematology    - per primary     - on fibrate, Atorvastatin       Chest pain    - troponin negative x 3    - not consistent with ACS event, EKG sinus tahcycardia no ST segment changes consistent with ischemia    - continue home medications with ASA, Prasugrel    - no indication for coronary angiography at this time      CAD s/p PCI    - on ASA, Prasugrel, Coreg, fibrate, Atorvastatin    - echo ordered and pending    - Cath 3/25/20: non obstructive CAD , hemodynamically insignificant lesion LAD iFR 0.98, no intervention at that time     - hemodynamically and electrically stable       HTN    - mildly elevated adjust medications as needed while inpatient    Maria Del Rosario Gonzalez DO  11/25/2020  8:06 PM

## 2020-11-26 NOTE — PROGRESS NOTES
Procedure: Therapeutic Plasmapheresis  Dx: hypertriglyceridemia  Day: 1   Labs drawn: CBC/CMP/MG+/FIBRINOGEN/PTT  Fibrinogen: 594  Calcium used:  2 grams  Replacement product: ALBUMIN  Replace volume: 3243  /  Plasma Remove volume: 4289  Fluid Balance: 100%  TPV: 3603  Issues: c/o itch during procedure; tylenol and benadryl given as ordered. Pt's right apheresis catheter flushes great with excellent blood return, but sluggish going through machine. Line flushed multiple times through out procedure. Pt repositioned lying flat to help line to flow better. Labs needed: CBC/CMP/FIBRINOGEN/PTT/MG+  Tolerated procedure: fair. Resting in bed. Report given to [facility]. (Pt on airborne precautions).

## 2020-11-27 LAB
ANION GAP SERPL CALC-SCNC: 7 MMOL/L (ref 7–16)
BACTERIA SPEC CULT: NORMAL
BASOPHILS # BLD: 0 K/UL (ref 0–0.2)
BASOPHILS NFR BLD: 1 % (ref 0–2)
BUN SERPL-MCNC: 6 MG/DL (ref 6–23)
CALCIUM SERPL-MCNC: 8.3 MG/DL (ref 8.3–10.4)
CHLORIDE SERPL-SCNC: 109 MMOL/L (ref 98–107)
CO2 SERPL-SCNC: 26 MMOL/L (ref 21–32)
CREAT SERPL-MCNC: 0.52 MG/DL (ref 0.8–1.5)
DIFFERENTIAL METHOD BLD: ABNORMAL
EOSINOPHIL # BLD: 0.5 K/UL (ref 0–0.8)
EOSINOPHIL NFR BLD: 7 % (ref 0.5–7.8)
ERYTHROCYTE [DISTWIDTH] IN BLOOD BY AUTOMATED COUNT: 13.6 % (ref 11.9–14.6)
GLUCOSE BLD STRIP.AUTO-MCNC: 189 MG/DL (ref 65–100)
GLUCOSE BLD STRIP.AUTO-MCNC: 190 MG/DL (ref 65–100)
GLUCOSE BLD STRIP.AUTO-MCNC: 203 MG/DL (ref 65–100)
GLUCOSE BLD STRIP.AUTO-MCNC: 263 MG/DL (ref 65–100)
GLUCOSE BLD STRIP.AUTO-MCNC: 273 MG/DL (ref 65–100)
GLUCOSE SERPL-MCNC: 161 MG/DL (ref 65–100)
HCT VFR BLD AUTO: 38.2 % (ref 41.1–50.3)
HGB BLD-MCNC: 12.7 G/DL (ref 13.6–17.2)
IMM GRANULOCYTES # BLD AUTO: 0 K/UL (ref 0–0.5)
IMM GRANULOCYTES NFR BLD AUTO: 0 % (ref 0–5)
LYMPHOCYTES # BLD: 2.1 K/UL (ref 0.5–4.6)
LYMPHOCYTES NFR BLD: 31 % (ref 13–44)
MCH RBC QN AUTO: 31.8 PG (ref 26.1–32.9)
MCHC RBC AUTO-ENTMCNC: 33.2 G/DL (ref 31.4–35)
MCV RBC AUTO: 95.5 FL (ref 79.6–97.8)
MONOCYTES # BLD: 0.7 K/UL (ref 0.1–1.3)
MONOCYTES NFR BLD: 11 % (ref 4–12)
NEUTS SEG # BLD: 3.5 K/UL (ref 1.7–8.2)
NEUTS SEG NFR BLD: 51 % (ref 43–78)
NRBC # BLD: 0 K/UL (ref 0–0.2)
PLATELET # BLD AUTO: 170 K/UL (ref 150–450)
PMV BLD AUTO: 11.4 FL (ref 9.4–12.3)
POTASSIUM SERPL-SCNC: 3.5 MMOL/L (ref 3.5–5.1)
RBC # BLD AUTO: 4 M/UL (ref 4.23–5.6)
SERVICE CMNT-IMP: NORMAL
SODIUM SERPL-SCNC: 142 MMOL/L (ref 136–145)
TRIGL SERPL-MCNC: 278 MG/DL (ref 35–150)
WBC # BLD AUTO: 6.9 K/UL (ref 4.3–11.1)

## 2020-11-27 PROCEDURE — 86480 TB TEST CELL IMMUN MEASURE: CPT

## 2020-11-27 PROCEDURE — 74011250637 HC RX REV CODE- 250/637: Performed by: INTERNAL MEDICINE

## 2020-11-27 PROCEDURE — 82962 GLUCOSE BLOOD TEST: CPT

## 2020-11-27 PROCEDURE — 80048 BASIC METABOLIC PNL TOTAL CA: CPT

## 2020-11-27 PROCEDURE — 99232 SBSQ HOSP IP/OBS MODERATE 35: CPT | Performed by: INTERNAL MEDICINE

## 2020-11-27 PROCEDURE — 74011636637 HC RX REV CODE- 636/637: Performed by: INTERNAL MEDICINE

## 2020-11-27 PROCEDURE — 93306 TTE W/DOPPLER COMPLETE: CPT

## 2020-11-27 PROCEDURE — 2709999900 HC NON-CHARGEABLE SUPPLY

## 2020-11-27 PROCEDURE — 99233 SBSQ HOSP IP/OBS HIGH 50: CPT | Performed by: INTERNAL MEDICINE

## 2020-11-27 PROCEDURE — 85025 COMPLETE CBC W/AUTO DIFF WBC: CPT

## 2020-11-27 PROCEDURE — 65270000029 HC RM PRIVATE

## 2020-11-27 PROCEDURE — 36415 COLL VENOUS BLD VENIPUNCTURE: CPT

## 2020-11-27 PROCEDURE — 87116 MYCOBACTERIA CULTURE: CPT

## 2020-11-27 PROCEDURE — 74011250636 HC RX REV CODE- 250/636: Performed by: INTERNAL MEDICINE

## 2020-11-27 PROCEDURE — 74011000250 HC RX REV CODE- 250: Performed by: INTERNAL MEDICINE

## 2020-11-27 PROCEDURE — 84478 ASSAY OF TRIGLYCERIDES: CPT

## 2020-11-27 PROCEDURE — 74011000258 HC RX REV CODE- 258: Performed by: INTERNAL MEDICINE

## 2020-11-27 RX ORDER — SAME BUTANEDISULFONATE/BETAINE 400-600 MG
250 POWDER IN PACKET (EA) ORAL 2 TIMES DAILY
Status: DISCONTINUED | OUTPATIENT
Start: 2020-11-27 | End: 2020-12-01 | Stop reason: HOSPADM

## 2020-11-27 RX ORDER — LIDOCAINE 4 G/100G
1 PATCH TOPICAL EVERY 24 HOURS
Status: DISCONTINUED | OUTPATIENT
Start: 2020-11-27 | End: 2020-12-01 | Stop reason: HOSPADM

## 2020-11-27 RX ADMIN — ISOSORBIDE MONONITRATE 30 MG: 30 TABLET, EXTENDED RELEASE ORAL at 09:10

## 2020-11-27 RX ADMIN — HYDROMORPHONE HYDROCHLORIDE 0.5 MG: 1 INJECTION, SOLUTION INTRAMUSCULAR; INTRAVENOUS; SUBCUTANEOUS at 09:09

## 2020-11-27 RX ADMIN — CARVEDILOL 6.25 MG: 6.25 TABLET, FILM COATED ORAL at 09:10

## 2020-11-27 RX ADMIN — ENOXAPARIN SODIUM 40 MG: 40 INJECTION SUBCUTANEOUS at 09:09

## 2020-11-27 RX ADMIN — INSULIN LISPRO 2 UNITS: 100 INJECTION, SOLUTION INTRAVENOUS; SUBCUTANEOUS at 11:30

## 2020-11-27 RX ADMIN — INSULIN GLARGINE 22 UNITS: 100 INJECTION, SOLUTION SUBCUTANEOUS at 08:50

## 2020-11-27 RX ADMIN — CARVEDILOL 6.25 MG: 6.25 TABLET, FILM COATED ORAL at 17:41

## 2020-11-27 RX ADMIN — INSULIN GLARGINE 22 UNITS: 100 INJECTION, SOLUTION SUBCUTANEOUS at 21:00

## 2020-11-27 RX ADMIN — AZITHROMYCIN 500 MG: 500 INJECTION, POWDER, LYOPHILIZED, FOR SOLUTION INTRAVENOUS at 14:00

## 2020-11-27 RX ADMIN — Medication 10 ML: at 21:13

## 2020-11-27 RX ADMIN — INSULIN LISPRO 2 UNITS: 100 INJECTION, SOLUTION INTRAVENOUS; SUBCUTANEOUS at 21:10

## 2020-11-27 RX ADMIN — CALCIUM 500 MG: 500 TABLET ORAL at 12:58

## 2020-11-27 RX ADMIN — PRASUGREL 10 MG: 10 TABLET, FILM COATED ORAL at 09:10

## 2020-11-27 RX ADMIN — RDII 250 MG CAPSULE 250 MG: at 17:41

## 2020-11-27 RX ADMIN — HYDROMORPHONE HYDROCHLORIDE 0.5 MG: 1 INJECTION, SOLUTION INTRAMUSCULAR; INTRAVENOUS; SUBCUTANEOUS at 21:05

## 2020-11-27 RX ADMIN — CEFTRIAXONE SODIUM 1 G: 1 INJECTION, POWDER, FOR SOLUTION INTRAMUSCULAR; INTRAVENOUS at 12:59

## 2020-11-27 RX ADMIN — RDII 250 MG CAPSULE 250 MG: at 12:58

## 2020-11-27 RX ADMIN — Medication 10 ML: at 06:00

## 2020-11-27 RX ADMIN — ACETAMINOPHEN 650 MG: 325 TABLET, FILM COATED ORAL at 13:04

## 2020-11-27 RX ADMIN — ASPIRIN 81 MG: 81 TABLET ORAL at 09:10

## 2020-11-27 RX ADMIN — HYDROMORPHONE HYDROCHLORIDE 0.5 MG: 1 INJECTION, SOLUTION INTRAMUSCULAR; INTRAVENOUS; SUBCUTANEOUS at 17:41

## 2020-11-27 RX ADMIN — BUPROPION HYDROCHLORIDE 100 MG: 100 TABLET, FILM COATED ORAL at 09:10

## 2020-11-27 RX ADMIN — INSULIN LISPRO 2 UNITS: 100 INJECTION, SOLUTION INTRAVENOUS; SUBCUTANEOUS at 08:50

## 2020-11-27 RX ADMIN — Medication 10 ML: at 15:40

## 2020-11-27 RX ADMIN — CALCIUM 500 MG: 500 TABLET ORAL at 17:41

## 2020-11-27 RX ADMIN — ATORVASTATIN CALCIUM 80 MG: 80 TABLET, FILM COATED ORAL at 09:10

## 2020-11-27 RX ADMIN — FENOFIBRATE 160 MG: 160 TABLET ORAL at 09:10

## 2020-11-27 RX ADMIN — CALCIUM 500 MG: 500 TABLET ORAL at 09:10

## 2020-11-27 RX ADMIN — BUPROPION HYDROCHLORIDE 100 MG: 100 TABLET, FILM COATED ORAL at 17:41

## 2020-11-27 NOTE — PROGRESS NOTES
Hourly rounds performed all needs met, bed locked low call light within reach . Will continue to monitor care and give report to onclaura RN.   Date 11/26/20 0700 - 11/27/20 0659 11/27/20 0700 - 11/28/20 0659   Shift 6333-3329 5888-9379 24 Hour Total 2275-0111 1750-0230 24 Hour Total   INTAKE   Shift Total(mL/kg)         OUTPUT   Urine(mL/kg/hr)           Urine Occurrence(s) 6 x 2 x 8 x      Stool           Stool Occurrence(s) 1 x 1 x 2 x      Shift Total(mL/kg)         NET         Weight (kg) 114.9 118.3 118.3 118.3 118.3 118.3

## 2020-11-27 NOTE — PROGRESS NOTES
[address] [facility] Hematology & Oncology        Inpatient Hematology / Oncology Progress Note    Reason for Consult:  Intractable pain [R52]  Referring Physician:  Eddie Ortiz MD      Interval history:  S/p PLEX x2  Triglycerides improved to 278 this AM  Reporting low back pain - MRI with DDD  R/O Tb pending      ROS:  Constitutional: Negative for fever, chills, weakness, malaise, fatigue. CV: Negative for chest pain, palpitations, edema. Respiratory: Negative for dyspnea, cough, wheezing. GI: Negative for nausea, abdominal pain, diarrhea. 10 point review of systems is otherwise negative with the exception of the elements mentioned above in the HPI.            Allergies   Allergen Reactions    Peanut Anaphylaxis    Morphine Other (comments)     Anxiety, claustrophobia     Past Medical History:   Diagnosis Date    Acute coronary syndrome ([facility] [address] 12/15/2016    Acute pancreatitis 2/15/2018    Arthritis     psoriatic    Diabetes ([facility] [address]     Endocrine disease     Hypertension     Nonintractable epilepsy with complex partial seizures ([facility] [address] 4/2/2020    Psoriatic arthropathy (HCC)     Seizures (HCC)      Past Surgical History:   Procedure Laterality Date    CARDIAC SURG PROCEDURE UNLIST      HX APPENDECTOMY      HX CHOLECYSTECTOMY      HX HERNIA REPAIR      HX ORTHOPAEDIC      left heel secondary to trauma     Family History   Problem Relation Age of Onset    Thyroid Disease Mother         goiters    Breast Cancer Mother         42's    Atrial Fibrillation Mother     Diabetes Father     Heart Disease Father 48    Diabetes Sister     Heart Disease Paternal Grandfather      Social History     Socioeconomic History    Marital status:      Spouse name: Not on file    Number of children: Not on file    Years of education: Not on file    Highest education level: Not on file   Occupational History    Not on file   Social Needs    Financial resource strain: Not on file    Food insecurity Worry: Not on file     Inability: Not on file    Transportation needs     Medical: Not on file     Non-medical: Not on file   Tobacco Use    Smoking status: Former Smoker     Packs/day: 0.25     Years: 30.00     Pack years: 7.50     Last attempt to quit: 2019     Years since quittin.8    Smokeless tobacco: Former User   Substance and Sexual Activity    Alcohol use: No    Drug use: No    Sexual activity: Not Currently     Partners: Female   Lifestyle    Physical activity     Days per week: Not on file     Minutes per session: Not on file    Stress: Not on file   Relationships    Social connections     Talks on phone: Not on file     Gets together: Not on file     Attends Rastafari service: Not on file     Active member of club or organization: Not on file     Attends meetings of clubs or organizations: Not on file     Relationship status: Not on file    Intimate partner violence     Fear of current or ex partner: Not on file     Emotionally abused: Not on file     Physically abused: Not on file     Forced sexual activity: Not on file   Other Topics Concern     Service No    Blood Transfusions No    Caffeine Concern No    Occupational Exposure No    Hobby Hazards No    Sleep Concern No    Stress Concern Yes    Weight Concern Yes    Special Diet No    Back Care No    Exercise No    Bike Helmet No    Seat Belt Yes    Self-Exams No   Social History Narrative    Not on file     Current Facility-Administered Medications   Medication Dose Route Frequency Provider Last Rate Last Dose    0.9% sodium chloride infusion  25 mL/hr IntraVENous CONTINUOUS Sindhu Dugan NP   Stopped at 20 2310    sodium chloride 3% hypertonic nebulizer soln  4 mL Nebulization Q12H PRN Idalia Slater MD   4 mL at 20 1646    sodium chloride (NS) flush 5-40 mL  5-40 mL IntraVENous Q8H Ofe Benitez MD   10 mL at 20 0600    sodium chloride (NS) flush 5-40 mL  5-40 mL IntraVENous PRN Jose C Fernandez MD   40 mL at 11/26/20 2310    acetaminophen (TYLENOL) tablet 650 mg  650 mg Oral Q6H PRN Jose C Fernandez MD        Or   [facility] acetaminophen (TYLENOL) suppository 650 mg  650 mg Rectal Q6H PRN Jose C Fernandez MD        polyethylene glycol (MIRALAX) packet 17 g  17 g Oral DAILY PRN Jose C Fernandez MD        promethazine (PHENERGAN) tablet 12.5 mg  12.5 mg Oral Q6H PRN Jose C Fernandez MD        Or    [facility] injection 4 mg  4 mg IntraVENous Q6H PRN Jose C Fernandez MD   4 mg at 11/26/20 1520    enoxaparin (LOVENOX) injection 40 mg  40 mg SubCUTAneous DAILY Jose C Fernandez MD   40 mg at 11/27/20 4794    aspirin delayed-release tablet 81 mg  81 mg Oral DAILY Jose C Fernandez MD   81 mg at 11/27/20 0910    atorvastatin (LIPITOR) tablet 80 mg  80 mg Oral DAILY Jose C Fernandez MD   80 mg at 11/27/20 0910    azithromycin (ZITHROMAX) 500 mg in 0.9% sodium chloride 250 mL (VIAL-MATE)  500 mg IntraVENous Q24H Jose C Fernandez MD   500 mg at 11/26/20 1426    cefTRIAXone (ROCEPHIN) 1 g in 0.9% sodium chloride (MBP/ADV) 50 mL MBP  1 g IntraVENous Q24H Jose C Fernandez  mL/hr at 11/26/20 1427 1 g at 11/26/20 1427    insulin lispro (HUMALOG) injection   SubCUTAneous AC&HS Jose C Fernandez MD   2 Units at 11/27/20 0850    insulin lispro (HUMALOG) injection   SubCUTAneous Q24H Jose C Fernandez MD   Stopped at 11/27/20 0200    calcium carbonate (OS-SARAI) tablet 500 mg [elemental]  500 mg Oral TID WITH MEALS Jose C Fernandez MD   500 mg at 11/27/20 0910    buPROPion (WELLBUTRIN) tablet 100 mg  100 mg Oral BID Jose C Fernandez MD   100 mg at 11/27/20 0910    carvediloL (COREG) tablet 6.25 mg  6.25 mg Oral BID WITH MEALS Jose C Fernandez MD   6.25 mg at 11/27/20 0910    fenofibrate (LOFIBRA) tablet 160 mg  160 mg Oral DAILY Jose C Fernandez MD   160 mg at 11/27/20 0910    insulin glargine (LANTUS) injection 22 Units  22 Units SubCUTAneous Q12H Jose C Fernandez MD   22 Units at 20 0850    isosorbide mononitrate ER (IMDUR) tablet 30 mg  30 mg Oral DAILY Erla Freeze, MD   30 mg at 20 0910    prasugreL (EFFIENT) tablet 10 mg  10 mg Oral DAILY Erla Freeze, MD   10 mg at 20 0910    HYDROmorphone (PF) (DILAUDID) injection 0.5 mg  0.5 mg IntraVENous Q4H PRN Erla Freeze, MD   0.5 mg at 20 0909    HYDROmorphone (PF) (DILAUDID) injection 0.2 mg  0.2 mg IntraVENous Q4H PRN Julian Avelar, MD        labetaloL (NORMODYNE;TRANDATE) injection 20 mg  20 mg IntraVENous Q1H PRN Erla Freeze, MD        labetaloL (NORMODYNE;TRANDATE) injection 20 mg  20 mg IntraVENous Q1H PRN Erla Freeze, MD           OBJECTIVE:  Patient Vitals for the past 8 hrs:   BP Temp Pulse Resp SpO2 Weight   20 0812 130/88 98 °F (36.7 °C) 92 18 96 %    20 0504 137/75 97.8 °F (36.6 °C) 95 18 97 % 260 lb 11.2 oz (118.3 kg)     Temp (24hrs), Av °F (36.7 °C), Min:97.6 °F (36.4 °C), Max:98.4 °F (36.9 °C)    No intake/output data recorded.     Physical Exam:  Exam per Dr. Benoit Joseph:    Recent Results (from the past 24 hour(s))   GLUCOSE, POC    Collection Time: 20 11:09 AM   Result Value Ref Range    Glucose (POC) 273 (H) 65 - 100 mg/dL   MAGNESIUM    Collection Time: 20 11:16 AM   Result Value Ref Range    Magnesium 1.8 1.8 - 2.4 mg/dL   PTT    Collection Time: 20 11:16 AM   Result Value Ref Range    aPTT 30.7 24.1 - 35.1 SEC   FIBRINOGEN    Collection Time: 20 11:16 AM   Result Value Ref Range    Fibrinogen 310 190 - 501 mg/dL   GLUCOSE, POC    Collection Time: 20  4:40 PM   Result Value Ref Range    Glucose (POC) 263 (H) 65 - 100 mg/dL   GLUCOSE, POC    Collection Time: 20  8:34 PM   Result Value Ref Range    Glucose (POC) 203 (H) 65 - 100 mg/dL   CBC WITH AUTOMATED DIFF    Collection Time: 20  5:06 AM   Result Value Ref Range    WBC 6.9 4.3 - 11.1 K/uL    RBC 4.00 (L) 4.23 - 5.6 M/uL    HGB 12.7 (L) 13.6 - 17.2 g/dL    HCT 38.2 (L) 41.1 - 50.3 %    MCV 95.5 79.6 - 97.8 FL    MCH 31.8 26.1 - 32.9 PG    MCHC 33.2 31.4 - 35.0 g/dL    RDW 13.6 11.9 - 14.6 %    PLATELET 114 155 - 217 K/uL    MPV 11.4 9.4 - 12.3 FL    ABSOLUTE NRBC 0.00 0.0 - 0.2 K/uL    DF AUTOMATED      NEUTROPHILS 51 43 - 78 %    LYMPHOCYTES 31 13 - 44 %    MONOCYTES 11 4.0 - 12.0 %    EOSINOPHILS 7 0.5 - 7.8 %    BASOPHILS 1 0.0 - 2.0 %    IMMATURE GRANULOCYTES 0 0.0 - 5.0 %    ABS. NEUTROPHILS 3.5 1.7 - 8.2 K/UL    ABS. LYMPHOCYTES 2.1 0.5 - 4.6 K/UL    ABS. MONOCYTES 0.7 0.1 - 1.3 K/UL    ABS. EOSINOPHILS 0.5 0.0 - 0.8 K/UL    ABS. BASOPHILS 0.0 0.0 - 0.2 K/UL    ABS. IMM. GRANS. 0.0 0.0 - 0.5 K/UL   METABOLIC PANEL, BASIC    Collection Time: 11/27/20  5:06 AM   Result Value Ref Range    Sodium 142 136 - 145 mmol/L    Potassium 3.5 3.5 - 5.1 mmol/L    Chloride 109 (H) 98 - 107 mmol/L    CO2 26 21 - 32 mmol/L    Anion gap 7 7 - 16 mmol/L    Glucose 161 (H) 65 - 100 mg/dL    BUN 6 6 - 23 MG/DL    Creatinine 0.52 (L) 0.8 - 1.5 MG/DL    GFR est AA >60 >60 ml/min/1.73m2    GFR est non-AA >60 >60 ml/min/1.73m2    Calcium 8.3 8.3 - 10.4 MG/DL   TRIGLYCERIDE    Collection Time: 11/27/20  5:06 AM   Result Value Ref Range    Triglyceride 278 (H) 35 - 150 MG/DL       Imaging:  US ABD LTD [[id number]]  Collected: 11/24/20 2046    Order Status: Completed  Updated: 11/24/20 2050    Narrative:      RIGHT UPPER QUADRANT ULTRASOUND 11/24/2020     HISTORY: Moderate right upper quadrant pain for one month. TECHNIQUE: Sonographic imaging of the right upper quadrant was performed. COMPARISON: CT abdomen and pelvis 11/27/2018     FINDINGS: Changes of a cholecystectomy are present. The common bile duct is 5 mm   in diameter. There is no intrahepatic biliary ductal dilatation. The liver parenchyma is diffusely echogenic relative to the renal cortex. This   finding may be present with steatosis. The right kidney is 13.1 cm in length. There is no hydronephrosis.  The distal   abdominal aorta is 1.9 cm in diameter. The IVC is patent. Impression:      IMPRESSION: Hepatic steatosis status post cholecystectomy. CT CHEST ABD PELV W CONT [[id number]]  Collected: 11/24/20 1611    Order Status: Completed  Updated: 11/24/20 1618    Narrative:      CT of the Chest, Abdomen, and Pelvis     INDICATION: Increasing back and chest pain, unexpected weight loss     Multiple axial images were obtained through the chest, abdomen, and pelvis. Oral contrast was used for bowel opacification.  100mL of Isovue 370 intravenous   contrast was used for better evaluation of solid organs and vascular structures.    Radiation dose reduction techniques were used for this study.  All CT scans   performed at this facility use one or all of the following: Automated exposure   control, adjustment of the mA and/or kVp according to patient's size, iterative   reconstruction. COMPARISON: Abdomen CT dated 11/27/2018     FINDINGS:   -LUNGS: Multiple small ill-defined nodular areas in the left lung, upper lobe   predominant.  Largest lesion is 8 mm.  The right lung is clear.  No associated   effusion. -MEDIASTINUM/AXILLA: No significant adenopathy. -HEART/VESSELS: There is moderate vascular calcification.  Heart size is normal.    There is normal enhancement of the thoracic aorta and pulmonary arteries. -CHEST WALL: Normal.     -LIVER: There is hepatomegaly and diffuse fatty infiltration of liver.  No   discrete liver mass. -GALLBLADDER/BILE DUCTS: Postcholecystectomy. -PANCREAS: Normal.   -SPLEEN: Normal.     -ADRENALS:  There is a stable 3.4 cm fat attenuation left adrenal mass, likely a   myelolipoma.  Right adrenal gland is unremarkable. -KIDNEYS/URETERS: No hydronephrosis or significant mass. -BLADDER: Normal.   -REPRODUCTIVE ORGANS: No pelvic masses. -BOWEL: Normal caliber.  No inflammatory changes. -LYMPH NODES: No significant retroperitoneal, mesenteric, or pelvic adenopathy. -BONES: Postoperative changes in the left hip.  No acute fracture. -VASCULATURE: Normal   -OTHER: No ascites. Impression:      IMPRESSION:   1.  Multiple ill-defined nodules in the left lung, most likely atypical   infection such as fungal or tuberculosis. 2.  Hepatomegaly and steatosis. 3.  No evidence of pulmonary embolus or aortic dissection. 4.  Extensive coronary artery calcification. If there are any questions about this report, I can be reached on [employer]   or at [facility] CONT [[id number]]     Order Status: Canceled     CT ABD PELV W CONT [[id number]]     Order Status: Canceled     XR CHEST PORT [[id number]]  Collected: 11/24/20 1131    Order Status: Completed  Updated: 11/24/20 1134    Narrative:      History: Chest pain, shortness of breath     Exam: portable chest     Comparison: 3/25/2020     Findings: There is apparent new asymmetric opacity seen at the left lung base   adjacent heart shadow. Otherwise, the lungs are clear. Mediastinal contour and   osseous structures are normal.     Impressions: Apparent new asymmetric lung opacity at the left lung base.    Correlation with PA and lateral chest x-ray recommended for further evaluation,   as the finding could be artifactual.          ASSESSMENT:  Problem List  Date Reviewed: 9/17/2020          Codes Class Noted    Lung infiltrate ICD-10-CM: R91.8  ICD-9-CM: 793.19  11/25/2020        Weight loss, unintentional  - over 40 lbs in 3 months ICD-10-CM: R63.4  ICD-9-CM: 783.21  11/25/2020        History of smoking 25-50 pack years -- quit in 2016 about 27 pack years ICD-10-CM: Z87.891  ICD-9-CM: V15.82  11/25/2020        Psoriatic arthritis ([facility] [address] -- was on Cosyntrex ICD-10-CM: L40.50  ICD-9-CM: 696.0  11/25/2020        * (Principal) Intractable back pain ICD-10-CM: M54.9  ICD-9-CM: 724.5  11/25/2020        Nonintractable epilepsy with complex partial seizures ([facility] [address] ICD-10-CM: U38.239  ICD-9-CM: 345.40  4/2/2020 [place] ICD-10-CM: R68.83  ICD-9-CM: 780.64  3/25/2020    Overview Signed 3/25/2020  7:43 PM by Flaquito Kang MD     Refer to ER             Bronchitis ICD-10-CM: J40  ICD-9-CM: 504  3/25/2020    Overview Signed 3/25/2020  7:45 PM by Flaquito Kang MD     S/p 2 rounds abx  Refer to ER for cxr               Chest pain ICD-10-CM: R07.9  ICD-9-CM: 786.50  3/25/2020    Overview Signed 3/25/2020  7:43 PM by Flaquito Kang MD     LIKELY ABGINA R/O ACUTE mi  ASPIRIN 81 MG   REFER TO er NOW--PT TO GO VIA PRIVATE VEHICLE  ADVISED TO CALL 911 IF WORSE             Coronary artery disease involving native heart with angina pectoris ([facility] [address] ICD-10-CM: I25.119  ICD-9-CM: 414.01, 413.9  3/25/2020    Overview Signed 3/25/2020  7:46 PM by Flaquito Kang MD     Stents several  Sees cardiologist             Nausea ICD-10-CM: R11.0  ICD-9-CM: 787.02  3/25/2020    Overview Signed 3/25/2020  7:46 PM by Flaquito Kang MD     Likely from chest pain             Seizure disorder [facility]) ICD-10-CM: G40.909  ICD-9-CM: 345.90  3/9/2020    Overview Signed 3/9/2020 11:24 AM by Flaquito Kang MD     1990  eeg abnormal  CT brain neg  Was on phenobarbital  Off med since 2010  latelyt subtle symptoms of  Atypical seizure             Sinusitis ICD-10-CM: J32.9  ICD-9-CM: 473.9  3/9/2020    Overview Signed 3/9/2020  8:16 PM by Flaquito Kang MD     Steam in halations  Saline nasal cleansing  mucinex as needed  abx  F/u in 2 weeks               Chronic middle ear effusion, bilateral ICD-10-CM: H65.493  ICD-9-CM: 381.3  3/9/2020    Overview Signed 3/9/2020  8:17 PM by Flaquito Kang MD     Sec to sinusitis  Monitor for few weeks             [place] ICD-10-CM: H92.09  ICD-9-CM: 388.70  3/9/2020    Overview Signed 3/9/2020  8:17 PM by Flaquito Kang MD     Referred pain vs from effusion  Supportive care             Pharyngitis ICD-10-CM: J02.9  ICD-9-CM: 669  3/9/2020    Overview Signed 3/9/2020  8:16 PM by Cheree Kawasaki., MD     abx             TBI (traumatic brain injury) [facility]) ICD-10-CM: S06. 9X9A  ICD-9-CM: 854.00  2/12/2020    Overview Signed 2/12/2020 12:46 PM by Cheree Kawasaki., MD     in 1990             Psoriatic arthropathy [facility]) ICD-10-CM: L40.50  ICD-9-CM: 696.0  1/24/2020    Overview Signed 1/24/2020  9:17 AM by Cheree Kawasaki., MD     Multiple joints pain/swelling             Acquired deformity of toe ICD-10-CM: M20.60  ICD-9-CM: 735.9  1/24/2020        Anxiety ICD-10-CM: F41.9  ICD-9-CM: 300.00  1/24/2020    Overview Signed 2/12/2020 12:47 PM by Cheree Kawasaki., MD     2/2020  Stop prozac and start wellbutrin             Comprehensive diabetic foot examination, type 2 DM, encounter for [facility]) ICD-10-CM: E11.9  ICD-9-CM: 250.00  1/24/2020    Overview Signed 1/24/2020 10:58 AM by Cheree Kawasaki., MD     1/24/20             Noncompliance with medications ICD-10-CM: Z91.14  ICD-9-CM: V15.81  1/24/2020        BMI 35.0-35.9,adult ICD-10-CM: F64.42  ICD-9-CM: V85.35  1/24/2020        Former smoker ICD-10-CM: D42.557  ICD-9-CM: V15.82  5/8/2019    Overview Signed 5/8/2019 10:31 PM by Cheree Kawasaki., MD     quit in 3/2019              Depression ICD-10-CM: F32.9  ICD-9-CM: 182  5/8/2019    Overview Addendum 2/12/2020 12:47 PM by Cheree Kawasaki., MD     Start zoloft-side effects discussed  F/u in 2 weeks--if all stable will fill more meds  5/2019  zolfgt helps -will refill med  No side effects  1/2020  Stop zoloft  Start prozac  Call in 2 weeks  2/2020  prozac not helping  Start welbutrin  Refer to psychiatrist             Hx of heart artery stent ICD-10-CM: Z95.5  ICD-9-CM: V45.82  5/8/2019    Overview Signed 5/8/2019 10:31 PM by Cheree Kawasaki., MD     7.3234  [facility] cardiology             Dietary counseling ICD-10-CM: Z71.3  ICD-9-CM: V65.3  5/8/2019    Overview Addendum 5/22/2019  7:41 PM by Cheree Kawasaki., MD     Low calorie/carb and fat diet discussed  diet discussed             Unstable angina ([facility] [address] ICD-10-CM: I20.0  ICD-9-CM: 411.1  5/2/2019    Overview Signed 5/20/2019  9:29 AM by Baudilio SIMMS     Added automatically from request for surgery [id number]             STEMI (ST elevation myocardial infarction) [facility]) ICD-10-CM: I21.3  ICD-9-CM: 410.90  2/26/2019    Overview Signed 5/20/2019  9:29 AM by Baudilio SIMMS     Last Assessment & Plan:   1  Primarily single vessel obstructive coronary artery disease in a right dominant system. 2  The chronically placed MATIAS in OM 2 remains widely patent. 3  The subtotal occlusion of the RCA was successfully treated with implantation of 3 drug-eluting stents with an excellent angiographic result. Ischemically mediated ventricular fibrillation was successfully defibrillated back to normal sinus rhythm. 4  Normal left ventricular systolic function with estimated LVEF 55% and mild hypokinesis of the inferior wall.  5  Resting left heart hemodynamics parameters reveal mild systemic hypertension and mildly elevated resting LVEDP.  6  Normal systemic arterial oxygen saturation (99%). 7  Hemostasis of RCFA with Perclose device. 8  Conscious sedation was performed throughout the procedure with no untoward effects.     - ASA 81mg daily, Effient, statin, BB, and ACE.   - Normal LV Function   - Follow up in the office in 4-6 weeks, office will mail appt. Will sign off.  Please call with questions             Infected sebaceous cyst of skin ICD-10-CM: L72.3, L08.9  ICD-9-CM: 706.2  1/9/2019    Overview Signed 1/9/2019  1:01 AM by Morro Barnard MD     nape area  resolved with bactrim now             Medication refill ICD-10-CM: Z76.0  ICD-9-CM: V68.1  1/9/2019        Adhesive capsulitis of left shoulder ICD-10-CM: M75.02  ICD-9-CM: 726.0  1/9/2019    Overview Addendum 5/20/2019  9:29 AM by Andi Almanza as needed  will refer to ortho for possible steroid injection  pt wants to wait on PT  Overview: Overview:   nsaids as needed  will refer to ortho for possible steroid injection  pt wants to wait on PT             Acute pain of left shoulder ICD-10-CM: M25.512  ICD-9-CM: 719.41  2019    Overview Addendum 2019  9:29 AM by Sharan SIMMS     x-ray normal  likely sprain vs capsulitis  rest  warmth  activity as tolerated   ortho  will benefit from PT  Overview:   Overview:   x-ray normal  likely sprain vs capsulitis  rest  warmth  activity as tolerated   ortho  will benefit from PT             Osteoarthritis ICD-10-CM: M19.90  ICD-9-CM: 715.90  12/10/2018    Overview Signed 12/10/2018 12:50 PM by Erin Davis MD     multiple joints  supportive              Hiatal hernia ICD-10-CM: K44.9  ICD-9-CM: 553.3  12/10/2018    Overview Addendum 2019  9:29 AM by Sharan SIMMS     seen on CT abdomen  pantoprazole help GERD  symptoms-refll  Overview:   Overview:   seen on CT abdomen  pantoprazole help GERD  symptoms-refll             Sebaceous cyst ICD-10-CM: L72.3  ICD-9-CM: 706.2  12/10/2018    Overview Signed 12/10/2018 12:51 PM by Erin Davis MD     NECK  PT DESIRES EXCISON  REFER TO GEN SURGEON               Uncontrolled type 2 diabetes mellitus without complication, without long-term current use of insulin ICD-10-CM: Eulogioevy Reilly  ICD-9-CM: Pham Tommie  12/10/2018    Overview Addendum 3/9/2020  8:18 PM by Erin Davis MD     Sec to noncompliance  Stable while on med--ON INSULIN AND METFORMIN--tolerating well-no pancreatitis while on metformin  Refilled meds  F/u in 6 weeks for recheck  2019  Off metformin  Increase basaglar to 25 units  Add novolog  Add glipizide 5 mg daily  Keep BS log and call in 2 weeks  3/2020  Pt on 50 units basaglar and sliding scale   BS above 200  Increase basaglar to 55 units --5 units increase every week until FBS is under 150             Encounter for examination following treatment at hospital ICD-10-CM: [facility]  ICD-9-CM: V67.9  12/10/2018 Overview Addendum 5/20/2019  9:29 AM by Siva Mckeon     hspital records reviewed  Overview:   Overview:   hspital records reviewed             Mass in neck ICD-10-CM: R22.1  ICD-9-CM: 784.2  11/30/2018        Excessive daytime sleepiness ICD-10-CM: G47.19  ICD-9-CM: 780.54  11/29/2018    Overview Addendum 5/20/2019  9:29 AM by Sahil SIMMS     Referred for sleep study    Overview:   Overview:   Referred for sleep study             Loud snoring ICD-10-CM: R06.83  ICD-9-CM: 786.09  11/28/2018        Suspected sleep apnea ICD-10-CM: R29.818  ICD-9-CM: 781.99  11/28/2018    Overview Signed 12/10/2018 12:51 PM by Cheree Kawasaki., MD     Referred to pulmonologist             History of tobacco use ICD-10-CM: Z87.891  ICD-9-CM: V15.82  6/19/2018    Overview Signed 6/19/2018  8:31 AM by Cheree Kawasaki., MD     quit jan 2018             Arthritis of left wrist ICD-10-CM: M34.224  ICD-9-CM: 716.93  6/19/2018    Overview Addendum 5/20/2019  9:29 AM by Sahil SIMMS     psoriatic arthriris vs gout  rest  Warmth locally  Motrin or diclofenac  as needed for pain  Avoid heavy lifting/pushing/pulling  F/u as needed or in 2 weeks      Overview:   Overview:   psoriatic arthriris vs gout  rest  Warmth locally  Motrin or diclofenac  as needed for pain  Avoid heavy lifting/pushing/pulling  F/u as needed or in 2 weeks             Severe obesity (BMI 35.0-39. 9) with comorbidity [facility]) ICD-10-CM: E66.01  ICD-9-CM: 278.01  3/19/2018        Encounter to discuss test results ICD-10-CM: Z71.2  ICD-9-CM: V65.49  3/19/2018    Overview Addendum 5/20/2019  9:29 AM by Sahil SIMMS     All results discussed in detail    Overview:   Overview:    All results discussed in detail             Encounter for medication review and counseling ICD-10-CM: Z71.89  ICD-9-CM: V65.49  3/19/2018    Overview Signed 3/19/2018  7:30 PM by Cheree Kawasaki., MD     Will have to consider metformin 3/2018 Hypertriglyceridemia ICD-10-CM: E78.1  ICD-9-CM: 272.1  3/19/2018    Overview Addendum 5/22/2019  7:41 PM by Shaji Steiner MD     Overview:   Overview:   Much better now on lofibra--doing well  Advised more stricter low carb diet and aggressive control of DM--pt willing  F/u in few months for recheck  5/2019  Statin/lofibra not helping--will add fish oil and niacin  F/u in few weeks for recheck             Essential hypertension ICD-10-CM: I10  ICD-9-CM: 401.9  3/2/2018    Overview Addendum 5/20/2019  9:29 AM by Betzy De La Cruz     Stable with med-amlodip[ine/lisinopril/coreg  Refilled  Labs   Annual eye exam recommended  F/u in 3 months      Overview:   Overview:   Stable with med-amlodip[ine/lisinopril/coreg  Refilled  Labs   Annual eye exam recommended  F/u in 3 months             Hyperlipidemia ICD-10-CM: E78.5  ICD-9-CM: 272.4  3/2/2018    Overview Addendum 5/20/2019  9:29 AM by Dayna SIMMS     On statin and fenofibrate   Recheck labs-f/u  Diet discussed    Overview:   Overview: On statin and fenofibrate   Recheck labs-f/u  Diet discussed    Last Assessment & Plan:    - Reports statin Intolerance. however tolerating Lipitor here    - Dietary modifications.              Obesity, unspecified obesity severity, unspecified obesity type ICD-10-CM: E66.9  ICD-9-CM: 278.00  3/2/2018    Overview Signed 3/2/2018  1:34 PM by Shaji Steiner MD     Weight loss encouraged             Anemia of chronic disease ICD-10-CM: D63.8  ICD-9-CM: 285.29  3/2/2018    Overview Addendum 5/20/2019  9:29 AM by Dayna SIMMS     DM  Will periodically monitor    Overview:   Overview:   DM  Will periodically monitor             Type 2 diabetes with nephropathy ([facility] ICD-10-CM: E11.21  ICD-9-CM: 250.40, 583.81  3/1/2018        Pancreatitis ICD-10-CM: K85.90  ICD-9-CM: 577.0  2/17/2018        High anion gap metabolic acidosis QQC-60-XH: E87.2  ICD-9-CM: 276.2  2/15/2018        CAD (coronary artery disease) ICD-10-CM: I25.10  ICD-9-CM: 414.00  2/15/2018    Overview Signed 3/2/2018  1:36 PM by Edmond Segura MD     Stents  Sees cardiologist  aspirin             Leukocytosis ICD-10-CM: P54.659  ICD-9-CM: 288.60  2/15/2018        DKA (diabetic ketoacidoses) ([facility] ICD-10-CM: E11.10  ICD-9-CM: 250.12  2/15/2018        Acute pancreatitis ICD-10-CM: K85.90  ICD-9-CM: 055.2  2/15/2018    Overview Signed 5/20/2019  9:29 AM by Cristal SIMMS     Overview:   Overview:   Sec to hypertriglyceridemia  Resolved now 3/2018  Few tramadol dispensed for pain management             Atherosclerosis of coronary artery ICD-10-CM: I25.10  ICD-9-CM: 414.00  2/15/2018    Overview Signed 5/20/2019  9:29 AM by Cristal SIMMS     Overview:   Overview:   Stents  Sees cardiologist  aspirin    Last Assessment & Plan:    - Cont medical management             Diabetic ketoacidosis without coma associated with type 2 diabetes mellitus ([facility] ICD-10-CM: E11.10  ICD-9-CM: 250.12  2/15/2018    Overview Signed 5/20/2019  9:29 AM by Silva Eaton     Last Assessment & Plan:    - Per primary team             Diabetes mellitus type II, uncontrolled ([facility] (Chronic) ICD-10-CM: E11.65  ICD-9-CM: 250.02  12/15/2016    Overview Addendum 6/19/2018 11:35 AM by Edmond Segura MD     Now on lantus and humalog prn  goor control BS under 150   Sliding scale given-multiples of 4  Pt on metformit 500 mg bid 3/2019 and  lantus  25 units  Diet discussed  Labs today  F/u every 3 months             Tobacco abuse (Chronic) ICD-10-CM: Z72.0  ICD-9-CM: 305.1  12/15/2016              Mr. Jose Arnold is a 52 y.o. male admitted on 11/25/2020 with a primary diagnosis of chest pain, DKA, and pancreatitis. His PMH includes CAD s/p multiple stents, GERD, hypertriglyceridemia s/p pheresis (2/16/18), hx psoriatic arthritis, IDDM with hx DKA, non-intractable epilepsy with complex partial seizures, and HTN.   Pt presented to ED with c/o progressively worsening back pain wrapping around to chest x 1 month. He also c/o cough, chest pain, polydipsia, polyuria, and unintentional wt loss of 40 # in past 3 months. He reports difficulty controlling his blood sugars at home and stopped his insulin about 1 and 1/2 weeks ago. On admission, /104, , WBC 15.6, Hgb 18, , Trop 14.2, EKG with no ischemic changes, non-specific T-wave flattening in inferior leads. CT CAP with multiple ill-defined nodules in L lung, most likely atypical infection such as fungal or TB; hepatomegaly and steatosis. Abd US with hepatic steatosis. On CTX/Azith. COVID rapid neg, PCR pending. TB pending. Cards/GI/Pulm consulted. Triglycerides 2253 today. We were consulted for pheresis. RECOMMENDATIONS:  Hypertriglyceridemia Pancreatitis  - Triglycerides 2253  - Needs to be transferred to [facility] for apheresis. Discussed with Dr. Brett Chiang, hospitalist.  Will have IR place apheresis line and plan for therapeutic apheresis later today. Primary team managing pancreatitis. Repeat triglyceride level in AM. May need additional treatment tomorrow. 11/27 s/p PLEX x2 (11/26 and 11/27). Triglycerides improved to 278 this AM. Ok to remove pheresis catheter. No further plans for pheresis. DKA  - mgmt per primary team    Chest pain  - cards consulted    Cough  - CT CAP with multiple ill-defined nodules in L lung, most likely atypical infection such as fungal or TB; hepatomegaly and steatosis. - COVID rapid neg, PCR pending  - TB pending  - Pulm consulted  - On CTX/Azith    Hypocalcemia  - CCa++ 8.5. Calcium ordered, given pt will undergo pheresis later today    Lab studies and imaging studies were personally reviewed. Thank you for allowing us to participate in the care of Mr. Omkar Dia. We will now sign off. Please do not hesitate to call with questions or concerns. No further Hematologic intervention or follow-up is required at this time. Terrell Carey MD    [facility]  [address] [place] [address] Avenue  Office : [phone]  Fax : [phone]

## 2020-11-27 NOTE — PROGRESS NOTES
[facility] CARDIOLOGY PROGRESS NOTE           11/27/2020 11:39 AM    Admit Date: 11/25/2020      Subjective: The patient does not report SOB, CP or palpitations. ROS:  Constitutional:   Negative for fevers and unexplained weight loss. Eyes:   Negative for visual disturbance. ENT:   Negative for significant hearing loss and tinnitus. Respiratory:   Negative for hemoptysis. Cardiovascular:   Negative except as noted in HPI. Gastrointestinal:   Negative for melena and abdominal pain. Genitourinary:   Negative for hematuria, renal stones. Integumentary:   Negative for rash or non-healing wounds  Hematologic/Lymphatic:   Negative for excessive bleeding hx or clotting disorder. Musculoskeletal:  Negative for active, unexplained/severe joint pain. Neurological:   Negative for stroke. Behavioral/Psych:   Negative for suicidal ideations. Endocrine:   Negative for uncontrolled diabetic symptoms including polyuria, polydipsia and poor wound healing. Objective:      Vitals:    11/26/20 2250 11/27/20 0504 11/27/20 0812 11/27/20 1117   BP: 119/75 137/75 130/88 123/85   Pulse: 86 95 92 94   Resp: 18 18 18 18   Temp:  97.8 °F (36.6 °C) 98 °F (36.7 °C) 97.8 °F (36.6 °C)   SpO2:  97% 96% 96%   Weight:  260 lb 11.2 oz (118.3 kg)         Physical Exam:  General-No Acute Distress  Neck- supple, no JVD  CV- regular rate and rhythm no RG  Lung- clear bilaterally  Abd- soft, nontender, nondistended  Ext- no edema bilaterally.   Skin- warm and dry    Data Review:   Recent Labs     11/27/20  0506 11/26/20  1116 11/26/20  0404 11/25/20  0841  11/25/20  0231     --  137* 128*  --  144   K 3.5  --  4.6 5.0   < > 6.7*   MG  --  1.8  --   --   --  2.0   BUN 6  --  6 6  --  7   CREA 0.52*  --  0.60* 0.63*  --  0.61*   *  --  183* 284*  --  192*   WBC 6.9  --  8.8  --   --  10.1   HGB 12.7*  --  14.5  --   --  14.7   HCT 38.2*  --  41.6  --   --  39.9*     --  176  --   --  222   INR --   --   --  1.0  --   --     < > = values in this interval not displayed. Assessment/Plan:     Hypertriglyceridemia  - severe high TG with acute pancreatitis  - recommend low fat diet, avoidance of refined carbohydrates and EtOH, omega-3 FA  - c/w statin and fibrate     CAD  - [facility] on 3/2020 with nonobstructive disease  - on ASA, statin, BB, Effient, ISMN  - ECHO pending     HTN  - on BB    Anemia  - 2U drop in Hgb since yesterday  - evaluation and management per primary team    Lung nodules  - TB rule out in process  - diagnosis and management per primary team    We will sign off. Please call with questions (e.g. abnormal ECHO).      Cherrie Wilson MD

## 2020-11-27 NOTE — PROGRESS NOTES
Care Management Interventions  Mode of Transport at Discharge: Self  Transition of Care Consult (CM Consult): Discharge Planning  Discharge Durable Medical Equipment: No  Physical Therapy Consult: No  Occupational Therapy Consult: No  Speech Therapy Consult: No  Confirm Follow Up Transport: Self  [facility] Resource Information Provided?: No  Discharge Location  Discharge Placement: Home    Chart screened by  for discharge planning. Patient will likely return home. PT/OT has not been consulted at this time. TB test is pending results. Patient is on precautions for possible TB. Patient will need APAP with [employer] at discharge, patient will need to rent the machine from them as he has not had a sleep study and will need to have one completed outpatient. CM will continue to follow patient during hospitalization for discharge planning and needs. No needs identified at this time. Please consult or notify  if any new issues arise.

## 2020-11-27 NOTE — PROGRESS NOTES
Procedure: Therapeutic Plasmapheresis  Dx: hypertriglyceridemia  Day: 2  Labs drawn: CBC/CMP/MG+/FIBRINOGEN/PTT  Fibrinogen: 310  Calcium used:  2 grams  Replacement product: ALBUMIN  Replace volume: 3269  /  Plasma Remove volume: 4227  Fluid Balance: 100%  TPV: 3428  Issues: pt pre-medicated with tylenol and benadryl as ordered. Denies itch. Labs needed: CBC/CMP/FIBRINOGEN/PTT/MG+  Tolerated procedure: well    Resting in bed. Report given to [facility]. (Pt on airborne precautions).

## 2020-11-27 NOTE — PROGRESS NOTES
NS  MRI LUMBAR SPINE SHOWS  MILD  DDD  AND  NO SIGNIFICANT SPINAL  STENOSIS  OR  MALALIGNMENT  NOT A SURGICAL ISSUE  MULTIPLE COMORBID ILLNESSES  REC:  PAIN MANAGEMENT REFERRAL  NO CHARGE TO PATIENT  FOR  THIS  REVIEW  Benoti Ashford MD

## 2020-11-27 NOTE — PROGRESS NOTES
James Martins  Admission Date: 11/25/2020             Daily Progress Note: 11/27/2020    The patient's chart is reviewed and the patient is discussed with the staff. Patient is a 52 y.o.  male seen and evaluated at the request of Dr. Salvador Segura for lung nodules     72-year-old male history of CAD, [facility] GERD, hypertriglyceridemia (TG>6000), history of psoriatic arthritis on Cosyntrex, insulin-dependent diabetes with history of DKA, non-intractable epilepsy with complex partial seizures, hypertension came ins yesterday due to pain in his back toward his chest. Reported felt like marked pressure in this chest. +mild dyspnea +cough +chills. No fevers +night sweats. Has been losing about 40 lbs unintentionally since August of this year.      Reported pain was also in abdomen and has been having increased foot drop in left leg with increased neuropathy. Reports on disability due to crippling psoriatic arthritis.     TNI marginally up at 14.2 but comming down. EKG non-specific LFTS including trig at 2,245 were way up but down today. Lipase was only 41 today. Was in DKA and started insulin drip. CT noted with increased congestion with nodule in [place]AL[place] and noted to have possible pancreatitis on CT of abd/pelvis along with adrenal cyst. Pain less since just given dilaudid     Off insulin drip now. Patient also smoker about 27 pack y ears quit 4 years ago. Does not use oxygen nebs/inhalers at home.      Subjective:     Remains afebrile  On RA  Quantiferon Gold pending      Current Facility-Administered Medications   Medication Dose Route Frequency    Saccharomyces boulardii (FLORASTOR) capsule 250 mg  250 mg Oral BID    0.9% sodium chloride infusion  25 mL/hr IntraVENous CONTINUOUS    sodium chloride 3% hypertonic nebulizer soln  4 mL Nebulization Q12H PRN    sodium chloride (NS) flush 5-40 mL  5-40 mL IntraVENous Q8H    sodium chloride (NS) flush 5-40 mL  5-40 mL IntraVENous PRN    acetaminophen (TYLENOL) tablet 650 mg  650 mg Oral Q6H PRN    Or    acetaminophen (TYLENOL) suppository 650 mg  650 mg Rectal Q6H PRN    polyethylene glycol (MIRALAX) packet 17 g  17 g Oral DAILY PRN    promethazine (PHENERGAN) tablet 12.5 mg  12.5 mg Oral Q6H PRN    Or    ondansetron (ZOFRAN) injection 4 mg  4 mg IntraVENous Q6H PRN    enoxaparin (LOVENOX) injection 40 mg  40 mg SubCUTAneous DAILY    aspirin delayed-release tablet 81 mg  81 mg Oral DAILY    atorvastatin (LIPITOR) tablet 80 mg  80 mg Oral DAILY    azithromycin (ZITHROMAX) 500 mg in 0.9% sodium chloride 250 mL (VIAL-MATE)  500 mg IntraVENous Q24H    cefTRIAXone (ROCEPHIN) 1 g in 0.9% sodium chloride (MBP/ADV) 50 mL MBP  1 g IntraVENous Q24H    insulin lispro (HUMALOG) injection   SubCUTAneous AC&HS    insulin lispro (HUMALOG) injection   SubCUTAneous Q24H    calcium carbonate (OS-SARAI) tablet 500 mg [elemental]  500 mg Oral TID WITH MEALS    buPROPion (WELLBUTRIN) tablet 100 mg  100 mg Oral BID    carvediloL (COREG) tablet 6.25 mg  6.25 mg Oral BID WITH MEALS    fenofibrate (LOFIBRA) tablet 160 mg  160 mg Oral DAILY    insulin glargine (LANTUS) injection 22 Units  22 Units SubCUTAneous Q12H    isosorbide mononitrate ER (IMDUR) tablet 30 mg  30 mg Oral DAILY    prasugreL (EFFIENT) tablet 10 mg  10 mg Oral DAILY    HYDROmorphone (PF) (DILAUDID) injection 0.5 mg  0.5 mg IntraVENous Q4H PRN    HYDROmorphone (PF) (DILAUDID) injection 0.2 mg  0.2 mg IntraVENous Q4H PRN    labetaloL (NORMODYNE;TRANDATE) injection 20 mg  20 mg IntraVENous Q1H PRN    labetaloL (NORMODYNE;TRANDATE) injection 20 mg  20 mg IntraVENous Q1H PRN       Review of Systems    Constitutional: negative for fever, chills, sweats  Cardiovascular: negative for chest pain, palpitations, syncope, edema  Gastrointestinal:  negative for dysphagia, reflux, vomiting, diarrhea, abdominal pain, or melena  Neurologic: negative for focal weakness, numbness, headache    Objective:     Vitals:    11/26/20 2218 11/26/20 2250 11/27/20 0504 11/27/20 0812   BP: 114/69 119/75 137/75 130/88   Pulse: 84 86 95 92   Resp: 18 18 18 18   Temp:   97.8 °F (36.6 °C) 98 °F (36.7 °C)   SpO2: 96%  97% 96%   Weight:   260 lb 11.2 oz (118.3 kg)        No intake or output data in the 24 hours ending 11/27/20 1131    Physical Exam:   Constitution:  the patient is well developed and in no acute distress  EENMT:  Sclera clear, pupils equal, oral mucosa moist  Respiratory: On RA  Cardiovascular:  RRR without M,G,R  Gastrointestinal: soft and non-tender; with positive bowel sounds. Musculoskeletal: warm without cyanosis. There is trace lower extremity edema. Skin:  no jaundice or rashes, no wounds   Neurologic: no gross neuro deficits     Psychiatric:  alert and oriented x 3    CXR: None today      LAB  Recent Labs     11/26/20  2034 11/26/20  1640 11/26/20  1109 11/26/20  0747 11/26/20  0223   GLUCPOC 203* 263* 273* 190* 189*      Recent Labs     11/27/20  0506 11/26/20  0404 11/25/20  0841 11/25/20  0231 11/24/20  1812   WBC 6.9 8.8  --  10.1 13.2*   HGB 12.7* 14.5  --  14.7 14.9   HCT 38.2* 41.6  --  39.9* 40.0*    176  --  222 210   INR  --   --  1.0  --   --      Recent Labs     11/27/20  0506 11/26/20  1116 11/26/20  0404 11/25/20  0841  11/25/20  0231     --  137* 128*  --  144   K 3.5  --  4.6 5.0   < > 6.7*   *  --  105 95*  --  106   CO2 26  --  22 23  --  24   *  --  183* 284*  --  192*   BUN 6  --  6 6  --  7   CREA 0.52*  --  0.60* 0.63*  --  0.61*   MG  --  1.8  --   --   --  2.0   CA 8.3  --  8.3 7.6*  --  7.9*   PHOS  --   --   --   --   --  4.4   ALB  --   --   --  2.9*  --   --    TBILI  --   --   --  1.0  --   --    ALT  --   --   --  24  --   --     < > = values in this interval not displayed. No results for input(s): PH, PCO2, PO2, HCO3, PHI, PCO2I, PO2I, HCO3I in the last 72 hours.   Recent Labs 11/24/20  1202   LAC 0.9         Assessment:  (Medical Decision Making)     Hospital Problems  Date Reviewed: 9/17/2020          Codes Class Noted POA    Weight loss, unintentional  - over 40 lbs in 3 months ICD-10-CM: R63.4  ICD-9-CM: 783.21  11/25/2020 Yes        * (Principal) Intractable back pain ICD-10-CM: M54.9  ICD-9-CM: 724.5  11/25/2020         Seizure disorder ([facility] ICD-10-CM: G40.909  ICD-9-CM: 345.90  3/9/2020 Yes    Overview Signed 3/9/2020 11:24 AM by Scotty Carter MD     1990  eeg abnormal  CT brain neg  Was on phenobarbital  Off med since 2010  latelyt subtle symptoms of  Atypical seizure             BMI 35.0-35.9,adult ICD-10-CM: Z68.35  ICD-9-CM: V85.35  1/24/2020 Yes        Hypertriglyceridemia ICD-10-CM: E78.1  ICD-9-CM: 272.1  3/19/2018 Yes    Overview Addendum 5/22/2019  7:41 PM by Scotty Carter MD     Overview:   Overview:   Much better now on lofibra--doing well  Advised more stricter low carb diet and aggressive control of DM--pt willing  F/u in few months for recheck  5/2019  Statin/lofibra not helping--will add fish oil and niacin  F/u in few weeks for recheck             CAD (coronary artery disease) ICD-10-CM: I25.10  ICD-9-CM: 414.00  2/15/2018 Yes    Overview Signed 3/2/2018  1:36 PM by Scotty Carter MD     Stents  Sees cardiologist  aspirin             DKA (diabetic ketoacidoses) ([facility] ICD-10-CM: E11.10  ICD-9-CM: 250.12  2/15/2018 Yes        Diabetes mellitus type II, uncontrolled ([facility] (Chronic) ICD-10-CM: E11.65  ICD-9-CM: 250.02  12/15/2016 Yes    Overview Addendum 6/19/2018 11:35 AM by Scotty Carter MD     Now on lantus and humalog prn  goor control BS under 150   Sliding scale given-multiples of 4  Pt on metformit 500 mg bid 3/2019 and  lantus  25 units  Diet discussed  Labs today  F/u every 3 months                   Plan:  (Medical Decision Making)     --On RA  --Blood cultures, AFB, Fungitell, mycoplasma, C diff pending, Urine culture/Legionella/COVID/strep pneumoniae negative  --On azithromycin, ceftriaxone 11/26/2020  --Quantiferon gold pending  --Continue negative pressure room  --GI/DVT prophylaxis  --Full code      More than 50% of the time documented was spent in face-to-face contact with the patient and in the care of the patient on the floor/unit where the patient is located.     Ant De La Cruz, NP

## 2020-11-27 NOTE — PROGRESS NOTES
Hourly rounding completed on this shift. pt c/o pain twice on this shift. Pt given dilaudid per MAR. Pt also c/o nausea once. Pt given zofran per MAR. All needs met. Pt is currently resting in bed. Will continue to monitor and give report to oncoming nurse.

## 2020-11-27 NOTE — PROGRESS NOTES
Name: Cheryln Siemens MRN: [id number]  : 1970  Age:50 y.o.  male  Admit Date:  2020 LOS: 2      Hospitalist Progress Note     Reason for Admission:  Intractable pain [R52]    Hospital Course:  Please refer to the admission H&P for details of presentation. In summary, Cheryln Siemens is a 52 y.o. male with medical history significant for CAD s/p multiple stents, GERD, hypertriglyceridemia, history of psoriatic arthritis, diabetes, seizure disorder, hypertension who presented to the ED on  with intractable back pain that radiates to the side of his chest w/ mild dyspnea, cough and chills, unintentional weight loss of 40 pounds since August.  Work-up in the ED revealed hypotension to 176/104, tachycardia with heart rate of 125, WBC 15.6 hemoglobin 18.0, anion gap of 22, glucose of 336, triglycerides of 2712. CT chest with multiple ill-defined nodules in the left lung. It was initially admitted to the ICU for DKA with insulin drip. DKA has resolved. Patient was then transferred to [facility] for apheresis due to hypertriglyceridemia. Patient underwent IR guided right IJ placement on  and had 2 sessions of apheresis for hypertriglyceridemia. Subjective/24 hr Events (20) : Patient is seen and examined at bedside. No acute events reported overnight by nursing staff. Reports loose bowel with 1  episode of watery diarrhea. Patient reports no abdominal discomfort or nausea vomiting. Continues to have lower back pain as well as upper back pain between his shoulders. He was able to produce sputum for sample to send for AFB ruled out. Patient denies fever, chills, chest pains, shortness of breath, n/v, abdominal pain. ROS: 10 point review of systems is otherwise negative with the exception of the elements mentioned above.     Objective:    Patient Vitals for the past 24 hrs:   Temp Pulse Resp BP SpO2   20 1117 97.8 °F (36.6 °C) 94 18 123/85 96 %   20 0812 98 °F (36.7 °C) 92 18 130/88 96 %   11/27/20 0504 97.8 °F (36.6 °C) 95 18 137/75 97 %   11/26/20 2250  86 18 119/75    11/26/20 2218  84 18 114/69 96 %   11/26/20 2038 98.4 °F (36.9 °C) 94 18 (!) 117/91 96 %   11/26/20 1645 97.6 °F (36.4 °C) (!) 102 18 103/70 93 %     Oxygen Therapy  O2 Sat (%): 96 % (11/27/20 1117)  O2 Device: Room air (11/27/20 0504)  O2 Flow Rate (L/min): 3 l/min (11/25/20 2321)    Estimated body mass index is 37.41 kg/m² as calculated from the following:    Height as of 11/26/20: 5' 10\" (1.778 m). Weight as of this encounter: 118.3 kg (260 lb 11.2 oz). No intake or output data in the 24 hours ending 11/27/20 1433    *Note that automatically entered I/Os may not be accurate; dependent on patient compliance with collection and accurate  by techs. Physical Exam:   General:     alert, awake, no acute distress. Well nourished. Obese  Head:   normocephalic, atraumatic  Eyes, Ears, nose: PERRL, EOMI. Normal conjunctiva  Neck:    supple, non-tender. Trachea midline. Lungs:   CTAB, no wheezing, rhonchi, rales  Cardiac:   RRR, Normal S1 and S2. Abdomen:   Soft, non distended, nontender, +BS  Extremities:   Warm, dry. No edema, Non TTP on the T/L/S spine. Skin:   No rashes, no jaundice  Neuro:  AAOx3.  No gross focal neurological deficit  Psychiatric:  No anxiety, calm, cooperative    Data Review:  I have reviewed all labs, meds, and studies from the last 24 hours:      Labs:    Recent Results (from the past 24 hour(s))   GLUCOSE, POC    Collection Time: 11/26/20  4:40 PM   Result Value Ref Range    Glucose (POC) 263 (H) 65 - 100 mg/dL   GLUCOSE, POC    Collection Time: 11/26/20  8:34 PM   Result Value Ref Range    Glucose (POC) 203 (H) 65 - 100 mg/dL   CBC WITH AUTOMATED DIFF    Collection Time: 11/27/20  5:06 AM   Result Value Ref Range    WBC 6.9 4.3 - 11.1 K/uL    RBC 4.00 (L) 4.23 - 5.6 M/uL    HGB 12.7 (L) 13.6 - 17.2 g/dL    HCT 38.2 (L) 41.1 - 50.3 %    MCV 95.5 79.6 - 97.8 FL    MCH 31.8 26.1 - 32.9 PG    MCHC 33.2 31.4 - 35.0 g/dL    RDW 13.6 11.9 - 14.6 %    PLATELET 354 939 - 759 K/uL    MPV 11.4 9.4 - 12.3 FL    ABSOLUTE NRBC 0.00 0.0 - 0.2 K/uL    DF AUTOMATED      NEUTROPHILS 51 43 - 78 %    LYMPHOCYTES 31 13 - 44 %    MONOCYTES 11 4.0 - 12.0 %    EOSINOPHILS 7 0.5 - 7.8 %    BASOPHILS 1 0.0 - 2.0 %    IMMATURE GRANULOCYTES 0 0.0 - 5.0 %    ABS. NEUTROPHILS 3.5 1.7 - 8.2 K/UL    ABS. LYMPHOCYTES 2.1 0.5 - 4.6 K/UL    ABS. MONOCYTES 0.7 0.1 - 1.3 K/UL    ABS. EOSINOPHILS 0.5 0.0 - 0.8 K/UL    ABS. BASOPHILS 0.0 0.0 - 0.2 K/UL    ABS. IMM.  GRANS. 0.0 0.0 - 0.5 K/UL   METABOLIC PANEL, BASIC    Collection Time: 11/27/20  5:06 AM   Result Value Ref Range    Sodium 142 136 - 145 mmol/L    Potassium 3.5 3.5 - 5.1 mmol/L    Chloride 109 (H) 98 - 107 mmol/L    CO2 26 21 - 32 mmol/L    Anion gap 7 7 - 16 mmol/L    Glucose 161 (H) 65 - 100 mg/dL    BUN 6 6 - 23 MG/DL    Creatinine 0.52 (L) 0.8 - 1.5 MG/DL    GFR est AA >60 >60 ml/min/1.73m2    GFR est non-AA >60 >60 ml/min/1.73m2    Calcium 8.3 8.3 - 10.4 MG/DL   TRIGLYCERIDE    Collection Time: 11/27/20  5:06 AM   Result Value Ref Range    Triglyceride 278 (H) 35 - 150 MG/DL         All Micro Results     Procedure Component Value Units Date/Time    QUANTIFERON-TB GOLD PLUS [[id number]] Collected:  11/27/20 1320    Order Status:  Completed Updated:  11/27/20 1325    AFB CULTURE + SMEAR W/RFLX ID FROM CULTURE [[id number]]     Order Status:  Sent     QUANTIFERON-TB GOLD PLUS [[id number]]     Order Status:  Sent Specimen:  Blood     CLOSTRIDIUM DIFF TOXIN A & B [[id number]]     Order Status:  Sent Specimen:  Stool     AFB CULTURE + SMEAR W/RFLX ID FROM CULTURE [[id number]] Collected:  11/27/20 1013    Order Status:  Completed Updated:  11/27/20 1013    AFB CULTURE + SMEAR W/RFLX ID FROM CULTURE [[id number]] Collected:  11/27/20 0800    Order Status:  Canceled     AFB CULTURE + SMEAR W/RFLX ID FROM CULTURE [[id number]] Collected:  11/26/20 1700    Order Status:  Completed Updated:  11/26/20 1715    AFB CULTURE + SMEAR W/RFLX ID FROM CULTURE [[id number]] Collected:  11/26/20 1652    Order Status:  Canceled     AFB CULTURE + SMEAR W/RFLX ID FROM CULTURE [[id number]]     Order Status:  Canceled     AFB CULTURE + SMEAR W/RFLX ID FROM CULTURE [[id number]] Collected:  11/26/20 0418    Order Status:  Canceled     AFB CULTURE + SMEAR W/RFLX ID FROM CULTURE [[id number]] Collected:  11/25/20 2100    Order Status:  Canceled           Current Meds:  Current Facility-Administered Medications   Medication Dose Route Frequency    Saccharomyces boulardii (FLORASTOR) capsule 250 mg  250 mg Oral BID    lidocaine 4 % patch 1 Patch  1 Patch TransDERmal Q24H    sodium chloride 3% hypertonic nebulizer soln  4 mL Nebulization Q12H PRN    sodium chloride (NS) flush 5-40 mL  5-40 mL IntraVENous Q8H    sodium chloride (NS) flush 5-40 mL  5-40 mL IntraVENous PRN    acetaminophen (TYLENOL) tablet 650 mg  650 mg Oral Q6H PRN    Or    acetaminophen (TYLENOL) suppository 650 mg  650 mg Rectal Q6H PRN    polyethylene glycol (MIRALAX) packet 17 g  17 g Oral DAILY PRN    promethazine (PHENERGAN) tablet 12.5 mg  12.5 mg Oral Q6H PRN    Or    ondansetron (ZOFRAN) injection 4 mg  4 mg IntraVENous Q6H PRN    enoxaparin (LOVENOX) injection 40 mg  40 mg SubCUTAneous DAILY    aspirin delayed-release tablet 81 mg  81 mg Oral DAILY    atorvastatin (LIPITOR) tablet 80 mg  80 mg Oral DAILY    azithromycin (ZITHROMAX) 500 mg in 0.9% sodium chloride 250 mL (VIAL-MATE)  500 mg IntraVENous Q24H    cefTRIAXone (ROCEPHIN) 1 g in 0.9% sodium chloride (MBP/ADV) 50 mL MBP  1 g IntraVENous Q24H    insulin lispro (HUMALOG) injection   SubCUTAneous AC&HS    insulin lispro (HUMALOG) injection   SubCUTAneous Q24H    calcium carbonate (OS-SARAI) tablet 500 mg [elemental]  500 mg Oral TID WITH MEALS    buPROPion (WELLBUTRIN) tablet 100 mg  100 mg Oral BID    carvediloL (COREG) tablet 6.25 mg  6.25 mg Oral BID WITH MEALS    fenofibrate (LOFIBRA) tablet 160 mg  160 mg Oral DAILY    insulin glargine (LANTUS) injection 22 Units  22 Units SubCUTAneous Q12H    isosorbide mononitrate ER (IMDUR) tablet 30 mg  30 mg Oral DAILY    prasugreL (EFFIENT) tablet 10 mg  10 mg Oral DAILY    HYDROmorphone (PF) (DILAUDID) injection 0.5 mg  0.5 mg IntraVENous Q4H PRN    HYDROmorphone (PF) (DILAUDID) injection 0.2 mg  0.2 mg IntraVENous Q4H PRN    labetaloL (NORMODYNE;TRANDATE) injection 20 mg  20 mg IntraVENous Q1H PRN    labetaloL (NORMODYNE;TRANDATE) injection 20 mg  20 mg IntraVENous Q1H PRN         Other Studies:  Mri Lumb Spine Wo Cont    Result Date: 11/26/2020  Exam: MRI lumbar spine without contrast. Indication: Back pain with foot drop. Comparison: None. Contrast: None Technique: Multiplanar multisequence imaging of the lumbar spine was performed without contrast. FINDINGS: There are 5 nonrib-bearing lumbar-type vertebral bodies. Minimal grade 1 anterolisthesis of L3 on L4. Vertebral body heights are preserved. Marrow signal is without acute or aggressive abnormality. The conus medullaris terminates at T12-L1. Cauda equina nerve roots are unremarkable. Prevertebral and paraspinal soft tissues are within normal limits. Multilevel disc desiccation throughout the lumbar spine with scattered Schmorl's nodes. T11-T12: Central disc osteophyte protrusion results in likely moderate spinal canal stenosis, only evaluated on the sagittal sequences. No neuroforaminal stenosis. There is focal cord signal abnormality and volume loss at this level compatible with chronic myelomalacia. T12-L1: No spinal canal or neuroforaminal stenosis. L1-L2: Broad-based central, right paracentral, and right foraminal disc protrusion resulting in mild spinal canal stenosis with effacement of the right lateral recess and mild right neuroforaminal stenosis. The left neural foramen is patent. L2-L3: No spinal canal or neuroforaminal stenosis.  L3-L4: Diffuse disc bulge with superimposed central disc extrusion. Bilateral facet arthropathy. No spinal canal stenosis. Mild to moderate bilateral neuroforaminal stenoses. L4-L5: Mild bilateral facet arthropathy. No spinal canal or neuroforaminal stenosis. L5-S1: Bilateral facet arthropathy. No spinal canal or neuroforaminal stenosis. IMPRESSION: 1. Multilevel degenerative disc disease and facet arthropathy. 2. Central disc osteophyte protrusion at T11-T12 results in moderate spinal canal stenosis associated with chronic cord myelomalacia. 3. Broad disc protrusion at L1-L2 results in mild spinal canal stenosis, right lateral recess effacement, and mild right neuroforaminal stenosis. 4. Diffuse disc bulge and facet arthropathy at L3-L4 results in mild to moderate bilateral neuroforaminal stenoses. Ct Chest Abd Pelv W Cont    Result Date: 11/24/2020  CT of the Chest, Abdomen, and Pelvis INDICATION: Increasing back and chest pain, unexpected weight loss Multiple axial images were obtained through the chest, abdomen, and pelvis. Oral contrast was used for bowel opacification. 100mL of Isovue 370 intravenous contrast was used for better evaluation of solid organs and vascular structures. Radiation dose reduction techniques were used for this study. All CT scans performed at this facility use one or all of the following: Automated exposure control, adjustment of the mA and/or kVp according to patient's size, iterative reconstruction. COMPARISON: Abdomen CT dated 11/27/2018 FINDINGS: -LUNGS: Multiple small ill-defined nodular areas in the left lung, upper lobe predominant. Largest lesion is 8 mm. The right lung is clear. No associated effusion. -MEDIASTINUM/AXILLA: No significant adenopathy. -HEART/VESSELS: There is moderate vascular calcification. Heart size is normal.  There is normal enhancement of the thoracic aorta and pulmonary arteries.  -CHEST WALL: Normal. -LIVER: There is hepatomegaly and diffuse fatty infiltration of liver. No discrete liver mass. -GALLBLADDER/BILE DUCTS: Postcholecystectomy. -PANCREAS: Normal. -SPLEEN: Normal. -ADRENALS:  There is a stable 3.4 cm fat attenuation left adrenal mass, likely a myelolipoma. Right adrenal gland is unremarkable. -KIDNEYS/URETERS: No hydronephrosis or significant mass. -BLADDER: Normal. -REPRODUCTIVE ORGANS: No pelvic masses. -BOWEL: Normal caliber. No inflammatory changes. -LYMPH NODES: No significant retroperitoneal, mesenteric, or pelvic adenopathy. -BONES: Postoperative changes in the left hip. No acute fracture. -VASCULATURE: Normal -OTHER: No ascites. IMPRESSION: 1.  Multiple ill-defined nodules in the left lung, most likely atypical infection such as fungal or tuberculosis. 2.  Hepatomegaly and steatosis. 3.  No evidence of pulmonary embolus or aortic dissection. 4.  Extensive coronary artery calcification. If there are any questions about this report, I can be reached on [employer] or at [address]    Result Date: 11/24/2020  RIGHT UPPER QUADRANT ULTRASOUND 11/24/2020 HISTORY: Moderate right upper quadrant pain for one month. TECHNIQUE: Sonographic imaging of the right upper quadrant was performed. COMPARISON: CT abdomen and pelvis 11/27/2018 FINDINGS: Changes of a cholecystectomy are present. The common bile duct is 5 mm in diameter. There is no intrahepatic biliary ductal dilatation. The liver parenchyma is diffusely echogenic relative to the renal cortex. This finding may be present with steatosis. The right kidney is 13.1 cm in length. There is no hydronephrosis. The distal abdominal aorta is 1.9 cm in diameter. The IVC is patent. IMPRESSION: Hepatic steatosis status post cholecystectomy. Xr Chest Port    Result Date: 11/24/2020  History: Chest pain, shortness of breath Exam: portable chest Comparison: 3/25/2020 Findings: There is apparent new asymmetric opacity seen at the left lung base adjacent heart shadow.  Otherwise, the lungs are clear. Mediastinal contour and osseous structures are normal. Impressions: Apparent new asymmetric lung opacity at the left lung base. Correlation with PA and lateral chest x-ray recommended for further evaluation, as the finding could be artifactual.       Assessment:    Active Hospital Problems    Diagnosis Date Noted    Intractable back pain 11/25/2020    Weight loss, unintentional  - over 40 lbs in 3 months 11/25/2020    Seizure disorder ([facility] [address] 03/09/2020 1990  eeg abnormal  CT brain neg  Was on phenobarbital  Off med since 2010  latelyt subtle symptoms of  Atypical seizure      BMI 35.0-35.9,adult 01/24/2020    Hypertriglyceridemia 03/19/2018     Overview:   Overview:   Much better now on lofibra--doing well  Advised more stricter low carb diet and aggressive control of DM--pt willing  F/u in few months for recheck  5/2019  Statin/lofibra not helping--will add fish oil and niacin  F/u in few weeks for recheck      CAD (coronary artery disease) 02/15/2018     Stents  Sees cardiologist  aspirin      DKA (diabetic ketoacidoses) ([facility] [address] 02/15/2018    Diabetes mellitus type II, uncontrolled ([facility] [address] 12/15/2016     Now on lantus and humalog prn  goor control BS under 150   Sliding scale given-multiples of 4  Pt on metformit 500 mg bid 3/2019 and  lantus  25 units  Diet discussed  Labs today  F/u every 3 months         Plan:    Cough, with subjective fever , resolved  CT chest w/ Multiple ill-defined nodules in the left lung, most likely atypical  Rapid COVID negative. Radiology read as infection such as fungal or tuberculosis. Has associated weight loss  - AFB x 3   - pulm following  - Check atypical panel (Legionella, mycoplasma, chlamydia) , quantiferon   - Continue Rocephin and azithromycin  - airborn isolation until TB r/o; avoid fluoroquinolones until rule out TB    Back pain radiating to the chest/abdomen, associated with nausea  Unclear etiology.  Has multiple episodes of acute pancreatitis due to triglyceridemia in the past with similar back pain and abdominal pain but this episode is without abdominal pain. MRI with DJD and mild stenosis. - c/w pain control, antiemetics as ordered  - eval by neurosurgery: non-surgical issue. - pain control     Diarrhea  Possible side effects from antibiotics but rule out C.diff  - lactobacillus BID  - follow up C.diff    Hypertriglyceridemia  Takes fenobrate and fish oil (1000mg bid) at home, continue fenofibrate 160mg as we dont carry fish oil here. -Hematology followinsession of apheresis on      DKA, resolved  Uncontrolled DM (A1c 10.8)  Likely secondary to not taking any insulin for the last week and a half, +/-infection. At home, he takes Basaglar 50 units BID and Admelog 25 units before meals with a sliding scale  but has not been able to take it due to insurance issues. - continue with lantus 22u bid and SSI QID ACHS +2AM sliding scale  - Diabetic educator.     Unintentional weight loss of 40lbs since August  Possibly secondary to uncontrolled diabetes versus TB versus occult malignancy  Needs outpatient follow-up with oncology for further evaluation     Chest Pain, resolved  CAD s/p multiple stent procedures // HTN  Troponin/EKGs not c/w ACS . CTA is neg for PE  - follow up Echo  - Continue PTA ASA 81mg every day , effient 10mg every day , coreg 3.25mg bid, atorvastatin 80mg qd  - Cardiology consult appreciated. Diet:  DIET GI SOFT  DVT PPx: Lovenox  Code: Full Code  Dispo: pending clinical course and PT/OT Eval  Estimated Discharge: TBD based on clinical course    Labs/Imaging Reviewed. Risk:  HIGH risk due to current condition and comorbid conditions as well as requiring frequent monitoring and high risk of decline. Plan discussed with staff, patient/family and are in agreement.       Part of this note was written by using a voice dictation software and the note has been proof read but may still contain some grammatical/other typographical errors.     Signed By: Nehal Jones MD     November 27, 2020

## 2020-11-28 PROBLEM — E87.6 HYPOKALEMIA: Status: ACTIVE | Noted: 2020-11-28

## 2020-11-28 LAB
ANION GAP SERPL CALC-SCNC: 9 MMOL/L (ref 7–16)
BASOPHILS # BLD: 0 K/UL (ref 0–0.2)
BASOPHILS NFR BLD: 1 % (ref 0–2)
BUN SERPL-MCNC: 4 MG/DL (ref 6–23)
CALCIUM SERPL-MCNC: 8.6 MG/DL (ref 8.3–10.4)
CHLORIDE SERPL-SCNC: 108 MMOL/L (ref 98–107)
CO2 SERPL-SCNC: 26 MMOL/L (ref 21–32)
CREAT SERPL-MCNC: 0.53 MG/DL (ref 0.8–1.5)
DIFFERENTIAL METHOD BLD: ABNORMAL
EOSINOPHIL # BLD: 0.5 K/UL (ref 0–0.8)
EOSINOPHIL NFR BLD: 8 % (ref 0.5–7.8)
ERYTHROCYTE [DISTWIDTH] IN BLOOD BY AUTOMATED COUNT: 13.4 % (ref 11.9–14.6)
GLUCOSE SERPL-MCNC: 183 MG/DL (ref 65–100)
HCT VFR BLD AUTO: 38.8 % (ref 41.1–50.3)
HGB BLD-MCNC: 13 G/DL (ref 13.6–17.2)
IMM GRANULOCYTES # BLD AUTO: 0 K/UL (ref 0–0.5)
IMM GRANULOCYTES NFR BLD AUTO: 0 % (ref 0–5)
LDLC SERPL DIRECT ASSAY-MCNC: 52 MG/DL
LYMPHOCYTES # BLD: 1.4 K/UL (ref 0.5–4.6)
LYMPHOCYTES NFR BLD: 23 % (ref 13–44)
MAGNESIUM SERPL-MCNC: 1.8 MG/DL (ref 1.8–2.4)
MCH RBC QN AUTO: 31.6 PG (ref 26.1–32.9)
MCHC RBC AUTO-ENTMCNC: 33.5 G/DL (ref 31.4–35)
MCV RBC AUTO: 94.4 FL (ref 79.6–97.8)
MONOCYTES # BLD: 0.8 K/UL (ref 0.1–1.3)
MONOCYTES NFR BLD: 13 % (ref 4–12)
NEUTS SEG # BLD: 3.4 K/UL (ref 1.7–8.2)
NEUTS SEG NFR BLD: 55 % (ref 43–78)
NRBC # BLD: 0 K/UL (ref 0–0.2)
PLATELET # BLD AUTO: 155 K/UL (ref 150–450)
PMV BLD AUTO: 11 FL (ref 9.4–12.3)
POTASSIUM SERPL-SCNC: 3 MMOL/L (ref 3.5–5.1)
QUANTIFERON CRITERIA, QFI1T: NORMAL
QUANTIFERON MITOGEN VALUE: 5.08 IU/ML
QUANTIFERON NIL VALUE: 0.01 IU/ML
QUANTIFERON TB1 AG: 0.03 IU/ML
QUANTIFERON TB2 AG: 0.01 IU/ML
RBC # BLD AUTO: 4.11 M/UL (ref 4.23–5.6)
SODIUM SERPL-SCNC: 143 MMOL/L (ref 138–145)
TRIGL SERPL-MCNC: 458 MG/DL (ref 35–150)
WBC # BLD AUTO: 6.1 K/UL (ref 4.3–11.1)

## 2020-11-28 PROCEDURE — 74011636637 HC RX REV CODE- 636/637: Performed by: INTERNAL MEDICINE

## 2020-11-28 PROCEDURE — 65270000029 HC RM PRIVATE

## 2020-11-28 PROCEDURE — 74011250636 HC RX REV CODE- 250/636: Performed by: INTERNAL MEDICINE

## 2020-11-28 PROCEDURE — 74011250637 HC RX REV CODE- 250/637: Performed by: INTERNAL MEDICINE

## 2020-11-28 PROCEDURE — 74011000258 HC RX REV CODE- 258: Performed by: INTERNAL MEDICINE

## 2020-11-28 PROCEDURE — 99232 SBSQ HOSP IP/OBS MODERATE 35: CPT | Performed by: INTERNAL MEDICINE

## 2020-11-28 PROCEDURE — 83721 ASSAY OF BLOOD LIPOPROTEIN: CPT

## 2020-11-28 PROCEDURE — 83735 ASSAY OF MAGNESIUM: CPT

## 2020-11-28 PROCEDURE — 74011000250 HC RX REV CODE- 250: Performed by: INTERNAL MEDICINE

## 2020-11-28 PROCEDURE — 80048 BASIC METABOLIC PNL TOTAL CA: CPT

## 2020-11-28 PROCEDURE — 87116 MYCOBACTERIA CULTURE: CPT

## 2020-11-28 PROCEDURE — 85025 COMPLETE CBC W/AUTO DIFF WBC: CPT

## 2020-11-28 PROCEDURE — 84478 ASSAY OF TRIGLYCERIDES: CPT

## 2020-11-28 RX ORDER — INSULIN GLARGINE 100 [IU]/ML
25 INJECTION, SOLUTION SUBCUTANEOUS EVERY 12 HOURS
Status: DISCONTINUED | OUTPATIENT
Start: 2020-11-28 | End: 2020-11-29

## 2020-11-28 RX ORDER — LIDOCAINE 4 G/100G
1 PATCH TOPICAL EVERY 24 HOURS
Status: DISCONTINUED | OUTPATIENT
Start: 2020-11-28 | End: 2020-12-01 | Stop reason: HOSPADM

## 2020-11-28 RX ORDER — POTASSIUM CHLORIDE 20 MEQ/1
40 TABLET, EXTENDED RELEASE ORAL 2 TIMES DAILY
Status: COMPLETED | OUTPATIENT
Start: 2020-11-28 | End: 2020-11-29

## 2020-11-28 RX ORDER — GABAPENTIN 100 MG/1
100 CAPSULE ORAL 3 TIMES DAILY
Status: DISCONTINUED | OUTPATIENT
Start: 2020-11-28 | End: 2020-11-29

## 2020-11-28 RX ADMIN — ATORVASTATIN CALCIUM 80 MG: 80 TABLET, FILM COATED ORAL at 09:22

## 2020-11-28 RX ADMIN — HYDROMORPHONE HYDROCHLORIDE 0.5 MG: 1 INJECTION, SOLUTION INTRAMUSCULAR; INTRAVENOUS; SUBCUTANEOUS at 17:17

## 2020-11-28 RX ADMIN — GABAPENTIN 100 MG: 100 CAPSULE ORAL at 21:10

## 2020-11-28 RX ADMIN — POTASSIUM CHLORIDE 40 MEQ: 20 TABLET, EXTENDED RELEASE ORAL at 12:25

## 2020-11-28 RX ADMIN — CARVEDILOL 6.25 MG: 6.25 TABLET, FILM COATED ORAL at 17:18

## 2020-11-28 RX ADMIN — FENOFIBRATE 160 MG: 160 TABLET ORAL at 09:22

## 2020-11-28 RX ADMIN — HYDROMORPHONE HYDROCHLORIDE 0.5 MG: 1 INJECTION, SOLUTION INTRAMUSCULAR; INTRAVENOUS; SUBCUTANEOUS at 21:10

## 2020-11-28 RX ADMIN — INSULIN LISPRO 8 UNITS: 100 INJECTION, SOLUTION INTRAVENOUS; SUBCUTANEOUS at 21:20

## 2020-11-28 RX ADMIN — RDII 250 MG CAPSULE 250 MG: at 17:18

## 2020-11-28 RX ADMIN — CALCIUM 500 MG: 500 TABLET ORAL at 09:22

## 2020-11-28 RX ADMIN — ISOSORBIDE MONONITRATE 30 MG: 30 TABLET, EXTENDED RELEASE ORAL at 09:23

## 2020-11-28 RX ADMIN — CEFTRIAXONE SODIUM 1 G: 1 INJECTION, POWDER, FOR SOLUTION INTRAMUSCULAR; INTRAVENOUS at 12:25

## 2020-11-28 RX ADMIN — INSULIN LISPRO 2 UNITS: 100 INJECTION, SOLUTION INTRAVENOUS; SUBCUTANEOUS at 09:21

## 2020-11-28 RX ADMIN — GABAPENTIN 100 MG: 100 CAPSULE ORAL at 12:26

## 2020-11-28 RX ADMIN — CALCIUM 500 MG: 500 TABLET ORAL at 12:26

## 2020-11-28 RX ADMIN — Medication 10 ML: at 21:10

## 2020-11-28 RX ADMIN — CARVEDILOL 6.25 MG: 6.25 TABLET, FILM COATED ORAL at 09:22

## 2020-11-28 RX ADMIN — AZITHROMYCIN 500 MG: 500 INJECTION, POWDER, LYOPHILIZED, FOR SOLUTION INTRAVENOUS at 14:42

## 2020-11-28 RX ADMIN — Medication 10 ML: at 06:00

## 2020-11-28 RX ADMIN — INSULIN LISPRO 8 UNITS: 100 INJECTION, SOLUTION INTRAVENOUS; SUBCUTANEOUS at 16:30

## 2020-11-28 RX ADMIN — INSULIN LISPRO 2 UNITS: 100 INJECTION, SOLUTION INTRAVENOUS; SUBCUTANEOUS at 11:30

## 2020-11-28 RX ADMIN — HYDROMORPHONE HYDROCHLORIDE 0.5 MG: 1 INJECTION, SOLUTION INTRAMUSCULAR; INTRAVENOUS; SUBCUTANEOUS at 09:23

## 2020-11-28 RX ADMIN — INSULIN GLARGINE 25 UNITS: 100 INJECTION, SOLUTION SUBCUTANEOUS at 21:00

## 2020-11-28 RX ADMIN — ACETAMINOPHEN 650 MG: 325 TABLET, FILM COATED ORAL at 12:27

## 2020-11-28 RX ADMIN — RDII 250 MG CAPSULE 250 MG: at 09:22

## 2020-11-28 RX ADMIN — POTASSIUM CHLORIDE 40 MEQ: 20 TABLET, EXTENDED RELEASE ORAL at 09:22

## 2020-11-28 RX ADMIN — ENOXAPARIN SODIUM 40 MG: 40 INJECTION SUBCUTANEOUS at 09:22

## 2020-11-28 RX ADMIN — HYDROMORPHONE HYDROCHLORIDE 0.5 MG: 1 INJECTION, SOLUTION INTRAMUSCULAR; INTRAVENOUS; SUBCUTANEOUS at 04:24

## 2020-11-28 RX ADMIN — Medication 10 ML: at 14:00

## 2020-11-28 RX ADMIN — INSULIN LISPRO 2 UNITS: 100 INJECTION, SOLUTION INTRAVENOUS; SUBCUTANEOUS at 03:30

## 2020-11-28 RX ADMIN — INSULIN GLARGINE 22 UNITS: 100 INJECTION, SOLUTION SUBCUTANEOUS at 09:20

## 2020-11-28 RX ADMIN — INSULIN LISPRO 2 UNITS: 100 INJECTION, SOLUTION INTRAVENOUS; SUBCUTANEOUS at 02:00

## 2020-11-28 RX ADMIN — CALCIUM 500 MG: 500 TABLET ORAL at 17:18

## 2020-11-28 RX ADMIN — LABETALOL HYDROCHLORIDE 20 MG: 5 INJECTION INTRAVENOUS at 21:10

## 2020-11-28 RX ADMIN — BUPROPION HYDROCHLORIDE 100 MG: 100 TABLET, FILM COATED ORAL at 17:18

## 2020-11-28 RX ADMIN — ASPIRIN 81 MG: 81 TABLET ORAL at 09:22

## 2020-11-28 RX ADMIN — PRASUGREL 10 MG: 10 TABLET, FILM COATED ORAL at 09:23

## 2020-11-28 RX ADMIN — BUPROPION HYDROCHLORIDE 100 MG: 100 TABLET, FILM COATED ORAL at 09:22

## 2020-11-28 NOTE — PROGRESS NOTES
Name: Sandi Kiser MRN: [id number]  : 1970  Age:50 y.o.  male  Admit Date:  2020 LOS: 3      Hospitalist Progress Note     Reason for Admission:  Intractable pain [R52]    Hospital Course:  Please refer to the admission H&P for details of presentation. In summary, Sandi Kiser is a 52 y.o. male with medical history significant for CAD s/p multiple stents, GERD, hypertriglyceridemia, history of psoriatic arthritis, diabetes, seizure disorder, hypertension who presented to the ED on  with intractable back pain that radiates to the side of his chest w/ mild dyspnea, cough and chills, unintentional weight loss of 40 pounds since August.  Work-up in the ED revealed hypotension to 176/104, tachycardia with heart rate of 125, WBC 15.6 hemoglobin 18.0, anion gap of 22, glucose of 336, triglycerides of 2712. CT chest with multiple ill-defined nodules in the left lung. It was initially admitted to the ICU for DKA with insulin drip. DKA has resolved. Patient was then transferred to [facility] for apheresis due to hypertriglyceridemia. Patient underwent IR guided right IJ placement on  and had 2 sessions of apheresis for hypertriglyceridemia. Subjective/24 hr Events (20) :  Patient is seen and examined at bedside. No acute events reported overnight by nursing staff. No longer having any BMs since yesterday. No abdominal pain, n/v.  Had shoulder pain yesterday night and early AM requiring IV pain medication. Pain is tight like \"muscle soreness/pain\". No cough, shortness of breath, chest pain. Walking around in his room without any issues. FS closer to 200s over 24hrs. ROS: 10 point review of systems is otherwise negative with the exception of the elements mentioned above.     Objective:    Patient Vitals for the past 24 hrs:   Temp Pulse Resp BP SpO2   20 0721 97.9 °F (36.6 °C) 94 18 (!) 143/113 99 %   20 0425 98.9 °F (37.2 °C) 94 18 139/79 97 %   20 0215 98.5 °F (36.9 °C) 98 18 136/78 97 %   11/27/20 2039 98.6 °F (37 °C) 96 (!) 98 130/81    11/27/20 1750 97.8 °F (36.6 °C) (!) 101 18 (!) 154/103 98 %   11/27/20 1117 97.8 °F (36.6 °C) 94 18 123/85 96 %     Oxygen Therapy  O2 Sat (%): 99 % (11/28/20 0721)  O2 Device: Room air (11/27/20 2039)  O2 Flow Rate (L/min): 3 l/min (11/25/20 2321)    Estimated body mass index is 37.33 kg/m² as calculated from the following:    Height as of 11/26/20: 5' 10\" (1.778 m). Weight as of this encounter: 118 kg (260 lb 3.2 oz). Intake/Output Summary (Last 24 hours) at 11/28/2020 1038  Last data filed at 11/27/2020 1839  Gross per 24 hour   Intake 360 ml   Output    Net 360 ml       *Note that automatically entered I/Os may not be accurate; dependent on patient compliance with collection and accurate  by techs. Physical Exam:   General:     alert, awake, no acute distress. Well nourished. Obese  Head:   normocephalic, atraumatic  Eyes, Ears, nose: PERRL, EOMI. Normal conjunctiva  Neck:    supple, non-tender. Trachea midline. Lungs:   CTAB, no wheezing, rhonchi, rales  Cardiac:   RRR, Normal S1 and S2. Abdomen:   Soft, non distended, nontender, +BS  Extremities:   Warm, dry. No edema, Non TTP on the T/L/S spine. Slight tenderness around left shoulder blade/trapezius. Skin:   No rashes, no jaundice  Neuro:  AAOx3.  No gross focal neurological deficit  Psychiatric:  No anxiety, calm, cooperative    Data Review:  I have reviewed all labs, meds, and studies from the last 24 hours:      Labs:    Recent Results (from the past 24 hour(s))   CBC WITH AUTOMATED DIFF    Collection Time: 11/28/20  5:07 AM   Result Value Ref Range    WBC 6.1 4.3 - 11.1 K/uL    RBC 4.11 (L) 4.23 - 5.6 M/uL    HGB 13.0 (L) 13.6 - 17.2 g/dL    HCT 38.8 (L) 41.1 - 50.3 %    MCV 94.4 79.6 - 97.8 FL    MCH 31.6 26.1 - 32.9 PG    MCHC 33.5 31.4 - 35.0 g/dL    RDW 13.4 11.9 - 14.6 %    PLATELET 706 165 - 277 K/uL    MPV 11.0 9.4 - 12.3 FL ABSOLUTE NRBC 0.00 0.0 - 0.2 K/uL    DF AUTOMATED      NEUTROPHILS 55 43 - 78 %    LYMPHOCYTES 23 13 - 44 %    MONOCYTES 13 (H) 4.0 - 12.0 %    EOSINOPHILS 8 (H) 0.5 - 7.8 %    BASOPHILS 1 0.0 - 2.0 %    IMMATURE GRANULOCYTES 0 0.0 - 5.0 %    ABS. NEUTROPHILS 3.4 1.7 - 8.2 K/UL    ABS. LYMPHOCYTES 1.4 0.5 - 4.6 K/UL    ABS. MONOCYTES 0.8 0.1 - 1.3 K/UL    ABS. EOSINOPHILS 0.5 0.0 - 0.8 K/UL    ABS. BASOPHILS 0.0 0.0 - 0.2 K/UL    ABS. IMM.  GRANS. 0.0 0.0 - 0.5 K/UL   METABOLIC PANEL, BASIC    Collection Time: 11/28/20  5:07 AM   Result Value Ref Range    Sodium 143 138 - 145 mmol/L    Potassium 3.0 (L) 3.5 - 5.1 mmol/L    Chloride 108 (H) 98 - 107 mmol/L    CO2 26 21 - 32 mmol/L    Anion gap 9 7 - 16 mmol/L    Glucose 183 (H) 65 - 100 mg/dL    BUN 4 (L) 6 - 23 MG/DL    Creatinine 0.53 (L) 0.8 - 1.5 MG/DL    GFR est AA >60 >60 ml/min/1.73m2    GFR est non-AA >60 >60 ml/min/1.73m2    Calcium 8.6 8.3 - 10.4 MG/DL   TRIGLYCERIDE    Collection Time: 11/28/20  5:07 AM   Result Value Ref Range    Triglyceride 458 (H) 35 - 150 MG/DL   LDL, DIRECT    Collection Time: 11/28/20  5:07 AM   Result Value Ref Range    LDL,Direct 52 <100 mg/dl   MAGNESIUM    Collection Time: 11/28/20  5:07 AM   Result Value Ref Range    Magnesium 1.8 1.8 - 2.4 mg/dL         All Micro Results     Procedure Component Value Units Date/Time    AFB CULTURE + SMEAR W/RFLX ID FROM CULTURE [[id number]]     Order Status:  Vinnie COHEN PLUS [[id number]] Collected:  11/27/20 1320    Order Status:  Completed Updated:  11/27/20 1325    AFB CULTURE + SMEAR W/RFLX ID FROM CULTURE [[id number]]     Order Status:  Sent     QUANTIFERON-TB GOLD PLUS [[id number]]     Order Status:  Canceled Specimen:  Blood     CLOSTRIDIUM DIFF TOXIN A & B [[id number]]     Order Status:  Sent Specimen:  Stool     AFB CULTURE + SMEAR W/RFLX ID FROM CULTURE [[id number]] Collected:  11/27/20 1013    Order Status:  Completed Updated:  11/27/20 1013    AFB CULTURE + SMEAR W/RFLX ID FROM CULTURE [[id number]] Collected:  11/27/20 0800    Order Status:  Canceled     AFB CULTURE + SMEAR W/RFLX ID FROM CULTURE [[id number]] Collected:  11/26/20 1700    Order Status:  Completed Updated:  11/26/20 1715    AFB CULTURE + SMEAR W/RFLX ID FROM CULTURE [[id number]] Collected:  11/26/20 1652    Order Status:  Canceled     AFB CULTURE + SMEAR W/RFLX ID FROM CULTURE [[id number]]     Order Status:  Canceled     AFB CULTURE + SMEAR W/RFLX ID FROM CULTURE [[id number]] Collected:  11/26/20 0418    Order Status:  Canceled     AFB CULTURE + SMEAR W/RFLX ID FROM CULTURE [[id number]] Collected:  11/25/20 2100    Order Status:  Canceled           Current Meds:  Current Facility-Administered Medications   Medication Dose Route Frequency    potassium chloride (K-DUR, KLOR-CON) SR tablet 40 mEq  40 mEq Oral BID    gabapentin (NEURONTIN) capsule 100 mg  100 mg Oral TID    lidocaine 4 % patch 1 Patch  1 Patch TransDERmal Q24H    Saccharomyces boulardii (FLORASTOR) capsule 250 mg  250 mg Oral BID    lidocaine 4 % patch 1 Patch  1 Patch TransDERmal Q24H    sodium chloride 3% hypertonic nebulizer soln  4 mL Nebulization Q12H PRN    sodium chloride (NS) flush 5-40 mL  5-40 mL IntraVENous Q8H    sodium chloride (NS) flush 5-40 mL  5-40 mL IntraVENous PRN    acetaminophen (TYLENOL) tablet 650 mg  650 mg Oral Q6H PRN    Or    acetaminophen (TYLENOL) suppository 650 mg  650 mg Rectal Q6H PRN    polyethylene glycol (MIRALAX) packet 17 g  17 g Oral DAILY PRN    promethazine (PHENERGAN) tablet 12.5 mg  12.5 mg Oral Q6H PRN    Or    ondansetron (ZOFRAN) injection 4 mg  4 mg IntraVENous Q6H PRN    enoxaparin (LOVENOX) injection 40 mg  40 mg SubCUTAneous DAILY    aspirin delayed-release tablet 81 mg  81 mg Oral DAILY    atorvastatin (LIPITOR) tablet 80 mg  80 mg Oral DAILY    azithromycin (ZITHROMAX) 500 mg in 0.9% sodium chloride 250 mL (VIAL-MATE)  500 mg IntraVENous Q24H    cefTRIAXone (ROCEPHIN) 1 g in 0.9% sodium chloride (MBP/ADV) 50 mL MBP  1 g IntraVENous Q24H    insulin lispro (HUMALOG) injection   SubCUTAneous AC&HS    insulin lispro (HUMALOG) injection   SubCUTAneous Q24H    calcium carbonate (OS-SARAI) tablet 500 mg [elemental]  500 mg Oral TID WITH MEALS    buPROPion (WELLBUTRIN) tablet 100 mg  100 mg Oral BID    carvediloL (COREG) tablet 6.25 mg  6.25 mg Oral BID WITH MEALS    fenofibrate (LOFIBRA) tablet 160 mg  160 mg Oral DAILY    insulin glargine (LANTUS) injection 22 Units  22 Units SubCUTAneous Q12H    isosorbide mononitrate ER (IMDUR) tablet 30 mg  30 mg Oral DAILY    prasugreL (EFFIENT) tablet 10 mg  10 mg Oral DAILY    HYDROmorphone (PF) (DILAUDID) injection 0.5 mg  0.5 mg IntraVENous Q4H PRN    HYDROmorphone (PF) (DILAUDID) injection 0.2 mg  0.2 mg IntraVENous Q4H PRN    labetaloL (NORMODYNE;TRANDATE) injection 20 mg  20 mg IntraVENous Q1H PRN    labetaloL (NORMODYNE;TRANDATE) injection 20 mg  20 mg IntraVENous Q1H PRN         Other Studies:  Mri Lumb Spine Wo Cont    Result Date: 11/26/2020  Exam: MRI lumbar spine without contrast. Indication: Back pain with foot drop. Comparison: None. Contrast: None Technique: Multiplanar multisequence imaging of the lumbar spine was performed without contrast. FINDINGS: There are 5 nonrib-bearing lumbar-type vertebral bodies. Minimal grade 1 anterolisthesis of L3 on L4. Vertebral body heights are preserved. Marrow signal is without acute or aggressive abnormality. The conus medullaris terminates at T12-L1. Cauda equina nerve roots are unremarkable. Prevertebral and paraspinal soft tissues are within normal limits. Multilevel disc desiccation throughout the lumbar spine with scattered Schmorl's nodes. T11-T12: Central disc osteophyte protrusion results in likely moderate spinal canal stenosis, only evaluated on the sagittal sequences. No neuroforaminal stenosis.  There is focal cord signal abnormality and volume loss at this level compatible with chronic myelomalacia. T12-L1: No spinal canal or neuroforaminal stenosis. L1-L2: Broad-based central, right paracentral, and right foraminal disc protrusion resulting in mild spinal canal stenosis with effacement of the right lateral recess and mild right neuroforaminal stenosis. The left neural foramen is patent. L2-L3: No spinal canal or neuroforaminal stenosis. L3-L4: Diffuse disc bulge with superimposed central disc extrusion. Bilateral facet arthropathy. No spinal canal stenosis. Mild to moderate bilateral neuroforaminal stenoses. L4-L5: Mild bilateral facet arthropathy. No spinal canal or neuroforaminal stenosis. L5-S1: Bilateral facet arthropathy. No spinal canal or neuroforaminal stenosis. IMPRESSION: 1. Multilevel degenerative disc disease and facet arthropathy. 2. Central disc osteophyte protrusion at T11-T12 results in moderate spinal canal stenosis associated with chronic cord myelomalacia. 3. Broad disc protrusion at L1-L2 results in mild spinal canal stenosis, right lateral recess effacement, and mild right neuroforaminal stenosis. 4. Diffuse disc bulge and facet arthropathy at L3-L4 results in mild to moderate bilateral neuroforaminal stenoses. Ct Chest Abd Pelv W Cont    Result Date: 11/24/2020  CT of the Chest, Abdomen, and Pelvis INDICATION: Increasing back and chest pain, unexpected weight loss Multiple axial images were obtained through the chest, abdomen, and pelvis. Oral contrast was used for bowel opacification. 100mL of Isovue 370 intravenous contrast was used for better evaluation of solid organs and vascular structures. Radiation dose reduction techniques were used for this study. All CT scans performed at this facility use one or all of the following: Automated exposure control, adjustment of the mA and/or kVp according to patient's size, iterative reconstruction.  COMPARISON: Abdomen CT dated 11/27/2018 FINDINGS: -LUNGS: Multiple small ill-defined nodular areas in the left lung, upper lobe predominant. Largest lesion is 8 mm. The right lung is clear. No associated effusion. -MEDIASTINUM/AXILLA: No significant adenopathy. -HEART/VESSELS: There is moderate vascular calcification. Heart size is normal.  There is normal enhancement of the thoracic aorta and pulmonary arteries. -CHEST WALL: Normal. -LIVER: There is hepatomegaly and diffuse fatty infiltration of liver. No discrete liver mass. -GALLBLADDER/BILE DUCTS: Postcholecystectomy. -PANCREAS: Normal. -SPLEEN: Normal. -ADRENALS:  There is a stable 3.4 cm fat attenuation left adrenal mass, likely a myelolipoma. Right adrenal gland is unremarkable. -KIDNEYS/URETERS: No hydronephrosis or significant mass. -BLADDER: Normal. -REPRODUCTIVE ORGANS: No pelvic masses. -BOWEL: Normal caliber. No inflammatory changes. -LYMPH NODES: No significant retroperitoneal, mesenteric, or pelvic adenopathy. -BONES: Postoperative changes in the left hip. No acute fracture. -VASCULATURE: Normal -OTHER: No ascites. IMPRESSION: 1.  Multiple ill-defined nodules in the left lung, most likely atypical infection such as fungal or tuberculosis. 2.  Hepatomegaly and steatosis. 3.  No evidence of pulmonary embolus or aortic dissection. 4.  Extensive coronary artery calcification. If there are any questions about this report, I can be reached on [employer] or at [phone]    Result Date: 11/24/2020  RIGHT UPPER QUADRANT ULTRASOUND 11/24/2020 HISTORY: Moderate right upper quadrant pain for one month. TECHNIQUE: Sonographic imaging of the right upper quadrant was performed. COMPARISON: CT abdomen and pelvis 11/27/2018 FINDINGS: Changes of a cholecystectomy are present. The common bile duct is 5 mm in diameter. There is no intrahepatic biliary ductal dilatation. The liver parenchyma is diffusely echogenic relative to the renal cortex. This finding may be present with steatosis. The right kidney is 13.1 cm in length. There is no hydronephrosis.  The distal abdominal aorta is 1.9 cm in diameter. The IVC is patent. IMPRESSION: Hepatic steatosis status post cholecystectomy. Xr Chest Port    Result Date: 11/24/2020  History: Chest pain, shortness of breath Exam: portable chest Comparison: 3/25/2020 Findings: There is apparent new asymmetric opacity seen at the left lung base adjacent heart shadow. Otherwise, the lungs are clear. Mediastinal contour and osseous structures are normal. Impressions: Apparent new asymmetric lung opacity at the left lung base. Correlation with PA and lateral chest x-ray recommended for further evaluation, as the finding could be artifactual.       Assessment:    Active Hospital Problems    Diagnosis Date Noted    Intractable back pain 11/25/2020    Weight loss, unintentional  - over 40 lbs in 3 months 11/25/2020    Seizure disorder ([facility] [address] 03/09/2020 1990  eeg abnormal  CT brain neg  Was on phenobarbital  Off med since 2010  latelyt subtle symptoms of  Atypical seizure      BMI 35.0-35.9,adult 01/24/2020    Hypertriglyceridemia 03/19/2018     Overview:   Overview:   Much better now on lofibra--doing well  Advised more stricter low carb diet and aggressive control of DM--pt willing  F/u in few months for recheck  5/2019  Statin/lofibra not helping--will add fish oil and niacin  F/u in few weeks for recheck      CAD (coronary artery disease) 02/15/2018     Stents  Sees cardiologist  aspirin      DKA (diabetic ketoacidoses) ([facility] [address] 02/15/2018    Diabetes mellitus type II, uncontrolled ([facility] [address] 12/15/2016     Now on lantus and humalog prn  goor control BS under 150   Sliding scale given-multiples of 4  Pt on metformit 500 mg bid 3/2019 and  lantus  25 units  Diet discussed  Labs today  F/u every 3 months         Plan:    Cough, with subjective fever , resolved  CT chest w/ Multiple ill-defined nodules in the left lung, most likely atypical  Rapid COVID negative. Legionella and s.penumo is neg.   Radiology read as infection such as fungal or tuberculosis. Has associated weight loss  - AFB x 3  ( 2 in lab)  - pulm following: possible Bronch tomorrow/?   - Check atypical panel (mycoplasma, chlamydia) , quantiferon   - Continue Rocephin and azithromycin  - airborn isolation until TB r/o; avoid fluoroquinolones until rule out TB    Back pain radiating to the chest/abdomen, associated with nausea  Unclear etiology. Has multiple episodes of acute pancreatitis due to triglyceridemia in the past with similar back pain and abdominal pain but this episode is without abdominal pain. MRI with DJD and mild stenosis. - c/w pain control, antiemetics as ordered  - eval by neurosurgery: non-surgical issue. - pain control     Diarrhea , resolved  Possible side effects from antibiotics  - lactobacillus BID    Hypertriglyceridemia  Takes fenobrate and fish oil (1000mg bid) at home, continue fenofibrate 160mg as we dont carry fish oil here. Down to ~200s but back up to 500s today.  -Hematology followinsession of apheresis on   -monitor      DKA, resolved  Uncontrolled DM (A1c 10.8)  Likely secondary to not taking any insulin for the last week and a half, +/-infection. At home, he takes Basaglar 50 units BID and Admelog 25 units before meals with a sliding scale but has not been able to take it due to insurance issues. - continue with lantus 25U bid and SSI QID ACHS   - Diabetic educator.     Unintentional weight loss of 40lbs since August  Possibly secondary to uncontrolled diabetes versus TB versus occult malignancy  Needs outpatient follow-up with oncology for further evaluation     Chest Pain, resolved  CAD s/p multiple stent procedures // HTN  Troponin/EKGs not c/w ACS . CTA is neg for PE  - follow up Echo  - Continue PTA ASA 81mg every day , effient 10mg every day , coreg 3.25mg bid, atorvastatin 80mg qd  - Cardiology consult appreciated.     Diet:  DIET GI SOFT  DVT PPx: Lovenox  Code: Full Code  Dispo: pending clinical course and PT/OT Eval  Estimated Discharge: TBD based on clinical course    Labs/Imaging Reviewed. Risk:  HIGH risk due to current condition and comorbid conditions as well as requiring frequent monitoring and high risk of decline. Plan discussed with staff, patient/family and are in agreement. Part of this note was written by using a voice dictation software and the note has been proof read but may still contain some grammatical/other typographical errors.     Signed By: Zeferino Hudson MD     November 28, 2020

## 2020-11-28 NOTE — PROGRESS NOTES
James Martins  Admission Date: 11/25/2020             Daily Progress Note: 11/28/2020    The patient's chart is reviewed and the patient is discussed with the staff. James Martins is a 57yoM with h/o psoriatic arthritis on cosentyx, CAD, GERD, hypertriglyceridemia, IDDM, epilepsy, HTN who was admitted at [facility] on 11/23 with chest pressure, dyspnea, cough, chills and 3 months of unintentional weight loss (40lbs). He has a 27pkyr smoking history. COVID negative. Underwent pheresis for hypertryglyceridemia. Chest CT with multiple nodules - work up in progress. Rule out for TB then plans for bronch. Subjective:     Back pain so bad that he could not rest until he got Dilaudid. Feels less short of breath today and no anterior chest pain.      Current Facility-Administered Medications   Medication Dose Route Frequency    potassium chloride (K-DUR, KLOR-CON) SR tablet 40 mEq  40 mEq Oral BID    gabapentin (NEURONTIN) capsule 100 mg  100 mg Oral TID    lidocaine 4 % patch 1 Patch  1 Patch TransDERmal Q24H    insulin glargine (LANTUS) injection 25 Units  25 Units SubCUTAneous Q12H    Saccharomyces boulardii (FLORASTOR) capsule 250 mg  250 mg Oral BID    lidocaine 4 % patch 1 Patch  1 Patch TransDERmal Q24H    sodium chloride 3% hypertonic nebulizer soln  4 mL Nebulization Q12H PRN    sodium chloride (NS) flush 5-40 mL  5-40 mL IntraVENous Q8H    sodium chloride (NS) flush 5-40 mL  5-40 mL IntraVENous PRN    acetaminophen (TYLENOL) tablet 650 mg  650 mg Oral Q6H PRN    Or    acetaminophen (TYLENOL) suppository 650 mg  650 mg Rectal Q6H PRN    polyethylene glycol (MIRALAX) packet 17 g  17 g Oral DAILY PRN    promethazine (PHENERGAN) tablet 12.5 mg  12.5 mg Oral Q6H PRN    Or    ondansetron (ZOFRAN) injection 4 mg  4 mg IntraVENous Q6H PRN    enoxaparin (LOVENOX) injection 40 mg  40 mg SubCUTAneous DAILY    aspirin delayed-release tablet 81 mg  81 mg Oral DAILY    atorvastatin (LIPITOR) tablet 80 mg  80 mg Oral DAILY    azithromycin (ZITHROMAX) 500 mg in 0.9% sodium chloride 250 mL (VIAL-MATE)  500 mg IntraVENous Q24H    cefTRIAXone (ROCEPHIN) 1 g in 0.9% sodium chloride (MBP/ADV) 50 mL MBP  1 g IntraVENous Q24H    insulin lispro (HUMALOG) injection   SubCUTAneous AC&HS    calcium carbonate (OS-SARAI) tablet 500 mg [elemental]  500 mg Oral TID WITH MEALS    buPROPion (WELLBUTRIN) tablet 100 mg  100 mg Oral BID    carvediloL (COREG) tablet 6.25 mg  6.25 mg Oral BID WITH MEALS    fenofibrate (LOFIBRA) tablet 160 mg  160 mg Oral DAILY    isosorbide mononitrate ER (IMDUR) tablet 30 mg  30 mg Oral DAILY    prasugreL (EFFIENT) tablet 10 mg  10 mg Oral DAILY    HYDROmorphone (PF) (DILAUDID) injection 0.5 mg  0.5 mg IntraVENous Q4H PRN    HYDROmorphone (PF) (DILAUDID) injection 0.2 mg  0.2 mg IntraVENous Q4H PRN    labetaloL (NORMODYNE;TRANDATE) injection 20 mg  20 mg IntraVENous Q1H PRN    labetaloL (NORMODYNE;TRANDATE) injection 20 mg  20 mg IntraVENous Q1H PRN         Objective:     Vitals:    11/27/20 2039 11/28/20 0215 11/28/20 0425 11/28/20 0721   BP: 130/81 136/78 139/79 (!) 143/113   Pulse: 96 98 94 94   Resp: (!) 98 18 18 18   Temp: 98.6 °F (37 °C) 98.5 °F (36.9 °C) 98.9 °F (37.2 °C) 97.9 °F (36.6 °C)   SpO2:  97% 97% 99%   Weight:  260 lb 3.2 oz (118 kg)       Intake and Output:   11/26 1901 - 11/28 0700  In: 1106 [P.O.:720; I.V.:386]  Out: 3536   No intake/output data recorded. Physical Exam:   Constitutional:  the patient is well developed and in no acute distress  HEENT:  Sclera clear, pupils equal, oral mucosa moist  Lungs: clear bilaterally. On room air. No congestion today  Cardiovascular:  RRR without M,G,R  Abd/GI: soft and non-tender; with positive bowel sounds. Ext: warm without cyanosis. There is no lower leg edema.   Musculoskeletal: moves all four extremities with equal strength  Skin:  no jaundice or rashes, no wounds   Neuro: no gross neuro deficits Musculoskeletal: can ambulate. No deformity  Psychiatric: Calm.      Review of Systems - General ROS: positive for  - weight loss  Respiratory ROS: positive for - cough and shortness of breath  Cardiovascular ROS: no chest pain or dyspnea on exertion  Gastrointestinal ROS: no abdominal pain, change in bowel habits, or black or bloody stools  Musculoskeletal ROS: positive for - back pain   Lines:  Peripheral IV    LAB  Recent Labs     11/28/20  0507 11/27/20  0506 11/26/20  0404   WBC 6.1 6.9 8.8   HGB 13.0* 12.7* 14.5   HCT 38.8* 38.2* 41.6    170 176     Recent Labs     11/28/20  0507 11/27/20  0506 11/26/20  1116 11/26/20  0404    142  --  137*   K 3.0* 3.5  --  4.6   * 109*  --  105   CO2 26 26  --  22   * 161*  --  183*   BUN 4* 6  --  6   CREA 0.53* 0.52*  --  0.60*   MG 1.8  --  1.8  --        Assessment:  (Medical Decision Making)     Hospital Problems  Date Reviewed: 9/17/2020          Codes Class Noted POA    Hypokalemia ICD-10-CM: E87.6  ICD-9-CM: 276.8  11/28/2020 Unknown    supplementing    Weight loss, unintentional  - over 40 lbs in 3 months ICD-10-CM: R63.4  ICD-9-CM: 783.21  11/25/2020 Yes    Unexplained to camila    * (Principal) Intractable back pain ICD-10-CM: M54.9  ICD-9-CM: 724.5  11/25/2020     ongoing    Seizure disorder ([facility] [address] ICD-10-CM: G40.909  ICD-9-CM: 345.90  3/9/2020 Yes    Overview Signed 3/9/2020 11:24 AM by Simon Taveras MD     1990  eeg abnormal  CT brain neg  Was on phenobarbital  Off med since 2010  latelyt subtle symptoms of  Atypical seizure             BMI 35.0-35.9,adult ICD-10-CM: Z68.35  ICD-9-CM: V85.35  1/24/2020 Yes        Hypertriglyceridemia ICD-10-CM: E78.1  ICD-9-CM: 272.1  3/19/2018 Yes    Overview Addendum 5/22/2019  7:41 PM by Simon Taveras MD     Overview:   Overview:   Much better now on lofibra--doing well  Advised more stricter low carb diet and aggressive control of DM--pt willing  F/u in few months for recheck  5/2019  Statin/lofibra not helping--will add fish oil and niacin  F/u in few weeks for recheck         Now down to 458 with pheresis    CAD (coronary artery disease) ICD-10-CM: I25.10  ICD-9-CM: 414.00  2/15/2018 Yes    Overview Signed 3/2/2018  1:36 PM by Simon Taveras MD     Stents  Sees cardiologist  aspirin             [facility]acidoses) ([facility] ICD-10-CM: E11.10  ICD-9-CM: 250.12  2/15/2018 Yes    resolved    Diabetes mellitus type II, uncontrolled ([facility] [address] (Chronic) ICD-10-CM: E11.65  ICD-9-CM: 250.02  12/15/2016 Yes    Overview Addendum 6/19/2018 11:35 AM by Simon Taveras MD     Now on lantus and humalog prn  goor control BS under 150   Sliding scale given-multiples of 4  Pt on metformit 500 mg bid 3/2019 and  lantus  25 units  Diet discussed  Labs today  F/u every 3 months         BS now upper 100s          Plan:  (Medical Decision Making)   1. Urine for strept and legionella neg. Mycoplasma, fungitell, quanteferon and AFB studies pending. Plans for bronch but need to get negative AFB studies before can schedule (have only submitted 2 AFB samples so far and cough now unproductive). Await results. 2. WBC down to normal. Day 3 rocephin/zmax. Blood and urine cultures neg. No routine sputum culture ordered. 3. Potassium being supplemented    Reva Jean NP  I have spoken with and examined the patient. I agree with the above assessment and plan as documented. Lungs: CTAB  Heart:  RRR with no Murmur/Rubs/Gallops  Abd: NTND  Ext: no edema    AFB smears neg x 2. Need third sample. Will plan for BAL Monday and likely discharge following that. Geni Watts MD      More than 50% of time documented was spent face-to-face contact with the patient and in the care of the patient on the floor/unit where the patient is located.

## 2020-11-28 NOTE — PROGRESS NOTES
Hourly rounds performed all needs met, call light within reach bed low/locked. Blood specimen drawn and sent to lab this morning, will continue to monitor care and give bedside report to oncoming RN.

## 2020-11-29 LAB
ANION GAP SERPL CALC-SCNC: 5 MMOL/L (ref 7–16)
BACTERIA SPEC CULT: NORMAL
BACTERIA SPEC CULT: NORMAL
BASOPHILS # BLD: 0 K/UL (ref 0–0.2)
BASOPHILS NFR BLD: 1 % (ref 0–2)
BUN SERPL-MCNC: 4 MG/DL (ref 6–23)
CALCIUM SERPL-MCNC: 9.6 MG/DL (ref 8.3–10.4)
CHLORIDE SERPL-SCNC: 107 MMOL/L (ref 98–107)
CO2 SERPL-SCNC: 29 MMOL/L (ref 21–32)
CREAT SERPL-MCNC: 0.58 MG/DL (ref 0.8–1.5)
DIFFERENTIAL METHOD BLD: ABNORMAL
EOSINOPHIL # BLD: 0.4 K/UL (ref 0–0.8)
EOSINOPHIL NFR BLD: 7 % (ref 0.5–7.8)
ERYTHROCYTE [DISTWIDTH] IN BLOOD BY AUTOMATED COUNT: 13.2 % (ref 11.9–14.6)
GLUCOSE BLD STRIP.AUTO-MCNC: 204 MG/DL (ref 65–100)
GLUCOSE BLD STRIP.AUTO-MCNC: 270 MG/DL (ref 65–100)
GLUCOSE BLD STRIP.AUTO-MCNC: 322 MG/DL (ref 65–100)
GLUCOSE BLD STRIP.AUTO-MCNC: 344 MG/DL (ref 65–100)
GLUCOSE SERPL-MCNC: 187 MG/DL (ref 65–100)
HCT VFR BLD AUTO: 40.8 % (ref 41.1–50.3)
HGB BLD-MCNC: 13.5 G/DL (ref 13.6–17.2)
IMM GRANULOCYTES # BLD AUTO: 0 K/UL (ref 0–0.5)
IMM GRANULOCYTES NFR BLD AUTO: 0 % (ref 0–5)
LDLC SERPL DIRECT ASSAY-MCNC: 55 MG/DL
LYMPHOCYTES # BLD: 2.3 K/UL (ref 0.5–4.6)
LYMPHOCYTES NFR BLD: 38 % (ref 13–44)
MAGNESIUM SERPL-MCNC: 1.9 MG/DL (ref 1.8–2.4)
MCH RBC QN AUTO: 31.6 PG (ref 26.1–32.9)
MCHC RBC AUTO-ENTMCNC: 33.1 G/DL (ref 31.4–35)
MCV RBC AUTO: 95.6 FL (ref 79.6–97.8)
MONOCYTES # BLD: 0.7 K/UL (ref 0.1–1.3)
MONOCYTES NFR BLD: 11 % (ref 4–12)
NEUTS SEG # BLD: 2.6 K/UL (ref 1.7–8.2)
NEUTS SEG NFR BLD: 43 % (ref 43–78)
NRBC # BLD: 0 K/UL (ref 0–0.2)
PLATELET # BLD AUTO: 191 K/UL (ref 150–450)
PMV BLD AUTO: 10.9 FL (ref 9.4–12.3)
POTASSIUM SERPL-SCNC: 3.3 MMOL/L (ref 3.5–5.1)
RBC # BLD AUTO: 4.27 M/UL (ref 4.23–5.6)
SERVICE CMNT-IMP: NORMAL
SERVICE CMNT-IMP: NORMAL
SODIUM SERPL-SCNC: 141 MMOL/L (ref 138–145)
TRIGL SERPL-MCNC: 575 MG/DL (ref 35–150)
WBC # BLD AUTO: 6 K/UL (ref 4.3–11.1)

## 2020-11-29 PROCEDURE — 74011000258 HC RX REV CODE- 258: Performed by: INTERNAL MEDICINE

## 2020-11-29 PROCEDURE — 74011250637 HC RX REV CODE- 250/637: Performed by: INTERNAL MEDICINE

## 2020-11-29 PROCEDURE — 99232 SBSQ HOSP IP/OBS MODERATE 35: CPT | Performed by: INTERNAL MEDICINE

## 2020-11-29 PROCEDURE — 74011636637 HC RX REV CODE- 636/637: Performed by: INTERNAL MEDICINE

## 2020-11-29 PROCEDURE — 83735 ASSAY OF MAGNESIUM: CPT

## 2020-11-29 PROCEDURE — 82962 GLUCOSE BLOOD TEST: CPT

## 2020-11-29 PROCEDURE — 65270000029 HC RM PRIVATE

## 2020-11-29 PROCEDURE — 85025 COMPLETE CBC W/AUTO DIFF WBC: CPT

## 2020-11-29 PROCEDURE — 74011250636 HC RX REV CODE- 250/636: Performed by: INTERNAL MEDICINE

## 2020-11-29 PROCEDURE — 36592 COLLECT BLOOD FROM PICC: CPT

## 2020-11-29 PROCEDURE — 74011000250 HC RX REV CODE- 250: Performed by: INTERNAL MEDICINE

## 2020-11-29 PROCEDURE — 83721 ASSAY OF BLOOD LIPOPROTEIN: CPT

## 2020-11-29 PROCEDURE — 84478 ASSAY OF TRIGLYCERIDES: CPT

## 2020-11-29 PROCEDURE — 80048 BASIC METABOLIC PNL TOTAL CA: CPT

## 2020-11-29 RX ORDER — INSULIN GLARGINE 100 [IU]/ML
30 INJECTION, SOLUTION SUBCUTANEOUS EVERY 12 HOURS
Status: DISCONTINUED | OUTPATIENT
Start: 2020-11-29 | End: 2020-11-30

## 2020-11-29 RX ORDER — GABAPENTIN 100 MG/1
200 CAPSULE ORAL 3 TIMES DAILY
Status: DISCONTINUED | OUTPATIENT
Start: 2020-11-29 | End: 2020-11-29

## 2020-11-29 RX ORDER — GABAPENTIN 300 MG/1
300 CAPSULE ORAL 3 TIMES DAILY
Status: DISCONTINUED | OUTPATIENT
Start: 2020-11-29 | End: 2020-12-01 | Stop reason: HOSPADM

## 2020-11-29 RX ORDER — HYDROCODONE BITARTRATE AND ACETAMINOPHEN 5; 325 MG/1; MG/1
1 TABLET ORAL
Status: DISCONTINUED | OUTPATIENT
Start: 2020-11-29 | End: 2020-12-01 | Stop reason: HOSPADM

## 2020-11-29 RX ORDER — INSULIN LISPRO 100 [IU]/ML
8 INJECTION, SOLUTION INTRAVENOUS; SUBCUTANEOUS
Status: DISCONTINUED | OUTPATIENT
Start: 2020-11-29 | End: 2020-11-30

## 2020-11-29 RX ADMIN — INSULIN LISPRO 4 UNITS: 100 INJECTION, SOLUTION INTRAVENOUS; SUBCUTANEOUS at 09:27

## 2020-11-29 RX ADMIN — HYDROMORPHONE HYDROCHLORIDE 0.5 MG: 1 INJECTION, SOLUTION INTRAMUSCULAR; INTRAVENOUS; SUBCUTANEOUS at 18:38

## 2020-11-29 RX ADMIN — POTASSIUM CHLORIDE 40 MEQ: 20 TABLET, EXTENDED RELEASE ORAL at 09:25

## 2020-11-29 RX ADMIN — CARVEDILOL 6.25 MG: 6.25 TABLET, FILM COATED ORAL at 17:21

## 2020-11-29 RX ADMIN — INSULIN LISPRO 8 UNITS: 100 INJECTION, SOLUTION INTRAVENOUS; SUBCUTANEOUS at 21:04

## 2020-11-29 RX ADMIN — INSULIN LISPRO 8 UNITS: 100 INJECTION, SOLUTION INTRAVENOUS; SUBCUTANEOUS at 11:30

## 2020-11-29 RX ADMIN — INSULIN GLARGINE 30 UNITS: 100 INJECTION, SOLUTION SUBCUTANEOUS at 21:04

## 2020-11-29 RX ADMIN — GABAPENTIN 300 MG: 300 CAPSULE ORAL at 21:04

## 2020-11-29 RX ADMIN — HYDROMORPHONE HYDROCHLORIDE 0.5 MG: 1 INJECTION, SOLUTION INTRAMUSCULAR; INTRAVENOUS; SUBCUTANEOUS at 09:20

## 2020-11-29 RX ADMIN — CEFTRIAXONE SODIUM 1 G: 1 INJECTION, POWDER, FOR SOLUTION INTRAMUSCULAR; INTRAVENOUS at 14:45

## 2020-11-29 RX ADMIN — CALCIUM 500 MG: 500 TABLET ORAL at 17:20

## 2020-11-29 RX ADMIN — CALCIUM 500 MG: 500 TABLET ORAL at 09:25

## 2020-11-29 RX ADMIN — ENOXAPARIN SODIUM 40 MG: 40 INJECTION SUBCUTANEOUS at 09:38

## 2020-11-29 RX ADMIN — HYDROMORPHONE HYDROCHLORIDE 0.5 MG: 1 INJECTION, SOLUTION INTRAMUSCULAR; INTRAVENOUS; SUBCUTANEOUS at 03:31

## 2020-11-29 RX ADMIN — Medication 10 ML: at 21:05

## 2020-11-29 RX ADMIN — Medication 10 ML: at 05:07

## 2020-11-29 RX ADMIN — INSULIN GLARGINE 25 UNITS: 100 INJECTION, SOLUTION SUBCUTANEOUS at 09:00

## 2020-11-29 RX ADMIN — HYDROCODONE BITARTRATE AND ACETAMINOPHEN 1 TABLET: 5; 325 TABLET ORAL at 22:04

## 2020-11-29 RX ADMIN — PRASUGREL 10 MG: 10 TABLET, FILM COATED ORAL at 09:00

## 2020-11-29 RX ADMIN — CALCIUM 500 MG: 500 TABLET ORAL at 12:00

## 2020-11-29 RX ADMIN — INSULIN LISPRO 6 UNITS: 100 INJECTION, SOLUTION INTRAVENOUS; SUBCUTANEOUS at 16:30

## 2020-11-29 RX ADMIN — FENOFIBRATE 160 MG: 160 TABLET ORAL at 09:30

## 2020-11-29 RX ADMIN — ASPIRIN 81 MG: 81 TABLET ORAL at 09:25

## 2020-11-29 RX ADMIN — BUPROPION HYDROCHLORIDE 100 MG: 100 TABLET, FILM COATED ORAL at 09:25

## 2020-11-29 RX ADMIN — POTASSIUM CHLORIDE 40 MEQ: 20 TABLET, EXTENDED RELEASE ORAL at 12:00

## 2020-11-29 RX ADMIN — ATORVASTATIN CALCIUM 80 MG: 80 TABLET, FILM COATED ORAL at 09:25

## 2020-11-29 RX ADMIN — HYDROCODONE BITARTRATE AND ACETAMINOPHEN 1 TABLET: 5; 325 TABLET ORAL at 11:29

## 2020-11-29 RX ADMIN — Medication 10 ML: at 14:00

## 2020-11-29 RX ADMIN — ISOSORBIDE MONONITRATE 30 MG: 30 TABLET, EXTENDED RELEASE ORAL at 09:36

## 2020-11-29 RX ADMIN — AZITHROMYCIN 500 MG: 500 INJECTION, POWDER, LYOPHILIZED, FOR SOLUTION INTRAVENOUS at 17:20

## 2020-11-29 RX ADMIN — CARVEDILOL 6.25 MG: 6.25 TABLET, FILM COATED ORAL at 09:29

## 2020-11-29 RX ADMIN — GABAPENTIN 300 MG: 300 CAPSULE ORAL at 17:19

## 2020-11-29 RX ADMIN — RDII 250 MG CAPSULE 250 MG: at 18:00

## 2020-11-29 RX ADMIN — RDII 250 MG CAPSULE 250 MG: at 09:24

## 2020-11-29 RX ADMIN — GABAPENTIN 200 MG: 100 CAPSULE ORAL at 09:26

## 2020-11-29 RX ADMIN — BUPROPION HYDROCHLORIDE 100 MG: 100 TABLET, FILM COATED ORAL at 17:20

## 2020-11-29 RX ADMIN — HYDROCODONE BITARTRATE AND ACETAMINOPHEN 1 TABLET: 5; 325 TABLET ORAL at 14:43

## 2020-11-29 RX ADMIN — INSULIN LISPRO 8 UNITS: 100 INJECTION, SOLUTION INTRAVENOUS; SUBCUTANEOUS at 16:30

## 2020-11-29 RX ADMIN — HYDROMORPHONE HYDROCHLORIDE 0.5 MG: 1 INJECTION, SOLUTION INTRAMUSCULAR; INTRAVENOUS; SUBCUTANEOUS at 14:30

## 2020-11-29 RX ADMIN — HYDROCODONE BITARTRATE AND ACETAMINOPHEN 1 TABLET: 5; 325 TABLET ORAL at 18:39

## 2020-11-29 NOTE — PROGRESS NOTES
Jong Craig  Admission Date: 11/25/2020             Daily Progress Note: 11/29/2020    The patient's chart is reviewed and the patient is discussed with the staff. Jong Craig is a 57yoM with h/o psoriatic arthritis on cosentyx, CAD, GERD, hypertriglyceridemia, IDDM, epilepsy, HTN who was admitted at [facility] on 11/23 with chest pressure, dyspnea, cough, chills and 3 months of unintentional weight loss (40lbs). He has a 27pkyr smoking history. COVID negative. Underwent pheresis for hypertryglyceridemia. Chest CT with multiple nodules - work up in progress. Rule out for TB then plans for bronch. Subjective:     Pain better today and hoping to go home tomorrow.      Current Facility-Administered Medications   Medication Dose Route Frequency    gabapentin (NEURONTIN) capsule 200 mg  200 mg Oral TID    HYDROcodone-acetaminophen (NORCO) 5-325 mg per tablet 1 Tab  1 Tab Oral Q4H PRN    potassium chloride (K-DUR, KLOR-CON) SR tablet 40 mEq  40 mEq Oral BID    lidocaine 4 % patch 1 Patch  1 Patch TransDERmal Q24H    insulin glargine (LANTUS) injection 25 Units  25 Units SubCUTAneous Q12H    Saccharomyces boulardii (FLORASTOR) capsule 250 mg  250 mg Oral BID    lidocaine 4 % patch 1 Patch  1 Patch TransDERmal Q24H    sodium chloride 3% hypertonic nebulizer soln  4 mL Nebulization Q12H PRN    sodium chloride (NS) flush 5-40 mL  5-40 mL IntraVENous Q8H    sodium chloride (NS) flush 5-40 mL  5-40 mL IntraVENous PRN    acetaminophen (TYLENOL) tablet 650 mg  650 mg Oral Q6H PRN    Or    acetaminophen (TYLENOL) suppository 650 mg  650 mg Rectal Q6H PRN    polyethylene glycol (MIRALAX) packet 17 g  17 g Oral DAILY PRN    promethazine (PHENERGAN) tablet 12.5 mg  12.5 mg Oral Q6H PRN    Or    ondansetron (ZOFRAN) injection 4 mg  4 mg IntraVENous Q6H PRN    enoxaparin (LOVENOX) injection 40 mg  40 mg SubCUTAneous DAILY    aspirin delayed-release tablet 81 mg  81 mg Oral DAILY    atorvastatin (LIPITOR) tablet 80 mg  80 mg Oral DAILY    azithromycin (ZITHROMAX) 500 mg in 0.9% sodium chloride 250 mL (VIAL-MATE)  500 mg IntraVENous Q24H    cefTRIAXone (ROCEPHIN) 1 g in 0.9% sodium chloride (MBP/ADV) 50 mL MBP  1 g IntraVENous Q24H    insulin lispro (HUMALOG) injection   SubCUTAneous AC&HS    calcium carbonate (OS-SARAI) tablet 500 mg [elemental]  500 mg Oral TID WITH MEALS    buPROPion (WELLBUTRIN) tablet 100 mg  100 mg Oral BID    carvediloL (COREG) tablet 6.25 mg  6.25 mg Oral BID WITH MEALS    fenofibrate (LOFIBRA) tablet 160 mg  160 mg Oral DAILY    isosorbide mononitrate ER (IMDUR) tablet 30 mg  30 mg Oral DAILY    prasugreL (EFFIENT) tablet 10 mg  10 mg Oral DAILY    HYDROmorphone (PF) (DILAUDID) injection 0.5 mg  0.5 mg IntraVENous Q4H PRN    labetaloL (NORMODYNE;TRANDATE) injection 20 mg  20 mg IntraVENous Q1H PRN    labetaloL (NORMODYNE;TRANDATE) injection 20 mg  20 mg IntraVENous Q1H PRN         Objective:     Vitals:    11/29/20 0328 11/29/20 0330 11/29/20 0705 11/29/20 1058   BP: (!) 138/96  125/89 (!) 153/105   Pulse: 83  87 91   Resp: 18  14 16   Temp: 97.6 °F (36.4 °C)  98.1 °F (36.7 °C) 98.2 °F (36.8 °C)   SpO2: 97%  97% 97%   Weight:  247 lb 14.4 oz (112.4 kg)       Intake and Output:   11/27 1901 - 11/29 0700  In: 840 [P.O.:840]  Out: -   No intake/output data recorded. Physical Exam:   Constitutional:  the patient is well developed and in no acute distress  HEENT:  Sclera clear, pupils equal, oral mucosa moist  Lungs: clear bilaterally. On room air. No congestion today  Cardiovascular:  RRR without M,G,R  Abd/GI: soft and non-tender; with positive bowel sounds. Ext: warm without cyanosis. There is no lower leg edema. Musculoskeletal: moves all four extremities with equal strength  Skin:  no jaundice or rashes, no wounds   Neuro: no gross neuro deficits   Musculoskeletal: can ambulate. No deformity  Psychiatric: Calm.      Review of Systems - General ROS: positive for  - weight loss  Respiratory ROS: positive for - cough and shortness of breath  Cardiovascular ROS: no chest pain or dyspnea on exertion  Gastrointestinal ROS: no abdominal pain, change in bowel habits, or black or bloody stools  Musculoskeletal ROS: positive for - back pain   Lines:  Peripheral IV    LAB  Recent Labs     11/29/20  0319 11/28/20  0507 11/27/20  0506   WBC 6.0 6.1 6.9   HGB 13.5* 13.0* 12.7*   HCT 40.8* 38.8* 38.2*    155 170     Recent Labs     11/29/20 0319 11/28/20  0507 11/27/20  0506    143 142   K 3.3* 3.0* 3.5    108* 109*   CO2 29 26 26   * 183* 161*   BUN 4* 4* 6   CREA 0.58* 0.53* 0.52*   MG 1.9 1.8  --        Assessment:  (Medical Decision Making)     Hospital Problems  Date Reviewed: 9/17/2020          Codes Class Noted POA    Hypokalemia ICD-10-CM: E87.6  ICD-9-CM: 276.8  11/28/2020 Unknown    supplementing    Weight loss, unintentional  - over 40 lbs in 3 months ICD-10-CM: R63.4  ICD-9-CM: 783.21  11/25/2020 Yes    Unexplained to camila    * (Principal) Intractable back pain ICD-10-CM: M54.9  ICD-9-CM: 724.5  11/25/2020     ongoing    Seizure disorder ([facility] [address] ICD-10-CM: G40.909  ICD-9-CM: 345.90  3/9/2020 Yes    Overview Signed 3/9/2020 11:24 AM by Andrew Ryan MD     1990  eeg abnormal  CT brain neg  Was on phenobarbital  Off med since 2010  latelyt subtle symptoms of  Atypical seizure             BMI 35.0-35.9,adult ICD-10-CM: Z68.35  ICD-9-CM: V85.35  1/24/2020 Yes        Hypertriglyceridemia ICD-10-CM: E78.1  ICD-9-CM: 272.1  3/19/2018 Yes    Overview Addendum 5/22/2019  7:41 PM by Andrew Ryan MD     Overview:   Overview:   Much better now on lofibra--doing well  Advised more stricter low carb diet and aggressive control of DM--pt willing  F/u in few months for recheck  5/2019  Statin/lofibra not helping--will add fish oil and niacin  F/u in few weeks for recheck         Now down to 458 with pheresis    CAD (coronary artery disease) ICD-10-CM: I25.10  ICD-9-CM: 414.00  2/15/2018 Yes    Overview Signed 3/2/2018  1:36 PM by Joesph Argueta MD     Stents  Sees cardiologist  aspirin             DKA (diabetic ketoacidoses) [facility]) ICD-10-CM: E11.10  ICD-9-CM: 250.12  2/15/2018 Yes    resolved    Diabetes mellitus type II, uncontrolled ([address] (Chronic) ICD-10-CM: E11.65  ICD-9-CM: 250.02  12/15/2016 Yes    Overview Addendum 6/19/2018 11:35 AM by Joesph Argueta MD     Now on lantus and humalog prn  goor control BS under 150   Sliding scale given-multiples of 4  Pt on metformit 500 mg bid 3/2019 and  lantus  25 units  Diet discussed  Labs today  F/u every 3 months         BS now upper 100s          Plan:  (Medical Decision Making)   1. Urine for strept and legionella neg. Mycoplasma, fungitell pending. Quanteferon and AFB studies negative so far (3rd sample pending). Plans for bronch tomorrow with washings (on Effient, no biopsies). Will make NPO after midnight  2. WBC down to normal. Day 4 rocephin/zmax. Blood and urine cultures neg. No routine sputum culture ordered. ? Change to po and complete 7 day course versus stop (? Bacterial process)   3. Potassium being supplemented      Shon Monika, NP  I have spoken with and examined the patient. I agree with the above assessment and plan as documented. Concerned because TGs are going up. Also prefers bronchoscopy tomorrow rather than plan for follow up imaging with bronch if needed. Gen: pleasant no distress  Lungs: CTAB  Heart:  RRR with no Murmur/Rubs/Gallops  Abd: NTTD  Ext:  No edema    --Will perform BAL tomorrow in area of nodular infiltrate  --Send for broad evaluation as patient is on cosentyx; AFB, fungal, bacterial cultures. --hypertriglyceridemia mgmt per primary team.      Ford Malcolm MD        More than 50% of time documented was spent face-to-face contact with the patient and in the care of the patient on the floor/unit where the patient is located.

## 2020-11-29 NOTE — PROGRESS NOTES
Problem: Diabetes Self-Management  Goal: *Disease process and treatment process  Description: Define diabetes and identify own type of diabetes; list 3 options for treating diabetes. Outcome: Progressing Towards Goal  Goal: *Incorporating physical activity into lifestyle  Description: State effect of exercise on blood glucose levels. Outcome: Progressing Towards Goal  Goal: *Using medications safely  Description: State effect of diabetes medications on diabetes; name diabetes medication taking, action and side effects. Outcome: Progressing Towards Goal  Goal: *Monitoring blood glucose, interpreting and using results  Description: Identify recommended blood glucose targets  and personal targets. Outcome: Progressing Towards Goal     Problem: Falls - Risk of  Goal: *Absence of Falls  Description: Document Mark Apo Fall Risk and appropriate interventions in the flowsheet.   Outcome: Progressing Towards Goal  Note: Fall Risk Interventions:  Mobility Interventions: Communicate number of staff needed for ambulation/transfer         Medication Interventions: Teach patient to arise slowly    Elimination Interventions: Call light in reach

## 2020-11-29 NOTE — PROGRESS NOTES
Name: Cheryln Siemens MRN: [id number]  : 1970  Age:50 y.o.  male  Admit Date:  2020 LOS: 4      Hospitalist Progress Note     Reason for Admission:  Intractable pain [R52]    Hospital Course:  Please refer to the admission H&P for details of presentation. In summary, Cheryln Siemens is a 52 y.o. male with medical history significant for CAD s/p multiple stents, GERD, hypertriglyceridemia, history of psoriatic arthritis, diabetes, seizure disorder, hypertension who presented to the ED on  with intractable back pain that radiates to the side of his chest w/ mild dyspnea, cough and chills, unintentional weight loss of 40 pounds since August.  Work-up in the ED revealed hypotension to 176/104, tachycardia with heart rate of 125, WBC 15.6 hemoglobin 18.0, anion gap of 22, glucose of 336, triglycerides of 2712. CT chest with multiple ill-defined nodules in the left lung. It was initially admitted to the ICU for DKA with insulin drip. DKA has resolved. Patient was then transferred to [facility] for apheresis due to hypertriglyceridemia. Patient underwent IR guided right IJ placement on  and had 2 sessions of apheresis for hypertriglyceridemia. Subjective/24 hr Events (20) :  Patient is seen and examined at bedside. No acute events reported overnight by nursing staff. Patient reports that his diarrhea has resolved. Denies any abdominal pain, nausea, vomiting. His shoulder pain has improved and not bothering him this morning. However, his lower back pain is still bothersome. Gabapentin has been helping his nerve pain mainly in his lower extremities but not so much for his back pain. Back pain is sharp and burning and intermittent. He denies any fever, chills, breath, chest pains. ROS: 10 point review of systems is otherwise negative with the exception of the elements mentioned above.     Objective:    Patient Vitals for the past 24 hrs:   Temp Pulse Resp BP SpO2   20 1058 98.2 °F (36.8 °C) 91 16 (!) 153/105 97 %   11/29/20 0705 98.1 °F (36.7 °C) 87 14 125/89 97 %   11/29/20 0328 97.6 °F (36.4 °C) 83 18 (!) 138/96 97 %   11/28/20 2048 98.9 °F (37.2 °C) 97 19 (!) 162/106 98 %   11/28/20 1612 98.3 °F (36.8 °C) (!) 112 19 (!) 157/91 96 %   11/28/20 1159 98 °F (36.7 °C) 92 18 123/83 98 %     Oxygen Therapy  O2 Sat (%): 97 % (11/29/20 1058)  O2 Device: Room air (11/27/20 2039)  O2 Flow Rate (L/min): 3 l/min (11/25/20 2321)    Estimated body mass index is 35.57 kg/m² as calculated from the following:    Height as of 11/26/20: 5' 10\" (1.778 m). Weight as of this encounter: 112.4 kg (247 lb 14.4 oz). Intake/Output Summary (Last 24 hours) at 11/29/2020 1119  Last data filed at 11/28/2020 1225  Gross per 24 hour   Intake 480 ml   Output    Net 480 ml       *Note that automatically entered I/Os may not be accurate; dependent on patient compliance with collection and accurate  by techs. Physical Exam:   General:     alert, awake, no acute distress. Well nourished. Obese  Head:   normocephalic, atraumatic  Eyes, Ears, nose: PERRL, EOMI. Normal conjunctiva  Neck:    supple, non-tender. Trachea midline. Lungs:   CTAB, no wheezing, rhonchi, rales  Cardiac:   RRR, Normal S1 and S2. Abdomen:   Soft, non distended, nontender, +BS  Extremities:   Warm, dry. No edema, Non TTP on the T/L/S spine. Skin:   No rashes, no jaundice  Neuro:  AAOx3.  No gross focal neurological deficit  Psychiatric:  No anxiety, calm, cooperative    Data Review:  I have reviewed all labs, meds, and studies from the last 24 hours:      Labs:    Recent Results (from the past 24 hour(s))   CBC WITH AUTOMATED DIFF    Collection Time: 11/29/20  3:19 AM   Result Value Ref Range    WBC 6.0 4.3 - 11.1 K/uL    RBC 4.27 4.23 - 5.6 M/uL    HGB 13.5 (L) 13.6 - 17.2 g/dL    HCT 40.8 (L) 41.1 - 50.3 %    MCV 95.6 79.6 - 97.8 FL    MCH 31.6 26.1 - 32.9 PG    MCHC 33.1 31.4 - 35.0 g/dL    RDW 13.2 11.9 - 14.6 % PLATELET 043 872 - 640 K/uL    MPV 10.9 9.4 - 12.3 FL    ABSOLUTE NRBC 0.00 0.0 - 0.2 K/uL    DF AUTOMATED      NEUTROPHILS 43 43 - 78 %    LYMPHOCYTES 38 13 - 44 %    MONOCYTES 11 4.0 - 12.0 %    EOSINOPHILS 7 0.5 - 7.8 %    BASOPHILS 1 0.0 - 2.0 %    IMMATURE GRANULOCYTES 0 0.0 - 5.0 %    ABS. NEUTROPHILS 2.6 1.7 - 8.2 K/UL    ABS. LYMPHOCYTES 2.3 0.5 - 4.6 K/UL    ABS. MONOCYTES 0.7 0.1 - 1.3 K/UL    ABS. EOSINOPHILS 0.4 0.0 - 0.8 K/UL    ABS. BASOPHILS 0.0 0.0 - 0.2 K/UL    ABS. IMM.  GRANS. 0.0 0.0 - 0.5 K/UL   METABOLIC PANEL, BASIC    Collection Time: 11/29/20  3:19 AM   Result Value Ref Range    Sodium 141 138 - 145 mmol/L    Potassium 3.3 (L) 3.5 - 5.1 mmol/L    Chloride 107 98 - 107 mmol/L    CO2 29 21 - 32 mmol/L    Anion gap 5 (L) 7 - 16 mmol/L    Glucose 187 (H) 65 - 100 mg/dL    BUN 4 (L) 6 - 23 MG/DL    Creatinine 0.58 (L) 0.8 - 1.5 MG/DL    GFR est AA >60 >60 ml/min/1.73m2    GFR est non-AA >60 >60 ml/min/1.73m2    Calcium 9.6 8.3 - 10.4 MG/DL   MAGNESIUM    Collection Time: 11/29/20  3:19 AM   Result Value Ref Range    Magnesium 1.9 1.8 - 2.4 mg/dL   GLUCOSE, POC    Collection Time: 11/29/20  6:59 AM   Result Value Ref Range    Glucose (POC) 204 (H) 65 - 100 mg/dL   GLUCOSE, POC    Collection Time: 11/29/20 10:53 AM   Result Value Ref Range    Glucose (POC) 322 (H) 65 - 100 mg/dL         All Micro Results     Procedure Component Value Units Date/Time    QUANTIFERON-TB GOLD PLUS [[id number]] Collected:  11/30/20 0500    Order Status:  No result     AFB CULTURE + SMEAR W/RFLX ID FROM CULTURE [[id number]]     Order Status:  Sent     AFB CULTURE + SMEAR W/RFLX ID FROM CULTURE [[id number]] Collected:  11/28/20 1700    Order Status:  Completed Updated:  11/28/20 1853    AFB CULTURE + SMEAR W/RFLX ID FROM CULTURE [[id number]] Collected:  11/26/20 1700    Order Status:  Completed Specimen:  Miscellaneous sample Updated:  11/28/20 1335     Source EXPECTORATED SPUTUM        AFB Specimen processing Concentration Acid Fast Smear Negative        Comment: (NOTE)  Performed At: [address] [id number]  Wilder Bautista MD [id number]          Acid Fast Culture PENDING    AFB CULTURE + SMEAR W/RFLX ID FROM CULTURE [[id number]] Collected:  11/27/20 1013    Order Status:  Completed Specimen:  Miscellaneous sample Updated:  11/28/20 1335     Source SPUTUM        AFB Specimen processing Concentration     Acid Fast Smear Negative        Comment: (NOTE)  Performed At: [address] [id number]  Wilder Bautista MD DA:[id number]          Acid Fast Culture PENDING    AFB CULTURE + SMEAR W/RFLX ID FROM CULTURE [[id number]] Collected:  11/28/20 0600    Order Status:  Canceled     AFB CULTURE + SMEAR W/RFLX ID FROM CULTURE [[id number]] Collected:  11/27/20 1700    Order Status:  Canceled     QUANTIFERON-TB GOLD PLUS [[id number]] Collected:  11/27/20 1320    Order Status:  Canceled     QUANTIFERON-TB GOLD PLUS [[id number]]     Order Status:  Canceled Specimen:  Blood     CLOSTRIDIUM DIFF TOXIN A & B [[id number]]     Order Status:  Canceled Specimen:  Stool     AFB CULTURE + SMEAR W/RFLX ID FROM CULTURE [[id number]] Collected:  11/27/20 0800    Order Status:  Canceled     AFB CULTURE + SMEAR W/RFLX ID FROM CULTURE [[id number]] Collected:  11/26/20 1652    Order Status:  Canceled     AFB CULTURE + SMEAR W/RFLX ID FROM CULTURE [[id number]]     Order Status:  Canceled     AFB CULTURE + SMEAR W/RFLX ID FROM CULTURE [[id number]] Collected:  11/26/20 0418    Order Status:  Canceled     AFB CULTURE + SMEAR W/RFLX ID FROM CULTURE [[id number]] Collected:  11/25/20 2100    Order Status:  Canceled           Current Meds:  Current Facility-Administered Medications   Medication Dose Route Frequency    HYDROcodone-acetaminophen (NORCO) 5-325 mg per tablet 1 Tab  1 Tab Oral Q4H PRN    gabapentin (NEURONTIN) capsule 300 mg  300 mg Oral TID    potassium chloride (K-DUR, KLOR-CON) SR tablet 40 mEq  40 mEq Oral BID    lidocaine 4 % patch 1 Patch  1 Patch TransDERmal Q24H    insulin glargine (LANTUS) injection 25 Units  25 Units SubCUTAneous Q12H    Saccharomyces boulardii (FLORASTOR) capsule 250 mg  250 mg Oral BID    lidocaine 4 % patch 1 Patch  1 Patch TransDERmal Q24H    sodium chloride 3% hypertonic nebulizer soln  4 mL Nebulization Q12H PRN    sodium chloride (NS) flush 5-40 mL  5-40 mL IntraVENous Q8H    sodium chloride (NS) flush 5-40 mL  5-40 mL IntraVENous PRN    acetaminophen (TYLENOL) tablet 650 mg  650 mg Oral Q6H PRN    Or    acetaminophen (TYLENOL) suppository 650 mg  650 mg Rectal Q6H PRN    polyethylene glycol (MIRALAX) packet 17 g  17 g Oral DAILY PRN    promethazine (PHENERGAN) tablet 12.5 mg  12.5 mg Oral Q6H PRN    Or    ondansetron (ZOFRAN) injection 4 mg  4 mg IntraVENous Q6H PRN    enoxaparin (LOVENOX) injection 40 mg  40 mg SubCUTAneous DAILY    aspirin delayed-release tablet 81 mg  81 mg Oral DAILY    atorvastatin (LIPITOR) tablet 80 mg  80 mg Oral DAILY    azithromycin (ZITHROMAX) 500 mg in 0.9% sodium chloride 250 mL (VIAL-MATE)  500 mg IntraVENous Q24H    cefTRIAXone (ROCEPHIN) 1 g in 0.9% sodium chloride (MBP/ADV) 50 mL MBP  1 g IntraVENous Q24H    insulin lispro (HUMALOG) injection   SubCUTAneous AC&HS    calcium carbonate (OS-SARAI) tablet 500 mg [elemental]  500 mg Oral TID WITH MEALS    buPROPion (WELLBUTRIN) tablet 100 mg  100 mg Oral BID    carvediloL (COREG) tablet 6.25 mg  6.25 mg Oral BID WITH MEALS    fenofibrate (LOFIBRA) tablet 160 mg  160 mg Oral DAILY    isosorbide mononitrate ER (IMDUR) tablet 30 mg  30 mg Oral DAILY    prasugreL (EFFIENT) tablet 10 mg  10 mg Oral DAILY    HYDROmorphone (PF) (DILAUDID) injection 0.5 mg  0.5 mg IntraVENous Q4H PRN    labetaloL (NORMODYNE;TRANDATE) injection 20 mg  20 mg IntraVENous Q1H PRN    labetaloL (NORMODYNE;TRANDATE) injection 20 mg  20 mg IntraVENous Q1H PRN         Other Studies:  Mri Lumb Spine Wo Cont    Result Date: 11/26/2020  Exam: MRI lumbar spine without contrast. Indication: Back pain with foot drop. Comparison: None. Contrast: None Technique: Multiplanar multisequence imaging of the lumbar spine was performed without contrast. FINDINGS: There are 5 nonrib-bearing lumbar-type vertebral bodies. Minimal grade 1 anterolisthesis of L3 on L4. Vertebral body heights are preserved. Marrow signal is without acute or aggressive abnormality. The conus medullaris terminates at T12-L1. Cauda equina nerve roots are unremarkable. Prevertebral and paraspinal soft tissues are within normal limits. Multilevel disc desiccation throughout the lumbar spine with scattered Schmorl's nodes. T11-T12: Central disc osteophyte protrusion results in likely moderate spinal canal stenosis, only evaluated on the sagittal sequences. No neuroforaminal stenosis. There is focal cord signal abnormality and volume loss at this level compatible with chronic myelomalacia. T12-L1: No spinal canal or neuroforaminal stenosis. L1-L2: Broad-based central, right paracentral, and right foraminal disc protrusion resulting in mild spinal canal stenosis with effacement of the right lateral recess and mild right neuroforaminal stenosis. The left neural foramen is patent. L2-L3: No spinal canal or neuroforaminal stenosis. L3-L4: Diffuse disc bulge with superimposed central disc extrusion. Bilateral facet arthropathy. No spinal canal stenosis. Mild to moderate bilateral neuroforaminal stenoses. L4-L5: Mild bilateral facet arthropathy. No spinal canal or neuroforaminal stenosis. L5-S1: Bilateral facet arthropathy. No spinal canal or neuroforaminal stenosis. IMPRESSION: 1. Multilevel degenerative disc disease and facet arthropathy. 2. Central disc osteophyte protrusion at T11-T12 results in moderate spinal canal stenosis associated with chronic cord myelomalacia.  3. Broad disc protrusion at L1-L2 results in mild spinal canal stenosis, right lateral recess effacement, and mild right neuroforaminal stenosis. 4. Diffuse disc bulge and facet arthropathy at L3-L4 results in mild to moderate bilateral neuroforaminal stenoses. Ct Chest Abd Pelv W Cont    Result Date: 11/24/2020  CT of the Chest, Abdomen, and Pelvis INDICATION: Increasing back and chest pain, unexpected weight loss Multiple axial images were obtained through the chest, abdomen, and pelvis. Oral contrast was used for bowel opacification. 100mL of Isovue 370 intravenous contrast was used for better evaluation of solid organs and vascular structures. Radiation dose reduction techniques were used for this study. All CT scans performed at this facility use one or all of the following: Automated exposure control, adjustment of the mA and/or kVp according to patient's size, iterative reconstruction. COMPARISON: Abdomen CT dated 11/27/2018 FINDINGS: -LUNGS: Multiple small ill-defined nodular areas in the left lung, upper lobe predominant. Largest lesion is 8 mm. The right lung is clear. No associated effusion. -MEDIASTINUM/AXILLA: No significant adenopathy. -HEART/VESSELS: There is moderate vascular calcification. Heart size is normal.  There is normal enhancement of the thoracic aorta and pulmonary arteries. -CHEST WALL: Normal. -LIVER: There is hepatomegaly and diffuse fatty infiltration of liver. No discrete liver mass. -GALLBLADDER/BILE DUCTS: Postcholecystectomy. -PANCREAS: Normal. -SPLEEN: Normal. -ADRENALS:  There is a stable 3.4 cm fat attenuation left adrenal mass, likely a myelolipoma. Right adrenal gland is unremarkable. -KIDNEYS/URETERS: No hydronephrosis or significant mass. -BLADDER: Normal. -REPRODUCTIVE ORGANS: No pelvic masses. -BOWEL: Normal caliber. No inflammatory changes. -LYMPH NODES: No significant retroperitoneal, mesenteric, or pelvic adenopathy. -BONES: Postoperative changes in the left hip. No acute fracture. -VASCULATURE: Normal -OTHER: No ascites. IMPRESSION: 1.  Multiple ill-defined nodules in the left lung, most likely atypical infection such as fungal or tuberculosis. 2.  Hepatomegaly and steatosis. 3.  No evidence of pulmonary embolus or aortic dissection. 4.  Extensive coronary artery calcification. If there are any questions about this report, I can be reached on [employer] or at [address]    Result Date: 11/24/2020  RIGHT UPPER QUADRANT ULTRASOUND 11/24/2020 HISTORY: Moderate right upper quadrant pain for one month. TECHNIQUE: Sonographic imaging of the right upper quadrant was performed. COMPARISON: CT abdomen and pelvis 11/27/2018 FINDINGS: Changes of a cholecystectomy are present. The common bile duct is 5 mm in diameter. There is no intrahepatic biliary ductal dilatation. The liver parenchyma is diffusely echogenic relative to the renal cortex. This finding may be present with steatosis. The right kidney is 13.1 cm in length. There is no hydronephrosis. The distal abdominal aorta is 1.9 cm in diameter. The IVC is patent. IMPRESSION: Hepatic steatosis status post cholecystectomy. Xr Chest Port    Result Date: 11/24/2020  History: Chest pain, shortness of breath Exam: portable chest Comparison: 3/25/2020 Findings: There is apparent new asymmetric opacity seen at the left lung base adjacent heart shadow. Otherwise, the lungs are clear. Mediastinal contour and osseous structures are normal. Impressions: Apparent new asymmetric lung opacity at the left lung base.  Correlation with PA and lateral chest x-ray recommended for further evaluation, as the finding could be artifactual.       Assessment:    Active Hospital Problems    Diagnosis Date Noted    Hypokalemia 11/28/2020    Intractable back pain 11/25/2020    Weight loss, unintentional  - over 40 lbs in 3 months 11/25/2020    Seizure disorder ([address] 03/09/2020 1990  eeg abnormal  CT brain neg  Was on phenobarbital  Off med since 2010  latelyt subtle symptoms of Atypical seizure      BMI 35.0-35.9,adult 01/24/2020    Hypertriglyceridemia 03/19/2018     Overview:   Overview:   Much better now on lofibra--doing well  Advised more stricter low carb diet and aggressive control of DM--pt willing  F/u in few months for recheck  5/2019  Statin/lofibra not helping--will add fish oil and niacin  F/u in few weeks for recheck      CAD (coronary artery disease) 02/15/2018     Stents  Sees cardiologist  aspirin      DKA (diabetic ketoacidoses) ([facility] 02/15/2018    Diabetes mellitus type II, uncontrolled ([facility] 12/15/2016     Now on lantus and humalog prn  goor control BS under 150   Sliding scale given-multiples of 4  Pt on metformit 500 mg bid 3/2019 and  lantus  25 units  Diet discussed  Labs today  F/u every 3 months         Plan:    Cough, with subjective fever , resolved  CT chest w/ Multiple ill-defined nodules in the left lung, most likely atypical  Rapid COVID negative. Legionella and s.penumo is neg. Radiology read as infection such as fungal or tuberculosis. Has associated weight loss  - AFB neg x3   - pulm following: possible Bronch tomorrow  - Check atypical panel (mycoplasma, chlamydia) , quantiferon   - Continue Rocephin and azithromycin  - airborn isolation until TB r/o; avoid fluoroquinolones until rule out TB    Back pain radiating to the chest/abdomen, associated with nausea  Unclear etiology. Has multiple episodes of acute pancreatitis due to triglyceridemia in the past with similar back pain and abdominal pain but this episode is without abdominal pain. MRI with DJD and mild stenosis. - c/w pain control, antiemetics as ordered  - eval by neurosurgery: non-surgical issue. - pain control     Diarrhea , resolved  Possible side effects from antibiotics  - lactobacillus BID    Hypertriglyceridemia  Takes fenobrate and fish oil (1000mg bid) at home, continue fenofibrate 160mg as we dont carry fish oil here.   Down to ~200s but back up to 500s today.  -Hematology followin sessions of apheresis   -monitor      DKA, resolved  Uncontrolled DM (A1c 10.8)  Likely secondary to not taking any insulin for the last week and a half, +/-infection. At home, he takes Basaglar 50 units BID and Admelog 25 units before meals with a sliding scale but has not been able to take it due to insurance issues. - continue with lantus 30U bid, Humalog 8 units 3 times daily AC And SSI QID ACHS   - Diabetic educator.     Unintentional weight loss of 40lbs since August  Possibly secondary to uncontrolled diabetes versus TB versus occult malignancy  Needs outpatient follow-up with oncology for further evaluation     Chest Pain, resolved  CAD s/p multiple stent procedures // HTN  Troponin/EKGs not c/w ACS . CTA is neg for PE. Echocardiogram shows normal EF of 55 to 60% without regional wall motion abnormalities or diastolic dysfunction  - Continue PTA ASA 81mg every day , effient 10mg every day , coreg 3.25mg bid, atorvastatin 80mg qd  - Cardiology consult appreciated. Diet:  DIET GI SOFT  DVT PPx: Lovenox  Code: Full Code  Dispo:Home  Estimated Discharge: TBD based on clinical course    Labs/Imaging Reviewed. Risk:  HIGH risk due to current condition and comorbid conditions as well as requiring frequent monitoring and high risk of decline. Plan discussed with staff, patient/family and are in agreement. Part of this note was written by using a voice dictation software and the note has been proof read but may still contain some grammatical/other typographical errors.     Signed By: Zeferino Hudson MD     2020

## 2020-11-30 PROBLEM — R91.8 PULMONARY INFILTRATES: Status: ACTIVE | Noted: 2020-11-30

## 2020-11-30 LAB
ANION GAP SERPL CALC-SCNC: 5 MMOL/L (ref 7–16)
APTT PPP: 30.5 SEC (ref 24.1–35.1)
BASOPHILS # BLD: 0.1 K/UL (ref 0–0.2)
BASOPHILS NFR BLD: 1 % (ref 0–2)
BUN SERPL-MCNC: 5 MG/DL (ref 6–23)
CALCIUM SERPL-MCNC: 8.8 MG/DL (ref 8.3–10.4)
CHLORIDE SERPL-SCNC: 105 MMOL/L (ref 98–107)
CO2 SERPL-SCNC: 30 MMOL/L (ref 21–32)
CREAT SERPL-MCNC: 0.64 MG/DL (ref 0.8–1.5)
DIFFERENTIAL METHOD BLD: ABNORMAL
EOSINOPHIL # BLD: 0.4 K/UL (ref 0–0.8)
EOSINOPHIL NFR BLD: 8 % (ref 0.5–7.8)
ERYTHROCYTE [DISTWIDTH] IN BLOOD BY AUTOMATED COUNT: 12.8 % (ref 11.9–14.6)
FIBRINOGEN PPP-MCNC: 183 MG/DL (ref 190–501)
GLUCOSE BLD STRIP.AUTO-MCNC: 142 MG/DL (ref 65–100)
GLUCOSE BLD STRIP.AUTO-MCNC: 169 MG/DL (ref 65–100)
GLUCOSE BLD STRIP.AUTO-MCNC: 170 MG/DL (ref 65–100)
GLUCOSE BLD STRIP.AUTO-MCNC: 177 MG/DL (ref 65–100)
GLUCOSE BLD STRIP.AUTO-MCNC: 182 MG/DL (ref 65–100)
GLUCOSE BLD STRIP.AUTO-MCNC: 182 MG/DL (ref 65–100)
GLUCOSE BLD STRIP.AUTO-MCNC: 190 MG/DL (ref 65–100)
GLUCOSE BLD STRIP.AUTO-MCNC: 206 MG/DL (ref 65–100)
GLUCOSE BLD STRIP.AUTO-MCNC: 209 MG/DL (ref 65–100)
GLUCOSE BLD STRIP.AUTO-MCNC: 254 MG/DL (ref 65–100)
GLUCOSE BLD STRIP.AUTO-MCNC: 257 MG/DL (ref 65–100)
GLUCOSE BLD STRIP.AUTO-MCNC: 291 MG/DL (ref 65–100)
GLUCOSE BLD STRIP.AUTO-MCNC: 301 MG/DL (ref 65–100)
GLUCOSE BLD STRIP.AUTO-MCNC: 312 MG/DL (ref 65–100)
GLUCOSE SERPL-MCNC: 237 MG/DL (ref 65–100)
HCT VFR BLD AUTO: 38.2 % (ref 41.1–50.3)
HGB BLD-MCNC: 12.7 G/DL (ref 13.6–17.2)
IMM GRANULOCYTES # BLD AUTO: 0 K/UL (ref 0–0.5)
IMM GRANULOCYTES NFR BLD AUTO: 0 % (ref 0–5)
LDLC SERPL DIRECT ASSAY-MCNC: 39 MG/DL
LYMPHOCYTES # BLD: 2.4 K/UL (ref 0.5–4.6)
LYMPHOCYTES NFR BLD: 44 % (ref 13–44)
MAGNESIUM SERPL-MCNC: 1.8 MG/DL (ref 1.8–2.4)
MAGNESIUM SERPL-MCNC: 2.1 MG/DL (ref 1.8–2.4)
MCH RBC QN AUTO: 31.8 PG (ref 26.1–32.9)
MCHC RBC AUTO-ENTMCNC: 33.2 G/DL (ref 31.4–35)
MCV RBC AUTO: 95.5 FL (ref 79.6–97.8)
MONOCYTES # BLD: 0.7 K/UL (ref 0.1–1.3)
MONOCYTES NFR BLD: 14 % (ref 4–12)
NEUTS SEG # BLD: 1.9 K/UL (ref 1.7–8.2)
NEUTS SEG NFR BLD: 34 % (ref 43–78)
NRBC # BLD: 0 K/UL (ref 0–0.2)
PLATELET # BLD AUTO: 175 K/UL (ref 150–450)
PMV BLD AUTO: 11 FL (ref 9.4–12.3)
POTASSIUM SERPL-SCNC: 3.3 MMOL/L (ref 3.5–5.1)
RBC # BLD AUTO: 4 M/UL (ref 4.23–5.6)
SODIUM SERPL-SCNC: 140 MMOL/L (ref 136–145)
TRIGL SERPL-MCNC: 712 MG/DL (ref 35–150)
WBC # BLD AUTO: 5.5 K/UL (ref 4.3–11.1)

## 2020-11-30 PROCEDURE — 97161 PT EVAL LOW COMPLEX 20 MIN: CPT | Performed by: PHYSICAL THERAPIST

## 2020-11-30 PROCEDURE — 74011250637 HC RX REV CODE- 250/637: Performed by: INTERNAL MEDICINE

## 2020-11-30 PROCEDURE — 80048 BASIC METABOLIC PNL TOTAL CA: CPT

## 2020-11-30 PROCEDURE — 85730 THROMBOPLASTIN TIME PARTIAL: CPT

## 2020-11-30 PROCEDURE — 99232 SBSQ HOSP IP/OBS MODERATE 35: CPT | Performed by: INTERNAL MEDICINE

## 2020-11-30 PROCEDURE — 85384 FIBRINOGEN ACTIVITY: CPT

## 2020-11-30 PROCEDURE — 97110 THERAPEUTIC EXERCISES: CPT | Performed by: PHYSICAL THERAPIST

## 2020-11-30 PROCEDURE — 36592 COLLECT BLOOD FROM PICC: CPT

## 2020-11-30 PROCEDURE — 83721 ASSAY OF BLOOD LIPOPROTEIN: CPT

## 2020-11-30 PROCEDURE — 86480 TB TEST CELL IMMUN MEASURE: CPT

## 2020-11-30 PROCEDURE — 74011000250 HC RX REV CODE- 250: Performed by: INTERNAL MEDICINE

## 2020-11-30 PROCEDURE — 74011250636 HC RX REV CODE- 250/636: Performed by: INTERNAL MEDICINE

## 2020-11-30 PROCEDURE — 74011636637 HC RX REV CODE- 636/637: Performed by: INTERNAL MEDICINE

## 2020-11-30 PROCEDURE — 83735 ASSAY OF MAGNESIUM: CPT

## 2020-11-30 PROCEDURE — 85025 COMPLETE CBC W/AUTO DIFF WBC: CPT

## 2020-11-30 PROCEDURE — 84478 ASSAY OF TRIGLYCERIDES: CPT

## 2020-11-30 PROCEDURE — 2709999900 HC NON-CHARGEABLE SUPPLY

## 2020-11-30 PROCEDURE — 36514 APHERESIS PLASMA: CPT

## 2020-11-30 PROCEDURE — 65270000029 HC RM PRIVATE

## 2020-11-30 PROCEDURE — 74011000258 HC RX REV CODE- 258: Performed by: INTERNAL MEDICINE

## 2020-11-30 RX ORDER — INSULIN LISPRO 100 [IU]/ML
12 INJECTION, SOLUTION INTRAVENOUS; SUBCUTANEOUS
Status: DISCONTINUED | OUTPATIENT
Start: 2020-11-30 | End: 2020-12-01 | Stop reason: HOSPADM

## 2020-11-30 RX ORDER — AZITHROMYCIN 250 MG/1
500 TABLET, FILM COATED ORAL DAILY
Status: DISCONTINUED | OUTPATIENT
Start: 2020-11-30 | End: 2020-12-01 | Stop reason: HOSPADM

## 2020-11-30 RX ORDER — OMEGA-3-ACID ETHYL ESTERS 1 G/1
2 CAPSULE, LIQUID FILLED ORAL 2 TIMES DAILY
Qty: 60 CAP | Refills: 1 | Status: SHIPPED | OUTPATIENT
Start: 2020-11-30 | End: 2021-03-31

## 2020-11-30 RX ORDER — INSULIN GLARGINE 100 [IU]/ML
40 INJECTION, SOLUTION SUBCUTANEOUS EVERY 12 HOURS
Status: DISCONTINUED | OUTPATIENT
Start: 2020-11-30 | End: 2020-12-01 | Stop reason: HOSPADM

## 2020-11-30 RX ORDER — LIDOCAINE 4 G/100G
PATCH TOPICAL
Qty: 12 PATCH | Refills: 1 | Status: SHIPPED | OUTPATIENT
Start: 2020-11-30 | End: 2020-12-17 | Stop reason: ALTCHOICE

## 2020-11-30 RX ORDER — POTASSIUM CHLORIDE 20 MEQ/1
40 TABLET, EXTENDED RELEASE ORAL 2 TIMES DAILY
Status: COMPLETED | OUTPATIENT
Start: 2020-11-30 | End: 2020-11-30

## 2020-11-30 RX ORDER — GABAPENTIN 300 MG/1
300 CAPSULE ORAL 3 TIMES DAILY
Qty: 90 CAP | Refills: 1 | Status: SHIPPED | OUTPATIENT
Start: 2020-11-30 | End: 2021-03-31 | Stop reason: SDUPTHER

## 2020-11-30 RX ADMIN — GABAPENTIN 300 MG: 300 CAPSULE ORAL at 17:00

## 2020-11-30 RX ADMIN — INSULIN LISPRO 12 UNITS: 100 INJECTION, SOLUTION INTRAVENOUS; SUBCUTANEOUS at 16:59

## 2020-11-30 RX ADMIN — CEFTRIAXONE SODIUM 1 G: 1 INJECTION, POWDER, FOR SOLUTION INTRAMUSCULAR; INTRAVENOUS at 12:15

## 2020-11-30 RX ADMIN — INSULIN LISPRO 8 UNITS: 100 INJECTION, SOLUTION INTRAVENOUS; SUBCUTANEOUS at 08:02

## 2020-11-30 RX ADMIN — INSULIN LISPRO 12 UNITS: 100 INJECTION, SOLUTION INTRAVENOUS; SUBCUTANEOUS at 12:14

## 2020-11-30 RX ADMIN — HYDROCODONE BITARTRATE AND ACETAMINOPHEN 1 TABLET: 5; 325 TABLET ORAL at 10:04

## 2020-11-30 RX ADMIN — INSULIN LISPRO 4 UNITS: 100 INJECTION, SOLUTION INTRAVENOUS; SUBCUTANEOUS at 08:02

## 2020-11-30 RX ADMIN — Medication 10 ML: at 21:32

## 2020-11-30 RX ADMIN — RDII 250 MG CAPSULE 250 MG: at 17:00

## 2020-11-30 RX ADMIN — GABAPENTIN 300 MG: 300 CAPSULE ORAL at 21:32

## 2020-11-30 RX ADMIN — CARVEDILOL 6.25 MG: 6.25 TABLET, FILM COATED ORAL at 08:01

## 2020-11-30 RX ADMIN — Medication 10 ML: at 05:08

## 2020-11-30 RX ADMIN — AZITHROMYCIN MONOHYDRATE 500 MG: 250 TABLET ORAL at 17:00

## 2020-11-30 RX ADMIN — ISOSORBIDE MONONITRATE 30 MG: 30 TABLET, EXTENDED RELEASE ORAL at 08:01

## 2020-11-30 RX ADMIN — INSULIN LISPRO 6 UNITS: 100 INJECTION, SOLUTION INTRAVENOUS; SUBCUTANEOUS at 21:32

## 2020-11-30 RX ADMIN — ASPIRIN 81 MG: 81 TABLET ORAL at 08:01

## 2020-11-30 RX ADMIN — POTASSIUM CHLORIDE 40 MEQ: 20 TABLET, EXTENDED RELEASE ORAL at 17:00

## 2020-11-30 RX ADMIN — RDII 250 MG CAPSULE 250 MG: at 08:02

## 2020-11-30 RX ADMIN — HYDROCODONE BITARTRATE AND ACETAMINOPHEN 1 TABLET: 5; 325 TABLET ORAL at 21:35

## 2020-11-30 RX ADMIN — HYDROCODONE BITARTRATE AND ACETAMINOPHEN 1 TABLET: 5; 325 TABLET ORAL at 04:45

## 2020-11-30 RX ADMIN — CALCIUM 500 MG: 500 TABLET ORAL at 17:00

## 2020-11-30 RX ADMIN — ATORVASTATIN CALCIUM 80 MG: 80 TABLET, FILM COATED ORAL at 08:01

## 2020-11-30 RX ADMIN — HYDROMORPHONE HYDROCHLORIDE 0.5 MG: 1 INJECTION, SOLUTION INTRAMUSCULAR; INTRAVENOUS; SUBCUTANEOUS at 07:58

## 2020-11-30 RX ADMIN — INSULIN LISPRO 2 UNITS: 100 INJECTION, SOLUTION INTRAVENOUS; SUBCUTANEOUS at 12:14

## 2020-11-30 RX ADMIN — POTASSIUM CHLORIDE 40 MEQ: 20 TABLET, EXTENDED RELEASE ORAL at 08:54

## 2020-11-30 RX ADMIN — FENOFIBRATE 160 MG: 160 TABLET ORAL at 08:01

## 2020-11-30 RX ADMIN — INSULIN GLARGINE 30 UNITS: 100 INJECTION, SOLUTION SUBCUTANEOUS at 08:02

## 2020-11-30 RX ADMIN — CARVEDILOL 6.25 MG: 6.25 TABLET, FILM COATED ORAL at 17:00

## 2020-11-30 RX ADMIN — CALCIUM 500 MG: 500 TABLET ORAL at 08:01

## 2020-11-30 RX ADMIN — INSULIN GLARGINE 40 UNITS: 100 INJECTION, SOLUTION SUBCUTANEOUS at 21:32

## 2020-11-30 RX ADMIN — BUPROPION HYDROCHLORIDE 100 MG: 100 TABLET, FILM COATED ORAL at 17:00

## 2020-11-30 RX ADMIN — PRASUGREL 10 MG: 10 TABLET, FILM COATED ORAL at 09:00

## 2020-11-30 RX ADMIN — GABAPENTIN 300 MG: 300 CAPSULE ORAL at 08:01

## 2020-11-30 RX ADMIN — HYDROCODONE BITARTRATE AND ACETAMINOPHEN 1 TABLET: 5; 325 TABLET ORAL at 17:00

## 2020-11-30 RX ADMIN — BUPROPION HYDROCHLORIDE 100 MG: 100 TABLET, FILM COATED ORAL at 08:01

## 2020-11-30 RX ADMIN — INSULIN LISPRO 2 UNITS: 100 INJECTION, SOLUTION INTRAVENOUS; SUBCUTANEOUS at 16:59

## 2020-11-30 RX ADMIN — HYDROMORPHONE HYDROCHLORIDE 0.5 MG: 1 INJECTION, SOLUTION INTRAMUSCULAR; INTRAVENOUS; SUBCUTANEOUS at 12:26

## 2020-11-30 NOTE — PROGRESS NOTES
Problem: Diabetes Self-Management  Goal: *Using medications safely  Description: State effect of diabetes medications on diabetes; name diabetes medication taking, action and side effects. Outcome: Progressing Towards Goal  Goal: *Monitoring blood glucose, interpreting and using results  Description: Identify recommended blood glucose targets  and personal targets. Outcome: Progressing Towards Goal     Problem: Falls - Risk of  Goal: *Absence of Falls  Description: Document Racquel Foreman Fall Risk and appropriate interventions in the flowsheet.   Outcome: Progressing Towards Goal  Note: Fall Risk Interventions:  Mobility Interventions: Strengthening exercises (ROM-active/passive)         Medication Interventions: Teach patient to arise slowly    Elimination Interventions: Call light in reach

## 2020-11-30 NOTE — PROGRESS NOTES
Problem: Mobility Impaired (Adult and Pediatric)  Goal: *Acute Goals and Plan of Care (Insert Text)  Description:   LTG:  (1.)Mr. Jose Arnold will move from supine to sit and sit to supine , scoot up and down, and roll side to side in bed with INDEPENDENT within 7 treatment day(s). (2.)Mr. Jose Arnold will transfer from bed to chair and chair to bed with INDEPENDENT using the least restrictive device within 7 treatment day(s). (3.)Mr. Jose Arnold will ambulate with INDEPENDENT for 1000 feet with the least restrictive device within 7 treatment day(s). (4.)Mr. Jose Arnold will tolerate at least 15 min of dynamic standing activity to assist standing ADLs with the least restrictive device within 7 treatment days. (5.)Mr. Jose Arnold will climb at least 16 steps with INDEPENDENT with the least restrictive device within 7 treatment days. ________________________________________________________________________________________________          PHYSICAL THERAPY: Initial Assessment, Daily Note, and AM 11/30/2020  INPATIENT: PT Visit Days : 1  Payor: Kaleb / Plan: [address] [place] [address] Avenue / Product Type: Managed Care Medicaid /       NAME/AGE/GENDER: Benji Yates is a 52 y.o. male   PRIMARY DIAGNOSIS: Intractable pain [R52] Intractable back pain Intractable back pain    ICD-10: Treatment Diagnosis:    · Generalized Muscle Weakness (M62.81)  · Other lack of cordination (R27.8)  · Difficulty in walking, Not elsewhere classified (R26.2)  · History of falling (Z91.81)  · Unspecified Lack of Coordination (R27.9)   Precaution/Allergies:  Peanut and Morphine      ASSESSMENT:     Mr. Jose Arnold is a 52year old M who presents for intractable back pain. Prior to hospital admission pt lives with his mother in the second story of a home with 16 step(s) to enter. Pt endorses 4 falls in past 6 months, as well as frequent other LOBs in which he has to quickly to steady steady himself. Prior to admission Mr. Jose Arnold uses nothing for mobility.   Upon entering, pt up in bedside chair, agreeable to PT evaluation. he reports 7/10 pain in his back at rest. BLE assessment indicates sensation to light touch intact, AROM WNL, and strength WNL (except for 3/5 in L plantarflexors and dorsiflexors). Pt performed  Sit > stand with SBA using nothing. Gait x 40 ft with CGA, cues for step clearance (gait is noted to be unsteady w/ bilateral hip drop and path deviations). At end of session pt up in bedside chair with all needs within reach, alarm activated for safety, RN notified. Pt presents as functioning below his baseline, with deficits in mobility including transfers, gait, balance, and activity tolerance. Pt will benefit from skilled therapy services to address stated deficits to promote return to highest level of function, independence, and safety. Will continue to follow. This section established at most recent assessment   PROBLEM LIST (Impairments causing functional limitations):  1. Decreased Ambulation Ability/Technique  2. Decreased Balance   INTERVENTIONS PLANNED: (Benefits and precautions of physical therapy have been discussed with the patient.)  1. Balance Exercise  2. Bed Mobility  3. Family Education  4. Gait Training  5. Manual Therapy  6. Neuromuscular Re-education/Strengthening  7. Range of Motion (ROM)  8. Therapeutic Activites  9. Therapeutic Exercise/Strengthening  10. Transfer Training     TREATMENT PLAN: Frequency/Duration: 3 times a week for duration of hospital stay  Rehabilitation Potential For Stated Goals: Excellent     REHAB RECOMMENDATIONS (at time of discharge pending progress):    Placement:   It is my opinion, based on this patient's performance to date, that Mr. Shaka Beasley may benefit from [facility] [address] after discharge due to the functional deficits listed above that are likely to improve with skilled rehabilitation because because his/her necessity for specific vestibular hypofunction/ balance training therapeutic intervention. Equipment:    None at this time              HISTORY:   History of Present Injury/Illness (Reason for Referral):  See H&P  Past Medical History/Comorbidities:   Mr. Steven Bhatt  has a past medical history of Acute coronary syndrome ([facility] [address] (12/15/2016), Acute pancreatitis (2/15/2018), Arthritis, Diabetes ([facility] [address] Endocrine disease, Hypertension, Nonintractable epilepsy with complex partial seizures ([facility] [address] (4/2/2020), Psoriatic arthropathy ([facility] [address] and Seizures ([facility] [address] He also has no past medical history of Chronic kidney disease, COPD, Heart failure ([facility] [address] or Sleep disorder. Mr. Steven Bhatt  has a past surgical history that includes hx appendectomy; hx cholecystectomy; hx orthopaedic; pr cardiac surg procedure unlist; and hx hernia repair. Social History/Living Environment:   Home Environment: Private residence  # Steps to Enter: 16  Rails to Enter: Yes  Hand Rails : Left  One/Two Story Residence: Two story  Living Alone: No  Support Systems: Parent  Current DME Used/Available at Home: Cane, straight  Prior Level of Function/Work/Activity:  Independent at baseline, does not drive carol to epilepsy history, frequent falls     Number of Personal Factors/Comorbidities that affect the Plan of Care: 1-2: MODERATE COMPLEXITY   EXAMINATION:   Most Recent Physical Functioning:   Gross Assessment:  AROM: Within functional limits  PROM: Within functional limits  Strength: Within functional limits  Coordination: Generally decreased, functional               Posture:  Posture (WDL): Within defined limits  Balance:  Sitting: Intact  Standing: Impaired  Standing - Static: Fair  Standing - Dynamic : Fair Bed Mobility:     Wheelchair Mobility:     Transfers:  Sit to Stand: Stand-by assistance  Stand to Sit: Stand-by assistance  Gait:     Base of Support: Widened  Speed/Louise: Slow;Shuffled  Gait Abnormalities: Path deviations; Altered arm swing  Distance (ft): 60 Feet (ft)  Assistive Device: Gait belt  Ambulation - Level of Assistance: Contact guard assistance  Interventions: Manual cues; Safety awareness training; Tactile cues; Verbal cues      Body Structures Involved:  1. Bones  2. Joints  3. Muscles Body Functions Affected:  1. Neuromusculoskeletal  2. Movement Related  3. Skin Related Activities and Participation Affected:  1. Learning and Applying Knowledge  2. General Tasks and Demands  3. Communication  4. Mobility  5. Self Care  6. Domestic Life  7. Interpersonal Interactions and Relationships  8. Community, Social and [place]   Number of elements that affect the Plan of Care: 3: MODERATE COMPLEXITY   CLINICAL PRESENTATION:   Presentation: Stable and uncomplicated: LOW COMPLEXITY   CLINICAL DECISION MAKING:   [facility] AM-PAC 6 Clicks   Basic Mobility Inpatient Short Form  How much difficulty does the patient currently have. .. Unable A Lot A Little None   1. Turning over in bed (including adjusting bedclothes, sheets and blankets)? [] 1   [] 2   [] 3   [x] 4   2. Sitting down on and standing up from a chair with arms ( e.g., wheelchair, bedside commode, etc.)   [] 1   [] 2   [] 3   [x] 4   3. Moving from lying on back to sitting on the side of the bed? [] 1   [] 2   [] 3   [x] 4   How much help from another person does the patient currently need. .. Total A Lot A Little None   4. Moving to and from a bed to a chair (including a wheelchair)? [] 1   [] 2   [] 3   [x] 4   5. Need to walk in hospital room? [] 1   [] 2   [x] 3   [] 4   6. Climbing 3-5 steps with a railing? [] 1   [] 2   [x] 3   [] 4   © 2007, Trustees of [facility], under license to [employer]. All rights reserved      Score:  Initial: 22 Most Recent: X (Date: -- )    Interpretation of Tool:  Represents activities that are increasingly more difficult (i.e. Bed mobility, Transfers, Gait). Medical Necessity:     · Skilled intervention continues to be required due to decreased balance and gait technique.   Reason for Services/Other Comments:  ·    Use of outcome tool(s) and clinical judgement create a POC that gives a: Clear prediction of patient's progress: LOW COMPLEXITY            TREATMENT:   (In addition to Assessment/Re-Assessment sessions the following treatments were rendered)   Pre-treatment Symptoms/Complaints:  \"It's very nice to meet you\"  Pain: Initial:   Pain Intensity 1: 7  Post Session:  5     Therapeutic Exercise: (10 Minutes):  Level ground ambulation to improve mobility, balance and coordination. Required moderate visual, verbal and manual cues to promote proper body alignment. Progressed distance as indicated. Braces/Orthotics/Lines/Etc:   · O2 Device: Room air  Treatment/Session Assessment:    · Response to Treatment:  Mild fatigue  · Interdisciplinary Collaboration:   o Physical Therapist  o Registered Nurse  · After treatment position/precautions:   o Up in chair  o Bed/Chair-wheels locked  o Bed in low position  o Call light within reach  o RN notified   · Compliance with Program/Exercises: Will assess as treatment progresses  · Recommendations/Intent for next treatment session: \"Next visit will focus on advancements to more challenging activities\".   Total Treatment Duration:  PT Patient Time In/Time Out  Time In: [phone]  Time Out: 100 [employer] [facility]

## 2020-11-30 NOTE — PROGRESS NOTES
Dena Villalobos  Admission Date: 11/25/2020             Daily Progress Note: 11/30/2020    The patient's chart is reviewed and the patient is discussed with the staff. Dena Villalobos is a 57yoM with h/o psoriatic arthritis on cosentyx, CAD, GERD, hypertriglyceridemia, IDDM, epilepsy, HTN who was admitted at [facility] on 11/23 with chest pressure, dyspnea, cough, chills and 3 months of unintentional weight loss (40lbs). He has a 27pkyr smoking history. COVID negative. Underwent pheresis for hypertryglyceridemia. Chest CT with multiple nodules - work up in progress. Rule out for TB then plans for bronch. Subjective:       On RA 99%  Plans for BAL today    Current Facility-Administered Medications   Medication Dose Route Frequency    potassium chloride (K-DUR, KLOR-CON) SR tablet 40 mEq  40 mEq Oral BID    insulin glargine (LANTUS) injection 40 Units  40 Units SubCUTAneous Q12H    insulin lispro (HUMALOG) injection 12 Units  12 Units SubCUTAneous TIDAC    azithromycin (ZITHROMAX) tablet 500 mg  500 mg Oral DAILY    HYDROcodone-acetaminophen (NORCO) 5-325 mg per tablet 1 Tab  1 Tab Oral Q4H PRN    gabapentin (NEURONTIN) capsule 300 mg  300 mg Oral TID    lidocaine 4 % patch 1 Patch  1 Patch TransDERmal Q24H    Saccharomyces boulardii (FLORASTOR) capsule 250 mg  250 mg Oral BID    lidocaine 4 % patch 1 Patch  1 Patch TransDERmal Q24H    sodium chloride 3% hypertonic nebulizer soln  4 mL Nebulization Q12H PRN    sodium chloride (NS) flush 5-40 mL  5-40 mL IntraVENous Q8H    sodium chloride (NS) flush 5-40 mL  5-40 mL IntraVENous PRN    acetaminophen (TYLENOL) tablet 650 mg  650 mg Oral Q6H PRN    Or    acetaminophen (TYLENOL) suppository 650 mg  650 mg Rectal Q6H PRN    polyethylene glycol (MIRALAX) packet 17 g  17 g Oral DAILY PRN    promethazine (PHENERGAN) tablet 12.5 mg  12.5 mg Oral Q6H PRN    Or    ondansetron (ZOFRAN) injection 4 mg  4 mg IntraVENous Q6H PRN    enoxaparin (LOVENOX) injection 40 mg  40 mg SubCUTAneous DAILY    aspirin delayed-release tablet 81 mg  81 mg Oral DAILY    atorvastatin (LIPITOR) tablet 80 mg  80 mg Oral DAILY    cefTRIAXone (ROCEPHIN) 1 g in 0.9% sodium chloride (MBP/ADV) 50 mL MBP  1 g IntraVENous Q24H    insulin lispro (HUMALOG) injection   SubCUTAneous AC&HS    calcium carbonate (OS-SARAI) tablet 500 mg [elemental]  500 mg Oral TID WITH MEALS    buPROPion (WELLBUTRIN) tablet 100 mg  100 mg Oral BID    carvediloL (COREG) tablet 6.25 mg  6.25 mg Oral BID WITH MEALS    fenofibrate (LOFIBRA) tablet 160 mg  160 mg Oral DAILY    isosorbide mononitrate ER (IMDUR) tablet 30 mg  30 mg Oral DAILY    prasugreL (EFFIENT) tablet 10 mg  10 mg Oral DAILY    HYDROmorphone (PF) (DILAUDID) injection 0.5 mg  0.5 mg IntraVENous Q4H PRN    labetaloL (NORMODYNE;TRANDATE) injection 20 mg  20 mg IntraVENous Q1H PRN    labetaloL (NORMODYNE;TRANDATE) injection 20 mg  20 mg IntraVENous Q1H PRN       Review of Systems - General ROS: positive for  - weight loss  Respiratory ROS: positive for - cough and shortness of breath  Cardiovascular ROS: no chest pain or dyspnea on exertion  Gastrointestinal ROS: no abdominal pain, change in bowel habits, or black or bloody stools  Musculoskeletal ROS: positive for - back pain       Objective:     Vitals:    11/30/20 0357 11/30/20 0436 11/30/20 0758 11/30/20 1132   BP:  126/80 (!) 158/115 (!) 161/85   Pulse:  88 92 95   Resp:  19 18 18   Temp:  98.3 °F (36.8 °C) 98.7 °F (37.1 °C) 98.1 °F (36.7 °C)   SpO2:  99% 99% 99%   Weight: 253 lb 14.4 oz (115.2 kg)        Intake and Output:   11/28 1901 - 11/30 0700  In: 300 [P.O.:300]  Out: -   No intake/output data recorded.     Physical Exam:   Constitutional:  the patient is well developed and in no acute distress  HEENT:  Sclera clear, pupils equal, oral mucosa moist  Lungs:  Clear bilaterally, On RA  Cardiovascular:  RRR without M,G,R  Abd/GI: soft and non-tender; with positive bowel sounds. Ext: warm without cyanosis. There is no lower leg edema. Musculoskeletal: moves all four extremities with equal strength  Skin:  no jaundice or rashes, no wounds   Neuro: no gross neuro deficits   Musculoskeletal: can ambulate.  No deformity  Psychiatric: Calm       LAB  Recent Labs     11/30/20  0401 11/29/20  0319 11/28/20  0507   WBC 5.5 6.0 6.1   HGB 12.7* 13.5* 13.0*   HCT 38.2* 40.8* 38.8*    191 155     Recent Labs     11/30/20  0401 11/29/20  0319 11/28/20  0507    141 143   K 3.3* 3.3* 3.0*    107 108*   CO2 30 29 26   * 187* 183*   BUN 5* 4* 4*   CREA 0.64* 0.58* 0.53*   MG 1.8 1.9 1.8       Assessment:  (Medical Decision Making)     Hospital Problems  Date Reviewed: 11/30/2020          Codes Class Noted POA    Hypokalemia ICD-10-CM: E87.6  ICD-9-CM: 276.8  11/28/2020 Unknown    supplementing    Weight loss, unintentional  - over 40 lbs in 3 months ICD-10-CM: R63.4  ICD-9-CM: 783.21  11/25/2020 Yes    Unexplained to camila    * (Principal) Intractable back pain ICD-10-CM: M54.9  ICD-9-CM: 724.5  11/25/2020     ongoing    Seizure disorder ([facility] [address] ICD-10-CM: G40.909  ICD-9-CM: 345.90  3/9/2020 Yes    Overview Signed 3/9/2020 11:24 AM by Edmond Segura MD     1990  eeg abnormal  CT brain neg  Was on phenobarbital  Off med since 2010  latelyt subtle symptoms of  Atypical seizure             BMI 35.0-35.9,adult ICD-10-CM: Z68.35  ICD-9-CM: V85.35  1/24/2020 Yes        Hypertriglyceridemia ICD-10-CM: E78.1  ICD-9-CM: 272.1  3/19/2018 Yes    Overview Addendum 5/22/2019  7:41 PM by Edmond Segura MD     Overview:   Overview:   Much better now on lofibra--doing well  Advised more stricter low carb diet and aggressive control of DM--pt willing  F/u in few months for recheck  5/2019  Statin/lofibra not helping--will add fish oil and niacin  F/u in few weeks for recheck         Now down to 458 with pheresis    CAD (coronary artery disease) ICD-10-CM: I25.10  ICD-9-CM: 414.00 2/15/2018 Yes    Overview Signed 3/2/2018  1:36 PM by Grabiel Campuzano MD     Stents  Sees cardiologist  aspirin             DKA (diabetic ketoacidoses) [facility]) ICD-10-CM: E11.10  ICD-9-CM: 250.12  2/15/2018 Yes    resolved    Diabetes mellitus type II, uncontrolled ([address] (Chronic) ICD-10-CM: E11.65  ICD-9-CM: 250.02  12/15/2016 Yes    Overview Addendum 6/19/2018 11:35 AM by Grabiel Campuzano MD     Now on lantus and humalog prn  goor control BS under 150   Sliding scale given-multiples of 4  Pt on metformit 500 mg bid 3/2019 and  lantus  25 units  Diet discussed  Labs today  F/u every 3 months         BS now upper 100s          Plan:  (Medical Decision Making)     --Patient on RA  --Urine for strept and legionella neg, Mycoplasma, fungitell pending. Quanteferon and AFB studies negative so far (3rd sample pending). Plans for bronch today with washings (on Effient, no biopsies)  --Day 5 rocephin and azithromycin   --GI/DVT prophylaxis  --Full Code      Annie Ambrocio, NP   Lungs:  Some crackles  Heart:  RRR with no Murmur/Rubs/Gallops    Additional Comments:  Pt. Says he is much better, bronch cancelled earlier today by Dr. Coretta Sylvester, have 1 pending sputum for afb-? Bronch tomorrow vs dsicharge home and follow up in office with repeat ct    I have spoken with and examined the patient. I agree with the above assessment and plan as documented. Rishabh Mccarty MD          More than 50% of time documented was spent face-to-face contact with the patient and in the care of the patient on the floor/unit where the patient is located.

## 2020-11-30 NOTE — PROGRESS NOTES
Name: Berna Calvillo MRN: [id number]  : 1970  Age:50 y.o.  male  Admit Date:  2020 LOS: 5      Hospitalist Progress Note     Reason for Admission:  Intractable pain [R52]    Hospital Course:  Please refer to the admission H&P for details of presentation. In summary, Berna Calvillo is a 52 y.o. male with medical history significant for CAD s/p multiple stents, GERD, hypertriglyceridemia, history of psoriatic arthritis, diabetes, seizure disorder, hypertension who presented to the ED on  with intractable back pain that radiates to the side of his chest w/ mild dyspnea, cough and chills, unintentional weight loss of 40 pounds since August.  Work-up in the ED revealed hypotension to 176/104, tachycardia with heart rate of 125, WBC 15.6 hemoglobin 18.0, anion gap of 22, glucose of 336, triglycerides of 2712. CT chest with multiple ill-defined nodules in the left lung. It was initially admitted to the ICU for DKA with insulin drip. DKA has resolved. Patient was then transferred to [facility] for apheresis due to hypertriglyceridemia. Patient underwent IR guided right IJ placement on  and had 2 sessions of apheresis for hypertriglyceridemia. Subjective/24 hr Events (20) :  Patient is seen and examined at bedside. No acute events reported overnight by nursing staff. Reports ongoing burning pain in his lower back which is controlled by pain meds at this time but worse when it wears off. He denies any fever, chills, breath, chest pains, abdominal pain, n/v.    ROS: 10 point review of systems is otherwise negative with the exception of the elements mentioned above.     Objective:    Patient Vitals for the past 24 hrs:   Temp Pulse Resp BP SpO2   20 0758 98.7 °F (37.1 °C) 92 18 (!) 158/115 99 %   20 0436 98.3 °F (36.8 °C) 88 19 126/80 99 %   20 2310 98.3 °F (36.8 °C) 93 17 122/80 98 %   20 1930 97.6 °F (36.4 °C) 92 18 (!) 150/98 98 %   20 1630 98 °F (36.7 °C) 94 16 (!) 134/108 97 %   11/29/20 1058 98.2 °F (36.8 °C) 91 16 (!) 153/105 97 %     Oxygen Therapy  O2 Sat (%): 99 % (11/30/20 0758)  O2 Device: Room air (11/27/20 2039)  O2 Flow Rate (L/min): 3 l/min (11/25/20 2321)    Estimated body mass index is 36.43 kg/m² as calculated from the following:    Height as of 11/26/20: 5' 10\" (1.778 m). Weight as of this encounter: 115.2 kg (253 lb 14.4 oz). Intake/Output Summary (Last 24 hours) at 11/30/2020 9382  Last data filed at 11/29/2020 1633  Gross per 24 hour   Intake 300 ml   Output    Net 300 ml       *Note that automatically entered I/Os may not be accurate; dependent on patient compliance with collection and accurate  by techs. Physical Exam:   General:     alert, awake, no acute distress. Well nourished. Obese  Head:   normocephalic, atraumatic  Eyes, Ears, nose: PERRL, EOMI. Normal conjunctiva  Neck:    supple, non-tender. Trachea midline. Lungs:   CTAB, no wheezing, rhonchi, rales  Cardiac:   RRR, Normal S1 and S2. Abdomen:   Soft, non distended, nontender, +BS  Extremities:   Warm, dry. No edema, Non TTP on the T/L/S spine. Skin:   No rashes, no jaundice  Neuro:  AAOx3.  No gross focal neurological deficit  Psychiatric:  No anxiety, calm, cooperative    Data Review:  I have reviewed all labs, meds, and studies from the last 24 hours:      Labs:    Recent Results (from the past 24 hour(s))   GLUCOSE, POC    Collection Time: 11/29/20 10:53 AM   Result Value Ref Range    Glucose (POC) 322 (H) 65 - 100 mg/dL   GLUCOSE, POC    Collection Time: 11/29/20  4:25 PM   Result Value Ref Range    Glucose (POC) 270 (H) 65 - 100 mg/dL   GLUCOSE, POC    Collection Time: 11/29/20  8:21 PM   Result Value Ref Range    Glucose (POC) 344 (H) 65 - 100 mg/dL   CBC WITH AUTOMATED DIFF    Collection Time: 11/30/20  4:01 AM   Result Value Ref Range    WBC 5.5 4.3 - 11.1 K/uL    RBC 4.00 (L) 4.23 - 5.6 M/uL    HGB 12.7 (L) 13.6 - 17.2 g/dL    HCT 38.2 (L) 41.1 - 50.3 %    MCV 95.5 79.6 - 97.8 FL    MCH 31.8 26.1 - 32.9 PG    MCHC 33.2 31.4 - 35.0 g/dL    RDW 12.8 11.9 - 14.6 %    PLATELET 843 279 - 467 K/uL    MPV 11.0 9.4 - 12.3 FL    ABSOLUTE NRBC 0.00 0.0 - 0.2 K/uL    DF AUTOMATED      NEUTROPHILS 34 (L) 43 - 78 %    LYMPHOCYTES 44 13 - 44 %    MONOCYTES 14 (H) 4.0 - 12.0 %    EOSINOPHILS 8 (H) 0.5 - 7.8 %    BASOPHILS 1 0.0 - 2.0 %    IMMATURE GRANULOCYTES 0 0.0 - 5.0 %    ABS. NEUTROPHILS 1.9 1.7 - 8.2 K/UL    ABS. LYMPHOCYTES 2.4 0.5 - 4.6 K/UL    ABS. MONOCYTES 0.7 0.1 - 1.3 K/UL    ABS. EOSINOPHILS 0.4 0.0 - 0.8 K/UL    ABS. BASOPHILS 0.1 0.0 - 0.2 K/UL    ABS. IMM.  GRANS. 0.0 0.0 - 0.5 K/UL   METABOLIC PANEL, BASIC    Collection Time: 11/30/20  4:01 AM   Result Value Ref Range    Sodium 140 136 - 145 mmol/L    Potassium 3.3 (L) 3.5 - 5.1 mmol/L    Chloride 105 98 - 107 mmol/L    CO2 30 21 - 32 mmol/L    Anion gap 5 (L) 7 - 16 mmol/L    Glucose 237 (H) 65 - 100 mg/dL    BUN 5 (L) 6 - 23 MG/DL    Creatinine 0.64 (L) 0.8 - 1.5 MG/DL    GFR est AA >60 >60 ml/min/1.73m2    GFR est non-AA >60 >60 ml/min/1.73m2    Calcium 8.8 8.3 - 10.4 MG/DL   TRIGLYCERIDE    Collection Time: 11/30/20  4:01 AM   Result Value Ref Range    Triglyceride 712 (H) 35 - 150 MG/DL   LDL, DIRECT    Collection Time: 11/30/20  4:01 AM   Result Value Ref Range    LDL,Direct 39 <100 mg/dl   GLUCOSE, POC    Collection Time: 11/30/20  8:12 AM   Result Value Ref Range    Glucose (POC) 209 (H) 65 - 100 mg/dL         All Micro Results     Procedure Component Value Units Date/Time    QUANTIFERON-TB GOLD PLUS [[id number]] Collected:  11/30/20 0348    Order Status:  Completed Updated:  11/30/20 0506    AFB CULTURE + SMEAR W/RFLX ID FROM CULTURE [[id number]]     Order Status:  Sent     AFB CULTURE + SMEAR W/RFLX ID FROM CULTURE [[id number]]     Order Status:  Sent     AFB CULTURE + SMEAR W/RFLX ID FROM CULTURE [[id number]]     Order Status:  Sent     AFB CULTURE + SMEAR W/RFLX ID FROM CULTURE [[id number]] Collected: 11/28/20 1700    Order Status:  Completed Updated:  11/28/20 1853    AFB CULTURE + SMEAR W/RFLX ID FROM CULTURE [[id number]] Collected:  11/26/20 1700    Order Status:  Completed Specimen:  Miscellaneous sample Updated:  11/28/20 1335     Source EXPECTORATED SPUTUM        AFB Specimen processing Concentration     Acid Fast Smear Negative        Comment: (NOTE)  Performed At: [facility]  [employer] [address] [id number]  Pablo Rich MD HH:[id number]          Acid Fast Culture PENDING    AFB CULTURE + SMEAR W/RFLX ID FROM CULTURE [[id number]] Collected:  11/27/20 1013    Order Status:  Completed Specimen:  Miscellaneous sample Updated:  11/28/20 1335     Source SPUTUM        AFB Specimen processing Concentration     Acid Fast Smear Negative        Comment: (NOTE)  Performed At: [facility]  [address] [place] [id number]  Pablo Rich MD CR:[id number]          Acid Fast Culture PENDING    AFB CULTURE + SMEAR W/RFLX ID FROM CULTURE [[id number]] Collected:  11/28/20 0600    Order Status:  Canceled     AFB CULTURE + SMEAR W/RFLX ID FROM CULTURE [[id number]] Collected:  11/27/20 1700    Order Status:  Canceled     QUANTIFERON-TB GOLD PLUS [[id number]] Collected:  11/27/20 1320    Order Status:  Canceled     QUANTIFERON-TB GOLD PLUS [[id number]]     Order Status:  Canceled Specimen:  Blood     CLOSTRIDIUM DIFF TOXIN A & B [[id number]]     Order Status:  Canceled Specimen:  Stool     AFB CULTURE + SMEAR W/RFLX ID FROM CULTURE [[id number]] Collected:  11/27/20 0800    Order Status:  Canceled     AFB CULTURE + SMEAR W/RFLX ID FROM CULTURE [[id number]] Collected:  11/26/20 1652    Order Status:  Canceled     AFB CULTURE + SMEAR W/RFLX ID FROM CULTURE [[id number]]     Order Status:  Canceled     AFB CULTURE + SMEAR W/RFLX ID FROM CULTURE [[id number]] Collected:  11/26/20 0418    Order Status:  Canceled     AFB CULTURE + SMEAR W/RFLX ID FROM CULTURE [[id number]] Collected:  11/25/20 2100 Order Status:  Canceled           Current Meds:  Current Facility-Administered Medications   Medication Dose Route Frequency    potassium chloride (K-DUR, KLOR-CON) SR tablet 40 mEq  40 mEq Oral BID    insulin glargine (LANTUS) injection 40 Units  40 Units SubCUTAneous Q12H    insulin lispro (HUMALOG) injection 12 Units  12 Units SubCUTAneous TIDAC    HYDROcodone-acetaminophen (NORCO) 5-325 mg per tablet 1 Tab  1 Tab Oral Q4H PRN    gabapentin (NEURONTIN) capsule 300 mg  300 mg Oral TID    lidocaine 4 % patch 1 Patch  1 Patch TransDERmal Q24H    Saccharomyces boulardii (FLORASTOR) capsule 250 mg  250 mg Oral BID    lidocaine 4 % patch 1 Patch  1 Patch TransDERmal Q24H    sodium chloride 3% hypertonic nebulizer soln  4 mL Nebulization Q12H PRN    sodium chloride (NS) flush 5-40 mL  5-40 mL IntraVENous Q8H    sodium chloride (NS) flush 5-40 mL  5-40 mL IntraVENous PRN    acetaminophen (TYLENOL) tablet 650 mg  650 mg Oral Q6H PRN    Or    acetaminophen (TYLENOL) suppository 650 mg  650 mg Rectal Q6H PRN    polyethylene glycol (MIRALAX) packet 17 g  17 g Oral DAILY PRN    promethazine (PHENERGAN) tablet 12.5 mg  12.5 mg Oral Q6H PRN    Or    ondansetron (ZOFRAN) injection 4 mg  4 mg IntraVENous Q6H PRN    enoxaparin (LOVENOX) injection 40 mg  40 mg SubCUTAneous DAILY    aspirin delayed-release tablet 81 mg  81 mg Oral DAILY    atorvastatin (LIPITOR) tablet 80 mg  80 mg Oral DAILY    azithromycin (ZITHROMAX) 500 mg in 0.9% sodium chloride 250 mL (VIAL-MATE)  500 mg IntraVENous Q24H    cefTRIAXone (ROCEPHIN) 1 g in 0.9% sodium chloride (MBP/ADV) 50 mL MBP  1 g IntraVENous Q24H    insulin lispro (HUMALOG) injection   SubCUTAneous AC&HS    calcium carbonate (OS-SARAI) tablet 500 mg [elemental]  500 mg Oral TID WITH MEALS    buPROPion (WELLBUTRIN) tablet 100 mg  100 mg Oral BID    carvediloL (COREG) tablet 6.25 mg  6.25 mg Oral BID WITH MEALS    fenofibrate (LOFIBRA) tablet 160 mg  160 mg Oral DAILY  isosorbide mononitrate ER (IMDUR) tablet 30 mg  30 mg Oral DAILY    prasugreL (EFFIENT) tablet 10 mg  10 mg Oral DAILY    HYDROmorphone (PF) (DILAUDID) injection 0.5 mg  0.5 mg IntraVENous Q4H PRN    labetaloL (NORMODYNE;TRANDATE) injection 20 mg  20 mg IntraVENous Q1H PRN    labetaloL (NORMODYNE;TRANDATE) injection 20 mg  20 mg IntraVENous Q1H PRN         Other Studies:  Mri Lumb Spine Wo Cont    Result Date: 11/26/2020  Exam: MRI lumbar spine without contrast. Indication: Back pain with foot drop. Comparison: None. Contrast: None Technique: Multiplanar multisequence imaging of the lumbar spine was performed without contrast. FINDINGS: There are 5 nonrib-bearing lumbar-type vertebral bodies. Minimal grade 1 anterolisthesis of L3 on L4. Vertebral body heights are preserved. Marrow signal is without acute or aggressive abnormality. The conus medullaris terminates at T12-L1. Cauda equina nerve roots are unremarkable. Prevertebral and paraspinal soft tissues are within normal limits. Multilevel disc desiccation throughout the lumbar spine with scattered Schmorl's nodes. T11-T12: Central disc osteophyte protrusion results in likely moderate spinal canal stenosis, only evaluated on the sagittal sequences. No neuroforaminal stenosis. There is focal cord signal abnormality and volume loss at this level compatible with chronic myelomalacia. T12-L1: No spinal canal or neuroforaminal stenosis. L1-L2: Broad-based central, right paracentral, and right foraminal disc protrusion resulting in mild spinal canal stenosis with effacement of the right lateral recess and mild right neuroforaminal stenosis. The left neural foramen is patent. L2-L3: No spinal canal or neuroforaminal stenosis. L3-L4: Diffuse disc bulge with superimposed central disc extrusion. Bilateral facet arthropathy. No spinal canal stenosis. Mild to moderate bilateral neuroforaminal stenoses. L4-L5: Mild bilateral facet arthropathy.  No spinal canal or neuroforaminal stenosis. L5-S1: Bilateral facet arthropathy. No spinal canal or neuroforaminal stenosis. IMPRESSION: 1. Multilevel degenerative disc disease and facet arthropathy. 2. Central disc osteophyte protrusion at T11-T12 results in moderate spinal canal stenosis associated with chronic cord myelomalacia. 3. Broad disc protrusion at L1-L2 results in mild spinal canal stenosis, right lateral recess effacement, and mild right neuroforaminal stenosis. 4. Diffuse disc bulge and facet arthropathy at L3-L4 results in mild to moderate bilateral neuroforaminal stenoses. Ct Chest Abd Pelv W Cont    Result Date: 11/24/2020  CT of the Chest, Abdomen, and Pelvis INDICATION: Increasing back and chest pain, unexpected weight loss Multiple axial images were obtained through the chest, abdomen, and pelvis. Oral contrast was used for bowel opacification. 100mL of Isovue 370 intravenous contrast was used for better evaluation of solid organs and vascular structures. Radiation dose reduction techniques were used for this study. All CT scans performed at this facility use one or all of the following: Automated exposure control, adjustment of the mA and/or kVp according to patient's size, iterative reconstruction. COMPARISON: Abdomen CT dated 11/27/2018 FINDINGS: -LUNGS: Multiple small ill-defined nodular areas in the left lung, upper lobe predominant. Largest lesion is 8 mm. The right lung is clear. No associated effusion. -MEDIASTINUM/AXILLA: No significant adenopathy. -HEART/VESSELS: There is moderate vascular calcification. Heart size is normal.  There is normal enhancement of the thoracic aorta and pulmonary arteries. -CHEST WALL: Normal. -LIVER: There is hepatomegaly and diffuse fatty infiltration of liver. No discrete liver mass. -GALLBLADDER/BILE DUCTS: Postcholecystectomy. -PANCREAS: Normal. -SPLEEN: Normal. -ADRENALS:  There is a stable 3.4 cm fat attenuation left adrenal mass, likely a myelolipoma. Right adrenal gland is unremarkable. -KIDNEYS/URETERS: No hydronephrosis or significant mass. -BLADDER: Normal. -REPRODUCTIVE ORGANS: No pelvic masses. -BOWEL: Normal caliber. No inflammatory changes. -LYMPH NODES: No significant retroperitoneal, mesenteric, or pelvic adenopathy. -BONES: Postoperative changes in the left hip. No acute fracture. -VASCULATURE: Normal -OTHER: No ascites. IMPRESSION: 1.  Multiple ill-defined nodules in the left lung, most likely atypical infection such as fungal or tuberculosis. 2.  Hepatomegaly and steatosis. 3.  No evidence of pulmonary embolus or aortic dissection. 4.  Extensive coronary artery calcification. If there are any questions about this report, I can be reached on [employer] or at [phone]    Result Date: 11/24/2020  RIGHT UPPER QUADRANT ULTRASOUND 11/24/2020 HISTORY: Moderate right upper quadrant pain for one month. TECHNIQUE: Sonographic imaging of the right upper quadrant was performed. COMPARISON: CT abdomen and pelvis 11/27/2018 FINDINGS: Changes of a cholecystectomy are present. The common bile duct is 5 mm in diameter. There is no intrahepatic biliary ductal dilatation. The liver parenchyma is diffusely echogenic relative to the renal cortex. This finding may be present with steatosis. The right kidney is 13.1 cm in length. There is no hydronephrosis. The distal abdominal aorta is 1.9 cm in diameter. The IVC is patent. IMPRESSION: Hepatic steatosis status post cholecystectomy. Xr Chest Port    Result Date: 11/24/2020  History: Chest pain, shortness of breath Exam: portable chest Comparison: 3/25/2020 Findings: There is apparent new asymmetric opacity seen at the left lung base adjacent heart shadow. Otherwise, the lungs are clear. Mediastinal contour and osseous structures are normal. Impressions: Apparent new asymmetric lung opacity at the left lung base.  Correlation with PA and lateral chest x-ray recommended for further evaluation, as the finding could be artifactual.       Assessment:    Active Hospital Problems    Diagnosis Date Noted    Hypokalemia 11/28/2020    Intractable back pain 11/25/2020    Weight loss, unintentional  - over 40 lbs in 3 months 11/25/2020    Seizure disorder ([facility] 03/09/2020 1990  eeg abnormal  CT brain neg  Was on phenobarbital  Off med since 2010  latelyt subtle symptoms of  Atypical seizure      BMI 35.0-35.9,adult 01/24/2020    Hypertriglyceridemia 03/19/2018     Overview:   Overview:   Much better now on lofibra--doing well  Advised more stricter low carb diet and aggressive control of DM--pt willing  F/u in few months for recheck  5/2019  Statin/lofibra not helping--will add fish oil and niacin  F/u in few weeks for recheck      CAD (coronary artery disease) 02/15/2018     Stents  Sees cardiologist  aspirin      DKA (diabetic ketoacidoses) ([facility] [address] 02/15/2018    Diabetes mellitus type II, uncontrolled ([facility] 12/15/2016     Now on lantus and humalog prn  goor control BS under 150   Sliding scale given-multiples of 4  Pt on metformit 500 mg bid 3/2019 and  lantus  25 units  Diet discussed  Labs today  F/u every 3 months         Plan:    Cough, with subjective fever , resolved  CT chest w/ Multiple ill-defined nodules in the left lung, most likely atypical  Rapid COVID negative. Legionella and s.penumo is neg. Radiology read as infection such as fungal or tuberculosis. Has associated weight loss  - AFB neg x2. Last AFB pending   - pulm following: planned for bronch today (11/30). - Check atypical panel (mycoplasma) , quantiferon   - Continue Rocephin and azithromycin  - airborn isolation until TB r/o; avoid fluoroquinolones until rule out TB    Back pain radiating to the chest/abdomen, associated with nausea  Unclear etiology. Has multiple episodes of acute pancreatitis due to triglyceridemia in the past with similar back pain and abdominal pain but this episode is without abdominal pain.   MRI with DJD and mild stenosis. - c/w pain control, antiemetics as ordered  - eval by neurosurgery: non-surgical issue. - pain control   - Follow-up Order for appointment with Pain Management place. Need to make sure he has one. Diarrhea , resolved  Possible side effects from antibiotics  - lactobacillus BID    Hypertriglyceridemia  TSH wnl. Takes fenobrate and fish oil (1000mg bid) at home  -Hematology followin sessions of apheresis   -re-consulted Hematology today for another session of apheresis due to increasing triglycerides. -pt will follow up with his Endocrinologist upon discharge to further management. - continue with statin, fenofibrate. Not on fish oil as its not formulary here(takes it at home)  - start on lovaza 2mg BID - Rx given for outpatient and follow up with Endocrinology       DKA, resolved  Uncontrolled DM (A1c 10.8)  Likely secondary to not taking any insulin for the last week and a half, +/-infection. At home, he takes Basaglar 50 units BID and Admelog 25 units before meals with a sliding scale but has not been able to take it due to insurance issues. - continue with lantus 40U bid, Humalog 12 units 3 times daily AC And SSI QID ACHS   - Diabetic educator.     Unintentional weight loss of 40lbs since August  Possibly secondary to uncontrolled diabetes versus TB versus occult malignancy  Needs outpatient follow-up with oncology for further evaluation     Chest Pain, resolved  CAD s/p multiple stent procedures // HTN  Troponin/EKGs not c/w ACS . CTA is neg for PE. Echocardiogram shows normal EF of 55 to 60% without regional wall motion abnormalities or diastolic dysfunction  - Continue PTA ASA 81mg every day , effient 10mg every day , coreg 3.25mg bid, atorvastatin 80mg qd  - Cardiology consult appreciated. Diet:  DIET NPO  DVT PPx: Lovenox  Code: Full Code  Dispo:Home  Estimated Discharge: 24hrs once Triglyceride levels are acceptable. Labs/Imaging Reviewed.    Risk:  Moderate risk due to current condition and comorbid conditions as well as requiring frequent monitoring and high risk of decline. Plan discussed with staff, patient/family and are in agreement. Part of this note was written by using a voice dictation software and the note has been proof read but may still contain some grammatical/other typographical errors.     Signed By: Jayson Jean MD     November 30, 2020

## 2020-11-30 NOTE — PROGRESS NOTES
[facility] Hematology & Oncology        Inpatient Hematology / Oncology Progress Note    Reason for Consult:  Intractable pain [R52]  Referring Physician:  Randol Moritz, MD      Interval history:  S/p PLEX x2 with triglycerides 712 today  Line in still in place  Plan pheresis tomorrow    ROS:  Constitutional: Negative for fever, chills, weakness, malaise, fatigue. CV: Negative for chest pain, palpitations, edema. Respiratory: Negative for dyspnea, cough, wheezing. GI: Negative for nausea, abdominal pain, diarrhea. 10 point review of systems is otherwise negative with the exception of the elements mentioned above in the HPI.            Allergies   Allergen Reactions    Peanut Anaphylaxis    Morphine Other (comments)     Anxiety, claustrophobia     Past Medical History:   Diagnosis Date    Acute coronary syndrome ([address] 12/15/2016    Acute pancreatitis 2/15/2018    Arthritis     psoriatic    Diabetes ([address]     Endocrine disease     Hypertension     Nonintractable epilepsy with complex partial seizures ([address] 4/2/2020    [facility]ropathy [facility]     Seizures (HCC)      Past Surgical History:   Procedure Laterality Date    CARDIAC SURG PROCEDURE UNLIST      HX APPENDECTOMY      HX CHOLECYSTECTOMY      HX HERNIA REPAIR      HX ORTHOPAEDIC      left heel secondary to trauma     Family History   Problem Relation Age of Onset    Thyroid Disease Mother         goiters    Breast Cancer Mother         42's    Atrial Fibrillation Mother     Diabetes Father     Heart Disease Father 48    Diabetes Sister     Heart Disease Paternal Grandfather      Social History     Socioeconomic History    Marital status:      Spouse name: Not on file    Number of children: Not on file    Years of education: Not on file    Highest education level: Not on file   Occupational History    Not on file   Social Needs    Financial resource strain: Not on file    Food insecurity     Worry: Not on file Inability: Not on file    Transportation needs     Medical: Not on file     Non-medical: Not on file   Tobacco Use    Smoking status: Former Smoker     Packs/day: 0.25     Years: 30.00     Pack years: 7.50     Last attempt to quit: 2019     Years since quittin.8    Smokeless tobacco: Former User   Substance and Sexual Activity    Alcohol use: No    Drug use: No    Sexual activity: Not Currently     Partners: Female   Lifestyle    Physical activity     Days per week: Not on file     Minutes per session: Not on file    Stress: Not on file   Relationships    Social connections     Talks on phone: Not on file     Gets together: Not on file     Attends [employer] service: Not on file     Active member of club or organization: Not on file     Attends meetings of clubs or organizations: Not on file     Relationship status: Not on file    Intimate partner violence     Fear of current or ex partner: Not on file     Emotionally abused: Not on file     Physically abused: Not on file     Forced sexual activity: Not on file   Other Topics Concern     Service No    Blood Transfusions No    Caffeine Concern No    Occupational Exposure No    Hobby Hazards No    Sleep Concern No    Stress Concern Yes    Weight Concern Yes    Special Diet No    Back Care No    Exercise No    Bike Helmet No    Seat Belt Yes    Self-Exams No   Social History Narrative    Not on file     Current Facility-Administered Medications   Medication Dose Route Frequency Provider Last Rate Last Dose    potassium chloride (K-DUR, KLOR-CON) SR tablet 40 mEq  40 mEq Oral BID Sheyla Story MD   40 mEq at 20 0854    insulin glargine (LANTUS) injection 40 Units  40 Units SubCUTAneous Q12H Sheyla Story MD        insulin lispro (HUMALOG) injection 12 Units  12 Units SubCUTAneous TIDAC Sheyla Story MD        azithromycin (ZITHROMAX) tablet 500 mg  500 mg Oral DAILY Pallavi Odonnell MD        HYDROcodone-acetaminophen (NORCO) 5-325 mg per tablet 1 Tab  1 Tab Oral Q4H PRN Raoul Morris MD   1 Tab at 11/30/20 1004    gabapentin (NEURONTIN) capsule 300 mg  300 mg Oral TID Raoul Morris MD   300 mg at 11/30/20 0801    lidocaine 4 % patch 1 Patch  1 Patch TransDERmal Q24H Raoul Morris MD   1 Patch at 11/29/20 1441    Saccharomyces boulardii (FLORASTOR) capsule 250 mg  250 mg Oral BID Raoul Morris MD   250 mg at 11/30/20 0802    lidocaine 4 % patch 1 Patch  1 Patch TransDERmal Q24H Raoul Morris MD   1 Patch at 11/29/20 1837    sodium chloride 3% hypertonic nebulizer soln  4 mL Nebulization Q12H PRN Herberth Kirby MD   4 mL at 11/26/20 1646    sodium chloride (NS) flush 5-40 mL  5-40 mL IntraVENous Q8H Jeannie Colon MD   10 mL at 11/30/20 0508    sodium chloride (NS) flush 5-40 mL  5-40 mL IntraVENous PRN Jeannie Colon MD   40 mL at 11/26/20 2310    acetaminophen (TYLENOL) tablet 650 mg  650 mg Oral Q6H PRN Jeannie Colon MD   650 mg at 11/28/20 1227    Or    acetaminophen (TYLENOL) suppository 650 mg  650 mg Rectal Q6H PRN Jeannie Colon MD        polyethylene glycol (MIRALAX) packet 17 g  17 g Oral DAILY PRN Jeannie Colon MD        promethazine (PHENERGAN) tablet 12.5 mg  12.5 mg Oral Q6H PRN Jeannie Colon MD        Or    ondansetron [facility]) injection 4 mg  4 mg IntraVENous Q6H PRN Jeannie Colon MD   4 mg at 11/26/20 1520    enoxaparin (LOVENOX) injection 40 mg  40 mg SubCUTAneous DAILY Jeannie Colon MD   Stopped at 11/30/20 0802    aspirin delayed-release tablet 81 mg  81 mg Oral DAILY Jeannie Colon MD   81 mg at 11/30/20 0801    atorvastatin (LIPITOR) tablet 80 mg  80 mg Oral DAILY Jeannie Colon MD   80 mg at 11/30/20 0801    cefTRIAXone (ROCEPHIN) 1 g in 0.9% sodium chloride (MBP/ADV) 50 mL MBP  1 g IntraVENous Q24H Jeannie Colon  mL/hr at 11/29/20 1445 1 g at 11/29/20 1445    insulin lispro (HUMALOG) injection   SubCUTAneous AC&HS Jeannie Colon MD   4 Units at 20 0802    calcium carbonate (OS-SARAI) tablet 500 mg [elemental]  500 mg Oral TID WITH MEALS Benny Virgen MD   500 mg at 20 0801    buPROPion [facility]) tablet 100 mg  100 mg Oral BID Benny Virgen MD   100 mg at 20 0801    carvediloL (COREG) tablet 6.25 mg  6.25 mg Oral BID WITH MEALS Benny Virgen MD   6.25 mg at 20 08    fenofibrate (LOFIBRA) tablet 160 mg  160 mg Oral DAILY Benny Virgen MD   160 mg at 20 08    isosorbide mononitrate ER (IMDUR) tablet 30 mg  30 mg Oral DAILY Benny Virgen MD   30 mg at 20 0801    prasugreL (EFFIENT) tablet 10 mg  10 mg Oral DAILY Benny Virgen MD   10 mg at 20 0900    HYDROmorphone (PF) (DILAUDID) injection 0.5 mg  0.5 mg IntraVENous Q4H PRN Benny Virgen MD   0.5 mg at 20 0758    labetaloL (NORMODYNE;TRANDATE) injection 20 mg  20 mg IntraVENous Q1H PRN Benny Virgen MD        labetaloL (NORMODYNE;TRANDATE) injection 20 mg  20 mg IntraVENous Q1H PRN Benny Virgen MD   20 mg at 20 2110       OBJECTIVE:  Patient Vitals for the past 8 hrs:   BP Temp Pulse Resp SpO2 Weight   20 0758 (!) 158/115 98.7 °F (37.1 °C) 92 18 99 %    20 0436 126/80 98.3 °F (36.8 °C) 88 19 99 %    20 0357      253 lb 14.4 oz (115.2 kg)     Temp (24hrs), Av.2 °F (36.8 °C), Min:97.6 °F (36.4 °C), Max:98.7 °F (37.1 °C)    No intake/output data recorded.     Physical Exam:  Exam per Dr. Suad Stallings:    Recent Results (from the past 24 hour(s))   GLUCOSE, POC    Collection Time: 20 10:53 AM   Result Value Ref Range    Glucose (POC) 322 (H) 65 - 100 mg/dL   GLUCOSE, POC    Collection Time: 20  4:25 PM   Result Value Ref Range    Glucose (POC) 270 (H) 65 - 100 mg/dL   GLUCOSE, POC    Collection Time: 20  8:21 PM   Result Value Ref Range    Glucose (POC) 344 (H) 65 - 100 mg/dL   CBC WITH AUTOMATED DIFF    Collection Time: 20  4:01 AM   Result Value Ref Range WBC 5.5 4.3 - 11.1 K/uL    RBC 4.00 (L) 4.23 - 5.6 M/uL    HGB 12.7 (L) 13.6 - 17.2 g/dL    HCT 38.2 (L) 41.1 - 50.3 %    MCV 95.5 79.6 - 97.8 FL    MCH 31.8 26.1 - 32.9 PG    MCHC 33.2 31.4 - 35.0 g/dL    RDW 12.8 11.9 - 14.6 %    PLATELET 664 635 - 191 K/uL    MPV 11.0 9.4 - 12.3 FL    ABSOLUTE NRBC 0.00 0.0 - 0.2 K/uL    DF AUTOMATED      NEUTROPHILS 34 (L) 43 - 78 %    LYMPHOCYTES 44 13 - 44 %    MONOCYTES 14 (H) 4.0 - 12.0 %    EOSINOPHILS 8 (H) 0.5 - 7.8 %    BASOPHILS 1 0.0 - 2.0 %    IMMATURE GRANULOCYTES 0 0.0 - 5.0 %    ABS. NEUTROPHILS 1.9 1.7 - 8.2 K/UL    ABS. LYMPHOCYTES 2.4 0.5 - 4.6 K/UL    ABS. MONOCYTES 0.7 0.1 - 1.3 K/UL    ABS. EOSINOPHILS 0.4 0.0 - 0.8 K/UL    ABS. BASOPHILS 0.1 0.0 - 0.2 K/UL    ABS. IMM. GRANS. 0.0 0.0 - 0.5 K/UL   METABOLIC PANEL, BASIC    Collection Time: 11/30/20  4:01 AM   Result Value Ref Range    Sodium 140 136 - 145 mmol/L    Potassium 3.3 (L) 3.5 - 5.1 mmol/L    Chloride 105 98 - 107 mmol/L    CO2 30 21 - 32 mmol/L    Anion gap 5 (L) 7 - 16 mmol/L    Glucose 237 (H) 65 - 100 mg/dL    BUN 5 (L) 6 - 23 MG/DL    Creatinine 0.64 (L) 0.8 - 1.5 MG/DL    GFR est AA >60 >60 ml/min/1.73m2    GFR est non-AA >60 >60 ml/min/1.73m2    Calcium 8.8 8.3 - 10.4 MG/DL   TRIGLYCERIDE    Collection Time: 11/30/20  4:01 AM   Result Value Ref Range    Triglyceride 712 (H) 35 - 150 MG/DL   LDL, DIRECT    Collection Time: 11/30/20  4:01 AM   Result Value Ref Range    LDL,Direct 39 <100 mg/dl   GLUCOSE, POC    Collection Time: 11/30/20  8:12 AM   Result Value Ref Range    Glucose (POC) 209 (H) 65 - 100 mg/dL       Imaging:  US ABD LTD [[id number]]  Collected: 11/24/20 2046    Order Status: Completed  Updated: 11/24/20 2050    Narrative:      RIGHT UPPER QUADRANT ULTRASOUND 11/24/2020     HISTORY: Moderate right upper quadrant pain for one month. TECHNIQUE: Sonographic imaging of the right upper quadrant was performed.      COMPARISON: CT abdomen and pelvis 11/27/2018     FINDINGS: Changes of a cholecystectomy are present. The common bile duct is 5 mm   in diameter. There is no intrahepatic biliary ductal dilatation. The liver parenchyma is diffusely echogenic relative to the renal cortex. This   finding may be present with steatosis. The right kidney is 13.1 cm in length. There is no hydronephrosis. The distal   abdominal aorta is 1.9 cm in diameter. The IVC is patent. Impression:      IMPRESSION: Hepatic steatosis status post cholecystectomy. CT CHEST ABD PELV W CONT [[id number]]  Collected: 11/24/20 1611    Order Status: Completed  Updated: 11/24/20 1618    Narrative:      CT of the Chest, Abdomen, and Pelvis     INDICATION: Increasing back and chest pain, unexpected weight loss     Multiple axial images were obtained through the chest, abdomen, and pelvis. Oral contrast was used for bowel opacification.  100mL of Isovue 370 intravenous   contrast was used for better evaluation of solid organs and vascular structures.    Radiation dose reduction techniques were used for this study.  All CT scans   performed at this facility use one or all of the following: Automated exposure   control, adjustment of the mA and/or kVp according to patient's size, iterative   reconstruction. COMPARISON: Abdomen CT dated 11/27/2018     FINDINGS:   -LUNGS: Multiple small ill-defined nodular areas in the left lung, upper lobe   predominant.  Largest lesion is 8 mm.  The right lung is clear.  No associated   effusion. -MEDIASTINUM/AXILLA: No significant adenopathy. -HEART/VESSELS: There is moderate vascular calcification.  Heart size is normal.    There is normal enhancement of the thoracic aorta and pulmonary arteries. -CHEST WALL: Normal.     -LIVER: There is hepatomegaly and diffuse fatty infiltration of liver.  No   discrete liver mass. -GALLBLADDER/BILE DUCTS: Postcholecystectomy.    -PANCREAS: Normal.   -SPLEEN: Normal.     -ADRENALS:  There is a stable 3.4 cm fat attenuation left adrenal mass, likely a   myelolipoma.  Right adrenal gland is unremarkable. -KIDNEYS/URETERS: No hydronephrosis or significant mass. -BLADDER: Normal.   -REPRODUCTIVE ORGANS: No pelvic masses. -BOWEL: Normal caliber.  No inflammatory changes. -LYMPH NODES: No significant retroperitoneal, mesenteric, or pelvic adenopathy. -BONES: Postoperative changes in the left hip.  No acute fracture. -VASCULATURE: Normal   -OTHER: No ascites. Impression:      IMPRESSION:   1.  Multiple ill-defined nodules in the left lung, most likely atypical   infection such as fungal or tuberculosis. 2.  Hepatomegaly and steatosis. 3.  No evidence of pulmonary embolus or aortic dissection. 4.  Extensive coronary artery calcification. If there are any questions about this report, I can be reached on [employer]   or at [facility] WO CONT [[id number]]     Order Status: Canceled     CT ABD PELV W CONT [[id number]]     Order Status: Canceled     XR CHEST PORT [[id number]]  Collected: 11/24/20 1131    Order Status: Completed  Updated: 11/24/20 1134    Narrative:      History: Chest pain, shortness of breath     Exam: portable chest     Comparison: 3/25/2020     Findings: There is apparent new asymmetric opacity seen at the left lung base   adjacent heart shadow. Otherwise, the lungs are clear. Mediastinal contour and   osseous structures are normal.     Impressions: Apparent new asymmetric lung opacity at the left lung base.    Correlation with PA and lateral chest x-ray recommended for further evaluation,   as the finding could be artifactual.          ASSESSMENT:  Problem List  Date Reviewed: 9/17/2020          Codes Class Noted    Hypokalemia ICD-10-CM: E87.6  ICD-9-CM: 276.8  11/28/2020        Lung infiltrate ICD-10-CM: R91.8  ICD-9-CM: 793.19  11/25/2020        Weight loss, unintentional  - over 40 lbs in 3 months ICD-10-CM: R63.4  ICD-9-CM: 783.21  11/25/2020        History of smoking 25-50 pack years -- quit in 2016 about 27 pack years ICD-10-CM: Z87.891  ICD-9-CM: V15.82  11/25/2020        Psoriatic arthritis ([facility] -- was on Cosyntrex ICD-10-CM: L40.50  ICD-9-CM: 696.0  11/25/2020        * (Principal) Intractable back pain ICD-10-CM: M54.9  ICD-9-CM: 724.5  11/25/2020        Nonintractable epilepsy with complex partial seizures ([facility] ICD-10-CM: G40.209  ICD-9-CM: 345.40  4/2/2020        Chills ICD-10-CM: S09.65  ICD-9-CM: 780.64  3/25/2020    Overview Signed 3/25/2020  7:43 PM by Fabby Wen MD     Refer to ER             Bronchitis ICD-10-CM: J40  ICD-9-CM: 461  3/25/2020    Overview Signed 3/25/2020  7:45 PM by Fabby Wen MD     S/p 2 rounds abx  Refer to ER for cxr               Chest pain ICD-10-CM: R07.9  ICD-9-CM: 786.50  3/25/2020    Overview Signed 3/25/2020  7:43 PM by Fabby Wen MD     LIKELY ABGINA R/O ACUTE mi  ASPIRIN 81 MG   REFER TO er NOW--PT TO GO VIA PRIVATE VEHICLE  ADVISED TO CALL 911 IF WORSE             Coronary artery disease involving native heart with angina pectoris ([facility] ICD-10-CM: I25.119  ICD-9-CM: 414.01, 413.9  3/25/2020    Overview Signed 3/25/2020  7:46 PM by Fabby Wen MD     Stents several  Sees cardiologist             Nausea ICD-10-CM: R11.0  ICD-9-CM: 787.02  3/25/2020    Overview Signed 3/25/2020  7:46 PM by Fabby Wen MD     Likely from chest pain             Seizure disorder [facility]) ICD-10-CM: G40.909  ICD-9-CM: 345.90  3/9/2020    Overview Signed 3/9/2020 11:24 AM by Fabby Wen MD     1990  eeg abnormal  CT brain neg  Was on phenobarbital  Off med since 2010  latelyt subtle symptoms of  Atypical seizure             Sinusitis ICD-10-CM: J32.9  ICD-9-CM: 473.9  3/9/2020    Overview Signed 3/9/2020  8:16 PM by Fabby Pineda., MD     Steam in halations  Saline nasal cleansing  mucinex as needed  abx  F/u in 2 weeks               Chronic middle ear effusion, bilateral ICD-10-CM: H65.493  ICD-9-CM: 381.3  3/9/2020 Overview Signed 3/9/2020  8:17 PM by Enrico Amin MD     Sec to sinusitis  Monitor for few weeks             [place] ICD-10-CM: H92.09  ICD-9-CM: 388.70  3/9/2020    Overview Signed 3/9/2020  8:17 PM by Enrico Amin MD     Referred pain vs from effusion  Supportive care             Pharyngitis ICD-10-CM: J02.9  ICD-9-CM: 632  3/9/2020    Overview Signed 3/9/2020  8:16 PM by Enrico Amin MD     abx             TBI (traumatic brain injury) ([facility] ICD-10-CM: S06. 9X9A  ICD-9-CM: 854.00  2/12/2020    Overview Signed 2/12/2020 12:46 PM by Enrico Amin MD     in 1990             Psoriatic arthropathy [facility]) ICD-10-CM: L40.50  ICD-9-CM: 696.0  1/24/2020    Overview Signed 1/24/2020  9:17 AM by Enrico Amin MD     Multiple joints pain/swelling             Acquired deformity of toe ICD-10-CM: M20.60  ICD-9-CM: 735.9  1/24/2020        Anxiety ICD-10-CM: F41.9  ICD-9-CM: 300.00  1/24/2020    Overview Signed 2/12/2020 12:47 PM by Enrico Amin MD     2/2020  Stop prozac and start wellbutrin             Comprehensive diabetic foot examination, type 2 DM, encounter for [facility]) ICD-10-CM: E11.9  ICD-9-CM: 250.00  1/24/2020    Overview Signed 1/24/2020 10:58 AM by Enrico Amin MD     1/24/20             Noncompliance with medications ICD-10-CM: Z91.14  ICD-9-CM: V15.81  1/24/2020        BMI 35.0-35.9,adult ICD-10-CM: Z45.91  ICD-9-CM: V85.35  1/24/2020        Former smoker ICD-10-CM: H57.473  ICD-9-CM: V15.82  5/8/2019    Overview Signed 5/8/2019 10:31 PM by Enrico Amin MD     quit in 3/2019              Depression ICD-10-CM: F32.9  ICD-9-CM: 221  5/8/2019    Overview Addendum 2/12/2020 12:47 PM by Enrico Amin MD     Start zoloft-side effects discussed  F/u in 2 weeks--if all stable will fill more meds  5/2019  zolfgt helps -will refill med  No side effects  1/2020  Stop zoloft  Start prozac  Call in 2 weeks  2/2020  prozac not helping  Start welbutrin  Refer to psychiatrist             Hx of heart artery stent ICD-10-CM: Z95.5  ICD-9-CM: V45.82  5/8/2019    Overview Signed 5/8/2019 10:31 PM by Yuki Llamas MD     0.9726  [facility] cardiology             Dietary counseling ICD-10-CM: Z71.3  ICD-9-CM: V65.3  5/8/2019    Overview Addendum 5/22/2019  7:41 PM by Yuki Llamas MD     Low calorie/carb and fat diet discussed  diet discussed             Unstable angina ([address] ICD-10-CM: I20.0  ICD-9-CM: 411.1  5/2/2019    Overview Signed 5/20/2019  9:29 AM by Lilliana SIMMS     Added automatically from request for surgery [id number]             STEMI (ST elevation myocardial infarction) [facility]) ICD-10-CM: I21.3  ICD-9-CM: 410.90  2/26/2019    Overview Signed 5/20/2019  9:29 AM by Lilliana SIMMS     Last Assessment & Plan:   1  Primarily single vessel obstructive coronary artery disease in a right dominant system. 2  The chronically placed MATIAS in OM 2 remains widely patent. 3  The subtotal occlusion of the RCA was successfully treated with implantation of 3 drug-eluting stents with an excellent angiographic result. Ischemically mediated ventricular fibrillation was successfully defibrillated back to normal sinus rhythm. 4  Normal left ventricular systolic function with estimated LVEF 55% and mild hypokinesis of the inferior wall.  5  Resting left heart hemodynamics parameters reveal mild systemic hypertension and mildly elevated resting LVEDP.  6  Normal systemic arterial oxygen saturation (99%). 7  Hemostasis of RCFA with Perclose device. 8  Conscious sedation was performed throughout the procedure with no untoward effects.     - ASA 81mg daily, Effient, statin, BB, and ACE.   - Normal LV Function   - Follow up in the office in 4-6 weeks, office will mail appt. Will sign off.  Please call with questions             Infected sebaceous cyst of skin ICD-10-CM: L72.3, L08.9  ICD-9-CM: 706.2  1/9/2019    Overview Signed 1/9/2019  1:01 AM by Yuki Llamas MD [place]  resolved with bactrim now             Medication refill ICD-10-CM: Z76.0  ICD-9-CM: V68.1  2019        Adhesive capsulitis of left shoulder ICD-10-CM: M75.02  ICD-9-CM: 726.0  2019    Overview Addendum 2019  9:29 AM by Raoul Borrero as needed  will refer to ortho for possible steroid injection  pt wants to wait on PT  Overview:   Overview:   nsaids as needed  will refer to ortho for possible steroid injection  pt wants to wait on PT             Acute pain of left shoulder ICD-10-CM: M25.512  ICD-9-CM: 719.41  2019    Overview Addendum 2019  9:29 AM by Cailin SIMMS     x-ray normal  likely sprain vs capsulitis  rest  warmth  activity as tolerated   ortho  will benefit from PT  Overview:   Overview:   x-ray normal  likely sprain vs capsulitis  rest  warmth  activity as tolerated   ortho  will benefit from PT             Osteoarthritis ICD-10-CM: M19.90  ICD-9-CM: 715.90  12/10/2018    Overview Signed 12/10/2018 12:50 PM by oLuise Benitez MD     multiple joints  supportive              Hiatal hernia ICD-10-CM: K44.9  ICD-9-CM: 553.3  12/10/2018    Overview Addendum 2019  9:29 AM by Cailin SIMMS     seen on CT abdomen  pantoprazole help GERD  symptoms-refll  Overview:   Overview:   seen on CT abdomen  pantoprazole help GERD  symptoms-refll             Sebaceous cyst ICD-10-CM: L72.3  ICD-9-CM: 706.2  12/10/2018    Overview Signed 12/10/2018 12:51 PM by Louise Benitez MD     NECK  PT DESIRES EXCISON  REFER TO GEN SURGEON               Uncontrolled type 2 diabetes mellitus without complication, without long-term current use of insulin ICD-10-CM: Timo Dubs  ICD-9-CM: Timo Dubs  12/10/2018    Overview Addendum 3/9/2020  8:18 PM by Louise Benitez MD     Sec to noncompliance  Stable while on med--ON INSULIN AND METFORMIN--tolerating well-no pancreatitis while on metformin  Refilled meds  F/u in 6 weeks for recheck  2019  Off metformin  Increase basaglar to 25 units  Add novolog  Add glipizide 5 mg daily  Keep BS log and call in 2 weeks  3/2020  Pt on 50 units basaglar and sliding scale   BS above 200  Increase basaglar to 55 units --5 units increase every week until FBS is under 150             Encounter for examination following treatment at hospital ICD-10-CM: [address]  ICD-9-CM: V67.9  12/10/2018    Overview Addendum 5/20/2019  9:29 AM by Juni Stephen records reviewed  Overview:   Overview:   hspital records reviewed             Mass in neck ICD-10-CM: R22.1  ICD-9-CM: 784.2  11/30/2018        Excessive daytime sleepiness ICD-10-CM: G47.19  ICD-9-CM: 780.54  11/29/2018    Overview Addendum 5/20/2019  9:29 AM by Rachid SIMMS     Referred for sleep study    Overview:   Overview:   Referred for sleep study             Loud snoring ICD-10-CM: R06.83  ICD-9-CM: 786.09  11/28/2018        Suspected sleep apnea ICD-10-CM: R29.818  ICD-9-CM: 781.99  11/28/2018    Overview Signed 12/10/2018 12:51 PM by Yan Guerra MD     Referred to pulmonologist             History of tobacco use ICD-10-CM: Z87.891  ICD-9-CM: V15.82  6/19/2018    Overview Signed 6/19/2018  8:31 AM by Yan Guerra MD     quit jan 2018             Arthritis of left wrist ICD-10-CM: D75.259  ICD-9-CM: 716.93  6/19/2018    Overview Addendum 5/20/2019  9:29 AM by Rachid SIMMS     psoriatic arthriris vs gout  rest  Warmth locally  Motrin or diclofenac  as needed for pain  Avoid heavy lifting/pushing/pulling  F/u as needed or in 2 weeks      Overview:   Overview:   psoriatic arthriris vs gout  rest  Warmth locally  Motrin or diclofenac  as needed for pain  Avoid heavy lifting/pushing/pulling  F/u as needed or in 2 weeks             Severe obesity (BMI 35.0-39. 9) with comorbidity [facility]) ICD-10-CM: E66.01  ICD-9-CM: 278.01  3/19/2018        Encounter to discuss test results ICD-10-CM: Z71.2  ICD-9-CM: V65.49  3/19/2018    Overview Addendum 5/20/2019  9:29 AM by Ezio Calles     All results discussed in detail    Overview:   Overview: All results discussed in detail             Encounter for medication review and counseling ICD-10-CM: Z71.89  ICD-9-CM: V65.49  3/19/2018    Overview Signed 3/19/2018  7:30 PM by Akhil Painting MD     Will have to consider metformin 3/2018             Hypertriglyceridemia ICD-10-CM: E78.1  ICD-9-CM: 272.1  3/19/2018    Overview Addendum 5/22/2019  7:41 PM by Akhil Painting MD     Overview:   Overview:   Much better now on lofibra--doing well  Advised more stricter low carb diet and aggressive control of DM--pt willing  F/u in few months for recheck  5/2019  Statin/lofibra not helping--will add fish oil and niacin  F/u in few weeks for recheck             Essential hypertension ICD-10-CM: I10  ICD-9-CM: 401.9  3/2/2018    Overview Addendum 5/20/2019  9:29 AM by Ezio Calles     Stable with med-amlodip[ine/lisinopril/coreg  Refilled  Labs   Annual eye exam recommended  F/u in 3 months      Overview:   Overview:   Stable with med-amlodip[ine/lisinopril/coreg  Refilled  Labs   Annual eye exam recommended  F/u in 3 months             Hyperlipidemia ICD-10-CM: E78.5  ICD-9-CM: 272.4  3/2/2018    Overview Addendum 5/20/2019  9:29 AM by Anibal SIMMS     On statin and fenofibrate   Recheck labs-f/u  Diet discussed    Overview:   Overview: On statin and fenofibrate   Recheck labs-f/u  Diet discussed    Last Assessment & Plan:    - Reports statin Intolerance. however tolerating Lipitor here    - Dietary modifications.              Obesity, unspecified obesity severity, unspecified obesity type ICD-10-CM: E66.9  ICD-9-CM: 278.00  3/2/2018    Overview Signed 3/2/2018  1:34 PM by Akhil Painting MD     Weight loss encouraged             Anemia of chronic disease ICD-10-CM: D63.8  ICD-9-CM: 285.29  3/2/2018    Overview Addendum 5/20/2019  9:29 AM by Anibal SIMMS     DM  Will periodically monitor    Overview:   Overview:   DM  Will periodically monitor             Type 2 diabetes with nephropathy ([facility] ICD-10-CM: E11.21  ICD-9-CM: 250.40, 583.81  3/1/2018        Pancreatitis ICD-10-CM: K85.90  ICD-9-CM: 577.0  2/17/2018        High anion gap metabolic acidosis RYB-78-ML: E87.2  ICD-9-CM: 276.2  2/15/2018        CAD (coronary artery disease) ICD-10-CM: I25.10  ICD-9-CM: 414.00  2/15/2018    Overview Signed 3/2/2018  1:36 PM by Scotty Carter MD     Stents  Sees cardiologist  aspirin             Leukocytosis ICD-10-CM: S13.899  ICD-9-CM: 288.60  2/15/2018        DKA (diabetic ketoacidoses) ([facility] ICD-10-CM: E11.10  ICD-9-CM: 250.12  2/15/2018        Acute pancreatitis ICD-10-CM: K85.90  ICD-9-CM: 518.1  2/15/2018    Overview Signed 5/20/2019  9:29 AM by Aan SIMMS     Overview:   Overview:   Sec to hypertriglyceridemia  Resolved now 3/2018  Few tramadol dispensed for pain management             Atherosclerosis of coronary artery ICD-10-CM: I25.10  ICD-9-CM: 414.00  2/15/2018    Overview Signed 5/20/2019  9:29 AM by Ana SIMMS     Overview:   Overview:   Stents  Sees cardiologist  aspirin    Last Assessment & Plan:    - Cont medical management             Diabetic ketoacidosis without coma associated with type 2 diabetes mellitus ([facility] ICD-10-CM: E11.10  ICD-9-CM: 250.12  2/15/2018    Overview Signed 5/20/2019  9:29 AM by Shena Sullivan     Last Assessment & Plan:    - Per primary team             Diabetes mellitus type II, uncontrolled ([facility] (Chronic) ICD-10-CM: E11.65  ICD-9-CM: 250.02  12/15/2016    Overview Addendum 6/19/2018 11:35 AM by Scotty Carter MD     Now on lantus and humalog prn  goor control BS under 150   Sliding scale given-multiples of 4  Pt on metformit 500 mg bid 3/2019 and  lantus  25 units  Diet discussed  Labs today  F/u every 3 months             Tobacco abuse (Chronic) ICD-10-CM: Z72.0  ICD-9-CM: 305.1  12/15/2016              Mr. William Buckley is a 52 y.o. male admitted on 11/25/2020 with a primary diagnosis of chest pain, DKA, and pancreatitis. His PMH includes CAD s/p multiple stents, GERD, hypertriglyceridemia s/p pheresis (2/16/18), hx psoriatic arthritis, IDDM with hx DKA, non-intractable epilepsy with complex partial seizures, and HTN. Pt presented to ED with c/o progressively worsening back pain wrapping around to chest x 1 month. He also c/o cough, chest pain, polydipsia, polyuria, and unintentional wt loss of 40 # in past 3 months. He reports difficulty controlling his blood sugars at home and stopped his insulin about 1 and 1/2 weeks ago. On admission, /104, , WBC 15.6, Hgb 18, , Trop 14.2, EKG with no ischemic changes, non-specific T-wave flattening in inferior leads. CT CAP with multiple ill-defined nodules in L lung, most likely atypical infection such as fungal or TB; hepatomegaly and steatosis. Abd US with hepatic steatosis. On CTX/Azith. COVID rapid neg, PCR pending. TB pending. Cards/GI/Pulm consulted. Triglycerides 2253 today. We were consulted for pheresis. RECOMMENDATIONS:  Hypertriglyceridemia Pancreatitis  - Triglycerides 2253  - Needs to be transferred to [facility] for apheresis. Discussed with Dr. Tena Steven, hospitalist.  Will have IR place apheresis line and plan for therapeutic apheresis later today. Primary team managing pancreatitis. Repeat triglyceride level in AM. May need additional treatment tomorrow. 11/27 s/p PLEX x2 (11/26 and 11/27). Triglycerides improved to 278 this AM  11/30 Trigs trending back up 712 today. On Lipitor and fenofibrate. DKA  - mgmt per primary team    Chest pain  - cards consulted    Cough  - CT CAP with multiple ill-defined nodules in L lung, most likely atypical infection such as fungal or TB; hepatomegaly and steatosis. - COVID rapid neg, PCR pending  - TB pending  - Pulm consulted  - On CTX/Azith    Lab studies and imaging studies were personally reviewed.  Thank you for allowing us to participate in the care of Mr. Carolynn Kaur. He may need to be referred to specialist to maintain control of trigs. Has cardiology weighed in? Pheresis is just a bridge until that happens. We will plan to apheresis tomorrow. Jeri Giang NP      [facility]  [address] [place] [address]  Office : [phone]  Fax : [phone]       Attending Addendum:  I have personally performed a face to face diagnostic evaluation on this patient. I have reviewed and agree with the care plan as documented above by Jeri Giang N.P.  My findings are as follows: Patient appears lethargic, heart rate regular without murmurs, abdomen is non-tender, bowel sounds are positive. 52 male history of hypertriglyceridemia status post pheresis in the past, DKA, pancreatitis, now presenting with significant weight loss and setting of multiple ill-defined pulmonary nodules concerning for atypical infection, as well as elevated triglycerides which improved with Plex x2. Now with rising triglycerides again: Will repeat PLEX x1, and follow-up results thereafter. Continue optimization of medical management directed towards dyslipidemia. Thank you for allowing us to participate in care of this pleasant patient. Please call w any questions.         Carlos Cantu MD  [facility]  [address] [place] [address]  Office : [phone]  Fax : [phone]

## 2020-11-30 NOTE — PROGRESS NOTES
Hourly rounds done. Pt c/o pain, medicated per MAR  Denies nausea, vomiting. NPO @ [facility]. All needs met at this time.

## 2020-11-30 NOTE — PROGRESS NOTES
Voicemail left for [employer] explaining that we still need to have someone come and assess pt and set up time for a new Apheresis session.

## 2020-11-30 NOTE — INTERDISCIPLINARY ROUNDS
Interdisciplinary Rounds completed. Nursing, Case Management, and Physician  present. Plan of care reviewed and updated. Hematology called to evaluate for another apheresis while in patient.   Possible d/c in am.

## 2020-11-30 NOTE — PROGRESS NOTES
Central line still in place due to patient stating concern for triglyceride levels going up, would rather keep central line in case apharesis is needed at some point in the future. Just wanted to make you aware. He is concerned that he would need central line placement again, and wants to keep until further notice.  Pulmonary notified: Karen Benavides MD.  Frances Owen stated ok to keep central line for now, continue to monitor/asses, f/u in am.

## 2020-12-01 VITALS
DIASTOLIC BLOOD PRESSURE: 80 MMHG | WEIGHT: 251.8 LBS | OXYGEN SATURATION: 98 % | RESPIRATION RATE: 16 BRPM | SYSTOLIC BLOOD PRESSURE: 120 MMHG | BODY MASS INDEX: 36.05 KG/M2 | HEART RATE: 83 BPM | HEIGHT: 70 IN | TEMPERATURE: 97.6 F

## 2020-12-01 PROBLEM — E87.6 HYPOKALEMIA: Status: RESOLVED | Noted: 2020-11-28 | Resolved: 2020-12-01

## 2020-12-01 PROBLEM — M54.9 INTRACTABLE BACK PAIN: Status: RESOLVED | Noted: 2020-11-25 | Resolved: 2020-12-01

## 2020-12-01 PROBLEM — E11.10 DKA (DIABETIC KETOACIDOSES): Status: RESOLVED | Noted: 2018-02-15 | Resolved: 2020-12-01

## 2020-12-01 LAB
ALBUMIN SERPL-MCNC: 3.8 G/DL (ref 3.5–5)
ALBUMIN/GLOB SERPL: 1.4 {RATIO} (ref 1.2–3.5)
ALP SERPL-CCNC: 61 U/L (ref 50–136)
ALT SERPL-CCNC: 55 U/L (ref 12–65)
ANION GAP SERPL CALC-SCNC: 3 MMOL/L (ref 7–16)
ANION GAP SERPL CALC-SCNC: 5 MMOL/L (ref 7–16)
APTT PPP: 27 SEC (ref 24.1–35.1)
AST SERPL-CCNC: 61 U/L (ref 15–37)
BASOPHILS # BLD: 0 K/UL (ref 0–0.2)
BASOPHILS NFR BLD: 1 % (ref 0–2)
BILIRUB SERPL-MCNC: 0.7 MG/DL (ref 0.2–1.1)
BUN SERPL-MCNC: 7 MG/DL (ref 6–23)
BUN SERPL-MCNC: 8 MG/DL (ref 6–23)
CALCIUM SERPL-MCNC: 9 MG/DL (ref 8.3–10.4)
CALCIUM SERPL-MCNC: 9.3 MG/DL (ref 8.3–10.4)
CHLORIDE SERPL-SCNC: 106 MMOL/L (ref 98–107)
CHLORIDE SERPL-SCNC: 107 MMOL/L (ref 98–107)
CO2 SERPL-SCNC: 29 MMOL/L (ref 21–32)
CO2 SERPL-SCNC: 31 MMOL/L (ref 21–32)
CREAT SERPL-MCNC: 0.75 MG/DL (ref 0.8–1.5)
CREAT SERPL-MCNC: 0.75 MG/DL (ref 0.8–1.5)
DIFFERENTIAL METHOD BLD: ABNORMAL
EOSINOPHIL # BLD: 0.5 K/UL (ref 0–0.8)
EOSINOPHIL NFR BLD: 7 % (ref 0.5–7.8)
ERYTHROCYTE [DISTWIDTH] IN BLOOD BY AUTOMATED COUNT: 12.8 % (ref 11.9–14.6)
FIBRINOGEN PPP-MCNC: 297 MG/DL (ref 190–501)
GLOBULIN SER CALC-MCNC: 2.7 G/DL (ref 2.3–3.5)
GLUCOSE BLD STRIP.AUTO-MCNC: 156 MG/DL (ref 65–100)
GLUCOSE BLD STRIP.AUTO-MCNC: 159 MG/DL (ref 65–100)
GLUCOSE BLD STRIP.AUTO-MCNC: 93 MG/DL (ref 65–100)
GLUCOSE SERPL-MCNC: 118 MG/DL (ref 65–100)
GLUCOSE SERPL-MCNC: 171 MG/DL (ref 65–100)
HCT VFR BLD AUTO: 40.9 % (ref 41.1–50.3)
HGB BLD-MCNC: 13.7 G/DL (ref 13.6–17.2)
IMM GRANULOCYTES # BLD AUTO: 0 K/UL (ref 0–0.5)
IMM GRANULOCYTES NFR BLD AUTO: 1 % (ref 0–5)
LDLC SERPL DIRECT ASSAY-MCNC: 47 MG/DL
LYMPHOCYTES # BLD: 2.6 K/UL (ref 0.5–4.6)
LYMPHOCYTES NFR BLD: 36 % (ref 13–44)
MAGNESIUM SERPL-MCNC: 1.9 MG/DL (ref 1.8–2.4)
MCH RBC QN AUTO: 31.7 PG (ref 26.1–32.9)
MCHC RBC AUTO-ENTMCNC: 33.5 G/DL (ref 31.4–35)
MCV RBC AUTO: 94.7 FL (ref 79.6–97.8)
MONOCYTES # BLD: 0.8 K/UL (ref 0.1–1.3)
MONOCYTES NFR BLD: 11 % (ref 4–12)
NEUTS SEG # BLD: 3.2 K/UL (ref 1.7–8.2)
NEUTS SEG NFR BLD: 45 % (ref 43–78)
NRBC # BLD: 0 K/UL (ref 0–0.2)
PLATELET # BLD AUTO: 216 K/UL (ref 150–450)
PMV BLD AUTO: 10.7 FL (ref 9.4–12.3)
POTASSIUM SERPL-SCNC: 3.7 MMOL/L (ref 3.5–5.1)
POTASSIUM SERPL-SCNC: 4.3 MMOL/L (ref 3.5–5.1)
PROT SERPL-MCNC: 6.5 G/DL (ref 6.3–8.2)
RBC # BLD AUTO: 4.32 M/UL (ref 4.23–5.6)
SODIUM SERPL-SCNC: 140 MMOL/L (ref 138–145)
SODIUM SERPL-SCNC: 141 MMOL/L (ref 136–145)
TRIGL SERPL-MCNC: 523 MG/DL (ref 35–150)
WBC # BLD AUTO: 7.1 K/UL (ref 4.3–11.1)

## 2020-12-01 PROCEDURE — 74011250636 HC RX REV CODE- 250/636: Performed by: INTERNAL MEDICINE

## 2020-12-01 PROCEDURE — 74011000250 HC RX REV CODE- 250: Performed by: INTERNAL MEDICINE

## 2020-12-01 PROCEDURE — 74011636637 HC RX REV CODE- 636/637: Performed by: INTERNAL MEDICINE

## 2020-12-01 PROCEDURE — 90471 IMMUNIZATION ADMIN: CPT

## 2020-12-01 PROCEDURE — 90686 IIV4 VACC NO PRSV 0.5 ML IM: CPT | Performed by: INTERNAL MEDICINE

## 2020-12-01 PROCEDURE — 99232 SBSQ HOSP IP/OBS MODERATE 35: CPT | Performed by: INTERNAL MEDICINE

## 2020-12-01 PROCEDURE — 74011250636 HC RX REV CODE- 250/636: Performed by: NURSE PRACTITIONER

## 2020-12-01 PROCEDURE — 84478 ASSAY OF TRIGLYCERIDES: CPT

## 2020-12-01 PROCEDURE — 74011250637 HC RX REV CODE- 250/637: Performed by: INTERNAL MEDICINE

## 2020-12-01 PROCEDURE — 36591 DRAW BLOOD OFF VENOUS DEVICE: CPT

## 2020-12-01 PROCEDURE — 36514 APHERESIS PLASMA: CPT

## 2020-12-01 PROCEDURE — 82962 GLUCOSE BLOOD TEST: CPT

## 2020-12-01 PROCEDURE — P9045 ALBUMIN (HUMAN), 5%, 250 ML: HCPCS | Performed by: INTERNAL MEDICINE

## 2020-12-01 PROCEDURE — 2709999900 HC NON-CHARGEABLE SUPPLY

## 2020-12-01 PROCEDURE — 85025 COMPLETE CBC W/AUTO DIFF WBC: CPT

## 2020-12-01 PROCEDURE — 83721 ASSAY OF BLOOD LIPOPROTEIN: CPT

## 2020-12-01 PROCEDURE — 74011000258 HC RX REV CODE- 258: Performed by: INTERNAL MEDICINE

## 2020-12-01 PROCEDURE — 85730 THROMBOPLASTIN TIME PARTIAL: CPT

## 2020-12-01 PROCEDURE — 83735 ASSAY OF MAGNESIUM: CPT

## 2020-12-01 PROCEDURE — 85384 FIBRINOGEN ACTIVITY: CPT

## 2020-12-01 PROCEDURE — 80053 COMPREHEN METABOLIC PANEL: CPT

## 2020-12-01 RX ORDER — DIPHENHYDRAMINE HYDROCHLORIDE 50 MG/ML
25 INJECTION, SOLUTION INTRAMUSCULAR; INTRAVENOUS AS NEEDED
Status: CANCELLED
Start: 2020-12-01

## 2020-12-01 RX ORDER — ONDANSETRON 2 MG/ML
8 INJECTION INTRAMUSCULAR; INTRAVENOUS AS NEEDED
Status: CANCELLED | OUTPATIENT
Start: 2020-12-01

## 2020-12-01 RX ORDER — ALBUTEROL SULFATE 0.83 MG/ML
2.5 SOLUTION RESPIRATORY (INHALATION) AS NEEDED
Status: CANCELLED
Start: 2020-12-01

## 2020-12-01 RX ORDER — SODIUM CHLORIDE 0.9 % (FLUSH) 0.9 %
10 SYRINGE (ML) INJECTION AS NEEDED
Status: DISCONTINUED | OUTPATIENT
Start: 2020-12-01 | End: 2020-12-01 | Stop reason: HOSPADM

## 2020-12-01 RX ORDER — DIPHENHYDRAMINE HCL 25 MG
25 CAPSULE ORAL
Status: DISCONTINUED | OUTPATIENT
Start: 2020-12-01 | End: 2020-12-01 | Stop reason: HOSPADM

## 2020-12-01 RX ORDER — SODIUM CHLORIDE 9 MG/ML
25 INJECTION, SOLUTION INTRAVENOUS CONTINUOUS
Status: DISCONTINUED | OUTPATIENT
Start: 2020-12-01 | End: 2020-12-01 | Stop reason: HOSPADM

## 2020-12-01 RX ORDER — ALBUMIN HUMAN 50 G/1000ML
3486 SOLUTION INTRAVENOUS ONCE
Status: COMPLETED | OUTPATIENT
Start: 2020-12-01 | End: 2020-12-01

## 2020-12-01 RX ORDER — HYDROCODONE BITARTRATE AND ACETAMINOPHEN 7.5; 325 MG/1; MG/1
1 TABLET ORAL
Qty: 12 TAB | Refills: 0 | Status: SHIPPED | OUTPATIENT
Start: 2020-12-01 | End: 2020-12-04

## 2020-12-01 RX ORDER — EPINEPHRINE 1 MG/ML
0.3 INJECTION, SOLUTION, CONCENTRATE INTRAVENOUS AS NEEDED
Status: CANCELLED | OUTPATIENT
Start: 2020-12-01

## 2020-12-01 RX ORDER — SODIUM CHLORIDE 9 MG/ML
10 INJECTION INTRAMUSCULAR; INTRAVENOUS; SUBCUTANEOUS AS NEEDED
Status: CANCELLED | OUTPATIENT
Start: 2020-12-01

## 2020-12-01 RX ORDER — DIPHENHYDRAMINE HYDROCHLORIDE 50 MG/ML
50 INJECTION, SOLUTION INTRAMUSCULAR; INTRAVENOUS AS NEEDED
Status: CANCELLED
Start: 2020-12-01

## 2020-12-01 RX ORDER — ACETAMINOPHEN 325 MG/1
650 TABLET ORAL
Status: CANCELLED | OUTPATIENT
Start: 2020-12-01

## 2020-12-01 RX ORDER — HYDROCORTISONE SODIUM SUCCINATE 100 MG/2ML
100 INJECTION, POWDER, FOR SOLUTION INTRAMUSCULAR; INTRAVENOUS AS NEEDED
Status: CANCELLED | OUTPATIENT
Start: 2020-12-01

## 2020-12-01 RX ORDER — ACETAMINOPHEN 325 MG/1
650 TABLET ORAL AS NEEDED
Status: CANCELLED
Start: 2020-12-01

## 2020-12-01 RX ADMIN — INSULIN LISPRO 2 UNITS: 100 INJECTION, SOLUTION INTRAVENOUS; SUBCUTANEOUS at 09:03

## 2020-12-01 RX ADMIN — INSULIN LISPRO 2 UNITS: 100 INJECTION, SOLUTION INTRAVENOUS; SUBCUTANEOUS at 12:51

## 2020-12-01 RX ADMIN — HYDROMORPHONE HYDROCHLORIDE 0.5 MG: 1 INJECTION, SOLUTION INTRAMUSCULAR; INTRAVENOUS; SUBCUTANEOUS at 06:24

## 2020-12-01 RX ADMIN — INSULIN LISPRO 12 UNITS: 100 INJECTION, SOLUTION INTRAVENOUS; SUBCUTANEOUS at 09:02

## 2020-12-01 RX ADMIN — ATORVASTATIN CALCIUM 80 MG: 80 TABLET, FILM COATED ORAL at 08:38

## 2020-12-01 RX ADMIN — AZITHROMYCIN MONOHYDRATE 500 MG: 250 TABLET ORAL at 15:47

## 2020-12-01 RX ADMIN — BUPROPION HYDROCHLORIDE 100 MG: 100 TABLET, FILM COATED ORAL at 08:38

## 2020-12-01 RX ADMIN — SODIUM CHLORIDE 25 ML/HR: 900 INJECTION, SOLUTION INTRAVENOUS at 14:05

## 2020-12-01 RX ADMIN — INFLUENZA VIRUS VACCINE 0.5 ML: 15; 15; 15; 15 SUSPENSION INTRAMUSCULAR at 17:13

## 2020-12-01 RX ADMIN — GABAPENTIN 300 MG: 300 CAPSULE ORAL at 15:47

## 2020-12-01 RX ADMIN — BUPROPION HYDROCHLORIDE 100 MG: 100 TABLET, FILM COATED ORAL at 18:16

## 2020-12-01 RX ADMIN — CEFTRIAXONE SODIUM 1 G: 1 INJECTION, POWDER, FOR SOLUTION INTRAMUSCULAR; INTRAVENOUS at 15:46

## 2020-12-01 RX ADMIN — RDII 250 MG CAPSULE 250 MG: at 08:38

## 2020-12-01 RX ADMIN — GABAPENTIN 300 MG: 300 CAPSULE ORAL at 08:38

## 2020-12-01 RX ADMIN — PRASUGREL 10 MG: 10 TABLET, FILM COATED ORAL at 09:46

## 2020-12-01 RX ADMIN — INSULIN LISPRO 12 UNITS: 100 INJECTION, SOLUTION INTRAVENOUS; SUBCUTANEOUS at 12:51

## 2020-12-01 RX ADMIN — ENOXAPARIN SODIUM 40 MG: 40 INJECTION SUBCUTANEOUS at 08:38

## 2020-12-01 RX ADMIN — ISOSORBIDE MONONITRATE 30 MG: 30 TABLET, EXTENDED RELEASE ORAL at 08:38

## 2020-12-01 RX ADMIN — INSULIN GLARGINE 40 UNITS: 100 INJECTION, SOLUTION SUBCUTANEOUS at 09:02

## 2020-12-01 RX ADMIN — Medication 10 ML: at 05:08

## 2020-12-01 RX ADMIN — CALCIUM 500 MG: 500 TABLET ORAL at 15:47

## 2020-12-01 RX ADMIN — CALCIUM 500 MG: 500 TABLET ORAL at 08:38

## 2020-12-01 RX ADMIN — HYDROCODONE BITARTRATE AND ACETAMINOPHEN 1 TABLET: 5; 325 TABLET ORAL at 09:36

## 2020-12-01 RX ADMIN — CARVEDILOL 6.25 MG: 6.25 TABLET, FILM COATED ORAL at 08:39

## 2020-12-01 RX ADMIN — CARVEDILOL 6.25 MG: 6.25 TABLET, FILM COATED ORAL at 18:18

## 2020-12-01 RX ADMIN — ALBUMIN (HUMAN) 174.3 G: 12.5 INJECTION, SOLUTION INTRAVENOUS at 14:04

## 2020-12-01 RX ADMIN — CALCIUM GLUCONATE 2 G: 98 INJECTION, SOLUTION INTRAVENOUS at 13:58

## 2020-12-01 RX ADMIN — ASPIRIN 81 MG: 81 TABLET ORAL at 08:38

## 2020-12-01 RX ADMIN — FENOFIBRATE 160 MG: 160 TABLET ORAL at 08:39

## 2020-12-01 RX ADMIN — HYDROCODONE BITARTRATE AND ACETAMINOPHEN 1 TABLET: 5; 325 TABLET ORAL at 04:43

## 2020-12-01 RX ADMIN — RDII 250 MG CAPSULE 250 MG: at 18:16

## 2020-12-01 RX ADMIN — Medication 10 ML: at 15:45

## 2020-12-01 NOTE — DISCHARGE INSTRUCTIONS
Patient Education      Discharge Nutrition Plan: Recommend an appointment with nutrition therapy in outpatient setting. Contact the [facility] [place]ment line at [phone] for more information, or have your doctor send referral to [facility]. Fax #: [phone]. Discharge Nutrition Plan:   Ask your provider to send referral to [facility] ([phone]) for comprehensive diabetes education and classes and/or [facility] ([phone]) for one on one nutrition counseling. Back Pain: Care Instructions  Your Care Instructions     Back pain has many possible causes. It is often related to problems with muscles and ligaments of the back. It may also be related to problems with the nerves, discs, or bones of the back. Moving, lifting, standing, sitting, or sleeping in an awkward way can strain the back. Sometimes you don't notice the injury until later. Arthritis is another common cause of back pain. Although it may hurt a lot, back pain usually improves on its own within several weeks. Most people recover in 12 weeks or less. Using good home treatment and being careful not to stress your back can help you feel better sooner. Follow-up care is a key part of your treatment and safety. Be sure to make and go to all appointments, and call your doctor if you are having problems. It's also a good idea to know your test results and keep a list of the medicines you take. How can you care for yourself at home? · Sit or lie in positions that are most comfortable and reduce your pain. Try one of these positions when you lie down:  ? Lie on your back with your knees bent and supported by large pillows. ? Lie on the floor with your legs on the seat of a sofa or chair. ? Lie on your side with your knees and hips bent and a pillow between your legs. ? Lie on your stomach if it does not make pain worse.   · Do not sit up in bed, and avoid soft couches and twisted positions. Bed rest can help relieve pain at first, but it delays healing. Avoid bed rest after the first day of back pain. · Change positions every 30 minutes. If you must sit for long periods of time, take breaks from sitting. Get up and walk around, or lie in a comfortable position. · Try using a heating pad on a low or medium setting for 15 to 20 minutes every 2 or 3 hours. Try a warm shower in place of one session with the heating pad. · You can also try an ice pack for 10 to 15 minutes every 2 to 3 hours. Put a thin cloth between the ice pack and your skin. · Take pain medicines exactly as directed. ? If the doctor gave you a prescription medicine for pain, take it as prescribed. ? If you are not taking a prescription pain medicine, ask your doctor if you can take an over-the-counter medicine. · Take short walks several times a day. You can start with 5 to 10 minutes, 3 or 4 times a day, and work up to longer walks. Walk on level surfaces and avoid hills and stairs until your back is better. · Return to work and other activities as soon as you can. Continued rest without activity is usually not good for your back. · To prevent future back pain, do exercises to stretch and strengthen your back and stomach. Learn how to use good posture, safe lifting techniques, and proper body mechanics. When should you call for help? Call your doctor now or seek immediate medical care if:    · You have new or worsening numbness in your legs.     · You have new or worsening weakness in your legs. (This could make it hard to stand up.)     · You lose control of your bladder or bowels. Watch closely for changes in your health, and be sure to contact your doctor if:    · You have a fever, lose weight, or don't feel well.     · You do not get better as expected. Where can you learn more?   Go to http://www.gray.com/  Enter I594 in the search box to learn more about \"Back Pain: Care Instructions. \"  Current as of: March 2, 2020               Content Version: 12.6  © 6623-8501 [employer]. Care instructions adapted under license by [employer] (which disclaims liability or warranty for this information). If you have questions about a medical condition or this instruction, always ask your healthcare professional. [address] any warranty or liability for your use of this information. Patient Education        Learning About Relief for Back Pain  What is back strain? Back strain is an injury that happens when you overstretch, or pull, a muscle in your back. You may hurt your back in an accident or when you exercise or lift something. Most back pain gets better with rest and time. You can take care of yourself at home to help your back heal.  What can you do first to relieve back pain? When you first feel back pain, try these steps:  · Walk. Take a short walk (10 to 20 minutes) on a level surface (no slopes, hills, or stairs) every 2 to 3 hours. Walk only distances you can manage without pain, especially leg pain. · Relax. Find a comfortable position for rest. Some people are comfortable on the floor or a medium-firm bed with a small pillow under their head and another under their knees. Some people prefer to lie on their side with a pillow between their knees. Don't stay in one position for too long. · Try heat or ice. Try using a heating pad on a low or medium setting, or take a warm shower, for 15 to 20 minutes every 2 to 3 hours. Or you can buy single-use heat wraps that last up to 8 hours. You can also try an ice pack for 10 to 15 minutes every 2 to 3 hours. You can use an ice pack or a bag of frozen vegetables wrapped in a thin towel. There is not strong evidence that either heat or ice will help, but you can try them to see if they help. You may also want to try switching between heat and cold.   · Take pain medicine exactly as directed. ? If the doctor gave you a prescription medicine for pain, take it as prescribed. ? If you are not taking a prescription pain medicine, ask your doctor if you can take an over-the-counter medicine. What else can you do? · Stretch and exercise. Exercises that increase flexibility may relieve your pain and make it easier for your muscles to keep your spine in a good, neutral position. And don't forget to keep walking. · Do self-massage. You can use self-massage to unwind after work or school or to energize yourself in the morning. You can easily massage your feet, hands, or neck. Self-massage works best if you are in comfortable clothes and are sitting or lying in a comfortable position. Use oil or lotion to massage bare skin. · Reduce stress. Back pain can lead to a vicious [place]: Distress about the pain tenses the muscles in your back, which in turn causes more pain. Learn how to relax your mind and your muscles to lower your stress. Where can you learn more? Go to http://www.gray.com/  Enter Q517 in the search box to learn more about \"Learning About Relief for Back Pain. \"  Current as of: March 2, 2020               Content Version: 12.6  © 1258-4920 [employer]. Care instructions adapted under license by [employer] (which disclaims liability or warranty for this information). If you have questions about a medical condition or this instruction, always ask your healthcare professional. [place] [address] any warranty or liability for your use of this information.   CALL MD if you experience SOB, loss of taste or smell or any other symptoms of covid 19

## 2020-12-01 NOTE — PROGRESS NOTES
Care Management Interventions  Mode of Transport at Discharge: Self  Transition of Care Consult (CM Consult): Discharge Planning  Discharge Durable Medical Equipment: No  Physical Therapy Consult: Yes(OP PT)  Occupational Therapy Consult: No  Speech Therapy Consult: No  Current Support Network: Lives Alone, Family Lives Nearby  Confirm Follow Up Transport: Family  The Plan for Transition of Care is Related to the Following Treatment Goals : Outpatient PT  The Patient and/or Patient Representative was Provided with a Choice of Provider and Agrees with the Discharge Plan?: Yes  Name of the Patient Representative Who was Provided with a Choice of Provider and Agrees with the Discharge Plan: Patient   Freedom of Choice List was Provided with Basic Dialogue that Supports the Patient's Individualized Plan of Care/Goals, Treatment Preferences and Shares the Quality Data Associated with the Providers?: Yes  [place] Resource Information Provided?: No  Discharge Location  Discharge Placement: Home with outpatient services    CM talked with pt by phone as pt remains on TB precautions. Pt states he rents the room over the garage at this mother's house. 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 He is fearful to live away from assistance since MI. Pt has grown children who assist with transportation as well as his mother when needed. Pt doesn't drive. Pt has 15 stairs to entrance of apartment with a landing and bannister. Pt has no DME. Pt is agreeable to PT recommendation for OP PT. PT had previously been in therapy at the [facility] and wishes to return. CM sent referral to the [address] [place] [address]. Location. Pt does not have a PCP since his Dr left the group. Pt is agreeable to [place] with [facility]. CM sent referral.   CM followed up with [facility] for possible rental of APAP for 1 month until pt can have sleep study. CM was informed that they will not rent a pressurized machine without a sleep study. Overnight oximetry has not been done this admission for possible nocturnal O2. CM will discuss with Dr Vikas Ziegler if pt is not discharged this day. CM following up order for pain management follow up. Await decision for bronchoscopy during this admission or after discharge. CM to continue to follow and update DCP. 65 CM called [facility] OP facility in [place] to verify referral and to inform them of pt's discharge. LVOM. Pt contacted by phone to discuss pt's PCP remains at [facility]. Pt will contact for f/u within a week. Pt received referral to [facility]. CM informed pt their contact information will be on his AVS.  Pt verifies understanding of all teaching. 1600 CM received a callback from [facility] with OP PT. She will contact pt in his room to set up schedule of therapy.

## 2020-12-01 NOTE — PROGRESS NOTES
Pt to discharge home this evening. Per Dr. Becki Hamilton, may give pt Rocephin IV prior to removing line, pt may have diabetic, 2gm sodium diet.

## 2020-12-01 NOTE — PROGRESS NOTES
Procedure: Therapeutic Plasmapheresis  Dx: hypertriglyceridemia  Day: 3 of 3  Labs drawn: CBC,CMP,Mag,PTT,Fibrinogen  Fibrinogen: 297  Calcium used:  2 grams  Replacement product: ALbumin  Replace volume: 3112  /  Plasma Remove volume: 3391  Fluid Balance: 100%  TPV: 1  Issues: none  Tolerated procedure: well

## 2020-12-01 NOTE — DISCHARGE SUMMARY
Hospitalist Discharge Summary     Admit Date:  2020  6:55 PM   DC note date: 2020  Name:  Sandi Comment   Age:  52 y.o.  :  1970   MRN:  [id number]   PCP:  LIBERTY eFrnandez  Treatment Team: Attending Provider: Merlin Beckwith MD; Consulting Provider: Reynold Membreno MD; Utilization Review: Antonia Fernandez RN; Care Manager: Neo Gonzalez RN; Consulting Provider: Alexandra Lopez MD; Utilization Review:  Kolton Gil; Primary Nurse: Dmitriy Velazquez RN; Physical Therapist: Ivette Mccarty    Problem List for this Hospitalization:  Hospital Problems as of 2020 Date Reviewed: 2020          Codes Class Noted - Resolved POA    Pulmonary infiltrates ICD-10-CM: R91.8  ICD-9-CM: 793.19  2020 - Present Yes        Weight loss, unintentional  - over 40 lbs in 3 months ICD-10-CM: R63.4  ICD-9-CM: 783.21  2020 - Present Yes        Seizure disorder ([address] ICD-10-CM: G40.909  ICD-9-CM: 345.90  3/9/2020 - Present Yes    Overview Signed 3/9/2020 11:24 AM by Morro Barnard MD       eeg abnormal  CT brain neg  Was on phenobarbital  Off med since   latelyt subtle symptoms of  Atypical seizure             BMI 35.0-35.9,adult ICD-10-CM: Z68.35  ICD-9-CM: V85.35  2020 - Present Yes        Hypertriglyceridemia ICD-10-CM: E78.1  ICD-9-CM: 272.1  3/19/2018 - Present Yes    Overview Addendum 2019  7:41 PM by Morro Barnard MD     Overview:   Overview:   Much better now on lofibra--doing well  Advised more stricter low carb diet and aggressive control of DM--pt willing  F/u in few months for recheck  2019  Statin/lofibra not helping--will add fish oil and niacin  F/u in few weeks for recheck             CAD (coronary artery disease) ICD-10-CM: I25.10  ICD-9-CM: 414.00  2/15/2018 - Present Yes    Overview Signed 3/2/2018  1:36 PM by Morro Munoz., MD     Stents  Sees cardiologist  aspirin             Acute pancreatitis ICD-10-CM: K85.90  ICD-9-CM: 577.0  2/15/2018 - Present     Overview Signed 5/20/2019  9:29 AM by Neo SIMMS     Overview:   Overview:   Sec to hypertriglyceridemia  Resolved now 3/2018  Few tramadol dispensed for pain management             Diabetes mellitus type II, uncontrolled ([facility] (Chronic) ICD-10-CM: E11.65  ICD-9-CM: 250.02  12/15/2016 - Present Yes    Overview Addendum 6/19/2018 11:35 AM by Shantel Avalos MD     Now on lantus and humalog prn  goor control BS under 150   Sliding scale given-multiples of 4  Pt on metformit 500 mg bid 3/2019 and  lantus  25 units  Diet discussed  Labs today  F/u every 3 months             RESOLVED: Hypokalemia ICD-10-CM: E87.6  ICD-9-CM: 276.8  11/28/2020 - 12/1/2020 Yes        * (Principal) RESOLVED: Intractable back pain ICD-10-CM: M54.9  ICD-9-CM: 724.5  11/25/2020 - 12/1/2020 Yes        RESOLVED: DKA (diabetic ketoacidoses) ([facility] ICD-10-CM: E11.10  ICD-9-CM: 250.12  2/15/2018 - 12/1/2020 Yes            Hospital Course:  Mr. Maxwell Collet is a pleasant 53 y/o WM with a h/o CAD s/p multiple stents, GERD, hypertriglyceridemia and associated acute pancreatitis requiring apheresis, psoriatic arthritis on Cosentyx, DM, seizure disorder, HTN, who presented to the ED on 11/25 with intractable back pain that radiates to the side of his chest w/ mild dyspnea, cough and chills, unintentional weight loss of 40 pounds since August (right after starting Cosentyx).  Work-up in the ED showed DKA and elevated triglycerides. CT chest with multiple ill-defined nodules in the left lung. He was initially admitted to the ICU for DKA with insulin drip.  DKA resolved.  Patient was then transferred to [facility] for apheresis due to hypertriglyceridemia. He underwent IR guided right IJ placement on 11/25 and in total has had 3 sessions of apheresis. Pulmonology initially was planning a bronchoscopy though this was cancelled. Patient has 2 negative AFB sputum cultures, 3rd was collected and still pending. He has been afebrile. Last apheresis session today. Neurosurgery saw for low back pain with MRI showing lumbar DDD, mild chronic changes, otherwise non-surgical issues. Will refer to Pain Management at discharge. Cardiology was consulted for chest pain and not felt to be ACS. D/w Pulm today and no plans for bronchoscopy. He can f/u with them in 4-6 weeks with repeat CT chest. He already has an Endocrinology appointment on 12/18. He's had at least 2 other episodes of triglyceride induced acute pancreatitis requiring apheresis and may be a good candidate for Vascepa or Repatha. He completed 7d antibiotics. His hospital course was otherwise unremarkable and he is medically stable for discharge home today. Con't isolation precautions as we would normally advise during COVID. Disposition: Home or Self Care  Activity: Activity as tolerated  Diet: DIET NPO  Code Status: Full Code    Follow Up Orders: Follow-up Appointments   Procedures    FOLLOW UP VISIT Appointment in: 3 - 5 Days Pain Management     Pain Management     Standing Status:   Standing     Number of Occurrences:   1     Order Specific Question:   Appointment in     Answer:   3 - 5 Days       Follow-up Information     Follow up With Specialties Details Why Contact Info    Pat Taylor Physician Assistant   [address] [facility] [address] [place] [phone]      [address] [place] [address] [facility] [address] [place] [address]  Call Establish care for chronic back pain/pain management [place] [phone]  [phone] [phone]          Discharge meds at bottom of this note. Plan was discussed with patient, nursing, CM, Pulmonology. All questions answered. Patient was stable at time of discharge. Given instructions to call a physician or return if any concerns. Discharge summary and encounter summary was sent to PCP electronically via \"Comm Mgt\" link in [facility], if possible.     Diagnostic Imaging/Tests:   Mri Lumb Spine Wo Cont    Result Date: 11/26/2020  Exam: MRI lumbar spine without contrast. Indication: Back pain with foot drop. Comparison: None. Contrast: None Technique: Multiplanar multisequence imaging of the lumbar spine was performed without contrast. FINDINGS: There are 5 nonrib-bearing lumbar-type vertebral bodies. Minimal grade 1 anterolisthesis of L3 on L4. Vertebral body heights are preserved. Marrow signal is without acute or aggressive abnormality. The conus medullaris terminates at T12-L1. Cauda equina nerve roots are unremarkable. Prevertebral and paraspinal soft tissues are within normal limits. Multilevel disc desiccation throughout the lumbar spine with scattered Schmorl's nodes. T11-T12: Central disc osteophyte protrusion results in likely moderate spinal canal stenosis, only evaluated on the sagittal sequences. No neuroforaminal stenosis. There is focal cord signal abnormality and volume loss at this level compatible with chronic myelomalacia. T12-L1: No spinal canal or neuroforaminal stenosis. L1-L2: Broad-based central, right paracentral, and right foraminal disc protrusion resulting in mild spinal canal stenosis with effacement of the right lateral recess and mild right neuroforaminal stenosis. The left neural foramen is patent. L2-L3: No spinal canal or neuroforaminal stenosis. L3-L4: Diffuse disc bulge with superimposed central disc extrusion. Bilateral facet arthropathy. No spinal canal stenosis. Mild to moderate bilateral neuroforaminal stenoses. L4-L5: Mild bilateral facet arthropathy. No spinal canal or neuroforaminal stenosis. L5-S1: Bilateral facet arthropathy. No spinal canal or neuroforaminal stenosis. IMPRESSION: 1. Multilevel degenerative disc disease and facet arthropathy. 2. Central disc osteophyte protrusion at T11-T12 results in moderate spinal canal stenosis associated with chronic cord myelomalacia.  3. Broad disc protrusion at L1-L2 results in mild spinal canal stenosis, right lateral recess effacement, and mild right neuroforaminal stenosis. 4. Diffuse disc bulge and facet arthropathy at L3-L4 results in mild to moderate bilateral neuroforaminal stenoses. Ct Chest Abd Pelv W Cont    Result Date: 11/24/2020  CT of the Chest, Abdomen, and Pelvis INDICATION: Increasing back and chest pain, unexpected weight loss Multiple axial images were obtained through the chest, abdomen, and pelvis. Oral contrast was used for bowel opacification. 100mL of Isovue 370 intravenous contrast was used for better evaluation of solid organs and vascular structures. Radiation dose reduction techniques were used for this study. All CT scans performed at this facility use one or all of the following: Automated exposure control, adjustment of the mA and/or kVp according to patient's size, iterative reconstruction. COMPARISON: Abdomen CT dated 11/27/2018 FINDINGS: -LUNGS: Multiple small ill-defined nodular areas in the left lung, upper lobe predominant. Largest lesion is 8 mm. The right lung is clear. No associated effusion. -MEDIASTINUM/AXILLA: No significant adenopathy. -HEART/VESSELS: There is moderate vascular calcification. Heart size is normal.  There is normal enhancement of the thoracic aorta and pulmonary arteries. -CHEST WALL: Normal. -LIVER: There is hepatomegaly and diffuse fatty infiltration of liver. No discrete liver mass. -GALLBLADDER/BILE DUCTS: Postcholecystectomy. -PANCREAS: Normal. -SPLEEN: Normal. -ADRENALS:  There is a stable 3.4 cm fat attenuation left adrenal mass, likely a myelolipoma. Right adrenal gland is unremarkable. -KIDNEYS/URETERS: No hydronephrosis or significant mass. -BLADDER: Normal. -REPRODUCTIVE ORGANS: No pelvic masses. -BOWEL: Normal caliber. No inflammatory changes. -LYMPH NODES: No significant retroperitoneal, mesenteric, or pelvic adenopathy. -BONES: Postoperative changes in the left hip. No acute fracture. -VASCULATURE: Normal -OTHER: No ascites. IMPRESSION: 1. Multiple ill-defined nodules in the left lung, most likely atypical infection such as fungal or tuberculosis. 2.  Hepatomegaly and steatosis. 3.  No evidence of pulmonary embolus or aortic dissection. 4.  Extensive coronary artery calcification. If there are any questions about this report, I can be reached on [employer] or at [address]    Result Date: 11/24/2020  RIGHT UPPER QUADRANT ULTRASOUND 11/24/2020 HISTORY: Moderate right upper quadrant pain for one month. TECHNIQUE: Sonographic imaging of the right upper quadrant was performed. COMPARISON: CT abdomen and pelvis 11/27/2018 FINDINGS: Changes of a cholecystectomy are present. The common bile duct is 5 mm in diameter. There is no intrahepatic biliary ductal dilatation. The liver parenchyma is diffusely echogenic relative to the renal cortex. This finding may be present with steatosis. The right kidney is 13.1 cm in length. There is no hydronephrosis. The distal abdominal aorta is 1.9 cm in diameter. The IVC is patent. IMPRESSION: Hepatic steatosis status post cholecystectomy. Xr Chest Port    Result Date: 11/24/2020  History: Chest pain, shortness of breath Exam: portable chest Comparison: 3/25/2020 Findings: There is apparent new asymmetric opacity seen at the left lung base adjacent heart shadow. Otherwise, the lungs are clear. Mediastinal contour and osseous structures are normal. Impressions: Apparent new asymmetric lung opacity at the left lung base.  Correlation with PA and lateral chest x-ray recommended for further evaluation, as the finding could be artifactual.       Echocardiogram/EKG results:  Results for orders placed or performed during the hospital encounter of 11/25/20   2D ECHO COMPLETE ADULT (TTE) W OR WO CONTR    Narrative    [facility] [address]  [phone]    Transthoracic Echocardiogram  2D, M-mode, Doppler, and Color Doppler    Patient: Edna Rodrigues Beryl Warren  MR #: [id number]  : 1970  Age: 52 years  Gender: Male  Study date: 2020  Account #: [de-identified]  Height: 70 in  Weight: 252.6 lb  BSA: 2.31 mï¾²  Status:Routine  Location: [facility]  BP: 137/ 75    Allergies: PEANUT, MORPHINE    Sonographer:  SOFIA Moreno  Group:  [facility]  Referring Physician:  Ricky Potter. Lucero Fagan MD  Reading Physician:  Amador Hewitt MD [facility]    INDICATIONS: SOB/Chest Pain    PROCEDURE: This was a routine study. A transthoracic echocardiogram was  performed. The study included complete 2D imaging, M-mode, complete spectral  Doppler, and color Doppler. Image quality was adequate. LEFT VENTRICLE: Size was normal. Systolic function was normal. Ejection  fraction was estimated in the range of 55 % to 60 %. There were no regional  wall motion abnormalities. Wall thickness was normal. Left ventricular  diastolic function parameters were normal. Avg E/e': 8.96.    RIGHT VENTRICLE: The size was normal. Systolic function was normal. The  tricuspid jet envelope definition was inadequate for estimation of RV   systolic  pressure. LEFT ATRIUM: Size was normal.    RIGHT ATRIUM: Size was normal.    SYSTEMIC VEINS: IVC: The inferior vena cava was mildly dilated. The  respirophasic change in diameter was more than 50%. AORTIC VALVE: The valve was structurally normal, tri-commissural. There was   no  evidence for stenosis. There was no insufficiency. MITRAL VALVE: Valve structure was normal. There was no evidence for stenosis. There was trivial regurgitation. TRICUSPID VALVE: The valve structure was normal. There was no evidence for  stenosis. There was trivial regurgitation. PULMONIC VALVE: Not well visualized. There was no evidence for stenosis. There  was no insufficiency. PERICARDIUM: There was no pericardial effusion. AORTA: The root exhibited normal size.     SUMMARY:    -  Left ventricle: Systolic function was normal. Ejection fraction was  estimated in the range of 55 % to 60 %. There were no regional wall motion  abnormalities. -  Inferior vena cava, hepatic veins: The inferior vena cava was mildly  dilated. The respirophasic change in diameter was more than 50%. SYSTEM MEASUREMENT TABLES    2D  Ao Diam: 3.3 cm  LA Diam: 3.9 cm  LAEDV Index (A-L): 26.9 ml/m2  %FS: 44.2 %  IVSd: 1 cm  LVIDd: 4.5 cm  LVIDs: 2.5 cm  LVOT Diam: 2 cm  LVPWd: 1 cm    PW  E/E' Av  E/E' Lat: 8.1  E/E' Sept: 10    Prepared and signed by    Abida Menjivar. Mell Arana MD [facility]  Signed [id number] 18:01:22         Results for orders placed or performed during the hospital encounter of 20   EKG, 12 LEAD, INITIAL   Result Value Ref Range    Ventricular Rate 118 BPM    Atrial Rate 118 BPM    P-R Interval 176 ms    QRS Duration 96 ms    Q-T Interval 304 ms    QTC Calculation (Bezet) 426 ms    Calculated P Axis 71 degrees    Calculated R Axis 81 degrees    Calculated T Axis 38 degrees    Diagnosis       Sinus tachycardia  Otherwise normal ECG  When compared with ECG of 25-MAR-2020 12:47,  Vent. rate has increased BY  40 BPM  Nonspecific T wave abnormality, worse in Inferior leads  Confirmed by CED SAENZ (), Jocy Antoine ([id number]) on 2020 4:16:30 PM         Procedures done this admission:  IR guided RIJ central line for apheresis.     All Micro Results     Procedure Component Value Units Date/Time    AFB CULTURE + SMEAR W/RFLX ID FROM CULTURE [[id number]]     Order Status:  Sent     AFB CULTURE + SMEAR W/RFLX ID FROM CULTURE [[id number]]     Order Status:  Sent     AFB CULTURE + SMEAR W/RFLX ID FROM CULTURE [[id number]]     Order Status:  Nicholos Ayaka GOLD PLUS [[id number]] Collected:  20 0348    Order Status:  Completed Updated:  20 050    AFB CULTURE + SMEAR W/RFLX ID FROM CULTURE [[id number]]     Order Status:  Sent     AFB CULTURE + SMEAR W/RFLX ID FROM CULTURE [[id number]]     Order Status:  Sent     AFB CULTURE + SMEAR W/RFLX ID FROM CULTURE [[id number]] Order Status:  Sent     AFB CULTURE + SMEAR W/RFLX ID FROM CULTURE [[id number]] Collected:  11/28/20 1700    Order Status:  Completed Updated:  11/28/20 1853    AFB CULTURE + SMEAR W/RFLX ID FROM CULTURE [[id number]] Collected:  11/26/20 1700    Order Status:  Completed Specimen:  Miscellaneous sample Updated:  11/28/20 1335     Source EXPECTORATED SPUTUM        AFB Specimen processing Concentration     Acid Fast Smear Negative        Comment: (NOTE)  Performed At: [facility]  Match [address] [id number]  Bebe Stevens MD [id number]          Acid Fast Culture PENDING    AFB CULTURE + SMEAR W/RFLX ID FROM CULTURE [[id number]] Collected:  11/27/20 1013    Order Status:  Completed Specimen:  Miscellaneous sample Updated:  11/28/20 1335     Source SPUTUM        AFB Specimen processing Concentration     Acid Fast Smear Negative        Comment: (NOTE)  Performed At: [facility]  Match [address] [id number]  Bebe Stevens MD [id number]          Acid Fast Culture PENDING    AFB CULTURE + SMEAR W/RFLX ID FROM CULTURE [[id number]] Collected:  11/28/20 0600    Order Status:  Canceled     AFB CULTURE + SMEAR W/RFLX ID FROM CULTURE [[id number]] Collected:  11/27/20 1700    Order Status:  Canceled     QUANTIFERON-TB GOLD PLUS [[id number]] Collected:  11/27/20 1320    Order Status:  Canceled     QUANTIFERON-TB GOLD PLUS [[id number]]     Order Status:  Canceled Specimen:  Blood     CLOSTRIDIUM DIFF TOXIN A & B [[id number]]     Order Status:  Canceled Specimen:  Stool     AFB CULTURE + SMEAR W/RFLX ID FROM CULTURE [[id number]] Collected:  11/27/20 0800    Order Status:  Canceled     AFB CULTURE + SMEAR W/RFLX ID FROM CULTURE [[id number]] Collected:  11/26/20 1652    Order Status:  Canceled     AFB CULTURE + SMEAR W/RFLX ID FROM CULTURE [[id number]]     Order Status:  Canceled     AFB CULTURE + SMEAR W/RFLX ID FROM CULTURE [[id number]] Collected:  11/26/20 0418    Order Status: Canceled     AFB CULTURE + SMEAR W/RFLX ID FROM CULTURE [[id number]] Collected:  11/25/20 2100    Order Status:  Canceled           SARS-CoV-2 Lab Results  \"Novel Coronavirus\" Test: No results found for: COV2NT   \"Emergent Disease\" Test: No results found for: EDPR  \"SARS-COV-2\" Test: No results found for: XGCOVT  Rapid Test:   Lab Results   Component Value Date/Time    COVR Not detected 11/24/2020 06:09 PM            Labs: Results:       BMP, Mg, Phos Recent Labs     12/01/20  0926 12/01/20  0503 11/30/20  1452 11/30/20  0401    141  --  140   K 4.3 3.7  --  3.3*    107  --  105   CO2 29 31  --  30   AGAP 5* 3*  --  5*   BUN 7 8  --  5*   CREA 0.75* 0.75*  --  0.64*   CA 9.3 9.0  --  8.8   * 118*  --  237*   MG 1.9  --  2.1 1.8      CBC Recent Labs     12/01/20  0503 11/30/20  0401 11/29/20  0319   WBC 7.1 5.5 6.0   RBC 4.32 4.00* 4.27   HGB 13.7 12.7* 13.5*   HCT 40.9* 38.2* 40.8*    175 191   GRANS 45 34* 43   LYMPH 36 44 38   EOS 7 8* 7   MONOS 11 14* 11   BASOS 1 1 1   IG 1 0 0   ANEU 3.2 1.9 2.6   ABL 2.6 2.4 2.3   EDWARD 0.5 0.4 0.4   ABM 0.8 0.7 0.7   ABB 0.0 0.1 0.0   AIG 0.0 0.0 0.0      LFT Recent Labs     12/01/20  0926   ALT 55   AP 61   TP 6.5   ALB 3.8   GLOB 2.7   AGRAT 1.4      Cardiac Testing Lab Results   Component Value Date/Time    Troponin-I, Qt. <0.02 (L) 03/25/2020 05:04 PM    Troponin-I, Qt. <0.02 (L) 03/25/2020 12:56 PM    Troponin-I, Qt. 9.36 (HH) 12/16/2016 08:06 AM      Coagulation Tests Lab Results   Component Value Date/Time    Prothrombin time 13.7 11/25/2020 08:41 AM    INR 1.0 11/25/2020 08:41 AM    INR (POC) 1.4 (H) 02/16/2018 02:50 PM    aPTT 27.0 12/01/2020 09:26 AM    aPTT 30.5 11/30/2020 02:52 PM    aPTT 30.7 11/26/2020 11:16 AM      A1c Lab Results   Component Value Date/Time    Hemoglobin A1c 10.8 11/25/2020 02:31 AM    Hemoglobin A1c 12.0 (H) 01/31/2020 08:42 AM    Hemoglobin A1c 12.2 (H) 05/08/2019 11:20 AM      Lipid Panel Lab Results   Component Value Date/Time    Cholesterol, total 148 03/26/2020 03:40 AM    HDL Cholesterol 26 (L) 03/26/2020 03:40 AM    LDL,Direct 47 12/01/2020 05:03 AM    LDL, calculated Not calculated due to elevated triglyceride level 03/26/2020 03:40 AM    VLDL, calculated 98.8 (H) 03/26/2020 03:40 AM    Triglyceride 523 (H) 12/01/2020 05:03 AM    CHOL/HDL Ratio 5.7 03/26/2020 03:40 AM      Thyroid Panel Lab Results   Component Value Date/Time    TSH 2.130 11/24/2020 11:20 AM    TSH 2.920 03/26/2020 03:35 AM    T4, Free 1.1 03/26/2020 03:35 AM    T4, Free 1.10 05/08/2019 11:20 AM        Most Recent UA Lab Results   Component Value Date/Time    Color YELLOW 11/24/2020 02:33 PM    Appearance CLEAR 11/24/2020 02:33 PM    Specific gravity 1.037 (H) 11/24/2020 02:33 PM    pH (UA) 5.0 11/24/2020 02:33 PM    Protein 30 (A) 11/24/2020 02:33 PM    Glucose >1,000 11/24/2020 02:33 PM    Ketone >80 (A) 11/24/2020 02:33 PM    Bilirubin SMALL (A) 11/24/2020 02:33 PM    Blood Negative 11/24/2020 02:33 PM    Urobilinogen 0.2 11/24/2020 02:33 PM    Nitrites Negative 11/24/2020 02:33 PM    Leukocyte Esterase Negative 11/24/2020 02:33 PM    WBC 0 11/24/2020 02:33 PM    RBC 0-3 11/24/2020 02:33 PM    Epithelial cells 0-3 11/24/2020 02:33 PM    Bacteria 0 11/24/2020 02:33 PM    Casts 0-3 11/24/2020 02:33 PM        Allergies   Allergen Reactions    Peanut Anaphylaxis    Morphine Other (comments)     Anxiety, claustrophobia     Immunization History   Administered Date(s) Administered    Influenza Vaccine 10/01/2018    Influenza Vaccine (Quad) Mdck Pf (>4 Yrs Flucelvax QUAD 07251) 09/08/2017    Pneumococcal Polysaccharide (PPSV-23) 02/27/2019    TB Skin Test (PPD) Intradermal 09/03/2008       All Labs from Last 24 Hrs:  Recent Results (from the past 24 hour(s))   GLUCOSE, POC    Collection Time: 11/30/20  8:05 PM   Result Value Ref Range    Glucose (POC) 254 (H) 65 - 100 mg/dL   CBC WITH AUTOMATED DIFF    Collection Time: 12/01/20  5:03 AM   Result Value Ref Range    WBC 7.1 4.3 - 11.1 K/uL    RBC 4.32 4.23 - 5.6 M/uL    HGB 13.7 13.6 - 17.2 g/dL    HCT 40.9 (L) 41.1 - 50.3 %    MCV 94.7 79.6 - 97.8 FL    MCH 31.7 26.1 - 32.9 PG    MCHC 33.5 31.4 - 35.0 g/dL    RDW 12.8 11.9 - 14.6 %    PLATELET 649 242 - 102 K/uL    MPV 10.7 9.4 - 12.3 FL    ABSOLUTE NRBC 0.00 0.0 - 0.2 K/uL    DF AUTOMATED      NEUTROPHILS 45 43 - 78 %    LYMPHOCYTES 36 13 - 44 %    MONOCYTES 11 4.0 - 12.0 %    EOSINOPHILS 7 0.5 - 7.8 %    BASOPHILS 1 0.0 - 2.0 %    IMMATURE GRANULOCYTES 1 0.0 - 5.0 %    ABS. NEUTROPHILS 3.2 1.7 - 8.2 K/UL    ABS. LYMPHOCYTES 2.6 0.5 - 4.6 K/UL    ABS. MONOCYTES 0.8 0.1 - 1.3 K/UL    ABS. EOSINOPHILS 0.5 0.0 - 0.8 K/UL    ABS. BASOPHILS 0.0 0.0 - 0.2 K/UL    ABS. IMM.  GRANS. 0.0 0.0 - 0.5 K/UL   METABOLIC PANEL, BASIC    Collection Time: 12/01/20  5:03 AM   Result Value Ref Range    Sodium 141 136 - 145 mmol/L    Potassium 3.7 3.5 - 5.1 mmol/L    Chloride 107 98 - 107 mmol/L    CO2 31 21 - 32 mmol/L    Anion gap 3 (L) 7 - 16 mmol/L    Glucose 118 (H) 65 - 100 mg/dL    BUN 8 6 - 23 MG/DL    Creatinine 0.75 (L) 0.8 - 1.5 MG/DL    GFR est AA >60 >60 ml/min/1.73m2    GFR est non-AA >60 >60 ml/min/1.73m2    Calcium 9.0 8.3 - 10.4 MG/DL   TRIGLYCERIDE    Collection Time: 12/01/20  5:03 AM   Result Value Ref Range    Triglyceride 523 (H) 35 - 150 MG/DL   LDL, DIRECT    Collection Time: 12/01/20  5:03 AM   Result Value Ref Range    LDL,Direct 47 <100 mg/dl   GLUCOSE, POC    Collection Time: 12/01/20  8:57 AM   Result Value Ref Range    Glucose (POC) 156 (H) 65 - 100 mg/dL   MAGNESIUM    Collection Time: 12/01/20  9:26 AM   Result Value Ref Range    Magnesium 1.9 1.8 - 2.4 mg/dL   PTT    Collection Time: 12/01/20  9:26 AM   Result Value Ref Range    aPTT 27.0 24.1 - 35.1 SEC   FIBRINOGEN    Collection Time: 12/01/20  9:26 AM   Result Value Ref Range    Fibrinogen 297 190 - 103 mg/dL   METABOLIC PANEL, COMPREHENSIVE    Collection Time: 12/01/20  9:26 AM   Result Value Ref Range Sodium 140 138 - 145 mmol/L    Potassium 4.3 3.5 - 5.1 mmol/L    Chloride 106 98 - 107 mmol/L    CO2 29 21 - 32 mmol/L    Anion gap 5 (L) 7 - 16 mmol/L    Glucose 171 (H) 65 - 100 mg/dL    BUN 7 6 - 23 MG/DL    Creatinine 0.75 (L) 0.8 - 1.5 MG/DL    GFR est AA >60 >60 ml/min/1.73m2    GFR est non-AA >60 >60 ml/min/1.73m2    Calcium 9.3 8.3 - 10.4 MG/DL    Bilirubin, total 0.7 0.2 - 1.1 MG/DL    ALT (SGPT) 55 12 - 65 U/L    AST (SGOT) 61 (H) 15 - 37 U/L    Alk. phosphatase 61 50 - 136 U/L    Protein, total 6.5 6.3 - 8.2 g/dL    Albumin 3.8 3.5 - 5.0 g/dL    Globulin 2.7 2.3 - 3.5 g/dL    A-G Ratio 1.4 1.2 - 3.5     GLUCOSE, POC    Collection Time: 12/01/20 12:03 PM   Result Value Ref Range    Glucose (POC) 159 (H) 65 - 100 mg/dL       Discharge Exam:  Patient Vitals for the past 24 hrs:   Temp Pulse Resp BP SpO2   12/01/20 1443 98.5 °F (36.9 °C) 76 20  100 %   12/01/20 1407 97.7 °F (36.5 °C) 77 20 (!) 120/93 99 %   12/01/20 1210 98.6 °F (37 °C) 93 (!) 96 (!) 134/93    12/01/20 0823 98.6 °F (37 °C) 89 15 (!) 145/108 98 %   12/01/20 0513 98.3 °F (36.8 °C) 84 18 112/79 97 %   12/01/20 0107 97.9 °F (36.6 °C) 93 19 127/83 97 %   11/30/20 1952 98.5 °F (36.9 °C) 91 19 (!) 146/89 96 %   11/30/20 1706 98.1 °F (36.7 °C) 86 18 (!) 156/96 99 %     Oxygen Therapy  O2 Sat (%): 100 % (12/01/20 1443)  O2 Device: Room air (11/27/20 2039)  O2 Flow Rate (L/min): 3 l/min (11/25/20 2321)    Estimated body mass index is 36.13 kg/m² as calculated from the following:    Height as of 11/26/20: 5' 10\" (1.778 m). Weight as of this encounter: 114.2 kg (251 lb 12.8 oz). No intake or output data in the 24 hours ending 12/01/20 1513    *Note that automatically entered I/Os may not be accurate; dependent on patient compliance with collection and accurate  by assistants. General:    Well nourished. No overt distress. Obese. Eyes:   Normal sclerae. Extraocular movements intact. ENT:  Normocephalic, atraumatic.   Moist mucous membranes  CV:   Regular rate and rhythm. No m/r/g. No edema. Lungs:  CTAB. No wheezing, rhonchi, or rales. Unlabored  Abdomen: Soft, nontender, nondistended. Extremities: Warm and dry. No cyanosis or clubbing  Neurologic: CN II-XII grossly intact. No gross focal deficits. Alert. Skin:     No rashes. No jaundice. Psych:  Normal mood and affect.     Current Med List in Hospital:   Current Facility-Administered Medications   Medication Dose Route Frequency    diphenhydrAMINE (BENADRYL) capsule 25 mg  25 mg Oral ONCE PRN    0.9% sodium chloride infusion  25 mL/hr IntraVENous CONTINUOUS    calcium gluconate 2 g in 0.9% sodium chloride 250 mL  2 g IntraVENous ONCE    sodium chloride (NS) flush 10 mL  10 mL InterCATHeter PRN    insulin glargine (LANTUS) injection 40 Units  40 Units SubCUTAneous Q12H    insulin lispro (HUMALOG) injection 12 Units  12 Units SubCUTAneous TIDAC    azithromycin (ZITHROMAX) tablet 500 mg  500 mg Oral DAILY    HYDROcodone-acetaminophen (NORCO) 5-325 mg per tablet 1 Tab  1 Tab Oral Q4H PRN    gabapentin (NEURONTIN) capsule 300 mg  300 mg Oral TID    lidocaine 4 % patch 1 Patch  1 Patch TransDERmal Q24H    Saccharomyces boulardii (FLORASTOR) capsule 250 mg  250 mg Oral BID    lidocaine 4 % patch 1 Patch  1 Patch TransDERmal Q24H    sodium chloride 3% hypertonic nebulizer soln  4 mL Nebulization Q12H PRN    sodium chloride (NS) flush 5-40 mL  5-40 mL IntraVENous Q8H    sodium chloride (NS) flush 5-40 mL  5-40 mL IntraVENous PRN    acetaminophen (TYLENOL) tablet 650 mg  650 mg Oral Q6H PRN    Or    acetaminophen (TYLENOL) suppository 650 mg  650 mg Rectal Q6H PRN    polyethylene glycol (MIRALAX) packet 17 g  17 g Oral DAILY PRN    promethazine (PHENERGAN) tablet 12.5 mg  12.5 mg Oral Q6H PRN    Or    ondansetron (ZOFRAN) injection 4 mg  4 mg IntraVENous Q6H PRN    enoxaparin (LOVENOX) injection 40 mg  40 mg SubCUTAneous DAILY    aspirin delayed-release tablet 81 mg 81 mg Oral DAILY    atorvastatin (LIPITOR) tablet 80 mg  80 mg Oral DAILY    cefTRIAXone (ROCEPHIN) 1 g in 0.9% sodium chloride (MBP/ADV) 50 mL MBP  1 g IntraVENous Q24H    insulin lispro (HUMALOG) injection   SubCUTAneous AC&HS    calcium carbonate (OS-SARAI) tablet 500 mg [elemental]  500 mg Oral TID WITH MEALS    buPROPion (WELLBUTRIN) tablet 100 mg  100 mg Oral BID    carvediloL (COREG) tablet 6.25 mg  6.25 mg Oral BID WITH MEALS    fenofibrate (LOFIBRA) tablet 160 mg  160 mg Oral DAILY    isosorbide mononitrate ER (IMDUR) tablet 30 mg  30 mg Oral DAILY    prasugreL (EFFIENT) tablet 10 mg  10 mg Oral DAILY    HYDROmorphone (PF) (DILAUDID) injection 0.5 mg  0.5 mg IntraVENous Q4H PRN    labetaloL (NORMODYNE;TRANDATE) injection 20 mg  20 mg IntraVENous Q1H PRN    labetaloL (NORMODYNE;TRANDATE) injection 20 mg  20 mg IntraVENous Q1H PRN       Discharge Info:   Current Discharge Medication List      START taking these medications    Details   HYDROcodone-acetaminophen (NORCO) 7.5-325 mg per tablet Take 1 Tab by mouth every six (6) hours as needed for Pain for up to 3 days. Max Daily Amount: 4 Tabs. Qty: 12 Tab, Refills: 0    Associated Diagnoses: Acute pancreatitis, unspecified complication status, unspecified pancreatitis type      lidocaine 4 % patch Apply patch to btw shoulder blades and lower back . Apply patch to the affected area for 12 hours a day and remove for 12 hours a day. Qty: 12 Patch, Refills: 1      gabapentin (NEURONTIN) 300 mg capsule Take 1 Cap by mouth three (3) times daily. Qty: 90 Cap, Refills: 1      omega-3 acid ethyl esters (LOVAZA) 1 gram capsule Take 2 Caps by mouth two (2) times a day.   Qty: 60 Cap, Refills: 1         CONTINUE these medications which have NOT CHANGED    Details   isosorbide mononitrate ER (IMDUR) 30 mg tablet TAKE 1 TABLET BY MOUTH ONCE DAILY      Admelog SoloStar U-100 Insulin 100 unit/mL kwikpen 25 units TIDAC plus correction 4/50>150, max daily dose 100 units  Qty: 10 Pen, Refills: 3    Associated Diagnoses: Uncontrolled type 2 diabetes mellitus with hyperglycemia (HCC)      Basaglpat HernandezikPen U-100 Insulin 100 unit/mL (3 mL) inpn Start 50 units BID and increase by 2 units per dose every 3 days until FBG , max daily dose 160 units  Qty: 16 Pen, Refills: 3    Associated Diagnoses: Uncontrolled type 2 diabetes mellitus with hyperglycemia (HCC)      Accu-Chek Guide test strips strip Check BGL 4 times daily, E11.65  Qty: 400 Strip, Refills: 3    Associated Diagnoses: Uncontrolled type 2 diabetes mellitus with hyperglycemia (HCC)      secukinumab (Cosentyx Pen) 150 mg/mL pnij 300 mg by SubCUTAneous route every twenty-eight (28) days. 300 mg SC at week every 4 weeks  Qty: 2 mL, Refills: 2    Associated Diagnoses: Psoriatic arthritis ([address]      ergocalciferol (ERGOCALCIFEROL) 1,250 mcg (50,000 unit) capsule Take 1 Cap by mouth every seven (7) days. Qty: 12 Cap, Refills: 0    Associated Diagnoses: Vitamin D deficiency      predniSONE (DELTASONE) 10 mg tablet Take 10 mg by mouth daily (with breakfast). Qty: 90 Tab, Refills: 0    Associated Diagnoses: Psoriatic arthritis (HCC)      zonisamide (Zonegran) 100 mg capsule Take 3 Caps by mouth nightly. Qty: 270 Cap, Refills: 3    Comments: START ONCE TITRATION SCHEDULE IS COMPLETE. Associated Diagnoses: Seizure ([address]      carvediloL (COREG) 6.25 mg tablet Take 1 Tab by mouth two (2) times daily (with meals). Qty: 60 Tab, Refills: 5      pantoprazole (PROTONIX) 40 mg tablet Take 1 Tab by mouth daily. Qty: 90 Tab, Refills: 0      fenofibrate (LOFIBRA) 160 mg tablet Take 1 Tab by mouth daily. Qty: 90 Tab, Refills: 0    Associated Diagnoses: BMI 35.0-35.9,adult; Uncontrolled type 2 diabetes mellitus without complication, without long-term current use of insulin; Medication refill      atorvastatin (LIPITOR) 80 mg tablet Take 1 Tab by mouth nightly for 360 days.   Qty: 90 Tab, Refills: 0    Associated Diagnoses: Uncontrolled type 2 diabetes mellitus without complication, without long-term current use of insulin      buPROPion (WELLBUTRIN) 100 mg tablet Take 1 Tab by mouth two (2) times a day. Qty: 60 Tab, Refills: 1    Associated Diagnoses: Anxiety; Depression, unspecified depression type      Insulin Needles, Disposable, 31 gauge x 5/16\" ndle by SubCUTAneous route four (4) times daily. Qty: 1 Package, Refills: 11    Associated Diagnoses: Uncontrolled type 2 diabetes mellitus without complication, without long-term current use of insulin      omega-3 fatty acids (FISH OIL CONCENTRATE) 1,000 mg cap Take 1,000 mg by mouth two (2) times a day. Qty: 180 Cap, Refills: 0    Associated Diagnoses: Hypertriglyceridemia      Blood-Glucose Meter monitoring kit Check blood sugar tid DM -2  Qty: 1 Kit, Refills: 0    Associated Diagnoses: Uncontrolled type 2 diabetes mellitus without complication, without long-term current use of insulin      !! lancets misc Check blood sugar tid  Qty: 100 Each, Refills: 11    Associated Diagnoses: Uncontrolled type 2 diabetes mellitus without complication, without long-term current use of insulin      nitroglycerin (NITROSTAT) 0.4 mg SL tablet 1 Tab by SubLINGual route as needed for Chest Pain. And call EMS ,May repeat every five min for up to 3 doses AS NEEDED  Qty: 1 Bottle, Refills: 0    Associated Diagnoses: Coronary artery disease due to lipid rich plaque; Hx of heart artery stent      aspirin delayed-release 81 mg tablet Take 81 mg by mouth.      prasugrel (EFFIENT) 10 mg tablet Take 10 mg by mouth. !! lancets misc Check blood sugar tid  Qty: 1 Each, Refills: 11    Associated Diagnoses: BMI 35.0-35.9,adult; Acute pain of left shoulder; Adhesive capsulitis of left shoulder; Uncontrolled type 2 diabetes mellitus without complication, without long-term current use of insulin; Medication refill       !! - Potential duplicate medications found. Please discuss with provider.             Time spent in patient discharge planning and coordination 35 minutes.     Signed:  Holden Catalan MD

## 2020-12-01 NOTE — INTERDISCIPLINARY ROUNDS
Interdisciplinary Rounds completed. Nursing, Case Management, and Physician  present. Plan of care reviewed and updated. Possible d/c in am.  Will need apheresis line removed before discharge.

## 2020-12-01 NOTE — CONSULTS
Comprehensive Nutrition Assessment    Type and Reason for Visit: Initial, Consult, Patient education  Diet education (hospitalist)    Nutrition Recommendations/Plan:   Verbally reviewed information with Patient  Educated on [address] Refer to Patient Education activity for more details. This assessment was completed remotely from within the hospital. Patient consent was obtained for remote assessment. Malnutrition Assessment:  Malnutrition Status: At risk for malnutrition (specify)(Significant 11.9% weight loss within 6 months.)  Context:      Nutrition Assessment:   Nutrition History: Pt reports a recent 40 pound, unintentional weight loss. He states that he does follow a consistent carbohydrate diet at home. He has been to numerous outpatient diabetes nutrition classes. Per pt, he does not drink sugar sweetened beverages (reports drinking 90% water and Propel zero). Pt reports eating whole grains with fiber (oatmeal for breakfast, whole grain breads). Pt states that he does not choose to not utilize insulin, but states that he runs out of his SSI and test strips and is unable to get a refill. Nutrition Background: Pt admitted with back pain, DKA, elevated triglycerides. [facility] was [facility]. S/P 3 rounds of plasmapharesis secondary to hypertriglyceridemia. PMH notable for CAD, DM, GERD. Daily Update:  Spoke with pt via phone d/t isolation precautions prior to discharge. Pt remains NPO for bronch (cancelled per pt).    Pertinent labs: Triglyceride 523 (12/1), A1C 10.8 (11/25)  Pertinent medications: Lantus 40 units q 12, Humalog 12 units TID, Florastor, Lofibra, Coreg, Lipitor    WT / BMI 12/1/2020 11/25/2020 11/25/2020 9/17/2020   WEIGHT 251 lb 12.8 oz 253 lb 3.2 oz  263 lb 12.8 oz     WT / BMI 7/15/2020 3/26/2020 3/25/2020 3/25/2020 3/25/2020   WEIGHT 285 lb 9.6 oz 278 lb 6.4 oz  276 lb      WT / BMI 3/25/2020   WEIGHT 276 lb     Per weights listed in EMR, potential for a 34 lb, 11.9% weight loss within 6 months. Nutrition Related Findings: NFPE deferred d/t isolation precautions. Current Nutrition Therapies:  DIET DIABETIC CONSISTENT CARB Regular; 2 GM NA (House Low NA)    Current Intake: Average Meal Intake: NPO(pt consuming 100% of meals prior to NPO status) Average Supplement Intake: None ordered      Anthropometric Measures:  Height: 5' 10\" (177.8 cm)  Current Body Wt: 114.2 kg (251 lb 12.3 oz)(12/1), Weight source: Bed scale  BMI: 36.1, Obese class 2 (BMI 35.0-39. 9)     Ideal Body Wt: 166 lbs (75 kg), 151.7 %  Usual Body Wt: (weight varies from 251-285 pounds over the past ~5 months. ), Percent weight change:            Estimated Daily Nutrient Needs:  Energy (kcal): 3483-3707 kcal/d  (Kcal/kg(25-30 ), Weight Used: Current(114.2 kg))  Protein (g): 69-86 gm/d (20% kcal) Weight Used: (Current)  Fluid (ml/day):   ( )    Nutrition Diagnosis:   · Inadequate oral intake related to (current medical condition) as evidenced by (NPO for planned bronch.)    · Unintended weight loss related to (unknown etiology) as evidenced by (potential for a 34 pound, 11.9% weight loss within 6 months.)    Nutrition Interventions:      Nutrition Education/Counseling: Education initiated      Goals:    Recall three items that are adequate sources of high fiber carbohyrates.     Nutrition Monitoring and Evaluation:   Behavioral-Environmental Outcomes: Knowledge or skill        Discharge Planning:    Recommend pursue outpatient nutrition counseling, Recommend pursue outpatient diabetes education    Randee [address], [address] [facility] [address], [facility] [address] [place] [address], [address] [place] [address] Ave.

## 2020-12-01 NOTE — PROGRESS NOTES
[facility]        Inpatient Hematology / Oncology Progress Note    Reason for Consult:  Intractable pain [R52]  Referring Physician:  Antony Escobar MD      Interval history:  [address]  PLEX today  Remains in isolation room for TB ruleout    ROS:  Constitutional: Negative for fever, chills, weakness, malaise, fatigue. CV: Negative for chest pain, palpitations, edema. Respiratory: Negative for dyspnea, cough, wheezing. GI: Negative for nausea, abdominal pain, diarrhea. 10 point review of systems is otherwise negative with the exception of the elements mentioned above in the HPI.            Allergies   Allergen Reactions    Peanut Anaphylaxis    Morphine Other (comments)     Anxiety, claustrophobia     Past Medical History:   Diagnosis Date    Acute coronary syndrome ([address] 12/15/2016    Acute pancreatitis 2/15/2018    Arthritis     psoriatic    Diabetes ([address]     Endocrine disease     Hypertension     Nonintractable epilepsy with complex partial seizures ([address] 4/2/2020    Psoriatic arthropathy ([address]     Seizures (HCC)      Past Surgical History:   Procedure Laterality Date    CARDIAC SURG PROCEDURE UNLIST      HX APPENDECTOMY      HX CHOLECYSTECTOMY      HX HERNIA REPAIR      HX ORTHOPAEDIC      left heel secondary to trauma     Family History   Problem Relation Age of Onset    Thyroid Disease Mother         goiters    Breast Cancer Mother         42's   Deidra Pacheco Atrial Fibrillation Mother     Diabetes Father     Heart Disease Father 48    Diabetes Sister     Heart Disease Paternal Grandfather      Social History     Socioeconomic History    Marital status:      Spouse name: Not on file    Number of children: Not on file    Years of education: Not on file    Highest education level: Not on file   Occupational History    Not on file   Social Needs    Financial resource strain: Not on file    Food insecurity     Worry: Not on file     Inability: Not on file    Transportation needs     Medical: Not on file     Non-medical: Not on file   Tobacco Use    Smoking status: Former Smoker     Packs/day: 0.25     Years: 30.00     Pack years: 7.50     Last attempt to quit: 2019     Years since quittin.8    Smokeless tobacco: Former User   Substance and Sexual Activity    Alcohol use: No    Drug use: No    Sexual activity: Not Currently     Partners: Female   Lifestyle    Physical activity     Days per week: Not on file     Minutes per session: Not on file    Stress: Not on file   Relationships    Social connections     Talks on phone: Not on file     Gets together: Not on file     Attends Latter-day service: Not on file     Active member of club or organization: Not on file     Attends meetings of clubs or organizations: Not on file     Relationship status: Not on file    Intimate partner violence     Fear of current or ex partner: Not on file     Emotionally abused: Not on file     Physically abused: Not on file     Forced sexual activity: Not on file   Other Topics Concern     Service No    Blood Transfusions No    Caffeine Concern No    Occupational Exposure No    Hobby Hazards No    Sleep Concern No    Stress Concern Yes    Weight Concern Yes    Special Diet No    Back Care No    Exercise No    Bike Helmet No    Seat Belt Yes    Self-Exams No   Social History Narrative    Not on file     Current Facility-Administered Medications   Medication Dose Route Frequency Provider Last Rate Last Dose    diphenhydrAMINE (BENADRYL) capsule 25 mg  25 mg Oral ONCE PRN May Coats, NP        calcium gluconate 2 g in 0.9% sodium chloride 250 mL  2 g IntraVENous Azael President, [address] [place] [address], NP        sodium chloride (NS) flush 10 mL  10 mL InterCATHeter PRN May Coats, NP        albumin human 5% (BUMINATE) solution 174.3 g  3,486 mL IntraVENous ONCE Alexandra Lopez MD        insulin glargine (LANTUS) injection 40 Units  40 Units SubCUTAneous Q12H Wili Alondra Hamilton MD   40 Units at 12/01/20 0902    insulin lispro (HUMALOG) injection 12 Units  12 Units SubCUTAneous TIDAC Cara Hernández MD   12 Units at 12/01/20 1251    azithromycin (ZITHROMAX) tablet 500 mg  500 mg Oral DAILY Zackery Herbert MD   500 mg at 11/30/20 1700    HYDROcodone-acetaminophen (NORCO) 5-325 mg per tablet 1 Tab  1 Tab Oral Q4H PRN Cara Hernández MD   1 Tab at 12/01/20 0936    gabapentin (NEURONTIN) capsule 300 mg  300 mg Oral TID Cara Hernández MD   300 mg at 12/01/20 0838    lidocaine 4 % patch 1 Patch  1 Patch TransDERmal Q24H Cara Hernández MD   1 Patch at 11/30/20 1110    Saccharomyces boulardii (FLORASTOR) capsule 250 mg  250 mg Oral BID Cara Hernández MD   250 mg at 12/01/20 0838    lidocaine 4 % patch 1 Patch  1 Patch TransDERmal Q24H Cara Hernández MD   1 Patch at 11/29/20 1837    sodium chloride 3% hypertonic nebulizer soln  4 mL Nebulization Q12H PRN Kole Catherine MD   4 mL at 11/26/20 1646    sodium chloride (NS) flush 5-40 mL  5-40 mL IntraVENous Q8H Zackery Herbert MD   10 mL at 12/01/20 0508    sodium chloride (NS) flush 5-40 mL  5-40 mL IntraVENous PRN Zackery Herbert MD   40 mL at 11/26/20 2310    acetaminophen (TYLENOL) tablet 650 mg  650 mg Oral Q6H PRN Zackery Herbert MD   650 mg at 11/28/20 1227    Or    acetaminophen (TYLENOL) suppository 650 mg  650 mg Rectal Q6H PRN Zackery Herbert MD        polyethylene glycol (MIRALAX) packet 17 g  17 g Oral DAILY PRN Zackery Herbert MD        promethazine (PHENERGAN) tablet 12.5 mg  12.5 mg Oral Q6H PRN Zackery Herbert MD        Or    ondansetron [facility]) injection 4 mg  4 mg IntraVENous Q6H PRN Zackery Herbert MD   4 mg at 11/26/20 1520    enoxaparin (LOVENOX) injection 40 mg  40 mg SubCUTAneous DAILY Zackery Herbert MD   40 mg at 12/01/20 5886    aspirin delayed-release tablet 81 mg  81 mg Oral DAILY Zackery Herbert MD   81 mg at 12/01/20 0838    atorvastatin (LIPITOR) tablet 80 mg  80 mg Oral DAILY Juice Pimentel MD   80 mg at 20 8043    cefTRIAXone (ROCEPHIN) 1 g in 0.9% sodium chloride (MBP/ADV) 50 mL MBP  1 g IntraVENous Q24H Juice Pimentel  mL/hr at 20 1215 1 g at 20 1215    insulin lispro (HUMALOG) injection   SubCUTAneous AC&HS Juice Pimentel MD   2 Units at 20 1251    calcium carbonate (OS-SARAI) tablet 500 mg [elemental]  500 mg Oral TID WITH MEALS Juice Pimentel MD   500 mg at 20 6803    buPROPion [facility]) tablet 100 mg  100 mg Oral BID Juice Pimentel MD   100 mg at 20 6784    carvediloL (COREG) tablet 6.25 mg  6.25 mg Oral BID WITH MEALS Juice Pimentel MD   6.25 mg at 20 8657    fenofibrate (LOFIBRA) tablet 160 mg  160 mg Oral DAILY Juice Pimentel MD   160 mg at 20 9219    isosorbide mononitrate ER (IMDUR) tablet 30 mg  30 mg Oral DAILY Juice Pimentel MD   30 mg at 20 1900    prasugreL (EFFIENT) tablet 10 mg  10 mg Oral DAILY Juice Pimentel MD   10 mg at 20 0946    HYDROmorphone (PF) (DILAUDID) injection 0.5 mg  0.5 mg IntraVENous Q4H PRN Juice Pimentel MD   0.5 mg at 20 1376    labetaloL (NORMODYNE;TRANDATE) injection 20 mg  20 mg IntraVENous Q1H PRN Juice Piemntel MD        labetaloL (NORMODYNE;TRANDATE) injection 20 mg  20 mg IntraVENous Q1H PRN Juice Pimentel MD   20 mg at 20 2110       OBJECTIVE:  Patient Vitals for the past 8 hrs:   BP Temp Pulse Resp SpO2 Weight   20 1210 (!) 134/93 98.6 °F (37 °C) 93 (!) 96     20 0823 (!) 145/108 98.6 °F (37 °C) 89 15 98 %    20 0630      251 lb 12.8 oz (114.2 kg)   20 0513 112/79 98.3 °F (36.8 °C) 84 18 97 %      Temp (24hrs), Av.3 °F (36.8 °C), Min:97.9 °F (36.6 °C), Max:98.6 °F (37 °C)    No intake/output data recorded.     Physical Exam:  Exam per Dr. Getachew Buckley:    Recent Results (from the past 24 hour(s))   GLUCOSE, POC    Collection Time: 20  2:41 PM   Result Value Ref Range Glucose (POC) 170 (H) 65 - 100 mg/dL   MAGNESIUM    Collection Time: 11/30/20  2:52 PM   Result Value Ref Range    Magnesium 2.1 1.8 - 2.4 mg/dL   PTT    Collection Time: 11/30/20  2:52 PM   Result Value Ref Range    aPTT 30.5 24.1 - 35.1 SEC   FIBRINOGEN    Collection Time: 11/30/20  2:52 PM   Result Value Ref Range    Fibrinogen 183 (L) 190 - 501 mg/dL   GLUCOSE, POC    Collection Time: 11/30/20  8:05 PM   Result Value Ref Range    Glucose (POC) 254 (H) 65 - 100 mg/dL   CBC WITH AUTOMATED DIFF    Collection Time: 12/01/20  5:03 AM   Result Value Ref Range    WBC 7.1 4.3 - 11.1 K/uL    RBC 4.32 4.23 - 5.6 M/uL    HGB 13.7 13.6 - 17.2 g/dL    HCT 40.9 (L) 41.1 - 50.3 %    MCV 94.7 79.6 - 97.8 FL    MCH 31.7 26.1 - 32.9 PG    MCHC 33.5 31.4 - 35.0 g/dL    RDW 12.8 11.9 - 14.6 %    PLATELET 982 326 - 651 K/uL    MPV 10.7 9.4 - 12.3 FL    ABSOLUTE NRBC 0.00 0.0 - 0.2 K/uL    DF AUTOMATED      NEUTROPHILS 45 43 - 78 %    LYMPHOCYTES 36 13 - 44 %    MONOCYTES 11 4.0 - 12.0 %    EOSINOPHILS 7 0.5 - 7.8 %    BASOPHILS 1 0.0 - 2.0 %    IMMATURE GRANULOCYTES 1 0.0 - 5.0 %    ABS. NEUTROPHILS 3.2 1.7 - 8.2 K/UL    ABS. LYMPHOCYTES 2.6 0.5 - 4.6 K/UL    ABS. MONOCYTES 0.8 0.1 - 1.3 K/UL    ABS. EOSINOPHILS 0.5 0.0 - 0.8 K/UL    ABS. BASOPHILS 0.0 0.0 - 0.2 K/UL    ABS. IMM.  GRANS. 0.0 0.0 - 0.5 K/UL   METABOLIC PANEL, BASIC    Collection Time: 12/01/20  5:03 AM   Result Value Ref Range    Sodium 141 136 - 145 mmol/L    Potassium 3.7 3.5 - 5.1 mmol/L    Chloride 107 98 - 107 mmol/L    CO2 31 21 - 32 mmol/L    Anion gap 3 (L) 7 - 16 mmol/L    Glucose 118 (H) 65 - 100 mg/dL    BUN 8 6 - 23 MG/DL    Creatinine 0.75 (L) 0.8 - 1.5 MG/DL    GFR est AA >60 >60 ml/min/1.73m2    GFR est non-AA >60 >60 ml/min/1.73m2    Calcium 9.0 8.3 - 10.4 MG/DL   TRIGLYCERIDE    Collection Time: 12/01/20  5:03 AM   Result Value Ref Range    Triglyceride 523 (H) 35 - 150 MG/DL   LDL, DIRECT    Collection Time: 12/01/20  5:03 AM   Result Value Ref Range LDL,Direct 47 <100 mg/dl   GLUCOSE, POC    Collection Time: 12/01/20  8:57 AM   Result Value Ref Range    Glucose (POC) 156 (H) 65 - 100 mg/dL   MAGNESIUM    Collection Time: 12/01/20  9:26 AM   Result Value Ref Range    Magnesium 1.9 1.8 - 2.4 mg/dL   PTT    Collection Time: 12/01/20  9:26 AM   Result Value Ref Range    aPTT 27.0 24.1 - 35.1 SEC   FIBRINOGEN    Collection Time: 12/01/20  9:26 AM   Result Value Ref Range    Fibrinogen 297 190 - 453 mg/dL   METABOLIC PANEL, COMPREHENSIVE    Collection Time: 12/01/20  9:26 AM   Result Value Ref Range    Sodium 140 138 - 145 mmol/L    Potassium 4.3 3.5 - 5.1 mmol/L    Chloride 106 98 - 107 mmol/L    CO2 29 21 - 32 mmol/L    Anion gap 5 (L) 7 - 16 mmol/L    Glucose 171 (H) 65 - 100 mg/dL    BUN 7 6 - 23 MG/DL    Creatinine 0.75 (L) 0.8 - 1.5 MG/DL    GFR est AA >60 >60 ml/min/1.73m2    GFR est non-AA >60 >60 ml/min/1.73m2    Calcium 9.3 8.3 - 10.4 MG/DL    Bilirubin, total 0.7 0.2 - 1.1 MG/DL    ALT (SGPT) 55 12 - 65 U/L    AST (SGOT) 61 (H) 15 - 37 U/L    Alk. phosphatase 61 50 - 136 U/L    Protein, total 6.5 6.3 - 8.2 g/dL    Albumin 3.8 3.5 - 5.0 g/dL    Globulin 2.7 2.3 - 3.5 g/dL    A-G Ratio 1.4 1.2 - 3.5     GLUCOSE, POC    Collection Time: 12/01/20 12:03 PM   Result Value Ref Range    Glucose (POC) 159 (H) 65 - 100 mg/dL       Imaging:  [facility] [[id number]]  Collected: 11/24/20 2046    Order Status: Completed  Updated: 11/24/20 2050    Narrative:      RIGHT UPPER QUADRANT ULTRASOUND 11/24/2020     HISTORY: Moderate right upper quadrant pain for one month. TECHNIQUE: Sonographic imaging of the right upper quadrant was performed. COMPARISON: CT abdomen and pelvis 11/27/2018     FINDINGS: Changes of a cholecystectomy are present. The common bile duct is 5 mm   in diameter. There is no intrahepatic biliary ductal dilatation. The liver parenchyma is diffusely echogenic relative to the renal cortex. This   finding may be present with steatosis. The right kidney is 13.1 cm in length. There is no hydronephrosis. The distal   abdominal aorta is 1.9 cm in diameter. The IVC is patent. Impression:      IMPRESSION: Hepatic steatosis status post cholecystectomy. CT CHEST ABD PELV W CONT [[id number]]  Collected: 11/24/20 1611    Order Status: Completed  Updated: 11/24/20 1618    Narrative:      CT of the Chest, Abdomen, and Pelvis     INDICATION: Increasing back and chest pain, unexpected weight loss     Multiple axial images were obtained through the chest, abdomen, and pelvis. Oral contrast was used for bowel opacification.  100mL of Isovue 370 intravenous   contrast was used for better evaluation of solid organs and vascular structures.    Radiation dose reduction techniques were used for this study.  All CT scans   performed at this facility use one or all of the following: Automated exposure   control, adjustment of the mA and/or kVp according to patient's size, iterative   reconstruction. COMPARISON: Abdomen CT dated 11/27/2018     FINDINGS:   -LUNGS: Multiple small ill-defined nodular areas in the left lung, upper lobe   predominant.  Largest lesion is 8 mm.  The right lung is clear.  No associated   effusion. -MEDIASTINUM/AXILLA: No significant adenopathy. -HEART/VESSELS: There is moderate vascular calcification.  Heart size is normal.    There is normal enhancement of the thoracic aorta and pulmonary arteries. -CHEST WALL: Normal.     -LIVER: There is hepatomegaly and diffuse fatty infiltration of liver.  No   discrete liver mass. -GALLBLADDER/BILE DUCTS: Postcholecystectomy. -PANCREAS: Normal.   -SPLEEN: Normal.     -ADRENALS:  There is a stable 3.4 cm fat attenuation left adrenal mass, likely a   myelolipoma.  Right adrenal gland is unremarkable. -KIDNEYS/URETERS: No hydronephrosis or significant mass. -BLADDER: Normal.   -REPRODUCTIVE ORGANS: No pelvic masses. -BOWEL: Normal caliber.  No inflammatory changes.    -LYMPH NODES: No significant retroperitoneal, mesenteric, or pelvic adenopathy. -BONES: Postoperative changes in the left hip.  No acute fracture. -VASCULATURE: Normal   -OTHER: No ascites. Impression:      IMPRESSION:   1.  Multiple ill-defined nodules in the left lung, most likely atypical   infection such as fungal or tuberculosis. 2.  Hepatomegaly and steatosis. 3.  No evidence of pulmonary embolus or aortic dissection. 4.  Extensive coronary artery calcification. If there are any questions about this report, I can be reached on [employer]   or at [facility] W WO CONT [[id number]]     Order Status: Canceled     CT ABD PELV W CONT [[id number]]     Order Status: Canceled     XR CHEST PORT [[id number]]  Collected: 11/24/20 1131    Order Status: Completed  Updated: 11/24/20 1134    Narrative:      History: Chest pain, shortness of breath     Exam: portable chest     Comparison: 3/25/2020     Findings: There is apparent new asymmetric opacity seen at the left lung base   adjacent heart shadow. Otherwise, the lungs are clear. Mediastinal contour and   osseous structures are normal.     Impressions: Apparent new asymmetric lung opacity at the left lung base.    Correlation with PA and lateral chest x-ray recommended for further evaluation,   as the finding could be artifactual.          ASSESSMENT:  Problem List  Date Reviewed: 12/1/2020          Codes Class Noted    Pulmonary infiltrates ICD-10-CM: R91.8  ICD-9-CM: 793.19  11/30/2020        Hypokalemia ICD-10-CM: E87.6  ICD-9-CM: 276.8  11/28/2020        Lung infiltrate ICD-10-CM: R91.8  ICD-9-CM: 793.19  11/25/2020        Weight loss, unintentional  - over 40 lbs in 3 months ICD-10-CM: R63.4  ICD-9-CM: 783.21  11/25/2020        History of smoking 25-50 pack years -- quit in 2016 about 27 pack years ICD-10-CM: Z87.891  ICD-9-CM: V15.82  11/25/2020        Psoriatic arthritis ([facility] -- was on Cosyntrex ICD-10-CM: L40.50  ICD-9-CM: 696.0 11/25/2020        * (Principal) Intractable back pain ICD-10-CM: M54.9  ICD-9-CM: 724.5  11/25/2020        Nonintractable epilepsy with complex partial seizures ([facility] ICD-10-CM: G40.209  ICD-9-CM: 345.40  4/2/2020        Chills ICD-10-CM: D84.73  ICD-9-CM: 780.64  3/25/2020    Overview Signed 3/25/2020  7:43 PM by Shaji Steiner MD     Refer to ER             Bronchitis ICD-10-CM: J40  ICD-9-CM: 377  3/25/2020    Overview Signed 3/25/2020  7:45 PM by Shaji Steiner MD     S/p 2 rounds abx  Refer to ER for cxr               Chest pain ICD-10-CM: R07.9  ICD-9-CM: 786.50  3/25/2020    Overview Signed 3/25/2020  7:43 PM by Shaji Steiner MD     LIKELY ABGINA R/O ACUTE mi  ASPIRIN 81 MG   REFER TO er NOW--PT TO GO VIA PRIVATE VEHICLE  ADVISED TO CALL 911 IF WORSE             Coronary artery disease involving native heart with angina pectoris ([facility] ICD-10-CM: I25.119  ICD-9-CM: 414.01, 413.9  3/25/2020    Overview Signed 3/25/2020  7:46 PM by Shaji Steiner MD     Stents several  Sees cardiologist             Nausea ICD-10-CM: R11.0  ICD-9-CM: 787.02  3/25/2020    Overview Signed 3/25/2020  7:46 PM by Shaji Steiner MD     Likely from chest pain             Seizure disorder [facility]) ICD-10-CM: G40.909  ICD-9-CM: 345.90  3/9/2020    Overview Signed 3/9/2020 11:24 AM by Shaji Steiner MD     1990  eeg abnormal  CT brain neg  Was on phenobarbital  Off med since 2010  latelyt subtle symptoms of  Atypical seizure             Sinusitis ICD-10-CM: J32.9  ICD-9-CM: 473.9  3/9/2020    Overview Signed 3/9/2020  8:16 PM by Shaji Steiner MD     Steam in halations  Saline nasal cleansing  mucinex as needed  abx  F/u in 2 weeks               Chronic middle ear effusion, bilateral ICD-10-CM: W51.311  ICD-9-CM: 381.3  3/9/2020    Overview Signed 3/9/2020  8:17 PM by Shaji Garza., MD     Sec to sinusitis  Monitor for few weeks             Earache ICD-10-CM: H92.09  ICD-9-CM: 388.70  3/9/2020 Overview Signed 3/9/2020  8:17 PM by Akhil Painting MD     Referred pain vs from effusion  Supportive care             Pharyngitis ICD-10-CM: J02.9  ICD-9-CM: 729  3/9/2020    Overview Signed 3/9/2020  8:16 PM by MD conrado Harrison             TBI (traumatic brain injury) ([facility] ICD-10-CM: S06. 9X9A  ICD-9-CM: 854.00  2/12/2020    Overview Signed 2/12/2020 12:46 PM by Akhil Painting MD     in 1990             Psoriatic arthropathy [facility]) ICD-10-CM: L40.50  ICD-9-CM: 696.0  1/24/2020    Overview Signed 1/24/2020  9:17 AM by Akhil Painting MD     Multiple joints pain/swelling             Acquired deformity of toe ICD-10-CM: M20.60  ICD-9-CM: 735.9  1/24/2020        Anxiety ICD-10-CM: F41.9  ICD-9-CM: 300.00  1/24/2020    Overview Signed 2/12/2020 12:47 PM by Akhil Painting MD     2/2020  Stop prozac and start wellbutrin             Comprehensive diabetic foot examination, type 2 DM, encounter for [facility]) ICD-10-CM: E11.9  ICD-9-CM: 250.00  1/24/2020    Overview Signed 1/24/2020 10:58 AM by Akhil Painting MD     1/24/20             Noncompliance with medications ICD-10-CM: Z91.14  ICD-9-CM: V15.81  1/24/2020        BMI 35.0-35.9,adult ICD-10-CM: W49.45  ICD-9-CM: V85.35  1/24/2020        Former smoker ICD-10-CM: A33.079  ICD-9-CM: V15.82  5/8/2019    Overview Signed 5/8/2019 10:31 PM by Akhil Painting MD     quit in 3/2019              Depression ICD-10-CM: F32.9  ICD-9-CM: 542  5/8/2019    Overview Addendum 2/12/2020 12:47 PM by Akhil Painting MD     Start zoloft-side effects discussed  F/u in 2 weeks--if all stable will fill more meds  5/2019  zolfgt helps -will refill med  No side effects  1/2020  Stop zoloft  Start prozac  Call in 2 weeks  2/2020  prozac not helping  Start welbutrin  Refer to psychiatrist             Hx of heart artery stent ICD-10-CM: Z95.5  ICD-9-CM: V45.82  5/8/2019    Overview Signed 5/8/2019 10:31 PM by Akhil Painting MD 4.2019  [facility] cardiology             Dietary counseling ICD-10-CM: Z71.3  ICD-9-CM: V65.3  5/8/2019    Overview Addendum 5/22/2019  7:41 PM by Bhavana Schaffer MD     Low calorie/carb and fat diet discussed  diet discussed             Unstable angina ([facility] ICD-10-CM: I20.0  ICD-9-CM: 411.1  5/2/2019    Overview Signed 5/20/2019  9:29 AM by Eloy SIMMS     Added automatically from request for surgery [id number]             STEMI (ST elevation myocardial infarction) [facility]) ICD-10-CM: I21.3  ICD-9-CM: 410.90  2/26/2019    Overview Signed 5/20/2019  9:29 AM by Eloy SIMMS     Last Assessment & Plan:   1  Primarily single vessel obstructive coronary artery disease in a right dominant system. 2  The chronically placed MATIAS in OM 2 remains widely patent. 3  The subtotal occlusion of the RCA was successfully treated with implantation of 3 drug-eluting stents with an excellent angiographic result. Ischemically mediated ventricular fibrillation was successfully defibrillated back to normal sinus rhythm. 4  Normal left ventricular systolic function with estimated LVEF 55% and mild hypokinesis of the inferior wall.  5  Resting left heart hemodynamics parameters reveal mild systemic hypertension and mildly elevated resting LVEDP.  6  Normal systemic arterial oxygen saturation (99%). 7  Hemostasis of RCFA with Perclose device. 8  Conscious sedation was performed throughout the procedure with no untoward effects.     - ASA 81mg daily, Effient, statin, BB, and ACE.   - Normal LV Function   - Follow up in the office in 4-6 weeks, office will mail appt. Will sign off.  Please call with questions             Infected sebaceous cyst of skin ICD-10-CM: L72.3, L08.9  ICD-9-CM: 706.2  1/9/2019    Overview Signed 1/9/2019  1:01 AM by Bhavana Schaffer MD     nape area  resolved with bactrim now             Medication refill ICD-10-CM: Z76.0  ICD-9-CM: V68.1  1/9/2019        Adhesive capsulitis of left shoulder ICD-10-CM: M75.02  ICD-9-CM: 726.0  2019    Overview Addendum 2019  9:29 AM by Virgie Christie as needed  will refer to ortho for possible steroid injection  pt wants to wait on PT  Overview:   Overview:   nsaids as needed  will refer to ortho for possible steroid injection  pt wants to wait on PT             Acute pain of left shoulder ICD-10-CM: M25.512  ICD-9-CM: 719.41  2019    Overview Addendum 2019  9:29 AM by Isaac SIMMS     x-ray normal  likely sprain vs capsulitis  rest  warmth  activity as tolerated   ortho  will benefit from PT  Overview:   Overview:   x-ray normal  likely sprain vs capsulitis  rest  warmth  activity as tolerated   ortho  will benefit from PT             Osteoarthritis ICD-10-CM: M19.90  ICD-9-CM: 715.90  12/10/2018    Overview Signed 12/10/2018 12:50 PM by Alix Peterson MD     multiple joints  supportive              Hiatal hernia ICD-10-CM: K44.9  ICD-9-CM: 553.3  12/10/2018    Overview Addendum 2019  9:29 AM by Isaac SIMMS     seen on CT abdomen  pantoprazole help GERD  symptoms-refll  Overview:   Overview:   seen on CT abdomen  pantoprazole help GERD  symptoms-refll             Sebaceous cyst ICD-10-CM: L72.3  ICD-9-CM: 706.2  12/10/2018    Overview Signed 12/10/2018 12:51 PM by Alix Peterson MD     NECK  PT DESIRES EXCISON  REFER TO GEN SURGEON               Uncontrolled type 2 diabetes mellitus without complication, without long-term current use of insulin ICD-10-CM: Nathanel Ruts  ICD-9-CM: Nathanel Ruts  12/10/2018    Overview Addendum 3/9/2020  8:18 PM by Alix Peterson MD     Sec to noncompliance  Stable while on med--ON INSULIN AND METFORMIN--tolerating well-no pancreatitis while on metformin  Refilled meds  F/u in 6 weeks for recheck  2019  Off metformin  Increase basaglar to 25 units  Add novolog  Add glipizide 5 mg daily  Keep BS log and call in 2 weeks  3/2020  Pt on 50 units basaglar and sliding scale   BS above 200  Increase basaglar to 55 units --5 units increase every week until FBS is under 150             Encounter for examination following treatment at hospital ICD-10-CM: [address]  ICD-9-CM: V67.9  12/10/2018    Overview Addendum 5/20/2019  9:29 AM by Ashely Messer records reviewed  Overview:   Overview:   hspital records reviewed             Mass in neck ICD-10-CM: R22.1  ICD-9-CM: 784.2  11/30/2018        Excessive daytime sleepiness ICD-10-CM: G47.19  ICD-9-CM: 780.54  11/29/2018    Overview Addendum 5/20/2019  9:29 AM by Sahil SIMMS     Referred for sleep study    Overview:   Overview:   Referred for sleep study             Loud snoring ICD-10-CM: R06.83  ICD-9-CM: 786.09  11/28/2018        Suspected sleep apnea ICD-10-CM: R29.818  ICD-9-CM: 781.99  11/28/2018    Overview Signed 12/10/2018 12:51 PM by Cheree Kawasaki., MD     Referred to pulmonologist             History of tobacco use ICD-10-CM: Z87.891  ICD-9-CM: V15.82  6/19/2018    Overview Signed 6/19/2018  8:31 AM by Cheree Kawasaki., MD     quit jan 2018             Arthritis of left wrist ICD-10-CM: L60.952  ICD-9-CM: 716.93  6/19/2018    Overview Addendum 5/20/2019  9:29 AM by Sahil SIMMS     psoriatic arthriris vs gout  rest  Warmth locally  Motrin or diclofenac  as needed for pain  Avoid heavy lifting/pushing/pulling  F/u as needed or in 2 weeks      Overview:   Overview:   psoriatic arthriris vs gout  rest  Warmth locally  Motrin or diclofenac  as needed for pain  Avoid heavy lifting/pushing/pulling  F/u as needed or in 2 weeks             Severe obesity (BMI 35.0-39. 9) with comorbidity [facility]) ICD-10-CM: E66.01  ICD-9-CM: 278.01  3/19/2018        Encounter to discuss test results ICD-10-CM: Z71.2  ICD-9-CM: V65.49  3/19/2018    Overview Addendum 5/20/2019  9:29 AM by Sahil SIMMS     All results discussed in detail    Overview:   Overview:    All results discussed in detail             Encounter for medication review and counseling ICD-10-CM: Z71.89  ICD-9-CM: V65.49  3/19/2018    Overview Signed 3/19/2018  7:30 PM by Ya Robertson MD     Will have to consider metformin 3/2018             Hypertriglyceridemia ICD-10-CM: E78.1  ICD-9-CM: 272.1  3/19/2018    Overview Addendum 5/22/2019  7:41 PM by Ya Robertson MD     Overview:   Overview:   Much better now on lofibra--doing well  Advised more stricter low carb diet and aggressive control of DM--pt willing  F/u in few months for recheck  5/2019  Statin/lofibra not helping--will add fish oil and niacin  F/u in few weeks for recheck             Essential hypertension ICD-10-CM: I10  ICD-9-CM: 401.9  3/2/2018    Overview Addendum 5/20/2019  9:29 AM by Amparo Spearing     Stable with med-amlodip[ine/lisinopril/coreg  Refilled  Labs   Annual eye exam recommended  F/u in 3 months      Overview:   Overview:   Stable with med-amlodip[ine/lisinopril/coreg  Refilled  Labs   Annual eye exam recommended  F/u in 3 months             Hyperlipidemia ICD-10-CM: E78.5  ICD-9-CM: 272.4  3/2/2018    Overview Addendum 5/20/2019  9:29 AM by Vonnie SIMMS     On statin and fenofibrate   Recheck labs-f/u  Diet discussed    Overview:   Overview: On statin and fenofibrate   Recheck labs-f/u  Diet discussed    Last Assessment & Plan:    - Reports statin Intolerance. however tolerating Lipitor here    - Dietary modifications.              Obesity, unspecified obesity severity, unspecified obesity type ICD-10-CM: E66.9  ICD-9-CM: 278.00  3/2/2018    Overview Signed 3/2/2018  1:34 PM by Ya Robertson MD     Weight loss encouraged             Anemia of chronic disease ICD-10-CM: D63.8  ICD-9-CM: 285.29  3/2/2018    Overview Addendum 5/20/2019  9:29 AM by Vonnie SIMMS     DM  Will periodically monitor    Overview:   Overview:   DM  Will periodically monitor             Type 2 diabetes with nephropathy ([facility] [address] ICD-10-CM: E11.21  ICD-9-CM: 250.40, 583.81 3/1/2018        Pancreatitis ICD-10-CM: K85.90  ICD-9-CM: 372.0  2/17/2018        High anion gap metabolic acidosis EYK-49-AG: E87.2  ICD-9-CM: 276.2  2/15/2018        CAD (coronary artery disease) ICD-10-CM: I25.10  ICD-9-CM: 414.00  2/15/2018    Overview Signed 3/2/2018  1:36 PM by Ya Robertson MD     Stents  Sees cardiologist  aspirin             Leukocytosis ICD-10-CM: Y86.430  ICD-9-CM: 288.60  2/15/2018        DKA (diabetic ketoacidoses) ([facility] ICD-10-CM: E11.10  ICD-9-CM: 250.12  2/15/2018        Acute pancreatitis ICD-10-CM: K85.90  ICD-9-CM: 343.9  2/15/2018    Overview Signed 5/20/2019  9:29 AM by Vonnie SIMMS     Overview:   Overview:   Sec to hypertriglyceridemia  Resolved now 3/2018  Few tramadol dispensed for pain management             Atherosclerosis of coronary artery ICD-10-CM: I25.10  ICD-9-CM: 414.00  2/15/2018    Overview Signed 5/20/2019  9:29 AM by Vonnie SIMMS     Overview:   Overview:   Stents  Sees cardiologist  aspirin    Last Assessment & Plan:    - Cont medical management             Diabetic ketoacidosis without coma associated with type 2 diabetes mellitus ([facility] ICD-10-CM: E11.10  ICD-9-CM: 250.12  2/15/2018    Overview Signed 5/20/2019  9:29 AM by Amparo Speartyra     Last Assessment & Plan:    - Per primary team             Diabetes mellitus type II, uncontrolled ([facility] (Chronic) ICD-10-CM: E11.65  ICD-9-CM: 250.02  12/15/2016    Overview Addendum 6/19/2018 11:35 AM by Ya Robertson MD     Now on lantus and humalog prn  goor control BS under 150   Sliding scale given-multiples of 4  Pt on metformit 500 mg bid 3/2019 and  lantus  25 units  Diet discussed  Labs today  F/u every 3 months             Tobacco abuse (Chronic) ICD-10-CM: Z72.0  ICD-9-CM: 305.1  12/15/2016              Mr. Daisy Dimas is a 52 y.o. male admitted on 11/25/2020 with a primary diagnosis of chest pain, DKA, and pancreatitis.   His PMH includes CAD s/p multiple stents, GERD, hypertriglyceridemia s/p pheresis (2/16/18), hx psoriatic arthritis, IDDM with hx DKA, non-intractable epilepsy with complex partial seizures, and HTN. Pt presented to ED with c/o progressively worsening back pain wrapping around to chest x 1 month. He also c/o cough, chest pain, polydipsia, polyuria, and unintentional wt loss of 40 # in past 3 months. He reports difficulty controlling his blood sugars at home and stopped his insulin about 1 and 1/2 weeks ago. On admission, /104, , WBC 15.6, Hgb 18, , Trop 14.2, EKG with no ischemic changes, non-specific T-wave flattening in inferior leads. CT CAP with multiple ill-defined nodules in L lung, most likely atypical infection such as fungal or TB; hepatomegaly and steatosis. Abd US with hepatic steatosis. On CTX/Azith. COVID rapid neg, PCR pending. TB pending. Cards/GI/Pulm consulted. Triglycerides 2253 today. We were consulted for pheresis. RECOMMENDATIONS:  Hypertriglyceridemia Pancreatitis  - Triglycerides 2253  - Needs to be transferred to [facility] for apheresis. Discussed with Dr. Jamarcus Conklin, hospitalist.  Will have IR place apheresis line and plan for therapeutic apheresis later today. Primary team managing pancreatitis. Repeat triglyceride level in AM. May need additional treatment tomorrow. 11/27 s/p PLEX x2 (11/26 and 11/27). Triglycerides improved to 278 this AM  11/30 Trigs trending back up 712 today. On Lipitor and fenofibrate. 12/1 Trigs 523. Lines remains in place. PLEX today. DKA  - mgmt per primary team    Chest pain  - cards consulted    Cough  - CT CAP with multiple ill-defined nodules in L lung, most likely atypical infection such as fungal or TB; hepatomegaly and steatosis. - COVID rapid neg, PCR pending  - TB pending  - Pulm consulted  - On CTX/Azith    Lab studies and imaging studies were personally reviewed. Thank you for allowing us to participate in the care of Mr. Tom Card.           Taisha Hutchinson, CUONG      [address] [facility] Hematology & Oncology  [address] [place] [address]  Office : [phone]  Fax : [phone]       Attending Addendum:  I have personally performed a face to face diagnostic evaluation on this patient. I have reviewed and agree with the care plan as documented above by Jin Candelaria NCAROLINE.  My findings are as follows: Patient appears lethargic, heart rate regular without murmurs, abdomen is non-tender, bowel sounds are positive. 52 male history of hypertriglyceridemia status post pheresis in the past, DKA, pancreatitis, now presenting with significant weight loss and setting of multiple ill-defined pulmonary nodules concerning for atypical infection, as well as elevated triglycerides which improved with Plex x2. Now with rising triglycerides again: Will repeat PLEX x1, and follow-up results thereafter. Continue optimization of medical management directed towards dyslipidemia. Thank you for allowing us to participate in care of this pleasant patient. Please call w any questions. Gael De La Garza MD  [address] [place] [address] Hematology/Oncology  [address] [place] [address]  Office : [phone]  Fax : [phone]      Attending Addendum:  I have personally performed a face to face diagnostic evaluation on this patient. I have reviewed and agree with the care plan as documented above by Lorenza Stevens N.P.  My findings are as follows: Patient appears lethargic, heart rate regular without murmurs, abdomen is non-tender, bowel sounds are positive. 52 male history of hypertriglyceridemia status post pheresis in the past, DKA, pancreatitis, now presenting with significant weight loss and setting of multiple ill-defined pulmonary nodules concerning for atypical infection, as well as elevated triglycerides which improved with Plex x2. Now with rising triglycerides again: Will repeat PLEX x1, and follow-up results thereafter.  Continue optimization of medical management directed towards dyslipidemia. OK to proceed w PLEX # 3 today. No further sessions planned thereafter            Thank you for allowing us to participate in care of this pleasant patient.  Please call w any questions.          Avery Kwon MD  [facility] Hematology/Oncology  [address] [place] [address]  Office : [phone]  Fax : [phone]

## 2020-12-01 NOTE — PROGRESS NOTES
Yary Amaya  Admission Date: 11/25/2020             Daily Progress Note: 12/1/2020    The patient's chart is reviewed and the patient is discussed with the staff. Yary Amaya is a 57yoM with h/o psoriatic arthritis on cosentyx, CAD, GERD, hypertriglyceridemia, IDDM, epilepsy, HTN who was admitted at [facility] on 11/23 with chest pressure, dyspnea, cough, chills and 3 months of unintentional weight loss (40lbs). He has a 27pkyr smoking history. COVID negative. Underwent pheresis for hypertryglyceridemia. Chest CT with multiple nodules - work up in progress. Rule out for TB then plans for bronch. Subjective:       On RA 98%  Plans for BAL, awaiting last AFB  Want to go home    Current Facility-Administered Medications   Medication Dose Route Frequency    diphenhydrAMINE (BENADRYL) capsule 25 mg  25 mg Oral ONCE PRN    calcium gluconate 2 g in 0.9% sodium chloride 250 mL  2 g IntraVENous ONCE    sodium chloride (NS) flush 10 mL  10 mL InterCATHeter PRN    albumin human 5% (BUMINATE) solution 174.3 g  3,486 mL IntraVENous ONCE    insulin glargine (LANTUS) injection 40 Units  40 Units SubCUTAneous Q12H    insulin lispro (HUMALOG) injection 12 Units  12 Units SubCUTAneous TIDAC    azithromycin (ZITHROMAX) tablet 500 mg  500 mg Oral DAILY    HYDROcodone-acetaminophen (NORCO) 5-325 mg per tablet 1 Tab  1 Tab Oral Q4H PRN    gabapentin (NEURONTIN) capsule 300 mg  300 mg Oral TID    lidocaine 4 % patch 1 Patch  1 Patch TransDERmal Q24H    Saccharomyces boulardii (FLORASTOR) capsule 250 mg  250 mg Oral BID    lidocaine 4 % patch 1 Patch  1 Patch TransDERmal Q24H    sodium chloride 3% hypertonic nebulizer soln  4 mL Nebulization Q12H PRN    sodium chloride (NS) flush 5-40 mL  5-40 mL IntraVENous Q8H    sodium chloride (NS) flush 5-40 mL  5-40 mL IntraVENous PRN    acetaminophen (TYLENOL) tablet 650 mg  650 mg Oral Q6H PRN    Or    acetaminophen (TYLENOL) suppository 650 mg  650 mg Rectal Q6H PRN    polyethylene glycol (MIRALAX) packet 17 g  17 g Oral DAILY PRN    promethazine (PHENERGAN) tablet 12.5 mg  12.5 mg Oral Q6H PRN    Or    ondansetron (ZOFRAN) injection 4 mg  4 mg IntraVENous Q6H PRN    enoxaparin (LOVENOX) injection 40 mg  40 mg SubCUTAneous DAILY    aspirin delayed-release tablet 81 mg  81 mg Oral DAILY    atorvastatin (LIPITOR) tablet 80 mg  80 mg Oral DAILY    cefTRIAXone (ROCEPHIN) 1 g in 0.9% sodium chloride (MBP/ADV) 50 mL MBP  1 g IntraVENous Q24H    insulin lispro (HUMALOG) injection   SubCUTAneous AC&HS    calcium carbonate (OS-SARAI) tablet 500 mg [elemental]  500 mg Oral TID WITH MEALS    buPROPion (WELLBUTRIN) tablet 100 mg  100 mg Oral BID    carvediloL (COREG) tablet 6.25 mg  6.25 mg Oral BID WITH MEALS    fenofibrate (LOFIBRA) tablet 160 mg  160 mg Oral DAILY    isosorbide mononitrate ER (IMDUR) tablet 30 mg  30 mg Oral DAILY    prasugreL (EFFIENT) tablet 10 mg  10 mg Oral DAILY    HYDROmorphone (PF) (DILAUDID) injection 0.5 mg  0.5 mg IntraVENous Q4H PRN    labetaloL (NORMODYNE;TRANDATE) injection 20 mg  20 mg IntraVENous Q1H PRN    labetaloL (NORMODYNE;TRANDATE) injection 20 mg  20 mg IntraVENous Q1H PRN       Review of Systems - General ROS: positive for  - weight loss  Respiratory ROS: positive for - cough and shortness of breath  Cardiovascular ROS: no chest pain or dyspnea on exertion  Gastrointestinal ROS: no abdominal pain, change in bowel habits, or black or bloody stools  Musculoskeletal ROS: positive for - back pain       Objective:     Vitals:    12/01/20 0107 12/01/20 0513 12/01/20 0630 12/01/20 0823   BP: 127/83 112/79  (!) 145/108   Pulse: 93 84  89   Resp: 19 18  15   Temp: 97.9 °F (36.6 °C) 98.3 °F (36.8 °C)  98.6 °F (37 °C)   SpO2: 97% 97%  98%   Weight:   251 lb 12.8 oz (114.2 kg)      Intake and Output:   No intake/output data recorded. No intake/output data recorded.     Physical Exam:   Constitutional:  the patient is well developed and in no acute distress  HEENT:  Sclera clear, pupils equal, oral mucosa moist  Lungs:  Clear, On RA  Cardiovascular:  RRR without M,G,R  Abd/GI: soft and non-tender; with positive bowel sounds. Ext: warm without cyanosis. There is no lower leg edema. Musculoskeletal: moves all four extremities with equal strength  Skin:  no jaundice or rashes, no wounds   Neuro: no gross neuro deficits   Musculoskeletal: can ambulate.  No deformity  Psychiatric: Calm       LAB  Recent Labs     12/01/20  0503 11/30/20  0401 11/29/20  0319   WBC 7.1 5.5 6.0   HGB 13.7 12.7* 13.5*   HCT 40.9* 38.2* 40.8*    175 191     Recent Labs     12/01/20  0926 12/01/20  0503 11/30/20  1452 11/30/20  0401    141  --  140   K 4.3 3.7  --  3.3*    107  --  105   CO2 29 31  --  30   * 118*  --  237*   BUN 7 8  --  5*   CREA 0.75* 0.75*  --  0.64*   MG 1.9  --  2.1 1.8   ALB 3.8  --   --   --        Assessment:  (Medical Decision Making)     Hospital Problems  Date Reviewed: 12/1/2020          Codes Class Noted POA    Hypokalemia ICD-10-CM: E87.6  ICD-9-CM: 276.8  11/28/2020 Unknown    supplementing    Weight loss, unintentional  - over 40 lbs in 3 months ICD-10-CM: R63.4  ICD-9-CM: 783.21  11/25/2020 Yes    Unexplained to camila    * (Principal) Intractable back pain ICD-10-CM: M54.9  ICD-9-CM: 724.5  11/25/2020     ongoing    Seizure disorder ([facility] [address] ICD-10-CM: G40.909  ICD-9-CM: 345.90  3/9/2020 Yes    Overview Signed 3/9/2020 11:24 AM by Scotty Carter MD     1990  eeg abnormal  CT brain neg  Was on phenobarbital  Off med since 2010  latelyt subtle symptoms of  Atypical seizure             BMI 35.0-35.9,adult ICD-10-CM: Z68.35  ICD-9-CM: V85.35  1/24/2020 Yes        Hypertriglyceridemia ICD-10-CM: E78.1  ICD-9-CM: 272.1  3/19/2018 Yes    Overview Addendum 5/22/2019  7:41 PM by Scotty Carter MD     Overview:   Overview:   Much better now on lofibra--doing well  Advised more stricter low carb diet and aggressive control of DM--pt willing  F/u in few months for recheck  5/2019  Statin/lofibra not helping--will add fish oil and niacin  F/u in few weeks for recheck         Now down to 458 with pheresis    CAD (coronary artery disease) ICD-10-CM: I25.10  ICD-9-CM: 414.00  2/15/2018 Yes    Overview Signed 3/2/2018  1:36 PM by Kodi Guerra MD     Stents  Sees cardiologist  aspirin             DKA (diabetic ketoacidoses) [facility]) ICD-10-CM: E11.10  ICD-9-CM: 250.12  2/15/2018 Yes    resolved    Diabetes mellitus type II, uncontrolled ([facility] [address] (Chronic) ICD-10-CM: E11.65  ICD-9-CM: 250.02  12/15/2016 Yes    Overview Addendum 6/19/2018 11:35 AM by Kodi Guerra MD     Now on lantus and humalog prn  goor control BS under 150   Sliding scale given-multiples of 4  Pt on metformit 500 mg bid 3/2019 and  lantus  25 units  Diet discussed  Labs today  F/u every 3 months         BS now upper 100s          Plan:  (Medical Decision Making)     --Patient on RA, eager to go home  --Urine for strept and legionella neg, Mycoplasma, fungitell pending. Quanteferon and AFB studies negative so far (3rd sample pending). Plans for bronch today with washings (on Effient, no biopsies) but awaiting last AFB cultures  --Day 6 rocephin and azithromycin   --GI/DVT prophylaxis  --Full Code    Miguelina Steele, CUONG     Lungs:  Clear, RA 99%  Heart:  RRR with no Murmur/Rubs/Gallops    Additional Comments:  States improved, no cough or dyspnea any more. Doubt TB, AFB neg x 2/3. Could go home and quarantine until 3rd returns. No plan for bronch. Will need follow up in clinic in 4-6 weeks and likely need repeat CT chest noncon in 8 weeks to assure infiltrates resolved. Will sign off. D/W Dr. Aaron Penaloza. I have spoken with and examined the patient. I agree with the above assessment and plan as documented.     Essie Heart MD

## 2020-12-01 NOTE — PROGRESS NOTES
Infusion nurse here to begin apheresis. Rocephin due, will hold due to apheresis. Call to pharmacy spoke with anastasiia Bianchi to adm Rocephin when apheresis completed this afternoon.

## 2020-12-01 NOTE — PROGRESS NOTES
Hospitalist Note     Admit Date:  2020  6:55 PM   Name:  Ewa Gardiner   Age:  52 y.o.  :  1970   MRN:  [id number]   PCP:  LIBERTY Powell  Treatment Team: Attending Provider: Giana Mary MD; Consulting Provider: Vic Schirmer, MD; Utilization Review: Qing Aguirre RN; Care Manager: Lidia Silverio RN; Consulting Provider: Vidhi Mir MD; Utilization Review: Dina Stephenson; Primary Nurse: Oziel Rivas RN; Physical Therapist: Helen Ogden    HPI/Subjective:   Mr. Brien Anthony is a pleasant 53 y/o WM with a h/o CAD s/p multiple stents, GERD, hypertriglyceridemia, psoriatic arthritis on Cosentyx, Dm, seizure disorder, hypertension who presented to the ED on  with intractable back pain that radiates to the side of his chest w/ mild dyspnea, cough and chills, unintentional weight loss of 40 pounds since August (right after starting Cosentyx). Work-up in the /104, tachycardia with heart rate of 125, WBC 15.6 hemoglobin 18.0, anion gap of 22, glucose of 336, triglycerides of 2712. CT chest with multiple ill-defined nodules in the left lung. It was initially admitted to the ICU for DKA with insulin drip. DKA has resolved. Patient was then transferred to [facility] for apheresis due to hypertriglyceridemia. Patient underwent IR guided right IJ placement on  and had 2 sessions of apheresis for hypertriglyceridemia. Heme re-consulted for rising trigs. Pulm following for cavitary lesion, bronch planned. : Feels great, up to chair, no complaints. Unsure of PLEX/bronch plans. Yehuda Heys to go home.      No other complaints  Objective:     Patient Vitals for the past 24 hrs:   Temp Pulse Resp BP SpO2   20 0823 98.6 °F (37 °C) 89 15 (!) 145/108 98 %   20 0513 98.3 °F (36.8 °C) 84 18 112/79 97 %   20 0107 97.9 °F (36.6 °C) 93 19 127/83 97 %   20 1952 98.5 °F (36.9 °C) 91 19 (!) 146/89 96 %   20 1706 98.1 °F (36.7 °C) 86 18 (!) 156/96 99 %   20 1132 98.1 °F (36.7 °C) 95 18 (!) 161/85 99 %     Oxygen Therapy  O2 Sat (%): 98 % (12/01/20 0823)  O2 Device: Room air (11/27/20 2039)  O2 Flow Rate (L/min): 3 l/min (11/25/20 2321)    Estimated body mass index is 36.13 kg/m² as calculated from the following:    Height as of 11/26/20: 5' 10\" (1.778 m). Weight as of this encounter: 114.2 kg (251 lb 12.8 oz). No intake or output data in the 24 hours ending 12/01/20 1102    *Note that automatically entered I/Os may not be accurate; dependent on patient compliance with collection and accurate  by techs. General:    Well nourished. No overt distress. Obese. CV:   RRR. No m/r/g. No edema. No JVD  Lungs:   Decreased but clear. Abdomen:   Soft, nontender, nondistended. Extremities: Warm and dry. No cyanosis   Skin:     No rashes. No jaundice. Normal coloration. RIJ CVC. Neuro:  No gross focal deficits.   Alert    Data Reviewed:  I have reviewed all labs, meds, and studies from the last 24 hours:  Recent Results (from the past 24 hour(s))   GLUCOSE, POC    Collection Time: 11/30/20 11:18 AM   Result Value Ref Range    Glucose (POC) 169 (H) 65 - 100 mg/dL   GLUCOSE, POC    Collection Time: 11/30/20  2:41 PM   Result Value Ref Range    Glucose (POC) 170 (H) 65 - 100 mg/dL   MAGNESIUM    Collection Time: 11/30/20  2:52 PM   Result Value Ref Range    Magnesium 2.1 1.8 - 2.4 mg/dL   PTT    Collection Time: 11/30/20  2:52 PM   Result Value Ref Range    aPTT 30.5 24.1 - 35.1 SEC   FIBRINOGEN    Collection Time: 11/30/20  2:52 PM   Result Value Ref Range    Fibrinogen 183 (L) 190 - 501 mg/dL   GLUCOSE, POC    Collection Time: 11/30/20  8:05 PM   Result Value Ref Range    Glucose (POC) 254 (H) 65 - 100 mg/dL   CBC WITH AUTOMATED DIFF    Collection Time: 12/01/20  5:03 AM   Result Value Ref Range    WBC 7.1 4.3 - 11.1 K/uL    RBC 4.32 4.23 - 5.6 M/uL    HGB 13.7 13.6 - 17.2 g/dL    HCT 40.9 (L) 41.1 - 50.3 %    MCV 94.7 79.6 - 97.8 FL    MCH 31.7 26.1 - 32.9 PG    MCHC 33.5 31.4 - 35.0 g/dL    RDW 12.8 11.9 - 14.6 %    PLATELET 089 202 - 944 K/uL    MPV 10.7 9.4 - 12.3 FL    ABSOLUTE NRBC 0.00 0.0 - 0.2 K/uL    DF AUTOMATED      NEUTROPHILS 45 43 - 78 %    LYMPHOCYTES 36 13 - 44 %    MONOCYTES 11 4.0 - 12.0 %    EOSINOPHILS 7 0.5 - 7.8 %    BASOPHILS 1 0.0 - 2.0 %    IMMATURE GRANULOCYTES 1 0.0 - 5.0 %    ABS. NEUTROPHILS 3.2 1.7 - 8.2 K/UL    ABS. LYMPHOCYTES 2.6 0.5 - 4.6 K/UL    ABS. MONOCYTES 0.8 0.1 - 1.3 K/UL    ABS. EOSINOPHILS 0.5 0.0 - 0.8 K/UL    ABS. BASOPHILS 0.0 0.0 - 0.2 K/UL    ABS. IMM.  GRANS. 0.0 0.0 - 0.5 K/UL   METABOLIC PANEL, BASIC    Collection Time: 12/01/20  5:03 AM   Result Value Ref Range    Sodium 141 136 - 145 mmol/L    Potassium 3.7 3.5 - 5.1 mmol/L    Chloride 107 98 - 107 mmol/L    CO2 31 21 - 32 mmol/L    Anion gap 3 (L) 7 - 16 mmol/L    Glucose 118 (H) 65 - 100 mg/dL    BUN 8 6 - 23 MG/DL    Creatinine 0.75 (L) 0.8 - 1.5 MG/DL    GFR est AA >60 >60 ml/min/1.73m2    GFR est non-AA >60 >60 ml/min/1.73m2    Calcium 9.0 8.3 - 10.4 MG/DL   TRIGLYCERIDE    Collection Time: 12/01/20  5:03 AM   Result Value Ref Range    Triglyceride 523 (H) 35 - 150 MG/DL   LDL, DIRECT    Collection Time: 12/01/20  5:03 AM   Result Value Ref Range    LDL,Direct 47 <100 mg/dl   GLUCOSE, POC    Collection Time: 12/01/20  8:57 AM   Result Value Ref Range    Glucose (POC) 156 (H) 65 - 100 mg/dL   MAGNESIUM    Collection Time: 12/01/20  9:26 AM   Result Value Ref Range    Magnesium 1.9 1.8 - 2.4 mg/dL   PTT    Collection Time: 12/01/20  9:26 AM   Result Value Ref Range    aPTT 27.0 24.1 - 35.1 SEC   FIBRINOGEN    Collection Time: 12/01/20  9:26 AM   Result Value Ref Range    Fibrinogen 297 190 - 048 mg/dL   METABOLIC PANEL, COMPREHENSIVE    Collection Time: 12/01/20  9:26 AM   Result Value Ref Range    Sodium 140 138 - 145 mmol/L    Potassium 4.3 3.5 - 5.1 mmol/L    Chloride 106 98 - 107 mmol/L    CO2 29 21 - 32 mmol/L    Anion gap 5 (L) 7 - 16 mmol/L    Glucose 171 (H) 65 - 100 mg/dL    BUN 7 6 - 23 MG/DL    Creatinine 0.75 (L) 0.8 - 1.5 MG/DL    GFR est AA >60 >60 ml/min/1.73m2    GFR est non-AA >60 >60 ml/min/1.73m2    Calcium 9.3 8.3 - 10.4 MG/DL    Bilirubin, total 0.7 0.2 - 1.1 MG/DL    ALT (SGPT) 55 12 - 65 U/L    AST (SGOT) 61 (H) 15 - 37 U/L    Alk.  phosphatase 61 50 - 136 U/L    Protein, total 6.5 6.3 - 8.2 g/dL    Albumin 3.8 3.5 - 5.0 g/dL    Globulin 2.7 2.3 - 3.5 g/dL    A-G Ratio 1.4 1.2 - 3.5         Current Meds:  Current Facility-Administered Medications   Medication Dose Route Frequency    diphenhydrAMINE (BENADRYL) capsule 25 mg  25 mg Oral ONCE PRN    calcium gluconate 2 g in 0.9% sodium chloride 250 mL  2 g IntraVENous ONCE    sodium chloride (NS) flush 10 mL  10 mL InterCATHeter PRN    albumin human 5% (BUMINATE) solution 174.3 g  3,486 mL IntraVENous ONCE    insulin glargine (LANTUS) injection 40 Units  40 Units SubCUTAneous Q12H    insulin lispro (HUMALOG) injection 12 Units  12 Units SubCUTAneous TIDAC    azithromycin (ZITHROMAX) tablet 500 mg  500 mg Oral DAILY    HYDROcodone-acetaminophen (NORCO) 5-325 mg per tablet 1 Tab  1 Tab Oral Q4H PRN    gabapentin (NEURONTIN) capsule 300 mg  300 mg Oral TID    lidocaine 4 % patch 1 Patch  1 Patch TransDERmal Q24H    Saccharomyces boulardii (FLORASTOR) capsule 250 mg  250 mg Oral BID    lidocaine 4 % patch 1 Patch  1 Patch TransDERmal Q24H    sodium chloride 3% hypertonic nebulizer soln  4 mL Nebulization Q12H PRN    sodium chloride (NS) flush 5-40 mL  5-40 mL IntraVENous Q8H    sodium chloride (NS) flush 5-40 mL  5-40 mL IntraVENous PRN    acetaminophen (TYLENOL) tablet 650 mg  650 mg Oral Q6H PRN    Or    acetaminophen (TYLENOL) suppository 650 mg  650 mg Rectal Q6H PRN    polyethylene glycol (MIRALAX) packet 17 g  17 g Oral DAILY PRN    promethazine (PHENERGAN) tablet 12.5 mg  12.5 mg Oral Q6H PRN    Or    ondansetron (ZOFRAN) injection 4 mg  4 mg IntraVENous Q6H PRN    enoxaparin (LOVENOX) injection 40 mg  40 mg SubCUTAneous DAILY    aspirin delayed-release tablet 81 mg  81 mg Oral DAILY    atorvastatin (LIPITOR) tablet 80 mg  80 mg Oral DAILY    cefTRIAXone (ROCEPHIN) 1 g in 0.9% sodium chloride (MBP/ADV) 50 mL MBP  1 g IntraVENous Q24H    insulin lispro (HUMALOG) injection   SubCUTAneous AC&HS    calcium carbonate (OS-SARAI) tablet 500 mg [elemental]  500 mg Oral TID WITH MEALS    buPROPion (WELLBUTRIN) tablet 100 mg  100 mg Oral BID    carvediloL (COREG) tablet 6.25 mg  6.25 mg Oral BID WITH MEALS    fenofibrate (LOFIBRA) tablet 160 mg  160 mg Oral DAILY    isosorbide mononitrate ER (IMDUR) tablet 30 mg  30 mg Oral DAILY    prasugreL (EFFIENT) tablet 10 mg  10 mg Oral DAILY    HYDROmorphone (PF) (DILAUDID) injection 0.5 mg  0.5 mg IntraVENous Q4H PRN    labetaloL (NORMODYNE;TRANDATE) injection 20 mg  20 mg IntraVENous Q1H PRN    labetaloL (NORMODYNE;TRANDATE) injection 20 mg  20 mg IntraVENous Q1H PRN       Other Studies:  Results for orders placed or performed during the hospital encounter of 20   2D ECHO COMPLETE ADULT (TTE) W OR WO CONTR    Narrative    Arthur  [address]  [phone]    Transthoracic Echocardiogram  2D, M-mode, Doppler, and Color Doppler    Patient: Javid Higgins  MR #: [id number]  : 1970  Age: 52 years  Gender: Male  Study date: 2020  Account #: [de-identified]  Height: 70 in  Weight: 252.6 lb  BSA: 2.31 mï¾²  Status:Routine  Location: [facility]  BP: 137/ 75    Allergies: PEANUT, MORPHINE    Sonographer:  SOFIA Corrales  Group:  [id number] Cardiology  Referring Physician:  Tom Silverman. Roxie Stovall MD  Reading Physician:  Dago Sun MD [facility]    INDICATIONS: SOB/Chest Pain    PROCEDURE: This was a routine study. A transthoracic echocardiogram was  performed. The study included complete 2D imaging, M-mode, complete spectral  Doppler, and color Doppler.  Image quality was adequate. LEFT VENTRICLE: Size was normal. Systolic function was normal. Ejection  fraction was estimated in the range of 55 % to 60 %. There were no regional  wall motion abnormalities. Wall thickness was normal. Left ventricular  diastolic function parameters were normal. Avg E/e': 8.96.    RIGHT VENTRICLE: The size was normal. Systolic function was normal. The  tricuspid jet envelope definition was inadequate for estimation of RV   systolic  pressure. LEFT ATRIUM: Size was normal.    RIGHT ATRIUM: Size was normal.    SYSTEMIC VEINS: IVC: The inferior vena cava was mildly dilated. The  respirophasic change in diameter was more than 50%. AORTIC VALVE: The valve was structurally normal, tri-commissural. There was   no  evidence for stenosis. There was no insufficiency. MITRAL VALVE: Valve structure was normal. There was no evidence for stenosis. There was trivial regurgitation. TRICUSPID VALVE: The valve structure was normal. There was no evidence for  stenosis. There was trivial regurgitation. PULMONIC VALVE: Not well visualized. There was no evidence for stenosis. There  was no insufficiency. PERICARDIUM: There was no pericardial effusion. AORTA: The root exhibited normal size. SUMMARY:    -  Left ventricle: Systolic function was normal. Ejection fraction was  estimated in the range of 55 % to 60 %. There were no regional wall motion  abnormalities. -  Inferior vena cava, hepatic veins: The inferior vena cava was mildly  dilated. The respirophasic change in diameter was more than 50%. SYSTEM MEASUREMENT TABLES    2D  Ao Diam: 3.3 cm  LA Diam: 3.9 cm  LAEDV Index (A-L): 26.9 ml/m2  %FS: 44.2 %  IVSd: 1 cm  LVIDd: 4.5 cm  LVIDs: 2.5 cm  LVOT Diam: 2 cm  LVPWd: 1 cm    PW  E/E' Av  E/E' Lat: 8.1  E/E' Sept: 10    Prepared and signed by    Cata Davidson. Philippe Corrigan MD [facility]  Signed 2020 18:01:22         No results found.     All Micro Results     Procedure Component Value Units Date/Time    AFB CULTURE + SMEAR W/RFLX ID FROM CULTURE [[id number]]     Order Status:  Sent     AFB CULTURE + SMEAR W/RFLX ID FROM CULTURE [[id number]]     Order Status:  [employer] GOLD PLUS [[id number]] Collected:  11/30/20 0348    Order Status:  Completed Updated:  11/30/20 0506    AFB CULTURE + SMEAR W/RFLX ID FROM CULTURE [[id number]]     Order Status:  Sent     AFB CULTURE + SMEAR W/RFLX ID FROM CULTURE [[id number]]     Order Status:  Sent     AFB CULTURE + SMEAR W/RFLX ID FROM CULTURE [[id number]]     Order Status:  Sent     AFB CULTURE + SMEAR W/RFLX ID FROM CULTURE [[id number]] Collected:  11/28/20 1700    Order Status:  Completed Updated:  11/28/20 1853    AFB CULTURE + SMEAR W/RFLX ID FROM CULTURE [[id number]] Collected:  11/26/20 1700    Order Status:  Completed Specimen:  Miscellaneous sample Updated:  11/28/20 1335     Source EXPECTORATED SPUTUM        AFB Specimen processing Concentration     Acid Fast Smear Negative        Comment: (NOTE)  Performed At: [address] [id number]  Layton Stallings MD [id number]          Acid Fast Culture PENDING    AFB CULTURE + SMEAR W/RFLX ID FROM CULTURE [[id number]] Collected:  11/27/20 1013    Order Status:  Completed Specimen:  Miscellaneous sample Updated:  11/28/20 1335     Source SPUTUM        AFB Specimen processing Concentration     Acid Fast Smear Negative        Comment: (NOTE)  Performed At: [address] [id number]  Layton Stallings MD [id number]          Acid Fast Culture PENDING    AFB CULTURE + SMEAR W/RFLX ID FROM CULTURE [[id number]] Collected:  11/28/20 0600    Order Status:  Canceled     AFB CULTURE + SMEAR W/RFLX ID FROM CULTURE [[id number]] Collected:  11/27/20 1700    Order Status:  Canceled     QU[employer]ER[employer]-TB GOLD PLUS [[id number]] Collected:  11/27/20 1320    Order Status:  Canceled     QUANTIFERON-TB GOLD PLUS [[id number]]     Order Status:  Canceled Specimen: Blood     CLOSTRIDIUM DIFF TOXIN A & B [[id number]]     Order Status:  Canceled Specimen:  Stool     AFB CULTURE + SMEAR W/RFLX ID FROM CULTURE [[id number]] Collected:  11/27/20 0800    Order Status:  Canceled     AFB CULTURE + SMEAR W/RFLX ID FROM CULTURE [[id number]] Collected:  11/26/20 1652    Order Status:  Canceled     AFB CULTURE + SMEAR W/RFLX ID FROM CULTURE [[id number]]     Order Status:  Canceled     AFB CULTURE + SMEAR W/RFLX ID FROM CULTURE [[id number]] Collected:  11/26/20 0418    Order Status:  Canceled     AFB CULTURE + SMEAR W/RFLX ID FROM CULTURE [[id number]] Collected:  11/25/20 2100    Order Status:  Canceled           SARS-CoV-2 Lab Results  \"Novel Coronavirus\" Test: No results found for: COV2NT   \"Emergent Disease\" Test: No results found for: EDPR  \"SARS-COV-2\" Test: No results found for: XGCOVT  Rapid Test:   Lab Results   Component Value Date/Time    COVR Not detected 11/24/2020 06:09 PM            Assessment and Plan:     Hospital Problems as of 12/1/2020 Date Reviewed: 9/17/2020          Codes Class Noted - Resolved POA    Pulmonary infiltrates ICD-10-CM: R91.8  ICD-9-CM: 793.19  11/30/2020 - Present Unknown        Hypokalemia ICD-10-CM: E87.6  ICD-9-CM: 276.8  11/28/2020 - Present Unknown        Weight loss, unintentional  - over 40 lbs in 3 months ICD-10-CM: R63.4  ICD-9-CM: 783.21  11/25/2020 - Present Yes        * (Principal) Intractable back pain ICD-10-CM: M54.9  ICD-9-CM: 724.5  11/25/2020 - Present         Seizure disorder ([facility] [address] ICD-10-CM: G40.909  ICD-9-CM: 345.90  3/9/2020 - Present Yes    Overview Signed 3/9/2020 11:24 AM by Morro Barnard MD     1990  eeg abnormal  CT brain neg  Was on phenobarbital  Off med since 2010  latelyt subtle symptoms of  Atypical seizure             BMI 35.0-35.9,adult ICD-10-CM: Z68.35  ICD-9-CM: V85.35  1/24/2020 - Present Yes        Hypertriglyceridemia ICD-10-CM: E78.1  ICD-9-CM: 272.1  3/19/2018 - Present Yes    Overview Addendum 5/22/2019  7:41 PM by Grabiel Campuzano MD     Overview:   Overview:   Much better now on lofibra--doing well  Advised more stricter low carb diet and aggressive control of DM--pt willing  F/u in few months for recheck  5/2019  Statin/lofibra not helping--will add fish oil and niacin  F/u in few weeks for recheck             CAD (coronary artery disease) ICD-10-CM: I25.10  ICD-9-CM: 414.00  2/15/2018 - Present Yes    Overview Signed 3/2/2018  1:36 PM by Grabiel Campuzano MD     Stents  Sees cardiologist  aspirin             DKA (diabetic ketoacidoses) ([facility] [address] ICD-10-CM: E11.10  ICD-9-CM: 250.12  2/15/2018 - Present Yes        Acute pancreatitis ICD-10-CM: K85.90  ICD-9-CM: 962.1  2/15/2018 - Present     Overview Signed 5/20/2019  9:29 AM by Tilton Boxer D     Overview:   Overview:   Sec to hypertriglyceridemia  Resolved now 3/2018  Few tramadol dispensed for pain management             Diabetes mellitus type II, uncontrolled ([facility] [address] (Chronic) ICD-10-CM: E11.65  ICD-9-CM: 250.02  12/15/2016 - Present Yes    Overview Addendum 6/19/2018 11:35 AM by Grabiel Campuzano MD     Now on lantus and humalog prn  goor control BS under 150   Sliding scale given-multiples of 4  Pt on metformit 500 mg bid 3/2019 and  lantus  25 units  Diet discussed  Labs today  F/u every 3 months                   Plan:  # Left lung nodules   - Atypical infection, fungus, TB. Pulm following. On abx. RA O2. On Cosentyx for psoriatic arthritis and now has lost 40lbs and developed night sweats since starting that. Bronch? # Acute pancreatitis 2/2 hypertriglyceridemia   - [facility] for apheresis, initially responsive then increased to Heme re-consulted, planning apheresis again today, 12/1? Lofibra added, con't statin. Has Endocrinology appt for 12/17. May be a candidate for Vascepa or Repatha. Has never been tested for familial/genetic cause.     # Lower back pain   - MRIs reviewed, Neurosurgery consulted and non-surgical. Outpatient pain mgmt referral.     # Diarrhea   - Resolved    # DKA   - Resolved. On insulin drip at time of admission. # Chest pain   - Resolved. Seen by Cards given hx CAD. Not ACS. Con't home meds. # DM2   - Insulin. # CAD   - Home meds. DC planning/Dispo: Home when able, today vs tomorrow pending apheresis/bronch plans. Diet:  DIET NPO  DVT ppx: Lovenox    Other listed chronic conditions stable, cont current management.     Signed:  Annalee Andre MD

## 2020-12-01 NOTE — PROGRESS NOTES
PT Note    Attempted to see pnt this PM, but was informed per RN that he is unavailable due to undergoing a transfusion. Will attempt again pending patient status and scheduling.      Thank you,   Tanisha Odom, PT, DPT

## 2020-12-01 NOTE — PROGRESS NOTES
Dr. Domitila Torres at nurses station informed of call from [facility] at [facility], micoplasm antibodies  Rejected by lab due to specimen too lipemic. No new orders received. Dr. Domitila Torres also informed MEWS score elevated, BP, HR elevated, no new orders received, continue to monitor.

## 2020-12-01 NOTE — PROGRESS NOTES
Dr. Vikas Ziegler notified blood sugar 98, new order received, may hold Humalog 12 units sq prior to dinner. Pt to discharge this evening, Rocephin currently infusing.

## 2020-12-01 NOTE — PROGRESS NOTES
Problem: Diabetes Self-Management  Goal: *Disease process and treatment process  Description: Define diabetes and identify own type of diabetes; list 3 options for treating diabetes. Outcome: Progressing Towards Goal  Goal: *Using medications safely  Description: State effect of diabetes medications on diabetes; name diabetes medication taking, action and side effects. Outcome: Progressing Towards Goal  Goal: *Monitoring blood glucose, interpreting and using results  Description: Identify recommended blood glucose targets  and personal targets. Outcome: Progressing Towards Goal     Problem: Falls - Risk of  Goal: *Absence of Falls  Description: Document Richard Merlin Fall Risk and appropriate interventions in the flowsheet.   Outcome: Progressing Towards Goal  Note: Fall Risk Interventions:  Mobility Interventions: Bed/chair exit alarm, Communicate number of staff needed for ambulation/transfer, OT consult for ADLs, Patient to call before getting OOB, PT Consult for mobility concerns         Medication Interventions: Teach patient to arise slowly    Elimination Interventions: Call light in reach

## 2020-12-02 ENCOUNTER — PATIENT OUTREACH (OUTPATIENT)
Dept: CASE MANAGEMENT | Age: 50
End: 2020-12-02

## 2020-12-02 NOTE — PROGRESS NOTES
CTN attempted to outreach to patient per hospital discharge 12/1/2020 for [facility]UR[facility] call. Unable to reach patient. Message left with contact information requesting a return call. Will continue to outreach per protocol.

## 2020-12-03 ENCOUNTER — PATIENT OUTREACH (OUTPATIENT)
Dept: CASE MANAGEMENT | Age: 50
End: 2020-12-03

## 2020-12-03 LAB
M TB IFN-G BLD-IMP: NORMAL
QUANTIFERON CRITERIA, QFI1T: NORMAL
QUANTIFERON INCUBATION, QF1T: NORMAL
QUANTIFERON MITOGEN VALUE: >10 IU/ML
QUANTIFERON NIL VALUE: 0.03 IU/ML
QUANTIFERON TB1 AG: 0.08 IU/ML
QUANTIFERON TB2 AG: 0.05 IU/ML

## 2020-12-03 NOTE — PROGRESS NOTES
2nd attempt to outreach to patient for [facility] call. Another message left with contact information requesting a return call. Episode will be resolved at this time due to no return call.

## 2020-12-12 LAB — 1,3 BETA GLUCAN SER-MCNC: NORMAL PG/ML

## 2020-12-30 LAB
ACID FAST STN SPEC: NEGATIVE
AMIKACIN ISLT MIC: ABNORMAL
CLARITHRO ISLT MIC: ABNORMAL
LINEZOLID ISLT MIC: ABNORMAL
M AVIUM CMPLX RRNA SPEC QL PROBE: POSITIVE
M GORDONAE RRNA SPEC QL PROBE: ABNORMAL
M KANSASII RRNA SPEC QL PROBE: ABNORMAL
M TB CMPLX RRNA SPEC QL PROBE: NEGATIVE
MICROORGANISM/AGENT SPEC: ABNORMAL
MOXIFLOXACIN ISLT MIC: ABNORMAL
MYCOBACTERIUM SPEC QL CULT: POSITIVE
OTHER, RAFBI6: ABNORMAL
PLEASE NOTE, AFR16: ABNORMAL
SPECIMEN PREPARATION: ABNORMAL
SPECIMEN SOURCE: ABNORMAL
STREPTOMYCIN ISLT MIC: ABNORMAL
SUSCEPT TESTING, RAFBI7: ABNORMAL

## 2021-01-07 PROBLEM — R91.8 LUNG INFILTRATE: Status: RESOLVED | Noted: 2020-11-25 | Resolved: 2021-01-07

## 2021-01-09 LAB
ACID FAST STN SPEC: NEGATIVE
MYCOBACTERIUM SPEC QL CULT: NEGATIVE
SPECIMEN PREPARATION: NORMAL
SPECIMEN SOURCE: NORMAL

## 2021-03-31 PROBLEM — G62.9 PERIPHERAL POLYNEUROPATHY: Status: ACTIVE | Noted: 2021-03-31

## 2021-03-31 PROBLEM — Z79.4 TYPE 2 DIABETES MELLITUS WITH HYPERGLYCEMIA, WITH LONG-TERM CURRENT USE OF INSULIN (HCC): Status: ACTIVE | Noted: 2020-01-24

## 2021-03-31 PROBLEM — E11.65 TYPE 2 DIABETES MELLITUS WITH HYPERGLYCEMIA, WITH LONG-TERM CURRENT USE OF INSULIN (HCC): Status: ACTIVE | Noted: 2020-01-24

## 2021-04-22 ENCOUNTER — HOSPITAL ENCOUNTER (OUTPATIENT)
Dept: CT IMAGING | Age: 51
Discharge: HOME | End: 2021-04-22
Attending: INTERNAL MEDICINE
Payer: COMMERCIAL

## 2021-04-22 DIAGNOSIS — R91.8 PULMONARY INFILTRATE: ICD-10-CM

## 2021-04-22 PROCEDURE — 71250 CT THORAX DX C-: CPT

## 2021-04-30 NOTE — PROGRESS NOTES
His CT looks better. There is a new inflammatory nodule area, but most look better. He didn't grow any more AFBs on his last two sputums he submitted. I'd like to get him to follow up at his convenience to discuss his symptoms and follow up. If he is coughing up sputum still/again I would repeat a resp culture/afb culture x 1 and also check Ig G/A/M and IgG subclasses as well as repeat a CT in 6 months for the nodules. If he is feeling well we can just repeat the CT in 6 months and follow up at his convenience.     Sine

## 2021-05-02 ENCOUNTER — APPOINTMENT (OUTPATIENT)
Dept: CT IMAGING | Age: 51
End: 2021-05-02
Attending: EMERGENCY MEDICINE
Payer: COMMERCIAL

## 2021-05-02 ENCOUNTER — APPOINTMENT (OUTPATIENT)
Dept: GENERAL RADIOLOGY | Age: 51
End: 2021-05-02
Attending: EMERGENCY MEDICINE
Payer: COMMERCIAL

## 2021-05-02 ENCOUNTER — HOSPITAL ENCOUNTER (EMERGENCY)
Age: 51
Discharge: HOME | End: 2021-05-02
Attending: EMERGENCY MEDICINE
Payer: COMMERCIAL

## 2021-05-02 VITALS
DIASTOLIC BLOOD PRESSURE: 84 MMHG | TEMPERATURE: 98.8 F | SYSTOLIC BLOOD PRESSURE: 138 MMHG | WEIGHT: 262 LBS | BODY MASS INDEX: 38.8 KG/M2 | HEIGHT: 69 IN | HEART RATE: 90 BPM | RESPIRATION RATE: 16 BRPM | OXYGEN SATURATION: 96 %

## 2021-05-02 DIAGNOSIS — N20.0 KIDNEY STONE: ICD-10-CM

## 2021-05-02 DIAGNOSIS — N30.01 ACUTE CYSTITIS WITH HEMATURIA: Primary | ICD-10-CM

## 2021-05-02 DIAGNOSIS — S60.221A CONTUSION OF RIGHT HAND, INITIAL ENCOUNTER: ICD-10-CM

## 2021-05-02 LAB
ALBUMIN SERPL-MCNC: 3.4 G/DL (ref 3.5–5)
ALBUMIN/GLOB SERPL: 0.7 {RATIO} (ref 1.2–3.5)
ALP SERPL-CCNC: 83 U/L (ref 50–136)
ALT SERPL-CCNC: 25 U/L (ref 12–65)
ANION GAP SERPL CALC-SCNC: 9 MMOL/L (ref 7–16)
AST SERPL-CCNC: 46 U/L (ref 15–37)
BACTERIA URNS QL MICRO: ABNORMAL /HPF
BASOPHILS # BLD: 0 K/UL (ref 0–0.2)
BASOPHILS NFR BLD: 0 % (ref 0–2)
BILIRUB SERPL-MCNC: 0.9 MG/DL (ref 0.2–1.1)
BUN SERPL-MCNC: 11 MG/DL (ref 6–23)
CALCIUM SERPL-MCNC: 9.5 MG/DL (ref 8.3–10.4)
CASTS URNS QL MICRO: 0 /LPF
CHLORIDE SERPL-SCNC: 103 MMOL/L (ref 98–107)
CO2 SERPL-SCNC: 24 MMOL/L (ref 21–32)
CREAT SERPL-MCNC: 0.97 MG/DL (ref 0.8–1.5)
CRYSTALS URNS QL MICRO: 0 /LPF
DIFFERENTIAL METHOD BLD: ABNORMAL
EOSINOPHIL # BLD: 0 K/UL (ref 0–0.8)
EOSINOPHIL NFR BLD: 0 % (ref 0.5–7.8)
EPI CELLS #/AREA URNS HPF: ABNORMAL /HPF
ERYTHROCYTE [DISTWIDTH] IN BLOOD BY AUTOMATED COUNT: 13.6 % (ref 11.9–14.6)
GLOBULIN SER CALC-MCNC: 4.6 G/DL (ref 2.3–3.5)
GLUCOSE SERPL-MCNC: 197 MG/DL (ref 65–100)
HCT VFR BLD AUTO: 44.8 % (ref 41.1–50.3)
HGB BLD-MCNC: 14.7 G/DL (ref 13.6–17.2)
IMM GRANULOCYTES # BLD AUTO: 0.1 K/UL (ref 0–0.5)
IMM GRANULOCYTES NFR BLD AUTO: 1 % (ref 0–5)
LACTATE SERPL-SCNC: 0.9 MMOL/L (ref 0.4–2)
LIPASE SERPL-CCNC: 98 U/L (ref 73–393)
LYMPHOCYTES # BLD: 0.5 K/UL (ref 0.5–4.6)
LYMPHOCYTES NFR BLD: 3 % (ref 13–44)
MCH RBC QN AUTO: 29.5 PG (ref 26.1–32.9)
MCHC RBC AUTO-ENTMCNC: 32.8 G/DL (ref 31.4–35)
MCV RBC AUTO: 89.8 FL (ref 79.6–97.8)
MONOCYTES # BLD: 1.5 K/UL (ref 0.1–1.3)
MONOCYTES NFR BLD: 9 % (ref 4–12)
MUCOUS THREADS URNS QL MICRO: 0 /LPF
NEUTS SEG # BLD: 14.2 K/UL (ref 1.7–8.2)
NEUTS SEG NFR BLD: 87 % (ref 43–78)
NRBC # BLD: 0 K/UL (ref 0–0.2)
OTHER OBSERVATIONS,UCOM: ABNORMAL
PLATELET # BLD AUTO: 291 K/UL (ref 150–450)
PMV BLD AUTO: 11 FL (ref 9.4–12.3)
POTASSIUM SERPL-SCNC: 4.4 MMOL/L (ref 3.5–5.1)
PROCALCITONIN SERPL-MCNC: 0.14 NG/ML
PROT SERPL-MCNC: 8 G/DL (ref 6.3–8.2)
RBC # BLD AUTO: 4.99 M/UL (ref 4.23–5.6)
RBC #/AREA URNS HPF: ABNORMAL /HPF
SODIUM SERPL-SCNC: 136 MMOL/L (ref 138–145)
WBC # BLD AUTO: 16.3 K/UL (ref 4.3–11.1)
WBC URNS QL MICRO: ABNORMAL /HPF
YEAST URNS QL MICRO: ABNORMAL

## 2021-05-02 PROCEDURE — 74176 CT ABD & PELVIS W/O CONTRAST: CPT

## 2021-05-02 PROCEDURE — 99285 EMERGENCY DEPT VISIT HI MDM: CPT

## 2021-05-02 PROCEDURE — 81015 MICROSCOPIC EXAM OF URINE: CPT

## 2021-05-02 PROCEDURE — 83605 ASSAY OF LACTIC ACID: CPT

## 2021-05-02 PROCEDURE — 87088 URINE BACTERIA CULTURE: CPT

## 2021-05-02 PROCEDURE — 74011000258 HC RX REV CODE- 258: Performed by: EMERGENCY MEDICINE

## 2021-05-02 PROCEDURE — 81003 URINALYSIS AUTO W/O SCOPE: CPT

## 2021-05-02 PROCEDURE — 87086 URINE CULTURE/COLONY COUNT: CPT

## 2021-05-02 PROCEDURE — 96376 TX/PRO/DX INJ SAME DRUG ADON: CPT

## 2021-05-02 PROCEDURE — 73130 X-RAY EXAM OF HAND: CPT

## 2021-05-02 PROCEDURE — 83690 ASSAY OF LIPASE: CPT

## 2021-05-02 PROCEDURE — 80053 COMPREHEN METABOLIC PANEL: CPT

## 2021-05-02 PROCEDURE — 84145 PROCALCITONIN (PCT): CPT

## 2021-05-02 PROCEDURE — 87186 SC STD MICRODIL/AGAR DIL: CPT

## 2021-05-02 PROCEDURE — 96365 THER/PROPH/DIAG IV INF INIT: CPT

## 2021-05-02 PROCEDURE — 96375 TX/PRO/DX INJ NEW DRUG ADDON: CPT

## 2021-05-02 PROCEDURE — 85025 COMPLETE CBC W/AUTO DIFF WBC: CPT

## 2021-05-02 PROCEDURE — 74011250636 HC RX REV CODE- 250/636: Performed by: EMERGENCY MEDICINE

## 2021-05-02 RX ORDER — ONDANSETRON 2 MG/ML
4 INJECTION INTRAMUSCULAR; INTRAVENOUS
Status: COMPLETED | OUTPATIENT
Start: 2021-05-02 | End: 2021-05-02

## 2021-05-02 RX ORDER — TAMSULOSIN HYDROCHLORIDE 0.4 MG/1
0.4 CAPSULE ORAL DAILY
Qty: 10 CAP | Refills: 0 | Status: SHIPPED | OUTPATIENT
Start: 2021-05-02 | End: 2021-05-12

## 2021-05-02 RX ORDER — PROMETHAZINE HYDROCHLORIDE 25 MG/1
25 TABLET ORAL
Qty: 12 TAB | Refills: 0 | Status: SHIPPED | OUTPATIENT
Start: 2021-05-02 | End: 2021-06-01

## 2021-05-02 RX ORDER — HYDROMORPHONE HYDROCHLORIDE 1 MG/ML
0.5 INJECTION, SOLUTION INTRAMUSCULAR; INTRAVENOUS; SUBCUTANEOUS ONCE
Status: COMPLETED | OUTPATIENT
Start: 2021-05-02 | End: 2021-05-02

## 2021-05-02 RX ORDER — CEFDINIR 300 MG/1
300 CAPSULE ORAL 2 TIMES DAILY
Qty: 20 CAP | Refills: 0 | Status: SHIPPED | OUTPATIENT
Start: 2021-05-02 | End: 2021-05-04 | Stop reason: ALTCHOICE

## 2021-05-02 RX ORDER — OXYCODONE HYDROCHLORIDE 5 MG/1
5 TABLET ORAL
Qty: 17 TAB | Refills: 0 | Status: SHIPPED | OUTPATIENT
Start: 2021-05-02 | End: 2021-05-05

## 2021-05-02 RX ORDER — HYDROMORPHONE HYDROCHLORIDE 1 MG/ML
1 INJECTION, SOLUTION INTRAMUSCULAR; INTRAVENOUS; SUBCUTANEOUS ONCE
Status: COMPLETED | OUTPATIENT
Start: 2021-05-02 | End: 2021-05-02

## 2021-05-02 RX ADMIN — HYDROMORPHONE HYDROCHLORIDE 0.5 MG: 1 INJECTION, SOLUTION INTRAMUSCULAR; INTRAVENOUS; SUBCUTANEOUS at 10:30

## 2021-05-02 RX ADMIN — CEFTRIAXONE 1 G: 1 INJECTION, POWDER, FOR SOLUTION INTRAMUSCULAR; INTRAVENOUS at 14:02

## 2021-05-02 RX ADMIN — HYDROMORPHONE HYDROCHLORIDE 1 MG: 1 INJECTION, SOLUTION INTRAMUSCULAR; INTRAVENOUS; SUBCUTANEOUS at 12:29

## 2021-05-02 RX ADMIN — ONDANSETRON 4 MG: 2 INJECTION INTRAMUSCULAR; INTRAVENOUS at 12:40

## 2021-05-02 RX ADMIN — SODIUM CHLORIDE 1000 ML: 900 INJECTION, SOLUTION INTRAVENOUS at 10:31

## 2021-05-02 NOTE — ED TRIAGE NOTES
Pt arrives via EMS from home. Pt reports lower back pain radiating to lower abd with hematuria and nausea  that began about 0400. Denies hx of kidney stones but reports hx of pancreatitis. Pt admin 100mcg fentanyl and 4mg zofran in route. 22 g L Hand placed in route as well. VSS in route with HTN noted. Pt states right 4th digit pain and bruising while gardening yesterday.

## 2021-05-02 NOTE — ED NOTES
I have reviewed discharge instructions with the patient. The patient verbalized understanding. Patient left ED via Discharge Method: ambulatory to Home with family. Opportunity for questions and clarification provided. Patient given 4 scripts. To continue your aftercare when you leave the hospital, you may receive an automated call from our care team to check in on how you are doing. This is a free service and part of our promise to provide the best care and service to meet your aftercare needs.  If you have questions, or wish to unsubscribe from this service please call [phone]. Thank you for Choosing our [facility] Emergency Department.

## 2021-05-02 NOTE — DISCHARGE INSTRUCTIONS
Take medications as prescribed  As we discussed, it is important that you call and arrange follow-up with urology  Return to the ER for any new, worsening or life-threatening symptoms

## 2021-05-02 NOTE — ED PROVIDER NOTES
Patient presents to the ER with complaints of abdominal and back pain. Patient states about 4 AM this morning he had acute onset of pain which he states mostly it was in the right part of his back. Describes it as a stabbing pain it is somewhat radiate towards her right lower part of his abdomen. Reports pain persisted until he became worse and became nauseated with it. Reports he has had some increased urinary urgency. States he did notice some blood in his urine a couple days ago, reports he frequently gets UTIs and did start 2 days Azo. Denies any fevers, may be of had some chills. Was administered fentanyl and Zofran in route by EMS with some improvement in symptoms. The history is provided by the patient. Back Pain   This is a new problem. The current episode started 6 to 12 hours ago. The problem has been rapidly improving. The problem occurs constantly. Patient reports not work related injury. The pain is associated with no known injury. The pain is present in the right side. The quality of the pain is described as shooting and aching. Radiates to: Right hemiabdomen. The pain is at a severity of 7/10. The pain is moderate. Associated symptoms include headaches and abdominal pain. Pertinent negatives include no chest pain, no fever, no numbness, no perianal numbness, no paresthesias, no paresis, no tingling and no weakness. He has tried analgesics for the symptoms. The treatment provided moderate relief. Risk factors include history of kidney stones.         Past Medical History:   Diagnosis Date    Acute coronary syndrome ([address] 12/15/2016    Acute pancreatitis 2/15/2018    Arthritis     psoriatic    Diabetes ([address]     Endocrine disease     Hypertension     Nonintractable epilepsy with complex partial seizures ([address] 4/2/2020    Psoriatic arthropathy ([address]     Seizures (HCC)        Past Surgical History:   Procedure Laterality Date    HX APPENDECTOMY      HX CHOLECYSTECTOMY      HX CORONARY STENT PLACEMENT  2020    HX HERNIA REPAIR      HX ORTHOPAEDIC      left heel secondary to trauma    IR INSERT NON TUNL CVC OVER 5 YRS  2020    MS CARDIAC SURG PROCEDURE UNLIST           Family History:   Problem Relation Age of Onset    Thyroid Disease Mother         goiters    Breast Cancer Mother         42's    Atrial Fibrillation Mother     Diabetes Father     Heart Disease Father 48    Diabetes Sister     Heart Disease Paternal Grandfather        Social History     Socioeconomic History    Marital status:      Spouse name: Not on file    Number of children: Not on file    Years of education: Not on file    Highest education level: Not on file   Occupational History    Not on file   Social Needs    Financial resource strain: Not on file    Food insecurity     Worry: Not on file     Inability: Not on file    Transportation needs     Medical: Not on file     Non-medical: Not on file   Tobacco Use    Smoking status: Former Smoker     Packs/day: 1.00     Years: 30.00     Pack years: 30.00     Quit date: 2019     Years since quittin.2    Smokeless tobacco: Former User   Substance and Sexual Activity    Alcohol use: No    Drug use: No    Sexual activity: Not Currently     Partners: Female   Lifestyle    Physical activity     Days per week: Not on file     Minutes per session: Not on file    Stress: Not on file   Relationships    Social connections     Talks on phone: Not on file     Gets together: Not on file     Attends Nondenominational service: Not on file     Active member of club or organization: Not on file     Attends meetings of clubs or organizations: Not on file     Relationship status: Not on file    Intimate partner violence     Fear of current or ex partner: Not on file     Emotionally abused: Not on file     Physically abused: Not on file     Forced sexual activity: Not on file   Other Topics Concern     Service No    Blood Transfusions No  Caffeine Concern No    Occupational Exposure No    Hobby Hazards No    Sleep Concern No    Stress Concern Yes    Weight Concern Yes    Special Diet No    Back Care No    Exercise No    Bike Helmet No    Seat Belt Yes    Self-Exams No   Social History Narrative    Not on file         ALLERGIES: Peanut and Morphine    Review of Systems   Constitutional: Positive for chills. Negative for fatigue and fever. HENT: Negative for congestion, trouble swallowing and voice change. Eyes: Negative for photophobia, redness and visual disturbance. Respiratory: Negative for choking, chest tightness, shortness of breath and stridor. Cardiovascular: Negative for chest pain and leg swelling. Gastrointestinal: Positive for abdominal pain, nausea and vomiting. Endocrine: Negative for polydipsia, polyphagia and polyuria. Genitourinary: Positive for flank pain and urgency. Musculoskeletal: Positive for back pain. Negative for gait problem, joint swelling and myalgias. Skin: Negative for color change, pallor and rash. Neurological: Positive for headaches. Negative for tingling, tremors, facial asymmetry, weakness, numbness and paresthesias. Hematological: Negative for adenopathy. Does not bruise/bleed easily. Psychiatric/Behavioral: Negative for behavioral problems and confusion. All other systems reviewed and are negative. Vitals:    05/02/21 1001   BP: (!) 149/93   Pulse: 86   Resp: 12   Temp: 99 °F (37.2 °C)   SpO2: 94%   Weight: 118.8 kg (262 lb)   Height: 5' 9\" (1.753 m)            Physical Exam  Vitals signs and nursing note reviewed. Constitutional:       Appearance: Normal appearance. He is ill-appearing. He is not toxic-appearing. HENT:      Head: Normocephalic and atraumatic. Right Ear: External ear normal.      Left Ear: External ear normal.      Nose: Nose normal. No congestion or rhinorrhea.       Mouth/Throat:      Mouth: Mucous membranes are moist.      Pharynx: No oropharyngeal exudate or posterior oropharyngeal erythema. Eyes:      General:         Right eye: No discharge. Extraocular Movements: Extraocular movements intact. Pupils: Pupils are equal, round, and reactive to light. Neck:      Musculoskeletal: Normal range of motion and neck supple. No neck rigidity or muscular tenderness. Cardiovascular:      Rate and Rhythm: Normal rate and regular rhythm. Pulses: Normal pulses. Heart sounds: Normal heart sounds. No murmur. Pulmonary:      Effort: Pulmonary effort is normal. No respiratory distress. Breath sounds: Normal breath sounds. No stridor. No wheezing. Abdominal:      General: Abdomen is flat. There is distension. Palpations: There is no mass. Tenderness: There is no guarding or rebound. Musculoskeletal:         General: No swelling, tenderness or signs of injury. Skin:     Capillary Refill: Capillary refill takes less than 2 seconds. Coloration: Skin is not jaundiced or pale. Neurological:      General: No focal deficit present. Mental Status: He is alert and oriented to person, place, and time. Cranial Nerves: No cranial nerve deficit. Sensory: No sensory deficit. Motor: No weakness. Coordination: Coordination normal.   Psychiatric:         Mood and Affect: Mood normal.         Behavior: Behavior normal.          MDM  Number of Diagnoses or Management Options  Diagnosis management comments: Differential diagnosis in this patient is broad, could relate to kidney stone, pyelonephritis      12:32 PM  CT urogram shows mild right hydronephrosis and hydroureter with a hypodensity seen at the right UVJ extending into the bladder suggestive of obstructing ureteral stone      2:42 PM  White count is 16.3. Chemistry panel stable. Urinalysis does show  white blood cells  Normal lactic acid, patient feels better after being treated with pain medication.   Discussed with him results of testing. I feel he stable for discharge but it is likely that his stone is passed. He does not appear grossly septic. Plan for outpatient management with urology. Amount and/or Complexity of Data Reviewed  Clinical lab tests: ordered and reviewed  Tests in the radiology section of CPT®: ordered and reviewed  Review and summarize past medical records: yes  Independent visualization of images, tracings, or specimens: yes    Risk of Complications, Morbidity, and/or Mortality  Presenting problems: moderate  Diagnostic procedures: moderate  Management options: high  General comments: High risk due to use of parenteral narcotics           Procedures        Results Include:    Recent Results (from the past 24 hour(s))   CBC WITH AUTOMATED DIFF    Collection Time: 05/02/21 10:15 AM   Result Value Ref Range    WBC 16.3 (H) 4.3 - 11.1 K/uL    RBC 4.99 4.23 - 5.6 M/uL    HGB 14.7 13.6 - 17.2 g/dL    HCT 44.8 41.1 - 50.3 %    MCV 89.8 79.6 - 97.8 FL    MCH 29.5 26.1 - 32.9 PG    MCHC 32.8 31.4 - 35.0 g/dL    RDW 13.6 11.9 - 14.6 %    PLATELET 875 218 - 440 K/uL    MPV 11.0 9.4 - 12.3 FL    ABSOLUTE NRBC 0.00 0.0 - 0.2 K/uL    DF AUTOMATED      NEUTROPHILS 87 (H) 43 - 78 %    LYMPHOCYTES 3 (L) 13 - 44 %    MONOCYTES 9 4.0 - 12.0 %    EOSINOPHILS 0 (L) 0.5 - 7.8 %    BASOPHILS 0 0.0 - 2.0 %    IMMATURE GRANULOCYTES 1 0.0 - 5.0 %    ABS. NEUTROPHILS 14.2 (H) 1.7 - 8.2 K/UL    ABS. LYMPHOCYTES 0.5 0.5 - 4.6 K/UL    ABS. MONOCYTES 1.5 (H) 0.1 - 1.3 K/UL    ABS. EOSINOPHILS 0.0 0.0 - 0.8 K/UL    ABS. BASOPHILS 0.0 0.0 - 0.2 K/UL    ABS. IMM.  GRANS. 0.1 0.0 - 0.5 K/UL   METABOLIC PANEL, COMPREHENSIVE    Collection Time: 05/02/21 10:15 AM   Result Value Ref Range    Sodium 136 (L) 138 - 145 mmol/L    Potassium 4.4 3.5 - 5.1 mmol/L    Chloride 103 98 - 107 mmol/L    CO2 24 21 - 32 mmol/L    Anion gap 9 7 - 16 mmol/L    Glucose 197 (H) 65 - 100 mg/dL    BUN 11 6 - 23 MG/DL    Creatinine 0.97 0.8 - 1.5 MG/DL    GFR est AA >60 >60 ml/min/1.73m2    GFR est non-AA >60 >60 ml/min/1.73m2    Calcium 9.5 8.3 - 10.4 MG/DL    Bilirubin, total 0.9 0.2 - 1.1 MG/DL    ALT (SGPT) 25 12 - 65 U/L    AST (SGOT) 46 (H) 15 - 37 U/L    Alk. phosphatase 83 50 - 136 U/L    Protein, total 8.0 6.3 - 8.2 g/dL    Albumin 3.4 (L) 3.5 - 5.0 g/dL    Globulin 4.6 (H) 2.3 - 3.5 g/dL    A-G Ratio 0.7 (L) 1.2 - 3.5     LIPASE    Collection Time: 05/02/21 10:15 AM   Result Value Ref Range    Lipase 98 73 - 393 U/L   LACTIC ACID    Collection Time: 05/02/21 10:15 AM   Result Value Ref Range    Lactic acid 0.9 0.4 - 2.0 MMOL/L   PROCALCITONIN    Collection Time: 05/02/21 10:15 AM   Result Value Ref Range    Procalcitonin 0.14 ng/mL   URINE MICROSCOPIC    Collection Time: 05/02/21 12:25 PM   Result Value Ref Range    WBC  0 /hpf    RBC 5-10 0 /hpf    Epithelial cells 0-3 0 /hpf    Bacteria 1+ (H) 0 /hpf    Casts 0 0 /lpf    Crystals, urine 0 0 /LPF    Mucus 0 0 /lpf    Yeast OCCASIONAL      Other observations RESULTS VERIFIED MANUALLY       Voice dictation software was used during the making of this note. This software is not perfect and grammatical and other typographical errors may be present. This note has been proofread, but may still contain errors.   Salma Arroyo MD; 5/2/2021 @2:44 PM   ===================================================================

## 2021-05-04 PROBLEM — R10.9 RIGHT FLANK PAIN: Status: ACTIVE | Noted: 2021-05-04

## 2021-05-04 PROBLEM — N20.1 CALCULUS OF URETEROVESICAL JUNCTION (UVJ): Status: ACTIVE | Noted: 2021-05-04

## 2021-05-04 PROBLEM — N39.0 URINARY TRACT INFECTION WITHOUT HEMATURIA: Status: ACTIVE | Noted: 2021-05-04

## 2021-05-05 ENCOUNTER — HOSPITAL ENCOUNTER (OUTPATIENT)
Dept: LAB | Age: 51
Discharge: HOME | End: 2021-05-05
Payer: COMMERCIAL

## 2021-05-05 DIAGNOSIS — R91.8 PULMONARY INFILTRATE: ICD-10-CM

## 2021-05-05 PROCEDURE — 87116 MYCOBACTERIA CULTURE: CPT

## 2021-05-05 PROCEDURE — 87070 CULTURE OTHR SPECIMN AEROBIC: CPT

## 2021-05-05 NOTE — INTERDISCIPLINARY ROUNDS
Interdisciplinary Rounds completed. Nursing, Case Management, and Physician  present. Plan of care reviewed and updated. AFB x 2 collected. C-diff ordered and will be collected today. Oncology/Pulm/Cards following. Prescriptions for albuterol, cetirizine, and ibuprofen sent to preferred pharmacy.    Patient should schedule follow up, using more ibuprofen in a short time than would recommend and directed. Will refill one more time until follow up can be scheduled. May see any provider at [facility].    Thank you,  Chetna Mccloud, DO

## 2021-05-06 LAB
BACTERIA SPEC CULT: ABNORMAL
BACTERIA SPEC CULT: ABNORMAL
SERVICE CMNT-IMP: ABNORMAL

## 2021-05-06 NOTE — PROGRESS NOTES
ED pharmacist reviewed recent results of urine culture. The patient was treated with ceftriaxone 1 gm IV x1 in the emergency department and received a prescription for cefdinir 300 mg PO BID x10 days upon discharge. Based on culture results, the patient is being adequately treated for the identified infection with existing antimicrobial therapy. No further intervention needed.     Allergies as of 05/02/2021 - Review Complete 05/02/2021   Allergen Reaction Noted    Peanut Anaphylaxis 09/17/2020    Morphine Other (comments) 04/16/2009     Contains abnormal data CULTURE, URINE  Order: [id number]  Status:  Final result   Visible to patient:  Yes (MyChart)  Specimen Information: Clean catch; Urine         Ref Range & Units 4d ago   (5/2/21) 5mo ago   (11/24/20) 5mo ago   (11/24/20)   Special Requests:   NO SPECIAL REQUESTS  NO SPECIAL REQUESTS  RIGHT   HAND    Culture result:   >100,000 COLONIES/mL PROTEUS MIRABILISAbnormal   NO GROWTH 2 DAYS  NO GROWTH 5 DAYS    Culture result:   10,000 to 50,000 COLONIES/mL MIXED SKIN JOANN ISOLATED      Resulting Agency  [address] [place] [address], [address], [address], [address]   Susceptibility     Proteus mirabilis     KJ     Ampicillin/sulbactam ($) Susceptible     Aztreonam ($$$$) Susceptible     Cefazolin ($) Susceptible     Cefepime ($$) Susceptible     Cefoxitin Susceptible     Ceftriaxone ($) Susceptible     Gentamicin ($) Susceptible     Piperacillin/Tazobac ($) Susceptible     Tobramycin ($) Susceptible     Trimeth-Sulfamethoxa Susceptible                 Specimen Collected: 05/02/21 12:25 PM Last Resulted: 05/06/21  6:34 AM

## 2021-05-07 LAB
BACTERIA SPEC CULT: NORMAL
GRAM STN SPEC: NORMAL
SERVICE CMNT-IMP: NORMAL

## 2021-06-01 PROBLEM — H60.91 OTITIS EXTERNA OF RIGHT EAR: Status: ACTIVE | Noted: 2021-06-01

## 2021-06-25 ENCOUNTER — APPOINTMENT (OUTPATIENT)
Dept: GENERAL RADIOLOGY | Age: 51
DRG: 174 | End: 2021-06-25
Attending: EMERGENCY MEDICINE
Payer: COMMERCIAL

## 2021-06-25 ENCOUNTER — HOSPITAL ENCOUNTER (INPATIENT)
Age: 51
LOS: 2 days | Discharge: HOME | DRG: 174 | End: 2021-06-27
Attending: EMERGENCY MEDICINE | Admitting: INTERNAL MEDICINE
Payer: COMMERCIAL

## 2021-06-25 DIAGNOSIS — I21.4 NON-STEMI (NON-ST ELEVATED MYOCARDIAL INFARCTION) (HCC): Primary | ICD-10-CM

## 2021-06-25 DIAGNOSIS — E78.2 MIXED HYPERLIPIDEMIA: Chronic | ICD-10-CM

## 2021-06-25 DIAGNOSIS — I25.110 CORONARY ARTERY DISEASE INVOLVING NATIVE CORONARY ARTERY OF NATIVE HEART WITH UNSTABLE ANGINA PECTORIS (HCC): Chronic | ICD-10-CM

## 2021-06-25 DIAGNOSIS — Z91.14 NONCOMPLIANCE WITH MEDICATIONS: Chronic | ICD-10-CM

## 2021-06-25 DIAGNOSIS — R07.9 CHEST PAIN, UNSPECIFIED TYPE: ICD-10-CM

## 2021-06-25 DIAGNOSIS — I10 HYPERTENSION, UNSPECIFIED TYPE: ICD-10-CM

## 2021-06-25 DIAGNOSIS — I10 ESSENTIAL HYPERTENSION: Chronic | ICD-10-CM

## 2021-06-25 PROBLEM — Z79.4 TYPE 2 DIABETES MELLITUS WITH HYPERGLYCEMIA, WITH LONG-TERM CURRENT USE OF INSULIN (HCC): Chronic | Status: ACTIVE | Noted: 2020-01-24

## 2021-06-25 PROBLEM — I25.10 CAD (CORONARY ARTERY DISEASE): Chronic | Status: ACTIVE | Noted: 2018-02-15

## 2021-06-25 PROBLEM — E78.5 HYPERLIPIDEMIA: Chronic | Status: ACTIVE | Noted: 2018-03-02

## 2021-06-25 PROBLEM — F41.9 ANXIETY: Chronic | Status: ACTIVE | Noted: 2020-01-24

## 2021-06-25 PROBLEM — L40.50 PSORIATIC ARTHROPATHY (HCC): Chronic | Status: ACTIVE | Noted: 2020-01-24

## 2021-06-25 PROBLEM — E11.65 TYPE 2 DIABETES MELLITUS WITH HYPERGLYCEMIA, WITH LONG-TERM CURRENT USE OF INSULIN (HCC): Chronic | Status: ACTIVE | Noted: 2020-01-24

## 2021-06-25 PROBLEM — G62.9 PERIPHERAL POLYNEUROPATHY: Chronic | Status: ACTIVE | Noted: 2021-03-31

## 2021-06-25 PROBLEM — G40.909 SEIZURE DISORDER (HCC): Chronic | Status: ACTIVE | Noted: 2020-03-09

## 2021-06-25 PROBLEM — E78.1 HYPERTRIGLYCERIDEMIA: Chronic | Status: ACTIVE | Noted: 2018-03-19

## 2021-06-25 LAB
ALBUMIN SERPL-MCNC: 3.1 G/DL (ref 3.5–5)
ALBUMIN/GLOB SERPL: 0.7 {RATIO} (ref 1.2–3.5)
ALP SERPL-CCNC: 97 U/L (ref 50–136)
ALT SERPL-CCNC: 20 U/L (ref 12–65)
ANION GAP SERPL CALC-SCNC: 11 MMOL/L (ref 7–16)
APTT PPP: 30.1 SEC (ref 24.1–35.1)
AST SERPL-CCNC: 31 U/L (ref 15–37)
BASOPHILS # BLD: 0.1 K/UL (ref 0–0.2)
BASOPHILS NFR BLD: 1 % (ref 0–2)
BILIRUB SERPL-MCNC: 0.5 MG/DL (ref 0.2–1.1)
BUN SERPL-MCNC: 8 MG/DL (ref 6–23)
CALCIUM SERPL-MCNC: 8.8 MG/DL (ref 8.3–10.4)
CHLORIDE SERPL-SCNC: 102 MMOL/L (ref 98–107)
CO2 SERPL-SCNC: 22 MMOL/L (ref 21–32)
CREAT SERPL-MCNC: 0.66 MG/DL (ref 0.8–1.5)
DIFFERENTIAL METHOD BLD: ABNORMAL
EOSINOPHIL # BLD: 0.9 K/UL (ref 0–0.8)
EOSINOPHIL NFR BLD: 8 % (ref 0.5–7.8)
ERYTHROCYTE [DISTWIDTH] IN BLOOD BY AUTOMATED COUNT: 14.5 % (ref 11.9–14.6)
GLOBULIN SER CALC-MCNC: 4.3 G/DL (ref 2.3–3.5)
GLUCOSE BLD STRIP.AUTO-MCNC: 232 MG/DL (ref 65–100)
GLUCOSE SERPL-MCNC: 277 MG/DL (ref 65–100)
HCT VFR BLD AUTO: 47.1 % (ref 41.1–50.3)
HGB BLD-MCNC: 15.6 G/DL (ref 13.6–17.2)
IMM GRANULOCYTES # BLD AUTO: 0 K/UL (ref 0–0.5)
IMM GRANULOCYTES NFR BLD AUTO: 0 % (ref 0–5)
LIPASE SERPL-CCNC: 146 U/L (ref 73–393)
LYMPHOCYTES # BLD: 2.2 K/UL (ref 0.5–4.6)
LYMPHOCYTES NFR BLD: 20 % (ref 13–44)
MAGNESIUM SERPL-MCNC: 2 MG/DL (ref 1.8–2.4)
MCH RBC QN AUTO: 30.8 PG (ref 26.1–32.9)
MCHC RBC AUTO-ENTMCNC: 33.1 G/DL (ref 31.4–35)
MCV RBC AUTO: 93.1 FL (ref 79.6–97.8)
MONOCYTES # BLD: 1 K/UL (ref 0.1–1.3)
MONOCYTES NFR BLD: 9 % (ref 4–12)
NEUTS SEG # BLD: 6.8 K/UL (ref 1.7–8.2)
NEUTS SEG NFR BLD: 62 % (ref 43–78)
NRBC # BLD: 0 K/UL (ref 0–0.2)
PLATELET # BLD AUTO: 292 K/UL (ref 150–450)
PMV BLD AUTO: 10.7 FL (ref 9.4–12.3)
POTASSIUM SERPL-SCNC: 3.8 MMOL/L (ref 3.5–5.1)
PROT SERPL-MCNC: 7.4 G/DL (ref 6.3–8.2)
RBC # BLD AUTO: 5.06 M/UL (ref 4.23–5.6)
SERVICE CMNT-IMP: ABNORMAL
SODIUM SERPL-SCNC: 135 MMOL/L (ref 136–145)
TROPONIN-HIGH SENSITIVITY: 1380.2 PG/ML (ref 0–14)
TROPONIN-HIGH SENSITIVITY: 4740.4 PG/ML (ref 0–14)
WBC # BLD AUTO: 11 K/UL (ref 4.3–11.1)

## 2021-06-25 PROCEDURE — 80053 COMPREHEN METABOLIC PANEL: CPT

## 2021-06-25 PROCEDURE — 84484 ASSAY OF TROPONIN QUANT: CPT

## 2021-06-25 PROCEDURE — 71045 X-RAY EXAM CHEST 1 VIEW: CPT

## 2021-06-25 PROCEDURE — 82962 GLUCOSE BLOOD TEST: CPT

## 2021-06-25 PROCEDURE — 99285 EMERGENCY DEPT VISIT HI MDM: CPT

## 2021-06-25 PROCEDURE — 83735 ASSAY OF MAGNESIUM: CPT

## 2021-06-25 PROCEDURE — 85025 COMPLETE CBC W/AUTO DIFF WBC: CPT

## 2021-06-25 PROCEDURE — 93005 ELECTROCARDIOGRAM TRACING: CPT | Performed by: EMERGENCY MEDICINE

## 2021-06-25 PROCEDURE — 99223 1ST HOSP IP/OBS HIGH 75: CPT | Performed by: INTERNAL MEDICINE

## 2021-06-25 PROCEDURE — 96375 TX/PRO/DX INJ NEW DRUG ADDON: CPT

## 2021-06-25 PROCEDURE — 74011250636 HC RX REV CODE- 250/636: Performed by: EMERGENCY MEDICINE

## 2021-06-25 PROCEDURE — 65660000000 HC RM CCU STEPDOWN

## 2021-06-25 PROCEDURE — 96365 THER/PROPH/DIAG IV INF INIT: CPT

## 2021-06-25 PROCEDURE — 74011000250 HC RX REV CODE- 250: Performed by: NURSE PRACTITIONER

## 2021-06-25 PROCEDURE — 83690 ASSAY OF LIPASE: CPT

## 2021-06-25 PROCEDURE — 85730 THROMBOPLASTIN TIME PARTIAL: CPT

## 2021-06-25 RX ORDER — TRAZODONE HYDROCHLORIDE 50 MG/1
50 TABLET ORAL
Status: DISCONTINUED | OUTPATIENT
Start: 2021-06-26 | End: 2021-06-27 | Stop reason: HOSPADM

## 2021-06-25 RX ORDER — HYDRALAZINE HYDROCHLORIDE 20 MG/ML
10 INJECTION INTRAMUSCULAR; INTRAVENOUS
Status: DISCONTINUED | OUTPATIENT
Start: 2021-06-25 | End: 2021-06-27 | Stop reason: HOSPADM

## 2021-06-25 RX ORDER — DIPHENHYDRAMINE HCL 25 MG
25 CAPSULE ORAL
Status: DISCONTINUED | OUTPATIENT
Start: 2021-06-25 | End: 2021-06-27 | Stop reason: HOSPADM

## 2021-06-25 RX ORDER — ATORVASTATIN CALCIUM 80 MG/1
80 TABLET, FILM COATED ORAL
Status: DISCONTINUED | OUTPATIENT
Start: 2021-06-26 | End: 2021-06-27 | Stop reason: HOSPADM

## 2021-06-25 RX ORDER — NITROGLYCERIN 0.4 MG/1
0.4 TABLET SUBLINGUAL
Status: DISCONTINUED | OUTPATIENT
Start: 2021-06-25 | End: 2021-06-27 | Stop reason: HOSPADM

## 2021-06-25 RX ORDER — SODIUM CHLORIDE 0.9 % (FLUSH) 0.9 %
5-40 SYRINGE (ML) INJECTION AS NEEDED
Status: DISCONTINUED | OUTPATIENT
Start: 2021-06-25 | End: 2021-06-27 | Stop reason: HOSPADM

## 2021-06-25 RX ORDER — HYDROMORPHONE HYDROCHLORIDE 1 MG/ML
0.5 INJECTION, SOLUTION INTRAMUSCULAR; INTRAVENOUS; SUBCUTANEOUS
Status: DISCONTINUED | OUTPATIENT
Start: 2021-06-25 | End: 2021-06-27 | Stop reason: HOSPADM

## 2021-06-25 RX ORDER — METOPROLOL TARTRATE 5 MG/5ML
5 INJECTION INTRAVENOUS ONCE
Status: COMPLETED | OUTPATIENT
Start: 2021-06-25 | End: 2021-06-25

## 2021-06-25 RX ORDER — CARVEDILOL 12.5 MG/1
12.5 TABLET ORAL 2 TIMES DAILY WITH MEALS
Status: DISCONTINUED | OUTPATIENT
Start: 2021-06-26 | End: 2021-06-26

## 2021-06-25 RX ORDER — SODIUM CHLORIDE 0.9 % (FLUSH) 0.9 %
5-10 SYRINGE (ML) INJECTION AS NEEDED
Status: DISCONTINUED | OUTPATIENT
Start: 2021-06-25 | End: 2021-06-25

## 2021-06-25 RX ORDER — CIPROFLOXACIN 500 MG/1
500 TABLET ORAL 2 TIMES DAILY
Status: DISCONTINUED | OUTPATIENT
Start: 2021-06-26 | End: 2021-06-27 | Stop reason: HOSPADM

## 2021-06-25 RX ORDER — HEPARIN SODIUM 5000 [USP'U]/100ML
12-25 INJECTION, SOLUTION INTRAVENOUS
Status: DISCONTINUED | OUTPATIENT
Start: 2021-06-25 | End: 2021-06-26

## 2021-06-25 RX ORDER — HYDROMORPHONE HYDROCHLORIDE 1 MG/ML
0.5 INJECTION, SOLUTION INTRAMUSCULAR; INTRAVENOUS; SUBCUTANEOUS ONCE
Status: DISCONTINUED | OUTPATIENT
Start: 2021-06-25 | End: 2021-06-25 | Stop reason: SDUPTHER

## 2021-06-25 RX ORDER — NALOXONE HYDROCHLORIDE 0.4 MG/ML
0.4 INJECTION, SOLUTION INTRAMUSCULAR; INTRAVENOUS; SUBCUTANEOUS AS NEEDED
Status: DISCONTINUED | OUTPATIENT
Start: 2021-06-25 | End: 2021-06-27 | Stop reason: HOSPADM

## 2021-06-25 RX ORDER — ACETAMINOPHEN 325 MG/1
650 TABLET ORAL
Status: DISCONTINUED | OUTPATIENT
Start: 2021-06-25 | End: 2021-06-27 | Stop reason: HOSPADM

## 2021-06-25 RX ORDER — ISOSORBIDE MONONITRATE 30 MG/1
30 TABLET, EXTENDED RELEASE ORAL DAILY
Status: DISCONTINUED | OUTPATIENT
Start: 2021-06-26 | End: 2021-06-27 | Stop reason: HOSPADM

## 2021-06-25 RX ORDER — SODIUM CHLORIDE 0.9 % (FLUSH) 0.9 %
5-10 SYRINGE (ML) INJECTION EVERY 8 HOURS
Status: DISCONTINUED | OUTPATIENT
Start: 2021-06-25 | End: 2021-06-25

## 2021-06-25 RX ORDER — MAG HYDROX/ALUMINUM HYD/SIMETH 200-200-20
30 SUSPENSION, ORAL (FINAL DOSE FORM) ORAL
Status: DISCONTINUED | OUTPATIENT
Start: 2021-06-25 | End: 2021-06-27 | Stop reason: HOSPADM

## 2021-06-25 RX ORDER — CLONAZEPAM 1 MG/1
0.5 TABLET ORAL
Status: DISCONTINUED | OUTPATIENT
Start: 2021-06-25 | End: 2021-06-27 | Stop reason: HOSPADM

## 2021-06-25 RX ORDER — GABAPENTIN 300 MG/1
300 CAPSULE ORAL 3 TIMES DAILY
Status: DISCONTINUED | OUTPATIENT
Start: 2021-06-26 | End: 2021-06-27 | Stop reason: HOSPADM

## 2021-06-25 RX ORDER — HYDROMORPHONE HYDROCHLORIDE 1 MG/ML
0.5 INJECTION, SOLUTION INTRAMUSCULAR; INTRAVENOUS; SUBCUTANEOUS ONCE
Status: COMPLETED | OUTPATIENT
Start: 2021-06-25 | End: 2021-06-25

## 2021-06-25 RX ORDER — DOCUSATE SODIUM 100 MG/1
100 CAPSULE, LIQUID FILLED ORAL
Status: DISCONTINUED | OUTPATIENT
Start: 2021-06-25 | End: 2021-06-27 | Stop reason: HOSPADM

## 2021-06-25 RX ORDER — SODIUM CHLORIDE 0.9 % (FLUSH) 0.9 %
5-40 SYRINGE (ML) INJECTION EVERY 8 HOURS
Status: DISCONTINUED | OUTPATIENT
Start: 2021-06-26 | End: 2021-06-27 | Stop reason: HOSPADM

## 2021-06-25 RX ORDER — INSULIN LISPRO 100 [IU]/ML
INJECTION, SOLUTION INTRAVENOUS; SUBCUTANEOUS
Status: DISCONTINUED | OUTPATIENT
Start: 2021-06-26 | End: 2021-06-27 | Stop reason: HOSPADM

## 2021-06-25 RX ORDER — PANTOPRAZOLE SODIUM 40 MG/1
40 TABLET, DELAYED RELEASE ORAL DAILY
Status: DISCONTINUED | OUTPATIENT
Start: 2021-06-26 | End: 2021-06-27 | Stop reason: HOSPADM

## 2021-06-25 RX ORDER — SODIUM CHLORIDE 9 MG/ML
75 INJECTION, SOLUTION INTRAVENOUS CONTINUOUS
Status: DISCONTINUED | OUTPATIENT
Start: 2021-06-26 | End: 2021-06-27 | Stop reason: HOSPADM

## 2021-06-25 RX ORDER — LISINOPRIL 5 MG/1
10 TABLET ORAL DAILY
Status: DISCONTINUED | OUTPATIENT
Start: 2021-06-26 | End: 2021-06-26

## 2021-06-25 RX ORDER — FENOFIBRATE 160 MG/1
160 TABLET ORAL DAILY
Status: DISCONTINUED | OUTPATIENT
Start: 2021-06-26 | End: 2021-06-26

## 2021-06-25 RX ORDER — ONDANSETRON 2 MG/ML
4 INJECTION INTRAMUSCULAR; INTRAVENOUS
Status: DISCONTINUED | OUTPATIENT
Start: 2021-06-25 | End: 2021-06-27 | Stop reason: HOSPADM

## 2021-06-25 RX ORDER — ASPIRIN 81 MG/1
81 TABLET ORAL DAILY
Status: DISCONTINUED | OUTPATIENT
Start: 2021-06-26 | End: 2021-06-27 | Stop reason: HOSPADM

## 2021-06-25 RX ORDER — HEPARIN SODIUM 5000 [USP'U]/ML
60 INJECTION, SOLUTION INTRAVENOUS; SUBCUTANEOUS ONCE
Status: COMPLETED | OUTPATIENT
Start: 2021-06-25 | End: 2021-06-25

## 2021-06-25 RX ADMIN — HEPARIN SODIUM 6500 UNITS: 5000 INJECTION INTRAVENOUS; SUBCUTANEOUS at 21:21

## 2021-06-25 RX ADMIN — Medication 5 ML: at 22:00

## 2021-06-25 RX ADMIN — HYDROMORPHONE HYDROCHLORIDE 0.5 MG: 1 INJECTION, SOLUTION INTRAMUSCULAR; INTRAVENOUS; SUBCUTANEOUS at 21:21

## 2021-06-25 RX ADMIN — HEPARIN SODIUM AND DEXTROSE 12 UNITS/KG/HR: 5000; 5 INJECTION INTRAVENOUS at 21:21

## 2021-06-25 RX ADMIN — METOPROLOL TARTRATE 5 MG: 5 INJECTION INTRAVENOUS at 22:23

## 2021-06-25 NOTE — Clinical Note
Lesion located in the Proximal Cx. Balloon inserted. Balloon inflated using single inflation technique. Lesion #1: Pressure = 12 dewayne; Duration = 16 sec.

## 2021-06-25 NOTE — Clinical Note
TRANSFER - OUT REPORT:     Verbal report given to: Tele RN. Report consisted of patient's Situation, Background, Assessment and   Recommendations(SBAR). Opportunity for questions and clarification was provided. Patient transported with a Registered Nurse and [address] [place] [address] / [facility]. Patient transported to: [facility].    [facility] Dr Steph Agudelo  RRA - 12 ml air in band @ 1008  Stent x1 LAD  Stent x1 OM  Stent x1 LCx  Angiomax off at 1015  Versed 2 mg IV  Fent 25  mcg IV  Brilinta 180 mg PO

## 2021-06-25 NOTE — Clinical Note
Balloon inflated using multiple inflation technique. Lesion #1: Pressure = 16 dewayne; Duration = 10 sec. Inflation 2: Pressure = 18 dewayne; Duration = 10 sec. Inflation 3: Pressure = 16 dewayne; Duration = 9 sec.

## 2021-06-25 NOTE — Clinical Note
Balloon inserted. Balloon inflated using multiple inflation technique. Lesion #1: Pressure = 6 dewayne; Duration = 7 sec. Inflation 2: Pressure = 16 dewayne; Duration = 8 sec.

## 2021-06-25 NOTE — Clinical Note
Lesion: Located in the Proximal Cx. Stent inserted. Stent deployed. First inflation pressure = 14 dewayne; inflation time: 24 sec.

## 2021-06-25 NOTE — Clinical Note
Balloon inserted. Balloon inflated using single inflation technique. Lesion #1: Pressure = 16 dewayne; Duration = 27 sec.

## 2021-06-25 NOTE — Clinical Note
Balloon inserted. Balloon inflated using multiple inflation technique. Lesion #1: Pressure = 14 dewayne; Duration = 15 sec. Inflation 2: Pressure = 16 dewayne; Duration = 13 sec. Inflation 3: Pressure = 14 dewayne; Duration = 14 sec. Inflation 4: Pressure = 20 dewayne; Duration = 12 sec.

## 2021-06-25 NOTE — Clinical Note
Contrast Dose Calculator:   Patient's age: 54.   Patient's sex: Male. Patient weight (kg) = 108.2. Creatinine level (mg/dL) = 0.58. Creatinine clearance (mL/min): 236. Max Contrast dose per Creatinine Cl calculator = 300 mL.

## 2021-06-25 NOTE — Clinical Note
Lesion located in the Proximal Ramus. Balloon inserted. Balloon inflated using multiple inflation technique. Lesion #1: Pressure = 10 dewayne; Duration = 11 sec. Inflation 2: Pressure = 10 dewayne; Duration = 11 sec.

## 2021-06-25 NOTE — Clinical Note
Lesion: Located in the Proximal LAD. Stent inserted. Stent deployed. First inflation pressure = 14 dewayne; inflation time: 22 sec.

## 2021-06-25 NOTE — Clinical Note
TRANSFER - OUT REPORT:     Verbal report given to: RN. Report consisted of patient's Situation, Background, Assessment and   Recommendations(SBAR). Opportunity for questions and clarification was provided. Patient transported with a Registered Nurse. Patient transported to: [facility]

## 2021-06-25 NOTE — Clinical Note
Lesion located in the Proximal LAD. Balloon inserted. Balloon inflated using multiple inflation technique. Lesion #1: Pressure = 16 dewayne; Duration = 12 sec. Inflation 2: Pressure = 20 dewayne; Duration = 13 sec. Inflation 3: Pressure = 12 dewayne; Duration = 12 sec.

## 2021-06-25 NOTE — Clinical Note
Balloon inflated using multiple inflation technique. Lesion #1: Pressure = 16 dewayne; Duration = 13 sec. Inflation 2: Pressure = 20 dewayne; Duration = 13 sec. Inflation 3: Pressure = 16 dewayne; Duration = 13 sec.

## 2021-06-25 NOTE — Clinical Note
Lesion: Located in the Proximal Ramus. Stent inserted. Stent deployed. Single technique used. First inflation pressure = 16 dewayne; inflation time: 18 sec.

## 2021-06-26 ENCOUNTER — APPOINTMENT (OUTPATIENT)
Dept: NON INVASIVE DIAGNOSTICS | Age: 51
DRG: 174 | End: 2021-06-26
Attending: NURSE PRACTITIONER
Payer: COMMERCIAL

## 2021-06-26 LAB
ACT BLD: 450 SECS (ref 70–128)
ANION GAP SERPL CALC-SCNC: 8 MMOL/L (ref 7–16)
ATRIAL RATE: 108 BPM
ATRIAL RATE: 78 BPM
BASOPHILS # BLD: 0.1 K/UL (ref 0–0.2)
BASOPHILS NFR BLD: 1 % (ref 0–2)
BUN SERPL-MCNC: 10 MG/DL (ref 6–23)
CALCIUM SERPL-MCNC: 8.1 MG/DL (ref 8.3–10.4)
CALCULATED P AXIS, ECG09: 44 DEGREES
CALCULATED P AXIS, ECG09: 63 DEGREES
CALCULATED R AXIS, ECG10: 52 DEGREES
CALCULATED R AXIS, ECG10: 63 DEGREES
CALCULATED T AXIS, ECG11: 60 DEGREES
CALCULATED T AXIS, ECG11: 88 DEGREES
CHLORIDE SERPL-SCNC: 102 MMOL/L (ref 98–107)
CHOLEST SERPL-MCNC: 193 MG/DL
CO2 SERPL-SCNC: 26 MMOL/L (ref 21–32)
CREAT SERPL-MCNC: 0.58 MG/DL (ref 0.8–1.5)
DIAGNOSIS, 93000: NORMAL
DIAGNOSIS, 93000: NORMAL
DIFFERENTIAL METHOD BLD: ABNORMAL
ECHO AO ASC DIAM: 3.25 CM
ECHO AO ROOT DIAM: 3.26 CM
ECHO AV AREA PEAK VELOCITY: 2.59 CM2
ECHO AV AREA PEAK VELOCITY: 2.59 CM2
ECHO AV PEAK GRADIENT: 7.84 MMHG
ECHO AV PEAK VELOCITY: 140.01 CM/S
ECHO LA MAJOR AXIS: 3.91 CM
ECHO LA MINOR AXIS: 1.76 CM
ECHO LV E' LATERAL VELOCITY: 7.58 CM/S
ECHO LV E' SEPTAL VELOCITY: 5.17 CM/S
ECHO LV EDV A4C: 66.32 ML
ECHO LV EDV INDEX A4C: 29.9 ML/M2
ECHO LV EJECTION FRACTION A4C: 64 PERCENT
ECHO LV ESV A4C: 23.9 ML
ECHO LV ESV INDEX A4C: 10.8 ML/M2
ECHO LV INTERNAL DIMENSION DIASTOLIC: 4.91 CM (ref 4.2–5.9)
ECHO LV INTERNAL DIMENSION SYSTOLIC: 2.92 CM
ECHO LV IVSD: 1.38 CM (ref 0.6–1)
ECHO LV MASS 2D: 253.1 G (ref 88–224)
ECHO LV MASS INDEX 2D: 114 G/M2 (ref 49–115)
ECHO LV POSTERIOR WALL DIASTOLIC: 1.21 CM (ref 0.6–1)
ECHO LVOT DIAM: 2.25 CM
ECHO LVOT PEAK GRADIENT: 3.37 MMHG
ECHO LVOT PEAK VELOCITY: 91.78 CM/S
ECHO MV A VELOCITY: 90.53 CM/S
ECHO MV AREA PHT: 6.17 CM2
ECHO MV E DECELERATION TIME (DT): 123.03 MS
ECHO MV E VELOCITY: 70.58 CM/S
ECHO MV E/A RATIO: 0.78
ECHO MV E/E' LATERAL: 9.31
ECHO MV E/E' RATIO (AVERAGED): 11.48
ECHO MV E/E' SEPTAL: 13.65
ECHO MV PRESSURE HALF TIME (PHT): 35.68 MS
ECHO RV INTERNAL DIMENSION: 3.36 CM
EOSINOPHIL # BLD: 0.9 K/UL (ref 0–0.8)
EOSINOPHIL NFR BLD: 8 % (ref 0.5–7.8)
ERYTHROCYTE [DISTWIDTH] IN BLOOD BY AUTOMATED COUNT: 14.3 % (ref 11.9–14.6)
EST. AVERAGE GLUCOSE BLD GHB EST-MCNC: 223 MG/DL
GLUCOSE BLD STRIP.AUTO-MCNC: 245 MG/DL (ref 65–100)
GLUCOSE BLD STRIP.AUTO-MCNC: 254 MG/DL (ref 65–100)
GLUCOSE BLD STRIP.AUTO-MCNC: 258 MG/DL (ref 65–100)
GLUCOSE BLD STRIP.AUTO-MCNC: 267 MG/DL (ref 65–100)
GLUCOSE SERPL-MCNC: 261 MG/DL (ref 65–100)
HBA1C MFR BLD: 9.4 % (ref 4.2–6.3)
HCT VFR BLD AUTO: 44.6 % (ref 41.1–50.3)
HDLC SERPL-MCNC: 24 MG/DL (ref 40–60)
HDLC SERPL: 8 {RATIO}
HGB BLD-MCNC: 14.5 G/DL (ref 13.6–17.2)
IMM GRANULOCYTES # BLD AUTO: 0.1 K/UL (ref 0–0.5)
IMM GRANULOCYTES NFR BLD AUTO: 1 % (ref 0–5)
LDLC SERPL CALC-MCNC: 98.8 MG/DL
LYMPHOCYTES # BLD: 2.2 K/UL (ref 0.5–4.6)
LYMPHOCYTES NFR BLD: 21 % (ref 13–44)
MAGNESIUM SERPL-MCNC: 2 MG/DL (ref 1.8–2.4)
MCH RBC QN AUTO: 30.7 PG (ref 26.1–32.9)
MCHC RBC AUTO-ENTMCNC: 32.5 G/DL (ref 31.4–35)
MCV RBC AUTO: 94.3 FL (ref 79.6–97.8)
MONOCYTES # BLD: 1.2 K/UL (ref 0.1–1.3)
MONOCYTES NFR BLD: 11 % (ref 4–12)
NEUTS SEG # BLD: 6.4 K/UL (ref 1.7–8.2)
NEUTS SEG NFR BLD: 60 % (ref 43–78)
NRBC # BLD: 0 K/UL (ref 0–0.2)
P-R INTERVAL, ECG05: 138 MS
P-R INTERVAL, ECG05: 138 MS
PLATELET # BLD AUTO: 248 K/UL (ref 150–450)
PMV BLD AUTO: 11 FL (ref 9.4–12.3)
POTASSIUM SERPL-SCNC: 3.6 MMOL/L (ref 3.5–5.1)
Q-T INTERVAL, ECG07: 350 MS
Q-T INTERVAL, ECG07: 404 MS
QRS DURATION, ECG06: 90 MS
QRS DURATION, ECG06: 92 MS
QTC CALCULATION (BEZET), ECG08: 460 MS
QTC CALCULATION (BEZET), ECG08: 469 MS
RBC # BLD AUTO: 4.73 M/UL (ref 4.23–5.6)
SERVICE CMNT-IMP: ABNORMAL
SODIUM SERPL-SCNC: 136 MMOL/L (ref 138–145)
T4 FREE SERPL-MCNC: 1.3 NG/DL (ref 0.78–1.46)
TRIGL SERPL-MCNC: 351 MG/DL (ref 35–150)
TROPONIN-HIGH SENSITIVITY: ABNORMAL PG/ML (ref 0–14)
TSH SERPL DL<=0.005 MIU/L-ACNC: 1.8 UIU/ML (ref 0.36–3.74)
UFH PPP CHRO-ACNC: 0.12 IU/ML (ref 0.3–0.7)
UFH PPP CHRO-ACNC: <0.1 IU/ML (ref 0.3–0.7)
VENTRICULAR RATE, ECG03: 108 BPM
VENTRICULAR RATE, ECG03: 78 BPM
VLDLC SERPL CALC-MCNC: 70.2 MG/DL (ref 6–23)
WBC # BLD AUTO: 10.8 K/UL (ref 4.3–11.1)

## 2021-06-26 PROCEDURE — B2111ZZ FLUOROSCOPY OF MULTIPLE CORONARY ARTERIES USING LOW OSMOLAR CONTRAST: ICD-10-PCS | Performed by: INTERNAL MEDICINE

## 2021-06-26 PROCEDURE — 92928 PRQ TCAT PLMT NTRAC ST 1 LES: CPT | Performed by: INTERNAL MEDICINE

## 2021-06-26 PROCEDURE — C1874 STENT, COATED/COV W/DEL SYS: HCPCS | Performed by: INTERNAL MEDICINE

## 2021-06-26 PROCEDURE — 74011250637 HC RX REV CODE- 250/637: Performed by: INTERNAL MEDICINE

## 2021-06-26 PROCEDURE — 80061 LIPID PANEL: CPT

## 2021-06-26 PROCEDURE — C1725 CATH, TRANSLUMIN NON-LASER: HCPCS | Performed by: INTERNAL MEDICINE

## 2021-06-26 PROCEDURE — 4A023N7 MEASUREMENT OF CARDIAC SAMPLING AND PRESSURE, LEFT HEART, PERCUTANEOUS APPROACH: ICD-10-PCS | Performed by: INTERNAL MEDICINE

## 2021-06-26 PROCEDURE — 99152 MOD SED SAME PHYS/QHP 5/>YRS: CPT | Performed by: INTERNAL MEDICINE

## 2021-06-26 PROCEDURE — 74011250637 HC RX REV CODE- 250/637: Performed by: NURSE PRACTITIONER

## 2021-06-26 PROCEDURE — 83036 HEMOGLOBIN GLYCOSYLATED A1C: CPT

## 2021-06-26 PROCEDURE — C1769 GUIDE WIRE: HCPCS | Performed by: INTERNAL MEDICINE

## 2021-06-26 PROCEDURE — 74011000636 HC RX REV CODE- 636: Performed by: INTERNAL MEDICINE

## 2021-06-26 PROCEDURE — 77030015766: Performed by: INTERNAL MEDICINE

## 2021-06-26 PROCEDURE — 2709999900 HC NON-CHARGEABLE SUPPLY

## 2021-06-26 PROCEDURE — 85520 HEPARIN ASSAY: CPT

## 2021-06-26 PROCEDURE — 92921 PR PRQ TRLUML CORONARY ANGIOPLASTY ADDL BRANCH: CPT | Performed by: INTERNAL MEDICINE

## 2021-06-26 PROCEDURE — B24BZZ4 ULTRASONOGRAPHY OF HEART WITH AORTA, TRANSESOPHAGEAL: ICD-10-PCS | Performed by: INTERNAL MEDICINE

## 2021-06-26 PROCEDURE — 74011250636 HC RX REV CODE- 250/636: Performed by: INTERNAL MEDICINE

## 2021-06-26 PROCEDURE — 80048 BASIC METABOLIC PNL TOTAL CA: CPT

## 2021-06-26 PROCEDURE — 77030016699 HC CATH ANGI DX INFN1 CARD -A: Performed by: INTERNAL MEDICINE

## 2021-06-26 PROCEDURE — 83735 ASSAY OF MAGNESIUM: CPT

## 2021-06-26 PROCEDURE — 93458 L HRT ARTERY/VENTRICLE ANGIO: CPT | Performed by: INTERNAL MEDICINE

## 2021-06-26 PROCEDURE — 027236Z DILATION OF CORONARY ARTERY, THREE ARTERIES WITH THREE DRUG-ELUTING INTRALUMINAL DEVICES, PERCUTANEOUS APPROACH: ICD-10-PCS | Performed by: INTERNAL MEDICINE

## 2021-06-26 PROCEDURE — B2151ZZ FLUOROSCOPY OF LEFT HEART USING LOW OSMOLAR CONTRAST: ICD-10-PCS | Performed by: INTERNAL MEDICINE

## 2021-06-26 PROCEDURE — 84439 ASSAY OF FREE THYROXINE: CPT

## 2021-06-26 PROCEDURE — 74011636637 HC RX REV CODE- 636/637: Performed by: NURSE PRACTITIONER

## 2021-06-26 PROCEDURE — C1894 INTRO/SHEATH, NON-LASER: HCPCS | Performed by: INTERNAL MEDICINE

## 2021-06-26 PROCEDURE — 74011000258 HC RX REV CODE- 258: Performed by: INTERNAL MEDICINE

## 2021-06-26 PROCEDURE — 85347 COAGULATION TIME ACTIVATED: CPT

## 2021-06-26 PROCEDURE — 74011000250 HC RX REV CODE- 250: Performed by: INTERNAL MEDICINE

## 2021-06-26 PROCEDURE — 65660000000 HC RM CCU STEPDOWN

## 2021-06-26 PROCEDURE — 92941 PRQ TRLML REVSC TOT OCCL AMI: CPT | Performed by: INTERNAL MEDICINE

## 2021-06-26 PROCEDURE — 36415 COLL VENOUS BLD VENIPUNCTURE: CPT

## 2021-06-26 PROCEDURE — 84484 ASSAY OF TROPONIN QUANT: CPT

## 2021-06-26 PROCEDURE — 76210000020 HC REC RM PH II FIRST 0.5 HR: Performed by: INTERNAL MEDICINE

## 2021-06-26 PROCEDURE — C1887 CATHETER, GUIDING: HCPCS | Performed by: INTERNAL MEDICINE

## 2021-06-26 PROCEDURE — 92921 HC PTCA SNGL MAJOR COR VESL/BRNCH  EA ADD LC: CPT | Performed by: INTERNAL MEDICINE

## 2021-06-26 PROCEDURE — 85025 COMPLETE CBC W/AUTO DIFF WBC: CPT

## 2021-06-26 PROCEDURE — 74011250636 HC RX REV CODE- 250/636: Performed by: NURSE PRACTITIONER

## 2021-06-26 PROCEDURE — C8929 TTE W OR WO FOL WCON,DOPPLER: HCPCS

## 2021-06-26 PROCEDURE — 99153 MOD SED SAME PHYS/QHP EA: CPT | Performed by: INTERNAL MEDICINE

## 2021-06-26 PROCEDURE — 77030029997 HC DEV COM RDL R BND TELE -B: Performed by: INTERNAL MEDICINE

## 2021-06-26 PROCEDURE — 84443 ASSAY THYROID STIM HORMONE: CPT

## 2021-06-26 PROCEDURE — 82962 GLUCOSE BLOOD TEST: CPT

## 2021-06-26 PROCEDURE — 93005 ELECTROCARDIOGRAM TRACING: CPT | Performed by: INTERNAL MEDICINE

## 2021-06-26 DEVICE — STENT COR DES 2.50X15M -- DES RESOLUTE ONYX: Type: IMPLANTABLE DEVICE | Status: FUNCTIONAL

## 2021-06-26 DEVICE — IMPLANTABLE DEVICE: Type: IMPLANTABLE DEVICE | Status: FUNCTIONAL

## 2021-06-26 RX ORDER — SODIUM CHLORIDE 0.9 % (FLUSH) 0.9 %
5-40 SYRINGE (ML) INJECTION EVERY 8 HOURS
Status: DISCONTINUED | OUTPATIENT
Start: 2021-06-26 | End: 2021-06-27 | Stop reason: HOSPADM

## 2021-06-26 RX ORDER — LISINOPRIL 5 MG/1
10 TABLET ORAL
Status: DISCONTINUED | OUTPATIENT
Start: 2021-06-26 | End: 2021-06-26

## 2021-06-26 RX ORDER — CARVEDILOL 12.5 MG/1
12.5 TABLET ORAL 2 TIMES DAILY WITH MEALS
Status: DISCONTINUED | OUTPATIENT
Start: 2021-06-26 | End: 2021-06-27 | Stop reason: HOSPADM

## 2021-06-26 RX ORDER — SODIUM CHLORIDE 9 MG/ML
75 INJECTION, SOLUTION INTRAVENOUS CONTINUOUS
Status: DISPENSED | OUTPATIENT
Start: 2021-06-26 | End: 2021-06-27

## 2021-06-26 RX ORDER — HEPARIN SODIUM 5000 [USP'U]/ML
40 INJECTION, SOLUTION INTRAVENOUS; SUBCUTANEOUS ONCE
Status: COMPLETED | OUTPATIENT
Start: 2021-06-26 | End: 2021-06-26

## 2021-06-26 RX ORDER — HYDROCODONE BITARTRATE AND ACETAMINOPHEN 5; 325 MG/1; MG/1
1 TABLET ORAL
Status: DISCONTINUED | OUTPATIENT
Start: 2021-06-26 | End: 2021-06-27 | Stop reason: HOSPADM

## 2021-06-26 RX ORDER — ONDANSETRON 2 MG/ML
4 INJECTION INTRAMUSCULAR; INTRAVENOUS AS NEEDED
Status: DISCONTINUED | OUTPATIENT
Start: 2021-06-26 | End: 2021-06-26 | Stop reason: HOSPADM

## 2021-06-26 RX ORDER — LORAZEPAM 1 MG/1
1 TABLET ORAL
Status: DISCONTINUED | OUTPATIENT
Start: 2021-06-26 | End: 2021-06-27 | Stop reason: HOSPADM

## 2021-06-26 RX ORDER — FENTANYL CITRATE 50 UG/ML
INJECTION, SOLUTION INTRAMUSCULAR; INTRAVENOUS AS NEEDED
Status: DISCONTINUED | OUTPATIENT
Start: 2021-06-26 | End: 2021-06-26 | Stop reason: HOSPADM

## 2021-06-26 RX ORDER — SODIUM CHLORIDE 0.9 % (FLUSH) 0.9 %
5-40 SYRINGE (ML) INJECTION AS NEEDED
Status: DISCONTINUED | OUTPATIENT
Start: 2021-06-26 | End: 2021-06-27 | Stop reason: HOSPADM

## 2021-06-26 RX ORDER — NITROGLYCERIN 0.4 MG/1
0.4 TABLET SUBLINGUAL
Status: DISCONTINUED | OUTPATIENT
Start: 2021-06-26 | End: 2021-06-27 | Stop reason: HOSPADM

## 2021-06-26 RX ORDER — GUAIFENESIN 100 MG/5ML
81 LIQUID (ML) ORAL DAILY
Status: DISCONTINUED | OUTPATIENT
Start: 2021-06-26 | End: 2021-06-26 | Stop reason: SDUPTHER

## 2021-06-26 RX ORDER — LISINOPRIL 5 MG/1
5 TABLET ORAL
Status: DISCONTINUED | OUTPATIENT
Start: 2021-06-26 | End: 2021-06-27 | Stop reason: HOSPADM

## 2021-06-26 RX ORDER — FENOFIBRATE 160 MG/1
160 TABLET ORAL
Status: DISCONTINUED | OUTPATIENT
Start: 2021-06-26 | End: 2021-06-27 | Stop reason: HOSPADM

## 2021-06-26 RX ORDER — ACETAMINOPHEN 325 MG/1
650 TABLET ORAL
Status: DISCONTINUED | OUTPATIENT
Start: 2021-06-26 | End: 2021-06-26 | Stop reason: SDUPTHER

## 2021-06-26 RX ORDER — MIDAZOLAM HYDROCHLORIDE 1 MG/ML
INJECTION, SOLUTION INTRAMUSCULAR; INTRAVENOUS AS NEEDED
Status: DISCONTINUED | OUTPATIENT
Start: 2021-06-26 | End: 2021-06-26 | Stop reason: HOSPADM

## 2021-06-26 RX ORDER — LIDOCAINE HYDROCHLORIDE 10 MG/ML
INJECTION INFILTRATION; PERINEURAL AS NEEDED
Status: DISCONTINUED | OUTPATIENT
Start: 2021-06-26 | End: 2021-06-26 | Stop reason: HOSPADM

## 2021-06-26 RX ORDER — MORPHINE SULFATE 2 MG/ML
2 INJECTION, SOLUTION INTRAMUSCULAR; INTRAVENOUS
Status: DISCONTINUED | OUTPATIENT
Start: 2021-06-26 | End: 2021-06-27 | Stop reason: HOSPADM

## 2021-06-26 RX ADMIN — ATORVASTATIN CALCIUM 80 MG: 80 TABLET, FILM COATED ORAL at 00:15

## 2021-06-26 RX ADMIN — HYDROMORPHONE HYDROCHLORIDE 0.5 MG: 1 INJECTION, SOLUTION INTRAMUSCULAR; INTRAVENOUS; SUBCUTANEOUS at 04:01

## 2021-06-26 RX ADMIN — ISOSORBIDE MONONITRATE 30 MG: 30 TABLET, EXTENDED RELEASE ORAL at 08:13

## 2021-06-26 RX ADMIN — NITROGLYCERIN 1 INCH: 20 OINTMENT TOPICAL at 06:00

## 2021-06-26 RX ADMIN — Medication 5 ML: at 17:18

## 2021-06-26 RX ADMIN — PERFLUTREN 1 ML: 6.52 INJECTION, SUSPENSION INTRAVENOUS at 14:45

## 2021-06-26 RX ADMIN — BIVALIRUDIN 1.75 MG/KG/HR: 250 INJECTION, POWDER, LYOPHILIZED, FOR SOLUTION INTRAVENOUS at 09:36

## 2021-06-26 RX ADMIN — TRAZODONE HYDROCHLORIDE 50 MG: 50 TABLET ORAL at 22:36

## 2021-06-26 RX ADMIN — LISINOPRIL 10 MG: 5 TABLET ORAL at 00:53

## 2021-06-26 RX ADMIN — CIPROFLOXACIN 500 MG: 500 TABLET, FILM COATED ORAL at 08:12

## 2021-06-26 RX ADMIN — CARVEDILOL 12.5 MG: 12.5 TABLET, FILM COATED ORAL at 17:13

## 2021-06-26 RX ADMIN — INSULIN LISPRO 6 UNITS: 100 INJECTION, SOLUTION INTRAVENOUS; SUBCUTANEOUS at 08:16

## 2021-06-26 RX ADMIN — INSULIN LISPRO 4 UNITS: 100 INJECTION, SOLUTION INTRAVENOUS; SUBCUTANEOUS at 00:14

## 2021-06-26 RX ADMIN — Medication 10 ML: at 22:38

## 2021-06-26 RX ADMIN — GABAPENTIN 300 MG: 300 CAPSULE ORAL at 08:13

## 2021-06-26 RX ADMIN — INSULIN LISPRO 6 UNITS: 100 INJECTION, SOLUTION INTRAVENOUS; SUBCUTANEOUS at 22:35

## 2021-06-26 RX ADMIN — HYDROCODONE BITARTRATE AND ACETAMINOPHEN 1 TABLET: 5; 325 TABLET ORAL at 22:36

## 2021-06-26 RX ADMIN — INSULIN LISPRO 4 UNITS: 100 INJECTION, SOLUTION INTRAVENOUS; SUBCUTANEOUS at 12:30

## 2021-06-26 RX ADMIN — TICAGRELOR 90 MG: 90 TABLET ORAL at 22:36

## 2021-06-26 RX ADMIN — ATORVASTATIN CALCIUM 80 MG: 80 TABLET, FILM COATED ORAL at 22:36

## 2021-06-26 RX ADMIN — FENOFIBRATE 160 MG: 160 TABLET ORAL at 22:37

## 2021-06-26 RX ADMIN — ACETAMINOPHEN 650 MG: 325 TABLET ORAL at 17:13

## 2021-06-26 RX ADMIN — Medication 10 ML: at 05:25

## 2021-06-26 RX ADMIN — GABAPENTIN 300 MG: 300 CAPSULE ORAL at 17:14

## 2021-06-26 RX ADMIN — PANTOPRAZOLE SODIUM 40 MG: 40 TABLET, DELAYED RELEASE ORAL at 08:13

## 2021-06-26 RX ADMIN — GABAPENTIN 300 MG: 300 CAPSULE ORAL at 00:16

## 2021-06-26 RX ADMIN — ONDANSETRON 4 MG: 2 INJECTION INTRAMUSCULAR; INTRAVENOUS at 08:45

## 2021-06-26 RX ADMIN — ASPIRIN 81 MG: 81 TABLET ORAL at 07:39

## 2021-06-26 RX ADMIN — Medication 10 ML: at 00:20

## 2021-06-26 RX ADMIN — HEPARIN SODIUM 4300 UNITS: 5000 INJECTION INTRAVENOUS; SUBCUTANEOUS at 05:15

## 2021-06-26 RX ADMIN — ACETAMINOPHEN 650 MG: 325 TABLET ORAL at 00:53

## 2021-06-26 RX ADMIN — ACETAMINOPHEN 650 MG: 325 TABLET ORAL at 12:30

## 2021-06-26 RX ADMIN — CARVEDILOL 12.5 MG: 12.5 TABLET, FILM COATED ORAL at 08:13

## 2021-06-26 RX ADMIN — SODIUM CHLORIDE 75 ML/HR: 900 INJECTION, SOLUTION INTRAVENOUS at 04:27

## 2021-06-26 RX ADMIN — CARVEDILOL 12.5 MG: 12.5 TABLET, FILM COATED ORAL at 00:53

## 2021-06-26 RX ADMIN — NITROGLYCERIN 1 INCH: 20 OINTMENT TOPICAL at 00:15

## 2021-06-26 RX ADMIN — FENOFIBRATE 160 MG: 160 TABLET ORAL at 00:53

## 2021-06-26 RX ADMIN — SODIUM CHLORIDE 75 ML/HR: 900 INJECTION, SOLUTION INTRAVENOUS at 11:07

## 2021-06-26 RX ADMIN — BIVALIRUDIN 1.75 MG/KG/HR: 250 INJECTION, POWDER, LYOPHILIZED, FOR SOLUTION INTRAVENOUS at 09:09

## 2021-06-26 RX ADMIN — TRAZODONE HYDROCHLORIDE 50 MG: 50 TABLET ORAL at 00:15

## 2021-06-26 RX ADMIN — LISINOPRIL 5 MG: 5 TABLET ORAL at 22:36

## 2021-06-26 RX ADMIN — INSULIN LISPRO 6 UNITS: 100 INJECTION, SOLUTION INTRAVENOUS; SUBCUTANEOUS at 17:14

## 2021-06-26 RX ADMIN — CIPROFLOXACIN 500 MG: 500 TABLET, FILM COATED ORAL at 17:13

## 2021-06-26 RX ADMIN — GABAPENTIN 300 MG: 300 CAPSULE ORAL at 22:35

## 2021-06-26 NOTE — ED TRIAGE NOTES
Pt coming from home by [facility] for chest px that started around 1330 this afternoon. States it has happened on and off past few days. Hx of widowmaker and pancreatitis. Pain is radiating to back of his shoulder blades and starts in the middle of his chest. 12 leads unremarkable. 324 aspirin given and 2 nitro given by [facility]> /104. , 98.2 temp, 18 G LAC, -120. Hx of MI 2016 and 2018. Denies fevers. Denies SOB or nausea and vomiting.

## 2021-06-26 NOTE — PROGRESS NOTES
Home narc vyvanse, klonopin and gabapentin placed and locked in narcotic box at nursing station with patient's consent. Paperwork placed in chart.

## 2021-06-26 NOTE — PROGRESS NOTES
Dual skin assessment completed on admission       06/25/21 9148   Skin Integumentary   Skin Integumentary (WDL) WDL    Pressure  Injury Documentation No Pressure Injury Noted-Pressure Ulcer Prevention Initiated   Skin Color Appropriate for ethnicity   Skin Condition/Temp Dry; Warm   Skin Integrity Intact;Scars (comment)   Turgor Non-tenting   Hair Growth Present   Nails WDL   Varicosities Absent     Sacrum intact. Heels intact. No skin abnormalities noted.

## 2021-06-26 NOTE — PROGRESS NOTES
Pt requests to complete DNR paperwork this admission. Mary Kwon NP and place order for consult to  in the morning.

## 2021-06-26 NOTE — PROGRESS NOTES
Consult request    Patient was calm  Alert    Very receptive to      Inquired about request on consult    Clarified that patient would like to complete advance directives sometime today    He was eating lunch      Will follow up later when his daughter arrives at hospital    Jose Armando

## 2021-06-26 NOTE — PROGRESS NOTES
TRANSFER - IN REPORT:    Verbal report received from Cabrera Knapp, [facility] (name) on Antonio Frank  being received from [facility] (unit) for routine post - op      Report consisted of patients Situation, Background, Assessment and   Recommendations(SBAR). Information from the following report(s) Procedure Summary was reviewed with the receiving nurse. Opportunity for questions and clarification was provided. Assessment completed upon patients arrival to unit and care assumed. Right radial site assessed; TR band intact. BP cycling Q15min.

## 2021-06-26 NOTE — ROUTINE PROCESS
Verbal bedside report given to darwin Qiu RN. Patient's situation, background, assessment and recommendations provided. Opportunity for questions provided. Oncoming RN assumed care of patient. Heparin IV drip verified at bedside with oncoming RN.

## 2021-06-26 NOTE — ED NOTES
TRANSFER - OUT REPORT:    Verbal report given to RN on Heidi San  being transferred to [phone] for routine progression of care       Report consisted of patients Situation, Background, Assessment and   Recommendations(SBAR). Information from the following report(s) SBAR, ED Summary and MAR was reviewed with the receiving nurse. Lines:   Peripheral IV 06/25/21 Left Antecubital (Active)   Site Assessment Clean, dry, & intact 06/25/21 2027   Phlebitis Assessment 0 06/25/21 2027   Infiltration Assessment 0 06/25/21 2027   Dressing Status Clean, dry, & intact 06/25/21 2027   Hub Color/Line Status Green 06/25/21 2027   Action Taken Blood drawn 06/25/21 2027   Alcohol Cap Used Yes 06/25/21 2027        Opportunity for questions and clarification was provided.       Patient transported with:   Monitor  Registered Nurse

## 2021-06-26 NOTE — ROUTINE PROCESS
[facility] Plan of care reviewed with patient at bedside. Bed low and locked with call light within reach. Patient instructed to call for assistance. Patient verbalized understanding. NPO for [facility]. Consent in chart. Heparin verified.

## 2021-06-26 NOTE — ED PROVIDER NOTES
Mask was worn during the entire patient examination. Harshad Vera is a 54 y.o. male who presents to the ED with a chief complaint of chest pain. Patient states he has had 2 to 3 days of off-and-on chest pain. The pain radiates into his back and in between the shoulder blades. He has history of coronary artery disease as well as acute pancreatitis and diabetes. He has had stents in his heart states he has not missed any of his medications. He denies any fevers or chills. States this feels similar to when he had heart problems in the past.  He did receive aspirin 324 mg as well as nitroglycerin twice prior to arrival thinks the nitroglycerin may have helped the chest pain but not the pain he has upper back. His last stents were in 2018    The history is provided by the patient. Chest Pain (Angina)   Pertinent negatives include no abdominal pain, no cough, no fever, no nausea, no numbness, no palpitations, no vomiting and no weakness.         Past Medical History:   Diagnosis Date    Acute coronary syndrome ([address] 12/15/2016    Acute pancreatitis 2/15/2018    Arthritis     psoriatic    Diabetes ([address]     Endocrine disease     Hypertension     Nonintractable epilepsy with complex partial seizures ([address] 4/2/2020    Psoriatic arthropathy ([address]     Seizures (HCC)        Past Surgical History:   Procedure Laterality Date    HX APPENDECTOMY      HX CHOLECYSTECTOMY      HX CORONARY STENT PLACEMENT  11/2020    HX HERNIA REPAIR      HX ORTHOPAEDIC      left heel secondary to trauma    IR INSERT NON TUNL CVC OVER 5 YRS  11/25/2020    DE CARDIAC SURG PROCEDURE UNLIST           Family History:   Problem Relation Age of Onset    Thyroid Disease Mother         goiters    Breast Cancer Mother         42's    Atrial Fibrillation Mother     Diabetes Father     Heart Disease Father 48    Diabetes Sister     Heart Disease Paternal Grandfather        Social History     Socioeconomic History    Marital status:      Spouse name: Not on file    Number of children: Not on file    Years of education: Not on file    Highest education level: Not on file   Occupational History    Not on file   Tobacco Use    Smoking status: Former Smoker     Packs/day: 1.00     Years: 30.00     Pack years: 30.00     Quit date: 2019     Years since quittin.3    Smokeless tobacco: Former User   Vaping Use    Vaping Use: Never used   Substance and Sexual Activity    Alcohol use: No    Drug use: No    Sexual activity: Not Currently     Partners: Female   Other Topics Concern     Service No    Blood Transfusions No    Caffeine Concern No    Occupational Exposure No    Hobby Hazards No    Sleep Concern No    Stress Concern Yes    Weight Concern Yes    Special Diet No    Back Care No    Exercise No    Bike Helmet No    Seat Belt Yes    Self-Exams No   Social History Narrative    Not on file     Social Determinants of Health     Financial Resource Strain:     Difficulty of Paying Living Expenses:    Food Insecurity:     Worried About Running Out of Food in the Last Year:     Ran Out of Food in the Last Year:    Transportation Needs:     Lack of Transportation (Medical):  Lack of Transportation (Non-Medical):    Physical Activity:     Days of Exercise per Week:     Minutes of Exercise per Session:    Stress:     Feeling of Stress :    Social Connections:     Frequency of Communication with Friends and Family:     Frequency of Social Gatherings with Friends and Family:     Attends Anabaptism Services:     Active Member of Clubs or Organizations:     Attends Club or Organization Meetings:     Marital Status:    Intimate Partner Violence:     Fear of Current or Ex-Partner:     Emotionally Abused:     Physically Abused:     Sexually Abused: ALLERGIES: Peanut and Morphine    Review of Systems   Constitutional: Negative for activity change, chills, fatigue and fever. Respiratory: Negative for cough, wheezing and stridor. Cardiovascular: Positive for chest pain. Negative for palpitations. Gastrointestinal: Negative for abdominal pain, diarrhea, nausea and vomiting. Musculoskeletal: Negative for neck pain and neck stiffness. Skin: Negative for color change, pallor and wound. Neurological: Negative for weakness and numbness. All other systems reviewed and are negative. Vitals:    06/25/21 2024 06/25/21 2031   BP: (!) 146/96 (!) 145/92   Pulse: (!) 110 (!) 107   Resp: 18 22   Temp: 98.1 °F (36.7 °C)    SpO2: 97% 95%   Weight: 108 kg (238 lb)    Height: 5' 9\" (1.753 m)             Physical Exam  Vitals and nursing note reviewed. Constitutional:       General: He is not in acute distress. Appearance: He is well-developed. He is not ill-appearing, toxic-appearing or diaphoretic. HENT:      Head: Normocephalic and atraumatic. Eyes:      General: No scleral icterus. Extraocular Movements: Extraocular movements intact. Conjunctiva/sclera: Conjunctivae normal.      Pupils: Pupils are equal, round, and reactive to light. Neck:      Trachea: No tracheal deviation. Cardiovascular:      Rate and Rhythm: Normal rate and regular rhythm. Heart sounds: Normal heart sounds. Heart sounds not distant. No murmur heard. No systolic murmur is present. Pulmonary:      Effort: Pulmonary effort is normal. No tachypnea, accessory muscle usage or respiratory distress. Breath sounds: No stridor. No decreased breath sounds, wheezing, rhonchi or rales. Abdominal:      Palpations: Abdomen is soft. Tenderness: There is no abdominal tenderness. Musculoskeletal:      Cervical back: Normal range of motion. Right lower leg: No tenderness. No edema. Left lower leg: No tenderness. No edema. Skin:     General: Skin is warm. Capillary Refill: Capillary refill takes less than 2 seconds. Coloration: Skin is not cyanotic or pale. Findings: No ecchymosis, erythema or rash. Neurological:      General: No focal deficit present. Mental Status: He is alert and oriented to person, place, and time. Mental status is at baseline. Cranial Nerves: No cranial nerve deficit. Motor: No weakness. Psychiatric:         Mood and Affect: Mood normal.         Behavior: Behavior normal.          MDM  Number of Diagnoses or Management Options  Non-STEMI (non-ST elevated myocardial infarction) ([address]  Diagnosis management comments: Patient already received aspirin 324 mg and 2 nitroglycerin prior to arrival.  I am treating with heparin bolus and drip and will call cardiology for non-STEMI. I will also try to get his pain under better control. Princess Alma Delia MD; 6/25/2021 @9:09 PM Voice dictation software was used during the making of this note. This software is not perfect and grammatical and other typographical errors may be present. This note has not been proofread for errors.  ===================================================================          Amount and/or Complexity of Data Reviewed  Clinical lab tests: ordered and reviewed  Tests in the radiology section of CPT®: ordered and reviewed  Discuss the patient with other providers: yes  Independent visualization of images, tracings, or specimens: yes      ED Course as of Jun 25 2108 Fri Jun 25, 2021 2049 ===============================================  ED EKG Interpretation  EKG was interpreted in the absence of a cardiologist.    EKG rhythm: sinus tachycardia  Rate: 108  ST Segments: Normal ST segments - NO STEMI      Princess Alma Delia MD; 6/25/2021 @8:49 PM================         [CT]      ED Course User Index  [CT] Aminah Brenner MD       Critical Care  Performed by: Aminah Brenner MD  Authorized by:  Aminah Brenner MD     Critical care provider statement:     Critical care time (minutes):  35    Critical care was necessary to treat or prevent imminent or life-threatening deterioration of the following conditions:  Cardiac failure    Critical care was time spent personally by me on the following activities:  Ordering and performing treatments and interventions, ordering and review of laboratory studies, ordering and review of radiographic studies, discussions with consultants, re-evaluation of patient's condition, examination of patient and review of old charts

## 2021-06-26 NOTE — ROUTINE PROCESS
TRANSFER - IN REPORT:    Verbal report received from Cyrus hernadez RN on Gaile Lobe being received from ER for routine progression of care      Report consisted of patients Situation, Background, Assessment and Recommendations(SBAR). Information from the following report(s) SBAR, Kardex, ED Summary, MAR, Recent Results and Cardiac Rhythm ST was reviewed with the receiving nurse. Opportunity for questions and clarification was provided. Assessment to be completed upon patients arrival to unit and care assumed.

## 2021-06-27 VITALS
HEART RATE: 92 BPM | SYSTOLIC BLOOD PRESSURE: 134 MMHG | RESPIRATION RATE: 16 BRPM | DIASTOLIC BLOOD PRESSURE: 78 MMHG | OXYGEN SATURATION: 97 % | HEIGHT: 69 IN | BODY MASS INDEX: 36.23 KG/M2 | WEIGHT: 244.6 LBS | TEMPERATURE: 98.1 F

## 2021-06-27 LAB
ANION GAP SERPL CALC-SCNC: 7 MMOL/L (ref 7–16)
ATRIAL RATE: 87 BPM
BASOPHILS # BLD: 0.1 K/UL (ref 0–0.2)
BASOPHILS NFR BLD: 1 % (ref 0–2)
BUN SERPL-MCNC: 8 MG/DL (ref 6–23)
CALCIUM SERPL-MCNC: 8.8 MG/DL (ref 8.3–10.4)
CALCULATED P AXIS, ECG09: 41 DEGREES
CALCULATED R AXIS, ECG10: 66 DEGREES
CALCULATED T AXIS, ECG11: 71 DEGREES
CHLORIDE SERPL-SCNC: 106 MMOL/L (ref 98–107)
CO2 SERPL-SCNC: 26 MMOL/L (ref 21–32)
CREAT SERPL-MCNC: 0.59 MG/DL (ref 0.8–1.5)
DIAGNOSIS, 93000: NORMAL
DIFFERENTIAL METHOD BLD: ABNORMAL
EOSINOPHIL # BLD: 0.8 K/UL (ref 0–0.8)
EOSINOPHIL NFR BLD: 9 % (ref 0.5–7.8)
ERYTHROCYTE [DISTWIDTH] IN BLOOD BY AUTOMATED COUNT: 14.5 % (ref 11.9–14.6)
GLUCOSE BLD STRIP.AUTO-MCNC: 259 MG/DL (ref 65–100)
GLUCOSE SERPL-MCNC: 230 MG/DL (ref 65–100)
HCT VFR BLD AUTO: 40.9 % (ref 41.1–50.3)
HGB BLD-MCNC: 13.2 G/DL (ref 13.6–17.2)
IMM GRANULOCYTES # BLD AUTO: 0 K/UL (ref 0–0.5)
IMM GRANULOCYTES NFR BLD AUTO: 0 % (ref 0–5)
LYMPHOCYTES # BLD: 2.1 K/UL (ref 0.5–4.6)
LYMPHOCYTES NFR BLD: 24 % (ref 13–44)
MAGNESIUM SERPL-MCNC: 2 MG/DL (ref 1.8–2.4)
MCH RBC QN AUTO: 30.6 PG (ref 26.1–32.9)
MCHC RBC AUTO-ENTMCNC: 32.3 G/DL (ref 31.4–35)
MCV RBC AUTO: 94.9 FL (ref 79.6–97.8)
MONOCYTES # BLD: 1.1 K/UL (ref 0.1–1.3)
MONOCYTES NFR BLD: 13 % (ref 4–12)
NEUTS SEG # BLD: 4.8 K/UL (ref 1.7–8.2)
NEUTS SEG NFR BLD: 55 % (ref 43–78)
NRBC # BLD: 0 K/UL (ref 0–0.2)
P-R INTERVAL, ECG05: 140 MS
PLATELET # BLD AUTO: 207 K/UL (ref 150–450)
PMV BLD AUTO: 11.1 FL (ref 9.4–12.3)
POTASSIUM SERPL-SCNC: 3.6 MMOL/L (ref 3.5–5.1)
Q-T INTERVAL, ECG07: 384 MS
QRS DURATION, ECG06: 94 MS
QTC CALCULATION (BEZET), ECG08: 462 MS
RBC # BLD AUTO: 4.31 M/UL (ref 4.23–5.6)
SERVICE CMNT-IMP: ABNORMAL
SODIUM SERPL-SCNC: 139 MMOL/L (ref 136–145)
VENTRICULAR RATE, ECG03: 87 BPM
WBC # BLD AUTO: 8.8 K/UL (ref 4.3–11.1)

## 2021-06-27 PROCEDURE — 99238 HOSP IP/OBS DSCHRG MGMT 30/<: CPT | Performed by: INTERNAL MEDICINE

## 2021-06-27 PROCEDURE — 80048 BASIC METABOLIC PNL TOTAL CA: CPT

## 2021-06-27 PROCEDURE — 74011636637 HC RX REV CODE- 636/637: Performed by: NURSE PRACTITIONER

## 2021-06-27 PROCEDURE — 36415 COLL VENOUS BLD VENIPUNCTURE: CPT

## 2021-06-27 PROCEDURE — 82962 GLUCOSE BLOOD TEST: CPT

## 2021-06-27 PROCEDURE — 74011250637 HC RX REV CODE- 250/637: Performed by: NURSE PRACTITIONER

## 2021-06-27 PROCEDURE — 74011250637 HC RX REV CODE- 250/637: Performed by: INTERNAL MEDICINE

## 2021-06-27 PROCEDURE — 93005 ELECTROCARDIOGRAM TRACING: CPT | Performed by: INTERNAL MEDICINE

## 2021-06-27 PROCEDURE — 85025 COMPLETE CBC W/AUTO DIFF WBC: CPT

## 2021-06-27 PROCEDURE — 83735 ASSAY OF MAGNESIUM: CPT

## 2021-06-27 RX ORDER — POTASSIUM CHLORIDE 20 MEQ/1
40 TABLET, EXTENDED RELEASE ORAL
Status: COMPLETED | OUTPATIENT
Start: 2021-06-27 | End: 2021-06-27

## 2021-06-27 RX ORDER — INSULIN PUMP SYRINGE, 3 ML
EACH MISCELLANEOUS
Qty: 1 KIT | Refills: 0 | Status: SHIPPED | OUTPATIENT
Start: 2021-06-27 | End: 2021-06-27 | Stop reason: SDUPTHER

## 2021-06-27 RX ORDER — LISINOPRIL 5 MG/1
5 TABLET ORAL DAILY
Qty: 90 TABLET | Refills: 3 | Status: SHIPPED | OUTPATIENT
Start: 2021-06-27 | End: 2021-06-27 | Stop reason: SDUPTHER

## 2021-06-27 RX ORDER — NITROGLYCERIN 0.4 MG/1
0.4 TABLET SUBLINGUAL
Qty: 1 BOTTLE | Refills: 11 | Status: SHIPPED | OUTPATIENT
Start: 2021-06-27 | End: 2021-06-27 | Stop reason: SDUPTHER

## 2021-06-27 RX ORDER — INSULIN PUMP SYRINGE, 3 ML
EACH MISCELLANEOUS
Qty: 1 KIT | Refills: 0 | Status: SHIPPED | OUTPATIENT
Start: 2021-06-27

## 2021-06-27 RX ORDER — NITROGLYCERIN 0.4 MG/1
0.4 TABLET SUBLINGUAL
Qty: 1 BOTTLE | Refills: 11 | Status: SHIPPED | OUTPATIENT
Start: 2021-06-27

## 2021-06-27 RX ORDER — LISINOPRIL 5 MG/1
5 TABLET ORAL DAILY
Qty: 90 TABLET | Refills: 3 | Status: SHIPPED | OUTPATIENT
Start: 2021-06-27 | End: 2021-09-23

## 2021-06-27 RX ADMIN — ISOSORBIDE MONONITRATE 30 MG: 30 TABLET, EXTENDED RELEASE ORAL at 08:01

## 2021-06-27 RX ADMIN — Medication 10 ML: at 06:26

## 2021-06-27 RX ADMIN — POTASSIUM CHLORIDE 40 MEQ: 20 TABLET, EXTENDED RELEASE ORAL at 09:23

## 2021-06-27 RX ADMIN — ASPIRIN 81 MG: 81 TABLET ORAL at 08:01

## 2021-06-27 RX ADMIN — PANTOPRAZOLE SODIUM 40 MG: 40 TABLET, DELAYED RELEASE ORAL at 08:01

## 2021-06-27 RX ADMIN — INSULIN LISPRO 6 UNITS: 100 INJECTION, SOLUTION INTRAVENOUS; SUBCUTANEOUS at 07:49

## 2021-06-27 RX ADMIN — TICAGRELOR 90 MG: 90 TABLET ORAL at 09:23

## 2021-06-27 RX ADMIN — CIPROFLOXACIN 500 MG: 500 TABLET, FILM COATED ORAL at 08:01

## 2021-06-27 RX ADMIN — CARVEDILOL 12.5 MG: 12.5 TABLET, FILM COATED ORAL at 07:49

## 2021-06-27 RX ADMIN — GABAPENTIN 300 MG: 300 CAPSULE ORAL at 08:01

## 2021-06-27 RX ADMIN — HYDROCODONE BITARTRATE AND ACETAMINOPHEN 1 TABLET: 5; 325 TABLET ORAL at 08:01

## 2021-06-27 NOTE — ROUTINE PROCESS
Shift change report received from Annφ[address]eugene [address], RN (off going nurse). Plan of care reviewed with patient at bedside. Opportunity to ask questions provided. Bed low and locked with call light within reach. Patient instructed to call for assistance. Patient verbalized understanding.

## 2021-06-27 NOTE — PROGRESS NOTES
[facility] CARDIOLOGY PROGRESS NOTE    6/27/2021 8:21 AM    Admit Date: 6/25/2021        Subjective:   Stable overnight without angina, CHF, or palpitations. Vitals stable and controlled. No other complaints overnight. Tolerating meds well. Ready to go home. Objective:      Vitals:    06/26/21 2126 06/27/21 0031 06/27/21 0532 06/27/21 0749   BP: 101/61 (!) 103/56 120/75 129/88   Pulse: 82 83 90 88   Resp: 18 17 18    Temp: 97 °F (36.1 °C) 97.3 °F (36.3 °C) 97.2 °F (36.2 °C)    SpO2: 95% 96% 96%    Weight:   244 lb 9.6 oz (110.9 kg)    Height:           Physical Exam:  Neck- supple, no JVD  CV- regular rate and rhythm no MRG  Lung- clear bilaterally  Abd- soft, nontender, nondistended  Ext- no edema, right wrist clean, dry, intact  Skin- warm and dry    Data Review:   Recent Labs     06/27/21  0329 06/26/21  0358    136*   K 3.6 3.6   MG 2.0 2.0   BUN 8 10   CREA 0.59* 0.58*   * 261*   WBC 8.8 10.8   HGB 13.2* 14.5   HCT 40.9* 44.6    248   CHOL  --  193   TRIGL  --  351*   HDL  --  24*       Assessment and Plan:     Principal Problem:    NSTEMI (non-ST elevated myocardial infarction) ([facility]) (6/25/2021)-improved, resolved, status post multivessel stenting with drug-eluting stents yesterday. On appropriate medications. Ready for discharge but needs /case management assistance with medications. Hospitalist consulted prior to discharge. Transitional care follow-up in 7 to 14 days in our office. Free 30-day voucher for Brilinta given today. Active Problems:    CAD (coronary artery disease)-improved, see above. The importance of compliance with dual antiplatelet therapy was emphasized. The risk of non-compliance, including stent thrombosis, acute MI, acute LV systolic dysfunction, and death was discussed and understood.            Hypertension-stable, continue meds and titrate in the outpatient setting as tolerated      Overview: [place]:       Overview:       Stable with med-amlodip[ine/lisinopril/coreg      Refilled      Labs       Annual eye exam recommended      F/u in 3 months      3/2021      Coreg increased to 12.5 mg bid      Hyperlipidemia-stable, continue meds and titrate in the outpatient setting as tolerated        Hypertriglyceridemia- as above      Overview: Overview:       Overview:       Much better now on lofibra--doing well      Advised more stricter low carb diet and aggressive control of DM--pt       willing      F/u in few months for recheck      5/2019      Statin/lofibra not helping--will add fish oil and niacin      F/u in few weeks for recheck      Psoriatic arthropathy ([facility] (1/24/2020)      Overview: Multiple joints pain/swelling      Anxiety (1/24/2020)      Overview: 2/2020      Stop prozac and start wellbutrin      Type 2 diabetes mellitus with hyperglycemia, with long-term current use of insulin ([facility] (1/24/2020)- resume home DM meds as previously taking, needs PCP follow up soon.          Ruby Mcconnell MD  [address] Cardiology  Pager [phone]

## 2021-06-27 NOTE — DISCHARGE INSTRUCTIONS
DISPOSITION: The patient is being discharged home in stable condition on a low saturated fat, low cholesterol and low salt diet. The patient is instructed to advance activities as tolerated to the limit of fatigue or shortness of breath. The patient is instructed to avoid all heavy lifting, straining, stooping or squatting for 3-5 days. The patient is instructed to watch the cath site for bleeding/oozing; if seen, the patient is instructed to apply firm pressure with a clean cloth and call [facility] at [phone]. The patient is instructed to watch for signs of infection which include: increasing area of redness, fever/hot to touch or purulent drainage at the catheterization site. The patient is instructed not to soak in a bathtub for 7-10 days, but is cleared to shower. The patient is instructed to call the office or return to the ER for immediate evaluation for any shortness of breath or chest pain not relieved by NTG. Cardiac Catheterization/Angiography Discharge Instructions    *Check the puncture site frequently for swelling or bleeding. If you see any bleeding, lie down and apply pressure over the area with a clean town or washcloth. Notify your doctor for any redness, swelling, drainage or oozing from the puncture site. Notify your doctor for any fever or chills. *If the leg or arm with the puncture becomes cold, numb or painful, call Dr Ilia Deluca at  Ilia Deluca    *Activity should be limited for the next 48 hours. Climb stairs as little as possible and avoid any stooping, bending or strenuous activity for 48 hours. No heavy lifting (anything over 10 pounds) for three days. *Do not drive for 48 hours. *You may resume your usual diet. Drink more fluids than usual.    *Have a responsible person drive you home and stay with you for at least 24 hours after your heart catheterization/angiography. *You may remove the bandage from your {ARM/GROIN:[id number]} in 24 hours.  You may shower in 24 hours. No tub baths, hot tubs or swimming for one week. Do not place any lotions, creams, powders, ointments over the puncture site for one week. You may place a clean band-aid over the puncture site each day for 5 days. Change this daily.

## 2021-06-27 NOTE — DISCHARGE SUMMARY
[facility] Cardiology Discharge Summary     Patient ID:  Festus Delarosa  [id number]  55 y.o.  1970    Admit date: 6/25/2021    Discharge date:  6/27/2021    Admitting Physician: Ortencia Mortimer, MD     Discharge Physician: Dr. Clay Matamoros    Admission Diagnoses: NSTEMI (non-ST elevated myocardial infarction) [facility]) [I21.4]    Discharge Diagnoses:    Diagnosis    NSTEMI (non-ST elevated myocardial infarction)     Otitis externa of right ear    Peripheral polyneuropathy    History of smoking 25-50 pack years -- quit in 2016 about 27 pack years    Psoriatic arthritis -- was on Cosyntrex    Nonintractable epilepsy with complex partial seizures     Chest pain    Coronary artery disease involving native heart with angina pectoris     Seizure disorder     Anxiety    Type 2 diabetes mellitus with hyperglycemia, with long-term current use of insulin     Noncompliance with medications    BMI 35.0-35.9,adult    Former smoker    Depression    Hx of heart artery stent    Dietary counseling    Unstable angina     Uncontrolled type II diabetes mellitus     Suspected sleep apnea    History of tobacco use    Arthritis of left wrist    Severe obesity (BMI 35.0-39. 9) with comorbidity     Encounter to discuss test results    Encounter for medication review and counseling    Hypertriglyceridemia    Hypertension    Hyperlipidemia    Obesity, unspecified obesity severity, unspecified obesity type    Anemia of chronic disease    CAD (coronary artery disease)    Atherosclerosis of coronary artery    Diabetes mellitus type II, uncontrolled     Tobacco abuse       Cardiology Procedures this admission:  Left heart catheterization with PCI  EchoCardiogram  Consults: None    Hospital Course: Patient presented to the ED with complaints of progressive but stuttering CP. He was found to have elevated troponin and was subsequently admitted and scheduled for a LHC at [facility] on 6/26/21.  His hsTroponin went up to 14,921. Patient underwent cardiac catheterization by Dr. Leanne Gamez. Patient was found to have a 99% stenosis of the pLAD that was stented with a 2.5 x 26mm Felipe MATIAS with <10% residual stenosis. Patient was found to have a 99% stenosis of the Naida that was stented with a 2.5 x 26mm [place] MATIAS with <10% residual stenosis. Patient was found to have a 70% stenosis of the pLCx that was stented with a 2.5 x 15mm Felipe MATIAS with 0% residual stenosis. Patient was found to have a 90% stenosis of the GZ4azck was treated with a POBA with 0% residual stenosis. Patient tolerated the procedure well and was taken to the telemetry floor for recovery. ECHO showed:   · LV: Estimated LVEF is 50 - 55%. Normal systolic function (ejection fraction normal) and diastolic function. · Image quality for this study was technically difficult. · Contrast used: DEFINITY. · There is moderate hypokinesis of the basal to mid inferolateral/anterolateral walls. The following morning patient was up feeling well without any complaints of chest pain or shortness of breath. Patient's right RA cath site was clean, dry and intact without hematoma or bruit. Patient's labs were stable. Patient was seen and examined by Dr. Leanne Gamez and determined stable and ready for discharge. Patient was instructed on the importance of medication compliance including taking Aspirin and Brilinta everyday without missing a dose. After receiving drug eluting stents, the patient will remain on dual anti-platelet therapy for at least 1 year. For maximized medical therapy for CAD, patient will continue BB, ACE-I, and statin as well. Pt with history of non-compliance with medications and poor follow-up. Stressed importance of taking all medications (cardiac and insulins) as directed for best management of his CAD and DM II (A1C 9.4 this admit). CM was involved for discharge planning to ensure Pt given every opportunity to be successful with OP management and follow-up.  The patient will follow up with [facility] and has been referred to cardiac rehab. Pt seen by social work prior to discharge. 15 minute discussion w pt re hospital course and d/c instructions. Discussed compliance w meds, cost of brilinta and options to make sure he is able to afford meds (coupon, call our office for samples) but again social work to assist w meds on d/c. Will call pt with f/u appt. Pt to call if any questions. Pt given script for glucose monitor. DISPOSITION: The patient is being discharged home in stable condition on a low saturated fat, low cholesterol and low salt diet. The patient is instructed to advance activities as tolerated to the limit of fatigue or shortness of breath. The patient is instructed to avoid all heavy lifting, straining, stooping or squatting for 3-5 days. The patient is instructed to watch the cath site for bleeding/oozing; if seen, the patient is instructed to apply firm pressure with a clean cloth and call [facility]iology at [phone]. The patient is instructed to watch for signs of infection which include: increasing area of redness, fever/hot to touch or purulent drainage at the catheterization site. The patient is instructed not to soak in a bathtub for 7-10 days, but is cleared to shower. The patient is instructed to call the office or return to the ER for immediate evaluation for any shortness of breath or chest pain not relieved by NTG. Discharge Exam:   Visit Vitals  /78 (BP 1 Location: Right upper arm, BP Patient Position: Sitting)   Pulse 92   Temp 98.1 °F (36.7 °C)   Resp 16   Ht 5' 9\" (1.753 m)   Wt 110.9 kg (244 lb 9.6 oz)   SpO2 97%   BMI 36.12 kg/m²       Patient has been seen by Dr. Edita Villalpando: see his progress note for exam details.     Recent Results (from the past 24 hour(s))   POC ACTIVATED CLOTTING TIME    Collection Time: 06/26/21  9:14 AM   Result Value Ref Range    Activated Clotting Time (POC) 450 (H) 70 - 128 SECS GLUCOSE, POC    Collection Time: 06/26/21 10:29 AM   Result Value Ref Range    Glucose (POC) 245 (H) 65 - 100 mg/dL    Performed by Amee    HEPARIN XA UFH    Collection Time: 06/26/21 11:48 AM   Result Value Ref Range    Heparin Xa UFH <0.10 (L) 0.3 - 0.7 IU/mL   EKG, 12 LEAD, INITIAL    Collection Time: 06/26/21 11:49 AM   Result Value Ref Range    Ventricular Rate 78 BPM    Atrial Rate 78 BPM    P-R Interval 138 ms    QRS Duration 92 ms    Q-T Interval 404 ms    QTC Calculation (Bezet) 460 ms    Calculated P Axis 44 degrees    Calculated R Axis 63 degrees    Calculated T Axis 88 degrees    Diagnosis       Normal sinus rhythm  Normal ECG  When compared with ECG of 25-JUN-2021 20:26,  Fusion complexes are no longer Present  Nonspecific T wave abnormality, worse in Lateral leads  Confirmed by Matt Bustillos ([id number]) on 6/26/2021 1:55:11 PM     ECHO ADULT COMPLETE    Collection Time: 06/26/21  2:50 PM   Result Value Ref Range    IVSd 1.38 (A) 0.60 - 1.00 cm    LVIDd 4.91 4.20 - 5.90 cm    LVIDs 2.92 cm    LVOT d 2.25 cm    LVPWd 1.21 (A) 0.60 - 1.00 cm    LV Ejection Fraction MOD 4C 64 percent    LV ED Vol A4C 66.32 mL    LV ES Vol A4C 23.90 mL    LVOT Peak Gradient 3.37 mmHg    LVOT Peak Velocity 91.78 cm/s    RVIDd 3.36 cm    Left Atrium Major Axis 3.91 cm    Aortic Valve Area by Continuity of Peak Velocity 2.59 cm2    Aortic Valve Area by Continuity of Peak Velocity 2.59 cm2    AoV PG 7.84 mmHg    Aortic Valve Systolic Peak Velocity 964.04 cm/s    MV A Lalito 90.53 cm/s    Mitral Valve E Wave Deceleration Time 123.03 ms    MV E Lalito 70.58 cm/s    E/E' ratio (averaged) 11.48     E/E' lateral 9.31     E/E' septal 13.65     LV E' Lateral Velocity 7.58 cm/s    LV E' Septal Velocity 5.17 cm/s    Mitral Valve Pressure Half-time 35.68 ms    MVA (PHT) 6.17 cm2    AO ASC D 3.25 cm    Ao Root D 3.26 cm    MV E/A 0.78     LV Mass .1 88.0 - 224.0 g    LV Mass AL Index 114.0 49.0 - 115.0 g/m2    Left Atrium Minor Axis 1.76 cm    LVED Vol Index A4C 29.9 mL/m2    LVES Vol Index A4C 10.8 mL/m2   GLUCOSE, POC    Collection Time: 06/26/21  4:03 PM   Result Value Ref Range    Glucose (POC) 254 (H) 65 - 100 mg/dL    Performed by Amee    GLUCOSE, POC    Collection Time: 06/26/21  8:57 PM   Result Value Ref Range    Glucose (POC) 258 (H) 65 - 100 mg/dL    Performed by [facility]    METABOLIC PANEL, BASIC    Collection Time: 06/27/21  3:29 AM   Result Value Ref Range    Sodium 139 136 - 145 mmol/L    Potassium 3.6 3.5 - 5.1 mmol/L    Chloride 106 98 - 107 mmol/L    CO2 26 21 - 32 mmol/L    Anion gap 7 7 - 16 mmol/L    Glucose 230 (H) 65 - 100 mg/dL    BUN 8 6 - 23 MG/DL    Creatinine 0.59 (L) 0.8 - 1.5 MG/DL    GFR est AA >60 >60 ml/min/1.73m2    GFR est non-AA >60 >60 ml/min/1.73m2    Calcium 8.8 8.3 - 10.4 MG/DL   CBC WITH AUTOMATED DIFF    Collection Time: 06/27/21  3:29 AM   Result Value Ref Range    WBC 8.8 4.3 - 11.1 K/uL    RBC 4.31 4.23 - 5.6 M/uL    HGB 13.2 (L) 13.6 - 17.2 g/dL    HCT 40.9 (L) 41.1 - 50.3 %    MCV 94.9 79.6 - 97.8 FL    MCH 30.6 26.1 - 32.9 PG    MCHC 32.3 31.4 - 35.0 g/dL    RDW 14.5 11.9 - 14.6 %    PLATELET 769 867 - 791 K/uL    MPV 11.1 9.4 - 12.3 FL    ABSOLUTE NRBC 0.00 0.0 - 0.2 K/uL    DF AUTOMATED      NEUTROPHILS 55 43 - 78 %    LYMPHOCYTES 24 13 - 44 %    MONOCYTES 13 (H) 4.0 - 12.0 %    EOSINOPHILS 9 (H) 0.5 - 7.8 %    BASOPHILS 1 0.0 - 2.0 %    IMMATURE GRANULOCYTES 0 0.0 - 5.0 %    ABS. NEUTROPHILS 4.8 1.7 - 8.2 K/UL    ABS. LYMPHOCYTES 2.1 0.5 - 4.6 K/UL    ABS. MONOCYTES 1.1 0.1 - 1.3 K/UL    ABS. EOSINOPHILS 0.8 0.0 - 0.8 K/UL    ABS. BASOPHILS 0.1 0.0 - 0.2 K/UL    ABS. IMM.  GRANS. 0.0 0.0 - 0.5 K/UL   MAGNESIUM    Collection Time: 06/27/21  3:29 AM   Result Value Ref Range    Magnesium 2.0 1.8 - 2.4 mg/dL   GLUCOSE, POC    Collection Time: 06/27/21  7:17 AM   Result Value Ref Range    Glucose (POC) 259 (H) 65 - 100 mg/dL    Performed by Shara    EKG, 12 LEAD, INITIAL    Collection Time: 06/27/21  8:16 AM   Result Value Ref Range    Ventricular Rate 87 BPM    Atrial Rate 87 BPM    P-R Interval 140 ms    QRS Duration 94 ms    Q-T Interval 384 ms    QTC Calculation (Bezet) 462 ms    Calculated P Axis 41 degrees    Calculated R Axis 66 degrees    Calculated T Axis 71 degrees    Diagnosis       Normal sinus rhythm  Normal ECG  When compared with ECG of 26-JUN-2021 11:49,  No significant change was found           Patient Instructions:   Current Discharge Medication List      START taking these medications    Details   ticagrelor (BRILINTA) 90 mg tablet Take 1 Tablet by mouth every twelve (12) hours every twelve (12) hours. Qty: 60 Tablet, Refills: 11  Start date: 6/27/2021      nitroglycerin (NITROSTAT) 0.4 mg SL tablet 1 Tablet by SubLINGual route every five (5) minutes as needed for Chest Pain. Up to 3 doses. Qty: 1 Bottle, Refills: 11  Start date: 6/27/2021         CONTINUE these medications which have CHANGED    Details   lisinopriL (PRINIVIL, ZESTRIL) 5 mg tablet Take 1 Tablet by mouth daily. Qty: 90 Tablet, Refills: 3  Start date: 6/27/2021    Associated Diagnoses: Hypertension, unspecified type         CONTINUE these medications which have NOT CHANGED    Details   ciprofloxacin HCl (CIPRO) 500 mg tablet Take 1 Tablet by mouth two (2) times a day. Qty: 20 Tablet, Refills: 0    Associated Diagnoses: Otitis externa of right ear, unspecified chronicity, unspecified type      fenofibrate (LOFIBRA) 160 mg tablet Take 1 Tablet by mouth daily. Qty: 90 Tablet, Refills: 0    Associated Diagnoses: BMI 35.0-35.9,adult; Hypertriglyceridemia; Medication refill      carvediloL (COREG) 12.5 mg tablet Take 1 Tablet by mouth two (2) times daily (with meals). Qty: 180 Tablet, Refills: 0    Associated Diagnoses: Hypertension, unspecified type      atorvastatin (LIPITOR) 80 mg tablet Take 1 Tablet by mouth nightly for 360 days.   Qty: 90 Tablet, Refills: 0    Associated Diagnoses: Type 2 diabetes mellitus with hyperglycemia, with long-term current use of insulin ([facility])      gabapentin (NEURONTIN) 300 mg capsule Take 1 Capsule by mouth three (3) times daily. Qty: 270 Capsule, Refills: 0    Associated Diagnoses: Peripheral polyneuropathy      pantoprazole (PROTONIX) 40 mg tablet Take 1 Tablet by mouth daily. Qty: 90 Tablet, Refills: 0      lisdexamfetamine (VYVANSE) 30 mg capsule Take 1 Cap by mouth every morning. Max Daily Amount: 30 mg.  Qty: 30 Cap, Refills: 0    Associated Diagnoses: Binge eating disorder      traZODone (DESYREL) 50 mg tablet Take 1 Tab by mouth nightly. Qty: 30 Tab, Refills: 3      clonazePAM (KlonoPIN) 0.5 mg tablet Take 1 Tab by mouth nightly as needed for Anxiety. Max Daily Amount: 0.5 mg.  Qty: 30 Tab, Refills: 4    Associated Diagnoses: Generalized anxiety disorder      omega-3 fatty acids (Fish Oil Concentrate) 1,000 mg cap Take 2,000 mg by mouth two (2) times a day. Qty: 360 Cap, Refills: 0    Associated Diagnoses: Hypertriglyceridemia      omega-3 acid ethyl esters (LOVAZA) 1 gram capsule TAKE 2 CAPSULES BY MOUTH TWICE DAILY      Semglee Pen U-100 Insulin 100 unit/mL (3 mL) inpn 50 Units by SubCUTAneous route two (2) times a day. THIS IS NEW LONG ACTING ON FORMULARY FOR 2021  Qty: 30 Pen, Refills: 3    Associated Diagnoses: Type 2 diabetes mellitus with hyperglycemia, with long-term current use of insulin ([facility])      Admelog SoloStar U-100 Insulin 100 unit/mL kwikpen 35 units TIDAC plus correction 4/50>150, max daily dose 100 units  Qty: 10 Pen, Refills: 3    Associated Diagnoses: Type 2 diabetes mellitus with hyperglycemia, with long-term current use of insulin ([facility])      ergocalciferol (ERGOCALCIFEROL) 1,250 mcg (50,000 unit) capsule Take 1 Cap by mouth every seven (7) days.   Qty: 12 Cap, Refills: 0    Associated Diagnoses: Vitamin D deficiency      isosorbide mononitrate ER (IMDUR) 30 mg tablet TAKE 1 TABLET BY MOUTH ONCE DAILY      Accu-Chek Guide test strips strip Check BGL 4 times daily, E11.65  Qty: 400 Strip, Refills: 3    Associated Diagnoses: Uncontrolled type 2 diabetes mellitus with hyperglycemia (HCC)      Insulin Needles, Disposable, 31 gauge x 5/16\" ndle by SubCUTAneous route four (4) times daily. Qty: 1 Package, Refills: 11    Associated Diagnoses: Uncontrolled type 2 diabetes mellitus without complication, without long-term current use of insulin      Blood-Glucose Meter monitoring kit Check blood sugar tid DM -2  Qty: 1 Kit, Refills: 0    Associated Diagnoses: Uncontrolled type 2 diabetes mellitus without complication, without long-term current use of insulin      aspirin delayed-release 81 mg tablet Take 162 mg by mouth daily. lancets misc Check blood sugar tid  Qty: 1 Each, Refills: 11    Associated Diagnoses: BMI 35.0-35.9,adult; Acute pain of left shoulder;  Adhesive capsulitis of left shoulder; Uncontrolled type 2 diabetes mellitus without complication, without long-term current use of insulin; Medication refill

## 2021-06-27 NOTE — PROGRESS NOTES
CM met with patient to discuss discharge planning. Patient presented alert and oriented. Demographic information verified by patient. The patient lives with family ( Ex Spouse/ Spouse-Family, patient's daughter Shadia Loges [phone] in a two story home with 1 step at the entrance. The patient is overall independent with completing ADL's and does not drive. Patient stated, he lives on the main level in home. Family provides transportation when available. Patient voiced barriers with reliable transportation, as the patient stated his family has one care available for household. Patient confirmed he receives $794.00 as patient stated he his classified as disabled. Diabetes: YES    Preferred Pharmacy: [facility] [address]. Patient voiced difficulty with obtaining some medications, that is not covered by current insurance plan. Discharge planning: Patient to discharge home this day. Patient provided with coupon for Brilinta. Attending PA has also provided script- for Blood-Glucose meter monitoring kit. Patient is aware of Good Rx Card, to assist with medication cost. Patient requested all medications be sent to preferred Pharmacy. Attending PA notified. Per Pharmacy staff at [address] [place] [address] Drive is priced at $3.40 and Lisinopril $1.99. CM provided contact information for [employer], to assist with upcoming appointments. Patient denies any additional  discharge needs at this time. Family to transport the patient home. Please consult or notify CM if any needs shall arise. CM remains available. Care Management Interventions  PCP Verified by CM:  Yes (Patient attends [facility]. )  Transition of Care Consult (CM Consult): Discharge Planning  Discharge Durable Medical Equipment: No  Physical Therapy Consult: No  Occupational Therapy Consult: No  Speech Therapy Consult: No  Current Support Network: Relative's Home (Patient resides with family. )  Confirm Follow Up Transport: Family (Information provided for Jacob. )  The Plan for Transition of Care is Related to the Following Treatment Goals : Return to baseline. Name of the Patient Representative Who was Provided with a Choice of Provider and Agrees with the Discharge Plan: Patient.     Resource Information Provided?: No  Discharge Location  Discharge Placement: Home with family assistance

## 2021-06-27 NOTE — CONSULTS
[facility] Hospitalist Consult       Name:  Kandace Herrera  Age:51 y.o. Sex:male   :  1970    MRN:  [id number]   PCP:  LIBERTY De Leon      Admit Date:  2021  8:21 PM     Reason for Admission:   NSTEMI (non-ST elevated myocardial infarction) ([facility] [address] [I21.4]    Reason for Consult:  I was asked to consult on this patient at the kind request of Yair Bolivar MD for DM management. Assessment & Plan:     DM type 2   On insulin with basal long-acting Semglee and short-acting before meal Admelog. Patient should continue this regimen at home. I have discussed with patient to follow up with his primary doctor and his endocrinologist after hospital discharge and keep track of his blood sugar to show them to see if further adjustment of doses is needed or not. Patient is advised to not take Metformin until seeing his doctors in follow ups. CAD   S/P stent placement this admission due to NSTEMI. As per cardiology plan     I have discussed the plan of care with patient. History of Presenting Illness:     Patient with past medical history of    CAD (s/p MI and PCI in the past)   Hypertension   Hyperlipidemia   DM type 2   Hypertriglyceridemia,   Psoriatic Arthritis,   Seizures   Obesity     Patient presented with chest pain and was found to have NSTEMI and underwent left heart catheterization and had stents placement done on 2021. Patient's blood sugar has been running in 200's ranges from home and in hospital. He reports last episode of pancreatitis in 2020. Patient has been on Semglee 50 units SC bid and Admelog 35 units sc tid ac before admission. Currently he is on sliding scale Humalog SC. Review of Systems:  A 14 point review of systems was taken and pertinent positive as per HPI.         Past Medical History:   Diagnosis Date    Acute coronary syndrome ([facility] [address] 12/15/2016    Acute pancreatitis 2/15/2018    Arthritis     psoriatic    Diabetes ([facility] [address]  Endocrine disease     Hypertension     Nonintractable epilepsy with complex partial seizures ([facility] [address] 2020    Psoriatic arthropathy ([facility] [address]     Seizures (HCC)        Past Surgical History:   Procedure Laterality Date    HX APPENDECTOMY      HX CHOLECYSTECTOMY      HX CORONARY STENT PLACEMENT  2020    HX HERNIA REPAIR      HX ORTHOPAEDIC      left heel secondary to trauma    IR INSERT NON TUNL CVC OVER 5 YRS  2020    [place] CARDIAC SURG PROCEDURE UNLIST         Family History : reviewed  Family History   Problem Relation Age of Onset    Thyroid Disease Mother         goiters    Breast Cancer Mother         42's    Atrial Fibrillation Mother     Diabetes Father     Heart Disease Father 48    Diabetes Sister     Heart Disease Paternal Grandfather         Social History     Tobacco Use    Smoking status: Former Smoker     Packs/day: 1.00     Years: 30.00     Pack years: 30.00     Quit date: 2019     Years since quittin.4    Smokeless tobacco: Former User   Substance Use Topics    Alcohol use: No       Allergies   Allergen Reactions    Peanut Anaphylaxis    Morphine Other (comments)     Anxiety, claustrophobia       Immunization History   Administered Date(s) Administered    Influenza Vaccine 10/01/2018    Influenza Vaccine (Quad) Mdck Pf (>4 Yrs Flucelvax QUAD [id number]) 2017    Influenza Vaccine (Quad) PF (>6 Mo Flulaval, Fluarix, and >3 Yrs Afluria, Fluzone [id number]) 2020    Pneumococcal Polysaccharide (PPSV-23) 2019    TB Skin Test (PPD) Intradermal 2008         PTA Medications:  Current Outpatient Medications   Medication Instructions    Accu-Chek Guide test strips strip Check BGL 4 times daily, E11.65    Admelog SoloStar U-100 Insulin 100 unit/mL kwikpen 35 units TIDAC plus correction >150, max daily dose 100 units    aspirin delayed-release 162 mg, Oral, DAILY    atorvastatin (LIPITOR) 80 mg, Oral, EVERY BEDTIME    Blood-Glucose Meter monitoring kit Check blood sugar tid DM -2    carvediloL (COREG) 12.5 mg, Oral, 2 TIMES DAILY WITH MEALS    ciprofloxacin HCl (CIPRO) 500 mg, Oral, 2 TIMES DAILY    clonazePAM (KLONOPIN) 0.5 mg, Oral, BEDTIME PRN    ergocalciferol (ERGOCALCIFEROL) 50,000 Units, Oral, EVERY 7 DAYS    fenofibrate (LOFIBRA) 160 mg, Oral, DAILY    gabapentin (NEURONTIN) 300 mg, Oral, 3 TIMES DAILY    Insulin Needles, Disposable, 31 gauge x 5/16\" ndle SubCUTAneous, 4 TIMES DAILY    isosorbide mononitrate ER (IMDUR) 30 mg tablet TAKE 1 TABLET BY MOUTH ONCE DAILY    lancets misc Check blood sugar tid    lisdexamfetamine (VYVANSE) 30 mg, Oral, 7AM    lisinopriL (PRINIVIL, ZESTRIL) 5 mg, Oral, DAILY    nitroglycerin (NITROSTAT) 0.4 mg, SubLINGual, EVERY 5 MIN AS NEEDED, Up to 3 doses.  omega-3 acid ethyl esters (LOVAZA) 1 gram capsule TAKE 2 CAPSULES BY MOUTH TWICE DAILY    omega-3 fatty acids (FISH OIL CONCENTRATE) 2,000 mg, Oral, 2 TIMES DAILY    pantoprazole (PROTONIX) 40 mg, Oral, DAILY    Semglee Pen U-100 Insulin 50 Units, SubCUTAneous, 2 TIMES DAILY, THIS IS NEW LONG ACTING ON FORMULARY FOR 2021     ticagrelor (BRILINTA) 90 mg, Oral, EVERY 12 HOURS    traZODone (DESYREL) 50 mg, Oral, EVERY BEDTIME       Objective:     Patient Vitals for the past 24 hrs:   Temp Pulse Resp BP SpO2   06/27/21 0848 98.1 °F (36.7 °C) 92 16 134/78 97 %   06/27/21 0749  88  129/88    06/27/21 0532 97.2 °F (36.2 °C) 90 18 120/75 96 %   06/27/21 0031 97.3 °F (36.3 °C) 83 17 (!) 103/56 96 %   06/26/21 2126 97 °F (36.1 °C) 82 18 101/61 95 %   06/26/21 1704    111/78    06/26/21 1559 98.4 °F (36.9 °C) 87 18 111/78 96 %   06/26/21 1023 97.8 °F (36.6 °C) 79 20 108/77 93 %       Oxygen Therapy  O2 Sat (%): 97 % (06/27/21 0848)  Pulse via Oximetry: 88 beats per minute (06/25/21 2231)  O2 Device: None (Room air) (06/27/21 0749)  FIO2 (%): 21 % (06/25/21 2315)    Body mass index is 36.12 kg/m².     Physical Exam:    General:  No acute distress, speaking in full sentences, patient is sitting comfortably in chair. Talking well. HEENT:  Pupils equal and reactive to light and accommodation, oropharynx is clear   Neck:   Supple, no lymphadenopathy, no JVD   Lungs:  Clear to auscultation bilaterally   CV:   Regular rate and rhythm with normal S1 and S2   Abdomen:  Soft, nontender, distended due to truncal obesity, normoactive bowel sounds   Extremities:  No cyanosis clubbing or edema   Neuro:  Nonfocal, A&O x3   Psych:  Normal mood and affect       Data Reviewed: I have reviewed all labs, meds, and studies.       Recent Results (from the past 24 hour(s))   POC ACTIVATED CLOTTING TIME    Collection Time: 06/26/21  9:14 AM   Result Value Ref Range    Activated Clotting Time (POC) 450 (H) 70 - 128 SECS   GLUCOSE, POC    Collection Time: 06/26/21 10:29 AM   Result Value Ref Range    Glucose (POC) 245 (H) 65 - 100 mg/dL    Performed by [facility]    HEPARIN XA UFH    Collection Time: 06/26/21 11:48 AM   Result Value Ref Range    Heparin Xa UFH <0.10 (L) 0.3 - 0.7 IU/mL   EKG, 12 LEAD, INITIAL    Collection Time: 06/26/21 11:49 AM   Result Value Ref Range    Ventricular Rate 78 BPM    Atrial Rate 78 BPM    P-R Interval 138 ms    QRS Duration 92 ms    Q-T Interval 404 ms    QTC Calculation (Bezet) 460 ms    Calculated P Axis 44 degrees    Calculated R Axis 63 degrees    Calculated T Axis 88 degrees    Diagnosis       Normal sinus rhythm  Normal ECG  When compared with ECG of 25-JUN-2021 20:26,  Fusion complexes are no longer Present  Nonspecific T wave abnormality, worse in Lateral leads  Confirmed by Shila Ross ([id number]) on 6/26/2021 1:55:11 PM     ECHO ADULT COMPLETE    Collection Time: 06/26/21  2:50 PM   Result Value Ref Range    IVSd 1.38 (A) 0.60 - 1.00 cm    LVIDd 4.91 4.20 - 5.90 cm    LVIDs 2.92 cm    LVOT d 2.25 cm    LVPWd 1.21 (A) 0.60 - 1.00 cm    LV Ejection Fraction MOD 4C 64 percent    LV ED Vol A4C 66.32 mL    LV ES Vol A4C 23.90 mL    LVOT Peak Gradient 3.37 mmHg    LVOT Peak Velocity 91.78 cm/s    RVIDd 3.36 cm    Left Atrium Major Axis 3.91 cm    Aortic Valve Area by Continuity of Peak Velocity 2.59 cm2    Aortic Valve Area by Continuity of Peak Velocity 2.59 cm2    AoV PG 7.84 mmHg    Aortic Valve Systolic Peak Velocity 569.20 cm/s    MV A Lalito 90.53 cm/s    Mitral Valve E Wave Deceleration Time 123.03 ms    MV E Lalito 70.58 cm/s    E/E' ratio (averaged) 11.48     E/E' lateral 9.31     E/E' septal 13.65     LV E' Lateral Velocity 7.58 cm/s    LV E' Septal Velocity 5.17 cm/s    Mitral Valve Pressure Half-time 35.68 ms    MVA (PHT) 6.17 cm2    AO ASC D 3.25 cm    Ao Root D 3.26 cm    MV E/A 0.78     LV Mass .1 88.0 - 224.0 g    LV Mass AL Index 114.0 49.0 - 115.0 g/m2    Left Atrium Minor Axis 1.76 cm    LVED Vol Index A4C 29.9 mL/m2    LVES Vol Index A4C 10.8 mL/m2   GLUCOSE, POC    Collection Time: 06/26/21  4:03 PM   Result Value Ref Range    Glucose (POC) 254 (H) 65 - 100 mg/dL    [facility]    GLUCOSE, POC    Collection Time: 06/26/21  8:57 PM   Result Value Ref Range    Glucose (POC) 258 (H) 65 - 100 mg/dL    Performed by [facility]    METABOLIC PANEL, BASIC    Collection Time: 06/27/21  3:29 AM   Result Value Ref Range    Sodium 139 136 - 145 mmol/L    Potassium 3.6 3.5 - 5.1 mmol/L    Chloride 106 98 - 107 mmol/L    CO2 26 21 - 32 mmol/L    Anion gap 7 7 - 16 mmol/L    Glucose 230 (H) 65 - 100 mg/dL    BUN 8 6 - 23 MG/DL    Creatinine 0.59 (L) 0.8 - 1.5 MG/DL    GFR est AA >60 >60 ml/min/1.73m2    GFR est non-AA >60 >60 ml/min/1.73m2    Calcium 8.8 8.3 - 10.4 MG/DL   CBC WITH AUTOMATED DIFF    Collection Time: 06/27/21  3:29 AM   Result Value Ref Range    WBC 8.8 4.3 - 11.1 K/uL    RBC 4.31 4.23 - 5.6 M/uL    HGB 13.2 (L) 13.6 - 17.2 g/dL    HCT 40.9 (L) 41.1 - 50.3 %    MCV 94.9 79.6 - 97.8 FL    MCH 30.6 26.1 - 32.9 PG    MCHC 32.3 31.4 - 35.0 g/dL    RDW 14.5 11.9 - 14.6 %    PLATELET 606 068 - 597 K/uL    MPV 11.1 9.4 - 12.3 FL    ABSOLUTE NRBC 0.00 0.0 - 0.2 K/uL    DF AUTOMATED      NEUTROPHILS 55 43 - 78 %    LYMPHOCYTES 24 13 - 44 %    MONOCYTES 13 (H) 4.0 - 12.0 %    EOSINOPHILS 9 (H) 0.5 - 7.8 %    BASOPHILS 1 0.0 - 2.0 %    IMMATURE GRANULOCYTES 0 0.0 - 5.0 %    ABS. NEUTROPHILS 4.8 1.7 - 8.2 K/UL    ABS. LYMPHOCYTES 2.1 0.5 - 4.6 K/UL    ABS. MONOCYTES 1.1 0.1 - 1.3 K/UL    ABS. EOSINOPHILS 0.8 0.0 - 0.8 K/UL    ABS. BASOPHILS 0.1 0.0 - 0.2 K/UL    ABS. IMM.  GRANS. 0.0 0.0 - 0.5 K/UL   MAGNESIUM    Collection Time: 06/27/21  3:29 AM   Result Value Ref Range    Magnesium 2.0 1.8 - 2.4 mg/dL   GLUCOSE, POC    Collection Time: 06/27/21  7:17 AM   Result Value Ref Range    Glucose (POC) 259 (H) 65 - 100 mg/dL    Performed by [facility]    EKG, 12 LEAD, INITIAL    Collection Time: 06/27/21  8:16 AM   Result Value Ref Range    Ventricular Rate 87 BPM    Atrial Rate 87 BPM    P-R Interval 140 ms    QRS Duration 94 ms    Q-T Interval 384 ms    QTC Calculation (Bezet) 462 ms    Calculated P Axis 41 degrees    Calculated R Axis 66 degrees    Calculated T Axis 71 degrees    Diagnosis       Normal sinus rhythm  Normal ECG  When compared with ECG of 26-JUN-2021 11:49,  No significant change was found         EKG Results     Procedure 720 Value Units Date/Time    EKG, 12 LEAD, INITIAL [[id number]] Collected: 06/27/21 0816    Order Status: Completed Updated: 06/27/21 0836     Ventricular Rate 87 BPM      Atrial Rate 87 BPM      P-R Interval 140 ms      QRS Duration 94 ms      Q-T Interval 384 ms      QTC Calculation (Bezet) 462 ms      Calculated P Axis 41 degrees      Calculated R Axis 66 degrees      Calculated T Axis 71 degrees      Diagnosis --     Normal sinus rhythm  Normal ECG  When compared with ECG of 26-JUN-2021 11:49,  No significant change was found      EKG, 12 LEAD, INITIAL [[id number]] Collected: 06/26/21 1149    Order Status: Completed Updated: 06/26/21 1355     Ventricular Rate 78 BPM      Atrial Rate 78 BPM      P-R Interval 138 ms      QRS Duration 92 ms      Q-T Interval 404 ms      QTC Calculation (Bezet) 460 ms      Calculated P Axis 44 degrees      Calculated R Axis 63 degrees      Calculated T Axis 88 degrees      Diagnosis --     Normal sinus rhythm  Normal ECG  When compared with ECG of 25-JUN-2021 20:26,  Fusion complexes are no longer Present  Nonspecific T wave abnormality, worse in Lateral leads  Confirmed by Matt Bustillos ([id number]) on 6/26/2021 1:55:11 PM      EKG [[id number]] Collected: 06/25/21 2026    Order Status: Completed Updated: 06/26/21 0702     Ventricular Rate 108 BPM      Atrial Rate 108 BPM      P-R Interval 138 ms      QRS Duration 90 ms      Q-T Interval 350 ms      QTC Calculation (Bezet) 469 ms      Calculated P Axis 63 degrees      Calculated R Axis 52 degrees      Calculated T Axis 60 degrees      Diagnosis --     !! AGE AND GENDER SPECIFIC ECG ANALYSIS !! Sinus tachycardia with Fusion complexes  Otherwise normal ECG  When compared with ECG of 24-NOV-2020 11:12,  Fusion complexes are now Present  Nonspecific T wave abnormality no longer evident in Inferior leads  Confirmed by Josselyn Perales MD (), Faith Otto ([id number]) on 6/26/2021 7:02:22 AM            All Micro Results     None          Other Studies:  ECHO ADULT COMPLETE    Result Date: 6/26/2021  · LV: Estimated LVEF is 50 - 55%. Normal systolic function (ejection fraction normal) and diastolic function. · Image quality for this study was technically difficult. · Contrast used: DEFINITY. · There is moderate hypokinesis of the basal to mid inferolateral/anterolateral walls. CARDIAC PROCEDURE    Result Date: 6/26/2021  · Severe multivessel coronary disease status post PTCA and stenting to the mid LAD, proximal ramus intermedius, ostial circumflex, with balloon dilatation of focal restenosis in the mid to distal obtuse marginal system.  · Mildly depressed left ventricular ejection fraction with anterolateral hypokinesis, echocardiogram pending  Left heart catheterization and percutaneous intervention via the right radial approach using Angiomax for anticoagulation with a therapeutic periprocedural ACT. 300 cc of contrast was used. The patient was loaded with Brilinta at the conclusion of the procedure and tolerated the procedure well. Access closure of the radial access will be achieved via protocol.          Medications:  Medications Administered      Medications Administered     0.9% sodium chloride infusion     Admin Date  06/26/2021 Action  New Bag Dose  75 mL/hr Rate  75 mL/hr Route  IntraVENous Administered By  Birdie Sanford           Admin Date  06/26/2021 Action  New Bag Dose  75 mL/hr Rate  75 mL/hr Route  IntraVENous Administered By  Jose Rossi RN          acetaminophen (TYLENOL) tablet 650 mg     Admin Date  06/26/2021 Action  Given Dose  650 mg Route  Oral Administered By  Birdie Sanford           Admin Date  06/26/2021 Action  Given Dose  650 mg Route  Oral Administered By  Jose Rossi RN           Admin Date  06/26/2021 Action  Given Dose  650 mg Route  Oral Administered By  Jose Rossi RN          aspirin delayed-release tablet 81 mg     Admin Date  06/26/2021 Action  Given Dose  81 mg Route  Oral Administered By  Janice Alvarez Date  06/27/2021 Action  Given Dose  81 mg Route  Oral Administered By  Jose Rossi RN          atorvastatin (LIPITOR) tablet 80 mg     Admin Date  06/26/2021 Action  Given Dose  80 mg Route  Oral Administered By  Janice Alvarez Date  06/26/2021 Action  Given Dose  80 mg Route  Oral Administered By  MARIFER Ace          bivalirudin (ANGIOMAX) 250 mg in 0.9% sodium chloride (MBP/ADV) 50 mL infusion     Admin Date  06/26/2021 Action  New Bag Dose  1.75 mg/kg/hr Rate  37.9 mL/hr Route  IntraVENous Administered By  Hansa Logan RN           Admin Date  06/26/2021 Action  New Bag Dose  1.75 mg/kg/hr Rate  37.9 mL/hr Route  IntraVENous Administered By  Carola Schroeder RN          carvediloL (COREG) tablet 12.5 mg     Admin Date  06/26/2021 Action  Given Dose  12.5 mg Route  Oral Administered By  Abdi Espino           Admin Date  06/26/2021 Action  Given Dose  12.5 mg Route  Oral Administered By  Mikayla Shukla RN           Admin Date  06/26/2021 Action  Given Dose  12.5 mg Route  Oral Administered By  Mikayla Shukla RN           Admin Date  06/27/2021 Action  Given Dose  12.5 mg Route  Oral Administered By  Mikayla Shukla RN          ciprofloxacin HCl (CIPRO) tablet 500 mg     Admin Date  06/26/2021 Action  Given Dose  500 mg Route  Oral Administered By  Mikayla Shukla RN           Admin Date  06/26/2021 Action  Given Dose  500 mg Route  Oral Administered By  Mikayla Shukla RN           Admin Date  06/27/2021 Action  Given Dose  500 mg Route  Oral Administered By  Mikayla Shukla RN          fenofibrate (LOFIBRA) tablet 160 mg     Admin Date  06/26/2021 Action  Given Dose  160 mg Route  Oral Administered By  Abdi Espino           Admin Date  06/26/2021 Action  Given Dose  160 mg Route  Oral Administered By  MRAIFER Laguerre          gabapentin (NEURONTIN) capsule 300 mg     Admin Date  06/26/2021 Action  Given Dose  300 mg Route  Oral Administered By  Abdi Espino           Admin Date  06/26/2021 Action  Given Dose  300 mg Route  Oral Administered By  Mikayla Shukla RN           Admin Date  06/26/2021 Action  Given Dose  300 mg Route  Oral Administered By  Mikayla Shukla RN           Admin Date  06/26/2021 Action  Given Dose  300 mg Route  Oral Administered By  MARIFER Laguerre           Admin Date  06/27/2021 Action  Given Dose  300 mg Route  Oral Administered By  Mikayla Shukla RN          heparin (porcine) injection 4,300 Units     Admin Date  06/26/2021 Action  Given Dose  4,300 Units Route  IntraVENous Administered By  Ana ABURTO          heparin (porcine) injection 6,500 Units     Admin Date  06/25/2021 Action  Given Dose  6,500 Units Route  IntraVENous Administered By  Kenny Hurtado RN          heparin 25,000 units in dextrose 500 mL infusion     Admin Date  06/25/2021 Action  New Bag Dose  12 Units/kg/hr Rate  25.9 mL/hr Route  IntraVENous Administered By  Kenny Hurtado RN           Admin Date  06/25/2021 Action  Rate Verify Dose  12 Units/kg/hr Rate  25.9 mL/hr Route  IntraVENous Administered By  Audra Odell           Admin Date  06/26/2021 Action  Rate Change Dose  16 Units/kg/hr Rate  34.6 mL/hr Route  IntraVENous Administered By  Audra Odell           Admin Date  06/26/2021 Action  Rate Verify Dose  16 Units/kg/hr Rate  34.6 mL/hr Route  IntraVENous Administered By  Audra Odell          HYDROcodone-acetaminophen (NORCO) 5-325 mg per tablet 1 Tablet     Admin Date  06/26/2021 Action  Given Dose  1 Tablet Route  Oral Administered By  MARIFER Falcon           Admin Date  06/27/2021 Action  Given Dose  1 Tablet Route  Oral Administered By  Ernesto Gutierres RN          HYDROmorphone (DILAUDID) injection 0.5 mg     Admin Date  06/25/2021 Action  Given Dose  0.5 mg Route  IntraVENous Administered By  Kenny Hurtado RN           Admin Date  06/26/2021 Action  Given Dose  0.5 mg Route  IntraVENous Administered By  Aysha ABURTO          insulin lispro (HUMALOG) injection     Admin Date  06/26/2021 Action  Given Dose  4 Units Route  SubCUTAneous Administered By  Cisco Bowen Date  06/26/2021 Action  Given Dose  6 Units Route  SubCUTAneous Administered By  Ernesto Gutierres RN           Admin Date  06/26/2021 Action  Given Dose  4 Units Route  SubCUTAneous Administered By  Ernesto Gutierres RN           Admin Date  06/26/2021 Action  Given Dose  6 Units Route  SubCUTAneous Administered By  Ernesto Gutierres RN           Admin Date  06/26/2021 Action  Given Dose  6 Units Route  SubCUTAneous Administered By  MARIFER Reece           Admin Date  06/27/2021 Action  Given Dose  6 Units Route  SubCUTAneous Administered By  Jaskaran Perez RN          isosorbide mononitrate ER (IMDUR) tablet 30 mg     Admin Date  06/26/2021 Action  Given Dose  30 mg Route  Oral Administered By  Jaskaran Perez RN           Admin Date  06/27/2021 Action  Given Dose  30 mg Route  Oral Administered By  Jaskaran Perez RN          lisinopriL (PRINIVIL, ZESTRIL) tablet 10 mg     Admin Date  06/26/2021 Action  Given Dose  10 mg Route  Oral Administered By  Govind ABURTO          lisinopriL (PRINIVIL, ZESTRIL) tablet 5 mg     Admin Date  06/26/2021 Action  Given Dose  5 mg Route  Oral Administered By  MARIFER Reece          metoprolol (LOPRESSOR) injection 5 mg     Admin Date  06/25/2021 Action  Given Dose  5 mg Route  IntraVENous Administered By  Dina Del Rio RN          nitroglycerin (NITROBID) 2 % ointment 1 Inch     Admin Date  06/26/2021 Action  Given Dose  1 Inch Route  Topical Administered By  Michelle Hernandez Date  06/26/2021 Action  Given Dose  1 Inch Route  Topical Administered By  Govind ABURTO          ondansetron [facility]) injection 4 mg     Admin Date  06/26/2021 Action  Given Dose  4 mg Route  IntraVENous Administered By  Amanad Nagy RN          pantoprazole (PROTONIX) tablet 40 mg     Admin Date  06/26/2021 Action  Given Dose  40 mg Route  Oral Administered By  Jaskaran Perez RN           Admin Date  06/27/2021 Action  Given Dose  40 mg Route  Oral Administered By  Jaskaran Perez RN          perflutren lipid microspheres (DEFINITY) in NS bolus IV     Admin Date  06/26/2021 Action  Given Dose  1 mL Route  IntraVENous Administered By  SOFIA Gonzalez          sodium chloride (NS) flush 5-10 mL     Admin Date  06/25/2021 Action  Given Dose  5 mL Route  IntraVENous Administered By  Dina Del Rio RN          sodium chloride (NS) flush 5-40 mL     Admin Date  06/26/2021 Action  Given Dose  10 mL Route  IntraVENous Administered By  Izella Crealdo           Admin Date  06/26/2021 Action  Given Dose  10 mL Route  IntraVENous Administered By  Angel Dunn           Admin Date  06/26/2021 Action  Given Dose  5 mL Route  IntraVENous Administered By  Mitzi Zendejas RN           Admin Date  06/26/2021 Action  Given Dose  5 mL Route  IntraVENous Administered By  Mitzi Zendejas RN           Admin Date  06/26/2021 Action  Given Dose  10 mL Route  IntraVENous Administered By  MARIFER Joel           Admin Date  06/26/2021 Action  Given Dose  10 mL Route  IntraVENous Administered By  MARIFER Joel           Admin Date  06/27/2021 Action  Given Dose  10 mL Route  IntraVENous Administered By  MARIFER Joel    Admin Date  06/27/2021 Action  Given Dose  10 mL Route  IntraVENous Administered By  MARIFER Joel          ticagrelor (BRILINTA) tablet 90 mg     Admin Date  06/26/2021 Action  Given Dose  90 mg Route  Oral Administered By  MARIFER Joel          traZODone (DESYREL) tablet 50 mg     Admin Date  06/26/2021 Action  Given Dose  50 mg Route  Oral Administered By  Izella Crealdo           Admin Date  06/26/2021 Action  Given Dose  50 mg Route  Oral Administered By  MARIFER Joel                  Problem List:     Hospital Problems as of 6/27/2021 Date Reviewed: 6/1/2021        Codes Class Noted - Resolved POA    * (Principal) NSTEMI (non-ST elevated myocardial infarction) ([facility] ICD-10-CM: I21.4  ICD-9-CM: 410.70  6/25/2021 - Present Yes        Peripheral polyneuropathy (Chronic) ICD-10-CM: G62.9  ICD-9-CM: 356.9  3/31/2021 - Present Yes        Seizure disorder ([facility] (Chronic) ICD-10-CM: G40.909  ICD-9-CM: 345.90  3/9/2020 - Present Yes    Overview Signed 3/9/2020 11:24 AM by Kirby Bloom MD     1990  eeg abnormal  CT brain neg  Was on phenobarbital  Off med since 2010  latelyt subtle symptoms of  Atypical seizure             Psoriatic arthropathy (HCC) (Chronic) ICD-10-CM: L40.50  ICD-9-CM: 696.0  1/24/2020 - Present Yes    Overview Signed 1/24/2020  9:17 AM by Opal Coates MD     Multiple joints pain/swelling             Anxiety (Chronic) ICD-10-CM: F41.9  ICD-9-CM: 300.00  1/24/2020 - Present Yes    Overview Signed 2/12/2020 12:47 PM by Opal Coates MD     2/2020  Stop prozac and start wellbutrin             Type 2 diabetes mellitus with hyperglycemia, with long-term current use of insulin (HCC) (Chronic) ICD-10-CM: E11.65, Z79.4  ICD-9-CM: 250.00, 790.29, V58.67  1/24/2020 - Present Yes    Overview Signed 1/24/2020 10:58 AM by Opal Coates MD     1/24/20             Noncompliance with medications (Chronic) ICD-10-CM: Z91.14  ICD-9-CM: V15.81  1/24/2020 - Present Yes        BMI 35.0-35.9,adult (Chronic) ICD-10-CM: U43.55  ICD-9-CM: V85.35  1/24/2020 - Present Yes        Hypertriglyceridemia (Chronic) ICD-10-CM: E78.1  ICD-9-CM: 272.1  3/19/2018 - Present Yes    Overview Addendum 5/22/2019  7:41 PM by Opal Coates MD     Overview:   Overview:   Much better now on lofibra--doing well  Advised more stricter low carb diet and aggressive control of DM--pt willing  F/u in few months for recheck  5/2019  Statin/lofibra not helping--will add fish oil and niacin  F/u in few weeks for recheck             Hypertension (Chronic) ICD-10-CM: I10  ICD-9-CM: 401.9  3/2/2018 - Present Yes    Overview Addendum 3/31/2021  8:01 PM by Opal Coates MD     [place]:   Overview:   Stable with med-amlodip[ine/lisinopril/coreg  Refilled  Labs   Annual eye exam recommended  F/u in 3 months  3/2021  Coreg increased to 12.5 mg bid             Hyperlipidemia (Chronic) ICD-10-CM: E78.5  ICD-9-CM: 272.4  3/2/2018 - Present Yes    Overview Addendum 5/20/2019  9:29 AM by Viji SIMMS     On statin and fenofibrate   Recheck labs-f/u  Diet discussed    Overview:   Overview: On statin and fenofibrate   Recheck labs-f/u  Diet discussed    Last Assessment & Plan:    - Reports statin Intolerance. however tolerating Lipitor here    - Dietary modifications. CAD (coronary artery disease) (Chronic) ICD-10-CM: I25.10  ICD-9-CM: 414.00  2/15/2018 - Present Yes    Overview Signed 3/2/2018  1:36 PM by Rossy Moreno MD     Stents  Sees cardiologist  aspirin             Diabetes mellitus type II, uncontrolled ([address] (Chronic) ICD-10-CM: E11.65  ICD-9-CM: 250.02  12/15/2016 - Present     Overview Addendum 6/19/2018 11:35 AM by Rossy Moreno MD     Now on lantus and humalog prn  goor control BS under 150   Sliding scale given-multiples of 4  Pt on metformit 500 mg bid 3/2019 and  lantus  25 units  Diet discussed  Labs today  F/u every 3 months                  Thank you Ben Zapata MD for allowing us to participate in the care of this interesting patient. We shall follow with you.     Signed By: Payton Urbina MD   [facility] Hospitalist Service    June 27, 2021  4:22 PM

## 2021-06-27 NOTE — PROGRESS NOTES
Problem: Falls - Risk of  Goal: *Absence of Falls  Description: Document Alexx Hawk Fall Risk and appropriate interventions in the flowsheet. Outcome: Resolved/Met     Problem: Patient Education: Go to Patient Education Activity  Goal: Patient/Family Education  Outcome: Resolved/Met     Problem: Diabetes Self-Management  Goal: *Disease process and treatment process  Description: Define diabetes and identify own type of diabetes; list 3 options for treating diabetes. Outcome: Resolved/Met  Goal: *Incorporating nutritional management into lifestyle  Description: Describe effect of type, amount and timing of food on blood glucose; list 3 methods for planning meals. Outcome: Resolved/Met  Goal: *Incorporating physical activity into lifestyle  Description: State effect of exercise on blood glucose levels. Outcome: Resolved/Met  Goal: *Developing strategies to promote health/change behavior  Description: Define the ABC's of diabetes; identify appropriate screenings, schedule and personal plan for screenings. Outcome: Resolved/Met  Goal: *Using medications safely  Description: State effect of diabetes medications on diabetes; name diabetes medication taking, action and side effects. Outcome: Resolved/Met  Goal: *Monitoring blood glucose, interpreting and using results  Description: Identify recommended blood glucose targets  and personal targets. Outcome: Resolved/Met  Goal: *Prevention, detection, treatment of acute complications  Description: List symptoms of hyper- and hypoglycemia; describe how to treat low blood sugar and actions for lowering  high blood glucose level. Outcome: Resolved/Met  Goal: *Prevention, detection and treatment of chronic complications  Description: Define the natural course of diabetes and describe the relationship of blood glucose levels to long term complications of diabetes.   Outcome: Resolved/Met  Goal: *Developing strategies to address psychosocial issues  Description: Describe feelings about living with diabetes; identify support needed and support network  Outcome: Resolved/Met  Goal: *Insulin pump training  Outcome: Resolved/Met  Goal: *Sick day guidelines  Outcome: Resolved/Met  Goal: *Patient Specific Goal (EDIT GOAL, INSERT TEXT)  Outcome: Resolved/Met     Problem: Patient Education: Go to Patient Education Activity  Goal: Patient/Family Education  Outcome: Resolved/Met

## 2021-06-27 NOTE — ROUTINE PROCESS
Bedside and Verbal shift change report given to self (oncoming nurse) by Julio Parkinson RN (offgoing nurse). Report included the following information SBAR, Kardex, Intake/Output, MAR and Recent Results.

## 2021-06-27 NOTE — ROUTINE PROCESS
Bedside and Verbal shift change report given to Partha Constantino RN (oncoming nurse) by self Omer Naegeli nurse). Report included the following information SBAR, Kardex, Intake/Output, MAR and Recent Results.

## 2021-06-27 NOTE — PROGRESS NOTES
Discharge instructions reviewed with patient. Prescriptions given for lisinopril, brilinta, and nirtostat and med info sheets provided for all new medications. Opportunity for questions provided. Patient voiced understanding of all discharge instructions. Patient verbalized understanding of importance to take Brilinta every 12 hours. Prescription card provided. [facility] called and verified costs of additional medications. Pt notified to discuss further prescriptions for Brilinta with office if issues arise with cost.     Klonopoin, Gabapentin, and vivance counted and returned to patient.

## 2021-06-30 PROBLEM — Z95.820 S/P ANGIOPLASTY WITH STENT: Status: ACTIVE | Noted: 2021-06-30

## 2021-07-07 ENCOUNTER — HOSPITAL ENCOUNTER (OUTPATIENT)
Dept: CARDIAC REHAB | Age: 51
Discharge: HOME | End: 2021-07-07

## 2021-10-21 PROBLEM — I25.110 CORONARY ARTERY DISEASE INVOLVING NATIVE CORONARY ARTERY OF NATIVE HEART WITH UNSTABLE ANGINA PECTORIS (HCC): Status: ACTIVE | Noted: 2021-10-21

## 2021-11-22 PROBLEM — R30.0 DYSURIA: Status: ACTIVE | Noted: 2021-11-22

## 2021-11-22 PROBLEM — Z79.899 HIGH RISK MEDICATION USE: Status: ACTIVE | Noted: 2021-11-22

## 2022-03-03 PROBLEM — E78.2 MIXED HYPERLIPIDEMIA: Status: ACTIVE | Noted: 2018-03-02

## 2022-03-18 PROBLEM — Z91.148 NONCOMPLIANCE WITH MEDICATIONS: Status: ACTIVE | Noted: 2020-01-24

## 2022-03-18 PROBLEM — Z71.89 ENCOUNTER FOR MEDICATION REVIEW AND COUNSELING: Status: ACTIVE | Noted: 2018-03-19

## 2022-03-18 PROBLEM — Z91.14 NONCOMPLIANCE WITH MEDICATIONS: Status: ACTIVE | Noted: 2020-01-24

## 2022-03-18 PROBLEM — R07.9 CHEST PAIN: Status: ACTIVE | Noted: 2020-03-25

## 2022-03-18 PROBLEM — R22.1 MASS IN NECK: Status: ACTIVE | Noted: 2018-11-30

## 2022-03-18 PROBLEM — I20.0 UNSTABLE ANGINA (HCC): Status: ACTIVE | Noted: 2019-05-02

## 2022-03-18 PROBLEM — E11.10 DIABETIC KETOACIDOSIS WITHOUT COMA ASSOCIATED WITH TYPE 2 DIABETES MELLITUS (HCC): Status: ACTIVE | Noted: 2018-02-15

## 2022-03-18 PROBLEM — I21.3 STEMI (ST ELEVATION MYOCARDIAL INFARCTION) (HCC): Status: ACTIVE | Noted: 2019-02-26

## 2022-03-18 PROBLEM — I25.10 ATHEROSCLEROSIS OF CORONARY ARTERY: Status: ACTIVE | Noted: 2018-02-15

## 2022-03-18 PROBLEM — N39.0 URINARY TRACT INFECTION WITHOUT HEMATURIA: Status: ACTIVE | Noted: 2021-05-04

## 2022-03-18 PROBLEM — D63.8 ANEMIA OF CHRONIC DISEASE: Status: ACTIVE | Noted: 2018-03-02

## 2022-03-18 PROBLEM — Z09 ENCOUNTER FOR EXAMINATION FOLLOWING TREATMENT AT HOSPITAL: Status: ACTIVE | Noted: 2018-12-10

## 2022-03-18 PROBLEM — F32.A DEPRESSION: Status: ACTIVE | Noted: 2019-05-08

## 2022-03-18 PROBLEM — Z76.0 MEDICATION REFILL: Status: ACTIVE | Noted: 2019-01-09

## 2022-03-18 PROBLEM — E11.21 TYPE 2 DIABETES WITH NEPHROPATHY (HCC): Status: ACTIVE | Noted: 2018-03-01

## 2022-03-18 PROBLEM — H65.493 CHRONIC MIDDLE EAR EFFUSION, BILATERAL: Status: ACTIVE | Noted: 2020-03-09

## 2022-03-18 PROBLEM — L40.50 PSORIATIC ARTHROPATHY (HCC): Status: ACTIVE | Noted: 2020-01-24

## 2022-03-18 PROBLEM — I25.110 CORONARY ARTERY DISEASE INVOLVING NATIVE CORONARY ARTERY OF NATIVE HEART WITH UNSTABLE ANGINA PECTORIS (HCC): Status: ACTIVE | Noted: 2021-10-21

## 2022-03-18 PROBLEM — Z95.820 S/P ANGIOPLASTY WITH STENT: Status: ACTIVE | Noted: 2021-06-30

## 2022-03-19 PROBLEM — R68.83 CHILLS: Status: ACTIVE | Noted: 2020-03-25

## 2022-03-19 PROBLEM — R06.83 LOUD SNORING: Status: ACTIVE | Noted: 2018-11-28

## 2022-03-19 PROBLEM — K85.90 ACUTE PANCREATITIS: Status: ACTIVE | Noted: 2018-02-15

## 2022-03-19 PROBLEM — K44.9 HIATAL HERNIA: Status: ACTIVE | Noted: 2018-12-10

## 2022-03-19 PROBLEM — Z87.891 HISTORY OF SMOKING 25-50 PACK YEARS: Status: ACTIVE | Noted: 2020-11-25

## 2022-03-19 PROBLEM — E78.1 HYPERTRIGLYCERIDEMIA: Status: ACTIVE | Noted: 2018-03-19

## 2022-03-19 PROBLEM — F41.9 ANXIETY: Status: ACTIVE | Noted: 2020-01-24

## 2022-03-19 PROBLEM — D72.829 LEUKOCYTOSIS: Status: ACTIVE | Noted: 2018-02-15

## 2022-03-19 PROBLEM — R30.0 DYSURIA: Status: ACTIVE | Noted: 2021-11-22

## 2022-03-19 PROBLEM — R63.4 WEIGHT LOSS, UNINTENTIONAL: Status: ACTIVE | Noted: 2020-11-25

## 2022-03-19 PROBLEM — R29.818 SUSPECTED SLEEP APNEA: Status: ACTIVE | Noted: 2018-11-28

## 2022-03-19 PROBLEM — M75.02 ADHESIVE CAPSULITIS OF LEFT SHOULDER: Status: ACTIVE | Noted: 2019-01-09

## 2022-03-19 PROBLEM — Z87.891 HISTORY OF TOBACCO USE: Status: ACTIVE | Noted: 2018-06-19

## 2022-03-19 PROBLEM — Z79.4 TYPE 2 DIABETES MELLITUS WITH HYPERGLYCEMIA, WITH LONG-TERM CURRENT USE OF INSULIN (HCC): Status: ACTIVE | Noted: 2020-01-24

## 2022-03-19 PROBLEM — R10.9 RIGHT FLANK PAIN: Status: ACTIVE | Noted: 2021-05-04

## 2022-03-19 PROBLEM — G40.209 NONINTRACTABLE EPILEPSY WITH COMPLEX PARTIAL SEIZURES (HCC): Status: ACTIVE | Noted: 2020-04-02

## 2022-03-19 PROBLEM — Z71.2 ENCOUNTER TO DISCUSS TEST RESULTS: Status: ACTIVE | Noted: 2018-03-19

## 2022-03-19 PROBLEM — K85.90 PANCREATITIS: Status: ACTIVE | Noted: 2018-02-17

## 2022-03-19 PROBLEM — J02.9 PHARYNGITIS: Status: ACTIVE | Noted: 2020-03-09

## 2022-03-19 PROBLEM — H92.09 EARACHE: Status: ACTIVE | Noted: 2020-03-09

## 2022-03-19 PROBLEM — M20.60 ACQUIRED DEFORMITY OF TOE: Status: ACTIVE | Noted: 2020-01-24

## 2022-03-19 PROBLEM — L08.9 INFECTED SEBACEOUS CYST OF SKIN: Status: ACTIVE | Noted: 2019-01-09

## 2022-03-19 PROBLEM — R91.8 PULMONARY INFILTRATE: Status: ACTIVE | Noted: 2020-11-30

## 2022-03-19 PROBLEM — J32.9 SINUSITIS: Status: ACTIVE | Noted: 2020-03-09

## 2022-03-19 PROBLEM — Z95.5 HX OF HEART ARTERY STENT: Status: ACTIVE | Noted: 2019-05-08

## 2022-03-19 PROBLEM — L40.50 PSORIATIC ARTHRITIS (HCC): Status: ACTIVE | Noted: 2020-11-25

## 2022-03-19 PROBLEM — H60.91 OTITIS EXTERNA OF RIGHT EAR: Status: ACTIVE | Noted: 2021-06-01

## 2022-03-19 PROBLEM — G62.9 PERIPHERAL POLYNEUROPATHY: Status: ACTIVE | Noted: 2021-03-31

## 2022-03-19 PROBLEM — E11.65 TYPE 2 DIABETES MELLITUS WITH HYPERGLYCEMIA, WITH LONG-TERM CURRENT USE OF INSULIN (HCC): Status: ACTIVE | Noted: 2020-01-24

## 2022-03-19 PROBLEM — Z71.3 DIETARY COUNSELING: Status: ACTIVE | Noted: 2019-05-08

## 2022-03-19 PROBLEM — I25.119 CORONARY ARTERY DISEASE INVOLVING NATIVE HEART WITH ANGINA PECTORIS (HCC): Status: ACTIVE | Noted: 2020-03-25

## 2022-03-19 PROBLEM — M19.90 OSTEOARTHRITIS: Status: ACTIVE | Noted: 2018-12-10

## 2022-03-19 PROBLEM — Z87.891 FORMER SMOKER: Status: ACTIVE | Noted: 2019-05-08

## 2022-03-19 PROBLEM — L72.3 INFECTED SEBACEOUS CYST OF SKIN: Status: ACTIVE | Noted: 2019-01-09

## 2022-03-19 PROBLEM — J40 BRONCHITIS: Status: ACTIVE | Noted: 2020-03-25

## 2022-03-19 PROBLEM — S06.9XAA TBI (TRAUMATIC BRAIN INJURY) (HCC): Status: ACTIVE | Noted: 2020-02-12

## 2022-03-19 PROBLEM — E78.2 MIXED HYPERLIPIDEMIA: Status: ACTIVE | Noted: 2018-03-02

## 2022-03-19 PROBLEM — L72.3 SEBACEOUS CYST: Status: ACTIVE | Noted: 2018-12-10

## 2022-03-19 PROBLEM — I25.10 CAD (CORONARY ARTERY DISEASE): Status: ACTIVE | Noted: 2018-02-15

## 2022-03-19 PROBLEM — Z79.899 HIGH RISK MEDICATION USE: Status: ACTIVE | Noted: 2021-11-22

## 2022-03-19 PROBLEM — E87.29 HIGH ANION GAP METABOLIC ACIDOSIS: Status: ACTIVE | Noted: 2018-02-15

## 2022-03-19 PROBLEM — G47.19 EXCESSIVE DAYTIME SLEEPINESS: Status: ACTIVE | Noted: 2018-11-29

## 2022-03-19 PROBLEM — M25.512 ACUTE PAIN OF LEFT SHOULDER: Status: ACTIVE | Noted: 2019-01-09

## 2022-03-20 PROBLEM — R11.0 NAUSEA: Status: ACTIVE | Noted: 2020-03-25

## 2022-03-20 PROBLEM — I10 HYPERTENSION: Status: ACTIVE | Noted: 2018-03-02

## 2022-03-20 PROBLEM — I21.4 NSTEMI (NON-ST ELEVATED MYOCARDIAL INFARCTION) (HCC): Status: ACTIVE | Noted: 2021-06-25

## 2022-03-20 PROBLEM — G40.909 SEIZURE DISORDER (HCC): Status: ACTIVE | Noted: 2020-03-09

## 2022-03-20 PROBLEM — M19.032 ARTHRITIS OF LEFT WRIST: Status: ACTIVE | Noted: 2018-06-19

## 2022-03-20 PROBLEM — E66.01 SEVERE OBESITY (BMI 35.0-39.9) WITH COMORBIDITY (HCC): Status: ACTIVE | Noted: 2018-03-19

## 2022-03-20 PROBLEM — N20.1 CALCULUS OF URETEROVESICAL JUNCTION (UVJ): Status: ACTIVE | Noted: 2021-05-04

## 2022-03-20 PROBLEM — E66.9 OBESITY, UNSPECIFIED OBESITY SEVERITY, UNSPECIFIED OBESITY TYPE: Status: ACTIVE | Noted: 2018-03-02

## 2022-05-31 ENCOUNTER — NURSE TRIAGE (OUTPATIENT)
Dept: OTHER | Facility: CLINIC | Age: 52
End: 2022-05-31

## 2022-05-31 NOTE — TELEPHONE ENCOUNTER
Received call from [address] at [facility] with [employer]. Current Symptoms: Pt reports swelling and discoloration to his left second toe. He denies injury. His entire toe is swollen. Underneath his toe, there is a dark purplish/red spot. Pt reports a foul odor. He is a diabetic. Onset: A few days ago, worsening     Associated Symptoms: None    Pain Severity: \"Aching\", 5 out of 10    Temperature: Denies fever    What has been tried: Elevation, wash with soap and water, cover with bandage     Pregnant: NA    Recommended disposition: 2nd level triage. Care advice provided, patient verbalizes understanding; denies any other questions or concerns; instructed to call back for any new or worsening symptoms. Writer provided warm transfer to [facility] for 2nd level triage. Attention Provider: Thank you for allowing me to participate in the care of your patient. The patient was connected to triage in response to information provided to the ECC/PSC. Please do not respond through this encounter as the response is not directed to a shared pool.     Reason for Disposition   Purple or black skin on toe (Exception: simple recalled injury with bruise)    Protocols used: TOE PAIN-ADULT-[place]

## 2022-06-01 ENCOUNTER — APPOINTMENT (OUTPATIENT)
Dept: GENERAL RADIOLOGY | Age: 52
End: 2022-06-01
Payer: MEDICAID

## 2022-06-01 ENCOUNTER — HOSPITAL ENCOUNTER (EMERGENCY)
Age: 52
Discharge: HOME | End: 2022-06-01
Attending: EMERGENCY MEDICINE
Payer: MEDICAID

## 2022-06-01 VITALS
RESPIRATION RATE: 16 BRPM | SYSTOLIC BLOOD PRESSURE: 134 MMHG | TEMPERATURE: 98.7 F | HEART RATE: 71 BPM | HEIGHT: 69 IN | OXYGEN SATURATION: 96 % | DIASTOLIC BLOOD PRESSURE: 96 MMHG | WEIGHT: 210 LBS | BODY MASS INDEX: 31.1 KG/M2

## 2022-06-01 DIAGNOSIS — L03.032 CELLULITIS OF SECOND TOE OF LEFT FOOT: Primary | ICD-10-CM

## 2022-06-01 LAB
ALBUMIN SERPL-MCNC: 3.9 G/DL (ref 3.5–5)
ALBUMIN/GLOB SERPL: 1.3 {RATIO}
ALP SERPL-CCNC: 94 U/L (ref 45–117)
ALT SERPL-CCNC: 12 U/L (ref 13–61)
ANION GAP SERPL CALC-SCNC: 11 MMOL/L (ref 7–16)
AST SERPL-CCNC: 17 U/L (ref 15–37)
BASOPHILS # BLD: 0.1 K/UL (ref 0–0.2)
BASOPHILS NFR BLD: 1 % (ref 0–2)
BILIRUB SERPL-MCNC: 0.4 MG/DL (ref 0.2–1.1)
BUN SERPL-MCNC: 17 MG/DL (ref 7–18)
CALCIUM SERPL-MCNC: 9.3 MG/DL (ref 8.3–10.4)
CHLORIDE SERPL-SCNC: 103 MMOL/L (ref 98–107)
CO2 SERPL-SCNC: 24 MMOL/L (ref 21–32)
CREAT SERPL-MCNC: 0.62 MG/DL (ref 0.8–1.5)
DIFFERENTIAL METHOD BLD: NORMAL
EOSINOPHIL # BLD: 0.5 K/UL (ref 0–0.8)
EOSINOPHIL NFR BLD: 6 % (ref 0.5–7.8)
ERYTHROCYTE [DISTWIDTH] IN BLOOD BY AUTOMATED COUNT: 11.9 % (ref 11.9–14.6)
GLOBULIN SER CALC-MCNC: 3.1 G/DL (ref 2.3–3.5)
GLUCOSE SERPL-MCNC: 198 MG/DL (ref 65–100)
HCT VFR BLD AUTO: 41.1 % (ref 41.1–50.3)
HGB BLD-MCNC: 14.2 G/DL (ref 13.6–17.2)
IMM GRANULOCYTES # BLD AUTO: 0 K/UL (ref 0–0.5)
IMM GRANULOCYTES NFR BLD AUTO: 0 % (ref 0–5)
LYMPHOCYTES # BLD: 2.2 K/UL (ref 0.5–4.6)
LYMPHOCYTES NFR BLD: 25 % (ref 13–44)
MCH RBC QN AUTO: 32.2 PG (ref 26.1–32.9)
MCHC RBC AUTO-ENTMCNC: 34.5 G/DL (ref 31.4–35)
MCV RBC AUTO: 93.2 FL (ref 79.6–97.8)
MONOCYTES # BLD: 0.6 K/UL (ref 0.1–1.3)
MONOCYTES NFR BLD: 6 % (ref 4–12)
NEUTS SEG # BLD: 5.6 K/UL (ref 1.7–8.2)
NEUTS SEG NFR BLD: 63 % (ref 43–78)
NRBC # BLD: 0 K/UL (ref 0–0.2)
PLATELET # BLD AUTO: 251 K/UL (ref 150–450)
PMV BLD AUTO: 10.3 FL (ref 9.4–12.3)
POTASSIUM SERPL-SCNC: 4.2 MMOL/L (ref 3.5–5.1)
PROT SERPL-MCNC: 7 G/DL (ref 6.4–8.2)
RBC # BLD AUTO: 4.41 M/UL (ref 4.23–5.6)
SODIUM SERPL-SCNC: 138 MMOL/L (ref 136–145)
WBC # BLD AUTO: 8.9 K/UL (ref 4.3–11.1)

## 2022-06-01 PROCEDURE — 82962 GLUCOSE BLOOD TEST: CPT

## 2022-06-01 PROCEDURE — 96365 THER/PROPH/DIAG IV INF INIT: CPT

## 2022-06-01 PROCEDURE — 80053 COMPREHEN METABOLIC PANEL: CPT

## 2022-06-01 PROCEDURE — 73660 X-RAY EXAM OF TOE(S): CPT

## 2022-06-01 PROCEDURE — 85025 COMPLETE CBC W/AUTO DIFF WBC: CPT

## 2022-06-01 PROCEDURE — 2500000003 HC RX 250 WO HCPCS: Performed by: EMERGENCY MEDICINE

## 2022-06-01 PROCEDURE — 99284 EMERGENCY DEPT VISIT MOD MDM: CPT

## 2022-06-01 RX ORDER — CLINDAMYCIN PHOSPHATE 900 MG/50ML
900 INJECTION INTRAVENOUS
Status: COMPLETED | OUTPATIENT
Start: 2022-06-01 | End: 2022-06-01

## 2022-06-01 RX ORDER — CLINDAMYCIN HYDROCHLORIDE 150 MG/1
300 CAPSULE ORAL 3 TIMES DAILY
Qty: 42 CAPSULE | Refills: 0 | Status: SHIPPED | OUTPATIENT
Start: 2022-06-01 | End: 2022-06-08

## 2022-06-01 RX ADMIN — CLINDAMYCIN PHOSPHATE 900 MG: 900 INJECTION, SOLUTION INTRAVENOUS at 13:12

## 2022-06-01 ASSESSMENT — PAIN DESCRIPTION - DESCRIPTORS: DESCRIPTORS: ACHING

## 2022-06-01 ASSESSMENT — ENCOUNTER SYMPTOMS
COUGH: 0
VOMITING: 0
ABDOMINAL PAIN: 0
SHORTNESS OF BREATH: 0

## 2022-06-01 ASSESSMENT — PAIN DESCRIPTION - LOCATION
LOCATION: TOE (COMMENT WHICH ONE)
LOCATION: TOE (COMMENT WHICH ONE)

## 2022-06-01 ASSESSMENT — PAIN DESCRIPTION - ORIENTATION: ORIENTATION: LEFT

## 2022-06-01 ASSESSMENT — PAIN - FUNCTIONAL ASSESSMENT
PAIN_FUNCTIONAL_ASSESSMENT: 0-10
PAIN_FUNCTIONAL_ASSESSMENT: 0-10

## 2022-06-01 ASSESSMENT — PAIN SCALES - GENERAL: PAINLEVEL_OUTOF10: 5

## 2022-06-01 NOTE — ED TRIAGE NOTES
Pt ambulatory from lobby to triage. Pt presents to the ED with complaint of a possible toe infection. Pt states he noticed a blister on his second toe on his left foot. The blister has since burst and not the area looks like it may be infected. Pt was using neosporin and covering the area with a bandage. Pt states it has also been hurting. Pt states he has also felt very tired. Denies fevers. He states he has pmh of neuropathy, so it is somewhat hard to differentiate the pain. Pt tried to get into his PCP, but could not get an appt.

## 2022-06-01 NOTE — ED PROVIDER NOTES
[facility] Emergency Department Provider Note                   PCP:                Cha Dial MD               Age: 50 y.o. Sex: male       ICD-10-CM    1. Cellulitis of second toe of left foot  L03.032        DISPOSITION Decision To Discharge 06/01/2022 01:58:08 PM       Discharge Medication List as of 6/1/2022  2:14 PM      START taking these medications    Details   clindamycin (CLEOCIN) 150 MG capsule Take 2 capsules by mouth 3 times daily for 7 days, Disp-42 capsule, R-0Print             Orders Placed This Encounter   Procedures    XR TOE LEFT (MIN 2 VIEWS)    CBC with Auto Differential    Comprehensive Metabolic Panel    C-Reactive Protein    Misc nursing order (specify)    CLEAN WOUND/ABRASION: SPECIFY    Insert peripheral IV        MDM  Number of Diagnoses or Management Options  Cellulitis of second toe of left foot  Diagnosis management comments: Toe Cellulitis in a diabetic patient. Lab work. Imaging to assess for osteomyelitis. IV antibiotics. Amount and/or Complexity of Data Reviewed  Clinical lab tests: ordered and reviewed  Independent visualization of images, tracings, or specimens: yes    Risk of Complications, Morbidity, and/or Mortality  Presenting problems: moderate  Diagnostic procedures: low  Management options: low    Patient Progress  Patient progress: stable       Odilon Pichardo is a 50 y.o. male who presents to the Emergency Department with chief complaint of    Chief Complaint   Patient presents with    Skin Problem       62-year-old male with hypertension diabetes presents with left second toe pain. Has noticed some redness and some discoloration and swelling over the last week or so. Increased last few days. No fever or chills. Mild pain. Has a history of diabetic neuropathy. No vomiting. Minimal drainage from a spot on the underside of his toe. No aggravating relieving factors. Not tried any medications for it.     The history is provided by the patient. All other systems reviewed and are negative. Review of Systems   Constitutional: Negative for chills and fever. Respiratory: Negative for cough and shortness of breath. Gastrointestinal: Negative for abdominal pain and vomiting. Neurological: Positive for numbness (Chronic neuropathy). Negative for weakness.        Past Medical History:   Diagnosis Date    Acute coronary syndrome ([facility] 12/15/2016    Acute pancreatitis 2/15/2018    Arthritis     psoriatic    Diabetes ([facility]     Endocrine disease     Hypertension     Nonintractable epilepsy with complex partial seizures ([facility] 4/2/2020    Psoriatic arthropathy ([facility]     Seizures (HCC)     Type 2 diabetes with nephropathy (HCC)         Past Surgical History:   Procedure Laterality Date    APPENDECTOMY      CHOLECYSTECTOMY      CORONARY ANGIOPLASTY WITH STENT PLACEMENT  11/2020    HERNIA REPAIR      IR NONTUNNELED VASCULAR CATHETER  11/25/2020    IR NONTUNNELED VASCULAR CATHETER  2/16/2018    IR NONTUNNELED VASCULAR CATHETER 2/16/2018 [facility]    IR NONTUNNELED VASCULAR CATHETER  11/25/2020    IR NONTUNNELED VASCULAR CATHETER 11/25/2020 [facility]    ORTHOPEDIC SURGERY      left heel secondary to trauma    AK CARDIAC SURG PROCEDURE UNLIST          Family History   Problem Relation Age of Onset    Atrial Fibrillation Mother     Breast Cancer Mother         42's    Thyroid Disease Mother         goiters    Heart Disease Paternal Grandfather     Diabetes Sister     Heart Disease Father 48    Diabetes Father            Social Connections:     Frequency of Communication with Friends and Family: Not on file    Frequency of Social Gatherings with Friends and Family: Not on file    Attends Orthodoxy Services: Not on file    Active Member of Clubs or Organizations: Not on file    Attends Club or Organization Meetings: Not on file    Marital Status: Not on file        Allergies   Allergen Reactions  Peanut-Containing Drug Products Anaphylaxis    Morphine Other (See Comments)     Anxiety, claustrophobia        Vitals signs and nursing note reviewed. Patient Vitals for the past 4 hrs:   Temp Pulse Resp BP SpO2   06/01/22 1422 98.7 °F (37.1 °C) 71 16 (!) 134/96 96 %   06/01/22 1329 -- 76 -- -- --   06/01/22 1316 -- -- -- (!) 132/94 --          Physical Exam  Vitals and nursing note reviewed. Constitutional:       Appearance: He is not ill-appearing. Musculoskeletal:      Comments: Examination left foot reveals erythema second toe. Superficial ulceration without any drainage on the underside of the toe. Distal neurovascular intact. No crepitus. No erythema soft tissue swelling of the foot. No tenderness. Good capillary refill. Skin:     General: Skin is warm and dry. Neurological:      Mental Status: He is alert. Procedures    Labs Reviewed   COMPREHENSIVE METABOLIC PANEL - Abnormal; Notable for the following components:       Result Value    Glucose 198 (*)     CREATININE 0.62 (*)     ALT 12 (*)     All other components within normal limits   CBC WITH AUTO DIFFERENTIAL   C-REACTIVE PROTEIN        XR TOE LEFT (MIN 2 VIEWS)   Final Result   FINDINGS / IMPRESSION: Diffuse osteopenia. No periostitis. No definite cortical   erosions. Soft tissue swelling. No soft tissue gas.                                  Results Include:    Recent Results (from the past 24 hour(s))   CBC with Auto Differential    Collection Time: 06/01/22 12:16 PM   Result Value Ref Range    WBC 8.9 4.3 - 11.1 K/uL    RBC 4.41 4.23 - 5.60 M/uL    Hemoglobin 14.2 13.6 - 17.2 g/dL    Hematocrit 41.1 41.1 - 50.3 %    MCV 93.2 79.6 - 97.8 FL    MCH 32.2 26.1 - 32.9 PG    MCHC 34.5 31.4 - 35.0 g/dL    RDW 11.9 11.9 - 14.6 %    Platelets 276 588 - 267 K/uL    MPV 10.3 9.4 - 12.3 FL    nRBC 0.00 0.0 - 0.2 K/uL    Differential Type AUTOMATED      Seg Neutrophils 63 43 - 78 %    Lymphocytes 25 13 - 44 %    Monocytes 6 4.0 - 12.0 %    Eosinophils % 6 0.5 - 7.8 %    Basophils 1 0.0 - 2.0 %    Immature Granulocytes 0 0.0 - 5.0 %    Segs Absolute 5.6 1.7 - 8.2 K/UL    Absolute Lymph # 2.2 0.5 - 4.6 K/UL    Absolute Mono # 0.6 0.1 - 1.3 K/UL    Absolute Eos # 0.5 0.0 - 0.8 K/UL    Basophils Absolute 0.1 0.0 - 0.2 K/UL    Absolute Immature Granulocyte 0.0 0.0 - 0.5 K/UL   Comprehensive Metabolic Panel    Collection Time: 06/01/22 12:16 PM   Result Value Ref Range    Sodium 138 136 - 145 mmol/L    Potassium 4.2 3.5 - 5.1 mmol/L    Chloride 103 98 - 107 mmol/L    CO2 24 21 - 32 mmol/L    Anion Gap 11 7.0 - 16.0 mmol/L    Glucose 198 (H) 65 - 100 mg/dL    BUN 17 7.0 - 18.0 MG/DL    CREATININE 0.62 (L) 0.8 - 1.5 MG/DL    GFR African American >177 >60 ml/min/1.73m2    GFR Non- >60 >60 ml/min/1.73m2    Calcium 9.3 8.3 - 10.4 MG/DL    Total Bilirubin 0.4 0.2 - 1.1 MG/DL    ALT 12 (L) 13.0 - 61.0 U/L    AST 17 15 - 37 U/L    Alk Phosphatase 94 45.0 - 117.0 U/L    Total Protein 7.0 6.4 - 8.2 g/dL    Albumin 3.9 3.5 - 5.0 g/dL    Globulin 3.1 2.3 - 3.5 g/dL    Albumin/Globulin Ratio 1.3       XR TOE LEFT (MIN 2 VIEWS)    Result Date: 6/1/2022  LEFT TOES 3 view(s). INDICATION: Diabetic foot ulcer left second toe TECHNIQUE: AP and lateral and oblique views. COMPARISON: None. FINDINGS / IMPRESSION: Diffuse osteopenia. No periostitis. No definite cortical erosions. Soft tissue swelling. No soft tissue gas. Patient received IV antibiotics. Agrees to recheck in 2 days regarding wound check. Voice dictation software was used during the making of this note. This software is not perfect and grammatical and other typographical errors may be present. This note has not been completely proofread for errors.      Dino Freeman MD  06/01/22 9600

## 2022-06-02 LAB — GLUCOSE BLD STRIP.AUTO-MCNC: 197 MG/DL (ref 65–100)

## 2022-06-03 ENCOUNTER — HOSPITAL ENCOUNTER (EMERGENCY)
Age: 52
Discharge: LEFT BEFORE DISCHARGE | End: 2022-06-03

## 2022-06-03 VITALS
BODY MASS INDEX: 31.83 KG/M2 | HEART RATE: 75 BPM | SYSTOLIC BLOOD PRESSURE: 161 MMHG | OXYGEN SATURATION: 99 % | HEIGHT: 68 IN | RESPIRATION RATE: 18 BRPM | WEIGHT: 210 LBS | DIASTOLIC BLOOD PRESSURE: 110 MMHG | TEMPERATURE: 98.2 F

## 2022-06-03 ASSESSMENT — PAIN SCALES - GENERAL: PAINLEVEL_OUTOF10: 5

## 2022-06-03 ASSESSMENT — PAIN - FUNCTIONAL ASSESSMENT: PAIN_FUNCTIONAL_ASSESSMENT: 0-10

## 2022-06-03 NOTE — ED NOTES
Patient left after triage, noticed when attempting to pull patient back to VC. Attempted to call pt multiple times.      Yashira Ridley RN  06/03/22 [id number]

## 2022-07-07 NOTE — PROGRESS NOTES
3rd attempt to outreach to patient for COVID-19 response call. Another message left with contact information requesting a return call. Will close episode at this time due to no return call. Luh austin

## 2022-08-03 ENCOUNTER — OFFICE VISIT (OUTPATIENT)
Dept: CARDIOLOGY CLINIC | Age: 52
End: 2022-08-03
Payer: MEDICAID

## 2022-08-03 VITALS
DIASTOLIC BLOOD PRESSURE: 82 MMHG | HEART RATE: 66 BPM | SYSTOLIC BLOOD PRESSURE: 106 MMHG | BODY MASS INDEX: 33.34 KG/M2 | WEIGHT: 220 LBS | HEIGHT: 68 IN

## 2022-08-03 DIAGNOSIS — I10 PRIMARY HYPERTENSION: ICD-10-CM

## 2022-08-03 DIAGNOSIS — Z95.5 HX OF HEART ARTERY STENT: ICD-10-CM

## 2022-08-03 DIAGNOSIS — I21.3 ST ELEVATION MYOCARDIAL INFARCTION (STEMI), UNSPECIFIED ARTERY (HCC): ICD-10-CM

## 2022-08-03 DIAGNOSIS — Z87.891 HISTORY OF TOBACCO USE: ICD-10-CM

## 2022-08-03 DIAGNOSIS — E78.2 MIXED HYPERLIPIDEMIA: ICD-10-CM

## 2022-08-03 DIAGNOSIS — E11.21 TYPE 2 DIABETES WITH NEPHROPATHY (HCC): ICD-10-CM

## 2022-08-03 DIAGNOSIS — I21.4 NSTEMI (NON-ST ELEVATED MYOCARDIAL INFARCTION) (HCC): ICD-10-CM

## 2022-08-03 DIAGNOSIS — I25.110 CORONARY ARTERY DISEASE INVOLVING NATIVE CORONARY ARTERY OF NATIVE HEART WITH UNSTABLE ANGINA PECTORIS (HCC): Primary | ICD-10-CM

## 2022-08-03 PROBLEM — Z01.810 PREOP CARDIOVASCULAR EXAM: Status: ACTIVE | Noted: 2019-05-08

## 2022-08-03 PROCEDURE — 93000 ELECTROCARDIOGRAM COMPLETE: CPT | Performed by: INTERNAL MEDICINE

## 2022-08-03 PROCEDURE — 99214 OFFICE O/P EST MOD 30 MIN: CPT | Performed by: INTERNAL MEDICINE

## 2022-08-03 ASSESSMENT — ENCOUNTER SYMPTOMS
ABDOMINAL PAIN: 0
SHORTNESS OF BREATH: 0

## 2022-08-03 NOTE — PROGRESS NOTES
[address], [address]  PHONE: [address]  1970      SUBJECTIVE:   Philip Carney is a 50 y.o. male seen for a follow up visit regarding the following:     Chief Complaint   Patient presents with    Pre-op Exam     Foot surg       HPI:    72-year-old male comes back for follow-up and preop evaluation prior to a second toe amputation on his foot. He has had history of premature coronary disease and interventions in the past procedure believe around 2020. He has been doing well from that standpoint is really trying to get his diabetes under control lost some weight and is doing good from that standpoint had a hammertoe on his foot and he got a sore end up getting infected and has not been able to heal and he needs an amputation probably as an outpatient. Came here for preop evaluation. He has not had any recent chest pain shortness of breath palpitations. Past Medical History, Past Surgical History, Family history, Social History, and Medications were all reviewed with the patient today and updated as necessary.        Allergies   Allergen Reactions    Peanut-Containing Drug Products Anaphylaxis    Morphine Other (See Comments)     Anxiety, claustrophobia     Past Medical History:   Diagnosis Date    Acute coronary syndrome ([address] 12/15/2016    Acute pancreatitis 2/15/2018    Arthritis     psoriatic    Diabetes ([address]     Endocrine disease     Hypertension     Nonintractable epilepsy with complex partial seizures ([address] 4/2/2020    Psoriatic arthropathy ([address]     Seizures ([address]     Type 2 diabetes with nephropathy [facility])      Past Surgical History:   Procedure Laterality Date    APPENDECTOMY      CHOLECYSTECTOMY      CORONARY ANGIOPLASTY WITH STENT PLACEMENT  11/2020    HERNIA REPAIR      IR NONTUNNELED VASCULAR CATHETER  11/25/2020    IR NONTUNNELED VASCULAR CATHETER  2/16/2018    IR NONTUNNELED VASCULAR CATHETER 2/16/2018 [facility]    IR NONTUNNELED VASCULAR CATHETER  11/25/2020    IR NONTUNNELED VASCULAR CATHETER 11/25/2020 [facility] SPECI[facility]    ORTHOPEDIC SURGERY      left heel secondary to trauma    ID CARDIAC SURG PROCEDURE UNLIST       Family History   Problem Relation Age of Onset    Atrial Fibrillation Mother     Breast Cancer Mother         42's    Thyroid Disease Mother         goiters    Heart Disease Paternal Grandfather     Diabetes Sister     Heart Disease Father 48    Diabetes Father       Social History     Tobacco Use    Smoking status: Former     Packs/day: 1.00     Types: Cigarettes     Quit date: 2/1/2019     Years since quitting: 3.5    Smokeless tobacco: Former   Substance Use Topics    Alcohol use: No       ROS:    Review of Systems   Constitutional: Negative for decreased appetite and weight loss. Cardiovascular:  Negative for dyspnea on exertion, leg swelling and palpitations. Respiratory:  Negative for shortness of breath. Hematologic/Lymphatic: Negative for bleeding problem. Gastrointestinal:  Negative for abdominal pain. Neurological:  Negative for focal weakness and weakness. PHYSICAL EXAM:    /82   Pulse 66   Ht 5' 8\" (1.727 m)   Wt 220 lb (99.8 kg) Comment: per pt  BMI 33.45 kg/m²        Wt Readings from Last 3 Encounters:   08/03/22 220 lb (99.8 kg)   06/03/22 210 lb (95.3 kg)   06/01/22 210 lb (95.3 kg)     BP Readings from Last 3 Encounters:   08/03/22 106/82   06/03/22 (!) 161/110   06/01/22 (!) 134/96         Physical Exam  Constitutional:       General: He is not in acute distress. Cardiovascular:      Rate and Rhythm: Normal rate and regular rhythm. Heart sounds: No murmur heard. No gallop. Pulmonary:      Effort: Pulmonary effort is normal.      Breath sounds: Normal breath sounds. Abdominal:      General: Abdomen is flat. Skin:     General: Skin is warm. Neurological:      General: No focal deficit present. Mental Status: He is alert.        Medical problems and test results were reviewed with the patient today. Results for orders placed or performed in visit on 08/03/22   EKG 12 Lead    Impression    Normal sinus rhythm rate of 66. Normal intervals. No ST changes. Lab Results   Component Value Date/Time     06/01/2022 12:16 PM    K 4.2 06/01/2022 12:16 PM     06/01/2022 12:16 PM    CO2 24 06/01/2022 12:16 PM    BUN 17 06/01/2022 12:16 PM    GFRAA >177 06/01/2022 12:16 PM     Lab Results   Component Value Date/Time    CHOL 193 06/26/2021 03:58 AM    HDL 24 06/26/2021 03:58 AM   Recent labs July 14 cholesterol 207 triglycerides 217 HDL 37       ASSESSMENT and PLAN    Gregory Ortiz was seen today for pre-op exam.    Diagnoses and all orders for this visit:    Coronary artery disease involving native coronary artery of native heart with unstable angina pectoris ([address] patient is clinically stable no symptoms noted has not had any recent angina after his last intervention his EKG looks normal today  -     EKG 12 Lead    STEMI (ST elevation myocardial infarction) ([address] still limited infarction some years ago but does been stable  -     EKG 12 Lead    NSTEMI (non-ST elevated myocardial infarction) ([address]  -     EKG 12 Lead    Primary hypertension his blood pressure is under control currently on his carvedilol    Type 2 diabetes with nephropathy (HCC) A1c is down to 6.5 doing a whole lot better than he was before. Hx of heart artery stent he has been stable since the last stent    History of tobacco use he stopped smoking. Mixed hyperlipidemia LDL level is still above goal we had him on Repatha at one point but he states that Medicaid would not pay it anymore. He is already taken good high intensity statin level and his LDL still 131 but all means he meets the criteria with known coronary disease previous stents of adding Repatha to the statin to get the LDL goal below 70.     Other orders  -     Evolocumab 140 MG/ML SOAJ; Inject 140 mg into the skin every 14 days      [unfilled]      No follow-up provider specified.     Enoch Glass MD  8/3/2022  1:42 PM

## 2022-08-26 NOTE — TELEPHONE ENCOUNTER
Pharmacy called on behalf of patient to get a refill.     MEDICATION REFILL REQUEST      Name of Medication:  Repatha  Dose:  140 mg/ml  Frequency:  SubQ every 2 weeks  Quantity:  6  Days' supply:  80      Pharmacy [facility]/Location[facility]  [facility]    Phone: [phone]  Fax: [phone]

## 2022-08-31 RX ORDER — EVOLOCUMAB 140 MG/ML
140 INJECTION, SOLUTION SUBCUTANEOUS
Qty: 6 PEN | Refills: 3 | Status: SHIPPED | OUTPATIENT
Start: 2022-08-31 | End: 2022-09-01 | Stop reason: SDUPTHER

## 2022-09-01 RX ORDER — EVOLOCUMAB 140 MG/ML
140 INJECTION, SOLUTION SUBCUTANEOUS
Qty: 2 ADJUSTABLE DOSE PRE-FILLED PEN SYRINGE | Refills: 3 | Status: SHIPPED | OUTPATIENT
Start: 2022-09-01 | End: 2022-09-07 | Stop reason: SDUPTHER

## 2022-09-01 NOTE — TELEPHONE ENCOUNTER
MEDICATION REFILL REQUEST        Name of Medication:  Repatha  Dose:  140 mg  Frequency:  1 every 2 weeks  Quantity: ?  Days' supply:  ?         Pharmacy Name/Location:  [facility] -[phone]  They are needing a RX because they do not have one but they have received a PA

## 2022-09-01 NOTE — TELEPHONE ENCOUNTER
Requested Prescriptions     Pending Prescriptions Disp Refills    Evolocumab (REPATHA SURECLICK) 804 MG/ML SOAJ 2 Adjustable Dose Pre-filled Pen Syringe 3     Sig: Inject 140 mg into the skin every 14 days

## 2022-09-01 NOTE — TELEPHONE ENCOUNTER
MEDICATION REFILL REQUEST      Name of Medication:  Repatha  Dose:  140 mg  Frequency:  1 every 2 weeks  Quantity: ?  Days' supply:  ?       Pharmacy Name/Location:  [facility] -[phone]  They are needing a RX because they do not have one but they have received a PA

## 2022-09-02 PROBLEM — Z01.810 PREOP CARDIOVASCULAR EXAM: Status: RESOLVED | Noted: 2019-05-08 | Resolved: 2022-09-02

## 2022-09-07 RX ORDER — EVOLOCUMAB 140 MG/ML
140 INJECTION, SOLUTION SUBCUTANEOUS
Qty: 6 ADJUSTABLE DOSE PRE-FILLED PEN SYRINGE | Refills: 3 | Status: SHIPPED | OUTPATIENT
Start: 2022-09-07

## 2022-09-07 NOTE — TELEPHONE ENCOUNTER
Medication, strength, directions and requested pharmacy reviewed/verified. Prescription pending physician's approval to print and fax.

## 2022-10-12 ENCOUNTER — TELEPHONE (OUTPATIENT)
Dept: CARDIOLOGY CLINIC | Age: 52
End: 2022-10-12

## 2022-10-12 NOTE — TELEPHONE ENCOUNTER
Sandra with [facility] called stating she needs the latest EKG and doctors visit notes from last visit. (8/3/2022 for both)      Please include-  Jojo ref # ([phone])              Please fax to # [phone]

## 2022-11-14 ENCOUNTER — TELEPHONE (OUTPATIENT)
Dept: CARDIOLOGY CLINIC | Age: 52
End: 2022-11-14

## 2022-11-14 NOTE — TELEPHONE ENCOUNTER
[facility] called because the pt's Repatha Rx doesn't match their prior auth and, therefore, it can't be filled. Please call [phone] option 2.

## 2023-09-18 ENCOUNTER — HOSPITAL ENCOUNTER (EMERGENCY)
Age: 53
Discharge: HOME | End: 2023-09-18
Attending: EMERGENCY MEDICINE
Payer: COMMERCIAL

## 2023-09-18 ENCOUNTER — APPOINTMENT (OUTPATIENT)
Dept: GENERAL RADIOLOGY | Age: 53
End: 2023-09-18
Payer: COMMERCIAL

## 2023-09-18 VITALS
BODY MASS INDEX: 32.93 KG/M2 | OXYGEN SATURATION: 99 % | TEMPERATURE: 98.2 F | DIASTOLIC BLOOD PRESSURE: 105 MMHG | WEIGHT: 230 LBS | HEART RATE: 82 BPM | SYSTOLIC BLOOD PRESSURE: 175 MMHG | RESPIRATION RATE: 18 BRPM | HEIGHT: 70 IN

## 2023-09-18 DIAGNOSIS — S91.309A OPEN WOUND OF FOOT EXCLUDING TOES: Primary | ICD-10-CM

## 2023-09-18 LAB
ALBUMIN SERPL-MCNC: 3.8 G/DL (ref 3.5–5)
ALBUMIN/GLOB SERPL: 1.1 (ref 0.4–1.6)
ALP SERPL-CCNC: 66 U/L (ref 50–136)
ALT SERPL-CCNC: 27 U/L (ref 12–65)
ANION GAP SERPL CALC-SCNC: 5 MMOL/L (ref 2–11)
AST SERPL-CCNC: 50 U/L (ref 15–37)
BASOPHILS # BLD: 0 K/UL (ref 0–0.2)
BASOPHILS NFR BLD: 1 % (ref 0–2)
BILIRUB SERPL-MCNC: 0.6 MG/DL (ref 0.2–1.1)
BUN SERPL-MCNC: 12 MG/DL (ref 6–23)
CALCIUM SERPL-MCNC: 8.8 MG/DL (ref 8.3–10.4)
CHLORIDE SERPL-SCNC: 107 MMOL/L (ref 101–110)
CO2 SERPL-SCNC: 25 MMOL/L (ref 21–32)
CREAT SERPL-MCNC: 0.8 MG/DL (ref 0.8–1.5)
DIFFERENTIAL METHOD BLD: NORMAL
EOSINOPHIL # BLD: 0.3 K/UL (ref 0–0.8)
EOSINOPHIL NFR BLD: 4 % (ref 0.5–7.8)
ERYTHROCYTE [DISTWIDTH] IN BLOOD BY AUTOMATED COUNT: 12.9 % (ref 11.9–14.6)
GLOBULIN SER CALC-MCNC: 3.5 G/DL (ref 2.8–4.5)
GLUCOSE SERPL-MCNC: 282 MG/DL (ref 65–100)
HCT VFR BLD AUTO: 43.9 % (ref 41.1–50.3)
HGB BLD-MCNC: 15 G/DL (ref 13.6–17.2)
IMM GRANULOCYTES # BLD AUTO: 0 K/UL (ref 0–0.5)
IMM GRANULOCYTES NFR BLD AUTO: 0 % (ref 0–5)
LACTATE SERPL-SCNC: 0.7 MMOL/L (ref 0.4–2)
LYMPHOCYTES # BLD: 2.4 K/UL (ref 0.5–4.6)
LYMPHOCYTES NFR BLD: 30 % (ref 13–44)
MCH RBC QN AUTO: 31.6 PG (ref 26.1–32.9)
MCHC RBC AUTO-ENTMCNC: 34.2 G/DL (ref 31.4–35)
MCV RBC AUTO: 92.6 FL (ref 82–102)
MONOCYTES # BLD: 0.6 K/UL (ref 0.1–1.3)
MONOCYTES NFR BLD: 8 % (ref 4–12)
NEUTS SEG # BLD: 4.6 K/UL (ref 1.7–8.2)
NEUTS SEG NFR BLD: 57 % (ref 43–78)
NRBC # BLD: 0 K/UL (ref 0–0.2)
PLATELET # BLD AUTO: 261 K/UL (ref 150–450)
PMV BLD AUTO: 11.1 FL (ref 9.4–12.3)
POTASSIUM SERPL-SCNC: 5.1 MMOL/L (ref 3.5–5.1)
PROCALCITONIN SERPL-MCNC: <0.05 NG/ML (ref 0–0.49)
PROT SERPL-MCNC: 7.3 G/DL (ref 6.3–8.2)
RBC # BLD AUTO: 4.74 M/UL (ref 4.23–5.6)
SODIUM SERPL-SCNC: 137 MMOL/L (ref 133–143)
WBC # BLD AUTO: 7.9 K/UL (ref 4.3–11.1)

## 2023-09-18 PROCEDURE — 83605 ASSAY OF LACTIC ACID: CPT

## 2023-09-18 PROCEDURE — 2580000003 HC RX 258: Performed by: PHYSICIAN ASSISTANT

## 2023-09-18 PROCEDURE — 87040 BLOOD CULTURE FOR BACTERIA: CPT

## 2023-09-18 PROCEDURE — 96367 TX/PROPH/DG ADDL SEQ IV INF: CPT

## 2023-09-18 PROCEDURE — 96366 THER/PROPH/DIAG IV INF ADDON: CPT

## 2023-09-18 PROCEDURE — 73630 X-RAY EXAM OF FOOT: CPT

## 2023-09-18 PROCEDURE — 6370000000 HC RX 637 (ALT 250 FOR IP): Performed by: PHYSICIAN ASSISTANT

## 2023-09-18 PROCEDURE — 84145 PROCALCITONIN (PCT): CPT

## 2023-09-18 PROCEDURE — 6360000002 HC RX W HCPCS: Performed by: PHYSICIAN ASSISTANT

## 2023-09-18 PROCEDURE — 99285 EMERGENCY DEPT VISIT HI MDM: CPT

## 2023-09-18 PROCEDURE — 85025 COMPLETE CBC W/AUTO DIFF WBC: CPT

## 2023-09-18 PROCEDURE — 96375 TX/PRO/DX INJ NEW DRUG ADDON: CPT

## 2023-09-18 PROCEDURE — 96365 THER/PROPH/DIAG IV INF INIT: CPT

## 2023-09-18 PROCEDURE — 71045 X-RAY EXAM CHEST 1 VIEW: CPT

## 2023-09-18 PROCEDURE — 80053 COMPREHEN METABOLIC PANEL: CPT

## 2023-09-18 RX ORDER — HYDROCODONE BITARTRATE AND ACETAMINOPHEN 5; 325 MG/1; MG/1
0.5 TABLET ORAL
Status: COMPLETED | OUTPATIENT
Start: 2023-09-18 | End: 2023-09-18

## 2023-09-18 RX ORDER — HYDROCODONE BITARTRATE AND ACETAMINOPHEN 5; 325 MG/1; MG/1
1 TABLET ORAL EVERY 4 HOURS PRN
Qty: 12 TABLET | Refills: 0 | Status: SHIPPED | OUTPATIENT
Start: 2023-09-18 | End: 2023-09-21

## 2023-09-18 RX ORDER — KETOROLAC TROMETHAMINE 15 MG/ML
15 INJECTION, SOLUTION INTRAMUSCULAR; INTRAVENOUS ONCE
Status: COMPLETED | OUTPATIENT
Start: 2023-09-18 | End: 2023-09-18

## 2023-09-18 RX ADMIN — KETOROLAC TROMETHAMINE 15 MG: 15 INJECTION, SOLUTION INTRAMUSCULAR; INTRAVENOUS at 16:01

## 2023-09-18 RX ADMIN — Medication 2500 MG: at 16:02

## 2023-09-18 RX ADMIN — HYDROCODONE BITARTRATE AND ACETAMINOPHEN 0.5 TABLET: 5; 325 TABLET ORAL at 17:21

## 2023-09-18 RX ADMIN — CEFEPIME 2000 MG: 2 INJECTION, POWDER, FOR SOLUTION INTRAVENOUS at 14:55

## 2023-09-18 ASSESSMENT — PAIN DESCRIPTION - LOCATION: LOCATION: FOOT

## 2023-09-18 ASSESSMENT — PAIN - FUNCTIONAL ASSESSMENT: PAIN_FUNCTIONAL_ASSESSMENT: 0-10

## 2023-09-18 ASSESSMENT — PAIN SCALES - GENERAL
PAINLEVEL_OUTOF10: 10
PAINLEVEL_OUTOF10: 9

## 2023-09-18 ASSESSMENT — PAIN DESCRIPTION - ORIENTATION: ORIENTATION: LEFT

## 2023-09-18 ASSESSMENT — PAIN DESCRIPTION - DESCRIPTORS: DESCRIPTORS: ACHING

## 2023-09-18 NOTE — ED PROVIDER NOTES
There is no abdominal tenderness. Musculoskeletal:         General: Normal range of motion. Cervical back: Normal range of motion. No rigidity. Skin:     General: Skin is warm. Comments: See attached media for left heel wound. No significant depth to the heel wound. Neurological:      General: No focal deficit present. Mental Status: He is alert.    Psychiatric:         Mood and Affect: Mood normal.            Procedures     Procedures    Orders Placed This Encounter   Procedures    Blood Culture 1    Blood Culture 2    XR CHEST PORTABLE    XR FOOT LEFT (MIN 3 VIEWS)    Comprehensive Metabolic Panel    CBC with Auto Differential    Lactate, Sepsis    Procalcitonin    Urinalysis    Neurologic Status Assessment    Strict intake and output    Vital Signs Per Unit Routine    EKG 12 Lead    Saline lock IV        Medications given during this emergency department visit:  Medications   vancomycin (VANCOCIN) 2500 mg in sodium chloride 0.9 % 500 mL IVPB (2,500 mg IntraVENous New Bag 9/18/23 1602)   ceFEPIme (MAXIPIME) 2,000 mg in sodium chloride 0.9 % 100 mL IVPB (mini-bag) (0 mg IntraVENous Stopped 9/18/23 1602)   ketorolac (TORADOL) injection 15 mg (15 mg IntraVENous Given 9/18/23 1601)   HYDROcodone-acetaminophen (NORCO) 5-325 MG per tablet 0.5 tablet (0.5 tablets Oral Given 9/18/23 1721)       New Prescriptions:  New Prescriptions    No medications on file        Past Medical History:   Diagnosis Date    Acute coronary syndrome ([address]) 12/15/2016    Acute pancreatitis 2/15/2018    Arthritis     psoriatic    Diabetes ([address])     Endocrine disease     Hypertension     Nonintractable epilepsy with complex partial seizures ([address]) 4/2/2020    Psoriatic arthropathy ([address])     Seizures ([address])     Type 2 diabetes with nephropathy ([address])         Past Surgical History:   Procedure Laterality Date    APPENDECTOMY      CHOLECYSTECTOMY      CORONARY ANGIOPLASTY WITH STENT PLACEMENT  11/2020    HERNIA REPAIR      IR

## 2023-09-18 NOTE — DISCHARGE INSTRUCTIONS
As discussed, your work-up looks okay today. There is no elevated white count or signs of a systemic infection. Additionally, your x-ray does not show any sign or suggestion of developing osteomyelitis. Nevertheless, like you begin taking the antibiotics that were prescribed by your foot doctor today. Furthermore, I have placed a referral into the orthopedic group. You should expect a phone call from the neck several days. If you have worsening worrisome symptoms then please return here probably for reevaluation.

## 2023-09-23 LAB
BACTERIA SPEC CULT: NORMAL
BACTERIA SPEC CULT: NORMAL
SERVICE CMNT-IMP: NORMAL
SERVICE CMNT-IMP: NORMAL

## 2023-10-03 ENCOUNTER — OFFICE VISIT (OUTPATIENT)
Dept: ORTHOPEDIC SURGERY | Age: 53
End: 2023-10-03

## 2023-10-03 ENCOUNTER — TELEPHONE (OUTPATIENT)
Age: 53
End: 2023-10-03

## 2023-10-03 ENCOUNTER — TELEPHONE (OUTPATIENT)
Dept: ORTHOPEDIC SURGERY | Age: 53
End: 2023-10-03

## 2023-10-03 DIAGNOSIS — M25.572 ACUTE LEFT ANKLE PAIN: Primary | ICD-10-CM

## 2023-10-03 DIAGNOSIS — L97.524 NON-PRESSURE CHRONIC ULCER OF OTHER PART OF LEFT FOOT WITH NECROSIS OF BONE (HCC): ICD-10-CM

## 2023-10-03 NOTE — PROGRESS NOTES
Name: Candida Breaux  YOB: 1970  Gender: male  MRN: [id number]    Summary: left heel wound    Need cardiac clearance  Proceed with surgery. Consent to read left Achilles lengthening, left heel wound debridement, resection of left calcaneus bone    Plan to admit postoperatively    Popliteal saphenous block     CC: left heel wound    HPI: Candida Breaux is a 48 y.o. male who presents due to a wound on their foot. Years ago he was involved in a lawnmower accident which resulted in foot injuries and bone loss. He is healed up well from that but now he continues to have wounds from the plantar aspect of the heel and infections from his heel. Patient presents with left nonhealing heel ulcer that began approximately 4 months ago. He has been seeing Dr. Lei Alvarez. He took his last dose of Augmentin and doxycycline yesterday. He notes that his wound has improved minimally over the past several weeks. He notes that it is extremely painful and difficult to ambulate. He has a history of a right first MTP fusion done approximately 1 year ago by Dr. Faisal Lawrence. He has been ambulating in a postop sandal.  He states that his infection has improved and he is no longer having significant drainage but it is definitely present. History was obtained by patient    ROS/Meds/PSH/PMH/FH/SH:   Patient Denies fever/chills, headache, visual changes, chest pain, shortness of breath, and nausea/vomiting/diarrhea     Tobacco:  reports that he quit smoking about 4 years ago. He smoked an average of 1 pack per day.  He has quit using smokeless tobacco.  Diabetes: Diabetic - Insulin dependent  Lab Results   Component Value Date    LABA1C 9.4 (H) 06/26/2021       Physical Examination:  Gen: AAOx3 NAD    left lower:   Achilles Contracture noted on silverskoid exam: With the hindfoot in neutral and forefoot supinated ankle dorsiflexion is diminished with knee in extension and with the knee at 90deg  Cavovarus foot posture when

## 2023-10-03 NOTE — TELEPHONE ENCOUNTER
----- Message from Deshaun Stone sent at 10/3/2023  4:11 PM EDT -----  Patient was currently seeing Dr Hakeem Garza but now since he is retired we need him in to get cardiac clearance he is currently on [address]   He is having surgery with us Novemeber 8th   We are seeing him on October 30th for a pre op we need to obtain cardiac clearance by then please call patient and get him scheduled

## 2023-10-03 NOTE — TELEPHONE ENCOUNTER
Lt foot wound/patient calling stating would care is referring him but I don't see anything.  Please call or advise

## 2023-10-04 PROBLEM — L97.524 NON-PRESSURE CHRONIC ULCER OF OTHER PART OF LEFT FOOT WITH NECROSIS OF BONE (HCC): Status: ACTIVE | Noted: 2023-10-03

## 2023-10-13 NOTE — PROGRESS NOTES
as noted    BP at target, continue meds    Check lipid panel today, on Repatha and statin, having not been at goal on statin  monotherapy. Return in about 6 months (around 4/16/2024). Thank you for allowing me to participate in this patient's care. Please call or contact me if there are any questions or concerns regarding the above.       Eden Garduno MD  10/16/23  3:05 PM

## 2023-10-16 ENCOUNTER — CLINICAL DOCUMENTATION (OUTPATIENT)
Dept: ORTHOPEDIC SURGERY | Age: 53
End: 2023-10-16

## 2023-10-16 ENCOUNTER — OFFICE VISIT (OUTPATIENT)
Age: 53
End: 2023-10-16
Payer: COMMERCIAL

## 2023-10-16 VITALS
HEIGHT: 69 IN | HEART RATE: 90 BPM | DIASTOLIC BLOOD PRESSURE: 70 MMHG | SYSTOLIC BLOOD PRESSURE: 118 MMHG | WEIGHT: 234 LBS | BODY MASS INDEX: 34.66 KG/M2

## 2023-10-16 DIAGNOSIS — Z01.818 PRE-OP EVALUATION: Primary | ICD-10-CM

## 2023-10-16 DIAGNOSIS — E78.5 DYSLIPIDEMIA: ICD-10-CM

## 2023-10-16 DIAGNOSIS — I10 ESSENTIAL HYPERTENSION: ICD-10-CM

## 2023-10-16 DIAGNOSIS — I25.118 CORONARY ARTERY DISEASE OF NATIVE ARTERY OF NATIVE HEART WITH STABLE ANGINA PECTORIS (HCC): ICD-10-CM

## 2023-10-16 PROCEDURE — 3078F DIAST BP <80 MM HG: CPT | Performed by: INTERNAL MEDICINE

## 2023-10-16 PROCEDURE — 99214 OFFICE O/P EST MOD 30 MIN: CPT | Performed by: INTERNAL MEDICINE

## 2023-10-16 PROCEDURE — 3074F SYST BP LT 130 MM HG: CPT | Performed by: INTERNAL MEDICINE

## 2023-10-16 RX ORDER — CLOPIDOGREL BISULFATE 75 MG/1
75 TABLET ORAL DAILY
Qty: 90 TABLET | Refills: 3 | Status: SHIPPED | OUTPATIENT
Start: 2023-10-16

## 2023-10-16 ASSESSMENT — ENCOUNTER SYMPTOMS: SHORTNESS OF BREATH: 0

## 2023-10-16 NOTE — PROGRESS NOTES
IMPRESSION:     Provided his A1C has been controlled, he is low cardiovascular risk for proposed surgery and to hold clopidogrel 5 days prior. Brilinta cost prohibitive. Switch to clopidogrel as noted     BP at target, continue meds     Check lipid panel today, on Repatha and statin, having not been at goal on statin  monotherapy.

## 2023-10-17 ENCOUNTER — TELEPHONE (OUTPATIENT)
Age: 53
End: 2023-10-17

## 2023-10-17 NOTE — TELEPHONE ENCOUNTER
----- Message from Martín Suarez MD sent at 10/17/2023  7:30 AM EDT -----  LDL still not at goal.  He's got to really get diet under control. If he's willing, refer to nutritionist.  Meanwhile, continue high intensity statin, PCSK9i, fenofibrate, and add Zetia 10 mg once daily.   Repeat lipids 3 months

## 2023-10-25 NOTE — TELEPHONE ENCOUNTER
Three attempts made to reach the patient, also left three voicemail's. Patient has nor responded as of this note.

## 2023-10-30 ENCOUNTER — HOSPITAL ENCOUNTER (EMERGENCY)
Age: 53
Discharge: HOME | End: 2023-10-30
Payer: COMMERCIAL

## 2023-10-30 ENCOUNTER — TELEPHONE (OUTPATIENT)
Dept: ORTHOPEDIC SURGERY | Age: 53
End: 2023-10-30

## 2023-10-30 ENCOUNTER — APPOINTMENT (OUTPATIENT)
Dept: GENERAL RADIOLOGY | Age: 53
End: 2023-10-30
Payer: COMMERCIAL

## 2023-10-30 VITALS
BODY MASS INDEX: 34.66 KG/M2 | DIASTOLIC BLOOD PRESSURE: 91 MMHG | RESPIRATION RATE: 18 BRPM | HEIGHT: 69 IN | WEIGHT: 234 LBS | OXYGEN SATURATION: 98 % | HEART RATE: 85 BPM | TEMPERATURE: 98.2 F | SYSTOLIC BLOOD PRESSURE: 128 MMHG

## 2023-10-30 DIAGNOSIS — S91.309A OPEN WOUND OF FOOT EXCLUDING TOES: Primary | ICD-10-CM

## 2023-10-30 LAB
ALBUMIN SERPL-MCNC: 3.7 G/DL (ref 3.5–5)
ALBUMIN/GLOB SERPL: 0.9 (ref 0.4–1.6)
ALP SERPL-CCNC: 71 U/L (ref 50–136)
ALT SERPL-CCNC: 20 U/L (ref 12–65)
ANION GAP SERPL CALC-SCNC: 11 MMOL/L (ref 2–11)
AST SERPL-CCNC: 6 U/L (ref 15–37)
BASOPHILS # BLD: 0 K/UL (ref 0–0.2)
BASOPHILS NFR BLD: 0 % (ref 0–2)
BILIRUB SERPL-MCNC: 0.7 MG/DL (ref 0.2–1.1)
BUN SERPL-MCNC: 18 MG/DL (ref 6–23)
CALCIUM SERPL-MCNC: 9.5 MG/DL (ref 8.3–10.4)
CHLORIDE SERPL-SCNC: 101 MMOL/L (ref 101–110)
CO2 SERPL-SCNC: 20 MMOL/L (ref 21–32)
CREAT SERPL-MCNC: 0.9 MG/DL (ref 0.8–1.5)
DIFFERENTIAL METHOD BLD: NORMAL
EOSINOPHIL # BLD: 0.4 K/UL (ref 0–0.8)
EOSINOPHIL NFR BLD: 4 % (ref 0.5–7.8)
ERYTHROCYTE [DISTWIDTH] IN BLOOD BY AUTOMATED COUNT: 12.7 % (ref 11.9–14.6)
GLOBULIN SER CALC-MCNC: 4.1 G/DL (ref 2.8–4.5)
GLUCOSE SERPL-MCNC: 284 MG/DL (ref 65–100)
HCT VFR BLD AUTO: 47.2 % (ref 41.1–50.3)
HGB BLD-MCNC: 16.1 G/DL (ref 13.6–17.2)
IMM GRANULOCYTES # BLD AUTO: 0 K/UL (ref 0–0.5)
IMM GRANULOCYTES NFR BLD AUTO: 0 % (ref 0–5)
LYMPHOCYTES # BLD: 2.2 K/UL (ref 0.5–4.6)
LYMPHOCYTES NFR BLD: 23 % (ref 13–44)
MCH RBC QN AUTO: 31.3 PG (ref 26.1–32.9)
MCHC RBC AUTO-ENTMCNC: 34.1 G/DL (ref 31.4–35)
MCV RBC AUTO: 91.8 FL (ref 82–102)
MONOCYTES # BLD: 0.8 K/UL (ref 0.1–1.3)
MONOCYTES NFR BLD: 8 % (ref 4–12)
NEUTS SEG # BLD: 6.3 K/UL (ref 1.7–8.2)
NEUTS SEG NFR BLD: 65 % (ref 43–78)
NRBC # BLD: 0 K/UL (ref 0–0.2)
PLATELET # BLD AUTO: 271 K/UL (ref 150–450)
PMV BLD AUTO: 11.1 FL (ref 9.4–12.3)
POTASSIUM SERPL-SCNC: 4.2 MMOL/L (ref 3.5–5.1)
PROT SERPL-MCNC: 7.8 G/DL (ref 6.3–8.2)
RBC # BLD AUTO: 5.14 M/UL (ref 4.23–5.6)
SODIUM SERPL-SCNC: 132 MMOL/L (ref 133–143)
WBC # BLD AUTO: 9.7 K/UL (ref 4.3–11.1)

## 2023-10-30 PROCEDURE — 99284 EMERGENCY DEPT VISIT MOD MDM: CPT

## 2023-10-30 PROCEDURE — 80053 COMPREHEN METABOLIC PANEL: CPT

## 2023-10-30 PROCEDURE — 73630 X-RAY EXAM OF FOOT: CPT

## 2023-10-30 PROCEDURE — 85025 COMPLETE CBC W/AUTO DIFF WBC: CPT

## 2023-10-30 RX ORDER — CEPHALEXIN 500 MG/1
500 CAPSULE ORAL 4 TIMES DAILY
Qty: 28 CAPSULE | Refills: 0 | Status: SHIPPED | OUTPATIENT
Start: 2023-10-30 | End: 2023-11-06

## 2023-10-30 ASSESSMENT — LIFESTYLE VARIABLES
HOW OFTEN DO YOU HAVE A DRINK CONTAINING ALCOHOL: 2-4 TIMES A MONTH
HOW MANY STANDARD DRINKS CONTAINING ALCOHOL DO YOU HAVE ON A TYPICAL DAY: 1 OR 2

## 2023-10-30 ASSESSMENT — PAIN DESCRIPTION - ORIENTATION: ORIENTATION: LEFT

## 2023-10-30 ASSESSMENT — PAIN DESCRIPTION - LOCATION: LOCATION: FOOT

## 2023-10-30 ASSESSMENT — PAIN - FUNCTIONAL ASSESSMENT: PAIN_FUNCTIONAL_ASSESSMENT: 0-10

## 2023-10-30 ASSESSMENT — PAIN SCALES - GENERAL: PAINLEVEL_OUTOF10: 10

## 2023-10-30 ASSESSMENT — PAIN DESCRIPTION - FREQUENCY: FREQUENCY: CONTINUOUS

## 2023-10-30 ASSESSMENT — PAIN DESCRIPTION - PAIN TYPE: TYPE: ACUTE PAIN;CHRONIC PAIN

## 2023-10-30 ASSESSMENT — PAIN DESCRIPTION - DESCRIPTORS: DESCRIPTORS: ACHING;DISCOMFORT

## 2023-10-30 NOTE — DISCHARGE INSTRUCTIONS
Keep clean, apply thick dressing change twice a day, use antibiotics 4 times a day, call your ortho office in am to arrange follow up appt limit walking on left heel

## 2023-10-30 NOTE — ED PROVIDER NOTES
Emergency Department Provider Note       PCP: Samina Garcia MD   Age: 48 y.o. Sex: male     DISPOSITION Decision To Discharge 10/30/2023 04:48:43 PM       ICD-10-CM    1. Open wound of foot excluding toes  S91.309A           Medical Decision Making     Complexity of Problems Addressed:  1 or more acute illnesses that pose a threat to life or bodily function. Data Reviewed and Analyzed:  I independently ordered and reviewed each unique test.  I reviewed external records: provider visit note from PCP. I reviewed external records: provider visit note from outside specialist.       I interpreted the X-rays left foot x rays w/o acute process. I interpreted the cbc normal, . Discussion of management or test interpretation. 49 yo male with wound to left heel, h/o irregular bone causing issues to have surgery next week, spoke with Dr. Mikie Alvarado and sent photo of wound, advised to start keflex, dress area and see him in follow up       Risk of Complications and/or Morbidity of Patient Management:  Prescription drug management performed. Shared medical decision making was utilized in creating the patients health plan today.          History       Pt to er c/o increased pain and drainage from left heel for past few days, pt to have surgery to area by  next week, no fever, + dm    Past Medical History:  12/15/2016: Acute coronary syndrome ([address])  2/15/2018: Acute pancreatitis  No date: Arthritis      Comment:  psoriatic  No date: Diabetes ([address])  No date: Endocrine disease  No date: Hypertension  4/2/2020: Nonintractable epilepsy with complex partial seizures ([address])  No date: Psoriatic arthropathy ([address])  No date: Seizures ([address])  No date: Type 2 diabetes with nephropathy ([address])     Past Surgical History:  No date: APPENDECTOMY  No date: CHOLECYSTECTOMY  11/2020: CORONARY ANGIOPLASTY WITH STENT PLACEMENT  No date: HERNIA REPAIR  11/25/2020: IR NONTUNNELED VASCULAR CATHETER  2/16/2018: IR NONTUNNELED VASCULAR

## 2023-10-30 NOTE — ED TRIAGE NOTES
Pt reports scheduled for diabetic foot ulcer surgery 11/8 L foot but pain has worsened and size of wound, pt referred by MD.   Pt also reports generalized body pain and tingling x2days.    (-)fever, (+)slight cough     A&Ox4

## 2023-10-31 ENCOUNTER — TELEPHONE (OUTPATIENT)
Dept: ORTHOPEDIC SURGERY | Age: 53
End: 2023-10-31

## 2023-10-31 NOTE — TELEPHONE ENCOUNTER
Please call patient , he said he needed a pre op appt , Dr Marcos Ordaz wanted to see him before his sx on the 8th

## 2023-11-02 ENCOUNTER — OFFICE VISIT (OUTPATIENT)
Dept: ORTHOPEDIC SURGERY | Age: 53
End: 2023-11-02
Payer: COMMERCIAL

## 2023-11-02 DIAGNOSIS — M25.572 ACUTE LEFT ANKLE PAIN: Primary | ICD-10-CM

## 2023-11-02 PROCEDURE — 99214 OFFICE O/P EST MOD 30 MIN: CPT | Performed by: ORTHOPAEDIC SURGERY

## 2023-11-02 NOTE — PROGRESS NOTES
surgical options. We discussed surgery to be Achilles lengthening, debridement of heel wound, minimally invasive calcaneus bone resection. I discussed with this patient  the risk associated with the outlined surgery. These risk include infection, delayed wound healing, hardware failure, painful hardware, recurring wounds, recurring pain, damage to surrounding structures such as nerves, vessels, tendons, ligaments, and joints. I discussed how no surgery is perfect and this surgery may not be successful. There is also risk of anesthesia such as nerve damage from local anesthetic, damage to the airway or mouth structures, respiratory distress, cardiac disease exacerbation, potential arrhythmias, and even death. The patient verbalized understanding of each of these risk  The patient was thoroughly informed regarding the Treatment Plan. Through shared decision making the patient elected to proceed with surgery and consented to the procedure. The discussion with him the postoperative plan is changed. We will plan to send him home postoperatively and keep him on antibiotics.   I will take cultures intraoperatively and this can help guide antibiotic treatment           Past Medical History:   Diagnosis Date    Acute coronary syndrome ([address]) 12/15/2016    Acute pancreatitis 2/15/2018    Arthritis     psoriatic    Diabetes ([address])     Endocrine disease     Hypertension     Nonintractable epilepsy with complex partial seizures ([address]) 4/2/2020    Psoriatic arthropathy ([address])     Seizures ([address])     Type 2 diabetes with nephropathy [facility])      Past Surgical History:   Procedure Laterality Date    APPENDECTOMY      CHOLECYSTECTOMY      CORONARY ANGIOPLASTY WITH STENT PLACEMENT  11/2020    HERNIA REPAIR      IR NONTUNNELED VASCULAR CATHETER  11/25/2020    IR NONTUNNELED VASCULAR CATHETER  2/16/2018    IR NONTUNNELED VASCULAR CATHETER 2/16/2018 [facility]    IR NONTUNNELED VASCULAR CATHETER  11/25/2020    IR

## 2023-11-02 NOTE — H&P (VIEW-ONLY)
using smokeless tobacco.  Diabetes: Diabetic - Insulin dependent  Lab Results   Component Value Date    LABA1C 9.4 (H) 06/26/2021       Physical Examination:  Gen: AAOx3 NAD    left lower:   Achilles Contracture noted on silverskoid exam: With the hindfoot in neutral and forefoot supinated ankle dorsiflexion is diminished with knee in extension and with the knee at 90deg  Cavovarus foot posture when standing. toe clawing noted  There is a wound: Heel wound tracks directly down to bone. Redness and erythema isolated heel. No signs of spreading redness erythema beyond the plantar heel. Today the wound is sealed over and there is no signs of pus or nor necrotic debris. Neuro:  normal SILT to s/s/sp/dp/t. Reflexes normal: 1+ patella reflex bilaterally, 1+ achilles reflex bilaterally, negative babinski bilaterally. no signs of hyper reflexia or absent reflex and decreased SILT in entire foot. Elrama Monofilament 5.07 exam normal however    Vascular: Bilateral DP and PT: dorsalis pedis 4/4, posterior tibial 4/4    Imaging:   I independently interpreted XR taken today     X-Ray LEFT Foot 3 vw (AP/Lateral/Oblique) for foot pain   Findings: Prior calcaneus excision noted in prior first MTP fusion. Plantar aspect the calcaneus has an apex deformity with obvious point loading of the plantar heel   Impression: Prior calcanectomy   Signature: Bryan Contreras MD       Differential Diagnosis:     Recurrent left plantar wound  Suspected calcaneus osteomyelitis  Achilles contracture     Treatment Plan:   I had a thorough discussion with the patient regarding the Treatment Plan    4 This is a chronic illness/condition with exacerbation and progression  I had a thoroughly informed the patient of the plan for treatment. I discussed non operative treatment initially. We discussed  continuing on antibiotics and wound care .   At this point Non surgical treatment has not relieved the patient symptoms    We discussed multiple

## 2023-11-03 NOTE — PERIOP NOTE
Patient verified name and . Order for consent not found in EHR     Type 1b surgery, PAT phone assessment complete. Orders not received. Labs per surgeon: None  Labs per anesthesia protocol: None    Patient answered medical/surgical history questions at their best of ability. All prior to admission medications documented in EPIC. Patient instructed to take the following medications the day of surgery according to anesthesia guidelines with a small sip of water: Atorvastatin, Carvedilol, Gabapentin, Isosorbide,Pantoprazole On the day before surgery please take Tylenol (Acetaminophen) 1000mg in the morning and then again before bed. You may substitute for Tylenol 650 mg. Hold all vitamins 7 days prior to surgery and NSAIDS 5 days prior to surgery. Prescription meds to hold:clopidogrel per surgeon's instruction. Pt seen by Dr. Audrey Gill and latest office note 10/16/2023 and states the following:  Provided his A1C has been controlled, he is low cardiovascular risk for proposed surgery and to hold clopidogrel 5 days prior. Patient instructed on the following:    > Arrive at [facility], time of arrival to be called the day before by 1700  > NPO after midnight, unless otherwise indicated, including gum, mints, and ice chips  > Responsible adult must drive patient to the hospital, stay during surgery, and patient will need supervision 24 hours after anesthesia  > Use antibacterial soap in shower the night before surgery and on the morning of surgery  > All piercings must be removed prior to arrival.    > Leave all valuables (money and jewelry) at home but bring insurance card and ID on DOS.   > You may be required to pay a deductible or co-pay on the day of your procedure. You can pre-pay by calling [phone] if your surgery is at the [facility] or [phone] if your surgery is at the [facility]. > Do not wear make-up, nail polish, lotions, cologne, perfumes, powders, or oil on skin.

## 2023-11-03 NOTE — PERIOP NOTE
Dear  Kristina Bejarano,      Thank you for completing your phone assessment with me today. Here are your requested surgery instructions. Please call #[phone], opt 7 or 9 with any questions/concerns. Your surgery is scheduled at [address] [place] [address], [facility] ([facility] with green roof). Please arrive at Entrance A OUTPATIENT SURGERY (next door to the ER); pre-op (#[phone]) will call you on the business day before your surgery with your arrival time. If you have any questions on the day of surgery, please call the pre-op dept. at the telephone number above. Nothing to eat after midnight which includes any gum, mints, candy, or ice chips. Please take these medications on the morning of surgery with a small sip of water: : Atorvastatin, Carvedilol, Gabapentin, Isosorbide,Pantoprazole . On the day before surgery take Acetaminophen 1000mg in the morning and at bedtime OR Acetaminophen 650mg in the morning, afternoon and bedtime. Please stop all vitamins/supplements 7 days prior to surgery and stop all NSAIDS (ibuprofen, naproxen, aleve, motrin, advil) 5 days before your surgery. A responsible adult must drive you to the hospital, remain in the building during surgery and you will need adult supervision for 24 hours after anesthesia. Please use an antibacterial soap (Dial, Safeguard, etc.) the night before surgery and on the morning of surgery unless you have been otherwise instructed. Do NOT wear deodorant, make-up, nail polish, lotions, cologne, perfumes, powders or oil on your skin. All piercings/metal/jewelry must be removed prior to arrival.  If you wear contacts then you will need to bring a case to store them in or wear your glasses. Please leave all your valuables at home but be sure to bring your insurance card and ID on the day of surgery for registration/identification.       Our Guide to Surgery with additional information can be

## 2023-11-07 ENCOUNTER — ANESTHESIA EVENT (OUTPATIENT)
Dept: SURGERY | Age: 53
End: 2023-11-07
Payer: COMMERCIAL

## 2023-11-07 DIAGNOSIS — M25.572 ACUTE LEFT ANKLE PAIN: Primary | ICD-10-CM

## 2023-11-07 RX ORDER — ONDANSETRON 4 MG/1
4 TABLET, FILM COATED ORAL DAILY PRN
Qty: 30 TABLET | Refills: 0 | Status: SHIPPED | OUTPATIENT
Start: 2023-11-07

## 2023-11-07 RX ORDER — OXYCODONE HYDROCHLORIDE 5 MG/1
5 TABLET ORAL EVERY 6 HOURS PRN
Qty: 20 TABLET | Refills: 0 | Status: SHIPPED | OUTPATIENT
Start: 2023-11-07 | End: 2023-11-12

## 2023-11-07 RX ORDER — ASPIRIN 81 MG/1
81 TABLET ORAL 2 TIMES DAILY
Qty: 30 TABLET | Refills: 0 | Status: SHIPPED | OUTPATIENT
Start: 2023-11-07

## 2023-11-07 NOTE — PROGRESS NOTES
Preop department called to notify patient of arrival time for scheduled procedure. Instructions given to   - Arrive at [address] [facility] [place]. - Remain NPO after midnight, unless otherwise indicated, including gum, mints, and ice chips. - Have a responsible adult to drive patient to the hospital, stay during surgery, and patient will need supervision 24 hours after anesthesia. - Use antibacterial soap in shower the night before surgery and on the morning of surgery.        Was patient contacted: yes, self  Voicemail left: n/a  Numbers contacted: [phone]   Arrival time: 2188

## 2023-11-08 ENCOUNTER — ANESTHESIA (OUTPATIENT)
Dept: SURGERY | Age: 53
End: 2023-11-08
Payer: COMMERCIAL

## 2023-11-08 ENCOUNTER — HOSPITAL ENCOUNTER (OUTPATIENT)
Age: 53
Setting detail: OUTPATIENT SURGERY
Discharge: HOME | End: 2023-11-08
Attending: ORTHOPAEDIC SURGERY | Admitting: ORTHOPAEDIC SURGERY
Payer: COMMERCIAL

## 2023-11-08 ENCOUNTER — APPOINTMENT (OUTPATIENT)
Dept: GENERAL RADIOLOGY | Age: 53
End: 2023-11-08
Attending: ORTHOPAEDIC SURGERY
Payer: COMMERCIAL

## 2023-11-08 VITALS
RESPIRATION RATE: 20 BRPM | SYSTOLIC BLOOD PRESSURE: 137 MMHG | HEART RATE: 69 BPM | HEIGHT: 69 IN | TEMPERATURE: 98 F | BODY MASS INDEX: 34.66 KG/M2 | DIASTOLIC BLOOD PRESSURE: 94 MMHG | OXYGEN SATURATION: 98 % | WEIGHT: 234 LBS

## 2023-11-08 DIAGNOSIS — L97.524 NON-PRESSURE CHRONIC ULCER OF OTHER PART OF LEFT FOOT WITH NECROSIS OF BONE (HCC): ICD-10-CM

## 2023-11-08 DIAGNOSIS — G89.18 POST-OP PAIN: Primary | ICD-10-CM

## 2023-11-08 LAB
ANION GAP SERPL CALC-SCNC: 8 MMOL/L (ref 2–11)
BUN SERPL-MCNC: 8 MG/DL (ref 6–23)
CALCIUM SERPL-MCNC: 9 MG/DL (ref 8.3–10.4)
CHLORIDE SERPL-SCNC: 106 MMOL/L (ref 101–110)
CO2 SERPL-SCNC: 21 MMOL/L (ref 21–32)
CREAT SERPL-MCNC: 0.8 MG/DL (ref 0.8–1.5)
GLUCOSE BLD STRIP.AUTO-MCNC: 152 MG/DL (ref 65–100)
GLUCOSE BLD STRIP.AUTO-MCNC: 270 MG/DL (ref 65–100)
GLUCOSE BLD STRIP.AUTO-MCNC: 339 MG/DL (ref 65–100)
GLUCOSE SERPL-MCNC: 308 MG/DL (ref 65–100)
POTASSIUM BLD-SCNC: 4.2 MMOL/L (ref 3.5–5.1)
POTASSIUM SERPL-SCNC: 4 MMOL/L (ref 3.5–5.1)
SERVICE CMNT-IMP: ABNORMAL
SODIUM SERPL-SCNC: 135 MMOL/L (ref 133–143)

## 2023-11-08 PROCEDURE — 6360000002 HC RX W HCPCS: Performed by: ANESTHESIOLOGY

## 2023-11-08 PROCEDURE — 7100000001 HC PACU RECOVERY - ADDTL 15 MIN: Performed by: ORTHOPAEDIC SURGERY

## 2023-11-08 PROCEDURE — 2580000003 HC RX 258: Performed by: ANESTHESIOLOGY

## 2023-11-08 PROCEDURE — 6370000000 HC RX 637 (ALT 250 FOR IP): Performed by: ANESTHESIOLOGY

## 2023-11-08 PROCEDURE — 64447 NJX AA&/STRD FEMORAL NRV IMG: CPT | Performed by: ANESTHESIOLOGY

## 2023-11-08 PROCEDURE — 3700000000 HC ANESTHESIA ATTENDED CARE: Performed by: ORTHOPAEDIC SURGERY

## 2023-11-08 PROCEDURE — 2709999900 HC NON-CHARGEABLE SUPPLY: Performed by: ORTHOPAEDIC SURGERY

## 2023-11-08 PROCEDURE — 3600000004 HC SURGERY LEVEL 4 BASE: Performed by: ORTHOPAEDIC SURGERY

## 2023-11-08 PROCEDURE — 84132 ASSAY OF SERUM POTASSIUM: CPT

## 2023-11-08 PROCEDURE — 80048 BASIC METABOLIC PNL TOTAL CA: CPT

## 2023-11-08 PROCEDURE — 6360000002 HC RX W HCPCS

## 2023-11-08 PROCEDURE — 6360000002 HC RX W HCPCS: Performed by: PHYSICIAN ASSISTANT

## 2023-11-08 PROCEDURE — 3700000001 HC ADD 15 MINUTES (ANESTHESIA): Performed by: ORTHOPAEDIC SURGERY

## 2023-11-08 PROCEDURE — 87075 CULTR BACTERIA EXCEPT BLOOD: CPT

## 2023-11-08 PROCEDURE — 7100000010 HC PHASE II RECOVERY - FIRST 15 MIN: Performed by: ORTHOPAEDIC SURGERY

## 2023-11-08 PROCEDURE — 3600000014 HC SURGERY LEVEL 4 ADDTL 15MIN: Performed by: ORTHOPAEDIC SURGERY

## 2023-11-08 PROCEDURE — 2500000003 HC RX 250 WO HCPCS

## 2023-11-08 PROCEDURE — 87070 CULTURE OTHR SPECIMN AEROBIC: CPT

## 2023-11-08 PROCEDURE — 82962 GLUCOSE BLOOD TEST: CPT

## 2023-11-08 PROCEDURE — 64445 NJX AA&/STRD SCIATIC NRV IMG: CPT | Performed by: ANESTHESIOLOGY

## 2023-11-08 PROCEDURE — 7100000000 HC PACU RECOVERY - FIRST 15 MIN: Performed by: ORTHOPAEDIC SURGERY

## 2023-11-08 PROCEDURE — 2720000010 HC SURG SUPPLY STERILE: Performed by: ORTHOPAEDIC SURGERY

## 2023-11-08 PROCEDURE — 87205 SMEAR GRAM STAIN: CPT

## 2023-11-08 RX ORDER — DOCUSATE SODIUM 100 MG/1
100 CAPSULE, LIQUID FILLED ORAL DAILY PRN
Qty: 30 CAPSULE | Refills: 0 | Status: SHIPPED | OUTPATIENT
Start: 2023-11-08

## 2023-11-08 RX ORDER — SODIUM CHLORIDE 0.9 % (FLUSH) 0.9 %
5-40 SYRINGE (ML) INJECTION EVERY 12 HOURS SCHEDULED
Status: DISCONTINUED | OUTPATIENT
Start: 2023-11-08 | End: 2023-11-08 | Stop reason: HOSPADM

## 2023-11-08 RX ORDER — LIDOCAINE HYDROCHLORIDE 10 MG/ML
1 INJECTION, SOLUTION INFILTRATION; PERINEURAL
Status: DISCONTINUED | OUTPATIENT
Start: 2023-11-08 | End: 2023-11-08 | Stop reason: HOSPADM

## 2023-11-08 RX ORDER — FENTANYL CITRATE 50 UG/ML
INJECTION, SOLUTION INTRAMUSCULAR; INTRAVENOUS PRN
Status: DISCONTINUED | OUTPATIENT
Start: 2023-11-08 | End: 2023-11-08 | Stop reason: SDUPTHER

## 2023-11-08 RX ORDER — SODIUM CHLORIDE 0.9 % (FLUSH) 0.9 %
5-40 SYRINGE (ML) INJECTION PRN
Status: DISCONTINUED | OUTPATIENT
Start: 2023-11-08 | End: 2023-11-08 | Stop reason: HOSPADM

## 2023-11-08 RX ORDER — ONDANSETRON 2 MG/ML
4 INJECTION INTRAMUSCULAR; INTRAVENOUS
Status: DISCONTINUED | OUTPATIENT
Start: 2023-11-08 | End: 2023-11-08 | Stop reason: HOSPADM

## 2023-11-08 RX ORDER — GLYCOPYRROLATE 0.2 MG/ML
INJECTION INTRAMUSCULAR; INTRAVENOUS PRN
Status: DISCONTINUED | OUTPATIENT
Start: 2023-11-08 | End: 2023-11-08 | Stop reason: SDUPTHER

## 2023-11-08 RX ORDER — HALOPERIDOL 5 MG/ML
1 INJECTION INTRAMUSCULAR
Status: DISCONTINUED | OUTPATIENT
Start: 2023-11-08 | End: 2023-11-08 | Stop reason: HOSPADM

## 2023-11-08 RX ORDER — IPRATROPIUM BROMIDE AND ALBUTEROL SULFATE 2.5; .5 MG/3ML; MG/3ML
1 SOLUTION RESPIRATORY (INHALATION)
Status: DISCONTINUED | OUTPATIENT
Start: 2023-11-08 | End: 2023-11-08 | Stop reason: HOSPADM

## 2023-11-08 RX ORDER — OXYCODONE HYDROCHLORIDE 5 MG/1
5 TABLET ORAL
Status: DISCONTINUED | OUTPATIENT
Start: 2023-11-08 | End: 2023-11-08 | Stop reason: HOSPADM

## 2023-11-08 RX ORDER — BUPIVACAINE HYDROCHLORIDE 5 MG/ML
INJECTION, SOLUTION EPIDURAL; INTRACAUDAL
Status: COMPLETED | OUTPATIENT
Start: 2023-11-08 | End: 2023-11-08

## 2023-11-08 RX ORDER — LIDOCAINE HYDROCHLORIDE 20 MG/ML
INJECTION, SOLUTION EPIDURAL; INFILTRATION; INTRACAUDAL; PERINEURAL PRN
Status: DISCONTINUED | OUTPATIENT
Start: 2023-11-08 | End: 2023-11-08 | Stop reason: SDUPTHER

## 2023-11-08 RX ORDER — HYDROMORPHONE HYDROCHLORIDE 2 MG/ML
0.5 INJECTION, SOLUTION INTRAMUSCULAR; INTRAVENOUS; SUBCUTANEOUS EVERY 10 MIN PRN
Status: DISCONTINUED | OUTPATIENT
Start: 2023-11-08 | End: 2023-11-08 | Stop reason: HOSPADM

## 2023-11-08 RX ORDER — SODIUM CHLORIDE, SODIUM LACTATE, POTASSIUM CHLORIDE, CALCIUM CHLORIDE 600; 310; 30; 20 MG/100ML; MG/100ML; MG/100ML; MG/100ML
INJECTION, SOLUTION INTRAVENOUS CONTINUOUS
Status: DISCONTINUED | OUTPATIENT
Start: 2023-11-08 | End: 2023-11-08 | Stop reason: HOSPADM

## 2023-11-08 RX ORDER — SUCCINYLCHOLINE CHLORIDE 20 MG/ML
INJECTION INTRAMUSCULAR; INTRAVENOUS PRN
Status: DISCONTINUED | OUTPATIENT
Start: 2023-11-08 | End: 2023-11-08 | Stop reason: SDUPTHER

## 2023-11-08 RX ORDER — ONDANSETRON 4 MG/1
4 TABLET, FILM COATED ORAL DAILY PRN
Qty: 30 TABLET | Refills: 0 | Status: SHIPPED | OUTPATIENT
Start: 2023-11-08

## 2023-11-08 RX ORDER — MIDAZOLAM HYDROCHLORIDE 2 MG/2ML
2 INJECTION, SOLUTION INTRAMUSCULAR; INTRAVENOUS
Status: COMPLETED | OUTPATIENT
Start: 2023-11-08 | End: 2023-11-08

## 2023-11-08 RX ORDER — PROPOFOL 10 MG/ML
INJECTION, EMULSION INTRAVENOUS PRN
Status: DISCONTINUED | OUTPATIENT
Start: 2023-11-08 | End: 2023-11-08 | Stop reason: SDUPTHER

## 2023-11-08 RX ORDER — ONDANSETRON 2 MG/ML
INJECTION INTRAMUSCULAR; INTRAVENOUS PRN
Status: DISCONTINUED | OUTPATIENT
Start: 2023-11-08 | End: 2023-11-08 | Stop reason: SDUPTHER

## 2023-11-08 RX ORDER — ACETAMINOPHEN 500 MG
1000 TABLET ORAL ONCE
Status: COMPLETED | OUTPATIENT
Start: 2023-11-08 | End: 2023-11-08

## 2023-11-08 RX ORDER — OXYCODONE HYDROCHLORIDE 5 MG/1
5 TABLET ORAL EVERY 6 HOURS PRN
Qty: 20 TABLET | Refills: 0 | Status: SHIPPED | OUTPATIENT
Start: 2023-11-08 | End: 2023-11-13

## 2023-11-08 RX ORDER — BUPIVACAINE HYDROCHLORIDE AND EPINEPHRINE 5; 5 MG/ML; UG/ML
INJECTION, SOLUTION EPIDURAL; INTRACAUDAL; PERINEURAL
Status: DISCONTINUED | OUTPATIENT
Start: 2023-11-08 | End: 2023-11-08

## 2023-11-08 RX ORDER — FENTANYL CITRATE 50 UG/ML
50 INJECTION, SOLUTION INTRAMUSCULAR; INTRAVENOUS EVERY 5 MIN PRN
Status: DISCONTINUED | OUTPATIENT
Start: 2023-11-08 | End: 2023-11-08 | Stop reason: HOSPADM

## 2023-11-08 RX ORDER — ASPIRIN 81 MG/1
81 TABLET ORAL 2 TIMES DAILY
Qty: 60 TABLET | Refills: 0 | Status: SHIPPED | OUTPATIENT
Start: 2023-11-08 | End: 2023-12-08

## 2023-11-08 RX ORDER — INSULIN LISPRO 100 [IU]/ML
10 INJECTION, SOLUTION INTRAVENOUS; SUBCUTANEOUS ONCE
Status: COMPLETED | OUTPATIENT
Start: 2023-11-08 | End: 2023-11-08

## 2023-11-08 RX ORDER — EPHEDRINE SULFATE/0.9% NACL/PF 50 MG/5 ML
SYRINGE (ML) INTRAVENOUS PRN
Status: DISCONTINUED | OUTPATIENT
Start: 2023-11-08 | End: 2023-11-08 | Stop reason: SDUPTHER

## 2023-11-08 RX ADMIN — BUPIVACAINE HYDROCHLORIDE 10 ML: 5 INJECTION, SOLUTION EPIDURAL; INTRACAUDAL at 10:13

## 2023-11-08 RX ADMIN — FENTANYL CITRATE 25 MCG: 50 INJECTION, SOLUTION INTRAMUSCULAR; INTRAVENOUS at 11:45

## 2023-11-08 RX ADMIN — GLYCOPYRROLATE 0.1 MG: 0.2 INJECTION INTRAMUSCULAR; INTRAVENOUS at 11:22

## 2023-11-08 RX ADMIN — Medication 2000 MG: at 11:11

## 2023-11-08 RX ADMIN — BUPIVACAINE HYDROCHLORIDE 20 ML: 5 INJECTION, SOLUTION EPIDURAL; INTRACAUDAL; PERINEURAL at 10:08

## 2023-11-08 RX ADMIN — Medication 15 MG: at 11:20

## 2023-11-08 RX ADMIN — Medication 10 MG: at 11:04

## 2023-11-08 RX ADMIN — MIDAZOLAM 2 MG: 1 INJECTION INTRAMUSCULAR; INTRAVENOUS at 10:08

## 2023-11-08 RX ADMIN — SODIUM CHLORIDE, POTASSIUM CHLORIDE, SODIUM LACTATE AND CALCIUM CHLORIDE: 600; 310; 30; 20 INJECTION, SOLUTION INTRAVENOUS at 08:14

## 2023-11-08 RX ADMIN — PROPOFOL 50 MG: 10 INJECTION, EMULSION INTRAVENOUS at 11:41

## 2023-11-08 RX ADMIN — Medication 140 MG: at 11:04

## 2023-11-08 RX ADMIN — Medication 5 MG: at 11:23

## 2023-11-08 RX ADMIN — ACETAMINOPHEN 1000 MG: 500 TABLET, FILM COATED ORAL at 08:14

## 2023-11-08 RX ADMIN — PROPOFOL 200 MG: 10 INJECTION, EMULSION INTRAVENOUS at 11:02

## 2023-11-08 RX ADMIN — FENTANYL CITRATE 50 MCG: 50 INJECTION, SOLUTION INTRAMUSCULAR; INTRAVENOUS at 11:15

## 2023-11-08 RX ADMIN — ONDANSETRON 4 MG: 2 INJECTION INTRAMUSCULAR; INTRAVENOUS at 11:22

## 2023-11-08 RX ADMIN — GLYCOPYRROLATE 0.1 MG: 0.2 INJECTION INTRAMUSCULAR; INTRAVENOUS at 11:24

## 2023-11-08 RX ADMIN — LIDOCAINE HYDROCHLORIDE 100 MG: 20 INJECTION, SOLUTION EPIDURAL; INFILTRATION; INTRACAUDAL; PERINEURAL at 11:02

## 2023-11-08 RX ADMIN — INSULIN LISPRO 10 UNITS: 100 INJECTION, SOLUTION INTRAVENOUS; SUBCUTANEOUS at 08:38

## 2023-11-08 RX ADMIN — FENTANYL CITRATE 25 MCG: 50 INJECTION, SOLUTION INTRAMUSCULAR; INTRAVENOUS at 11:41

## 2023-11-08 RX ADMIN — Medication 10 MG: at 11:31

## 2023-11-08 ASSESSMENT — PAIN SCALES - GENERAL
PAINLEVEL_OUTOF10: 5
PAINLEVEL_OUTOF10: 0

## 2023-11-08 NOTE — ANESTHESIA PROCEDURE NOTES
Peripheral Block    Patient location during procedure: pre-op  Reason for block: post-op pain management and at surgeon's request  Start time: 11/8/2023 10:13 AM  End time: 11/8/2023 10:14 AM  Staffing  Performed: anesthesiologist   Anesthesiologist: Mo Fleming MD  Performed by: Mo Fleming MD  Authorized by: Mo Fleming MD    Preanesthetic Checklist  Completed: patient identified, IV checked, site marked, risks and benefits discussed, surgical/procedural consents, equipment checked, pre-op evaluation, timeout performed, anesthesia consent given, oxygen available, monitors applied/VS acknowledged and blood product R/B/A discussed and consented  Peripheral Block   Patient position: supine  Prep: ChloraPrep  Provider prep: sterile gloves and mask  Patient monitoring: continuous pulse ox, cardiac monitor, frequent blood pressure checks, IV access, oxygen and responsive to questions  Block type: Femoral  Adductor canal  Laterality: left  Injection technique: single-shot  Guidance: ultrasound guided  Local infiltration: lidocaine  Infiltration strength: 1 %  Local infiltration: lidocaine  Dose: 3 mL    Needle   Needle type: insulated echogenic nerve stimulator needle   Needle gauge: 20 G  Needle localization: ultrasound guidance  Needle length: 10 cm  Assessment   Injection assessment: negative aspiration for heme, no paresthesia on injection, no intravascular symptoms and low pressure verified by pressure monitor  Paresthesia pain: none  Slow fractionated injection: yes  Hemodynamics: stable  Real-time US image taken/store: yes  Outcomes: uncomplicated    Medications Administered  bupivacaine (MARCAINE) PF injection 0.5% - Perineural   10 mL - 11/8/2023 10:13:00 AM

## 2023-11-08 NOTE — PERIOP NOTE
Dr. Sarbjit Hunt notified of CBG of 339. POC Potassium of 4.2 (sedlak aware) and BMP ordered and drawn.    10 units of Lispro insulin given subcutaneous     Will recheck blood sugar

## 2023-11-08 NOTE — ANESTHESIA PROCEDURE NOTES
Peripheral Block    Patient location during procedure: pre-op  Reason for block: post-op pain management and at surgeon's request  Start time: 11/8/2023 10:08 AM  End time: 11/8/2023 10:12 AM  Staffing  Performed: anesthesiologist   Anesthesiologist: John Elizabeth MD  Performed by: John Elizabeth MD  Authorized by: John Elizabeth MD    Preanesthetic Checklist  Completed: patient identified, IV checked, site marked, risks and benefits discussed, surgical/procedural consents, equipment checked, pre-op evaluation, timeout performed, anesthesia consent given, oxygen available, monitors applied/VS acknowledged and blood product R/B/A discussed and consented  Peripheral Block   Patient position: supine  Prep: ChloraPrep  Provider prep: mask and sterile gloves  Patient monitoring: cardiac monitor, continuous pulse ox, frequent blood pressure checks, IV access, oxygen and responsive to questions  Block type: Sciatic  Popliteal  Laterality: left  Injection technique: single-shot  Guidance: ultrasound guided  Local infiltration: lidocaine  Infiltration strength: 1 %  Local infiltration: lidocaine  Dose: 3 mL    Needle   Needle type: insulated echogenic nerve stimulator needle   Needle gauge: 20 G  Needle localization: ultrasound guidance  Needle length: 10 cm  Assessment   Injection assessment: negative aspiration for heme, no paresthesia on injection, low pressure verified by pressure monitor, no intravascular symptoms and local visualized surrounding nerve on ultrasound  Paresthesia pain: none  Slow fractionated injection: yes  Hemodynamics: stable  Real-time US image taken/store: yes  Outcomes: uncomplicated    Medications Administered  bupivacaine (MARCAINE) PF injection 0.5% - Perineural   20 mL - 11/8/2023 10:08:00 AM

## 2023-11-08 NOTE — INTERVAL H&P NOTE
Update History & Physical    The Patient's History and Physical was reviewed   I discussed the surgery and patients medical condition with the patient. The chart was reviewed with the patient and I examined the patient. There was no change. The surgical site was confirmed by the patient and me. CV: RRR  RESP: CTAB    Plan:  The risk, benefits, expected outcome, and alternative to the recommended procedure have been discussed with the patient. Patient understands and wants to proceed with the procedure.     Electronically signed by Omar Garcia MD on 11/08/23 7:58 AM

## 2023-11-08 NOTE — DISCHARGE INSTRUCTIONS
INSTRUCTIONS FOLLOWING FOOT SURGERY    ACTIVITY  Elevate foot. No Ice    1.)No weight bearing. Use crutches or knee walker until seen in follow up appointment    2.)Protected partial weight bearing on the heel only as tolerated in post op shoe after full sensation returns. Blood clot prevention:  As instructed by Dr Gilbert Schmitt: Take 81mg aspirin twice daily if ok with your medical doctor and you have no GI Ulcer. Get up and out of bed frequently. While in bed move the legs as much as possible. DRESSING CARE Keep dry and in place until follow up appointment. Cover with cast bag or plastic bag when showering. Let the office know if dressing gets saturated with water. Don't put anything into the splint to relieve itching etc.     CALL YOUR DOCTOR IF YOU HAVE  Excessive bleeding that does not stop after holding mild pressure over the area. Temperature of 101 degrees or above. Redness, excessive swelling or bruising, and/or green or yellow, smelly discharge from incision. Loss of sensation - cold, white or blue toes. DIET  Day of Surgery: Clear liquids until no nausea or vomiting; then light, bland diet (Baked chicken, plain rice, grits, scrambled egg, toast). Nothing Greasy, fried or spicy today  Advance to regular diet on second day, unless your doctor orders otherwise. PAIN  Take pain medications as directed by your doctor. Call your doctor if pain is NOT relieved by medication. MEDICATION INTERACTION:  During your procedure you potentially received a medication or medications which may reduce the effectiveness of oral contraceptives. Please consider other forms of contraception for 1 month following your procedure if you are currently using oral contraceptives as your primary form of birth control.  In addition to this, we recommend continuing your oral contraceptive as prescribed, unless otherwise instructed by your physician, during this time    After general anesthesia or intravenous sedation,

## 2023-11-08 NOTE — ANESTHESIA PRE PROCEDURE
Department of Anesthesiology  Preprocedure Note       Name:  Ambrose Vera   Age:  48 y.o.  :  1970                                          MRN:  [id number]         Date:  2023      Surgeon: Brigid Ward):  Anna Marie Khalil MD    Procedure: Procedure(s):  LEFT CALCANEAL EXCISION    Medications prior to admission:   Prior to Admission medications    Medication Sig Start Date End Date Taking? Authorizing Provider   oxyCODONE (ROXICODONE) 5 MG immediate release tablet Take 1 tablet by mouth every 6 hours as needed for Pain for up to 5 days. Intended supply: 5 days.  Take lowest dose possible to manage pain Max Daily Amount: 20 mg 23  Anna Marie Khalil MD   aspirin (ASPIRIN 81) 81 MG EC tablet Take 1 tablet by mouth in the morning and at bedtime 23   Anna Marie Khalil MD   ondansetron [facility]) 4 MG tablet Take 1 tablet by mouth daily as needed for Nausea or Vomiting 23   Anna Marie Khalil MD   clopidogrel (PLAVIX) 75 MG tablet Take 1 tablet by mouth daily 10/16/23   Cornelio Sow MD   Evolocumab (REPATHA SURECLICK) 264 MG/ML SOAJ Inject 140 mg into the skin every 14 days Faxed to [phone]. 22   Deandre Bartholomew MD   Lancets MISC Check blood sugar tid 19   Automatic Reconciliation, Ar   aspirin 81 MG EC tablet Take 2 tablets by mouth daily  Patient not taking: Reported on 11/3/2023    Automatic Reconciliation, Ar   atorvastatin (LIPITOR) 80 MG tablet Take 1 tablet by mouth daily 3/3/22 11/8/23  Automatic Reconciliation, Ar   carvedilol (COREG) 25 MG tablet Take 1 tablet by mouth 2 times daily (with meals) 3/3/22   Automatic Reconciliation, Ar   ergocalciferol (ERGOCALCIFEROL) 1.25 MG (17885 UT) capsule Take 1 capsule by mouth every 7 days 20   Automatic Reconciliation, Ar   fenofibrate (TRIGLIDE) 160 MG tablet Take 1 tablet by mouth daily 3/3/22   Automatic Reconciliation, Ar   folic acid (FOLVITE) 1 MG tablet Take 1 tablet by mouth daily 10/14/21   Automatic

## 2023-11-08 NOTE — OP NOTE
Operative Note    Patient:Daniele Mandel  MRN: [id number]    Date Of Surgery: 11/8/2023    Surgeon: Jeevan Hood MD    Assistant Surgeon: None    Procedure Performed:   left  Plantar wound debridement  Partial excision calcaneus  Achilles tendon lengthening    Pre Op Diagnosis:  Left plantar calcaneal ulceration with osteomyelitis    Post Op Diagnosis:   same    Implants:   * No implants in log *    Anesthesia:  General    Blood Loss:  30cc    Tourniquet:  Estimated ankle Esmarch tourniquet-20 minutes    Pre Operative Abx:   Ancef 2g    Specimens/Cultures:  Wound culture deep    Significant Findings:  Plantar ulceration did not appear to track below the deep fascia. Calcaneal bone appeared healthy. Bony prominence noted    Pre Operative Course:  Anna Marie Galicia is a 48 y.o. male who has a history of a recurrent plantar ulceration that is repeatedly become infected secondary to lawnmower accident that resulted in an oddly shaped heel. Despite continued antibiotics he continues to be ulcerated and develop repeat infections. After discussion with him the decision was made for surgery    Operation In Detail:  Patient was evaluated in the preoperative area. The left lower extremity was marked by me. We had a long discussion about the procedure and postoperative protocols. The patient was then brought back to the operating room suite and placed in the operating room table. A timeout was taken to identify the patient, procedure being performed, and laterality. After this the patient was prepped and draped in the normal sterile fashion using a Betadine solution and/or a ChloraPrep solution. A timeout was then taken to identify the patient his name, date of birth, laterality, and procedure being performed. We also identified allergies and any concerns about the operation. Attention was then placed to the operative extremity.     Antibiotics, 2 g IV Ancef was administered    I then performed a Silfverskiold exam.

## 2023-11-10 LAB
BACTERIA SPEC CULT: NORMAL
BACTERIA SPEC CULT: NORMAL
GRAM STN SPEC: NORMAL
GRAM STN SPEC: NORMAL
SERVICE CMNT-IMP: NORMAL
SERVICE CMNT-IMP: NORMAL

## 2023-11-13 LAB
BACTERIA SPEC CULT: NORMAL
SERVICE CMNT-IMP: NORMAL

## 2023-11-28 ENCOUNTER — OFFICE VISIT (OUTPATIENT)
Dept: ORTHOPEDIC SURGERY | Age: 53
End: 2023-11-28

## 2023-11-28 DIAGNOSIS — G89.18 POST-OP PAIN: Primary | ICD-10-CM

## 2023-11-28 PROCEDURE — 99024 POSTOP FOLLOW-UP VISIT: CPT | Performed by: ORTHOPAEDIC SURGERY

## 2023-11-28 RX ORDER — NALOXONE HYDROCHLORIDE 4 MG/.1ML
4 SPRAY NASAL
COMMUNITY
Start: 2022-08-23

## 2023-11-28 RX ORDER — TRAMADOL HYDROCHLORIDE 50 MG/1
50 TABLET ORAL EVERY 6 HOURS PRN
Qty: 28 TABLET | Refills: 0 | Status: SHIPPED | OUTPATIENT
Start: 2023-11-28 | End: 2023-12-05

## 2023-11-28 RX ORDER — AMOXICILLIN AND CLAVULANATE POTASSIUM 875; 125 MG/1; MG/1
TABLET, FILM COATED ORAL
COMMUNITY
Start: 2023-09-18

## 2023-11-28 RX ORDER — NAPROXEN SODIUM 220 MG
220 TABLET ORAL PRN
COMMUNITY

## 2023-11-28 RX ORDER — ONDANSETRON 4 MG/1
4 TABLET, FILM COATED ORAL 3 TIMES DAILY PRN
COMMUNITY
Start: 2022-10-12

## 2023-11-28 RX ORDER — ACARBOSE 25 MG/1
TABLET ORAL
COMMUNITY
Start: 2023-11-24

## 2023-11-28 RX ORDER — DOXYCYCLINE HYCLATE 100 MG/1
CAPSULE ORAL
COMMUNITY
Start: 2023-09-18

## 2023-11-28 RX ORDER — ACETAMINOPHEN 325 MG/1
650 TABLET ORAL EVERY 8 HOURS
COMMUNITY
Start: 2022-10-12

## 2023-11-28 RX ORDER — FENOFIBRATE 134 MG/1
134 CAPSULE ORAL DAILY
COMMUNITY
Start: 2023-11-24

## 2023-11-28 NOTE — PROGRESS NOTES
Name: Pari Mendoza  YOB: 1970  Gender: male  MRN: [id number]    Plan:    Continue NWB x 3 weeks  Tramadol 50 mg given    Follow up in 3 week(s)without XR        Procedure: Left Calcaneal Excision - Left    Surgery Date: 11/8/2023      Subjective: Denies fevers chills or infection. Denies any signs of spreading erythema. Patient doing well. He is continued to be nonambulatory. He denies any significant pain but notes that it is worse at night and is requesting pain medication for nighttime. ROS: Patient Denies fever/chills, headache, visual changes, chest pain, shortness of breath, and nausea/vomiting/diarrhea     Exam:     Wounds: appears to be healing well with good reapproximation, healing well , sutures in place and were removed without difficulty  Edema/swelling: mild at the incision and surgical site  Tenderness: mild at the incision and surgical site    Neuro:  normal SILT to s/s/sp/dp/t. Reflexes normal: 1+ patella reflex bilaterally, 1+ achilles reflex bilaterally, negative babinski bilaterally. no signs of hyper reflexia or absent reflex    Vascular: BLE: 2+ DP pulse, toes wwp w/ BCR<2s    Imaging:   No imaging reviewed      Assessment/Plan:    DVT px: ASA 81 mg BID    Weight-bearing status: NWB          Return to work/work restrictions: none  Tramadol 50mg p.o. q6h PRN pain Disp #30. This patient does not have a sulfa allergy. However,  I discussed the side effects of nausea and vomiting and GI distress. I also discussed how this is a narcotic and its abuse potential.  I told the patient I am not a long term pain medication provider so this Rx is for a short time only. I explained the need for accountability and honestly with narcotics. I explained how overdosing can result in respiratory suppression and potentially death.      F/u 3 weeks w/ no Xray

## 2024-01-23 ENCOUNTER — OFFICE VISIT (OUTPATIENT)
Dept: ORTHOPEDIC SURGERY | Age: 54
End: 2024-01-23

## 2024-01-23 DIAGNOSIS — G89.18 POST-OP PAIN: ICD-10-CM

## 2024-01-23 DIAGNOSIS — L97.524 NON-PRESSURE CHRONIC ULCER OF OTHER PART OF LEFT FOOT WITH NECROSIS OF BONE (HCC): Primary | ICD-10-CM

## 2024-01-23 PROCEDURE — 99024 POSTOP FOLLOW-UP VISIT: CPT | Performed by: ORTHOPAEDIC SURGERY

## 2024-01-23 RX ORDER — L-METHYLFOLATE-ALGAE-VIT B12-B6 CAP 3-90.314-2-35 MG 3-90.314-2-35 MG
1 CAP ORAL DAILY
Qty: 30 CAPSULE | Refills: 2 | Status: SHIPPED | OUTPATIENT
Start: 2024-01-23 | End: 2024-04-22

## 2024-01-23 NOTE — PROGRESS NOTES
Name: Daniele Morel  YOB: 1970  Gender: male  MRN: [id number]    Plan:    New custom orthotics and extra depth diabetic shoes ordered  Metanx given = RX    Follow up in 3 month(s)without XR       Procedure: Left Calcaneal Excision - Left    Surgery Date: 11/8/2023      Subjective: Denies fevers chills or infection.  Denies any signs of spreading erythema.  12/19/2023-doing much better today.  States the wound is nearly completely healed.    1/23/2024-patient states that he is having significant pain globally about the foot and extending into the left lower extremity.  He describes his pain in his leg as a \"shinsplints.\"  He states that it is particularly worse at night.    ROS: Patient Denies fever/chills, headache, visual changes, chest pain, shortness of breath, and nausea/vomiting/diarrhea     Exam:     Wounds: appears to be healing well with good reapproximation, healing well , it is now scabbed over.  There is no more signs of open wound.  Edema/swelling: mild at the incision and surgical site  Tenderness: mild at the incision and surgical site    Neuro:  normal SILT to s/s/sp/dp/t.  Reflexes normal: 1+ patella reflex bilaterally, 1+ achilles reflex bilaterally, negative babinski bilaterally. no signs of hyper reflexia or absent reflex    Vascular: BLE: 2+ DP pulse, toes wwp w/ BCR<2s    Imaging:   No imaging reviewed

## 2024-03-04 ENCOUNTER — PATIENT MESSAGE (OUTPATIENT)
Dept: ORTHOPEDIC SURGERY | Age: 54
End: 2024-03-04

## 2024-03-04 NOTE — TELEPHONE ENCOUNTER
From: Daniele Morel  To: Dr. Kobi Paris  Sent: 3/4/2024 8:41 AM EST  Subject: Appointment Request    Appointment Request From: Daniele Morel    With Provider: Kobi Paris MD [[facility]]    Preferred Date Range: 3/8/2024 – 4/8/2024    Preferred Times: Any Time    Reason for visit: Request an Appointment    Comments:  Follow up

## 2024-04-30 SDOH — HEALTH STABILITY: PHYSICAL HEALTH: ON AVERAGE, HOW MANY DAYS PER WEEK DO YOU ENGAGE IN MODERATE TO STRENUOUS EXERCISE (LIKE A BRISK WALK)?: 3 DAYS

## 2024-04-30 SDOH — HEALTH STABILITY: PHYSICAL HEALTH: ON AVERAGE, HOW MANY MINUTES DO YOU ENGAGE IN EXERCISE AT THIS LEVEL?: 20 MIN

## 2024-05-01 ENCOUNTER — APPOINTMENT (OUTPATIENT)
Dept: GENERAL RADIOLOGY | Age: 54
DRG: 053 | End: 2024-05-01
Payer: COMMERCIAL

## 2024-05-01 ENCOUNTER — APPOINTMENT (OUTPATIENT)
Dept: CT IMAGING | Age: 54
DRG: 053 | End: 2024-05-01
Payer: COMMERCIAL

## 2024-05-01 ENCOUNTER — OFFICE VISIT (OUTPATIENT)
Dept: INTERNAL MEDICINE CLINIC | Facility: CLINIC | Age: 54
End: 2024-05-01
Payer: COMMERCIAL

## 2024-05-01 ENCOUNTER — HOSPITAL ENCOUNTER (INPATIENT)
Age: 54
LOS: 3 days | Discharge: HOME | DRG: 053 | End: 2024-05-05
Attending: EMERGENCY MEDICINE | Admitting: HOSPITALIST
Payer: COMMERCIAL

## 2024-05-01 VITALS
SYSTOLIC BLOOD PRESSURE: 154 MMHG | RESPIRATION RATE: 18 BRPM | DIASTOLIC BLOOD PRESSURE: 107 MMHG | OXYGEN SATURATION: 97 % | HEART RATE: 88 BPM | HEIGHT: 70 IN | BODY MASS INDEX: 31.95 KG/M2 | WEIGHT: 223.2 LBS

## 2024-05-01 DIAGNOSIS — R47.01 APHASIA: ICD-10-CM

## 2024-05-01 DIAGNOSIS — F41.9 ANXIETY: ICD-10-CM

## 2024-05-01 DIAGNOSIS — I10 PRIMARY HYPERTENSION: ICD-10-CM

## 2024-05-01 DIAGNOSIS — L40.50 PSORIATIC ARTHRITIS (HCC): ICD-10-CM

## 2024-05-01 DIAGNOSIS — G40.909 SEIZURE DISORDER (HCC): ICD-10-CM

## 2024-05-01 DIAGNOSIS — G62.9 PERIPHERAL POLYNEUROPATHY: ICD-10-CM

## 2024-05-01 DIAGNOSIS — Z79.4 TYPE 2 DIABETES MELLITUS WITH HYPERGLYCEMIA, WITH LONG-TERM CURRENT USE OF INSULIN (HCC): ICD-10-CM

## 2024-05-01 DIAGNOSIS — I63.9 CEREBROVASCULAR ACCIDENT (CVA), UNSPECIFIED MECHANISM (HCC): ICD-10-CM

## 2024-05-01 DIAGNOSIS — I25.10 CORONARY ARTERY DISEASE INVOLVING NATIVE CORONARY ARTERY OF NATIVE HEART WITHOUT ANGINA PECTORIS: Primary | ICD-10-CM

## 2024-05-01 DIAGNOSIS — R56.9 SEIZURE (HCC): Primary | ICD-10-CM

## 2024-05-01 DIAGNOSIS — Z86.31 HISTORY OF DIABETIC ULCER OF FOOT: ICD-10-CM

## 2024-05-01 DIAGNOSIS — G45.9 TIA (TRANSIENT ISCHEMIC ATTACK): ICD-10-CM

## 2024-05-01 DIAGNOSIS — E78.2 MIXED HYPERLIPIDEMIA: ICD-10-CM

## 2024-05-01 DIAGNOSIS — E11.65 TYPE 2 DIABETES MELLITUS WITH HYPERGLYCEMIA, WITH LONG-TERM CURRENT USE OF INSULIN (HCC): ICD-10-CM

## 2024-05-01 PROBLEM — N20.1 CALCULUS OF URETEROVESICAL JUNCTION (UVJ): Status: RESOLVED | Noted: 2021-05-04 | Resolved: 2024-05-01

## 2024-05-01 PROBLEM — L72.3 SEBACEOUS CYST: Status: RESOLVED | Noted: 2018-12-10 | Resolved: 2024-05-01

## 2024-05-01 PROBLEM — Z95.820 S/P ANGIOPLASTY WITH STENT: Status: RESOLVED | Noted: 2021-06-30 | Resolved: 2024-05-01

## 2024-05-01 PROBLEM — J02.9 PHARYNGITIS: Status: RESOLVED | Noted: 2020-03-09 | Resolved: 2024-05-01

## 2024-05-01 PROBLEM — Z09 ENCOUNTER FOR EXAMINATION FOLLOWING TREATMENT AT HOSPITAL: Status: RESOLVED | Noted: 2018-12-10 | Resolved: 2024-05-01

## 2024-05-01 PROBLEM — I25.110 CORONARY ARTERY DISEASE INVOLVING NATIVE CORONARY ARTERY OF NATIVE HEART WITH UNSTABLE ANGINA PECTORIS (HCC): Status: RESOLVED | Noted: 2021-10-21 | Resolved: 2024-05-01

## 2024-05-01 PROBLEM — J32.9 SINUSITIS: Status: RESOLVED | Noted: 2020-03-09 | Resolved: 2024-05-01

## 2024-05-01 PROBLEM — K85.90 ACUTE PANCREATITIS: Status: RESOLVED | Noted: 2018-02-15 | Resolved: 2024-05-01

## 2024-05-01 PROBLEM — Z91.148 NONCOMPLIANCE WITH MEDICATIONS: Status: RESOLVED | Noted: 2020-01-24 | Resolved: 2024-05-01

## 2024-05-01 PROBLEM — H60.91 OTITIS EXTERNA OF RIGHT EAR: Status: RESOLVED | Noted: 2021-06-01 | Resolved: 2024-05-01

## 2024-05-01 PROBLEM — M25.512 ACUTE PAIN OF LEFT SHOULDER: Status: RESOLVED | Noted: 2019-01-09 | Resolved: 2024-05-01

## 2024-05-01 PROBLEM — Z79.899 HIGH RISK MEDICATION USE: Status: RESOLVED | Noted: 2021-11-22 | Resolved: 2024-05-01

## 2024-05-01 PROBLEM — N39.0 URINARY TRACT INFECTION WITHOUT HEMATURIA: Status: RESOLVED | Noted: 2021-05-04 | Resolved: 2024-05-01

## 2024-05-01 PROBLEM — R11.0 NAUSEA: Status: RESOLVED | Noted: 2020-03-25 | Resolved: 2024-05-01

## 2024-05-01 PROBLEM — I21.4 NSTEMI (NON-ST ELEVATED MYOCARDIAL INFARCTION) (HCC): Status: RESOLVED | Noted: 2021-06-25 | Resolved: 2024-05-01

## 2024-05-01 PROBLEM — R30.0 DYSURIA: Status: RESOLVED | Noted: 2021-11-22 | Resolved: 2024-05-01

## 2024-05-01 PROBLEM — R22.1 MASS IN NECK: Status: RESOLVED | Noted: 2018-11-30 | Resolved: 2024-05-01

## 2024-05-01 PROBLEM — S91.309A OPEN WOUND OF FOOT EXCLUDING TOES: Status: RESOLVED | Noted: 2023-10-30 | Resolved: 2024-05-01

## 2024-05-01 PROBLEM — I25.119 CORONARY ARTERY DISEASE INVOLVING NATIVE HEART WITH ANGINA PECTORIS (HCC): Status: RESOLVED | Noted: 2020-03-25 | Resolved: 2024-05-01

## 2024-05-01 PROBLEM — H65.493 CHRONIC MIDDLE EAR EFFUSION, BILATERAL: Status: RESOLVED | Noted: 2020-03-09 | Resolved: 2024-05-01

## 2024-05-01 PROBLEM — R63.4 WEIGHT LOSS, UNINTENTIONAL: Status: RESOLVED | Noted: 2020-11-25 | Resolved: 2024-05-01

## 2024-05-01 PROBLEM — M75.02 ADHESIVE CAPSULITIS OF LEFT SHOULDER: Status: RESOLVED | Noted: 2019-01-09 | Resolved: 2024-05-01

## 2024-05-01 PROBLEM — Z76.0 MEDICATION REFILL: Status: RESOLVED | Noted: 2019-01-09 | Resolved: 2024-05-01

## 2024-05-01 PROBLEM — Z71.2 ENCOUNTER TO DISCUSS TEST RESULTS: Status: RESOLVED | Noted: 2018-03-19 | Resolved: 2024-05-01

## 2024-05-01 PROBLEM — E11.10 DIABETIC KETOACIDOSIS WITHOUT COMA ASSOCIATED WITH TYPE 2 DIABETES MELLITUS (HCC): Status: RESOLVED | Noted: 2018-02-15 | Resolved: 2024-05-01

## 2024-05-01 PROBLEM — S06.9XAA TBI (TRAUMATIC BRAIN INJURY) (HCC): Status: RESOLVED | Noted: 2020-02-12 | Resolved: 2024-05-01

## 2024-05-01 PROBLEM — E87.29 HIGH ANION GAP METABOLIC ACIDOSIS: Status: RESOLVED | Noted: 2018-02-15 | Resolved: 2024-05-01

## 2024-05-01 PROBLEM — I20.0 UNSTABLE ANGINA (HCC): Status: RESOLVED | Noted: 2019-05-02 | Resolved: 2024-05-01

## 2024-05-01 PROBLEM — K85.90 PANCREATITIS: Status: RESOLVED | Noted: 2018-02-17 | Resolved: 2024-05-01

## 2024-05-01 PROBLEM — L08.9 INFECTED SEBACEOUS CYST OF SKIN: Status: RESOLVED | Noted: 2019-01-09 | Resolved: 2024-05-01

## 2024-05-01 PROBLEM — Z71.89 ENCOUNTER FOR MEDICATION REVIEW AND COUNSELING: Status: RESOLVED | Noted: 2018-03-19 | Resolved: 2024-05-01

## 2024-05-01 PROBLEM — R07.9 CHEST PAIN: Status: RESOLVED | Noted: 2020-03-25 | Resolved: 2024-05-01

## 2024-05-01 PROBLEM — L97.524 NON-PRESSURE CHRONIC ULCER OF OTHER PART OF LEFT FOOT WITH NECROSIS OF BONE (HCC): Status: RESOLVED | Noted: 2023-10-03 | Resolved: 2024-05-01

## 2024-05-01 PROBLEM — H92.09 EARACHE: Status: RESOLVED | Noted: 2020-03-09 | Resolved: 2024-05-01

## 2024-05-01 PROBLEM — I21.3 STEMI (ST ELEVATION MYOCARDIAL INFARCTION) (HCC): Status: RESOLVED | Noted: 2019-02-26 | Resolved: 2024-05-01

## 2024-05-01 PROBLEM — R91.8 PULMONARY INFILTRATE: Status: RESOLVED | Noted: 2020-11-30 | Resolved: 2024-05-01

## 2024-05-01 PROBLEM — R68.83 CHILLS: Status: RESOLVED | Noted: 2020-03-25 | Resolved: 2024-05-01

## 2024-05-01 PROBLEM — R10.9 RIGHT FLANK PAIN: Status: RESOLVED | Noted: 2021-05-04 | Resolved: 2024-05-01

## 2024-05-01 PROBLEM — L72.3 INFECTED SEBACEOUS CYST OF SKIN: Status: RESOLVED | Noted: 2019-01-09 | Resolved: 2024-05-01

## 2024-05-01 PROBLEM — M19.032 ARTHRITIS OF LEFT WRIST: Status: RESOLVED | Noted: 2018-06-19 | Resolved: 2024-05-01

## 2024-05-01 PROBLEM — J40 BRONCHITIS: Status: RESOLVED | Noted: 2020-03-25 | Resolved: 2024-05-01

## 2024-05-01 PROBLEM — M20.60 ACQUIRED DEFORMITY OF TOE: Status: RESOLVED | Noted: 2020-01-24 | Resolved: 2024-05-01

## 2024-05-01 PROBLEM — D72.829 LEUKOCYTOSIS: Status: RESOLVED | Noted: 2018-02-15 | Resolved: 2024-05-01

## 2024-05-01 LAB
ALBUMIN SERPL-MCNC: 3.6 G/DL (ref 3.5–5)
ALBUMIN SERPL-MCNC: 4 G/DL (ref 3.5–5)
ALBUMIN/GLOB SERPL: 1 (ref 1–1.9)
ALBUMIN/GLOB SERPL: 1.1 (ref 1–1.9)
ALP SERPL-CCNC: 106 U/L (ref 40–129)
ALP SERPL-CCNC: 94 U/L (ref 40–129)
ALT SERPL-CCNC: 14 U/L (ref 12–65)
ALT SERPL-CCNC: 18 U/L (ref 12–65)
AMPHET UR QL SCN: NEGATIVE
ANION GAP SERPL CALC-SCNC: 12 MMOL/L (ref 9–18)
ANION GAP SERPL CALC-SCNC: 15 MMOL/L (ref 9–18)
AST SERPL-CCNC: 19 U/L (ref 15–37)
AST SERPL-CCNC: 25 U/L (ref 15–37)
BARBITURATES UR QL SCN: NEGATIVE
BASOPHILS # BLD: 0.1 K/UL (ref 0–0.2)
BASOPHILS # BLD: 0.1 K/UL (ref 0–0.2)
BASOPHILS NFR BLD: 1 % (ref 0–2)
BASOPHILS NFR BLD: 1 % (ref 0–2)
BENZODIAZ UR QL: NEGATIVE
BILIRUB SERPL-MCNC: 0.4 MG/DL (ref 0–1.2)
BILIRUB SERPL-MCNC: 0.5 MG/DL (ref 0–1.2)
BILIRUB UR QL: NEGATIVE
BUN SERPL-MCNC: 14 MG/DL (ref 6–23)
BUN SERPL-MCNC: 16 MG/DL (ref 6–23)
CALCIUM SERPL-MCNC: 8.9 MG/DL (ref 8.8–10.2)
CALCIUM SERPL-MCNC: 9.6 MG/DL (ref 8.8–10.2)
CANNABINOIDS UR QL SCN: NEGATIVE
CHLORIDE SERPL-SCNC: 98 MMOL/L (ref 98–107)
CHLORIDE SERPL-SCNC: 98 MMOL/L (ref 98–107)
CHOLEST SERPL-MCNC: 234 MG/DL (ref 0–200)
CO2 SERPL-SCNC: 22 MMOL/L (ref 20–28)
CO2 SERPL-SCNC: 22 MMOL/L (ref 20–28)
COCAINE UR QL SCN: NEGATIVE
CREAT SERPL-MCNC: 0.76 MG/DL (ref 0.8–1.3)
CREAT SERPL-MCNC: 0.76 MG/DL (ref 0.8–1.3)
DIFFERENTIAL METHOD BLD: NORMAL
DIFFERENTIAL METHOD BLD: NORMAL
EOSINOPHIL # BLD: 0.2 K/UL (ref 0–0.8)
EOSINOPHIL # BLD: 0.3 K/UL (ref 0–0.8)
EOSINOPHIL NFR BLD: 3 % (ref 0.5–7.8)
EOSINOPHIL NFR BLD: 3 % (ref 0.5–7.8)
ERYTHROCYTE [DISTWIDTH] IN BLOOD BY AUTOMATED COUNT: 13.2 % (ref 11.9–14.6)
ERYTHROCYTE [DISTWIDTH] IN BLOOD BY AUTOMATED COUNT: 13.2 % (ref 11.9–14.6)
EST. AVERAGE GLUCOSE BLD GHB EST-MCNC: 292 MG/DL
ETHANOL SERPL-MCNC: <11 MG/DL (ref 0–0.08)
GLOBULIN SER CALC-MCNC: 3.1 G/DL (ref 2.3–3.5)
GLOBULIN SER CALC-MCNC: 4 G/DL (ref 2.3–3.5)
GLUCOSE SERPL-MCNC: 331 MG/DL (ref 70–99)
GLUCOSE SERPL-MCNC: 423 MG/DL (ref 70–99)
GLUCOSE UR QL STRIP.AUTO: >1000 MG/DL
HBA1C MFR BLD: 11.8 % (ref 0–5.6)
HCT VFR BLD AUTO: 43 % (ref 41.1–50.3)
HCT VFR BLD AUTO: 47.9 % (ref 41.1–50.3)
HDLC SERPL-MCNC: 30 MG/DL (ref 40–60)
HDLC SERPL: 7.9 (ref 0–5)
HGB BLD-MCNC: 14.7 G/DL (ref 13.6–17.2)
HGB BLD-MCNC: 16 G/DL (ref 13.6–17.2)
IMM GRANULOCYTES # BLD AUTO: 0 K/UL (ref 0–0.5)
IMM GRANULOCYTES # BLD AUTO: 0 K/UL (ref 0–0.5)
IMM GRANULOCYTES NFR BLD AUTO: 0 % (ref 0–5)
IMM GRANULOCYTES NFR BLD AUTO: 0 % (ref 0–5)
KETONES UR-MCNC: 15 MG/DL
LDLC SERPL CALC-MCNC: 138 MG/DL (ref 0–100)
LEUKOCYTE ESTERASE UR QL STRIP: NEGATIVE
LYMPHOCYTES # BLD: 2.1 K/UL (ref 0.5–4.6)
LYMPHOCYTES # BLD: 2.3 K/UL (ref 0.5–4.6)
LYMPHOCYTES NFR BLD: 21 % (ref 13–44)
LYMPHOCYTES NFR BLD: 27 % (ref 13–44)
MCH RBC QN AUTO: 29.7 PG (ref 26.1–32.9)
MCH RBC QN AUTO: 30 PG (ref 26.1–32.9)
MCHC RBC AUTO-ENTMCNC: 33.4 G/DL (ref 31.4–35)
MCHC RBC AUTO-ENTMCNC: 34.2 G/DL (ref 31.4–35)
MCV RBC AUTO: 87.8 FL (ref 82–102)
MCV RBC AUTO: 89 FL (ref 82–102)
METHADONE UR QL: NEGATIVE
MONOCYTES # BLD: 0.6 K/UL (ref 0.1–1.3)
MONOCYTES # BLD: 0.7 K/UL (ref 0.1–1.3)
MONOCYTES NFR BLD: 7 % (ref 4–12)
MONOCYTES NFR BLD: 7 % (ref 4–12)
NEUTS SEG # BLD: 5.4 K/UL (ref 1.7–8.2)
NEUTS SEG # BLD: 6.8 K/UL (ref 1.7–8.2)
NEUTS SEG NFR BLD: 62 % (ref 43–78)
NEUTS SEG NFR BLD: 68 % (ref 43–78)
NITRITE UR QL: NEGATIVE
NRBC # BLD: 0 K/UL (ref 0–0.2)
NRBC # BLD: 0 K/UL (ref 0–0.2)
OPIATES UR QL: NEGATIVE
PCP UR QL: NEGATIVE
PH UR: 7 (ref 5–9)
PLATELET # BLD AUTO: 269 K/UL (ref 150–450)
PLATELET # BLD AUTO: 289 K/UL (ref 150–450)
PMV BLD AUTO: 10.7 FL (ref 9.4–12.3)
PMV BLD AUTO: 11.5 FL (ref 9.4–12.3)
POTASSIUM SERPL-SCNC: 4.2 MMOL/L (ref 3.5–5.1)
POTASSIUM SERPL-SCNC: 4.3 MMOL/L (ref 3.5–5.1)
PROT SERPL-MCNC: 6.7 G/DL (ref 6.3–8.2)
PROT SERPL-MCNC: 8 G/DL (ref 6.3–8.2)
PROT UR QL: NEGATIVE MG/DL
RBC # BLD AUTO: 4.9 M/UL (ref 4.23–5.6)
RBC # BLD AUTO: 5.38 M/UL (ref 4.23–5.6)
RBC # UR STRIP: ABNORMAL
SERVICE CMNT-IMP: ABNORMAL
SODIUM SERPL-SCNC: 131 MMOL/L (ref 136–145)
SODIUM SERPL-SCNC: 135 MMOL/L (ref 136–145)
SP GR UR: 1.01 (ref 1–1.02)
TRIGL SERPL-MCNC: 331 MG/DL (ref 0–150)
TROPONIN T SERPL HS-MCNC: 19 NG/L (ref 0–22)
TROPONIN T SERPL HS-MCNC: 21 NG/L (ref 0–22)
UROBILINOGEN UR QL: 2 EU/DL (ref 0.2–1)
VLDLC SERPL CALC-MCNC: 66 MG/DL (ref 6–23)
WBC # BLD AUTO: 8.6 K/UL (ref 4.3–11.1)
WBC # BLD AUTO: 9.9 K/UL (ref 4.3–11.1)

## 2024-05-01 PROCEDURE — 99285 EMERGENCY DEPT VISIT HI MDM: CPT

## 2024-05-01 PROCEDURE — 93005 ELECTROCARDIOGRAM TRACING: CPT | Performed by: EMERGENCY MEDICINE

## 2024-05-01 PROCEDURE — 84484 ASSAY OF TROPONIN QUANT: CPT

## 2024-05-01 PROCEDURE — 70496 CT ANGIOGRAPHY HEAD: CPT

## 2024-05-01 PROCEDURE — 3080F DIAST BP >= 90 MM HG: CPT | Performed by: INTERNAL MEDICINE

## 2024-05-01 PROCEDURE — 99204 OFFICE O/P NEW MOD 45 MIN: CPT | Performed by: INTERNAL MEDICINE

## 2024-05-01 PROCEDURE — 71045 X-RAY EXAM CHEST 1 VIEW: CPT

## 2024-05-01 PROCEDURE — 85025 COMPLETE CBC W/AUTO DIFF WBC: CPT

## 2024-05-01 PROCEDURE — 70450 CT HEAD/BRAIN W/O DYE: CPT

## 2024-05-01 PROCEDURE — 3077F SYST BP >= 140 MM HG: CPT | Performed by: INTERNAL MEDICINE

## 2024-05-01 PROCEDURE — 80307 DRUG TEST PRSMV CHEM ANLYZR: CPT

## 2024-05-01 PROCEDURE — 81003 URINALYSIS AUTO W/O SCOPE: CPT

## 2024-05-01 PROCEDURE — 80053 COMPREHEN METABOLIC PANEL: CPT

## 2024-05-01 PROCEDURE — 82077 ASSAY SPEC XCP UR&BREATH IA: CPT

## 2024-05-01 PROCEDURE — 6360000004 HC RX CONTRAST MEDICATION: Performed by: EMERGENCY MEDICINE

## 2024-05-01 RX ORDER — GABAPENTIN 300 MG/1
300 CAPSULE ORAL 4 TIMES DAILY
Qty: 360 CAPSULE | Refills: 0 | Status: SHIPPED | OUTPATIENT
Start: 2024-05-01 | End: 2024-07-30

## 2024-05-01 RX ORDER — 0.9 % SODIUM CHLORIDE 0.9 %
100 INTRAVENOUS SOLUTION INTRAVENOUS
Status: DISCONTINUED | OUTPATIENT
Start: 2024-05-01 | End: 2024-05-05 | Stop reason: HOSPADM

## 2024-05-01 RX ORDER — ACARBOSE 25 MG/1
25 TABLET ORAL
Qty: 90 TABLET | Refills: 3 | Status: SHIPPED | OUTPATIENT
Start: 2024-05-01

## 2024-05-01 RX ORDER — SODIUM CHLORIDE 0.9 % (FLUSH) 0.9 %
10 SYRINGE (ML) INJECTION
Status: DISCONTINUED | OUTPATIENT
Start: 2024-05-01 | End: 2024-05-05 | Stop reason: HOSPADM

## 2024-05-01 RX ORDER — HYDRALAZINE HYDROCHLORIDE 20 MG/ML
10 INJECTION INTRAMUSCULAR; INTRAVENOUS ONCE
Status: DISCONTINUED | OUTPATIENT
Start: 2024-05-01 | End: 2024-05-03

## 2024-05-01 RX ADMIN — IOPAMIDOL 100 ML: 755 INJECTION, SOLUTION INTRAVENOUS at 21:35

## 2024-05-01 ASSESSMENT — LIFESTYLE VARIABLES
HOW OFTEN DO YOU HAVE A DRINK CONTAINING ALCOHOL: NEVER
HOW MANY STANDARD DRINKS CONTAINING ALCOHOL DO YOU HAVE ON A TYPICAL DAY: PATIENT DOES NOT DRINK

## 2024-05-01 ASSESSMENT — PAIN - FUNCTIONAL ASSESSMENT: PAIN_FUNCTIONAL_ASSESSMENT: NONE - DENIES PAIN

## 2024-05-01 NOTE — ED TRIAGE NOTES
Pt c/o expressive aphasia for four days, also c/o headache.  Pt also c/o high BP, .  Nitro paste applied by EMS

## 2024-05-01 NOTE — PROGRESS NOTES
calcaneal excision on 11/8/2023  -Patient is following with orthopedic surgery, last seen 1/23/2024, at this time patient stated he was having significant pain globally about the foot and extending into the left lower extremity.  Wounds at this time.  Be healing well with good reapproximation.  Plan at this time was to get new custom orthotics with extra-depth diabetic shoes  -Patient is following up with ortho prn     Primary Insomnia  -Trazodone 100 mg qhs which helps some.     Aphasia/word searching/difficulty speaking  -MRI of the brain  -Referral to neurology    Health Maintenance:  -due for colonoscopy   -due for LDCT    -Previous medical records and/or labs/tests available to me reviewed, any records outstanding not available requested  -The risks, benefits, and medical necessity of all medications, tests labs, and any other orders that were ordered at today's visit were discussed with the patient including all elective or patient requested orders.  Decision to order or not order tests are based on this risk/benefit ratio and medical necessity.  -The patient expresses understanding of the plan as I've explained it to him and is in agreement with the current plan.    Follow up: 3 months    Total Time: 45 minutes (chart review and in-exam time)    Signed: Patel Devi D.O.  05/01/24  12:52 PM

## 2024-05-02 ENCOUNTER — APPOINTMENT (OUTPATIENT)
Dept: GENERAL RADIOLOGY | Age: 54
DRG: 053 | End: 2024-05-02
Payer: COMMERCIAL

## 2024-05-02 PROBLEM — I63.9 CEREBROVASCULAR ACCIDENT (CVA) (HCC): Status: ACTIVE | Noted: 2024-05-02

## 2024-05-02 PROBLEM — G40.901 STATUS EPILEPTICUS (HCC): Status: ACTIVE | Noted: 2024-05-02

## 2024-05-02 LAB
ARTERIAL PATENCY WRIST A: POSITIVE
BASE DEFICIT BLD-SCNC: 2.6 MMOL/L
BDY SITE: ABNORMAL
EKG ATRIAL RATE: 84 BPM
EKG DIAGNOSIS: NORMAL
EKG P AXIS: 45 DEGREES
EKG P-R INTERVAL: 157 MS
EKG Q-T INTERVAL: 379 MS
EKG QRS DURATION: 102 MS
EKG QTC CALCULATION (BAZETT): 451 MS
EKG R AXIS: 57 DEGREES
EKG T AXIS: 40 DEGREES
EKG VENTRICULAR RATE: 85 BPM
GAS FLOW.O2 O2 DELIVERY SYS: ABNORMAL
GLUCOSE BLD STRIP.AUTO-MCNC: 193 MG/DL (ref 65–100)
GLUCOSE BLD STRIP.AUTO-MCNC: 235 MG/DL (ref 65–100)
GLUCOSE BLD STRIP.AUTO-MCNC: 239 MG/DL (ref 65–100)
GLUCOSE BLD STRIP.AUTO-MCNC: 310 MG/DL (ref 65–100)
GLUCOSE BLD STRIP.AUTO-MCNC: 341 MG/DL (ref 65–100)
HCO3 BLD-SCNC: 20.8 MMOL/L (ref 22–26)
IPAP/PIP/HIGH PEEP: 26
O2/TOTAL GAS SETTING VFR VENT: 60 %
PAW @ MEAN EXP FLOW ON VENT: 14 CMH2O
PCO2 BLD: 32.2 MMHG (ref 35–45)
PEEP RESPIRATORY: 8 CMH2O
PH BLD: 7.42 (ref 7.35–7.45)
PO2 BLD: 255 MMHG (ref 75–100)
SAO2 % BLD: 99.9 % (ref 95–98)
SERVICE CMNT-IMP: ABNORMAL
SPECIMEN TYPE: ABNORMAL
VENTILATION MODE VENT: ABNORMAL
VT SETTING VENT: 550 ML

## 2024-05-02 PROCEDURE — 93010 ELECTROCARDIOGRAM REPORT: CPT | Performed by: INTERNAL MEDICINE

## 2024-05-02 PROCEDURE — 31500 INSERT EMERGENCY AIRWAY: CPT

## 2024-05-02 PROCEDURE — 82803 BLOOD GASES ANY COMBINATION: CPT

## 2024-05-02 PROCEDURE — 71045 X-RAY EXAM CHEST 1 VIEW: CPT

## 2024-05-02 PROCEDURE — 1100000003 HC PRIVATE W/ TELEMETRY

## 2024-05-02 PROCEDURE — 0BH17EZ INSERTION OF ENDOTRACHEAL AIRWAY INTO TRACHEA, VIA NATURAL OR ARTIFICIAL OPENING: ICD-10-PCS | Performed by: HOSPITALIST

## 2024-05-02 PROCEDURE — A4216 STERILE WATER/SALINE, 10 ML: HCPCS | Performed by: INTERNAL MEDICINE

## 2024-05-02 PROCEDURE — 2580000003 HC RX 258: Performed by: EMERGENCY MEDICINE

## 2024-05-02 PROCEDURE — 2580000003 HC RX 258: Performed by: INTERNAL MEDICINE

## 2024-05-02 PROCEDURE — 31500 INSERT EMERGENCY AIRWAY: CPT | Performed by: EMERGENCY MEDICINE

## 2024-05-02 PROCEDURE — 2580000003 HC RX 258: Performed by: HOSPITALIST

## 2024-05-02 PROCEDURE — 6360000002 HC RX W HCPCS: Performed by: HOSPITALIST

## 2024-05-02 PROCEDURE — 2500000003 HC RX 250 WO HCPCS

## 2024-05-02 PROCEDURE — 6360000002 HC RX W HCPCS: Performed by: PHYSICIAN ASSISTANT

## 2024-05-02 PROCEDURE — 2500000003 HC RX 250 WO HCPCS: Performed by: INTERNAL MEDICINE

## 2024-05-02 PROCEDURE — 6370000000 HC RX 637 (ALT 250 FOR IP): Performed by: EMERGENCY MEDICINE

## 2024-05-02 PROCEDURE — 99291 CRITICAL CARE FIRST HOUR: CPT | Performed by: INTERNAL MEDICINE

## 2024-05-02 PROCEDURE — 95813 EEG EXTND MNTR 61-119 MIN: CPT

## 2024-05-02 PROCEDURE — 6360000002 HC RX W HCPCS

## 2024-05-02 PROCEDURE — 6360000002 HC RX W HCPCS: Performed by: EMERGENCY MEDICINE

## 2024-05-02 PROCEDURE — 36600 WITHDRAWAL OF ARTERIAL BLOOD: CPT

## 2024-05-02 PROCEDURE — 94002 VENT MGMT INPAT INIT DAY: CPT

## 2024-05-02 PROCEDURE — 5A1935Z RESPIRATORY VENTILATION, LESS THAN 24 CONSECUTIVE HOURS: ICD-10-PCS | Performed by: HOSPITALIST

## 2024-05-02 PROCEDURE — 74018 RADEX ABDOMEN 1 VIEW: CPT

## 2024-05-02 PROCEDURE — 6370000000 HC RX 637 (ALT 250 FOR IP): Performed by: INTERNAL MEDICINE

## 2024-05-02 PROCEDURE — 82962 GLUCOSE BLOOD TEST: CPT

## 2024-05-02 PROCEDURE — 2709999900 HC NON-CHARGEABLE SUPPLY

## 2024-05-02 PROCEDURE — 6370000000 HC RX 637 (ALT 250 FOR IP): Performed by: HOSPITALIST

## 2024-05-02 PROCEDURE — 1100000000 HC RM PRIVATE

## 2024-05-02 PROCEDURE — 2700000000 HC OXYGEN THERAPY PER DAY

## 2024-05-02 RX ORDER — FENTANYL CITRATE 50 UG/ML
INJECTION, SOLUTION INTRAMUSCULAR; INTRAVENOUS
Status: COMPLETED
Start: 2024-05-02 | End: 2024-05-02

## 2024-05-02 RX ORDER — ATORVASTATIN CALCIUM 80 MG/1
80 TABLET, FILM COATED ORAL DAILY
Status: DISCONTINUED | OUTPATIENT
Start: 2024-05-02 | End: 2024-05-02

## 2024-05-02 RX ORDER — DOCUSATE SODIUM 100 MG/1
100 CAPSULE, LIQUID FILLED ORAL DAILY PRN
Status: DISCONTINUED | OUTPATIENT
Start: 2024-05-02 | End: 2024-05-03

## 2024-05-02 RX ORDER — DOCUSATE SODIUM 100 MG/1
100 CAPSULE, LIQUID FILLED ORAL DAILY PRN
Status: DISCONTINUED | OUTPATIENT
Start: 2024-05-02 | End: 2024-05-02

## 2024-05-02 RX ORDER — POLYETHYLENE GLYCOL 3350 17 G/17G
17 POWDER, FOR SOLUTION ORAL DAILY PRN
Status: DISCONTINUED | OUTPATIENT
Start: 2024-05-02 | End: 2024-05-03

## 2024-05-02 RX ORDER — ETOMIDATE 2 MG/ML
INJECTION INTRAVENOUS
Status: COMPLETED
Start: 2024-05-02 | End: 2024-05-02

## 2024-05-02 RX ORDER — ONDANSETRON 4 MG/1
4 TABLET, ORALLY DISINTEGRATING ORAL EVERY 8 HOURS PRN
Status: DISCONTINUED | OUTPATIENT
Start: 2024-05-02 | End: 2024-05-05 | Stop reason: HOSPADM

## 2024-05-02 RX ORDER — FENOFIBRATE 160 MG/1
160 TABLET ORAL DAILY
Status: DISCONTINUED | OUTPATIENT
Start: 2024-05-02 | End: 2024-05-02

## 2024-05-02 RX ORDER — LEVETIRACETAM 500 MG/5ML
1000 INJECTION, SOLUTION, CONCENTRATE INTRAVENOUS EVERY 12 HOURS
Status: DISCONTINUED | OUTPATIENT
Start: 2024-05-02 | End: 2024-05-04

## 2024-05-02 RX ORDER — FOLIC ACID 1 MG/1
1 TABLET ORAL DAILY
Status: DISCONTINUED | OUTPATIENT
Start: 2024-05-02 | End: 2024-05-03

## 2024-05-02 RX ORDER — CLOPIDOGREL BISULFATE 75 MG/1
75 TABLET ORAL DAILY
Status: DISCONTINUED | OUTPATIENT
Start: 2024-05-02 | End: 2024-05-03

## 2024-05-02 RX ORDER — PANTOPRAZOLE SODIUM 40 MG/1
40 TABLET, DELAYED RELEASE ORAL DAILY
Status: DISCONTINUED | OUTPATIENT
Start: 2024-05-02 | End: 2024-05-02

## 2024-05-02 RX ORDER — FOLIC ACID 1 MG/1
1 TABLET ORAL DAILY
Status: DISCONTINUED | OUTPATIENT
Start: 2024-05-02 | End: 2024-05-02

## 2024-05-02 RX ORDER — SODIUM CHLORIDE 0.9 % (FLUSH) 0.9 %
5-40 SYRINGE (ML) INJECTION PRN
Status: DISCONTINUED | OUTPATIENT
Start: 2024-05-02 | End: 2024-05-05 | Stop reason: HOSPADM

## 2024-05-02 RX ORDER — GABAPENTIN 300 MG/1
300 CAPSULE ORAL 4 TIMES DAILY
Status: DISCONTINUED | OUTPATIENT
Start: 2024-05-02 | End: 2024-05-02

## 2024-05-02 RX ORDER — SODIUM CHLORIDE 0.9 % (FLUSH) 0.9 %
5-40 SYRINGE (ML) INJECTION EVERY 12 HOURS SCHEDULED
Status: DISCONTINUED | OUTPATIENT
Start: 2024-05-02 | End: 2024-05-05 | Stop reason: HOSPADM

## 2024-05-02 RX ORDER — ETOMIDATE 2 MG/ML
40 INJECTION INTRAVENOUS ONCE
Status: COMPLETED | OUTPATIENT
Start: 2024-05-02 | End: 2024-05-02

## 2024-05-02 RX ORDER — ATORVASTATIN CALCIUM 80 MG/1
80 TABLET, FILM COATED ORAL DAILY
Status: DISCONTINUED | OUTPATIENT
Start: 2024-05-02 | End: 2024-05-03

## 2024-05-02 RX ORDER — FENTANYL CITRATE-0.9 % NACL/PF 20 MCG/2ML
50 SYRINGE (ML) INTRAVENOUS EVERY 30 MIN PRN
Status: DISCONTINUED | OUTPATIENT
Start: 2024-05-02 | End: 2024-05-02 | Stop reason: HOSPADM

## 2024-05-02 RX ORDER — LISINOPRIL 20 MG/1
20 TABLET ORAL 2 TIMES DAILY
Status: DISCONTINUED | OUTPATIENT
Start: 2024-05-02 | End: 2024-05-03

## 2024-05-02 RX ORDER — PROCHLORPERAZINE EDISYLATE 5 MG/ML
10 INJECTION INTRAMUSCULAR; INTRAVENOUS EVERY 6 HOURS PRN
Status: DISCONTINUED | OUTPATIENT
Start: 2024-05-02 | End: 2024-05-05 | Stop reason: HOSPADM

## 2024-05-02 RX ORDER — CARVEDILOL 25 MG/1
25 TABLET ORAL 2 TIMES DAILY WITH MEALS
Status: DISCONTINUED | OUTPATIENT
Start: 2024-05-02 | End: 2024-05-02

## 2024-05-02 RX ORDER — LORAZEPAM 2 MG/ML
INJECTION INTRAMUSCULAR
Status: COMPLETED | OUTPATIENT
Start: 2024-05-02 | End: 2024-05-02

## 2024-05-02 RX ORDER — IBUPROFEN 600 MG/1
1 TABLET ORAL PRN
Status: DISCONTINUED | OUTPATIENT
Start: 2024-05-02 | End: 2024-05-05 | Stop reason: HOSPADM

## 2024-05-02 RX ORDER — HYDRALAZINE HYDROCHLORIDE 20 MG/ML
10 INJECTION INTRAMUSCULAR; INTRAVENOUS ONCE
Status: COMPLETED | OUTPATIENT
Start: 2024-05-02 | End: 2024-05-02

## 2024-05-02 RX ORDER — PROCHLORPERAZINE MALEATE 10 MG
10 TABLET ORAL EVERY 6 HOURS PRN
Status: DISCONTINUED | OUTPATIENT
Start: 2024-05-02 | End: 2024-05-05 | Stop reason: HOSPADM

## 2024-05-02 RX ORDER — ACARBOSE 25 MG/1
25 TABLET ORAL
Status: DISCONTINUED | OUTPATIENT
Start: 2024-05-02 | End: 2024-05-03

## 2024-05-02 RX ORDER — LEVETIRACETAM 500 MG/5ML
500 INJECTION, SOLUTION, CONCENTRATE INTRAVENOUS ONCE
Status: COMPLETED | OUTPATIENT
Start: 2024-05-02 | End: 2024-05-02

## 2024-05-02 RX ORDER — SUCCINYLCHOLINE/SOD CL,ISO/PF 200MG/10ML
100 SYRINGE (ML) INTRAVENOUS ONCE
Status: COMPLETED | OUTPATIENT
Start: 2024-05-02 | End: 2024-05-02

## 2024-05-02 RX ORDER — PROPOFOL 10 MG/ML
INJECTION, EMULSION INTRAVENOUS
Status: DISPENSED
Start: 2024-05-02 | End: 2024-05-02

## 2024-05-02 RX ORDER — ENOXAPARIN SODIUM 100 MG/ML
30 INJECTION SUBCUTANEOUS 2 TIMES DAILY
Status: DISCONTINUED | OUTPATIENT
Start: 2024-05-02 | End: 2024-05-05 | Stop reason: HOSPADM

## 2024-05-02 RX ORDER — LISDEXAMFETAMINE DIMESYLATE 50 MG/1
50 CAPSULE ORAL DAILY
Status: DISCONTINUED | OUTPATIENT
Start: 2024-05-02 | End: 2024-05-03

## 2024-05-02 RX ORDER — ACARBOSE 25 MG/1
25 TABLET ORAL
Status: DISCONTINUED | OUTPATIENT
Start: 2024-05-02 | End: 2024-05-02

## 2024-05-02 RX ORDER — SODIUM CHLORIDE 9 MG/ML
INJECTION, SOLUTION INTRAVENOUS PRN
Status: DISCONTINUED | OUTPATIENT
Start: 2024-05-02 | End: 2024-05-05 | Stop reason: HOSPADM

## 2024-05-02 RX ORDER — LISINOPRIL 20 MG/1
20 TABLET ORAL 2 TIMES DAILY
Status: DISCONTINUED | OUTPATIENT
Start: 2024-05-02 | End: 2024-05-02

## 2024-05-02 RX ORDER — LORAZEPAM 2 MG/ML
INJECTION INTRAMUSCULAR
Status: DISPENSED
Start: 2024-05-02 | End: 2024-05-02

## 2024-05-02 RX ORDER — CLOPIDOGREL BISULFATE 75 MG/1
75 TABLET ORAL DAILY
Status: DISCONTINUED | OUTPATIENT
Start: 2024-05-02 | End: 2024-05-02

## 2024-05-02 RX ORDER — MIDAZOLAM HYDROCHLORIDE 1 MG/ML
1-10 INJECTION, SOLUTION INTRAVENOUS CONTINUOUS
Status: DISCONTINUED | OUTPATIENT
Start: 2024-05-02 | End: 2024-05-02 | Stop reason: HOSPADM

## 2024-05-02 RX ORDER — DEXTROSE MONOHYDRATE 100 MG/ML
INJECTION, SOLUTION INTRAVENOUS CONTINUOUS PRN
Status: DISCONTINUED | OUTPATIENT
Start: 2024-05-02 | End: 2024-05-05 | Stop reason: HOSPADM

## 2024-05-02 RX ORDER — ASPIRIN 81 MG/1
81 TABLET ORAL 2 TIMES DAILY
Status: DISCONTINUED | OUTPATIENT
Start: 2024-05-02 | End: 2024-05-05 | Stop reason: HOSPADM

## 2024-05-02 RX ORDER — FENOFIBRATE 160 MG/1
160 TABLET ORAL DAILY
Status: DISCONTINUED | OUTPATIENT
Start: 2024-05-02 | End: 2024-05-03

## 2024-05-02 RX ORDER — FENTANYL CITRATE 50 UG/ML
100 INJECTION, SOLUTION INTRAMUSCULAR; INTRAVENOUS ONCE
Status: COMPLETED | OUTPATIENT
Start: 2024-05-02 | End: 2024-05-02

## 2024-05-02 RX ORDER — SUCCINYLCHOLINE/SOD CL,ISO/PF 200MG/10ML
SYRINGE (ML) INTRAVENOUS
Status: COMPLETED
Start: 2024-05-02 | End: 2024-05-02

## 2024-05-02 RX ORDER — NOREPINEPHRINE BITARTRATE 0.02 MG/ML
2-100 INJECTION, SOLUTION INTRAVENOUS CONTINUOUS
Status: DISCONTINUED | OUTPATIENT
Start: 2024-05-02 | End: 2024-05-02 | Stop reason: ALTCHOICE

## 2024-05-02 RX ORDER — FENTANYL CITRATE-0.9 % NACL/PF 10 MCG/ML
25-200 PLASTIC BAG, INJECTION (ML) INTRAVENOUS CONTINUOUS
Status: DISCONTINUED | OUTPATIENT
Start: 2024-05-02 | End: 2024-05-02 | Stop reason: SDUPTHER

## 2024-05-02 RX ORDER — GABAPENTIN 300 MG/1
300 CAPSULE ORAL 4 TIMES DAILY
Status: DISCONTINUED | OUTPATIENT
Start: 2024-05-02 | End: 2024-05-03

## 2024-05-02 RX ORDER — ISOSORBIDE MONONITRATE 30 MG/1
30 TABLET, EXTENDED RELEASE ORAL DAILY
Status: DISCONTINUED | OUTPATIENT
Start: 2024-05-02 | End: 2024-05-05 | Stop reason: HOSPADM

## 2024-05-02 RX ORDER — FENTANYL CITRATE-0.9 % NACL/PF 10 MCG/ML
25-200 PLASTIC BAG, INJECTION (ML) INTRAVENOUS CONTINUOUS
Status: DISCONTINUED | OUTPATIENT
Start: 2024-05-02 | End: 2024-05-02 | Stop reason: HOSPADM

## 2024-05-02 RX ORDER — CARVEDILOL 12.5 MG/1
25 TABLET ORAL 2 TIMES DAILY WITH MEALS
Status: DISCONTINUED | OUTPATIENT
Start: 2024-05-02 | End: 2024-05-03

## 2024-05-02 RX ORDER — 0.9 % SODIUM CHLORIDE 0.9 %
1000 INTRAVENOUS SOLUTION INTRAVENOUS ONCE
Status: COMPLETED | OUTPATIENT
Start: 2024-05-02 | End: 2024-05-02

## 2024-05-02 RX ORDER — INSULIN LISPRO 100 [IU]/ML
0-8 INJECTION, SOLUTION INTRAVENOUS; SUBCUTANEOUS EVERY 4 HOURS
Status: DISCONTINUED | OUTPATIENT
Start: 2024-05-02 | End: 2024-05-03

## 2024-05-02 RX ORDER — ONDANSETRON 2 MG/ML
4 INJECTION INTRAMUSCULAR; INTRAVENOUS EVERY 6 HOURS PRN
Status: DISCONTINUED | OUTPATIENT
Start: 2024-05-02 | End: 2024-05-05 | Stop reason: HOSPADM

## 2024-05-02 RX ORDER — LEVETIRACETAM 500 MG/5ML
1500 INJECTION, SOLUTION, CONCENTRATE INTRAVENOUS
Status: COMPLETED | OUTPATIENT
Start: 2024-05-02 | End: 2024-05-02

## 2024-05-02 RX ORDER — TRAZODONE HYDROCHLORIDE 50 MG/1
100 TABLET ORAL NIGHTLY
Status: DISCONTINUED | OUTPATIENT
Start: 2024-05-02 | End: 2024-05-02

## 2024-05-02 RX ORDER — POLYETHYLENE GLYCOL 3350 17 G/17G
17 POWDER, FOR SOLUTION ORAL DAILY PRN
Status: DISCONTINUED | OUTPATIENT
Start: 2024-05-02 | End: 2024-05-02

## 2024-05-02 RX ORDER — INSULIN GLARGINE 100 [IU]/ML
22 INJECTION, SOLUTION SUBCUTANEOUS NIGHTLY
Status: DISCONTINUED | OUTPATIENT
Start: 2024-05-02 | End: 2024-05-03

## 2024-05-02 RX ADMIN — ACARBOSE 25 MG: 25 TABLET ORAL at 14:22

## 2024-05-02 RX ADMIN — Medication 100 MG: at 03:41

## 2024-05-02 RX ADMIN — CLOPIDOGREL BISULFATE 75 MG: 75 TABLET ORAL at 11:24

## 2024-05-02 RX ADMIN — FAMOTIDINE 20 MG: 10 INJECTION, SOLUTION INTRAVENOUS at 20:06

## 2024-05-02 RX ADMIN — LEVETIRACETAM 1000 MG: 100 INJECTION, SOLUTION INTRAVENOUS at 14:23

## 2024-05-02 RX ADMIN — SODIUM CHLORIDE, PRESERVATIVE FREE 10 ML: 5 INJECTION INTRAVENOUS at 23:38

## 2024-05-02 RX ADMIN — ENOXAPARIN SODIUM 30 MG: 100 INJECTION SUBCUTANEOUS at 20:34

## 2024-05-02 RX ADMIN — ONDANSETRON 4 MG: 2 INJECTION INTRAMUSCULAR; INTRAVENOUS at 20:06

## 2024-05-02 RX ADMIN — FENTANYL CITRATE 100 MCG: 50 INJECTION INTRAMUSCULAR; INTRAVENOUS at 03:40

## 2024-05-02 RX ADMIN — SODIUM CHLORIDE 1000 ML: 9 INJECTION, SOLUTION INTRAVENOUS at 06:38

## 2024-05-02 RX ADMIN — CARVEDILOL 25 MG: 25 TABLET, FILM COATED ORAL at 11:25

## 2024-05-02 RX ADMIN — FOLIC ACID 1 MG: 1 TABLET ORAL at 11:24

## 2024-05-02 RX ADMIN — ACARBOSE 25 MG: 25 TABLET ORAL at 11:23

## 2024-05-02 RX ADMIN — FENOFIBRATE 160 MG: 160 TABLET ORAL at 11:25

## 2024-05-02 RX ADMIN — LORAZEPAM 1 MG: 2 INJECTION INTRAMUSCULAR; INTRAVENOUS at 03:11

## 2024-05-02 RX ADMIN — SODIUM CHLORIDE, PRESERVATIVE FREE 10 ML: 5 INJECTION INTRAVENOUS at 11:26

## 2024-05-02 RX ADMIN — ETOMIDATE INJECTION 40 MG: 2 SOLUTION INTRAVENOUS at 03:40

## 2024-05-02 RX ADMIN — ATORVASTATIN CALCIUM 80 MG: 80 TABLET, FILM COATED ORAL at 11:24

## 2024-05-02 RX ADMIN — LISINOPRIL 20 MG: 20 TABLET ORAL at 11:27

## 2024-05-02 RX ADMIN — GABAPENTIN 300 MG: 300 CAPSULE ORAL at 17:25

## 2024-05-02 RX ADMIN — CARVEDILOL 25 MG: 25 TABLET, FILM COATED ORAL at 17:25

## 2024-05-02 RX ADMIN — ASPIRIN 81 MG: 81 TABLET, COATED ORAL at 14:22

## 2024-05-02 RX ADMIN — FENTANYL CITRATE 50 MCG/HR: 0.05 INJECTION, SOLUTION INTRAMUSCULAR; INTRAVENOUS at 03:50

## 2024-05-02 RX ADMIN — LEVETIRACETAM 500 MG: 100 INJECTION, SOLUTION INTRAVENOUS at 05:24

## 2024-05-02 RX ADMIN — ENOXAPARIN SODIUM 30 MG: 100 INJECTION SUBCUTANEOUS at 11:25

## 2024-05-02 RX ADMIN — GABAPENTIN 300 MG: 300 CAPSULE ORAL at 14:23

## 2024-05-02 RX ADMIN — INSULIN GLARGINE 22 UNITS: 100 INJECTION, SOLUTION SUBCUTANEOUS at 23:37

## 2024-05-02 RX ADMIN — PROCHLORPERAZINE EDISYLATE 10 MG: 5 INJECTION INTRAMUSCULAR; INTRAVENOUS at 23:38

## 2024-05-02 RX ADMIN — INSULIN LISPRO 2 UNITS: 100 INJECTION, SOLUTION INTRAVENOUS; SUBCUTANEOUS at 14:23

## 2024-05-02 RX ADMIN — HYDRALAZINE HYDROCHLORIDE 10 MG: 20 INJECTION, SOLUTION INTRAMUSCULAR; INTRAVENOUS at 02:55

## 2024-05-02 RX ADMIN — SODIUM CHLORIDE, PRESERVATIVE FREE 10 ML: 5 INJECTION INTRAVENOUS at 20:06

## 2024-05-02 RX ADMIN — LORAZEPAM 2 MG: 2 INJECTION INTRAMUSCULAR; INTRAVENOUS at 03:16

## 2024-05-02 RX ADMIN — ISOSORBIDE MONONITRATE 30 MG: 30 TABLET, EXTENDED RELEASE ORAL at 11:24

## 2024-05-02 RX ADMIN — Medication 50 MCG: at 06:45

## 2024-05-02 RX ADMIN — FENTANYL CITRATE 100 MCG: 50 INJECTION, SOLUTION INTRAMUSCULAR; INTRAVENOUS at 03:40

## 2024-05-02 RX ADMIN — MIDAZOLAM 5 MG/HR: 5 INJECTION INTRAMUSCULAR; INTRAVENOUS at 03:52

## 2024-05-02 RX ADMIN — LEVETIRACETAM 1500 MG: 500 INJECTION, SOLUTION INTRAVENOUS at 03:16

## 2024-05-02 RX ADMIN — INSULIN LISPRO 6 UNITS: 100 INJECTION, SOLUTION INTRAVENOUS; SUBCUTANEOUS at 11:03

## 2024-05-02 RX ADMIN — ETOMIDATE 40 MG: 2 INJECTION INTRAVENOUS at 03:40

## 2024-05-02 RX ADMIN — GABAPENTIN 300 MG: 300 CAPSULE ORAL at 11:24

## 2024-05-02 RX ADMIN — FAMOTIDINE 20 MG: 10 INJECTION, SOLUTION INTRAVENOUS at 11:25

## 2024-05-02 ASSESSMENT — PULMONARY FUNCTION TESTS
PIF_VALUE: 23
PIF_VALUE: 21
PIF_VALUE: 23

## 2024-05-02 ASSESSMENT — PAIN SCALES - GENERAL: PAINLEVEL_OUTOF10: 0

## 2024-05-02 NOTE — CARE COORDINATION
Case Management Assessment  Initial Evaluation    Date/Time of Evaluation: 5/2/2024 11:53 AM  Assessment Completed by: Lorelei Dorado RN    If patient is discharged prior to next notation, then this note serves as note for discharge by case management.    Patient Name: Daniele Morel                   YOB: 1970  Diagnosis: TIA (transient ischemic attack) [G45.9]  Cerebrovascular accident (CVA), unspecified mechanism (HCC) [I63.9]                   Date / Time: 5/1/2024  7:43 PM    Patient Admission Status: Inpatient   Readmission Risk (Low < 19, Mod (19-27), High > 27): Readmission Risk Score: 13    Current PCP: Patel Devi, DO  PCP verified by CM? (P) Yes    Chart Reviewed: Yes      History Provided by: (P) Child/Family  Patient Orientation: (P) Unable to Assess ([employer] intubate/vent)    Patient Cognition: (P) Other (see comment) (vent)    Hospitalization in the last 30 days (Readmission):  No    If yes, Readmission Assessment in  Navigator will be completed.    Advance Directives:      Code Status: Full Code   Patient's Primary Decision Maker is: (P) Legal Next of Kin      Discharge Planning:    Patient lives with: (P) Alone Type of Home: (P) Other (Comment) (pending)  Primary Care Giver: (P) Self  Patient Support Systems include: (P) Children, Family Members, Parent   Current Financial resources: (P) Medicaid ([facility])  Current community resources: (P) None  Current services prior to admission: (P) Durable Medical Equipment, C-pap            Current DME: (P) Cane, Cpap, Glucometer            Type of Home Care services:       ADLS  Prior functional level: (P) Independent in ADLs/IADLs  Current functional level: (P) Assistance with the following:    PT AM-PAC:   /24  OT AM-PAC:   /24    Family can provide assistance at DC: (P) Yes  Would you like Case Management to discuss the discharge plan with any other family members/significant others, and if so, who? (P) Yes  Plans to Return

## 2024-05-02 NOTE — ACP (ADVANCE CARE PLANNING)
Advance Care Planning     Advance Care Planning Activator (Inpatient)  Conversation Note      Date of ACP Conversation: 5/2/2024         ACP Activator: Lorelei Dorado RN    {When Decision Maker makes decisions on behalf of the incapacitated patient: Decision Maker is asked to consider and make decisions based on patient values, known preferences, or best interests.     Health Care Decision Maker: MARC LW/HCPOA on file. Pt . Has 4 children. One in [place] and 3 nearby. He is estranged from 3 of the children. Shayla is daughter currently here. All/majority of children are legal NOK unless document presented or completed stating otherwise.         Current Designated Health Care Decision Maker: Shayla -[phone] or mother -Juanis -[phone]    Click here to complete Healthcare Decision Makers including section of the Healthcare Decision Maker Relationship (ie \"Primary\")  Today we documented Decision Maker(s) consistent with Legal Next of Kin hierarchy.    Care Preferences  Full code per MD orders.

## 2024-05-02 NOTE — ED PROVIDER NOTES
lowering technique.      Radiation Dose: CTDI is 92.77 mGy. DLP is 1774.56 mGy-cm.      Contrast Type: iopamidol (ISOVUE-370) 76 . Contrast Volume: 100 mL      Report signed on 05/01/2024 (23:00 Eastern Time)   Signed by: Antolin Alvarez M.D.   Reading Location: 10      XR CHEST PORTABLE   Final Result   Mediastinum appears prominent, which may be due in part to mediastinal fat. CTA   would better evaluate the mediastinum if clinically warranted.   No airspace disease by radiograph.            NIH Stroke Scale  Interval: Baseline  NIH Stroke Scale All Negative: Yes  Level of Consciousness (1a): Alert  LOC Questions (1b): Answers both correctly  LOC Commands (1c): Performs both tasks correctly  Best Gaze (2): Normal  Visual (3): No visual loss  Facial Palsy (4): Normal symmetrical movement  Motor Arm, Left (5a): No drift  Motor Arm, Right (5b): No drift  Motor Leg, Left (6a): No drift  Motor Leg, Right (6b): No drift  Limb Ataxia (7): Absent  Sensory (8): Normal  Best Language (9): Mild to moderate aphasia  Extinction and Inattention (11): No abnormality         No results for input(s): \"COVID19\" in the last 72 hours.    Voice dictation software was used during the making of this note.  This software is not perfect and grammatical and other typographical errors may be present.  This note has not been completely proofread for errors.       Osvaldo Lewis MD  05/01/24 2599

## 2024-05-02 NOTE — PROCEDURES
Procedure: Endotracheal intubation    Indication: Acute respiratory failure 518.81    Medications:  Fentanyl 100mcg   Etomidate 40mg   Propofol 0 mg  Succinylcholine 100 mg    After assessing the airway, the patient underwent preoxygenation with 100% FiO2 for 5 min. Fentanyl was then given and after 3 min, etomidate and succinylcholine were given sequentially in rapid IV push.  The Sellick maneuver was performed throughout the entire sequence.  After adequate sedation and paralysis intubation was performed.    A Kingvision #3 laryngoscope was used to visualize the epiglottis and vocal cords.    After positive identification of epiglottis and the vocal cords, a size 7.5 ET tube was placed into the trachea with direct visualization.      Confirmation of endotracheal positionin.  The ET tube was directly visualized passing through the vocal cords.    2.  CO2 colorimetry was employed immediately to verify tube in airway with appropriate color change indicating detection/lack of CO2.   3.  Water vapor was seen within the ET tube, and auscultation of the abdomen revealed no bubbling sounds.  4.  Auscultation  And inspection of the chest after intubation showed symmetric chest excursion and symmetric air entry bilaterally.  5.  Chest X-ray has been ordered and is pending.    The tube was secured at 26cm at the teeth with a tube nicole.  The patient has been placed on a mechanical ventilator.    There were no complications.    Neil Beck MD

## 2024-05-02 NOTE — PROCEDURES
Routine EEG Report    Reason for EEG: Seizure    Technical Summary: This EEG was performed with a 32-channel digital EEG machine with electrodes placed according to the international 10-20 system of placement.            Background:   EEG sampled and reviewed sleep and comatose state.  Background is symmetric, medium amplitude with theta predominant frequency.  There are sleep transients seen throughout the recording in the form of K complexes, vertex waves and sleep spindles.  There are several second bursts of theta slowing in the frontocentral regions bilaterally.  No definite epileptiform discharges were seen.  No seizures and no status.     Hyperventilation: Hyperventilation was not performed due to medical condition    Photic Stimulation: Photic stimulation was not performed due to medical condition      Impression: This is an unremarkable EEG in the drowsy and comatose state, without definite epileptiform discharges appreciated.  There are no seizures in this study.       Eliceo Nuñez, DO  Neurology

## 2024-05-02 NOTE — PLAN OF CARE
Problem: Discharge Planning  Goal: Discharge to home or other facility with appropriate resources  5/2/2024 1624 by Yrn Golden RN  Outcome: Progressing  5/2/2024 0737 by Lisa Martino RN  Outcome: Progressing  Flowsheets (Taken 5/2/2024 0515)  Discharge to home or other facility with appropriate resources:   Identify barriers to discharge with patient and caregiver   Arrange for needed discharge resources and transportation as appropriate   Identify discharge learning needs (meds, wound care, etc)   Arrange for interpreters to assist at discharge as needed   Refer to discharge planning if patient needs post-hospital services based on physician order or complex needs related to functional status, cognitive ability or social support system     Problem: Safety - Adult  Goal: Free from fall injury  5/2/2024 1624 by Yrn Golden RN  Outcome: Progressing  5/2/2024 0737 by Lisa Martino RN  Outcome: Progressing     Problem: ABCDS Injury Assessment  Goal: Absence of physical injury  5/2/2024 1624 by Yrn Golden RN  Outcome: Progressing  5/2/2024 0737 by Lisa Martino RN  Outcome: Progressing     Problem: Skin/Tissue Integrity  Goal: Absence of new skin breakdown  Description: 1.  Monitor for areas of redness and/or skin breakdown  2.  Assess vascular access sites hourly  3.  Every 4-6 hours minimum:  Change oxygen saturation probe site  4.  Every 4-6 hours:  If on nasal continuous positive airway pressure, respiratory therapy assess nares and determine need for appliance change or resting period.  5/2/2024 1624 by Yrn Golden RN  Outcome: Progressing  5/2/2024 0737 by Lisa Martino RN  Outcome: Progressing     Problem: Safety - Medical Restraint  Goal: Remains free of injury from restraints (Restraint for Interference with Medical Device)  Description: INTERVENTIONS:  1. Determine that other, less restrictive measures have been tried or would not be effective before applying the restraint  2. Evaluate

## 2024-05-02 NOTE — INTERDISCIPLINARY ROUNDS
Multi-D Rounds/Checklist (leapfrog):  Lines: can any be removed?: Endotracheal tube   ETT  (Active)     NG/OG/NJ/NE Tube Orogastric 18 fr Center mouth (Active)       External Urinary Catheter (Active)      DVT Prophylaxis: Ordered  Vent: HOB elevated? Yes ;  mL/k ; Vent day 1  Nutrition Ordered/appropriate: Contraindicated- possible extubate  Can antibiotics or other drugs be stopped? N/A Yes/No  Inpat Anti-Infectives (From admission, onward)      None          Consults needed: None  A: Is pain control adequate? (has PRNs? Stop drip?) Yes  B: Sedation break and SBT? Yes  C: Is sedation choice appropriate? Yes  D: Delirium/CAM-ICU? No  E: Mobility goals/appropriateness? Yes  F: Family update and plan? mother is surrogate decision maker and is being updated daily by primary attending and nursing staff.    Maryam Carpenter, APRN - CNP

## 2024-05-02 NOTE — CONSULTS
.Consult    Patient: Daniele Morel MRN: [id number]     YOB: 1970  Age: 54 y.o.  Sex: male      Subjective:      Daniele Morel is a 54 y.o. male who is being seen for seizures.    He has a past medical history of epilepsy since birth which is only managed on gabapentin at this time (Saw [facility] 4/02/20, plan at this time was to obtain sleep study and MRI of the brain and start zonisamide (ZONEGRAN) 25 mg capsule; 1 qhs x 1wk, 2 qhs x1wk, increase by 1 weekly up to 300 mg qhs, but it appears patient was lost to follow-up Cooper Johnson), CAD with multiple MIs, uncontrolled DM, and DKA. Per mom, his DM was previously well controlled on an injectable medication but his diability medicare quit covering it. He lives alone independently but does not drive, mother drives him and picks up his medications from pharmacy.    He presented to the ER with a bad headache and difficulty speaking, apparently he had been feeling unwell off an on for the past year with concern he might be having seizures again but over the past week was very out of it, calling his family but having difficulty speaking and being \"out of it\" with a bad headache. He was initially admitted for a stroke workup but had a seizure on the floor and was given 3mg ativan, intubated, and moved to the ICU. In the ER his blood sugar was 331 and he has not received any insulin since admission.      Past Medical History:   Diagnosis Date    Acquired deformity of toe 01/24/2020    Acute coronary syndrome (HCC) 12/15/2016    Acute pain of left shoulder 01/09/2019    x-ray normal  likely sprain vs capsulitis  rest  warmth  activity as tolerated   ortho  will benefit from PT  Overview:   Overview:   x-ray normal  likely sprain vs capsulitis  rest  warmth  activity as tolerated   ortho  will benefit from PT       Acute pancreatitis 02/15/2018    Adhesive capsulitis of left shoulder 01/09/2019    nsaids as needed  will refer to ortho for possible steroid 
Arranged SOC tele neuro consult via website. Connect ID [id number]. Camera in room.  
Neurology Consult Note       History:   54-year-old male with history of epilepsy, diabetes, CAD with stents, hypertension, insomnia, anxiety presented with confusion for a few days prior to admission.  Elevated glucose in the 300s on arrival, and hypertensive.  While in the ER \"patient began to become unresponsive, with head tilted to right and exhibiting spastic and repetitive movements.\"  Was given IV Ativan and Keppra load, and subsequently intubated.  Neurology consulted.  He was on gabapentin only for seizure medicines prior to admission.  No neurology clinic visits available for review.       Exam: Pertinent positives and negatives include:  Intubated and sedated.  He awakens to voice and nods his head appropriately to questions.  Follows commands.  Moves extremities spontaneously with purpose.     Imaging and review of data:   CT head without acute pathology    CT angiogram head and neck with left ICA stenosis 50 to 60%    Preliminary EEG read without recurrent epileptiform discharges or status epilepticus.     Assessment and Plan:   54-year-old male with epilepsy presents with breakthrough seizure in the setting of hyperglycemia and hypertension.     Recommendations:  Continue maintenance Keppra at current dose, indefinitely    Hyperglycemic and metabolic corrections per primary    MRI brain can be deferred until after extubation and until he is medically stable.     Wean sedation and attempt SBTs as tolerated.  Further recommendations to follow.      Eliceo Nuñez,   Neurology      Patient is critically ill.  Without intervention, there is a high probability of imminent acute organ impairment or life-threatening deterioration.  Total critical care time spent: 40 minutes.        
with history of seizures, likely partial seizures since childhood presents with breakthrough possibly status epilepticus, intubated for airway protection, neurology consult pending likely will need continuous EEG for a while.    NEURO:   Likely status epilepticus, EEG to the discretion of neurology have been consulted, on Keppra, antiepileptics per neurology.  Sedation: Versed,  Analgesia: Fentanyl  Paralytics: None  CV:   Volume Status: Seems euvolemic  Septic shock: None  NSTEMI: None  PULM:   Acute hypoxemic/hypercapneic respiratory failure: Patient was intubated for airway protection, his case was discussed with neurology, likely will need EEG formal consult pending.  RENAL:  ANABELLE: None  Lactic acidosis: None  Electrolytes: Replenish as needed  GI:   Nutrition: Start tube feeding  HEME:   Anemia: Monitor, none  Thrombocytopenia: None  Anticoagulation: Subcu Lovenox  ID:   Septic Shock: None    Skin: no decub, turns, preventive care  Prophy: Lovenox, Pepcid    Family including wife and daughter as well as consulting neurologist updated during rounds    Full Code    The patient is critically ill with respiratory failure, circulatory failure and requires high complexity decision making for assessment and support including frequent ventilator adjustment, frequent evaluation and titration of therapies , application of advanced monitoring technologies and extensive interpretation of multiple databases    Cumulative time devoted to patient care services by me for day of service is 40 mins.    Adam Joyner MD

## 2024-05-02 NOTE — ED NOTES
Held hydralazine per MD's request as pt's BP has been trending down. Last BP was 128/90.     Crohn, Abigail E, RN  05/02/24 0136     ADMIT

## 2024-05-03 LAB
ANION GAP SERPL CALC-SCNC: 11 MMOL/L (ref 9–18)
BASOPHILS # BLD: 0.1 K/UL (ref 0–0.2)
BASOPHILS NFR BLD: 1 % (ref 0–2)
BUN SERPL-MCNC: 23 MG/DL (ref 6–23)
CALCIUM SERPL-MCNC: 9 MG/DL (ref 8.8–10.2)
CHLORIDE SERPL-SCNC: 105 MMOL/L (ref 98–107)
CHOLEST SERPL-MCNC: 194 MG/DL (ref 0–200)
CO2 SERPL-SCNC: 22 MMOL/L (ref 20–28)
CREAT SERPL-MCNC: 1.07 MG/DL (ref 0.8–1.3)
DIFFERENTIAL METHOD BLD: NORMAL
EOSINOPHIL # BLD: 0.3 K/UL (ref 0–0.8)
EOSINOPHIL NFR BLD: 3 % (ref 0.5–7.8)
ERYTHROCYTE [DISTWIDTH] IN BLOOD BY AUTOMATED COUNT: 13.5 % (ref 11.9–14.6)
EST. AVERAGE GLUCOSE BLD GHB EST-MCNC: 283 MG/DL
GLUCOSE BLD STRIP.AUTO-MCNC: 153 MG/DL (ref 65–100)
GLUCOSE BLD STRIP.AUTO-MCNC: 194 MG/DL (ref 65–100)
GLUCOSE BLD STRIP.AUTO-MCNC: 201 MG/DL (ref 65–100)
GLUCOSE BLD STRIP.AUTO-MCNC: 234 MG/DL (ref 65–100)
GLUCOSE BLD STRIP.AUTO-MCNC: 259 MG/DL (ref 65–100)
GLUCOSE BLD STRIP.AUTO-MCNC: 265 MG/DL (ref 65–100)
GLUCOSE SERPL-MCNC: 237 MG/DL (ref 70–99)
HBA1C MFR BLD: 11.5 % (ref 0–5.6)
HCT VFR BLD AUTO: 43.7 % (ref 41.1–50.3)
HDLC SERPL-MCNC: 26 MG/DL (ref 40–60)
HDLC SERPL: 7.5 (ref 0–5)
HGB BLD-MCNC: 14.2 G/DL (ref 13.6–17.2)
IMM GRANULOCYTES # BLD AUTO: 0 K/UL (ref 0–0.5)
IMM GRANULOCYTES NFR BLD AUTO: 0 % (ref 0–5)
LDLC SERPL CALC-MCNC: 112 MG/DL (ref 0–100)
LYMPHOCYTES # BLD: 2 K/UL (ref 0.5–4.6)
LYMPHOCYTES NFR BLD: 21 % (ref 13–44)
MCH RBC QN AUTO: 29.9 PG (ref 26.1–32.9)
MCHC RBC AUTO-ENTMCNC: 32.5 G/DL (ref 31.4–35)
MCV RBC AUTO: 92 FL (ref 82–102)
MONOCYTES # BLD: 0.9 K/UL (ref 0.1–1.3)
MONOCYTES NFR BLD: 9 % (ref 4–12)
NEUTS SEG # BLD: 6.5 K/UL (ref 1.7–8.2)
NEUTS SEG NFR BLD: 66 % (ref 43–78)
NRBC # BLD: 0 K/UL (ref 0–0.2)
PLATELET # BLD AUTO: 278 K/UL (ref 150–450)
PMV BLD AUTO: 10.9 FL (ref 9.4–12.3)
POTASSIUM SERPL-SCNC: 3.9 MMOL/L (ref 3.5–5.1)
RBC # BLD AUTO: 4.75 M/UL (ref 4.23–5.6)
SERVICE CMNT-IMP: ABNORMAL
SODIUM SERPL-SCNC: 138 MMOL/L (ref 136–145)
TRIGL SERPL-MCNC: 279 MG/DL (ref 0–150)
VLDLC SERPL CALC-MCNC: 56 MG/DL (ref 6–23)
WBC # BLD AUTO: 9.8 K/UL (ref 4.3–11.1)

## 2024-05-03 PROCEDURE — 85025 COMPLETE CBC W/AUTO DIFF WBC: CPT

## 2024-05-03 PROCEDURE — C9113 INJ PANTOPRAZOLE SODIUM, VIA: HCPCS | Performed by: EMERGENCY MEDICINE

## 2024-05-03 PROCEDURE — 97530 THERAPEUTIC ACTIVITIES: CPT

## 2024-05-03 PROCEDURE — 6370000000 HC RX 637 (ALT 250 FOR IP): Performed by: INTERNAL MEDICINE

## 2024-05-03 PROCEDURE — 2580000003 HC RX 258: Performed by: EMERGENCY MEDICINE

## 2024-05-03 PROCEDURE — 1100000000 HC RM PRIVATE

## 2024-05-03 PROCEDURE — 82962 GLUCOSE BLOOD TEST: CPT

## 2024-05-03 PROCEDURE — A4216 STERILE WATER/SALINE, 10 ML: HCPCS | Performed by: INTERNAL MEDICINE

## 2024-05-03 PROCEDURE — 92523 SPEECH SOUND LANG COMPREHEN: CPT

## 2024-05-03 PROCEDURE — 6370000000 HC RX 637 (ALT 250 FOR IP): Performed by: STUDENT IN AN ORGANIZED HEALTH CARE EDUCATION/TRAINING PROGRAM

## 2024-05-03 PROCEDURE — 6360000002 HC RX W HCPCS: Performed by: EMERGENCY MEDICINE

## 2024-05-03 PROCEDURE — 1100000003 HC PRIVATE W/ TELEMETRY

## 2024-05-03 PROCEDURE — 6370000000 HC RX 637 (ALT 250 FOR IP): Performed by: FAMILY MEDICINE

## 2024-05-03 PROCEDURE — 2580000003 HC RX 258: Performed by: HOSPITALIST

## 2024-05-03 PROCEDURE — 6360000002 HC RX W HCPCS: Performed by: HOSPITALIST

## 2024-05-03 PROCEDURE — 97112 NEUROMUSCULAR REEDUCATION: CPT

## 2024-05-03 PROCEDURE — 99232 SBSQ HOSP IP/OBS MODERATE 35: CPT | Performed by: STUDENT IN AN ORGANIZED HEALTH CARE EDUCATION/TRAINING PROGRAM

## 2024-05-03 PROCEDURE — 6370000000 HC RX 637 (ALT 250 FOR IP): Performed by: HOSPITALIST

## 2024-05-03 PROCEDURE — 80048 BASIC METABOLIC PNL TOTAL CA: CPT

## 2024-05-03 PROCEDURE — 2580000003 HC RX 258: Performed by: INTERNAL MEDICINE

## 2024-05-03 PROCEDURE — 92610 EVALUATE SWALLOWING FUNCTION: CPT

## 2024-05-03 PROCEDURE — 36415 COLL VENOUS BLD VENIPUNCTURE: CPT

## 2024-05-03 PROCEDURE — 83036 HEMOGLOBIN GLYCOSYLATED A1C: CPT

## 2024-05-03 PROCEDURE — 2580000003 HC RX 258: Performed by: STUDENT IN AN ORGANIZED HEALTH CARE EDUCATION/TRAINING PROGRAM

## 2024-05-03 PROCEDURE — A4216 STERILE WATER/SALINE, 10 ML: HCPCS | Performed by: EMERGENCY MEDICINE

## 2024-05-03 PROCEDURE — 2500000003 HC RX 250 WO HCPCS: Performed by: INTERNAL MEDICINE

## 2024-05-03 PROCEDURE — 97166 OT EVAL MOD COMPLEX 45 MIN: CPT

## 2024-05-03 PROCEDURE — 80061 LIPID PANEL: CPT

## 2024-05-03 PROCEDURE — 97162 PT EVAL MOD COMPLEX 30 MIN: CPT

## 2024-05-03 RX ORDER — INSULIN LISPRO 100 [IU]/ML
0-8 INJECTION, SOLUTION INTRAVENOUS; SUBCUTANEOUS
Status: DISCONTINUED | OUTPATIENT
Start: 2024-05-03 | End: 2024-05-05 | Stop reason: HOSPADM

## 2024-05-03 RX ORDER — DOCUSATE SODIUM 100 MG/1
100 CAPSULE, LIQUID FILLED ORAL DAILY PRN
Status: DISCONTINUED | OUTPATIENT
Start: 2024-05-03 | End: 2024-05-05 | Stop reason: HOSPADM

## 2024-05-03 RX ORDER — GABAPENTIN 300 MG/1
300 CAPSULE ORAL 4 TIMES DAILY
Status: DISCONTINUED | OUTPATIENT
Start: 2024-05-03 | End: 2024-05-05 | Stop reason: HOSPADM

## 2024-05-03 RX ORDER — CARVEDILOL 12.5 MG/1
25 TABLET ORAL 2 TIMES DAILY WITH MEALS
Status: DISCONTINUED | OUTPATIENT
Start: 2024-05-03 | End: 2024-05-04

## 2024-05-03 RX ORDER — INSULIN LISPRO 100 [IU]/ML
4 INJECTION, SOLUTION INTRAVENOUS; SUBCUTANEOUS
Status: DISCONTINUED | OUTPATIENT
Start: 2024-05-03 | End: 2024-05-05 | Stop reason: HOSPADM

## 2024-05-03 RX ORDER — INSULIN GLARGINE 100 [IU]/ML
25 INJECTION, SOLUTION SUBCUTANEOUS NIGHTLY
Status: DISCONTINUED | OUTPATIENT
Start: 2024-05-03 | End: 2024-05-04

## 2024-05-03 RX ORDER — FENOFIBRATE 160 MG/1
160 TABLET ORAL DAILY
Status: DISCONTINUED | OUTPATIENT
Start: 2024-05-03 | End: 2024-05-05 | Stop reason: HOSPADM

## 2024-05-03 RX ORDER — 0.9 % SODIUM CHLORIDE 0.9 %
500 INTRAVENOUS SOLUTION INTRAVENOUS ONCE
Status: COMPLETED | OUTPATIENT
Start: 2024-05-03 | End: 2024-05-03

## 2024-05-03 RX ORDER — POLYETHYLENE GLYCOL 3350 17 G/17G
17 POWDER, FOR SOLUTION ORAL DAILY PRN
Status: DISCONTINUED | OUTPATIENT
Start: 2024-05-03 | End: 2024-05-05 | Stop reason: HOSPADM

## 2024-05-03 RX ORDER — CLOPIDOGREL BISULFATE 75 MG/1
75 TABLET ORAL DAILY
Status: DISCONTINUED | OUTPATIENT
Start: 2024-05-03 | End: 2024-05-05 | Stop reason: HOSPADM

## 2024-05-03 RX ORDER — FOLIC ACID 1 MG/1
1 TABLET ORAL DAILY
Status: DISCONTINUED | OUTPATIENT
Start: 2024-05-03 | End: 2024-05-05 | Stop reason: HOSPADM

## 2024-05-03 RX ORDER — LISINOPRIL 20 MG/1
20 TABLET ORAL 2 TIMES DAILY
Status: DISCONTINUED | OUTPATIENT
Start: 2024-05-03 | End: 2024-05-05 | Stop reason: HOSPADM

## 2024-05-03 RX ORDER — ATORVASTATIN CALCIUM 80 MG/1
80 TABLET, FILM COATED ORAL DAILY
Status: DISCONTINUED | OUTPATIENT
Start: 2024-05-03 | End: 2024-05-05 | Stop reason: HOSPADM

## 2024-05-03 RX ORDER — ACARBOSE 25 MG/1
25 TABLET ORAL
Status: DISCONTINUED | OUTPATIENT
Start: 2024-05-03 | End: 2024-05-05 | Stop reason: HOSPADM

## 2024-05-03 RX ORDER — INSULIN LISPRO 100 [IU]/ML
0-4 INJECTION, SOLUTION INTRAVENOUS; SUBCUTANEOUS NIGHTLY
Status: DISCONTINUED | OUTPATIENT
Start: 2024-05-03 | End: 2024-05-05 | Stop reason: HOSPADM

## 2024-05-03 RX ADMIN — SODIUM CHLORIDE 500 ML: 9 INJECTION, SOLUTION INTRAVENOUS at 16:39

## 2024-05-03 RX ADMIN — LEVETIRACETAM 1000 MG: 100 INJECTION, SOLUTION INTRAVENOUS at 03:30

## 2024-05-03 RX ADMIN — ATORVASTATIN CALCIUM 80 MG: 80 TABLET, FILM COATED ORAL at 09:14

## 2024-05-03 RX ADMIN — ENOXAPARIN SODIUM 30 MG: 100 INJECTION SUBCUTANEOUS at 09:13

## 2024-05-03 RX ADMIN — GABAPENTIN 300 MG: 300 CAPSULE ORAL at 12:27

## 2024-05-03 RX ADMIN — FOLIC ACID 1 MG: 1 TABLET ORAL at 09:15

## 2024-05-03 RX ADMIN — PANTOPRAZOLE SODIUM 40 MG: 40 INJECTION, POWDER, FOR SOLUTION INTRAVENOUS at 09:14

## 2024-05-03 RX ADMIN — ISOSORBIDE MONONITRATE 30 MG: 30 TABLET, EXTENDED RELEASE ORAL at 09:15

## 2024-05-03 RX ADMIN — CLOPIDOGREL BISULFATE 75 MG: 75 TABLET ORAL at 09:23

## 2024-05-03 RX ADMIN — FAMOTIDINE 20 MG: 10 INJECTION, SOLUTION INTRAVENOUS at 09:14

## 2024-05-03 RX ADMIN — GABAPENTIN 300 MG: 300 CAPSULE ORAL at 21:27

## 2024-05-03 RX ADMIN — SODIUM CHLORIDE, PRESERVATIVE FREE 10 ML: 5 INJECTION INTRAVENOUS at 03:30

## 2024-05-03 RX ADMIN — ACARBOSE 25 MG: 25 TABLET ORAL at 12:27

## 2024-05-03 RX ADMIN — CARVEDILOL 25 MG: 12.5 TABLET, FILM COATED ORAL at 09:14

## 2024-05-03 RX ADMIN — INSULIN LISPRO 4 UNITS: 100 INJECTION, SOLUTION INTRAVENOUS; SUBCUTANEOUS at 12:27

## 2024-05-03 RX ADMIN — INSULIN LISPRO 4 UNITS: 100 INJECTION, SOLUTION INTRAVENOUS; SUBCUTANEOUS at 12:26

## 2024-05-03 RX ADMIN — ASPIRIN 81 MG: 81 TABLET, COATED ORAL at 09:14

## 2024-05-03 RX ADMIN — SODIUM CHLORIDE, PRESERVATIVE FREE 10 ML: 5 INJECTION INTRAVENOUS at 21:26

## 2024-05-03 RX ADMIN — INSULIN LISPRO 2 UNITS: 100 INJECTION, SOLUTION INTRAVENOUS; SUBCUTANEOUS at 09:22

## 2024-05-03 RX ADMIN — SODIUM CHLORIDE, PRESERVATIVE FREE 5 ML: 5 INJECTION INTRAVENOUS at 09:22

## 2024-05-03 RX ADMIN — FENOFIBRATE 160 MG: 160 TABLET ORAL at 09:15

## 2024-05-03 RX ADMIN — ACARBOSE 25 MG: 25 TABLET ORAL at 16:40

## 2024-05-03 RX ADMIN — INSULIN LISPRO 4 UNITS: 100 INJECTION, SOLUTION INTRAVENOUS; SUBCUTANEOUS at 01:19

## 2024-05-03 RX ADMIN — ASPIRIN 81 MG: 81 TABLET, COATED ORAL at 21:27

## 2024-05-03 RX ADMIN — ACARBOSE 25 MG: 25 TABLET ORAL at 09:24

## 2024-05-03 RX ADMIN — INSULIN LISPRO 4 UNITS: 100 INJECTION, SOLUTION INTRAVENOUS; SUBCUTANEOUS at 16:40

## 2024-05-03 RX ADMIN — ENOXAPARIN SODIUM 30 MG: 100 INJECTION SUBCUTANEOUS at 21:27

## 2024-05-03 RX ADMIN — FAMOTIDINE 20 MG: 10 INJECTION, SOLUTION INTRAVENOUS at 21:27

## 2024-05-03 RX ADMIN — INSULIN GLARGINE 25 UNITS: 100 INJECTION, SOLUTION SUBCUTANEOUS at 21:26

## 2024-05-03 RX ADMIN — GABAPENTIN 300 MG: 300 CAPSULE ORAL at 16:40

## 2024-05-03 RX ADMIN — LEVETIRACETAM 1000 MG: 100 INJECTION, SOLUTION INTRAVENOUS at 15:02

## 2024-05-03 RX ADMIN — INSULIN LISPRO 4 UNITS: 100 INJECTION, SOLUTION INTRAVENOUS; SUBCUTANEOUS at 09:21

## 2024-05-03 RX ADMIN — GABAPENTIN 300 MG: 300 CAPSULE ORAL at 09:14

## 2024-05-03 ASSESSMENT — PAIN SCALES - GENERAL
PAINLEVEL_OUTOF10: 0
PAINLEVEL_OUTOF10: 0

## 2024-05-03 NOTE — DIABETES MGMT
Patient admitted with TIA. Admitting blood glucose 331. HbA1c 11.8 (). Blood glucose ranged 193-341 yesterday with patient receiving Lantus 22 units, Humalog 12 units, and Acarbose 50mg. Blood glucose this morning was 194 at 0446. Creatinine 1.07. GFR 83. Reviewed patient current regimen: Lantus 22 units nightly, Acarbose 25mg TID with meals, and Humalog correctional insulin q4h. Noted patient has diet ordered. Patient would likely benefit from a change in correctional insulin to ACHS with prandial insulin to help offset hyperglycemia related to caloric intake. Patient would also likely benefit from a small increase in basal insulin to help improve fasting glucose. Provider updated via [employer] regarding recommendations and patient glycemic control.      
result in hypoglycemia. Patient verbalizes understanding.    Encouraged compliance with discharge regimen. Encouraged patient to continue to work on lifestyle modifications and to follow up with primary care provider (Patel Devi) for further titration of regimen. Patient verbalized understanding and voices no further questions regarding diabetes management.

## 2024-05-03 NOTE — RESULT ENCOUNTER NOTE
Patient's lipid panel was much improved, he should continue on his current medications for his cholesterol.  His hemoglobin A1c was worse at 11.8, when he gets out of the hospital I would like to increase his insulin.  He should continue taking his other diabetic medications including the Jardiance we ordered for him.

## 2024-05-03 NOTE — THERAPY EVALUATION
ACUTE OCCUPATIONAL THERAPY GOALS:   (Developed with and agreed upon by patient and/or caregiver.)  1.Patient will complete lower body dressing and bathing with SUPERVISION and adaptive equipment as needed.   3. Patient will participate in BUE therapeutic exercises to increase strength in LUE to at least 4/5 for participation in ADLs and functional transfers.   4. Patient will participate in BUE therapeutic activities to increase coordination in LUE to WFL for bimanual fine motor ADLs.   5. Patient will attend to LEFT side for 100% of treatment session with 1-3 verbal cues from therapist.   6. Patient will complete functional transfers with SUPERVISION and adaptive equipment as needed.     Timeframe: 7 visits      OCCUPATIONAL THERAPY Initial Assessment, Daily Note, and AM       OT Visit Days: 1  Acknowledge Orders  Time  OT Charge Capture  Rehab Caseload Tracker      Daniele Morel is a 54 y.o. male   PRIMARY DIAGNOSIS: TIA (transient ischemic attack)  TIA (transient ischemic attack) [G45.9]  Cerebrovascular accident (CVA), unspecified mechanism (HCC) [I63.9]       Reason for Referral: Generalized Muscle Weakness (M62.81)  Other lack of cordination (R27.8)  Difficulty in walking, Not elsewhere classified (R26.2)  Other abnormalities of gait and mobility (R26.89)  Dizziness and Giddiness (R42)  Unspecified Lack of Coordination (R27.9)  Inpatient: Payor: [employer] [facility] / Plan: [employer] [facility] / Product Type: *No Product type* /     ASSESSMENT:     REHAB RECOMMENDATIONS:   Recommendation to date pending progress:  Setting:  Inpatient Rehab Facility     Equipment:    Rolling Walker  To Be Determined     ASSESSMENT:  Mr. Morel is a 55 y/o male originally presented to ED on 5/1/24 with stroke-like symptoms, including speech difficulties, pmh seizure disorders. At baseline pt lives alone, is indep for BADLs and functional mobility, does not drive.   Pt overall Min A for functional transfers and mob. Today, pt

## 2024-05-04 LAB
ANION GAP SERPL CALC-SCNC: 10 MMOL/L (ref 9–18)
BASOPHILS # BLD: 0 K/UL (ref 0–0.2)
BASOPHILS NFR BLD: 0 % (ref 0–2)
BUN SERPL-MCNC: 21 MG/DL (ref 6–23)
CALCIUM SERPL-MCNC: 8.7 MG/DL (ref 8.8–10.2)
CHLORIDE SERPL-SCNC: 102 MMOL/L (ref 98–107)
CO2 SERPL-SCNC: 23 MMOL/L (ref 20–28)
CREAT SERPL-MCNC: 0.83 MG/DL (ref 0.8–1.3)
DIFFERENTIAL METHOD BLD: ABNORMAL
EOSINOPHIL # BLD: 0.3 K/UL (ref 0–0.8)
EOSINOPHIL NFR BLD: 4 % (ref 0.5–7.8)
ERYTHROCYTE [DISTWIDTH] IN BLOOD BY AUTOMATED COUNT: 13.3 % (ref 11.9–14.6)
GLUCOSE BLD STRIP.AUTO-MCNC: 197 MG/DL (ref 65–100)
GLUCOSE BLD STRIP.AUTO-MCNC: 229 MG/DL (ref 65–100)
GLUCOSE BLD STRIP.AUTO-MCNC: 233 MG/DL (ref 65–100)
GLUCOSE BLD STRIP.AUTO-MCNC: 236 MG/DL (ref 65–100)
GLUCOSE SERPL-MCNC: 285 MG/DL (ref 70–99)
HCT VFR BLD AUTO: 41.3 % (ref 41.1–50.3)
HGB BLD-MCNC: 13.5 G/DL (ref 13.6–17.2)
IMM GRANULOCYTES # BLD AUTO: 0 K/UL (ref 0–0.5)
IMM GRANULOCYTES NFR BLD AUTO: 0 % (ref 0–5)
LYMPHOCYTES # BLD: 2.1 K/UL (ref 0.5–4.6)
LYMPHOCYTES NFR BLD: 27 % (ref 13–44)
MCH RBC QN AUTO: 29.9 PG (ref 26.1–32.9)
MCHC RBC AUTO-ENTMCNC: 32.7 G/DL (ref 31.4–35)
MCV RBC AUTO: 91.4 FL (ref 82–102)
MONOCYTES # BLD: 0.8 K/UL (ref 0.1–1.3)
MONOCYTES NFR BLD: 10 % (ref 4–12)
NEUTS SEG # BLD: 4.6 K/UL (ref 1.7–8.2)
NEUTS SEG NFR BLD: 59 % (ref 43–78)
NRBC # BLD: 0 K/UL (ref 0–0.2)
PLATELET # BLD AUTO: 220 K/UL (ref 150–450)
PMV BLD AUTO: 10.9 FL (ref 9.4–12.3)
POTASSIUM SERPL-SCNC: 3.9 MMOL/L (ref 3.5–5.1)
RBC # BLD AUTO: 4.52 M/UL (ref 4.23–5.6)
SERVICE CMNT-IMP: ABNORMAL
SODIUM SERPL-SCNC: 134 MMOL/L (ref 136–145)
WBC # BLD AUTO: 7.9 K/UL (ref 4.3–11.1)

## 2024-05-04 PROCEDURE — A4216 STERILE WATER/SALINE, 10 ML: HCPCS | Performed by: INTERNAL MEDICINE

## 2024-05-04 PROCEDURE — A4216 STERILE WATER/SALINE, 10 ML: HCPCS | Performed by: EMERGENCY MEDICINE

## 2024-05-04 PROCEDURE — 2500000003 HC RX 250 WO HCPCS: Performed by: INTERNAL MEDICINE

## 2024-05-04 PROCEDURE — 2580000003 HC RX 258: Performed by: INTERNAL MEDICINE

## 2024-05-04 PROCEDURE — 6370000000 HC RX 637 (ALT 250 FOR IP): Performed by: FAMILY MEDICINE

## 2024-05-04 PROCEDURE — C9113 INJ PANTOPRAZOLE SODIUM, VIA: HCPCS | Performed by: EMERGENCY MEDICINE

## 2024-05-04 PROCEDURE — 6370000000 HC RX 637 (ALT 250 FOR IP): Performed by: HOSPITALIST

## 2024-05-04 PROCEDURE — 6360000002 HC RX W HCPCS: Performed by: EMERGENCY MEDICINE

## 2024-05-04 PROCEDURE — 36415 COLL VENOUS BLD VENIPUNCTURE: CPT

## 2024-05-04 PROCEDURE — 85025 COMPLETE CBC W/AUTO DIFF WBC: CPT

## 2024-05-04 PROCEDURE — 82962 GLUCOSE BLOOD TEST: CPT

## 2024-05-04 PROCEDURE — 2580000003 HC RX 258: Performed by: EMERGENCY MEDICINE

## 2024-05-04 PROCEDURE — 1100000000 HC RM PRIVATE

## 2024-05-04 PROCEDURE — 2580000003 HC RX 258: Performed by: HOSPITALIST

## 2024-05-04 PROCEDURE — 6360000002 HC RX W HCPCS: Performed by: HOSPITALIST

## 2024-05-04 PROCEDURE — 80048 BASIC METABOLIC PNL TOTAL CA: CPT

## 2024-05-04 PROCEDURE — 6370000000 HC RX 637 (ALT 250 FOR IP): Performed by: STUDENT IN AN ORGANIZED HEALTH CARE EDUCATION/TRAINING PROGRAM

## 2024-05-04 RX ORDER — LEVETIRACETAM 500 MG/1
1000 TABLET ORAL 2 TIMES DAILY
Status: DISCONTINUED | OUTPATIENT
Start: 2024-05-04 | End: 2024-05-05 | Stop reason: HOSPADM

## 2024-05-04 RX ORDER — CLONAZEPAM 0.5 MG/1
0.5 TABLET ORAL EVERY 12 HOURS PRN
Status: DISCONTINUED | OUTPATIENT
Start: 2024-05-04 | End: 2024-05-05 | Stop reason: HOSPADM

## 2024-05-04 RX ORDER — INSULIN GLARGINE 100 [IU]/ML
28 INJECTION, SOLUTION SUBCUTANEOUS NIGHTLY
Status: DISCONTINUED | OUTPATIENT
Start: 2024-05-04 | End: 2024-05-05 | Stop reason: HOSPADM

## 2024-05-04 RX ORDER — FAMOTIDINE 20 MG/1
20 TABLET, FILM COATED ORAL 2 TIMES DAILY
Status: DISCONTINUED | OUTPATIENT
Start: 2024-05-04 | End: 2024-05-05 | Stop reason: HOSPADM

## 2024-05-04 RX ORDER — CARVEDILOL 3.12 MG/1
6.25 TABLET ORAL 2 TIMES DAILY WITH MEALS
Status: DISCONTINUED | OUTPATIENT
Start: 2024-05-04 | End: 2024-05-05

## 2024-05-04 RX ORDER — ACETAMINOPHEN 325 MG/1
650 TABLET ORAL EVERY 4 HOURS PRN
Status: DISCONTINUED | OUTPATIENT
Start: 2024-05-04 | End: 2024-05-05 | Stop reason: HOSPADM

## 2024-05-04 RX ADMIN — CLONAZEPAM 0.5 MG: 0.5 TABLET ORAL at 23:28

## 2024-05-04 RX ADMIN — INSULIN LISPRO 2 UNITS: 100 INJECTION, SOLUTION INTRAVENOUS; SUBCUTANEOUS at 08:22

## 2024-05-04 RX ADMIN — LEVETIRACETAM 1000 MG: 500 TABLET, FILM COATED ORAL at 14:31

## 2024-05-04 RX ADMIN — FOLIC ACID 1 MG: 1 TABLET ORAL at 08:19

## 2024-05-04 RX ADMIN — ASPIRIN 81 MG: 81 TABLET, COATED ORAL at 08:19

## 2024-05-04 RX ADMIN — FAMOTIDINE 20 MG: 20 TABLET, FILM COATED ORAL at 21:26

## 2024-05-04 RX ADMIN — INSULIN LISPRO 4 UNITS: 100 INJECTION, SOLUTION INTRAVENOUS; SUBCUTANEOUS at 16:39

## 2024-05-04 RX ADMIN — ENOXAPARIN SODIUM 30 MG: 100 INJECTION SUBCUTANEOUS at 21:20

## 2024-05-04 RX ADMIN — GABAPENTIN 300 MG: 300 CAPSULE ORAL at 16:39

## 2024-05-04 RX ADMIN — SODIUM CHLORIDE, PRESERVATIVE FREE 10 ML: 5 INJECTION INTRAVENOUS at 03:55

## 2024-05-04 RX ADMIN — GABAPENTIN 300 MG: 300 CAPSULE ORAL at 08:19

## 2024-05-04 RX ADMIN — INSULIN GLARGINE 28 UNITS: 100 INJECTION, SOLUTION SUBCUTANEOUS at 21:19

## 2024-05-04 RX ADMIN — PANTOPRAZOLE SODIUM 40 MG: 40 INJECTION, POWDER, FOR SOLUTION INTRAVENOUS at 08:18

## 2024-05-04 RX ADMIN — ENOXAPARIN SODIUM 30 MG: 100 INJECTION SUBCUTANEOUS at 08:19

## 2024-05-04 RX ADMIN — CLOPIDOGREL BISULFATE 75 MG: 75 TABLET ORAL at 08:19

## 2024-05-04 RX ADMIN — SODIUM CHLORIDE, PRESERVATIVE FREE 10 ML: 5 INJECTION INTRAVENOUS at 21:26

## 2024-05-04 RX ADMIN — LEVETIRACETAM 1000 MG: 100 INJECTION, SOLUTION INTRAVENOUS at 03:55

## 2024-05-04 RX ADMIN — INSULIN LISPRO 2 UNITS: 100 INJECTION, SOLUTION INTRAVENOUS; SUBCUTANEOUS at 12:23

## 2024-05-04 RX ADMIN — ACETAMINOPHEN 650 MG: 325 TABLET ORAL at 23:28

## 2024-05-04 RX ADMIN — ASPIRIN 81 MG: 81 TABLET, COATED ORAL at 21:26

## 2024-05-04 RX ADMIN — GABAPENTIN 300 MG: 300 CAPSULE ORAL at 12:22

## 2024-05-04 RX ADMIN — GABAPENTIN 300 MG: 300 CAPSULE ORAL at 21:20

## 2024-05-04 RX ADMIN — CARVEDILOL 6.25 MG: 3.12 TABLET, FILM COATED ORAL at 08:21

## 2024-05-04 RX ADMIN — ACARBOSE 25 MG: 25 TABLET ORAL at 09:11

## 2024-05-04 RX ADMIN — INSULIN LISPRO 4 UNITS: 100 INJECTION, SOLUTION INTRAVENOUS; SUBCUTANEOUS at 12:22

## 2024-05-04 RX ADMIN — INSULIN LISPRO 4 UNITS: 100 INJECTION, SOLUTION INTRAVENOUS; SUBCUTANEOUS at 08:21

## 2024-05-04 RX ADMIN — SODIUM CHLORIDE, PRESERVATIVE FREE 5 ML: 5 INJECTION INTRAVENOUS at 09:12

## 2024-05-04 RX ADMIN — ATORVASTATIN CALCIUM 80 MG: 80 TABLET, FILM COATED ORAL at 08:19

## 2024-05-04 RX ADMIN — CARVEDILOL 6.25 MG: 3.12 TABLET, FILM COATED ORAL at 16:39

## 2024-05-04 RX ADMIN — FAMOTIDINE 20 MG: 10 INJECTION, SOLUTION INTRAVENOUS at 08:18

## 2024-05-04 RX ADMIN — LEVETIRACETAM 1000 MG: 500 TABLET, FILM COATED ORAL at 23:23

## 2024-05-04 RX ADMIN — ACARBOSE 25 MG: 25 TABLET ORAL at 12:23

## 2024-05-04 RX ADMIN — ACARBOSE 25 MG: 25 TABLET ORAL at 16:39

## 2024-05-04 RX ADMIN — FENOFIBRATE 160 MG: 160 TABLET ORAL at 08:19

## 2024-05-04 RX ADMIN — INSULIN LISPRO 2 UNITS: 100 INJECTION, SOLUTION INTRAVENOUS; SUBCUTANEOUS at 16:39

## 2024-05-04 ASSESSMENT — PAIN DESCRIPTION - PAIN TYPE: TYPE: CHRONIC PAIN

## 2024-05-04 ASSESSMENT — PAIN SCALES - GENERAL
PAINLEVEL_OUTOF10: 0
PAINLEVEL_OUTOF10: 3
PAINLEVEL_OUTOF10: 0
PAINLEVEL_OUTOF10: 0

## 2024-05-04 ASSESSMENT — PAIN DESCRIPTION - DESCRIPTORS: DESCRIPTORS: ACHING

## 2024-05-04 ASSESSMENT — PAIN DESCRIPTION - ORIENTATION: ORIENTATION: RIGHT;LEFT

## 2024-05-04 ASSESSMENT — PAIN DESCRIPTION - ONSET: ONSET: ON-GOING

## 2024-05-04 ASSESSMENT — PAIN DESCRIPTION - FREQUENCY: FREQUENCY: INTERMITTENT

## 2024-05-04 ASSESSMENT — PAIN - FUNCTIONAL ASSESSMENT: PAIN_FUNCTIONAL_ASSESSMENT: ACTIVITIES ARE NOT PREVENTED

## 2024-05-04 ASSESSMENT — PAIN DESCRIPTION - LOCATION: LOCATION: WRIST

## 2024-05-05 VITALS
HEART RATE: 72 BPM | OXYGEN SATURATION: 98 % | TEMPERATURE: 97.9 F | BODY MASS INDEX: 31.92 KG/M2 | RESPIRATION RATE: 18 BRPM | DIASTOLIC BLOOD PRESSURE: 97 MMHG | SYSTOLIC BLOOD PRESSURE: 140 MMHG | HEIGHT: 70 IN | WEIGHT: 223 LBS

## 2024-05-05 PROBLEM — G40.901 STATUS EPILEPTICUS (HCC): Status: RESOLVED | Noted: 2024-05-02 | Resolved: 2024-05-05

## 2024-05-05 LAB
GLUCOSE BLD STRIP.AUTO-MCNC: 223 MG/DL (ref 65–100)
GLUCOSE BLD STRIP.AUTO-MCNC: 225 MG/DL (ref 65–100)
SERVICE CMNT-IMP: ABNORMAL
SERVICE CMNT-IMP: ABNORMAL

## 2024-05-05 PROCEDURE — 6370000000 HC RX 637 (ALT 250 FOR IP): Performed by: FAMILY MEDICINE

## 2024-05-05 PROCEDURE — 6360000002 HC RX W HCPCS: Performed by: EMERGENCY MEDICINE

## 2024-05-05 PROCEDURE — A4216 STERILE WATER/SALINE, 10 ML: HCPCS | Performed by: EMERGENCY MEDICINE

## 2024-05-05 PROCEDURE — 6370000000 HC RX 637 (ALT 250 FOR IP): Performed by: HOSPITALIST

## 2024-05-05 PROCEDURE — 6360000002 HC RX W HCPCS: Performed by: HOSPITALIST

## 2024-05-05 PROCEDURE — 2580000003 HC RX 258: Performed by: EMERGENCY MEDICINE

## 2024-05-05 PROCEDURE — 97116 GAIT TRAINING THERAPY: CPT

## 2024-05-05 PROCEDURE — C9113 INJ PANTOPRAZOLE SODIUM, VIA: HCPCS | Performed by: EMERGENCY MEDICINE

## 2024-05-05 PROCEDURE — 2580000003 HC RX 258: Performed by: HOSPITALIST

## 2024-05-05 PROCEDURE — 82962 GLUCOSE BLOOD TEST: CPT

## 2024-05-05 PROCEDURE — 6370000000 HC RX 637 (ALT 250 FOR IP): Performed by: STUDENT IN AN ORGANIZED HEALTH CARE EDUCATION/TRAINING PROGRAM

## 2024-05-05 RX ORDER — DIPHENHYDRAMINE HYDROCHLORIDE 25 MG/1
CAPSULE, LIQUID FILLED ORAL
Qty: 1 KIT | Refills: 0 | Status: SHIPPED | OUTPATIENT
Start: 2024-05-05

## 2024-05-05 RX ORDER — INSULIN GLARGINE 100 [IU]/ML
28 INJECTION, SOLUTION SUBCUTANEOUS NIGHTLY
Qty: 8.4 ML | Refills: 0 | Status: SHIPPED | OUTPATIENT
Start: 2024-05-05 | End: 2024-06-04

## 2024-05-05 RX ORDER — LEVETIRACETAM 1000 MG/1
1000 TABLET ORAL 2 TIMES DAILY
Qty: 60 TABLET | Refills: 0 | Status: SHIPPED | OUTPATIENT
Start: 2024-05-05 | End: 2024-05-07 | Stop reason: SDUPTHER

## 2024-05-05 RX ORDER — INSULIN LISPRO 100 [IU]/ML
4 INJECTION, SOLUTION INTRAVENOUS; SUBCUTANEOUS
Qty: 3.6 ML | Refills: 1 | Status: SHIPPED | OUTPATIENT
Start: 2024-05-05 | End: 2024-06-04

## 2024-05-05 RX ORDER — GLUCOSAMINE HCL/CHONDROITIN SU 500-400 MG
CAPSULE ORAL
Qty: 100 STRIP | Refills: 1 | Status: SHIPPED | OUTPATIENT
Start: 2024-05-05 | End: 2024-06-04

## 2024-05-05 RX ORDER — CLONAZEPAM 0.5 MG/1
0.5 TABLET ORAL EVERY 12 HOURS PRN
Qty: 10 TABLET | Refills: 0 | Status: SHIPPED | OUTPATIENT
Start: 2024-05-05 | End: 2024-05-10

## 2024-05-05 RX ORDER — CARVEDILOL 12.5 MG/1
12.5 TABLET ORAL 2 TIMES DAILY WITH MEALS
Status: DISCONTINUED | OUTPATIENT
Start: 2024-05-05 | End: 2024-05-05 | Stop reason: HOSPADM

## 2024-05-05 RX ADMIN — CARVEDILOL 12.5 MG: 12.5 TABLET, FILM COATED ORAL at 08:10

## 2024-05-05 RX ADMIN — INSULIN LISPRO 4 UNITS: 100 INJECTION, SOLUTION INTRAVENOUS; SUBCUTANEOUS at 11:51

## 2024-05-05 RX ADMIN — INSULIN LISPRO 4 UNITS: 100 INJECTION, SOLUTION INTRAVENOUS; SUBCUTANEOUS at 08:10

## 2024-05-05 RX ADMIN — FOLIC ACID 1 MG: 1 TABLET ORAL at 08:11

## 2024-05-05 RX ADMIN — SODIUM CHLORIDE, PRESERVATIVE FREE 10 ML: 5 INJECTION INTRAVENOUS at 08:12

## 2024-05-05 RX ADMIN — INSULIN LISPRO 2 UNITS: 100 INJECTION, SOLUTION INTRAVENOUS; SUBCUTANEOUS at 11:51

## 2024-05-05 RX ADMIN — GABAPENTIN 300 MG: 300 CAPSULE ORAL at 08:11

## 2024-05-05 RX ADMIN — ATORVASTATIN CALCIUM 80 MG: 80 TABLET, FILM COATED ORAL at 08:11

## 2024-05-05 RX ADMIN — PANTOPRAZOLE SODIUM 40 MG: 40 INJECTION, POWDER, FOR SOLUTION INTRAVENOUS at 08:11

## 2024-05-05 RX ADMIN — INSULIN LISPRO 2 UNITS: 100 INJECTION, SOLUTION INTRAVENOUS; SUBCUTANEOUS at 08:10

## 2024-05-05 RX ADMIN — FENOFIBRATE 160 MG: 160 TABLET ORAL at 08:10

## 2024-05-05 RX ADMIN — FAMOTIDINE 20 MG: 20 TABLET, FILM COATED ORAL at 08:10

## 2024-05-05 RX ADMIN — CLOPIDOGREL BISULFATE 75 MG: 75 TABLET ORAL at 08:11

## 2024-05-05 RX ADMIN — ENOXAPARIN SODIUM 30 MG: 100 INJECTION SUBCUTANEOUS at 08:11

## 2024-05-05 RX ADMIN — ACARBOSE 25 MG: 25 TABLET ORAL at 08:14

## 2024-05-05 RX ADMIN — GABAPENTIN 300 MG: 300 CAPSULE ORAL at 13:05

## 2024-05-05 RX ADMIN — ASPIRIN 81 MG: 81 TABLET, COATED ORAL at 08:11

## 2024-05-05 RX ADMIN — ACARBOSE 25 MG: 25 TABLET ORAL at 12:14

## 2024-05-05 RX ADMIN — LEVETIRACETAM 1000 MG: 500 TABLET, FILM COATED ORAL at 11:50

## 2024-05-05 RX ADMIN — ISOSORBIDE MONONITRATE 30 MG: 30 TABLET, EXTENDED RELEASE ORAL at 08:10

## 2024-05-05 ASSESSMENT — PAIN SCALES - GENERAL: PAINLEVEL_OUTOF10: 0

## 2024-05-05 NOTE — CARE COORDINATION
Pt is for discharge home today with family.  Referral sent for SF Out Pt therapy for follow up and a RW provided from [facility], delivered to room prior to discharge.  No needs/supportive care orders recieved for CM at this time.     05/05/24 1963   Service Assessment   Patient's Healthcare Decision Maker is: Legal Next of Kin   Social/Functional History   Lives With Alone   Home Equipment Cane  (CPAP, glucometer)   ADL Assistance Independent   Homemaking Assistance Independent   Homemaking Responsibilities Yes   Ambulation Assistance Independent   Transfer Assistance Independent   Active  No   Patient's  Info family   Occupation On disability   Services At/After Discharge   Transition of Care Consult (CM Consult) Discharge Planning;DME/Supply Assistance;Other   Services At/After Discharge DME;Outpatient;PT;OT    Resource Information Provided? No   Mode of Transport at Discharge Other (see comment)  (family)   Confirm Follow Up Transport Family   Condition of Participation: Discharge Planning   The Plan for Transition of Care is related to the following treatment goals: Pt will return home with family and SF Out Pt therapy referral.   The Patient and/or Patient Representative was provided with a Choice of Provider? Patient   The Patient and/Or Patient Representative agree with the Discharge Plan? Yes   Freedom of Choice list was provided with basic dialogue that supports the patient's individualized plan of care/goals, treatment preferences, and shares the quality data associated with the providers?  Yes

## 2024-05-05 NOTE — PROGRESS NOTES
Hospitalist Progress Note   Admit Date:  2024  7:43 PM   Name:  Danieel Morel   Age:  54 y.o.  Sex:  male  :  1970   MRN:  [id number]   Room:  [address] [place] [address]    Presenting/Chief Complaint: Aphasia     Reason(s) for Admission: TIA (transient ischemic attack) [G45.9]  Cerebrovascular accident (CVA), unspecified mechanism (HCC) [I63.9]     Hospital Course:   Daniele Morel is a 54 y.o. male with medical history of type 2 diabetes, CAD, hypertension, hyperlipidemia, obesity, seizure disorder, chronic foot ulcer, presents to the ER for expressive aphasia.  He states for the last 4 days he has had difficulty with word finding. Patient denies any facial droop and denies any difficulty ambulating or focal neurological deficits. Patient was seen in a clinic today to establish care and outpatient MRI brain was ordered for this difficulty with word finding.  Due to ongoing progressive symptoms, he presented to the ER for evaluation.       Workup in ER showed normal labs including urine drug screen.  Patient was seen by teleneurologist who felt that his symptoms were consistent with CVA and recommended admission.  He was out of the window for tPA.     On hospitalist arrival patient continued with expressive aphasia.  Family at bedside states he is normally conversant and active, despite his numerous medical problems      Subjective & 24hr Events:   Patient seen and examined at bedside.  Patient was status epilepticus overnight and intubated for airway protection.      Assessment & Plan:   Altered mental status in setting of epilepsy  - Neurology states his EEG is negative for epileptic activity at this time  - Continue Keppra at current dose  - Await MRI brain    Acute hypoxic respiratory failure  - Patient intubated overnight for airway protection  - Patient extubated this afternoon  - Will continue to wean 2 L nasal cannula oxygen    Diabetes mellitus type 2  - Hemoglobin A1c 11.8  - Fasting blood sugars not 
       Hospitalist Progress Note   Admit Date:  2024  7:43 PM   Name:  Daniele Morel   Age:  54 y.o.  Sex:  male  :  1970   MRN:  [id number]   Room:  [facility]    Presenting/Chief Complaint: Aphasia     Reason(s) for Admission: TIA (transient ischemic attack) [G45.9]  Cerebrovascular accident (CVA), unspecified mechanism (HCC) [I63.9]     Hospital Course:   Patient is a 53 y/o male with medical history of DM II insulin dependent, CAD, hypertension, hyperlipidemia, obesity, seizure disorder, chronic foot ulcer, SHRUTI, MDD who presented to ED  with cc expressive aphasia of 4-day duration associated with new onset headache. He actually established with a new PCP earlier in the day  and mentioned speech difficulties. PCP ordered outpatient MRI of brain and also planned to start Zonisamide for seizure disorder. Due to progressive, persistent symptoms he decided to present to the ED for further evaluation.    ED workup: afebrile RR 20 HR 85 /101 95% on RA  Tele neurology evaluation: symptoms c/w CVA with recommendation for admission for work-up. Outside of window for tPA.  CT head negative, CTA H/N negative for hemodynamically significant carotid stenosis, unremarkable angiogram of brain  Hospitalist admission requested.    Patient was admitted to medical floor. Patient experienced seizure like activity with suspected status epilepticus: rapid response initiated  0400. He was intubated for airway protection and transferred to ICU. He was given hydralazine for /143. He was also loaded with Keppra. Neurology consultation and EEG were ordered. Patient was extubated to 2L NC around 1300 . He was then transitioned to room air and transferred back to medical floor. EEG obtained negative for seizures. Neurology has deferred MRI of brain for further CVA workup. Neurology has signed off.    Extensive behavioral health history managed by psychiatry with multiple psychiatric medications. He is no longer 
  Chart reviewed. Patient intubated and extubated yesterday. Now on room air.  Neurology has seen and feels that patient is stable on Keppra with plan for follow up in the office and outpatient MRI.  Plans for DC per hospitalist.  We will not plan to follow.     ANANYA Evans - CNP      Signing off.     Raimn Skaggs MD    
  SPEECH LANGUAGE PATHOLOGY: ATTEMPT     NAME: Daniele Morel  : 1970  MRN: [id number]    ADMISSION DATE: 2024  PRIMARY DIAGNOSIS: TIA (transient ischemic attack)    Speech therapy consulted to eval/treat. Patient transferred to ICU following change in medical status. Speech therapy will sign off at this time. Please reconsult speech therapy services when patient is appropriate for evaluation.     LEONARD JUAREZ  2024 8:23 AM    
0249: Rapid response called     0250: ICU RNLisa, at bedside. Patient having apparent seizure. Oxygen saturation 100% on room air, unresponsive. Patient making grunting sounds, head turned to left and staring.     0251: Dr. Martell, respiratory therapists x3, nursing house supervisors at bedside.     0252: POC blood glucose 310    0253: Verbal orders received from Dr. Martell to administer a one-time dose of 10mg hydralazine IV.     0254: /143, , O2 sat 100% on room air.     0255: 10mg hydralazine IV administered as ordered. /143, .     0307: Patient continues to have seizure. Dr. Martell remains at bedside. Oxygen saturation 100% on room air.     0311: 1mg ativan IV administered as ordered    0313: Small amount of bright red blood mixed with saliva coming from patient's mouth. RN at bedside providing oral suction with yankeur. Dr. Martell ordered patient to be transferred to ICU for higher level of care. ICU [place] RNLisa, and RT remains at bedside assisting with patient care.     0316: 2mg ativan IV and 1500mg keppra administered as ordered.     0317: /148, , oxygen saturation 100% on room air.     0325: Patient transferred to ICU room 3105 with RN x2 and RT x2 at bedside. Rapid response terminated.       
ACUTE PHYSICAL THERAPY GOALS:   (Developed with and agreed upon by patient and/or caregiver.)  (1.) Daniele Morel  will move from supine to sit and sit to supine  with MODIFIED INDEPENDENCE within 7 treatment day(s).    (2.) Daniele Morel will transfer from bed to chair and chair to bed with MODIFIED INDEPENDENCE using the least restrictive device within 7 treatment day(s).    (3.) Daniele Morel will ambulate with MODIFIED INDEPENDENCE for 250 feet with the least restrictive device within 7 treatment day(s).   (4.) Daniele Morel will perform standing static and dynamic balance activities x 10 minutes with MODIFIED INDEPENDENCE to improve safety within 7 treatment day(s).  (5.) Daniele Morel will ascend and descend 3 stairs using 1 hand rail(s) with SUPERVISION to improve functional mobility and safety within 7 treatment day(s).  (6.) Daniele Morel will perform therapeutic exercises x 15 min for HEP with INDEPENDENCE to improve strength, endurance, and functional mobility within 7 treatment day(s).     PHYSICAL THERAPY: Daily Note AM   (Link to Caseload Tracking: PT Visit Days : 2  Time In/Out PT Charge Capture  Rehab Caseload Tracker  Orders    Daniele Morel is a 54 y.o. male   PRIMARY DIAGNOSIS: TIA (transient ischemic attack)  TIA (transient ischemic attack) [G45.9]  Cerebrovascular accident (CVA), unspecified mechanism (HCC) [I63.9]       Inpatient: Payor: [facility] / Plan: [facility] / Product Type: *No Product type* /     ASSESSMENT:     REHAB RECOMMENDATIONS:   Recommendation to date pending progress:  Setting:  Outpatient Therapy    Equipment:    Rolling Walker     ASSESSMENT:  Mr. Morel seen for follow up PT session. Reports feeling better. Is ready to go home. He continues with increased numbness B LE from baseline. Demonstrates weakness at L hip abductors, otherwise improved to 4+/5. Performed sit to stand and ambulated x 250 feet with RW and SPV. Pt is safe for d/c home, but 
Extended EEG, 61 minutes complete.  Awake and sleep recorded.  Dr. Nuñez to read    Radha Ventura,  EEG Tech  
Neurology Consult Note       History:   54-year-old male with history of epilepsy, diabetes, CAD with stents, hypertension, insomnia, anxiety presented with confusion for a few days prior to admission.  Elevated glucose in the 300s on arrival, and hypertensive.  While in the ER \"patient began to become unresponsive, with head tilted to right and exhibiting spastic and repetitive movements.\"  Was given IV Ativan and Keppra load, and subsequently intubated.  Neurology consulted.  He was on gabapentin only for seizure medicines prior to admission.  No neurology clinic visits available for review.     Interim  Extubated.  Awake alert following commands.       Exam: Pertinent positives and negatives include:  Extubated on room air.  Somnolent but awakens easily follows commands.  No language deficits or dysarthria.  No involuntary stereotyped movements, gaze deviations or saccades.  Moves all extremities equally and with purpose.     Imaging and review of data:   CT head without acute pathology    CT angiogram head and neck with left ICA stenosis 50 to 60%    Preliminary EEG read without recurrent epileptiform discharges or status epilepticus.     Assessment and Plan:   54-year-old male with epilepsy presents with breakthrough seizure in the setting of hyperglycemia and hypertension.     Recommendations:  Continue maintenance Keppra at current dose, indefinitely    Hyperglycemic and metabolic corrections per primary.     As he is now back to his baseline and has well-documented history of epilepsy, I am okay with deferring MRI to the outpatient setting.  Patient would prefer this, as he is severely claustrophobic and prefers open MRI, which he can get at [facility].     He will follow-up with us in clinic for long-term seizure management.  Seizure precautions were discussed, patient does not drive.     Seizure Precautions - discussed with patient. Please obey the following seizure precautions:  1. No driving until 
Occupational Therapy Note:    Occupational therapy services are being discontinued at this time d/t patient's decline in medical status and subsequent intubation after RR was called for seizure activity. Please re-consult therapy services with MD thomasonems appropriate.    Mignon Wilkes, OTR/L, CLT    
PT Note:  Therapy is discharging patient from caseload at this time due to transfer to unit.  Please re-consult when/if deemed appropriate.  Thank you,  Amelia Serrano, PT      
Patient given a Patient Stroke Education book.  Patient educated on signs and symptoms of stroke and importance of getting to nearest ED as soon as symptoms occur.  Patient educated on risk factors of stroke.     
Patient's parent, Juanis Morel, notified of patient's transfer to [facility].  
RRT Clinical Rounding Nurse Progress Report      SUBJECTIVE: Patient assessed secondary to transfer from critical care.      Vitals:    05/02/24 1612 05/02/24 2000 05/03/24 0024 05/03/24 0030   BP: 119/77 116/88  107/74   Pulse: 83 85 92    Resp:  14  14   Temp:  98.2 °F (36.8 °C) 98.2 °F (36.8 °C)    TempSrc:  Oral     SpO2: 92% 95% 94%    Weight:       Height:            DETERIORATION INDEX SCORE: 27    ASSESSMENT:  Pt alert and oriented in bed at this time. Pt with complaints of nausea after receiving Zofran, primary RN notified at this time. Per primary RN, no seizures at this time. Pt respirations are even and unlabored on RA. VSS, labs and notes reviewed. No acute distress noted at this time. Primary RN to call with any additional concerns.    PLAN:  Will follow per RRT Clinical Rounding Program protocol.    Shae Hobbs RN  [facility]: [phone]  [facility]: [phone]    
Rapid Response:  Rapid response called due to seizure activity once he arrived to the floor around 245.  Patient with distant history of seizure disorder not on any current medication except for Neurontin.  Patient was postictal at time of arrival with blood pressure 236/143    He was given hydralazine 10 mg IV  He slowly became more alert and was able to answer simple questions  While hospitalist was there he had another seizure with tongue biting lasting approximately 5 to 6 minutes.  Patient was given Ativan 2 mg IV total.  He was loaded up with Keppra 1500 mg and will start maintenance dose.  Due to ongoing seizure activity patient will be transferred to the intensive care unit for closer monitoring    Consult was placed to intensivist for assistance with management while in ICU.  Will place consult for neurology, order EEG, seizure precautions.  Patient will be changed to inpatient admission due to increased acuity    Total critical care time 35 minutes    SHARON PARKER MD          
Respiratory Mechanics completed and are as follows:     Patient extubated to a 2L NC. Patient is  able to communicate and is  negative for stridor. Breath sounds are diminished. No complications with extubation.     Zoltan Hylton, BRYNN  
Responded to rapid response, called for seizure on the floor.  He was given Keppra 1500 mg IV and Ativan 3 mg IV total, still has some twitching without a return to his baseline mental status.  He was moved to the ICU, continues to stare to the left and have occasional twitching of his left extremity. He was intubated to protect his airway and to sedate him to hopefully stop his seizures. Communicated this with Dr. Martell, will get EEG.  
TRANSFER - IN REPORT:    Verbal report received from PAUL Lindsey on Daniele Morel  being received from ED for routine progression of patient care      Report consisted of patient's Situation, Background, Assessment and   Recommendations(SBAR).     Information from the following report(s) Nurse Handoff Report was reviewed with the receiving nurse.    Opportunity for questions and clarification was provided.      Assessment completed upon patient's arrival to unit and care assumed.    
This RN noted patient to have increase in NIH score. Notified Dr. Martell via XMOSve. Dr. Martell on way to bedside. During this time, patient began to become unresponsive, with head tilted to right and exhibiting spastic and repetitive movements. Code Rapid Response called at this time. See rapid response note.  
Ventilator check complete; patient has a #7.5 ET tube secured at the 28 at the lip.  Patient is  sedated.  Patient is  able to follow commands.  Breath sounds are diminished.  Trachea is midline, Negative for subcutaneous air, and chest excursion is symmetric. Patient is also Negative for cyanosis and is Negative for pitting edema.  All alarms are set and audible.  Resuscitation bag is  at the head of the bed.      Ventilator Settings  Mode FIO2 Rate Tidal Volume Pressure PEEP I:E Ratio   AC/PRVC  30 %   20 550    8 1:2.3      Peak airway pressure:     Minute ventilation:       ABG: No results for input(s): \"PH\", \"PCO2\", \"PO2\", \"HCO3\" in the last 72 hours.      Zoltan Hylton RCP  
Ventilator check complete; patient has a #7.5 ET tube secured at the 28cm at the lip.  Patient is  sedated.  Patient is not able to follow commands.  Breath sounds are clear and diminished.  Trachea is midline, Negative for subcutaneous air, and chest excursion is symmetric. Patient is also Negative for cyanosis and is Negative for pitting edema.  All alarms are set and audible.  Resuscitation bag is  at the head of the bed.      Ventilator Settings  Mode FIO2 Rate Tidal Volume Pressure PEEP I:E Ratio   AC/PRVC  60 %  20 bpm 550 ml      8 cmH2O   1:2.3      Peak airway pressure:  23 cmH2O   Minute ventilation:  11.2 L/min           Lorrie Gordon RCP  
arrival to hospital. He is being worked up for TIA vs CVA. At baseline, pt reports he is independent with functional mobility. This date pt performs mobility including bed mobility with min A, fair+ static and fair dynamic sit balance. Performed sit <> stand with RW and min A. He ambulated x 60 ft with RW and min A. Pt was unsteady and RW tilting on occasion. Demonstrates ataxic gait pattern. Pt presents as functioning below his baseline, with deficits in mobility including transfers, gait, balance, and activity tolerance. Pt will benefit from skilled therapy services to address stated deficits to promote return to highest level of function, independence, and safety. Will continue to follow. IRC recommended at d/c.     [facility]™ “6 Clicks” Basic Mobility Inpatient Short Form  AM-PAC Basic Mobility - Inpatient   How much help is needed turning from your back to your side while in a flat bed without using bedrails?: A Little  How much help is needed moving from lying on your back to sitting on the side of a flat bed without using bedrails?: A Little  How much help is needed moving to and from a bed to a chair?: A Little  How much help is needed standing up from a chair using your arms?: A Little  How much help is needed walking in hospital room?: A Little  How much help is needed climbing 3-5 steps with a railing?: A Little  [facility] Inpatient Mobility Raw Score : 18  AM-PAC Inpatient T-Scale Score : 43.63  Mobility Inpatient CMS 0-100% Score: 46.58  Mobility Inpatient CMS G-Code Modifier : CK    SUBJECTIVE:   Mr. Morel states, \"I'm definitely unsteady.\"     Social/Functional Lives With: Alone  Home Equipment: Cane (CPAP, glucometer)  ADL Assistance: Independent  Homemaking Assistance: Independent  Homemaking Responsibilities: Yes  Ambulation Assistance: Independent  Transfer Assistance: Independent  Active : No  Patient's  Info: family  Occupation: On disability    OBJECTIVE:     PAIN: 
(!) 159/109 --   05/02/24 1130 -- 78 20 (!) 135/101 97 %   05/02/24 1124 -- -- -- (!) 154/102 --   05/02/24 1115 -- 79 20 (!) 154/102 98 %   05/02/24 1100 -- 72 20 115/78 99 %   05/02/24 1045 -- 72 20 105/67 98 %   05/02/24 1030 -- 74 20 105/67 98 %   05/02/24 1015 -- 73 20 100/67 98 %   05/02/24 1000 -- 73 20 102/68 98 %   05/02/24 0945 -- 74 20 110/71 98 %   05/02/24 0930 -- 80 16 119/78 97 %   05/02/24 0915 -- 72 20 115/78 99 %   05/02/24 0900 -- 71 20 114/76 99 %   05/02/24 0845 -- 71 20 101/64 98 %   05/02/24 0830 -- 72 20 95/60 98 %   05/02/24 0815 -- 72 20 101/67 98 %       Oxygen Therapy  SpO2: 94 %  Pulse Oximetry Type: Continuous  Pulse via Oximetry: 83 beats per minute  Pulse Oximeter Device Mode: Continuous  Pulse Oximeter Device Location: Finger  O2 Device: None (Room air)  Oximetry Probe Site Changed: Yes  Skin Assessment: Clean, dry, & intact  Skin Protection for O2 Device: Yes  Orientation: Bilateral  Location: Cheek  Intervention(s): Mouth Care  FiO2 : 30 %  O2 Flow Rate (L/min): 2 L/min  Oxygen Therapy: Supplemental oxygen  Vent Mode: CPAP/PS    Estimated body mass index is 32 kg/m² as calculated from the following:    Height as of this encounter: 1.778 m (5' 10\").    Weight as of this encounter: 101.2 kg (223 lb).    Intake/Output Summary (Last 24 hours) at 5/3/2024 0803  Last data filed at 5/3/2024 0312  Gross per 24 hour   Intake 143.21 ml   Output 1350 ml   Net -1206.79 ml         Physical Exam:   General:    Well nourished.  Alert. No acute distress  Head:  Normocephalic, atraumatic  CV:   RRR.  No m/r/g.    Lungs:   CTAB anterior. Resp even/unlabored on RA  Extremities: No edema  Skin:     Warm and dry.    Neuro:  A&O x 4. No dysarthria. Motor exam inact.  Psych:  Normal mood and affect.      I have personally reviewed labs and tests:  Recent Labs:  Recent Results (from the past 48 hour(s))   Comprehensive Metabolic Panel    Collection Time: 05/01/24 11:33 AM   Result Value Ref Range    
on Results of evaluation, Speech therapy recommendations, Role of speech therapy, SLP plan, and Diet recommendations  Education provided to Patient and RN  Education response: Verbalizes understanding and Demonstrates understanding    Safety:   Call light within reach  In Bed  RN notified     Therapy Time:  Time In: 1205  Time Out: 1222  Minutes: 17    Yessi Flower M.S., CCC-SLP   5/3/2024 12:24 PM

## 2024-05-05 NOTE — DISCHARGE SUMMARY
Contrast Type: iopamidol (ISOVUE-370) 76 . Contrast Volume: 100 mL Report signed on 05/01/2024 (23:00 Eastern Time) Signed by: Antolin Alvarez M.D. [place] Location: 10    CT Head W/O Contrast    Result Date: 5/1/2024  No acute intracranial bleed. Automatic exposure control was used as a dose lowering technique. Radiation Dose: CTDI is 92.77 mGy. DLP is 1774.56 mGy-cm. Contrast Type: iopamidol (ISOVUE-370) 76 . Contrast Volume: 100 mL Report signed on 05/01/2024 (22:50 Eastern Time) Signed by: Antolin Alvarez M.D. [place] Location: 10    XR CHEST PORTABLE    Result Date: 5/1/2024  Mediastinum appears prominent, which may be due in part to mediastinal fat. CTA would better evaluate the mediastinum if clinically warranted. No airspace disease by radiograph.        Labs: Results:       BMP, Mg, Phos Recent Labs     05/03/24  0456 05/04/24  0526    134*   K 3.9 3.9    102   CO2 22 23   ANIONGAP 11 10   BUN 23 21   CREATININE 1.07 0.83   LABGLOM 83 >90   CALCIUM 9.0 8.7*   GLUCOSE 237* 285*      CBC Recent Labs     05/03/24  0456 05/04/24  0526   WBC 9.8 7.9   RBC 4.75 4.52   HGB 14.2 13.5*   HCT 43.7 41.3   MCV 92.0 91.4   MCH 29.9 29.9   MCHC 32.5 32.7   RDW 13.5 13.3    220   MPV 10.9 10.9   NRBC 0.00 0.00   LYMPHOPCT 21 27   MONOPCT 9 10   BASOPCT 1 0   IMMGRAN 0 0   EOSABS 0.3 0.3   MONOSABS 0.9 0.8   BASOSABS 0.1 0.0   ABSIMMGRAN 0.0 0.0      LFT No results for input(s): \"BILITOT\", \"BILIDIR\", \"ALKPHOS\", \"AST\", \"ALT\", \"PROT\", \"LABALBU\", \"GLOB\" in the last 72 hours.   Cardiac  Lab Results   Component Value Date/Time    TROPHS 14,921.9 06/26/2021 03:58 AM    TROPHS 4,740.4 06/25/2021 10:26 PM    TROPHS 1,380.2 06/25/2021 08:26 PM      Coags Lab Results   Component Value Date/Time    PROTIME 13.7 11/25/2020 08:41 AM    INR 1.0 11/25/2020 08:41 AM    APTT 30.1 06/25/2021 08:26 PM    APTT 27.0 12/01/2020 09:26 AM    APTT 30.5 11/30/2020 02:52 PM      A1c Lab Results   Component Value Date/Time    LABA1C 11.5

## 2024-05-07 ENCOUNTER — OFFICE VISIT (OUTPATIENT)
Dept: NEUROLOGY | Age: 54
End: 2024-05-07
Payer: COMMERCIAL

## 2024-05-07 VITALS
WEIGHT: 225 LBS | DIASTOLIC BLOOD PRESSURE: 96 MMHG | HEIGHT: 70 IN | OXYGEN SATURATION: 98 % | SYSTOLIC BLOOD PRESSURE: 148 MMHG | HEART RATE: 76 BPM | BODY MASS INDEX: 32.21 KG/M2

## 2024-05-07 DIAGNOSIS — Z09 HOSPITAL DISCHARGE FOLLOW-UP: ICD-10-CM

## 2024-05-07 DIAGNOSIS — G40.909 SEIZURE DISORDER (HCC): Primary | ICD-10-CM

## 2024-05-07 DIAGNOSIS — Z79.4 TYPE 2 DIABETES MELLITUS WITH HYPERGLYCEMIA, WITH LONG-TERM CURRENT USE OF INSULIN (HCC): ICD-10-CM

## 2024-05-07 DIAGNOSIS — R53.1 WEAKNESS GENERALIZED: ICD-10-CM

## 2024-05-07 DIAGNOSIS — E11.65 TYPE 2 DIABETES MELLITUS WITH HYPERGLYCEMIA, WITH LONG-TERM CURRENT USE OF INSULIN (HCC): ICD-10-CM

## 2024-05-07 PROCEDURE — 1111F DSCHRG MED/CURRENT MED MERGE: CPT | Performed by: PHYSICAL THERAPIST

## 2024-05-07 PROCEDURE — 99215 OFFICE O/P EST HI 40 MIN: CPT | Performed by: PHYSICAL THERAPIST

## 2024-05-07 RX ORDER — LEVETIRACETAM 1000 MG/1
1000 TABLET ORAL 2 TIMES DAILY
Qty: 120 TABLET | Refills: 5 | Status: SHIPPED | OUTPATIENT
Start: 2024-05-07

## 2024-05-07 ASSESSMENT — PATIENT HEALTH QUESTIONNAIRE - PHQ9
SUM OF ALL RESPONSES TO PHQ9 QUESTIONS 1 & 2: 1
SUM OF ALL RESPONSES TO PHQ QUESTIONS 1-9: 1
2. FEELING DOWN, DEPRESSED OR HOPELESS: SEVERAL DAYS
1. LITTLE INTEREST OR PLEASURE IN DOING THINGS: NOT AT ALL
SUM OF ALL RESPONSES TO PHQ QUESTIONS 1-9: 1

## 2024-05-07 NOTE — PROGRESS NOTES
[facility] Neurology [address], [address]  [place] [address]  [phone]    Patient was hospitalized from 05/01/2024 to 05/05/2024.      Chief Complaint   Patient presents with    Follow-Up from Hospital     CVA and Seizures       HPI  Daniele Morel is a 54 y.o. male with a past medical history of DM type II, depression, HLD, HTN, and seizure disorder who presents for hospital follow-up due to seizure.  Patient has little recollection of the seizure-like event, so some of this information is taken from his medical chart.  Patient recently was establishing care with PCP on 05/01.  When he went to visit PCP that day and stated that he had a 4-day duration of difficulty \"getting his words out\", and a new headache.  Plan was initially to start him on lacosamide.  The symptoms continue to worsen and he sought emergent medical attention.  A workup for stroke was initiated, but on 05/02 patient became increasingly confused and started to suffer from seizure-like activity.  He states he remembers feeling lethargic and having increased difficulty speaking, but after this he has no memory of the seizure-like event.  He then became unresponsive and had to be intubated.  He was loaded with Keppra and also given IV Ativan at the time.  He is continue to take Keppra since his discharge and denies any other seizure-like activity.    Prior to this event he states he has several incidences of spacing out and mild bouts of \"brain fog.\"  This has been going on for several years.  He did suffer from seizures as a kid after a fall that led to \"traumatic brain damage.\"  His last convulsive seizure was roughly 20 years ago.  He has not taken any medication since then.  He continues to feel fatigued and somewhat weak.  He is waiting on therapy to call to schedule appointment.       Risk Factors:  Developmental history: Normal  Febrile seizures: No  FMHx of seizures: No  TBI w/ LOC: No  CNS infections:

## 2024-06-09 ENCOUNTER — PATIENT MESSAGE (OUTPATIENT)
Dept: INTERNAL MEDICINE CLINIC | Facility: CLINIC | Age: 54
End: 2024-06-09

## 2024-06-10 RX ORDER — INSULIN GLARGINE 100 [IU]/ML
28 INJECTION, SOLUTION SUBCUTANEOUS NIGHTLY
Qty: 8.4 ML | Refills: 1 | Status: SHIPPED | OUTPATIENT
Start: 2024-06-10 | End: 2024-08-09

## 2024-06-10 RX ORDER — PANTOPRAZOLE SODIUM 40 MG/1
40 TABLET, DELAYED RELEASE ORAL DAILY
Qty: 60 TABLET | Refills: 0 | Status: SHIPPED | OUTPATIENT
Start: 2024-06-10 | End: 2024-08-09

## 2024-06-10 NOTE — TELEPHONE ENCOUNTER
----- Message from Daniele Morel sent at 6/9/2024  4:45 PM EDT -----  Regarding: refills  Contact: [phone]  Please refill my pantoprazole 40 MG tablets and my Lantus insulin

## 2024-06-24 ENCOUNTER — PATIENT MESSAGE (OUTPATIENT)
Dept: INTERNAL MEDICINE CLINIC | Facility: CLINIC | Age: 54
End: 2024-06-24

## 2024-06-24 RX ORDER — INSULIN LISPRO 100 [IU]/ML
4 INJECTION, SOLUTION INTRAVENOUS; SUBCUTANEOUS 3 TIMES DAILY
Qty: 3.6 ML | Refills: 1 | Status: SHIPPED | OUTPATIENT
Start: 2024-06-24

## 2024-06-24 NOTE — TELEPHONE ENCOUNTER
LOV 05/01/2024  NOV 08/01/2024    Pt sent XOG message requesting refill for his insulin lispro, 1 unit dial 100 unit/ML.

## 2024-06-24 NOTE — TELEPHONE ENCOUNTER
From: Daniele Morel  To: Dr. Patel NORRIS Grnak  Sent: 6/24/2024 6:59 AM EDT  Subject: Insulin    Could you please refill my insulin insulin lispro (1 Unit Dial) 100 UNIT/ML Sopn. Thank you

## 2024-07-30 SDOH — ECONOMIC STABILITY: FOOD INSECURITY: WITHIN THE PAST 12 MONTHS, THE FOOD YOU BOUGHT JUST DIDN'T LAST AND YOU DIDN'T HAVE MONEY TO GET MORE.: NEVER TRUE

## 2024-07-30 SDOH — ECONOMIC STABILITY: FOOD INSECURITY: WITHIN THE PAST 12 MONTHS, YOU WORRIED THAT YOUR FOOD WOULD RUN OUT BEFORE YOU GOT MONEY TO BUY MORE.: SOMETIMES TRUE

## 2024-07-30 SDOH — ECONOMIC STABILITY: TRANSPORTATION INSECURITY
IN THE PAST 12 MONTHS, HAS LACK OF TRANSPORTATION KEPT YOU FROM MEETINGS, WORK, OR FROM GETTING THINGS NEEDED FOR DAILY LIVING?: YES

## 2024-07-30 SDOH — ECONOMIC STABILITY: HOUSING INSECURITY
IN THE LAST 12 MONTHS, WAS THERE A TIME WHEN YOU DID NOT HAVE A STEADY PLACE TO SLEEP OR SLEPT IN A SHELTER (INCLUDING NOW)?: NO

## 2024-07-30 SDOH — ECONOMIC STABILITY: INCOME INSECURITY: HOW HARD IS IT FOR YOU TO PAY FOR THE VERY BASICS LIKE FOOD, HOUSING, MEDICAL CARE, AND HEATING?: HARD

## 2024-08-01 ENCOUNTER — PATIENT MESSAGE (OUTPATIENT)
Dept: INTERNAL MEDICINE CLINIC | Facility: CLINIC | Age: 54
End: 2024-08-01

## 2024-08-01 ENCOUNTER — OFFICE VISIT (OUTPATIENT)
Dept: INTERNAL MEDICINE CLINIC | Facility: CLINIC | Age: 54
End: 2024-08-01
Payer: COMMERCIAL

## 2024-08-01 VITALS
OXYGEN SATURATION: 98 % | TEMPERATURE: 98.1 F | DIASTOLIC BLOOD PRESSURE: 124 MMHG | WEIGHT: 245 LBS | HEART RATE: 88 BPM | SYSTOLIC BLOOD PRESSURE: 179 MMHG | BODY MASS INDEX: 35.15 KG/M2

## 2024-08-01 DIAGNOSIS — E78.2 MIXED HYPERLIPIDEMIA: ICD-10-CM

## 2024-08-01 DIAGNOSIS — K21.9 GASTROESOPHAGEAL REFLUX DISEASE, UNSPECIFIED WHETHER ESOPHAGITIS PRESENT: ICD-10-CM

## 2024-08-01 DIAGNOSIS — E11.65 TYPE 2 DIABETES MELLITUS WITH HYPERGLYCEMIA, WITH LONG-TERM CURRENT USE OF INSULIN (HCC): ICD-10-CM

## 2024-08-01 DIAGNOSIS — I25.10 CORONARY ARTERY DISEASE INVOLVING NATIVE CORONARY ARTERY OF NATIVE HEART WITHOUT ANGINA PECTORIS: ICD-10-CM

## 2024-08-01 DIAGNOSIS — E78.1 HYPERTRIGLYCERIDEMIA: ICD-10-CM

## 2024-08-01 DIAGNOSIS — G47.9 SLEEP DISTURBANCE: ICD-10-CM

## 2024-08-01 DIAGNOSIS — I10 PRIMARY HYPERTENSION: Primary | ICD-10-CM

## 2024-08-01 DIAGNOSIS — G40.909 SEIZURE DISORDER (HCC): ICD-10-CM

## 2024-08-01 DIAGNOSIS — Z79.4 TYPE 2 DIABETES MELLITUS WITH HYPERGLYCEMIA, WITH LONG-TERM CURRENT USE OF INSULIN (HCC): ICD-10-CM

## 2024-08-01 DIAGNOSIS — F41.9 ANXIETY: ICD-10-CM

## 2024-08-01 DIAGNOSIS — G62.9 PERIPHERAL POLYNEUROPATHY: ICD-10-CM

## 2024-08-01 LAB
ANION GAP SERPL CALC-SCNC: 12 MMOL/L (ref 9–18)
BUN SERPL-MCNC: 15 MG/DL (ref 6–23)
CALCIUM SERPL-MCNC: 9.2 MG/DL (ref 8.8–10.2)
CHLORIDE SERPL-SCNC: 103 MMOL/L (ref 98–107)
CO2 SERPL-SCNC: 23 MMOL/L (ref 20–28)
CREAT SERPL-MCNC: 1.12 MG/DL (ref 0.8–1.3)
ERYTHROCYTE [DISTWIDTH] IN BLOOD BY AUTOMATED COUNT: 13 % (ref 11.9–14.6)
EST. AVERAGE GLUCOSE BLD GHB EST-MCNC: 207 MG/DL
GLUCOSE SERPL-MCNC: 174 MG/DL (ref 70–99)
HBA1C MFR BLD: 8.9 % (ref 0–5.6)
HCT VFR BLD AUTO: 48.8 % (ref 41.1–50.3)
HGB BLD-MCNC: 15.8 G/DL (ref 13.6–17.2)
MCH RBC QN AUTO: 30 PG (ref 26.1–32.9)
MCHC RBC AUTO-ENTMCNC: 32.4 G/DL (ref 31.4–35)
MCV RBC AUTO: 92.6 FL (ref 82–102)
NRBC # BLD: 0 K/UL (ref 0–0.2)
PLATELET # BLD AUTO: 252 K/UL (ref 150–450)
PMV BLD AUTO: 11.5 FL (ref 9.4–12.3)
POTASSIUM SERPL-SCNC: 4.4 MMOL/L (ref 3.5–5.1)
RBC # BLD AUTO: 5.27 M/UL (ref 4.23–5.6)
SODIUM SERPL-SCNC: 138 MMOL/L (ref 136–145)
WBC # BLD AUTO: 9.3 K/UL (ref 4.3–11.1)

## 2024-08-01 PROCEDURE — 99214 OFFICE O/P EST MOD 30 MIN: CPT | Performed by: INTERNAL MEDICINE

## 2024-08-01 PROCEDURE — 3077F SYST BP >= 140 MM HG: CPT | Performed by: INTERNAL MEDICINE

## 2024-08-01 PROCEDURE — 3080F DIAST BP >= 90 MM HG: CPT | Performed by: INTERNAL MEDICINE

## 2024-08-01 PROCEDURE — 3046F HEMOGLOBIN A1C LEVEL >9.0%: CPT | Performed by: INTERNAL MEDICINE

## 2024-08-01 RX ORDER — LISINOPRIL AND HYDROCHLOROTHIAZIDE 25; 20 MG/1; MG/1
1 TABLET ORAL DAILY
Qty: 90 TABLET | Refills: 1 | Status: SHIPPED | OUTPATIENT
Start: 2024-08-01

## 2024-08-01 RX ORDER — ISOSORBIDE MONONITRATE 30 MG/1
30 TABLET, EXTENDED RELEASE ORAL DAILY
Qty: 90 TABLET | Refills: 1 | Status: SHIPPED | OUTPATIENT
Start: 2024-08-01 | End: 2025-01-28

## 2024-08-01 RX ORDER — INSULIN LISPRO 100 [IU]/ML
12 INJECTION, SOLUTION INTRAVENOUS; SUBCUTANEOUS 3 TIMES DAILY
Qty: 32.4 ML | Refills: 0 | Status: SHIPPED | OUTPATIENT
Start: 2024-08-01 | End: 2024-08-02 | Stop reason: SDUPTHER

## 2024-08-01 RX ORDER — GABAPENTIN 300 MG/1
600 CAPSULE ORAL 4 TIMES DAILY
Qty: 720 CAPSULE | Refills: 1 | Status: SHIPPED | OUTPATIENT
Start: 2024-08-01 | End: 2025-01-28

## 2024-08-01 RX ORDER — ACARBOSE 25 MG/1
25 TABLET ORAL
Qty: 90 TABLET | Refills: 3 | Status: SHIPPED | OUTPATIENT
Start: 2024-08-01

## 2024-08-01 RX ORDER — ATORVASTATIN CALCIUM 80 MG/1
80 TABLET, FILM COATED ORAL DAILY
Qty: 30 TABLET | Refills: 26 | Status: SHIPPED | OUTPATIENT
Start: 2024-08-01 | End: 2026-10-06

## 2024-08-01 RX ORDER — LEVETIRACETAM 1000 MG/1
1000 TABLET ORAL 2 TIMES DAILY
Qty: 180 TABLET | Refills: 1 | Status: SHIPPED | OUTPATIENT
Start: 2024-08-01 | End: 2025-01-28

## 2024-08-01 RX ORDER — FENOFIBRATE 160 MG/1
160 TABLET ORAL DAILY
Qty: 90 TABLET | Refills: 1 | Status: SHIPPED | OUTPATIENT
Start: 2024-08-01 | End: 2025-01-28

## 2024-08-01 RX ORDER — INSULIN GLARGINE 100 [IU]/ML
34 INJECTION, SOLUTION SUBCUTANEOUS NIGHTLY
Qty: 8.4 ML | Refills: 1 | Status: SHIPPED | OUTPATIENT
Start: 2024-08-01 | End: 2024-10-30

## 2024-08-01 RX ORDER — ASPIRIN 81 MG/1
81 TABLET ORAL 2 TIMES DAILY
Qty: 180 TABLET | Refills: 1 | Status: SHIPPED | OUTPATIENT
Start: 2024-08-01 | End: 2025-01-28

## 2024-08-01 RX ORDER — PANTOPRAZOLE SODIUM 40 MG/1
40 TABLET, DELAYED RELEASE ORAL DAILY
Qty: 90 TABLET | Refills: 1 | Status: SHIPPED | OUTPATIENT
Start: 2024-08-01 | End: 2025-01-28

## 2024-08-01 RX ORDER — CLOPIDOGREL BISULFATE 75 MG/1
75 TABLET ORAL DAILY
Qty: 90 TABLET | Refills: 3 | Status: SHIPPED | OUTPATIENT
Start: 2024-08-01

## 2024-08-01 RX ORDER — INSULIN GLARGINE 100 [IU]/ML
28 INJECTION, SOLUTION SUBCUTANEOUS NIGHTLY
Qty: 8.4 ML | Refills: 1 | Status: CANCELLED | OUTPATIENT
Start: 2024-08-01 | End: 2024-09-30

## 2024-08-01 RX ORDER — TRAZODONE HYDROCHLORIDE 100 MG/1
100 TABLET ORAL NIGHTLY
Qty: 90 TABLET | Refills: 1 | Status: SHIPPED | OUTPATIENT
Start: 2024-08-01 | End: 2025-01-28

## 2024-08-01 RX ORDER — GABAPENTIN 300 MG/1
300 CAPSULE ORAL 4 TIMES DAILY
Qty: 360 CAPSULE | Refills: 1 | Status: CANCELLED | OUTPATIENT
Start: 2024-08-01 | End: 2025-01-28

## 2024-08-01 RX ORDER — CARVEDILOL 25 MG/1
25 TABLET ORAL 2 TIMES DAILY WITH MEALS
Qty: 180 TABLET | Refills: 1 | Status: SHIPPED | OUTPATIENT
Start: 2024-08-01 | End: 2025-01-28

## 2024-08-01 NOTE — PROGRESS NOTES
Patel Devi D.O.   [place] [address]  Tel: [phone]    Office Visit: Follow Up     Patient Name: Daniele Morel   :  1970   MRN:   [id number]      Today's Date: 24 11:46 AM    Subjective     The patient is a 54 y.o.-year-old male who presents for follow-up.    Type 2 diabetes mellitus  -Acarbose 25 mg 3 times daily with meals  -Insulin lispro, 4 units 3 times daily with meals  -Lantus 28 units nightly  -Jardiance 10 mg daily, insurance not approved.   -Hemoglobin A1c on 5/3/2024 was 11.5  -He states his sugars have 200 in the AM and throughout the day 180-250   -He states he has been eating very healthy and exercising some.     Coronary artery disease  -Previous cardiac history includes:  2016: NSTEMI - 2.25 x 28 and a 2.5 x 23 Xience to OM2, EF 45%  2019: Inferior STEMI with DKA. JENNIFER x 3-RCA ([place]); Echo EF 55%  Mar 2020: [facility] - stable CAD, non cardiac cp  2021: NSTEMI - 2.5 x 26 Rickie-pLAD, 2.5 x 26 Rick[place] - RI, 2.5 x 15 Rickie - pCx; Echo - EF 50-55%, inferolateral and anterolateral HK  2022: Normal bilateral MENDY ([facility]win)  -Aspirin 81 mg daily  -Lipitor 80 mg daily  -Coreg 25 mg twice daily  -Plavix 75 mg daily  -Imdur 30 mg daily  -Nitroglycerin 0.4 mg sublingual  -Patient following with cardiology, he last saw cardiology 10/16/2023 for preop clearance.  -Patient states he is going to follow up with cardiology soon.      Primary hypertension  -Lisinopril 20 mg daily  -Coreg 25 mg twice daily     Hyperlipidemia  -Lipitor 80 mg daily  -Fenofibrate 160 mg daily  -Lipid panel from 10/16/2023 showed a total cholesterol 246, HDL 28, LDL not calculated due to high triglycerides, LDL direct was 117 and triglycerides of 700 with a VLDL of 140  -He states he has been eating very healthy and exercising some.     GERD  -Protonix 40 mg daily, he states this helps a lot.      Obesity  -BMI 35.15, weight 245 pounds  -He states he has been

## 2024-08-02 RX ORDER — INSULIN LISPRO 100 [IU]/ML
50 INJECTION, SOLUTION INTRAVENOUS; SUBCUTANEOUS 3 TIMES DAILY
Qty: 135 ML | Refills: 1 | Status: SHIPPED | OUTPATIENT
Start: 2024-08-02 | End: 2025-01-29

## 2024-08-02 NOTE — RESULT ENCOUNTER NOTE
Patient's hemoglobin A1c is much improved from 3 months ago at 8.9, he should continue focusing on eating healthy and getting enough exercise and taking his medications.  I suspect with the increase in his insulin that we made yesterday that his A1c will continue to come down, possibly be below 8 at next draw.  The rest of his labs are stable.

## 2024-08-02 NOTE — TELEPHONE ENCOUNTER
Talked to phadavy. Humalog is being denied since we called in a script on 7/23/2024. Insurance will not cover another vial until 8/10. Talked to phamacy and they recommend doing 50 units and highest mL so insurance may approve it since the dosage has change.    Pt states he will be out of insulin tonight.

## 2024-09-18 DIAGNOSIS — E11.65 TYPE 2 DIABETES MELLITUS WITH HYPERGLYCEMIA, WITH LONG-TERM CURRENT USE OF INSULIN (HCC): ICD-10-CM

## 2024-09-18 DIAGNOSIS — Z79.4 TYPE 2 DIABETES MELLITUS WITH HYPERGLYCEMIA, WITH LONG-TERM CURRENT USE OF INSULIN (HCC): ICD-10-CM

## 2024-09-18 RX ORDER — INSULIN GLARGINE-YFGN 100 [IU]/ML
INJECTION, SOLUTION SUBCUTANEOUS
Qty: 9 ML | Refills: 0 | OUTPATIENT
Start: 2024-09-18

## 2024-09-20 DIAGNOSIS — E11.65 TYPE 2 DIABETES MELLITUS WITH HYPERGLYCEMIA, WITH LONG-TERM CURRENT USE OF INSULIN (HCC): ICD-10-CM

## 2024-09-20 DIAGNOSIS — Z79.4 TYPE 2 DIABETES MELLITUS WITH HYPERGLYCEMIA, WITH LONG-TERM CURRENT USE OF INSULIN (HCC): ICD-10-CM

## 2024-09-20 RX ORDER — INSULIN GLARGINE-YFGN 100 [IU]/ML
INJECTION, SOLUTION SUBCUTANEOUS
Qty: 9 ML | Refills: 0 | OUTPATIENT
Start: 2024-09-20

## 2024-09-24 DIAGNOSIS — Z79.4 TYPE 2 DIABETES MELLITUS WITH HYPERGLYCEMIA, WITH LONG-TERM CURRENT USE OF INSULIN (HCC): ICD-10-CM

## 2024-09-24 DIAGNOSIS — E11.65 TYPE 2 DIABETES MELLITUS WITH HYPERGLYCEMIA, WITH LONG-TERM CURRENT USE OF INSULIN (HCC): ICD-10-CM

## 2024-09-24 RX ORDER — INSULIN GLARGINE-YFGN 100 [IU]/ML
INJECTION, SOLUTION SUBCUTANEOUS
Qty: 9 ML | Refills: 0 | OUTPATIENT
Start: 2024-09-24

## 2024-10-03 DIAGNOSIS — Z79.4 TYPE 2 DIABETES MELLITUS WITH HYPERGLYCEMIA, WITH LONG-TERM CURRENT USE OF INSULIN (HCC): ICD-10-CM

## 2024-10-03 DIAGNOSIS — E11.65 TYPE 2 DIABETES MELLITUS WITH HYPERGLYCEMIA, WITH LONG-TERM CURRENT USE OF INSULIN (HCC): ICD-10-CM

## 2024-10-04 RX ORDER — INSULIN GLARGINE-YFGN 100 [IU]/ML
INJECTION, SOLUTION SUBCUTANEOUS
Qty: 9 ML | Refills: 0 | Status: SHIPPED | OUTPATIENT
Start: 2024-10-04

## 2024-10-24 DIAGNOSIS — E11.65 TYPE 2 DIABETES MELLITUS WITH HYPERGLYCEMIA, WITH LONG-TERM CURRENT USE OF INSULIN (HCC): ICD-10-CM

## 2024-10-24 DIAGNOSIS — Z79.4 TYPE 2 DIABETES MELLITUS WITH HYPERGLYCEMIA, WITH LONG-TERM CURRENT USE OF INSULIN (HCC): ICD-10-CM

## 2024-10-24 RX ORDER — INSULIN GLARGINE-YFGN 100 [IU]/ML
INJECTION, SOLUTION SUBCUTANEOUS
Qty: 9 ML | Refills: 3 | Status: SHIPPED | OUTPATIENT
Start: 2024-10-24

## 2024-10-24 RX ORDER — INSULIN GLARGINE-YFGN 100 [IU]/ML
INJECTION, SOLUTION SUBCUTANEOUS
Qty: 9 ML | Refills: 0 | OUTPATIENT
Start: 2024-10-24

## 2024-10-30 DIAGNOSIS — Z79.4 TYPE 2 DIABETES MELLITUS WITH HYPERGLYCEMIA, WITH LONG-TERM CURRENT USE OF INSULIN (HCC): ICD-10-CM

## 2024-10-30 DIAGNOSIS — E11.65 TYPE 2 DIABETES MELLITUS WITH HYPERGLYCEMIA, WITH LONG-TERM CURRENT USE OF INSULIN (HCC): ICD-10-CM

## 2024-10-30 RX ORDER — INSULIN LISPRO 100 [IU]/ML
50 INJECTION, SOLUTION INTRAVENOUS; SUBCUTANEOUS 3 TIMES DAILY
Qty: 135 ML | Refills: 1 | Status: SHIPPED | OUTPATIENT
Start: 2024-10-30 | End: 2024-10-31 | Stop reason: SDUPTHER

## 2024-10-31 DIAGNOSIS — E11.65 TYPE 2 DIABETES MELLITUS WITH HYPERGLYCEMIA, WITH LONG-TERM CURRENT USE OF INSULIN (HCC): ICD-10-CM

## 2024-10-31 DIAGNOSIS — Z79.4 TYPE 2 DIABETES MELLITUS WITH HYPERGLYCEMIA, WITH LONG-TERM CURRENT USE OF INSULIN (HCC): ICD-10-CM

## 2024-10-31 RX ORDER — INSULIN LISPRO 100 [IU]/ML
30 INJECTION, SOLUTION INTRAVENOUS; SUBCUTANEOUS 3 TIMES DAILY
Qty: 81 ML | Refills: 0 | Status: SHIPPED | OUTPATIENT
Start: 2024-10-31 | End: 2025-01-29

## 2024-11-18 ENCOUNTER — TELEPHONE (OUTPATIENT)
Dept: NEUROLOGY | Age: 54
End: 2024-11-18

## 2024-11-18 NOTE — TELEPHONE ENCOUNTER
Received results from [facility]. Scanned to chart and placed in provider folder for review.

## 2024-12-02 ENCOUNTER — OFFICE VISIT (OUTPATIENT)
Dept: NEUROLOGY | Age: 54
End: 2024-12-02
Payer: COMMERCIAL

## 2024-12-02 VITALS
HEIGHT: 70 IN | HEART RATE: 76 BPM | DIASTOLIC BLOOD PRESSURE: 88 MMHG | SYSTOLIC BLOOD PRESSURE: 126 MMHG | OXYGEN SATURATION: 95 % | BODY MASS INDEX: 38.22 KG/M2 | WEIGHT: 267 LBS

## 2024-12-02 DIAGNOSIS — E11.65 TYPE 2 DIABETES MELLITUS WITH HYPERGLYCEMIA, WITH LONG-TERM CURRENT USE OF INSULIN (HCC): ICD-10-CM

## 2024-12-02 DIAGNOSIS — R53.1 WEAKNESS GENERALIZED: ICD-10-CM

## 2024-12-02 DIAGNOSIS — G40.909 SEIZURE DISORDER (HCC): Primary | ICD-10-CM

## 2024-12-02 DIAGNOSIS — Z79.4 TYPE 2 DIABETES MELLITUS WITH HYPERGLYCEMIA, WITH LONG-TERM CURRENT USE OF INSULIN (HCC): ICD-10-CM

## 2024-12-02 PROCEDURE — 3074F SYST BP LT 130 MM HG: CPT | Performed by: PHYSICAL THERAPIST

## 2024-12-02 PROCEDURE — 99214 OFFICE O/P EST MOD 30 MIN: CPT | Performed by: PHYSICAL THERAPIST

## 2024-12-02 PROCEDURE — 3052F HG A1C>EQUAL 8.0%<EQUAL 9.0%: CPT | Performed by: PHYSICAL THERAPIST

## 2024-12-02 PROCEDURE — 3079F DIAST BP 80-89 MM HG: CPT | Performed by: PHYSICAL THERAPIST

## 2024-12-02 ASSESSMENT — PATIENT HEALTH QUESTIONNAIRE - PHQ9
SUM OF ALL RESPONSES TO PHQ QUESTIONS 1-9: 0
2. FEELING DOWN, DEPRESSED OR HOPELESS: NOT AT ALL
1. LITTLE INTEREST OR PLEASURE IN DOING THINGS: NOT AT ALL
SUM OF ALL RESPONSES TO PHQ9 QUESTIONS 1 & 2: 0
SUM OF ALL RESPONSES TO PHQ QUESTIONS 1-9: 0

## 2024-12-02 NOTE — PROGRESS NOTES
[facility] [address], [address]  [place] [address]  [phone]    Patient was hospitalized from 05/01/2024 to 05/05/2024.      Chief Complaint   Patient presents with    Follow-up    Seizures       HPI  Daniele Morel is a 54 y.o. male with a past medical history of DM type II, depression, HLD, HTN, and seizure disorder who presents for hospital follow-up due to seizure.  Patient has little recollection of the seizure-like event, so some of this information is taken from his medical chart.  Patient recently was establishing care with PCP on 05/01.  When he went to visit PCP that day and stated that he had a 4-day duration of difficulty \"getting his words out\", and a new headache.  Plan was initially to start him on lacosamide.  The symptoms continue to worsen and he sought emergent medical attention.  A workup for stroke was initiated, but on 05/02 patient became increasingly confused and started to suffer from seizure-like activity.  He states he remembers feeling lethargic and having increased difficulty speaking, but after this he has no memory of the seizure-like event.  He then became unresponsive and had to be intubated.  He was loaded with Keppra and also given IV Ativan at the time.  He is continue to take Keppra since his discharge and denies any other seizure-like activity.    Prior to this event he states he has several incidences of spacing out and mild bouts of \"brain fog.\"  This has been going on for several years.  He did suffer from seizures as a kid after a fall that led to \"traumatic brain damage.\"  His last convulsive seizure was roughly 20 years ago.  He has not taken any medication since then.  He continues to feel fatigued and somewhat weak.  He is waiting on therapy to call to schedule appointment.       Risk Factors:  Developmental history: Normal  Febrile seizures: No  FMHx of seizures: No  TBI w/ LOC: No  CNS infections: No  Alcohol use: None  Hx of drug

## 2025-01-01 ENCOUNTER — RESULTS FOLLOW-UP (OUTPATIENT)
Dept: INTERNAL MEDICINE CLINIC | Facility: CLINIC | Age: 55
End: 2025-01-01

## 2025-01-01 DIAGNOSIS — Z79.4 TYPE 2 DIABETES MELLITUS WITH HYPERGLYCEMIA, WITH LONG-TERM CURRENT USE OF INSULIN (HCC): ICD-10-CM

## 2025-01-01 DIAGNOSIS — E78.2 MIXED HYPERLIPIDEMIA: ICD-10-CM

## 2025-01-01 DIAGNOSIS — E11.65 TYPE 2 DIABETES MELLITUS WITH HYPERGLYCEMIA, WITH LONG-TERM CURRENT USE OF INSULIN (HCC): ICD-10-CM

## 2025-01-01 DIAGNOSIS — G40.209 NONINTRACTABLE EPILEPSY WITH COMPLEX PARTIAL SEIZURES (HCC): ICD-10-CM

## 2025-01-01 LAB
ALBUMIN SERPL-MCNC: 3.9 G/DL (ref 3.5–5)
ALBUMIN/GLOB SERPL: 1 (ref 1–1.9)
ALP SERPL-CCNC: 80 U/L (ref 40–129)
ALT SERPL-CCNC: 20 U/L (ref 8–55)
ANION GAP SERPL CALC-SCNC: 12 MMOL/L (ref 7–16)
AST SERPL-CCNC: 28 U/L (ref 15–37)
BASOPHILS # BLD: 0.07 K/UL (ref 0–0.2)
BASOPHILS NFR BLD: 0.7 % (ref 0–2)
BILIRUB SERPL-MCNC: 0.4 MG/DL (ref 0–1.2)
BUN SERPL-MCNC: 15 MG/DL (ref 6–23)
CALCIUM SERPL-MCNC: 9.7 MG/DL (ref 8.8–10.2)
CHLORIDE SERPL-SCNC: 98 MMOL/L (ref 98–107)
CHOLEST SERPL-MCNC: 174 MG/DL (ref 0–200)
CO2 SERPL-SCNC: 25 MMOL/L (ref 20–29)
CREAT SERPL-MCNC: 1.23 MG/DL (ref 0.8–1.3)
DIFFERENTIAL METHOD BLD: NORMAL
EOSINOPHIL # BLD: 0.75 K/UL (ref 0–0.8)
EOSINOPHIL NFR BLD: 7.1 % (ref 0.5–7.8)
ERYTHROCYTE [DISTWIDTH] IN BLOOD BY AUTOMATED COUNT: 13.2 % (ref 11.9–14.6)
EST. AVERAGE GLUCOSE BLD GHB EST-MCNC: 154 MG/DL
GLOBULIN SER CALC-MCNC: 3.8 G/DL (ref 2.3–3.5)
GLUCOSE SERPL-MCNC: 126 MG/DL (ref 70–99)
HBA1C MFR BLD: 7 % (ref 0–5.6)
HCT VFR BLD AUTO: 41.5 % (ref 41.1–50.3)
HDLC SERPL-MCNC: 24 MG/DL (ref 40–60)
HDLC SERPL: 7.2 (ref 0–5)
HGB BLD-MCNC: 13.7 G/DL (ref 13.6–17.2)
IMM GRANULOCYTES # BLD AUTO: 0.03 K/UL (ref 0–0.5)
IMM GRANULOCYTES NFR BLD AUTO: 0.3 % (ref 0–5)
LDLC SERPL CALC-MCNC: 102 MG/DL (ref 0–100)
LYMPHOCYTES # BLD: 2.72 K/UL (ref 0.5–4.6)
LYMPHOCYTES NFR BLD: 25.9 % (ref 13–44)
MCH RBC QN AUTO: 31.1 PG (ref 26.1–32.9)
MCHC RBC AUTO-ENTMCNC: 33 G/DL (ref 31.4–35)
MCV RBC AUTO: 94.1 FL (ref 82–102)
MONOCYTES # BLD: 0.87 K/UL (ref 0.1–1.3)
MONOCYTES NFR BLD: 8.3 % (ref 4–12)
NEUTS SEG # BLD: 6.05 K/UL (ref 1.7–8.2)
NEUTS SEG NFR BLD: 57.7 % (ref 43–78)
NRBC # BLD: 0 K/UL (ref 0–0.2)
PLATELET # BLD AUTO: 341 K/UL (ref 150–450)
PMV BLD AUTO: 11.2 FL (ref 9.4–12.3)
POTASSIUM SERPL-SCNC: 3.9 MMOL/L (ref 3.5–5.1)
PROT SERPL-MCNC: 7.7 G/DL (ref 6.3–8.2)
RBC # BLD AUTO: 4.41 M/UL (ref 4.23–5.6)
SODIUM SERPL-SCNC: 135 MMOL/L (ref 136–145)
TRIGL SERPL-MCNC: 237 MG/DL (ref 0–150)
VLDLC SERPL CALC-MCNC: 47 MG/DL (ref 6–23)
WBC # BLD AUTO: 10.5 K/UL (ref 4.3–11.1)

## 2025-01-01 RX ORDER — LEVETIRACETAM 1000 MG/1
1000 TABLET ORAL 2 TIMES DAILY
Qty: 180 TABLET | Refills: 1 | Status: CANCELLED | OUTPATIENT
Start: 2025-01-01 | End: 2025-10-29

## 2025-01-23 DIAGNOSIS — G62.9 PERIPHERAL POLYNEUROPATHY: ICD-10-CM

## 2025-01-23 DIAGNOSIS — G40.909 SEIZURE DISORDER (HCC): ICD-10-CM

## 2025-01-23 DIAGNOSIS — I10 PRIMARY HYPERTENSION: ICD-10-CM

## 2025-01-23 RX ORDER — LISINOPRIL AND HYDROCHLOROTHIAZIDE 20; 25 MG/1; MG/1
1 TABLET ORAL DAILY
Qty: 90 TABLET | Refills: 0 | Status: SHIPPED | OUTPATIENT
Start: 2025-01-23

## 2025-01-23 RX ORDER — GABAPENTIN 300 MG/1
CAPSULE ORAL
Qty: 720 CAPSULE | Refills: 1 | Status: SHIPPED | OUTPATIENT
Start: 2025-01-23 | End: 2025-07-22

## 2025-01-24 DIAGNOSIS — E11.65 TYPE 2 DIABETES MELLITUS WITH HYPERGLYCEMIA, WITH LONG-TERM CURRENT USE OF INSULIN (HCC): ICD-10-CM

## 2025-01-24 DIAGNOSIS — Z79.4 TYPE 2 DIABETES MELLITUS WITH HYPERGLYCEMIA, WITH LONG-TERM CURRENT USE OF INSULIN (HCC): ICD-10-CM

## 2025-01-27 RX ORDER — INSULIN LISPRO 100 [IU]/ML
INJECTION, SOLUTION INTRAVENOUS; SUBCUTANEOUS
Qty: 81 ML | Refills: 0 | Status: SHIPPED | OUTPATIENT
Start: 2025-01-27

## 2025-02-20 SDOH — ECONOMIC STABILITY: FOOD INSECURITY: WITHIN THE PAST 12 MONTHS, THE FOOD YOU BOUGHT JUST DIDN'T LAST AND YOU DIDN'T HAVE MONEY TO GET MORE.: NEVER TRUE

## 2025-02-20 SDOH — ECONOMIC STABILITY: TRANSPORTATION INSECURITY
IN THE PAST 12 MONTHS, HAS THE LACK OF TRANSPORTATION KEPT YOU FROM MEDICAL APPOINTMENTS OR FROM GETTING MEDICATIONS?: YES

## 2025-02-20 SDOH — ECONOMIC STABILITY: FOOD INSECURITY: WITHIN THE PAST 12 MONTHS, YOU WORRIED THAT YOUR FOOD WOULD RUN OUT BEFORE YOU GOT MONEY TO BUY MORE.: SOMETIMES TRUE

## 2025-02-20 SDOH — ECONOMIC STABILITY: INCOME INSECURITY: IN THE LAST 12 MONTHS, WAS THERE A TIME WHEN YOU WERE NOT ABLE TO PAY THE MORTGAGE OR RENT ON TIME?: NO

## 2025-02-20 SDOH — ECONOMIC STABILITY: TRANSPORTATION INSECURITY
IN THE PAST 12 MONTHS, HAS LACK OF TRANSPORTATION KEPT YOU FROM MEETINGS, WORK, OR FROM GETTING THINGS NEEDED FOR DAILY LIVING?: NO

## 2025-02-20 ASSESSMENT — PATIENT HEALTH QUESTIONNAIRE - PHQ9
3. TROUBLE FALLING OR STAYING ASLEEP: MORE THAN HALF THE DAYS
2. FEELING DOWN, DEPRESSED OR HOPELESS: MORE THAN HALF THE DAYS
7. TROUBLE CONCENTRATING ON THINGS, SUCH AS READING THE NEWSPAPER OR WATCHING TELEVISION: SEVERAL DAYS
1. LITTLE INTEREST OR PLEASURE IN DOING THINGS: SEVERAL DAYS
8. MOVING OR SPEAKING SO SLOWLY THAT OTHER PEOPLE COULD HAVE NOTICED. OR THE OPPOSITE, BEING SO FIGETY OR RESTLESS THAT YOU HAVE BEEN MOVING AROUND A LOT MORE THAN USUAL: NOT AT ALL
10. IF YOU CHECKED OFF ANY PROBLEMS, HOW DIFFICULT HAVE THESE PROBLEMS MADE IT FOR YOU TO DO YOUR WORK, TAKE CARE OF THINGS AT HOME, OR GET ALONG WITH OTHER PEOPLE: SOMEWHAT DIFFICULT
6. FEELING BAD ABOUT YOURSELF - OR THAT YOU ARE A FAILURE OR HAVE LET YOURSELF OR YOUR FAMILY DOWN: SEVERAL DAYS
5. POOR APPETITE OR OVEREATING: SEVERAL DAYS
4. FEELING TIRED OR HAVING LITTLE ENERGY: MORE THAN HALF THE DAYS
6. FEELING BAD ABOUT YOURSELF - OR THAT YOU ARE A FAILURE OR HAVE LET YOURSELF OR YOUR FAMILY DOWN: SEVERAL DAYS
9. THOUGHTS THAT YOU WOULD BE BETTER OFF DEAD, OR OF HURTING YOURSELF: NOT AT ALL
4. FEELING TIRED OR HAVING LITTLE ENERGY: MORE THAN HALF THE DAYS
SUM OF ALL RESPONSES TO PHQ QUESTIONS 1-9: 10
2. FEELING DOWN, DEPRESSED OR HOPELESS: MORE THAN HALF THE DAYS
3. TROUBLE FALLING OR STAYING ASLEEP: MORE THAN HALF THE DAYS
7. TROUBLE CONCENTRATING ON THINGS, SUCH AS READING THE NEWSPAPER OR WATCHING TELEVISION: SEVERAL DAYS
5. POOR APPETITE OR OVEREATING: SEVERAL DAYS
SUM OF ALL RESPONSES TO PHQ9 QUESTIONS 1 & 2: 3
9. THOUGHTS THAT YOU WOULD BE BETTER OFF DEAD, OR OF HURTING YOURSELF: NOT AT ALL
1. LITTLE INTEREST OR PLEASURE IN DOING THINGS: SEVERAL DAYS
8. MOVING OR SPEAKING SO SLOWLY THAT OTHER PEOPLE COULD HAVE NOTICED. OR THE OPPOSITE - BEING SO FIDGETY OR RESTLESS THAT YOU HAVE BEEN MOVING AROUND A LOT MORE THAN USUAL: NOT AT ALL
SUM OF ALL RESPONSES TO PHQ QUESTIONS 1-9: 10
10. IF YOU CHECKED OFF ANY PROBLEMS, HOW DIFFICULT HAVE THESE PROBLEMS MADE IT FOR YOU TO DO YOUR WORK, TAKE CARE OF THINGS AT HOME, OR GET ALONG WITH OTHER PEOPLE: SOMEWHAT DIFFICULT

## 2025-02-21 ENCOUNTER — OFFICE VISIT (OUTPATIENT)
Dept: INTERNAL MEDICINE CLINIC | Facility: CLINIC | Age: 55
End: 2025-02-21

## 2025-02-21 VITALS
BODY MASS INDEX: 38.88 KG/M2 | HEART RATE: 93 BPM | OXYGEN SATURATION: 97 % | DIASTOLIC BLOOD PRESSURE: 93 MMHG | WEIGHT: 271 LBS | SYSTOLIC BLOOD PRESSURE: 161 MMHG

## 2025-02-21 DIAGNOSIS — I10 PRIMARY HYPERTENSION: ICD-10-CM

## 2025-02-21 DIAGNOSIS — Z87.891 PERSONAL HISTORY OF TOBACCO USE: ICD-10-CM

## 2025-02-21 DIAGNOSIS — Z79.4 TYPE 2 DIABETES MELLITUS WITH HYPERGLYCEMIA, WITH LONG-TERM CURRENT USE OF INSULIN (HCC): Primary | ICD-10-CM

## 2025-02-21 DIAGNOSIS — E11.65 TYPE 2 DIABETES MELLITUS WITH HYPERGLYCEMIA, WITH LONG-TERM CURRENT USE OF INSULIN (HCC): Primary | ICD-10-CM

## 2025-02-21 DIAGNOSIS — E78.2 MIXED HYPERLIPIDEMIA: ICD-10-CM

## 2025-02-21 DIAGNOSIS — E11.21 TYPE 2 DIABETES WITH NEPHROPATHY (HCC): ICD-10-CM

## 2025-02-21 DIAGNOSIS — G62.9 PERIPHERAL POLYNEUROPATHY: ICD-10-CM

## 2025-02-21 DIAGNOSIS — E66.01 SEVERE OBESITY (BMI 35.0-39.9) WITH COMORBIDITY: ICD-10-CM

## 2025-02-21 DIAGNOSIS — Z12.11 ENCOUNTER FOR SCREENING FOR COLORECTAL MALIGNANT NEOPLASM: ICD-10-CM

## 2025-02-21 DIAGNOSIS — Z79.4 TYPE 2 DIABETES MELLITUS WITH HYPERGLYCEMIA, WITH LONG-TERM CURRENT USE OF INSULIN (HCC): ICD-10-CM

## 2025-02-21 DIAGNOSIS — Z13.31 POSITIVE DEPRESSION SCREENING: ICD-10-CM

## 2025-02-21 DIAGNOSIS — K21.9 GASTROESOPHAGEAL REFLUX DISEASE, UNSPECIFIED WHETHER ESOPHAGITIS PRESENT: ICD-10-CM

## 2025-02-21 DIAGNOSIS — G47.9 SLEEP DISTURBANCE: ICD-10-CM

## 2025-02-21 DIAGNOSIS — L40.50 PSORIATIC ARTHRITIS (HCC): ICD-10-CM

## 2025-02-21 DIAGNOSIS — Z12.12 ENCOUNTER FOR SCREENING FOR COLORECTAL MALIGNANT NEOPLASM: ICD-10-CM

## 2025-02-21 DIAGNOSIS — I25.10 CORONARY ARTERY DISEASE INVOLVING NATIVE CORONARY ARTERY OF NATIVE HEART WITHOUT ANGINA PECTORIS: ICD-10-CM

## 2025-02-21 DIAGNOSIS — G40.209 NONINTRACTABLE EPILEPSY WITH COMPLEX PARTIAL SEIZURES (HCC): ICD-10-CM

## 2025-02-21 DIAGNOSIS — E11.65 TYPE 2 DIABETES MELLITUS WITH HYPERGLYCEMIA, WITH LONG-TERM CURRENT USE OF INSULIN (HCC): ICD-10-CM

## 2025-02-21 PROBLEM — Z95.5 HX OF HEART ARTERY STENT: Status: RESOLVED | Noted: 2019-05-08 | Resolved: 2025-02-21

## 2025-02-21 PROBLEM — G45.9 TIA (TRANSIENT ISCHEMIC ATTACK): Status: RESOLVED | Noted: 2024-05-01 | Resolved: 2025-02-21

## 2025-02-21 LAB
ANION GAP SERPL CALC-SCNC: 12 MMOL/L (ref 7–16)
BASOPHILS # BLD: 0.05 K/UL (ref 0–0.2)
BASOPHILS NFR BLD: 0.5 % (ref 0–2)
BUN SERPL-MCNC: 22 MG/DL (ref 6–23)
CALCIUM SERPL-MCNC: 9.4 MG/DL (ref 8.8–10.2)
CHLORIDE SERPL-SCNC: 101 MMOL/L (ref 98–107)
CO2 SERPL-SCNC: 25 MMOL/L (ref 20–29)
CREAT SERPL-MCNC: 1.05 MG/DL (ref 0.8–1.3)
CREAT UR-MCNC: 132 MG/DL (ref 39–259)
DIFFERENTIAL METHOD BLD: NORMAL
EOSINOPHIL # BLD: 0.45 K/UL (ref 0–0.8)
EOSINOPHIL NFR BLD: 4.9 % (ref 0.5–7.8)
ERYTHROCYTE [DISTWIDTH] IN BLOOD BY AUTOMATED COUNT: 13.1 % (ref 11.9–14.6)
EST. AVERAGE GLUCOSE BLD GHB EST-MCNC: 179 MG/DL
GLUCOSE SERPL-MCNC: 230 MG/DL (ref 70–99)
HBA1C MFR BLD: 7.9 % (ref 0–5.6)
HCT VFR BLD AUTO: 42.2 % (ref 41.1–50.3)
HGB BLD-MCNC: 14 G/DL (ref 13.6–17.2)
IMM GRANULOCYTES # BLD AUTO: 0.03 K/UL (ref 0–0.5)
IMM GRANULOCYTES NFR BLD AUTO: 0.3 % (ref 0–5)
LYMPHOCYTES # BLD: 1.93 K/UL (ref 0.5–4.6)
LYMPHOCYTES NFR BLD: 20.9 % (ref 13–44)
MCH RBC QN AUTO: 30.6 PG (ref 26.1–32.9)
MCHC RBC AUTO-ENTMCNC: 33.2 G/DL (ref 31.4–35)
MCV RBC AUTO: 92.3 FL (ref 82–102)
MICROALBUMIN UR-MCNC: 2.57 MG/DL (ref 0–20)
MICROALBUMIN/CREAT UR-RTO: 19 MG/G (ref 0–30)
MONOCYTES # BLD: 0.77 K/UL (ref 0.1–1.3)
MONOCYTES NFR BLD: 8.3 % (ref 4–12)
NEUTS SEG # BLD: 6.02 K/UL (ref 1.7–8.2)
NEUTS SEG NFR BLD: 65.1 % (ref 43–78)
NRBC # BLD: 0 K/UL (ref 0–0.2)
PLATELET # BLD AUTO: 304 K/UL (ref 150–450)
PMV BLD AUTO: 11 FL (ref 9.4–12.3)
POTASSIUM SERPL-SCNC: 4.4 MMOL/L (ref 3.5–5.1)
RBC # BLD AUTO: 4.57 M/UL (ref 4.23–5.6)
SODIUM SERPL-SCNC: 137 MMOL/L (ref 136–145)
WBC # BLD AUTO: 9.3 K/UL (ref 4.3–11.1)

## 2025-02-21 RX ORDER — INSULIN GLARGINE-YFGN 100 [IU]/ML
INJECTION, SOLUTION SUBCUTANEOUS
Qty: 9 ML | Refills: 5 | Status: CANCELLED | OUTPATIENT
Start: 2025-02-21

## 2025-02-21 RX ORDER — INSULIN LISPRO 100 [IU]/ML
30 INJECTION, SOLUTION INTRAVENOUS; SUBCUTANEOUS
Qty: 81 ML | Refills: 5 | Status: SHIPPED | OUTPATIENT
Start: 2025-02-21

## 2025-02-21 RX ORDER — ACARBOSE 25 MG/1
25 TABLET ORAL
Qty: 90 TABLET | Refills: 3 | Status: SHIPPED | OUTPATIENT
Start: 2025-02-21

## 2025-02-21 RX ORDER — ISOSORBIDE MONONITRATE 30 MG/1
30 TABLET, EXTENDED RELEASE ORAL DAILY
Qty: 90 TABLET | Refills: 1 | Status: SHIPPED | OUTPATIENT
Start: 2025-02-21 | End: 2025-08-20

## 2025-02-21 RX ORDER — GABAPENTIN 300 MG/1
300 CAPSULE ORAL 4 TIMES DAILY
Qty: 720 CAPSULE | Refills: 1 | Status: SHIPPED | OUTPATIENT
Start: 2025-02-21 | End: 2026-02-16

## 2025-02-21 RX ORDER — ASPIRIN 81 MG/1
81 TABLET ORAL 2 TIMES DAILY
Qty: 180 TABLET | Refills: 1 | Status: SHIPPED | OUTPATIENT
Start: 2025-02-21 | End: 2025-08-20

## 2025-02-21 RX ORDER — CLOPIDOGREL BISULFATE 75 MG/1
75 TABLET ORAL DAILY
Qty: 90 TABLET | Refills: 3 | Status: SHIPPED | OUTPATIENT
Start: 2025-02-21

## 2025-02-21 RX ORDER — LEVETIRACETAM 1000 MG/1
1000 TABLET ORAL 2 TIMES DAILY
Qty: 180 TABLET | Refills: 1 | Status: SHIPPED | OUTPATIENT
Start: 2025-02-21 | End: 2025-08-20

## 2025-02-21 RX ORDER — TRAZODONE HYDROCHLORIDE 100 MG/1
100 TABLET ORAL NIGHTLY
Qty: 90 TABLET | Refills: 1 | Status: SHIPPED | OUTPATIENT
Start: 2025-02-21 | End: 2025-08-20

## 2025-02-21 RX ORDER — CHLORTHALIDONE 25 MG/1
25 TABLET ORAL DAILY
Qty: 30 TABLET | Refills: 3 | Status: SHIPPED | OUTPATIENT
Start: 2025-02-21

## 2025-02-21 RX ORDER — PANTOPRAZOLE SODIUM 40 MG/1
40 TABLET, DELAYED RELEASE ORAL DAILY
Qty: 90 TABLET | Refills: 1 | Status: SHIPPED | OUTPATIENT
Start: 2025-02-21 | End: 2025-08-20

## 2025-02-21 RX ORDER — ATORVASTATIN CALCIUM 80 MG/1
80 TABLET, FILM COATED ORAL DAILY
Qty: 90 TABLET | Refills: 1 | Status: SHIPPED | OUTPATIENT
Start: 2025-02-21 | End: 2025-08-20

## 2025-02-21 RX ORDER — FENOFIBRATE 160 MG/1
160 TABLET ORAL DAILY
Qty: 90 TABLET | Refills: 1 | Status: SHIPPED | OUTPATIENT
Start: 2025-02-21 | End: 2025-08-20

## 2025-02-21 RX ORDER — LISINOPRIL 40 MG/1
40 TABLET ORAL DAILY
Qty: 30 TABLET | Refills: 5 | Status: SHIPPED | OUTPATIENT
Start: 2025-02-21

## 2025-02-21 RX ORDER — INSULIN GLARGINE 100 [IU]/ML
34 INJECTION, SOLUTION SUBCUTANEOUS NIGHTLY
Qty: 5 ADJUSTABLE DOSE PRE-FILLED PEN SYRINGE | Refills: 3 | Status: SHIPPED | OUTPATIENT
Start: 2025-02-21

## 2025-02-21 RX ORDER — LISINOPRIL AND HYDROCHLOROTHIAZIDE 20; 25 MG/1; MG/1
1 TABLET ORAL DAILY
Qty: 90 TABLET | Refills: 1 | Status: CANCELLED | OUTPATIENT
Start: 2025-02-21

## 2025-02-21 RX ORDER — CARVEDILOL 25 MG/1
25 TABLET ORAL 2 TIMES DAILY WITH MEALS
Qty: 180 TABLET | Refills: 1 | Status: SHIPPED | OUTPATIENT
Start: 2025-02-21 | End: 2025-08-20

## 2025-02-21 NOTE — PROGRESS NOTES
Patel Devi D.O.   [place] [address]  Tel: [phone]    Office Visit: Follow Up     Patient Name: Daniele Morel   :  1970   MRN:   [id number]      Today's Date: 25 10:06 AM    Subjective     The patient is a 54 y.o.-year-old male who presents for follow-up.    Type 2 diabetes mellitus  -Acarbose 25 mg 3 times daily with meals  -Insulin lispro to 12 units 3 times daily  -Lantus 34 units nightly, he states he has not been able to get this for 2 weeks.    -Jardiance 10 mg daily, insurance not approved.   -Hemoglobin A1c on 2024 was 8.9 (down from 11.5 previously)  -He states he is eating pretty healthy. He is not exercising much.      Coronary artery disease  -Previous cardiac history includes:  2016: NSTEMI - 2.25 x 28 and a 2.5 x 23 Xience to OM2, EF 45%  2019: Inferior STEMI with DKA. JENNIFER x 3-RCA ([facility]); Echo EF 55%  Mar 2020: [facility] - stable CAD, non cardiac cp  2021: NSTEMI - 2.5 x 26 Rickie-pLAD, 2.5 x 26 [place] - RI, 2.5 x 15 [place] - pCx; Echo - EF 50-55%, inferolateral and anterolateral HK  2022: Normal bilateral MENDY ([facility]win)  -Aspirin 81 mg daily  -Lipitor 80 mg daily  -Coreg 25 mg twice daily  -Plavix 75 mg daily  -Imdur 30 mg daily  -Nitroglycerin 0.4 mg sublingual  -Patient following with cardiology, he last saw cardiology 10/16/2023 for preop clearance.  -Patient has not seen cardiology since 2023.     Primary hypertension  -Lisinopril-hydrochlorothiazide 20-25 mg daily  -Imdur 30 mg daily  -Coreg 25 mg twice daily  -Patients BP has been about 140/90     Hyperlipidemia  -Lipitor 80 mg daily  -Fenofibrate 160 mg daily  -Lipid panel from 5/3/2024 showed total cholesterol of 194, HDL 26, , triglycerides of 279, VLDL of 56  -He states he is eating pretty healthy. He is not exercising much.      GERD  -Protonix 40 mg daily, he states this helps a lot.      Obesity  -BMI 38.88, weight 271 pounds  -He states

## 2025-02-21 NOTE — PATIENT INSTRUCTIONS
follow-up, he or she will help you understand what to do next.  After a lung cancer screening, you can go back to your usual activities right away.  A lung cancer screening test can't tell if you have lung cancer. If your results are positive, your doctor can't tell whether an abnormal finding is a harmless nodule, cancer, or something else without doing more tests.  What can you do to help prevent lung cancer?  Some lung cancers can't be prevented. But if you smoke, quitting smoking is the best step you can take to prevent lung cancer. If you want to quit, your doctor can recommend medicines or other ways to help.  Follow-up care is a key part of your treatment and safety. Be sure to make and go to all appointments, and call your doctor if you are having problems. It's also a good idea to know your test results and keep a list of the medicines you take.  Where can you learn more?  Go to https://www.[employer].net/patientEd and enter Q940 to learn more about \"Learning About Lung Cancer Screening.\"  Current as of: October 25, 2023  Content Version: 14.3  © 2024 [employer].   Care instructions adapted under license by [employer]. If you have questions about a medical condition or this instruction, always ask your healthcare professional. [employer] disclaims any warranty or liability for your use of this information.

## 2025-02-24 ENCOUNTER — PREP FOR PROCEDURE (OUTPATIENT)
Dept: GASTROENTEROLOGY | Age: 55
End: 2025-02-24

## 2025-02-24 ENCOUNTER — TELEPHONE (OUTPATIENT)
Age: 55
End: 2025-02-24

## 2025-02-24 DIAGNOSIS — E11.65 TYPE 2 DIABETES MELLITUS WITH HYPERGLYCEMIA, WITH LONG-TERM CURRENT USE OF INSULIN (HCC): Primary | ICD-10-CM

## 2025-02-24 DIAGNOSIS — E11.21 TYPE 2 DIABETES WITH NEPHROPATHY (HCC): ICD-10-CM

## 2025-02-24 DIAGNOSIS — Z12.11 COLON CANCER SCREENING: ICD-10-CM

## 2025-02-24 DIAGNOSIS — Z79.4 TYPE 2 DIABETES MELLITUS WITH HYPERGLYCEMIA, WITH LONG-TERM CURRENT USE OF INSULIN (HCC): Primary | ICD-10-CM

## 2025-02-24 DIAGNOSIS — Z12.11 ENCOUNTER FOR SCREENING COLONOSCOPY: Primary | ICD-10-CM

## 2025-02-24 RX ORDER — BISACODYL 5 MG/1
5 TABLET, DELAYED RELEASE ORAL SEE ADMIN INSTRUCTIONS
Qty: 2 TABLET | Refills: 0 | Status: SHIPPED | OUTPATIENT
Start: 2025-02-24

## 2025-02-24 RX ORDER — SODIUM CHLORIDE 0.9 % (FLUSH) 0.9 %
5-40 SYRINGE (ML) INJECTION PRN
Status: CANCELLED | OUTPATIENT
Start: 2025-02-24

## 2025-02-24 RX ORDER — SODIUM, POTASSIUM,MAG SULFATES 17.5-3.13G
1 SOLUTION, RECONSTITUTED, ORAL ORAL SEE ADMIN INSTRUCTIONS
Qty: 1 EACH | Refills: 0 | Status: SHIPPED | OUTPATIENT
Start: 2025-02-24

## 2025-02-24 RX ORDER — SODIUM CHLORIDE 9 MG/ML
25 INJECTION, SOLUTION INTRAVENOUS PRN
Status: CANCELLED | OUTPATIENT
Start: 2025-02-24

## 2025-02-24 RX ORDER — SODIUM CHLORIDE 0.9 % (FLUSH) 0.9 %
5-40 SYRINGE (ML) INJECTION EVERY 12 HOURS SCHEDULED
Status: CANCELLED | OUTPATIENT
Start: 2025-02-24

## 2025-02-24 NOTE — RESULT ENCOUNTER NOTE
Patient's hemoglobin A1c continues to improve it is now 7.9 down from 8.9 which was down from over 11 previously.  He is doing a great job and should continue eating healthy and exercising regularly and taking his medications to continue to bring this number down.  The rest of his labs were normal.

## 2025-02-24 NOTE — TELEPHONE ENCOUNTER
Message per Lucio, 2/24/25:  \"Please send suprep/dulcolax to [address] - P [phone] - F [phone] \"    --------------------------    As per standing order from Dr. Landry, Suprep/dulcolax sent to requested pharmacy on file.

## 2025-02-25 ENCOUNTER — TELEPHONE (OUTPATIENT)
Dept: INTERNAL MEDICINE CLINIC | Facility: CLINIC | Age: 55
End: 2025-02-25

## 2025-02-25 NOTE — TELEPHONE ENCOUNTER
Prior authorization for ozempic done 02/25/2025    Contact plan to follow up on [id number]  Your PA has been faxed to the plan as a paper copy. Please contact the plan directly if you haven't received a determination in a typical timeframe.    You will be notified of the determination electronically and via fax.

## 2025-03-21 ENCOUNTER — TELEPHONE (OUTPATIENT)
Age: 55
End: 2025-03-21

## 2025-03-26 PROBLEM — Z12.11 COLON CANCER SCREENING: Status: RESOLVED | Noted: 2025-02-24 | Resolved: 2025-03-26

## 2025-04-14 PROBLEM — Z12.11 COLON CANCER SCREENING: Status: ACTIVE | Noted: 2025-02-24

## 2025-04-25 ENCOUNTER — TELEPHONE (OUTPATIENT)
Age: 55
End: 2025-04-25

## 2025-04-25 NOTE — TELEPHONE ENCOUNTER
Cancel case   (patients choice )     During the PAT interview today the patient states \" needs to cancel his procedure on 04/28/2025 with Dr. Landry. He states that he is not feeling well. He states that he does not want to reschedule at this time\". Please reach out to the patient to confirm and send a case message to the OR Schedulers to cancel the case.

## 2025-05-05 ENCOUNTER — PATIENT MESSAGE (OUTPATIENT)
Dept: INTERNAL MEDICINE CLINIC | Facility: CLINIC | Age: 55
End: 2025-05-05

## 2025-05-05 ENCOUNTER — OFFICE VISIT (OUTPATIENT)
Dept: INTERNAL MEDICINE CLINIC | Facility: CLINIC | Age: 55
End: 2025-05-05
Payer: COMMERCIAL

## 2025-05-05 ENCOUNTER — TELEPHONE (OUTPATIENT)
Dept: INTERNAL MEDICINE CLINIC | Facility: CLINIC | Age: 55
End: 2025-05-05

## 2025-05-05 VITALS
OXYGEN SATURATION: 98 % | WEIGHT: 257 LBS | BODY MASS INDEX: 36.79 KG/M2 | HEIGHT: 70 IN | SYSTOLIC BLOOD PRESSURE: 117 MMHG | DIASTOLIC BLOOD PRESSURE: 78 MMHG | HEART RATE: 81 BPM

## 2025-05-05 DIAGNOSIS — E11.65 TYPE 2 DIABETES MELLITUS WITH HYPERGLYCEMIA, WITH LONG-TERM CURRENT USE OF INSULIN (HCC): Primary | ICD-10-CM

## 2025-05-05 DIAGNOSIS — E78.2 MIXED HYPERLIPIDEMIA: ICD-10-CM

## 2025-05-05 DIAGNOSIS — Z79.4 TYPE 2 DIABETES MELLITUS WITH HYPERGLYCEMIA, WITH LONG-TERM CURRENT USE OF INSULIN (HCC): Primary | ICD-10-CM

## 2025-05-05 DIAGNOSIS — G47.9 SLEEP DISTURBANCE: ICD-10-CM

## 2025-05-05 DIAGNOSIS — K21.9 GASTROESOPHAGEAL REFLUX DISEASE, UNSPECIFIED WHETHER ESOPHAGITIS PRESENT: ICD-10-CM

## 2025-05-05 DIAGNOSIS — I10 PRIMARY HYPERTENSION: ICD-10-CM

## 2025-05-05 DIAGNOSIS — I25.10 CORONARY ARTERY DISEASE INVOLVING NATIVE CORONARY ARTERY OF NATIVE HEART WITHOUT ANGINA PECTORIS: ICD-10-CM

## 2025-05-05 DIAGNOSIS — G40.209 NONINTRACTABLE EPILEPSY WITH COMPLEX PARTIAL SEIZURES (HCC): ICD-10-CM

## 2025-05-05 DIAGNOSIS — G62.9 PERIPHERAL POLYNEUROPATHY: ICD-10-CM

## 2025-05-05 DIAGNOSIS — G40.909 SEIZURE DISORDER (HCC): ICD-10-CM

## 2025-05-05 DIAGNOSIS — R11.10 REGURGITATION OF FOOD: ICD-10-CM

## 2025-05-05 DIAGNOSIS — R13.19 ESOPHAGEAL DYSPHAGIA: ICD-10-CM

## 2025-05-05 DIAGNOSIS — F33.41 RECURRENT MAJOR DEPRESSIVE DISORDER, IN PARTIAL REMISSION: ICD-10-CM

## 2025-05-05 DIAGNOSIS — E78.2 MIXED HYPERLIPIDEMIA: Primary | ICD-10-CM

## 2025-05-05 DIAGNOSIS — E66.01 SEVERE OBESITY (BMI 35.0-39.9) WITH COMORBIDITY (HCC): ICD-10-CM

## 2025-05-05 DIAGNOSIS — E11.21 TYPE 2 DIABETES WITH NEPHROPATHY (HCC): ICD-10-CM

## 2025-05-05 DIAGNOSIS — L40.50 PSORIATIC ARTHRITIS (HCC): ICD-10-CM

## 2025-05-05 DIAGNOSIS — M19.91 PRIMARY OSTEOARTHRITIS, UNSPECIFIED SITE: ICD-10-CM

## 2025-05-05 PROBLEM — Z12.11 COLON CANCER SCREENING: Status: RESOLVED | Noted: 2025-02-24 | Resolved: 2025-05-05

## 2025-05-05 PROCEDURE — 3078F DIAST BP <80 MM HG: CPT | Performed by: INTERNAL MEDICINE

## 2025-05-05 PROCEDURE — 3051F HG A1C>EQUAL 7.0%<8.0%: CPT | Performed by: INTERNAL MEDICINE

## 2025-05-05 PROCEDURE — 99214 OFFICE O/P EST MOD 30 MIN: CPT | Performed by: INTERNAL MEDICINE

## 2025-05-05 PROCEDURE — 3074F SYST BP LT 130 MM HG: CPT | Performed by: INTERNAL MEDICINE

## 2025-05-05 PROCEDURE — G2211 COMPLEX E/M VISIT ADD ON: HCPCS | Performed by: INTERNAL MEDICINE

## 2025-05-05 RX ORDER — INSULIN GLARGINE-YFGN 100 [IU]/ML
34 INJECTION, SOLUTION SUBCUTANEOUS
Qty: 9 ML | Refills: 3 | Status: SHIPPED | OUTPATIENT
Start: 2025-05-05

## 2025-05-05 RX ORDER — ASPIRIN 81 MG/1
81 TABLET ORAL 2 TIMES DAILY
Qty: 180 TABLET | Refills: 1 | Status: SHIPPED | OUTPATIENT
Start: 2025-05-05 | End: 2025-11-01

## 2025-05-05 RX ORDER — FENOFIBRATE 160 MG/1
160 TABLET ORAL DAILY
Qty: 90 TABLET | Refills: 1 | Status: SHIPPED | OUTPATIENT
Start: 2025-05-05 | End: 2025-11-01

## 2025-05-05 RX ORDER — CARVEDILOL 25 MG/1
25 TABLET ORAL 2 TIMES DAILY WITH MEALS
Qty: 180 TABLET | Refills: 1 | Status: SHIPPED | OUTPATIENT
Start: 2025-05-05 | End: 2025-11-01

## 2025-05-05 RX ORDER — CLOPIDOGREL BISULFATE 75 MG/1
75 TABLET ORAL DAILY
Qty: 90 TABLET | Refills: 3 | Status: SHIPPED | OUTPATIENT
Start: 2025-05-05

## 2025-05-05 RX ORDER — CHLORTHALIDONE 25 MG/1
25 TABLET ORAL DAILY
Qty: 30 TABLET | Refills: 3 | Status: SHIPPED | OUTPATIENT
Start: 2025-05-05

## 2025-05-05 RX ORDER — GABAPENTIN 300 MG/1
300 CAPSULE ORAL 4 TIMES DAILY
Qty: 720 CAPSULE | Refills: 1 | Status: SHIPPED | OUTPATIENT
Start: 2025-05-05 | End: 2026-04-30

## 2025-05-05 RX ORDER — ACYCLOVIR 400 MG/1
TABLET ORAL
Qty: 1 EACH | Refills: 12 | Status: SHIPPED | OUTPATIENT
Start: 2025-05-05

## 2025-05-05 RX ORDER — LISINOPRIL 40 MG/1
40 TABLET ORAL DAILY
Qty: 30 TABLET | Refills: 5 | Status: SHIPPED | OUTPATIENT
Start: 2025-05-05

## 2025-05-05 RX ORDER — LISINOPRIL AND HYDROCHLOROTHIAZIDE 20; 25 MG/1; MG/1
1 TABLET ORAL DAILY
Qty: 90 TABLET | Refills: 0 | Status: SHIPPED | OUTPATIENT
Start: 2025-05-05

## 2025-05-05 RX ORDER — INSULIN LISPRO 100 [IU]/ML
30 INJECTION, SOLUTION INTRAVENOUS; SUBCUTANEOUS
Qty: 81 ML | Refills: 5 | Status: SHIPPED | OUTPATIENT
Start: 2025-05-05

## 2025-05-05 RX ORDER — PANTOPRAZOLE SODIUM 40 MG/1
40 TABLET, DELAYED RELEASE ORAL DAILY
Qty: 90 TABLET | Refills: 1 | Status: SHIPPED | OUTPATIENT
Start: 2025-05-05 | End: 2025-11-01

## 2025-05-05 RX ORDER — ACARBOSE 25 MG/1
25 TABLET ORAL
Qty: 90 TABLET | Refills: 3 | Status: SHIPPED | OUTPATIENT
Start: 2025-05-05

## 2025-05-05 RX ORDER — ISOSORBIDE MONONITRATE 30 MG/1
30 TABLET, EXTENDED RELEASE ORAL DAILY
Qty: 90 TABLET | Refills: 1 | Status: SHIPPED | OUTPATIENT
Start: 2025-05-05 | End: 2025-11-01

## 2025-05-05 RX ORDER — ATORVASTATIN CALCIUM 80 MG/1
80 TABLET, FILM COATED ORAL DAILY
Qty: 90 TABLET | Refills: 1 | Status: SHIPPED | OUTPATIENT
Start: 2025-05-05 | End: 2025-11-01

## 2025-05-05 RX ORDER — ACYCLOVIR 400 MG/1
TABLET ORAL
Qty: 1 EACH | Refills: 0 | Status: SHIPPED | OUTPATIENT
Start: 2025-05-05

## 2025-05-05 RX ORDER — LEVETIRACETAM 1000 MG/1
1000 TABLET ORAL 2 TIMES DAILY
Qty: 180 TABLET | Refills: 1 | Status: SHIPPED | OUTPATIENT
Start: 2025-05-05 | End: 2025-11-01

## 2025-05-05 RX ORDER — TRAZODONE HYDROCHLORIDE 100 MG/1
100 TABLET ORAL NIGHTLY
Qty: 90 TABLET | Refills: 1 | Status: SHIPPED | OUTPATIENT
Start: 2025-05-05 | End: 2025-11-01

## 2025-05-05 RX ORDER — INSULIN GLARGINE 100 [IU]/ML
34 INJECTION, SOLUTION SUBCUTANEOUS NIGHTLY
Qty: 5 ADJUSTABLE DOSE PRE-FILLED PEN SYRINGE | Refills: 3 | Status: SHIPPED | OUTPATIENT
Start: 2025-05-05

## 2025-05-05 NOTE — TELEPHONE ENCOUNTER
Prior authorization for fenofibrate done 05/05/2025    Contact plan to follow up on [id number]. Your PA has been faxed to the plan as a paper copy.         Prior authorization for Dexcom G7 Sensor done 05/05/2025    Contact plan to follow up on [id number]. The plan will fax you a determination, typically within 1 to 5 business days.         Prior authorization for Semglee done 05/05/2025    Contact plan to follow up on [id number]. Your PA has been faxed to the plan as a paper copy

## 2025-05-05 NOTE — PROGRESS NOTES
Patel Devi D.O.   [place] [address]  Tel: [phone]    Office Visit: Follow Up     Patient Name: Daniele Morel   :  1970   MRN:   [id number]      Today's Date: 25 1:59 PM    Subjective     The patient is a 55 y.o.-year-old male who presents for follow-up.    Type 2 diabetes mellitus  -Acarbose 25 mg 3 times daily with meals  -Insulin lispro to 12 units 3 times daily  -Glargine YFGN 34 units nightly   -Jardiance 10 mg daily, insurance not approved.   -Semaglutide 0.5 mg weekly   -Hemoglobin A1c on 2025 was 7.9 (down from 8.9 and 11.5 previously)  -He reports he is eating healthy. He has been exercising some.      Coronary artery disease  -Previous cardiac history includes:  2016: NSTEMI - 2.25 x 28 and a 2.5 x 23 Xience to OM2, EF 45%  2019: Inferior STEMI with DKA. JENNIFER x 3-RCA ([facility]); Echo EF 55%  Mar 2020: [facility] - stable CAD, non cardiac cp  2021: NSTEMI - 2.5 x 26 Rickie-pLAD, 2.5 x 26 [place] - RI, 2.5 x 15 [place] - pCx; Echo - EF 50-55%, inferolateral and anterolateral HK  2022: Normal bilateral MENDY ([facility])  -Aspirin 81 mg daily  -Lipitor 80 mg daily  -Coreg 25 mg twice daily  -Plavix 75 mg daily  -Imdur 30 mg daily  -Nitroglycerin 0.4 mg sublingual  -Patient following with cardiology, he last saw cardiology 10/16/2023 for preop clearance.  -Patient has not seen cardiology since 2023.  -He reports he has a follow up with cardiology 2025  -He reports some CP with exertion.      Primary hypertension  -Lisinopril-hydrochlorothiazide 20-25 mg daily  -Imdur 30 mg daily  -Coreg 25 mg twice daily     Hyperlipidemia  -Lipitor 80 mg daily  -Fenofibrate 160 mg daily  -Lipid panel from 5/3/2024 showed total cholesterol of 194, HDL 26, , triglycerides of 279, VLDL of 56  -He reports he is eating healthy. He has been exercising some.      GERD  -Protonix 40 mg daily  -He reports acid reflux well controlled but he

## 2025-05-06 NOTE — PROGRESS NOTES
[facility]  [address] [place] [address], [address]  [place] [address]  PHONE: [phone]      25    NAME:  Daniele Morel  : 1970  MRN: [id number]         SUBJECTIVE:   Daniele Morel is a 55 y.o. male seen for a follow up visit regarding the following:     Chief Complaint   Patient presents with    Coronary Artery Disease            HPI:  Follow up  Coronary Artery Disease   .    Lost to follow up since . Returns with chest discomfort. Hx includes prior coronary disease, MIs, DM with ultimately toe amputations for infected diabetic ulcers, uncontrolled hypertriglyceridemia and dyslipidemia, was supposed to have been on Repatha but failed to follow up (was started by my former partner, Dr Tamayo, in )--his insurance won't cover it or a dexcom,   He reports he was away from office care with TIAs and seizures.  He has chest discomfort, often when he bends over.  Says he thinks it is because the ozempic causes accumulation of phlegm in his throat.  He is also having reflux of gastric contents with choking.  He is seeing a gastroenterologist    PAST CARDIAC HISTORY:  2016       NSTEMI - 2.25 x 28 and a 2.5 x 23 Xience to OM2, EF 45%  2019       Inferior STEMI with DKA. JENNIFER x 3-RCA ([facility])       Echo EF 55%  Mar 2020       [facility] - stable CAD, non cardiac cp  2021           NSTEMI - 2.5 x 26 [place]-pLAD, 2.5 x 26 [place] - RI, 2.5 x 15 [place] - pCx       Echo - EF 50-55%, inferolateral and anterolateral HK  2022       Normal bilateral MENDY (erwin)                Cardiac Medications       ACE Inhibitors       lisinopril (PRINIVIL;ZESTRIL) 40 MG tablet Take 1 tablet by mouth daily Indications: High Blood Pressure     Patient not taking: Reported on 2025       Antihypertensive Combinations       lisinopril-hydroCHLOROthiazide (PRINZIDE;ZESTORETIC) 20-25 MG per tablet Take 1 tablet by mouth daily       Nitrates       nitroGLYCERIN (NITROSTAT) 0.4 MG SL tablet Place 1 tablet under the

## 2025-05-06 NOTE — RESULT ENCOUNTER NOTE
Patient's cholesterol panel continues to improve, he should continue to focus on eating healthy and getting of exercise to continue to bring these numbers down.  His hemoglobin A1c also continues to improve and is now down to 7.0 from 7.9.

## 2025-05-06 NOTE — TELEPHONE ENCOUNTER
Received fax that semglee denied due to not trying lantus solostar. Patient has been on lantus solostar since 2023. Was put in prior authorization.  Called [facility] [phone] and let them know patient has been on lantus solostar for 2 years. They documented it into the patient file and reopened the prior authorization to be reviewed.     Also received fax that fenofibrate was denied due to not trying fenofibrate 48mg or 145mg. Patient has been on 160mg fenofibrate since Feb 2025 and 138mg since 2023. [facility] documented that into the patient file and reopened the prior authorization.    Both of these prior authorizations have been expedited  and will have a response within 48 hours.    CaseID: Semglee: [id number]  CaseID fenofibrate: [id number]

## 2025-05-07 ENCOUNTER — OFFICE VISIT (OUTPATIENT)
Age: 55
End: 2025-05-07
Payer: COMMERCIAL

## 2025-05-07 VITALS
HEART RATE: 84 BPM | DIASTOLIC BLOOD PRESSURE: 84 MMHG | BODY MASS INDEX: 38.66 KG/M2 | WEIGHT: 261 LBS | SYSTOLIC BLOOD PRESSURE: 112 MMHG | HEIGHT: 69 IN

## 2025-05-07 DIAGNOSIS — R07.89 CHEST DISCOMFORT: ICD-10-CM

## 2025-05-07 DIAGNOSIS — G62.9 PERIPHERAL POLYNEUROPATHY: ICD-10-CM

## 2025-05-07 DIAGNOSIS — E78.2 MIXED HYPERLIPIDEMIA: ICD-10-CM

## 2025-05-07 DIAGNOSIS — I25.10 CORONARY ARTERY DISEASE INVOLVING NATIVE CORONARY ARTERY OF NATIVE HEART WITHOUT ANGINA PECTORIS: Primary | ICD-10-CM

## 2025-05-07 DIAGNOSIS — Z79.4 TYPE 2 DIABETES MELLITUS WITH HYPERGLYCEMIA, WITH LONG-TERM CURRENT USE OF INSULIN (HCC): ICD-10-CM

## 2025-05-07 DIAGNOSIS — E11.65 TYPE 2 DIABETES MELLITUS WITH HYPERGLYCEMIA, WITH LONG-TERM CURRENT USE OF INSULIN (HCC): ICD-10-CM

## 2025-05-07 DIAGNOSIS — I10 PRIMARY HYPERTENSION: ICD-10-CM

## 2025-05-07 PROCEDURE — 93000 ELECTROCARDIOGRAM COMPLETE: CPT | Performed by: INTERNAL MEDICINE

## 2025-05-07 PROCEDURE — 3051F HG A1C>EQUAL 7.0%<8.0%: CPT | Performed by: INTERNAL MEDICINE

## 2025-05-07 PROCEDURE — 99214 OFFICE O/P EST MOD 30 MIN: CPT | Performed by: INTERNAL MEDICINE

## 2025-05-07 PROCEDURE — 3079F DIAST BP 80-89 MM HG: CPT | Performed by: INTERNAL MEDICINE

## 2025-05-07 PROCEDURE — 3074F SYST BP LT 130 MM HG: CPT | Performed by: INTERNAL MEDICINE

## 2025-05-07 RX ORDER — FENOFIBRATE 134 MG/1
134 CAPSULE ORAL
Qty: 30 CAPSULE | Refills: 11 | Status: SHIPPED | OUTPATIENT
Start: 2025-05-07 | End: 2026-05-02

## 2025-05-07 RX ORDER — BLOOD SUGAR DIAGNOSTIC
1 STRIP MISCELLANEOUS 4 TIMES DAILY
Qty: 200 EACH | Refills: 3 | Status: SHIPPED | OUTPATIENT
Start: 2025-05-07

## 2025-05-07 RX ORDER — NITROGLYCERIN 0.4 MG/1
0.4 TABLET SUBLINGUAL EVERY 5 MIN PRN
Qty: 25 TABLET | Refills: 4 | Status: SHIPPED | OUTPATIENT
Start: 2025-05-07

## 2025-05-07 ASSESSMENT — ENCOUNTER SYMPTOMS: SHORTNESS OF BREATH: 1

## 2025-05-07 NOTE — PATIENT INSTRUCTIONS
[unfilled]       Learning About the Mediterranean Diet  What is the Mediterranean diet?     The Mediterranean diet is a style of eating rather than a diet plan. It features foods eaten in [place], [place], southern [place], and other countries along the [place] Sea. It emphasizes eating foods like fish, fruits, vegetables, beans, high-fiber breads and whole grains, nuts, and olive oil. This style of eating includes limited red meat, cheese, and sweets.  Why choose the Mediterranean diet?  A Mediterranean-style diet may improve heart health. It contains more fat than other heart-healthy diets. But the fats are mainly from nuts, unsaturated oils (such as fish oils and olive oil), and certain nut or seed oils (such as canola, soybean, or flaxseed oil). These fats may help protect the heart and blood vessels.  How can you get started on the Mediterranean diet?  Here are some things you can do to switch to a more Mediterranean way of eating.  What to eat  Eat a variety of fruits and vegetables each day, such as grapes, blueberries, tomatoes, broccoli, peppers, figs, olives, spinach, eggplant, beans, lentils, and chickpeas.  Eat a variety of whole-grain foods each day, such as oats, brown rice, and whole wheat bread, pasta, and couscous.  Eat fish at least 2 times a week. Try tuna, salmon, mackerel, lake trout, herring, or sardines.  Eat moderate amounts of low-fat dairy products, such as milk, cheese, or yogurt.  Eat moderate amounts of poultry and eggs.  Choose healthy (unsaturated) fats, such as nuts, olive oil, and certain nut or seed oils like canola, soybean, and flaxseed.  Limit unhealthy (saturated) fats, such as butter, palm oil, and coconut oil. And limit fats found in animal products, such as meat and dairy products made with whole milk. Try to eat red meat only a few times a month in very small amounts.  Limit sweets and desserts to only a few times a week. This includes sugar-sweetened drinks

## 2025-05-07 NOTE — TELEPHONE ENCOUNTER
Got another fax stating that it was denied. Called again. Stated that insurance will only cover lantus solostar     Only will cover fenofibrate if it is 48mg or 138mg

## 2025-05-13 ENCOUNTER — TELEPHONE (OUTPATIENT)
Dept: INTERNAL MEDICINE CLINIC | Facility: CLINIC | Age: 55
End: 2025-05-13

## 2025-05-13 NOTE — TELEPHONE ENCOUNTER
Called and spoke to 's office- she confirmed that they do have this patient listed as  but the time of death and cause is listed as unknown. She stated that the PCP would have to provide that info as he should have the health history- I advised her that I was calling on behalf of the PCP. She stated that's all the info that she could provide.

## (undated) DEVICE — 4.0MM EGG BUR

## (undated) DEVICE — C-ARM: Brand: UNBRANDED

## (undated) DEVICE — CATH ANGI BLLN DIL 2.5X15MM -- NC EUPHORA

## (undated) DEVICE — GUIDEWIRE 035IN 210CM PTFE COAT FIX COR J TIP 15MM FIRM BODY

## (undated) DEVICE — WEDGE BURR: Brand: MICA

## (undated) DEVICE — CATHETER GUID AD 6FR L100CM COR PERIPH GRN NYL PTFE XB L 3

## (undated) DEVICE — CLOTH PRE OP W9XL10.5IN 2% CHG FRAGRANCE RNS FREE READYPREP

## (undated) DEVICE — STERILE PVP: Brand: MEDLINE INDUSTRIES, INC.

## (undated) DEVICE — Z DUPLICATE USE 2103554 VALVE HEMOSTATIC BLEEDBK CTRL COPILOT

## (undated) DEVICE — CATH ANGI BLLN DIL 3.0X15MM -- NC EUPHORA

## (undated) DEVICE — GLOVE ORANGE PI 7 1/2   MSG9075

## (undated) DEVICE — GLOVE SURG SZ 85 L12IN FNGR ORTHO 126MIL CRM LTX FREE

## (undated) DEVICE — SPLINT THMB W4XL30IN FBRGLS PD PRECUT LTWT DURABLE FAST SET

## (undated) DEVICE — PTCA DILATATION CATHETER: Brand: EMERGE™

## (undated) DEVICE — CATH BLLN DIL 2.5 X15MM RX -- EUPHORA

## (undated) DEVICE — RADIFOCUS OPTITORQUE ANGIOGRAPHIC CATHETER: Brand: OPTITORQUE

## (undated) DEVICE — GLIDESHEATH SLENDER STAINLESS STEEL KIT: Brand: GLIDESHEATH SLENDER

## (undated) DEVICE — PAD,ABDOMINAL,5"X9",ST,LF,25/BX: Brand: MEDLINE INDUSTRIES, INC.

## (undated) DEVICE — SUTURE VCRL SZ 2-0 L27IN ABSRB UD L26MM CT-2 1/2 CIR J269H

## (undated) DEVICE — CATHETER ANGIO 5FR L100CM GRY S STL NYL JL3.5 3 SEG BRAID L

## (undated) DEVICE — CATHETER DIAG AD 5FR L110CM 145DEG COR GRY HYDRPHLC NYL

## (undated) DEVICE — GOWN,SIRUS,NONRNF,SETINSLV,XL,20/CS: Brand: MEDLINE

## (undated) DEVICE — BAND RADIAL COMPR ARTERY 24CM -- REG BX/10

## (undated) DEVICE — RUNTHROUGH NS EXTRA FLOPPY PTCA GUIDEWIRE: Brand: RUNTHROUGH

## (undated) DEVICE — GLOVE ORTHO 8   MSG9480

## (undated) DEVICE — GLOVE SURG SZ 8 L12IN FNGR THK79MIL GRN LTX FREE

## (undated) DEVICE — FOOT DR TOLLISON & WOMACK: Brand: MEDLINE INDUSTRIES, INC.

## (undated) DEVICE — CATH ANGI BLLN DIL 3.25X20MM -- NC EUPHORA